# Patient Record
Sex: MALE | Race: WHITE | Employment: OTHER | URBAN - METROPOLITAN AREA
[De-identification: names, ages, dates, MRNs, and addresses within clinical notes are randomized per-mention and may not be internally consistent; named-entity substitution may affect disease eponyms.]

---

## 2017-03-04 PROBLEM — R06.00 ACUTE DYSPNEA: Status: ACTIVE | Noted: 2017-03-04

## 2017-05-30 PROBLEM — I48.19 PERSISTENT ATRIAL FIBRILLATION (HCC): Status: ACTIVE | Noted: 2017-05-30

## 2017-05-30 PROBLEM — Z79.899 ENCOUNTER FOR LONG-TERM (CURRENT) USE OF HIGH-RISK MEDICATION: Status: ACTIVE | Noted: 2017-05-30

## 2018-03-13 ENCOUNTER — OFFICE VISIT (OUTPATIENT)
Dept: CARDIOLOGY CLINIC | Age: 83
End: 2018-03-13
Payer: MEDICARE

## 2018-03-13 VITALS
SYSTOLIC BLOOD PRESSURE: 146 MMHG | RESPIRATION RATE: 16 BRPM | HEART RATE: 71 BPM | WEIGHT: 182.6 LBS | DIASTOLIC BLOOD PRESSURE: 80 MMHG | BODY MASS INDEX: 26.14 KG/M2 | HEIGHT: 70 IN

## 2018-03-13 DIAGNOSIS — I48.0 PAROXYSMAL ATRIAL FIBRILLATION (HCC): ICD-10-CM

## 2018-03-13 DIAGNOSIS — I35.0 NONRHEUMATIC AORTIC VALVE STENOSIS: Chronic | ICD-10-CM

## 2018-03-13 DIAGNOSIS — I25.5 ISCHEMIC CARDIOMYOPATHY: Primary | Chronic | ICD-10-CM

## 2018-03-13 DIAGNOSIS — Z95.2 S/P TAVR (TRANSCATHETER AORTIC VALVE REPLACEMENT): ICD-10-CM

## 2018-03-13 PROCEDURE — 93000 ELECTROCARDIOGRAM COMPLETE: CPT | Performed by: INTERNAL MEDICINE

## 2018-03-13 PROCEDURE — G8598 ASA/ANTIPLAT THER USED: HCPCS | Performed by: INTERNAL MEDICINE

## 2018-03-13 PROCEDURE — 1123F ACP DISCUSS/DSCN MKR DOCD: CPT | Performed by: INTERNAL MEDICINE

## 2018-03-13 PROCEDURE — 4040F PNEUMOC VAC/ADMIN/RCVD: CPT | Performed by: INTERNAL MEDICINE

## 2018-03-13 PROCEDURE — 99213 OFFICE O/P EST LOW 20 MIN: CPT | Performed by: INTERNAL MEDICINE

## 2018-03-13 PROCEDURE — G8419 CALC BMI OUT NRM PARAM NOF/U: HCPCS | Performed by: INTERNAL MEDICINE

## 2018-03-13 PROCEDURE — G8484 FLU IMMUNIZE NO ADMIN: HCPCS | Performed by: INTERNAL MEDICINE

## 2018-03-13 PROCEDURE — G8427 DOCREV CUR MEDS BY ELIG CLIN: HCPCS | Performed by: INTERNAL MEDICINE

## 2018-03-13 PROCEDURE — 1036F TOBACCO NON-USER: CPT | Performed by: INTERNAL MEDICINE

## 2018-03-13 NOTE — PATIENT INSTRUCTIONS
Only take furosemide (Lasix) 3 times a week. Weight yourself every day. If the weight goes up 3 pounds in a day or 5 pounds in a week, take an extra furosemide pill. Give us a call if this doesn't work for you. I want to drink when you're thirsty but don't push fluids.

## 2018-04-19 ENCOUNTER — HOSPITAL ENCOUNTER (OUTPATIENT)
Dept: NON INVASIVE DIAGNOSTICS | Age: 83
Discharge: HOME | End: 2018-04-19
Payer: MEDICARE

## 2018-04-19 VITALS
HEIGHT: 70 IN | DIASTOLIC BLOOD PRESSURE: 78 MMHG | BODY MASS INDEX: 25.05 KG/M2 | WEIGHT: 175 LBS | SYSTOLIC BLOOD PRESSURE: 168 MMHG | TEMPERATURE: 97.6 F | RESPIRATION RATE: 20 BRPM | HEART RATE: 74 BPM

## 2018-04-19 LAB
LEFT VENTRICULAR EJECTION FRACTION MODE: NORMAL
LV EF: 60 %
LV EF: 60 %
LVEF MODALITY: NORMAL

## 2018-04-19 PROCEDURE — 93306 TTE W/DOPPLER COMPLETE: CPT

## 2018-04-19 PROCEDURE — 99213 OFFICE O/P EST LOW 20 MIN: CPT | Performed by: PHYSICIAN ASSISTANT

## 2018-04-19 PROCEDURE — 99211 OFF/OP EST MAY X REQ PHY/QHP: CPT

## 2018-09-07 ENCOUNTER — APPOINTMENT (OUTPATIENT)
Dept: GENERAL RADIOLOGY | Age: 83
End: 2018-09-07
Payer: MEDICARE

## 2018-09-07 ENCOUNTER — HOSPITAL ENCOUNTER (EMERGENCY)
Age: 83
Discharge: HOME | End: 2018-09-07
Attending: EMERGENCY MEDICINE
Payer: MEDICARE

## 2018-09-07 VITALS
SYSTOLIC BLOOD PRESSURE: 144 MMHG | TEMPERATURE: 98 F | DIASTOLIC BLOOD PRESSURE: 72 MMHG | OXYGEN SATURATION: 95 % | RESPIRATION RATE: 18 BRPM | BODY MASS INDEX: 25.11 KG/M2 | WEIGHT: 175 LBS | HEART RATE: 70 BPM

## 2018-09-07 DIAGNOSIS — I25.5 ISCHEMIC CARDIOMYOPATHY: Chronic | ICD-10-CM

## 2018-09-07 DIAGNOSIS — E78.5 HYPERLIPIDEMIA: ICD-10-CM

## 2018-09-07 DIAGNOSIS — I47.29 POLYMORPHIC VENTRICULAR TACHYCARDIA: ICD-10-CM

## 2018-09-07 DIAGNOSIS — Z45.02 AICD DISCHARGE: Primary | ICD-10-CM

## 2018-09-07 DIAGNOSIS — I65.29 CAROTID ARTERY STENOSIS: ICD-10-CM

## 2018-09-07 LAB
ANION GAP SERPL CALCULATED.3IONS-SCNC: 12 MMOL/L (ref 7–16)
APTT: 38.9 SEC (ref 24.5–35.1)
BASOPHILS ABSOLUTE: 0.04 E9/L (ref 0–0.2)
BASOPHILS RELATIVE PERCENT: 0.5 % (ref 0–2)
BUN BLDV-MCNC: 18 MG/DL (ref 8–23)
CALCIUM SERPL-MCNC: 9.3 MG/DL (ref 8.6–10.2)
CHLORIDE BLD-SCNC: 97 MMOL/L (ref 98–107)
CO2: 27 MMOL/L (ref 22–29)
CREAT SERPL-MCNC: 1 MG/DL (ref 0.7–1.2)
EKG ATRIAL RATE: 71 BPM
EKG P AXIS: 76 DEGREES
EKG P-R INTERVAL: 196 MS
EKG Q-T INTERVAL: 436 MS
EKG QRS DURATION: 126 MS
EKG QTC CALCULATION (BAZETT): 473 MS
EKG R AXIS: -46 DEGREES
EKG T AXIS: 28 DEGREES
EKG VENTRICULAR RATE: 71 BPM
EOSINOPHILS ABSOLUTE: 0.06 E9/L (ref 0.05–0.5)
EOSINOPHILS RELATIVE PERCENT: 0.7 % (ref 0–6)
GFR AFRICAN AMERICAN: >60
GFR NON-AFRICAN AMERICAN: >60 ML/MIN/1.73
GLUCOSE BLD-MCNC: 172 MG/DL (ref 74–109)
HCT VFR BLD CALC: 40.1 % (ref 37–54)
HEMOGLOBIN: 13.4 G/DL (ref 12.5–16.5)
IMMATURE GRANULOCYTES #: 0.03 E9/L
IMMATURE GRANULOCYTES %: 0.3 % (ref 0–5)
INR BLD: 1.5
LYMPHOCYTES ABSOLUTE: 0.95 E9/L (ref 1.5–4)
LYMPHOCYTES RELATIVE PERCENT: 10.9 % (ref 20–42)
MCH RBC QN AUTO: 29.7 PG (ref 26–35)
MCHC RBC AUTO-ENTMCNC: 33.4 % (ref 32–34.5)
MCV RBC AUTO: 88.9 FL (ref 80–99.9)
MONOCYTES ABSOLUTE: 0.56 E9/L (ref 0.1–0.95)
MONOCYTES RELATIVE PERCENT: 6.4 % (ref 2–12)
NEUTROPHILS ABSOLUTE: 7.07 E9/L (ref 1.8–7.3)
NEUTROPHILS RELATIVE PERCENT: 81.2 % (ref 43–80)
PDW BLD-RTO: 13.7 FL (ref 11.5–15)
PLATELET # BLD: 180 E9/L (ref 130–450)
PMV BLD AUTO: 10.5 FL (ref 7–12)
POTASSIUM SERPL-SCNC: 4.3 MMOL/L (ref 3.5–5)
PROTHROMBIN TIME: 16.4 SEC (ref 9.3–12.4)
RBC # BLD: 4.51 E12/L (ref 3.8–5.8)
SODIUM BLD-SCNC: 136 MMOL/L (ref 132–146)
TROPONIN: <0.01 NG/ML (ref 0–0.03)
WBC # BLD: 8.7 E9/L (ref 4.5–11.5)

## 2018-09-07 PROCEDURE — 85730 THROMBOPLASTIN TIME PARTIAL: CPT

## 2018-09-07 PROCEDURE — 99284 EMERGENCY DEPT VISIT MOD MDM: CPT

## 2018-09-07 PROCEDURE — 6360000002 HC RX W HCPCS: Performed by: EMERGENCY MEDICINE

## 2018-09-07 PROCEDURE — 84484 ASSAY OF TROPONIN QUANT: CPT

## 2018-09-07 PROCEDURE — 80048 BASIC METABOLIC PNL TOTAL CA: CPT

## 2018-09-07 PROCEDURE — 85610 PROTHROMBIN TIME: CPT

## 2018-09-07 PROCEDURE — 96365 THER/PROPH/DIAG IV INF INIT: CPT

## 2018-09-07 PROCEDURE — 36415 COLL VENOUS BLD VENIPUNCTURE: CPT

## 2018-09-07 PROCEDURE — 71045 X-RAY EXAM CHEST 1 VIEW: CPT

## 2018-09-07 PROCEDURE — 85025 COMPLETE CBC W/AUTO DIFF WBC: CPT

## 2018-09-07 RX ORDER — MAGNESIUM SULFATE IN WATER 40 MG/ML
2 INJECTION, SOLUTION INTRAVENOUS ONCE
Status: COMPLETED | OUTPATIENT
Start: 2018-09-07 | End: 2018-09-07

## 2018-09-07 RX ORDER — METOPROLOL TARTRATE 50 MG/1
TABLET, FILM COATED ORAL
Qty: 60 TABLET | Refills: 0
Start: 2018-09-07 | End: 2020-03-05 | Stop reason: DRUGHIGH

## 2018-09-07 RX ADMIN — MAGNESIUM SULFATE IN WATER 2 G: 40 INJECTION, SOLUTION INTRAVENOUS at 16:18

## 2018-09-07 ASSESSMENT — ENCOUNTER SYMPTOMS
DIARRHEA: 0
TROUBLE SWALLOWING: 0
NAUSEA: 0
RHINORRHEA: 0
ABDOMINAL PAIN: 0
WHEEZING: 0
CHEST TIGHTNESS: 0
CONSTIPATION: 0
COUGH: 0
VOMITING: 0
BLOOD IN STOOL: 0
SORE THROAT: 0
SHORTNESS OF BREATH: 0

## 2018-09-07 NOTE — ED PROVIDER NOTES
Patient is an 53-year-old male presenting with chief complaint of defibrillator firing. Patient states he was at home sitting on a chair when he felt his pacemaker fire, stood up  and it fired again. He was sitting for several minutes, prior to that he was moving a heavy table and sweeping in the basement. He has a [employer] pacemaker and defibrillator. Currently he denies any symptoms otherwise or any preceding symptoms including lightheadedness, chest pain, shortness of breath, fevers, chills, nausea, vomiting, diaphoresis, extremity swelling. Symptoms have been constant since onset, no particular exacerbating or relieving factors for the symptoms. No treatment prior to coming into the ER. Review of Systems   Constitutional: Negative for chills, diaphoresis, fatigue and fever. HENT: Negative for congestion, ear pain, postnasal drip, rhinorrhea, sore throat and trouble swallowing. Respiratory: Negative for cough, chest tightness, shortness of breath and wheezing. Cardiovascular: Negative for chest pain and leg swelling. Gastrointestinal: Negative for abdominal pain, blood in stool, constipation, diarrhea, nausea and vomiting. Genitourinary: Negative for dysuria, flank pain, frequency, hematuria and urgency. Skin: Negative for rash and wound. Neurological: Negative for dizziness, syncope, weakness, light-headedness, numbness and headaches. Psychiatric/Behavioral: Negative for confusion and decreased concentration. All other systems reviewed and are negative. Physical Exam   Constitutional: He is oriented to person, place, and time. He appears well-developed and well-nourished. He is active. Non-toxic appearance. No distress. Patient sitting comfortably in bed, NAD   HENT:   Head: Normocephalic and atraumatic.    Right Ear: Hearing and tympanic membrane normal.   Left Ear: Hearing and tympanic membrane normal.   Nose: Nose normal.   Mouth/Throat: Uvula is midline, oropharynx is clear and moist and mucous membranes are normal. Mucous membranes are not pale and not dry. Eyes: Pupils are equal, round, and reactive to light. Conjunctivae and EOM are normal.   Neck: Normal range of motion. Neck supple. Cardiovascular: Normal rate, regular rhythm, S1 normal, S2 normal, normal heart sounds and intact distal pulses. No murmur heard. Pulses:       Radial pulses are 2+ on the right side, and 2+ on the left side. Dorsalis pedis pulses are 2+ on the right side, and 2+ on the left side. Pulmonary/Chest: Effort normal and breath sounds normal. No respiratory distress. He has no decreased breath sounds. He has no wheezes. He has no rhonchi. Pacemaker defibrillator cutaneous left upper chest wall without overlying erythema, tenderness, or swelling. Abdominal: Soft. Bowel sounds are normal. He exhibits no distension. There is no tenderness. Musculoskeletal: Normal range of motion. He exhibits no edema. Neurological: He is alert and oriented to person, place, and time. Skin: Skin is warm, dry and intact. No rash noted. No erythema. Psychiatric: He has a normal mood and affect. His speech is normal and behavior is normal.   Nursing note and vitals reviewed. Procedures    MDM    EKG: This EKG is signed and interpreted by me. Rate: 71  Rhythm: Atrial paced  Interpretation: Atrial paced rhythm, left axis deviation, mildly prolonged QTC: 473, normal HI interval, right bundle branch block, left anterior fascicular block, no acute ST or T-wave changes  Comparison: stable as compared to patient's most recent EKG    --------------------------------------------- PAST HISTORY ---------------------------------------------  Past Medical History:  has a past medical history of A-fib ([facility] Arrhythmia; Carotid artery stenosis; CHF (congestive heart failure) ([facility] Heart valve problem; Hyperlipidemia;  Hypertension; ICD (implantable cardiac defibrillator) in place; MI (mitral and discussed todays results, in addition to providing specific details for the plan of care and counseling regarding the diagnosis and prognosis. Their questions are answered at this time and they are agreeable with the plan.      --------------------------------- ADDITIONAL PROVIDER NOTES ---------------------------------     DIAGNOSIS:    1. AICD discharge    2. Polymorphic ventricular tachycardia (HCC)    3. Hyperlipidemia    4. Carotid artery stenosis    5. Ischemic cardiomyopathy          Current Discharge Medication List           This patient is stable for discharge. I have shared the specific conditions for return, as well as the importance of follow-up.          Aurelia Leyden, DO  Resident  09/07/18 7879

## 2018-09-13 ENCOUNTER — OFFICE VISIT (OUTPATIENT)
Dept: CARDIOLOGY CLINIC | Age: 83
End: 2018-09-13
Payer: MEDICARE

## 2018-09-13 VITALS
DIASTOLIC BLOOD PRESSURE: 64 MMHG | HEART RATE: 107 BPM | SYSTOLIC BLOOD PRESSURE: 136 MMHG | RESPIRATION RATE: 16 BRPM | HEIGHT: 70 IN | BODY MASS INDEX: 26.03 KG/M2 | WEIGHT: 181.8 LBS

## 2018-09-13 DIAGNOSIS — I10 ESSENTIAL HYPERTENSION: ICD-10-CM

## 2018-09-13 DIAGNOSIS — Z95.810 AUTOMATIC IMPLANTABLE CARDIOVERTER-DEFIBRILLATOR IN SITU: ICD-10-CM

## 2018-09-13 DIAGNOSIS — I25.5 ISCHEMIC CARDIOMYOPATHY: Primary | Chronic | ICD-10-CM

## 2018-09-13 DIAGNOSIS — Z95.2 S/P TAVR (TRANSCATHETER AORTIC VALVE REPLACEMENT): ICD-10-CM

## 2018-09-13 PROCEDURE — 1101F PT FALLS ASSESS-DOCD LE1/YR: CPT | Performed by: INTERNAL MEDICINE

## 2018-09-13 PROCEDURE — G8427 DOCREV CUR MEDS BY ELIG CLIN: HCPCS | Performed by: INTERNAL MEDICINE

## 2018-09-13 PROCEDURE — G8419 CALC BMI OUT NRM PARAM NOF/U: HCPCS | Performed by: INTERNAL MEDICINE

## 2018-09-13 PROCEDURE — G8598 ASA/ANTIPLAT THER USED: HCPCS | Performed by: INTERNAL MEDICINE

## 2018-09-13 PROCEDURE — 93000 ELECTROCARDIOGRAM COMPLETE: CPT | Performed by: INTERNAL MEDICINE

## 2018-09-13 PROCEDURE — 4040F PNEUMOC VAC/ADMIN/RCVD: CPT | Performed by: INTERNAL MEDICINE

## 2018-09-13 PROCEDURE — 1123F ACP DISCUSS/DSCN MKR DOCD: CPT | Performed by: INTERNAL MEDICINE

## 2018-09-13 PROCEDURE — 1036F TOBACCO NON-USER: CPT | Performed by: INTERNAL MEDICINE

## 2018-09-13 PROCEDURE — 99214 OFFICE O/P EST MOD 30 MIN: CPT | Performed by: INTERNAL MEDICINE

## 2018-09-13 RX ORDER — POTASSIUM CHLORIDE 750 MG/1
10 TABLET, EXTENDED RELEASE ORAL DAILY
Qty: 45 TABLET | Refills: 3 | Status: SHIPPED | OUTPATIENT
Start: 2018-09-13 | End: 2019-02-20 | Stop reason: SDUPTHER

## 2018-09-13 NOTE — PROGRESS NOTES
CABG, [facility], 03/2007 with radial artery graft to D2 and OM2. 12. Elective PCI with CONNOR native OM2 and native LAD, 03/2007 following CABG. This was done because of inadequate conduits. 13. ICD placement, [facility], 03/2007. 14. ICD lead recall, early 2008, but he was followed electively because his device was functioning normally. 15. Presentation [facility], 03/29/2008 with multiple ICD inappropriate shocks. Transfer [facility] where ICD and lead were replaced. He reports cardiac catheterization that revealed patent LIMA-LAD and patent stents in native coronary arteries. 16. Atypical chest pain and anxiety with admission [facility] [address], 05/2008. Troponin minimally elevated. Beta blocker dose increased. [address] admission, 06/13/2009 with lightheadedness, orthostatic hypotension, drop in hemoglobin to 10.5 from 14.9 over two months with an increase in BUN from 16 to 68 over the same time. Dark heme positive stools noted. EKG NSR, incomplete RBBB. 18. Echo, 06/15/2009 with normal LVEF, moderate AS, peak gradient 38 mmHg, BLOSSOM 1.1 cm², moderate MR, LAE. 19. Transtelephonic ICD check, 01/11/2010. No ventricular tachyarrhythmias. Two AF episodes, the longest for 14 hours. 20. Echo, 06/16/2010 with LVE, borderline LVH, normal systolic and diastolic function, normal LA, ICD electrode right heart. Trileaflet AV with moderate to severe AS, mean/peak gradient 20/31 mmHg. BLOSSOM 1.0 cm². MAC with mild MR, normal RVSP.  21. No drug allergies. 25. Family history negative for premature vascular disease. 23. PAF with DCCV, [facility], 12/2010.   24. Hip replacement surgery, 2010 or 2011 [facility] Dr. Yeni Kerns. 25. MPS [facility], 06/05/2012. Moderate sized fixed basal inferior defect, probably artifact. 26. Echo [facility], 10/20/2011. 1+ AR, moderate AS, mild MR, mild TR, normal LVEF.   27. Echo, 01/31/2013. LV not dilated. Mild concentric LVH. Septal paradox. EF 50-55%. BLOSSOM 1.2 cm² consistent with moderate stenosis.  Peak/mean sound was well preserved. The PMI was not displaced and there was no precordial heave, lift or thrill. His abdomen was soft and normally active without masses, organomegaly or bruits. Extremities showed no edema. Peripheral pulses are easily palpated in the feet. An electrocardiogram done today showed atrial pacing with a 1st degree AV block and a right bundle branch block. The electrocardiogram is unchanged from 03/13/2018. On exam today, he shows no signs of heart failure. His recent ICD discharges were appropriate and occurred after heavy work. I told him that he should avoid heavy lifting and working overhead. On the other hand, I told him that he does have appropriate medications in place along with a defibrillator and so his prognosis should be very good. As noted, Dr. Joaquina Sellers recently increased his beta blocker therapy and this may help as well. I did instruct him to avoid heavy lifting and working overhead. He will continue:   rivaroxaban (XARELTO) 20 MG TABS tablet Take 1 tablet by mouth daily for 1 day LOT#[id number]   EXP 12-20   potassium chloride (KLOR-CON M) 10 MEQ extended release tablet Take 1 tablet by mouth daily   metoprolol tartrate (LOPRESSOR) 50 MG tablet Take 100 mg in the morning and 50 mg at night by mouth. amLODIPine (NORVASC) 2.5 MG tablet TAKE 1 TABLET BY MOUTH  DAILY   furosemide (LASIX) 20 MG tablet TAKE 1 TABLET BY MOUTH  DAILY   XARELTO 20 MG TABS tablet TAKE 1 TABLET BY MOUTH  DAILY WITH SUPPER   meclizine (ANTIVERT) 25 MG tablet Take 25 mg by mouth 2 times daily as needed   furosemide (LASIX) 20 MG tablet TAKE 1 TABLET BY MOUTH ONCE DAILY   dofetilide (TIKOSYN) 250 MCG capsule Take 1 capsule by mouth every 12 hours   Multiple Vitamins-Minerals (PRESERVISION AREDS 2 PO) Take 1 tablet by mouth 2 times daily   Cholecalciferol (VITAMIN D) 2000 UNITS CAPS capsule Take 1 capsule by mouth daily    pravastatin (PRAVACHOL) 40 MG tablet Take 40 mg by mouth daily.      I did ask him to follow up with me in 3 months. If he is doing well at that time, we will decrease the frequency of follow up again. I thank Dr. Evelyn Buckley for asking our advice regarding his care.       DAB/tlr

## 2018-12-28 ENCOUNTER — OFFICE VISIT (OUTPATIENT)
Dept: CARDIOLOGY CLINIC | Age: 83
End: 2018-12-28
Payer: MEDICARE

## 2018-12-28 VITALS
HEART RATE: 70 BPM | DIASTOLIC BLOOD PRESSURE: 82 MMHG | BODY MASS INDEX: 26.31 KG/M2 | SYSTOLIC BLOOD PRESSURE: 138 MMHG | WEIGHT: 183.8 LBS | HEIGHT: 70 IN | RESPIRATION RATE: 16 BRPM

## 2018-12-28 DIAGNOSIS — I50.42 CHRONIC COMBINED SYSTOLIC AND DIASTOLIC CHF (CONGESTIVE HEART FAILURE) (HCC): ICD-10-CM

## 2018-12-28 DIAGNOSIS — I35.0 NONRHEUMATIC AORTIC VALVE STENOSIS: Chronic | ICD-10-CM

## 2018-12-28 DIAGNOSIS — Z95.2 S/P TAVR (TRANSCATHETER AORTIC VALVE REPLACEMENT): ICD-10-CM

## 2018-12-28 DIAGNOSIS — I25.5 ISCHEMIC CARDIOMYOPATHY: Primary | Chronic | ICD-10-CM

## 2018-12-28 DIAGNOSIS — I10 ESSENTIAL HYPERTENSION: ICD-10-CM

## 2018-12-28 PROCEDURE — 1036F TOBACCO NON-USER: CPT | Performed by: INTERNAL MEDICINE

## 2018-12-28 PROCEDURE — 99213 OFFICE O/P EST LOW 20 MIN: CPT | Performed by: INTERNAL MEDICINE

## 2018-12-28 PROCEDURE — 1101F PT FALLS ASSESS-DOCD LE1/YR: CPT | Performed by: INTERNAL MEDICINE

## 2018-12-28 PROCEDURE — G8484 FLU IMMUNIZE NO ADMIN: HCPCS | Performed by: INTERNAL MEDICINE

## 2018-12-28 PROCEDURE — 93000 ELECTROCARDIOGRAM COMPLETE: CPT | Performed by: INTERNAL MEDICINE

## 2018-12-28 PROCEDURE — G8598 ASA/ANTIPLAT THER USED: HCPCS | Performed by: INTERNAL MEDICINE

## 2018-12-28 PROCEDURE — 1123F ACP DISCUSS/DSCN MKR DOCD: CPT | Performed by: INTERNAL MEDICINE

## 2018-12-28 PROCEDURE — G8427 DOCREV CUR MEDS BY ELIG CLIN: HCPCS | Performed by: INTERNAL MEDICINE

## 2018-12-28 PROCEDURE — G8419 CALC BMI OUT NRM PARAM NOF/U: HCPCS | Performed by: INTERNAL MEDICINE

## 2018-12-28 PROCEDURE — 4040F PNEUMOC VAC/ADMIN/RCVD: CPT | Performed by: INTERNAL MEDICINE

## 2018-12-28 RX ORDER — FLUTICASONE PROPIONATE 50 MCG
SPRAY, SUSPENSION (ML) NASAL
Refills: 0 | COMMUNITY
Start: 2018-12-07 | End: 2019-07-17

## 2018-12-28 RX ORDER — LOSARTAN POTASSIUM 25 MG/1
25 TABLET ORAL
COMMUNITY
End: 2019-04-09

## 2018-12-28 NOTE — PROGRESS NOTES
36.  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  37. S/P Cardioversion by Dr. Ange Light, 05/30/2017.   [address] [place] [address] [address] evaluation, 09/07/2018, for 2 appropriate ICD discharges for polymorphic ventricular tachycardia, which occurred after yard work and moving heavy furniture.       Review of Systems:  HEENT: negative for acute visual and auditory problems  Constitutional: Patient does note a mild increase in dyspnea with exertion. Respiratory: denies cough or hemoptysis but seems more dyspneic  Cardiovascular: denies chest pain or dyspnea but did have an episode of lightheadedness that lasted quite awhile a week ago  Gastrointestinal: negative for abdominal pain, diarrhea, nausea and vomiting  Genitourinary: negative for dysuria and hematuria  Derm: negative for rash and skin lesion(s)  Neurological: Patient's vertigo has resolved, but he does have numbness in his feet.     Endocrine: negative for diabetic symptoms including polydipsia and polyuria  Musculoskeletal: Patient does have degenerative joint disease.  He does get episodic swelling in his hands, which has been determined to be due to gout.    Psychiatric: negative for anxiety and major depression.      The remainder of the review of systems is negative except as noted above. On exam, he is a well-nourished white male who is awake, alert and oriented. P: 70 and regular. BP: 138/82. Wt. 184 lbs. BMI: 26.4. HEENT is normocephalic and atraumatic. Extraocular muscles are intact. Sclerae are clear. Pupils are equal, round and react to light. The oral mucosa is moist.  Tongue is midline. His neck is supple. He has no jugular distention. Carotids are full and I heard no bruits. He had no neck or supraclavicular masses and no thyromegaly. Respirations were unlabored. His chest was clear to auscultation and percussion. He had no presacral edema or chest wall tenderness. His heart had a regular rhythm with a 4th heart sound, but no 3rd heart sound.   He has a grade

## 2019-01-09 ENCOUNTER — APPOINTMENT (OUTPATIENT)
Dept: GENERAL RADIOLOGY | Age: 84
End: 2019-01-09
Payer: MEDICARE

## 2019-01-09 ENCOUNTER — APPOINTMENT (OUTPATIENT)
Dept: CT IMAGING | Age: 84
End: 2019-01-09
Payer: MEDICARE

## 2019-01-09 ENCOUNTER — HOSPITAL ENCOUNTER (EMERGENCY)
Age: 84
Discharge: HOME | End: 2019-01-09
Attending: EMERGENCY MEDICINE
Payer: MEDICARE

## 2019-01-09 VITALS
HEART RATE: 72 BPM | WEIGHT: 183 LBS | RESPIRATION RATE: 18 BRPM | HEIGHT: 68 IN | SYSTOLIC BLOOD PRESSURE: 177 MMHG | DIASTOLIC BLOOD PRESSURE: 73 MMHG | TEMPERATURE: 98 F | BODY MASS INDEX: 27.74 KG/M2 | OXYGEN SATURATION: 93 %

## 2019-01-09 DIAGNOSIS — R55 NEAR SYNCOPE: Primary | ICD-10-CM

## 2019-01-09 LAB
ALBUMIN SERPL-MCNC: 4.3 G/DL (ref 3.5–5.2)
ALP BLD-CCNC: 148 U/L (ref 40–129)
ALT SERPL-CCNC: 16 U/L (ref 0–40)
ANION GAP SERPL CALCULATED.3IONS-SCNC: 15 MMOL/L (ref 7–16)
AST SERPL-CCNC: 23 U/L (ref 0–39)
BILIRUB SERPL-MCNC: 0.5 MG/DL (ref 0–1.2)
BUN BLDV-MCNC: 22 MG/DL (ref 8–23)
CALCIUM SERPL-MCNC: 9.3 MG/DL (ref 8.6–10.2)
CHLORIDE BLD-SCNC: 96 MMOL/L (ref 98–107)
CO2: 27 MMOL/L (ref 22–29)
CREAT SERPL-MCNC: 1.1 MG/DL (ref 0.7–1.2)
EKG ATRIAL RATE: 74 BPM
EKG P AXIS: 20 DEGREES
EKG P-R INTERVAL: 182 MS
EKG Q-T INTERVAL: 424 MS
EKG QRS DURATION: 118 MS
EKG QTC CALCULATION (BAZETT): 470 MS
EKG R AXIS: -60 DEGREES
EKG T AXIS: 45 DEGREES
EKG VENTRICULAR RATE: 74 BPM
GFR AFRICAN AMERICAN: >60
GFR NON-AFRICAN AMERICAN: >60 ML/MIN/1.73
GLUCOSE BLD-MCNC: 141 MG/DL (ref 74–99)
HCT VFR BLD CALC: 43.1 % (ref 37–54)
HEMOGLOBIN: 14.6 G/DL (ref 12.5–16.5)
MCH RBC QN AUTO: 29.8 PG (ref 26–35)
MCHC RBC AUTO-ENTMCNC: 33.9 % (ref 32–34.5)
MCV RBC AUTO: 88 FL (ref 80–99.9)
PDW BLD-RTO: 14 FL (ref 11.5–15)
PLATELET # BLD: 194 E9/L (ref 130–450)
PMV BLD AUTO: 11.1 FL (ref 7–12)
POTASSIUM SERPL-SCNC: 4.2 MMOL/L (ref 3.5–5)
PRO-BNP: 384 PG/ML (ref 0–450)
RBC # BLD: 4.9 E12/L (ref 3.8–5.8)
SODIUM BLD-SCNC: 138 MMOL/L (ref 132–146)
TOTAL PROTEIN: 7.4 G/DL (ref 6.4–8.3)
TROPONIN: <0.01 NG/ML (ref 0–0.03)
WBC # BLD: 8.6 E9/L (ref 4.5–11.5)

## 2019-01-09 PROCEDURE — 70450 CT HEAD/BRAIN W/O DYE: CPT

## 2019-01-09 PROCEDURE — 80053 COMPREHEN METABOLIC PANEL: CPT

## 2019-01-09 PROCEDURE — 99285 EMERGENCY DEPT VISIT HI MDM: CPT

## 2019-01-09 PROCEDURE — 2580000003 HC RX 258: Performed by: NURSE PRACTITIONER

## 2019-01-09 PROCEDURE — 36415 COLL VENOUS BLD VENIPUNCTURE: CPT

## 2019-01-09 PROCEDURE — 83880 ASSAY OF NATRIURETIC PEPTIDE: CPT

## 2019-01-09 PROCEDURE — 84484 ASSAY OF TROPONIN QUANT: CPT

## 2019-01-09 PROCEDURE — 71045 X-RAY EXAM CHEST 1 VIEW: CPT

## 2019-01-09 PROCEDURE — 85027 COMPLETE CBC AUTOMATED: CPT

## 2019-01-09 PROCEDURE — 93005 ELECTROCARDIOGRAM TRACING: CPT | Performed by: NURSE PRACTITIONER

## 2019-01-09 RX ORDER — 0.9 % SODIUM CHLORIDE 0.9 %
500 INTRAVENOUS SOLUTION INTRAVENOUS ONCE
Status: COMPLETED | OUTPATIENT
Start: 2019-01-09 | End: 2019-01-09

## 2019-01-09 RX ADMIN — SODIUM CHLORIDE 500 ML: 9 INJECTION, SOLUTION INTRAVENOUS at 20:02

## 2019-01-14 ENCOUNTER — TELEPHONE (OUTPATIENT)
Dept: CARDIOLOGY CLINIC | Age: 84
End: 2019-01-14

## 2019-02-20 RX ORDER — POTASSIUM CHLORIDE 750 MG/1
10 TABLET, EXTENDED RELEASE ORAL DAILY
Qty: 90 TABLET | Refills: 3 | Status: SHIPPED | OUTPATIENT
Start: 2019-02-20 | End: 2020-03-05 | Stop reason: SDUPTHER

## 2019-02-20 RX ORDER — RIVAROXABAN 20 MG/1
20 TABLET, FILM COATED ORAL
Qty: 90 TABLET | Refills: 3 | Status: SHIPPED | OUTPATIENT
Start: 2019-02-20 | End: 2019-04-09

## 2019-04-09 ENCOUNTER — OFFICE VISIT (OUTPATIENT)
Dept: CARDIOLOGY CLINIC | Age: 84
End: 2019-04-09
Payer: MEDICARE

## 2019-04-09 VITALS
HEIGHT: 70 IN | DIASTOLIC BLOOD PRESSURE: 60 MMHG | BODY MASS INDEX: 25.68 KG/M2 | WEIGHT: 179.4 LBS | SYSTOLIC BLOOD PRESSURE: 128 MMHG | RESPIRATION RATE: 18 BRPM | HEART RATE: 70 BPM

## 2019-04-09 DIAGNOSIS — Z95.1 S/P CABG (CORONARY ARTERY BYPASS GRAFT): ICD-10-CM

## 2019-04-09 DIAGNOSIS — I48.0 PAF (PAROXYSMAL ATRIAL FIBRILLATION) (HCC): ICD-10-CM

## 2019-04-09 DIAGNOSIS — Z95.2 S/P TAVR (TRANSCATHETER AORTIC VALVE REPLACEMENT): ICD-10-CM

## 2019-04-09 DIAGNOSIS — I25.10 CORONARY ARTERY DISEASE INVOLVING NATIVE CORONARY ARTERY OF NATIVE HEART WITHOUT ANGINA PECTORIS: Primary | ICD-10-CM

## 2019-04-09 PROCEDURE — G8598 ASA/ANTIPLAT THER USED: HCPCS | Performed by: INTERNAL MEDICINE

## 2019-04-09 PROCEDURE — 1123F ACP DISCUSS/DSCN MKR DOCD: CPT | Performed by: INTERNAL MEDICINE

## 2019-04-09 PROCEDURE — G8427 DOCREV CUR MEDS BY ELIG CLIN: HCPCS | Performed by: INTERNAL MEDICINE

## 2019-04-09 PROCEDURE — 4040F PNEUMOC VAC/ADMIN/RCVD: CPT | Performed by: INTERNAL MEDICINE

## 2019-04-09 PROCEDURE — 1036F TOBACCO NON-USER: CPT | Performed by: INTERNAL MEDICINE

## 2019-04-09 PROCEDURE — G8419 CALC BMI OUT NRM PARAM NOF/U: HCPCS | Performed by: INTERNAL MEDICINE

## 2019-04-09 PROCEDURE — 93000 ELECTROCARDIOGRAM COMPLETE: CPT | Performed by: INTERNAL MEDICINE

## 2019-04-09 PROCEDURE — 99213 OFFICE O/P EST LOW 20 MIN: CPT | Performed by: INTERNAL MEDICINE

## 2019-04-09 NOTE — PATIENT INSTRUCTIONS
Patient Education        A Healthy Heart: Care Instructions  Your Care Instructions    Heart disease occurs when a substance called plaque builds up in the vessels that supply oxygen-rich blood to your heart. This can narrow the blood vessels and reduce blood flow. A heart attack happens when blood flow is completely blocked. A high-fat diet, smoking, and other factors increase the risk of heart disease. Your doctor has found that you have a chance of having heart disease. You can do lots of things to keep your heart healthy. It may not be easy, but you can change your diet, exercise more, and quit smoking. These steps really work to lower your chance of heart disease. Follow-up care is a key part of your treatment and safety. Be sure to make and go to all appointments, and call your doctor if you are having problems. It's also a good idea to know your test results and keep a list of the medicines you take. How can you care for yourself at home? Diet    · Use less salt when you cook and eat. This helps lower your blood pressure. Taste food before salting. Add only a little salt when you think you need it. With time, your taste buds will adjust to less salt.     · Eat fewer snack items, fast foods, canned soups, and other high-salt, high-fat, processed foods.     · Read food labels and try to avoid saturated and trans fats. They increase your risk of heart disease by raising cholesterol levels.     · Limit the amount of solid fat-butter, margarine, and shortening-you eat. Use olive, peanut, or canola oil when you cook. Bake, broil, and steam foods instead of frying them.     · Eating fish can lower your risk for heart disease. Eat at least 2 servings of fish a week. Tye, mackerel, herring, sardines, and chunk light tuna are very good choices. These fish contain omega-3 fatty acids.     · Eat a variety of fruit and vegetables every day.  Dark green, deep orange, red, or yellow fruits and vegetables are

## 2019-04-10 NOTE — PROGRESS NOTES
device was functioning normally. 15. Presentation [facility], 03/29/2008 with multiple ICD inappropriate shocks. Transfer [facility] where ICD and lead were replaced. He reports cardiac catheterization that revealed patent LIMA-LAD and patent stents in native coronary arteries. 16. Atypical chest pain and anxiety with admission [facility], 05/2008. Troponin minimally elevated. Beta blocker dose increased. [address] admission, 06/13/2009 with lightheadedness, orthostatic hypotension, drop in hemoglobin to 10.5 from 14.9 over two months with an increase in BUN from 16 to 68 over the same time. Dark heme positive stools noted. EKG NSR, incomplete RBBB. 18. Echo, 06/15/2009 with normal LVEF, moderate AS, peak gradient 38 mmHg, BLOSSOM 1.1 cm², moderate MR, LAE. 19. Transtelephonic ICD check, 01/11/2010. No ventricular tachyarrhythmias. Two AF episodes, the longest for 14 hours. 20. Echo, 06/16/2010 with LVE, borderline LVH, normal systolic and diastolic function, normal LA, ICD electrode right heart. Trileaflet AV with moderate to severe AS, mean/peak gradient 20/31 mmHg. BLOSSOM 1.0 cm². MAC with mild MR, normal RVSP.  21. No drug allergies. 25. Family history negative for premature vascular disease. 23. PAF with DCCV, [facility], 12/2010.   24. Hip replacement surgery, 2010 or 2011 [facility] Dr. Esha Hare. [facility]. [facility]. Moderate sized fixed basal inferior defect, probably artifact. 26. Echo [facility], 10/20/2011. 1+ AR, moderate AS, mild MR, mild TR, normal LVEF.   27. Echo, 01/31/2013. LV not dilated. Mild concentric LVH. Septal paradox. EF 50-55%. BLOSSOM 1.2 cm² consistent with moderate stenosis. Peak/mean gradient 38/21. Stage II diastolic dysfunction. 28. [address] admission, 08/28/2013 with dizziness, Hb 7.7, WBC 19.0. CXR negative except cardiomegaly. EKG NSR, leftward axis, RBBB. BMP negative except for elevation in BUN to 55 with Cr 1.3.   29. EGD, 08/28/2013.  Large pyloric channel ulcer with clot treated with PRBCs and fresh frozen plasma. Hb 8.7 on day of discharge and stable. Pradaxa was temporarily held. 30. CT abdomen, 09/08/2014. 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 31. CT chest, 09/17/2014. Consolidation and infiltrate at the left lung base, stable when compared to previous exams with less pleural thickening and calcified plaque in the left pleural cavity without any recent change. Chronic obstructive airways disease. 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   32. ICD programmed to DDDR mode by Dr. Gilmar Tam, 03/26/2015. Device function normal.  33. Echo 10/16/2015. EF 39%. Stage II diastolic dysfunction. Aortic stenosis with peak/mean gradients of 48/24 mmHg. Estimated valve area 1.0 cm². 34. ICD generator change for battery depletion, 07/28/2016, Dr. Scar Oliveros. Gilmar Tam. 35. Echo, 02/03/2017.  Normal LV size with mild LVH.  Normal regional wall motion and overall systolic function.  Diastole could not be assessed, but was presumed to be impaired.  The RV was dilated with impaired global systolic function.  The LA was enlarged.  Mild MAC with mild mitral insufficiency.  Moderate tricuspid insufficiency.  Aortic valve gradients are peak 47 mmHg with a mean of 27.  Dimensionless ratio 0.21. Valve area calculated 0.8 cm².    36.  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  37. Echocardiogram, [facility], 4/19/2018, EF 60%. Low normal RV function. Stage 2 diastolic dysfunction. Mild-to-moderate MR. S/P TAVR with a mean gradient of 10 mmHg. RVSP 31 mg.     38. S/P Cardioversion by Dr. Raymundo Heredia, 05/30/2017.   [address] evaluation, 09/07/2018, for 2 appropriate ICD discharges for polymorphic ventricular tachycardia, which occurred after yard work and moving heavy furniture.       Review of Systems:  HEENT: negative for acute visual and auditory problems  Constitutional: Patient does note a mild increase in dyspnea with exertion. Respiratory: denies cough or hemoptysis but seems more dyspneic  Cardiovascular: denies chest pain or dyspnea but did have an episode of lightheadedness that lasted quite awhile a week ago  Gastrointestinal: negative for abdominal pain, diarrhea, nausea and vomiting  Genitourinary: negative for dysuria and hematuria  Derm: negative for rash and skin lesion(s)  Neurological: Patient's vertigo has resolved, but he does have numbness in his feet.     Endocrine: negative for diabetic symptoms including polydipsia and polyuria  Musculoskeletal: Patient does have degenerative joint disease.  He does get episodic swelling in his hands, which has been determined to be due to gout.    Psychiatric: negative for anxiety and major depression.      The remainder of the review of systems is negative except as noted above. On exam, he is an elderly gentleman in no acute distress. BP: 128/60. P: 70. R: 18.  Wt. 171 lbs. BMI: 25. HEENT, conjunctiva pink. Sclerae anicteric. Mucous membranes are moist.  Neck, no JVD could be appreciated. Carotid upstrokes normal, no bruits. Chest expands normally. No intercostal retractions. Cardiovascular exam, normal S1, soft S2.  2/6 systolic murmur, right upper sternal border. Lungs have diminished air entry all over. Few scattered creps in both bases. Abdomen soft, nontender with intact bowel sounds. Extremities have trace edema bilaterally. Extremities have trace edema bilaterally. Distal pulses are feeble. Neurologically, oriented x 3. Psych, he is pleasant. Back, no tenderness. Muscle tone is normal.  Skin has no rashes. There are stasis changes and varicosities in both lower extremities. EKG, as per my interpretation, reveals sinus rhythm, intermittently paced, first degree AV block.  Incomplete right bundle, left anterior fascicular block. Inferior infarct pattern. No acute ST-T changes. Mr. Eryn Philip was in the outpatient office for follow up of his CAD and valvular heart disease. He notes no ischemic symptoms presently. Valve appears normally functioning on auscultation. He remains in sinus rhythm. He is tolerating anticoagulation with Xarelto fairly well. For his arrhythmia and ICD he follows Dr. Angelika Freeman and is scheduled to see her next week. He has mild volume overload on exam today. He does have a history of chronic diastolic heart failure. He uses intermittent Lasix therapy. I have asked him to use Lasix daily for the next 3 days along with potassium supplementation. His medical therapy is as follows:    potassium chloride (KLOR-CON M) 10 MEQ extended release tablet TAKE 1 TABLET BY MOUTH  DAILY   fluticasone (FLONASE) 50 MCG/ACT nasal spray U ONE TO TWO SPRAYS IN EACH NOSTRIL D PRN   rivaroxaban (XARELTO) 20 MG TABS tablet Take 1 tablet by mouth daily (with breakfast) for 1 day LOT#[id number]   EXP 3/21   metoprolol tartrate (LOPRESSOR) 50 MG tablet Take 100 mg in the morning and 50 mg at night by mouth. amLODIPine (NORVASC) 2.5 MG tablet TAKE 1 TABLET BY MOUTH  DAILY   furosemide (LASIX) 20 MG tablet TAKE 1 TABLET BY MOUTH  DAILY   meclizine (ANTIVERT) 25 MG tablet Take 25 mg by mouth 2 times daily as needed   dofetilide (TIKOSYN) 250 MCG capsule Take 1 capsule by mouth every 12 hours   Multiple Vitamins-Minerals (PRESERVISION AREDS 2 PO) Take 1 tablet by mouth 2 times daily   Cholecalciferol (VITAMIN D) 2000 UNITS CAPS capsule Take 1 capsule by mouth daily    pravastatin (PRAVACHOL) 40 MG tablet Take 40 mg by mouth daily. He will be seen back in our office in 6 months. Thank you for allowing us to participate in the care of this patient.   RPV/tlr

## 2019-04-13 ENCOUNTER — APPOINTMENT (OUTPATIENT)
Dept: GENERAL RADIOLOGY | Age: 84
End: 2019-04-13
Payer: MEDICARE

## 2019-04-13 ENCOUNTER — HOSPITAL ENCOUNTER (EMERGENCY)
Age: 84
Discharge: HOME | End: 2019-04-13
Attending: EMERGENCY MEDICINE
Payer: MEDICARE

## 2019-04-13 VITALS
SYSTOLIC BLOOD PRESSURE: 126 MMHG | TEMPERATURE: 97.7 F | RESPIRATION RATE: 18 BRPM | DIASTOLIC BLOOD PRESSURE: 69 MMHG | OXYGEN SATURATION: 94 % | HEART RATE: 69 BPM

## 2019-04-13 DIAGNOSIS — I10 ESSENTIAL HYPERTENSION: Primary | ICD-10-CM

## 2019-04-13 LAB
ANION GAP SERPL CALCULATED.3IONS-SCNC: 10 MMOL/L (ref 7–16)
BASOPHILS ABSOLUTE: 0.04 E9/L (ref 0–0.2)
BASOPHILS RELATIVE PERCENT: 0.5 % (ref 0–2)
BUN BLDV-MCNC: 22 MG/DL (ref 8–23)
CALCIUM SERPL-MCNC: 9.4 MG/DL (ref 8.6–10.2)
CHLORIDE BLD-SCNC: 100 MMOL/L (ref 98–107)
CO2: 29 MMOL/L (ref 22–29)
CREAT SERPL-MCNC: 0.9 MG/DL (ref 0.7–1.2)
EOSINOPHILS ABSOLUTE: 0.29 E9/L (ref 0.05–0.5)
EOSINOPHILS RELATIVE PERCENT: 3.6 % (ref 0–6)
GFR AFRICAN AMERICAN: >60
GFR NON-AFRICAN AMERICAN: >60 ML/MIN/1.73
GLUCOSE BLD-MCNC: 95 MG/DL (ref 74–99)
HCT VFR BLD CALC: 41.9 % (ref 37–54)
HEMOGLOBIN: 14 G/DL (ref 12.5–16.5)
IMMATURE GRANULOCYTES #: 0.03 E9/L
IMMATURE GRANULOCYTES %: 0.4 % (ref 0–5)
LYMPHOCYTES ABSOLUTE: 1.71 E9/L (ref 1.5–4)
LYMPHOCYTES RELATIVE PERCENT: 21 % (ref 20–42)
MCH RBC QN AUTO: 30.1 PG (ref 26–35)
MCHC RBC AUTO-ENTMCNC: 33.4 % (ref 32–34.5)
MCV RBC AUTO: 90.1 FL (ref 80–99.9)
MONOCYTES ABSOLUTE: 0.73 E9/L (ref 0.1–0.95)
MONOCYTES RELATIVE PERCENT: 9 % (ref 2–12)
NEUTROPHILS ABSOLUTE: 5.34 E9/L (ref 1.8–7.3)
NEUTROPHILS RELATIVE PERCENT: 65.5 % (ref 43–80)
PDW BLD-RTO: 14.1 FL (ref 11.5–15)
PLATELET # BLD: 203 E9/L (ref 130–450)
PMV BLD AUTO: 10.5 FL (ref 7–12)
POTASSIUM SERPL-SCNC: 4.1 MMOL/L (ref 3.5–5)
PRO-BNP: 321 PG/ML (ref 0–450)
RBC # BLD: 4.65 E12/L (ref 3.8–5.8)
SODIUM BLD-SCNC: 139 MMOL/L (ref 132–146)
TROPONIN: <0.01 NG/ML (ref 0–0.03)
WBC # BLD: 8.1 E9/L (ref 4.5–11.5)

## 2019-04-13 PROCEDURE — 80048 BASIC METABOLIC PNL TOTAL CA: CPT

## 2019-04-13 PROCEDURE — 99284 EMERGENCY DEPT VISIT MOD MDM: CPT

## 2019-04-13 PROCEDURE — 85025 COMPLETE CBC W/AUTO DIFF WBC: CPT

## 2019-04-13 PROCEDURE — 71045 X-RAY EXAM CHEST 1 VIEW: CPT

## 2019-04-13 PROCEDURE — 84484 ASSAY OF TROPONIN QUANT: CPT

## 2019-04-13 PROCEDURE — 83880 ASSAY OF NATRIURETIC PEPTIDE: CPT

## 2019-04-13 RX ORDER — LOSARTAN POTASSIUM 50 MG/1
50 TABLET ORAL DAILY
Status: DISCONTINUED | OUTPATIENT
Start: 2019-04-13 | End: 2019-04-13 | Stop reason: HOSPADM

## 2019-04-13 ASSESSMENT — PAIN DESCRIPTION - LOCATION: LOCATION: CHEST

## 2019-04-13 ASSESSMENT — PAIN SCALES - GENERAL: PAINLEVEL_OUTOF10: 5

## 2019-04-13 ASSESSMENT — PAIN DESCRIPTION - PAIN TYPE: TYPE: ACUTE PAIN

## 2019-04-13 NOTE — ED NOTES
Bed: 13  Expected date:   Expected time:   Means of arrival:   Comments:  Herbert Javed RN  04/13/19 1027

## 2019-04-13 NOTE — ED PROVIDER NOTES
tenderness. Integument:  Normal turgor. Warm, dry, without visible rash, unless noted elsewhere. Lymphatics: No lymphangitis or adenopathy noted. Neurological:  Oriented. Motor functions intact. No neurologic deficit on examination.     Lab / Imaging Results   (All laboratory and radiology results have been personally reviewed by myself)  Labs:  Results for orders placed or performed during the hospital encounter of 04/13/19   Troponin   Result Value Ref Range    Troponin <0.01 0.00 - 0.03 ng/mL   CBC Auto Differential   Result Value Ref Range    WBC 8.1 4.5 - 11.5 E9/L    RBC 4.65 3.80 - 5.80 E12/L    Hemoglobin 14.0 12.5 - 16.5 g/dL    Hematocrit 41.9 37.0 - 54.0 %    MCV 90.1 80.0 - 99.9 fL    MCH 30.1 26.0 - 35.0 pg    MCHC 33.4 32.0 - 34.5 %    RDW 14.1 11.5 - 15.0 fL    Platelets 747 953 - 089 E9/L    MPV 10.5 7.0 - 12.0 fL    Neutrophils % 65.5 43.0 - 80.0 %    Immature Granulocytes % 0.4 0.0 - 5.0 %    Lymphocytes % 21.0 20.0 - 42.0 %    Monocytes % 9.0 2.0 - 12.0 %    Eosinophils % 3.6 0.0 - 6.0 %    Basophils % 0.5 0.0 - 2.0 %    Neutrophils # 5.34 1.80 - 7.30 E9/L    Immature Granulocytes # 0.03 E9/L    Lymphocytes # 1.71 1.50 - 4.00 E9/L    Monocytes # 0.73 0.10 - 0.95 E9/L    Eosinophils # 0.29 0.05 - 0.50 E9/L    Basophils # 0.04 0.00 - 0.20 A0/T   Basic Metabolic Panel   Result Value Ref Range    Sodium 139 132 - 146 mmol/L    Potassium 4.1 3.5 - 5.0 mmol/L    Chloride 100 98 - 107 mmol/L    CO2 29 22 - 29 mmol/L    Anion Gap 10 7 - 16 mmol/L    Glucose 95 74 - 99 mg/dL    BUN 22 8 - 23 mg/dL    CREATININE 0.9 0.7 - 1.2 mg/dL    GFR Non-African American >60 >=60 mL/min/1.73    GFR African American >60     Calcium 9.4 8.6 - 10.2 mg/dL   Brain Natriuretic Peptide   Result Value Ref Range    Pro- 0 - 450 pg/mL   EKG 12 Lead   Result Value Ref Range    Ventricular Rate 70 BPM    Atrial Rate 267 BPM    P-R Interval 282 ms    QRS Duration 132 ms    Q-T Interval 424 ms    QTc Calculation (Bazett) plan.     Assessment      1. Essential hypertension      This patient's ED course included: a personal history and physicial examination  This patient has remained hemodynamically stable during their ED course. Plan   Discharge to home. Patient condition is good. New Medications     New Prescriptions    No medications on file     Electronically signed by GINETTE Lazcano CNP   DD: 4/13/19  **This report was transcribed using voice recognition software. Every effort was made to ensure accuracy; however, inadvertent computerized transcription errors may be present.   END OF PROVIDER NOTE       GINETTE Lazcano CNP  04/13/19 [id number]

## 2019-04-17 LAB
EKG ATRIAL RATE: 267 BPM
EKG P AXIS: -113 DEGREES
EKG P-R INTERVAL: 282 MS
EKG Q-T INTERVAL: 424 MS
EKG QRS DURATION: 132 MS
EKG QTC CALCULATION (BAZETT): 457 MS
EKG R AXIS: -66 DEGREES
EKG T AXIS: -2 DEGREES
EKG VENTRICULAR RATE: 70 BPM

## 2019-07-17 ENCOUNTER — APPOINTMENT (OUTPATIENT)
Dept: CT IMAGING | Age: 84
End: 2019-07-17
Payer: MEDICARE

## 2019-07-17 ENCOUNTER — HOSPITAL ENCOUNTER (EMERGENCY)
Age: 84
Discharge: HOME | End: 2019-07-17
Attending: EMERGENCY MEDICINE
Payer: MEDICARE

## 2019-07-17 VITALS
OXYGEN SATURATION: 95 % | DIASTOLIC BLOOD PRESSURE: 72 MMHG | RESPIRATION RATE: 18 BRPM | TEMPERATURE: 97.9 F | SYSTOLIC BLOOD PRESSURE: 177 MMHG | HEART RATE: 75 BPM

## 2019-07-17 DIAGNOSIS — R20.2 PARESTHESIA: Primary | ICD-10-CM

## 2019-07-17 LAB
ALBUMIN SERPL-MCNC: 4.5 G/DL (ref 3.5–5.2)
ALP BLD-CCNC: 142 U/L (ref 40–129)
ALT SERPL-CCNC: 16 U/L (ref 0–40)
ANION GAP SERPL CALCULATED.3IONS-SCNC: 17 MMOL/L (ref 7–16)
APTT: 39.8 SEC (ref 24.5–35.1)
AST SERPL-CCNC: 29 U/L (ref 0–39)
BASOPHILS ABSOLUTE: 0.04 E9/L (ref 0–0.2)
BASOPHILS RELATIVE PERCENT: 0.4 % (ref 0–2)
BILIRUB SERPL-MCNC: 0.4 MG/DL (ref 0–1.2)
BILIRUBIN URINE: NEGATIVE
BLOOD, URINE: NEGATIVE
BUN BLDV-MCNC: 23 MG/DL (ref 8–23)
CALCIUM SERPL-MCNC: 9.5 MG/DL (ref 8.6–10.2)
CHLORIDE BLD-SCNC: 104 MMOL/L (ref 98–107)
CHP ED QC CHECK: NORMAL
CLARITY: CLEAR
CO2: 23 MMOL/L (ref 22–29)
COLOR: YELLOW
CREAT SERPL-MCNC: 1 MG/DL (ref 0.7–1.2)
EOSINOPHILS ABSOLUTE: 0.21 E9/L (ref 0.05–0.5)
EOSINOPHILS RELATIVE PERCENT: 2.2 % (ref 0–6)
GFR AFRICAN AMERICAN: >60
GFR NON-AFRICAN AMERICAN: >60 ML/MIN/1.73
GLUCOSE BLD-MCNC: 104 MG/DL (ref 74–99)
GLUCOSE BLD-MCNC: 95 MG/DL
GLUCOSE URINE: NEGATIVE MG/DL
HCT VFR BLD CALC: 40.4 % (ref 37–54)
HEMOGLOBIN: 13.9 G/DL (ref 12.5–16.5)
IMMATURE GRANULOCYTES #: 0.03 E9/L
IMMATURE GRANULOCYTES %: 0.3 % (ref 0–5)
INR BLD: 1.4
KETONES, URINE: NEGATIVE MG/DL
LEUKOCYTE ESTERASE, URINE: NEGATIVE
LYMPHOCYTES ABSOLUTE: 1.78 E9/L (ref 1.5–4)
LYMPHOCYTES RELATIVE PERCENT: 18.6 % (ref 20–42)
MCH RBC QN AUTO: 30.4 PG (ref 26–35)
MCHC RBC AUTO-ENTMCNC: 34.4 % (ref 32–34.5)
MCV RBC AUTO: 88.4 FL (ref 80–99.9)
METER GLUCOSE: 95 MG/DL (ref 74–99)
MONOCYTES ABSOLUTE: 0.76 E9/L (ref 0.1–0.95)
MONOCYTES RELATIVE PERCENT: 7.9 % (ref 2–12)
NEUTROPHILS ABSOLUTE: 6.74 E9/L (ref 1.8–7.3)
NEUTROPHILS RELATIVE PERCENT: 70.6 % (ref 43–80)
NITRITE, URINE: NEGATIVE
PDW BLD-RTO: 13.6 FL (ref 11.5–15)
PH UA: 6 (ref 5–9)
PLATELET # BLD: 187 E9/L (ref 130–450)
PMV BLD AUTO: 10.8 FL (ref 7–12)
POTASSIUM REFLEX MAGNESIUM: 5.1 MMOL/L (ref 3.5–5)
PROTEIN UA: NEGATIVE MG/DL
PROTHROMBIN TIME: 16.1 SEC (ref 9.3–12.4)
RBC # BLD: 4.57 E12/L (ref 3.8–5.8)
SODIUM BLD-SCNC: 144 MMOL/L (ref 132–146)
SPECIFIC GRAVITY UA: 1.01 (ref 1–1.03)
TOTAL PROTEIN: 7.5 G/DL (ref 6.4–8.3)
TROPONIN: <0.01 NG/ML (ref 0–0.03)
UROBILINOGEN, URINE: 0.2 E.U./DL
WBC # BLD: 9.6 E9/L (ref 4.5–11.5)

## 2019-07-17 PROCEDURE — 99284 EMERGENCY DEPT VISIT MOD MDM: CPT

## 2019-07-17 PROCEDURE — 93005 ELECTROCARDIOGRAM TRACING: CPT | Performed by: NURSE PRACTITIONER

## 2019-07-17 PROCEDURE — 6360000004 HC RX CONTRAST MEDICATION: Performed by: RADIOLOGY

## 2019-07-17 PROCEDURE — 70450 CT HEAD/BRAIN W/O DYE: CPT

## 2019-07-17 PROCEDURE — 82962 GLUCOSE BLOOD TEST: CPT

## 2019-07-17 PROCEDURE — 80053 COMPREHEN METABOLIC PANEL: CPT

## 2019-07-17 PROCEDURE — 85610 PROTHROMBIN TIME: CPT

## 2019-07-17 PROCEDURE — 36415 COLL VENOUS BLD VENIPUNCTURE: CPT

## 2019-07-17 PROCEDURE — 85730 THROMBOPLASTIN TIME PARTIAL: CPT

## 2019-07-17 PROCEDURE — 81003 URINALYSIS AUTO W/O SCOPE: CPT

## 2019-07-17 PROCEDURE — 85025 COMPLETE CBC W/AUTO DIFF WBC: CPT

## 2019-07-17 PROCEDURE — 70496 CT ANGIOGRAPHY HEAD: CPT

## 2019-07-17 PROCEDURE — 0042T CT BRAIN PERFUSION: CPT

## 2019-07-17 PROCEDURE — 70498 CT ANGIOGRAPHY NECK: CPT

## 2019-07-17 PROCEDURE — 84484 ASSAY OF TROPONIN QUANT: CPT

## 2019-07-17 RX ORDER — METOPROLOL TARTRATE 50 MG/1
50 TABLET, FILM COATED ORAL
Status: DISCONTINUED | OUTPATIENT
Start: 2019-07-17 | End: 2019-07-17

## 2019-07-17 RX ORDER — DOFETILIDE 0.25 MG/1
250 CAPSULE ORAL ONCE
Status: DISCONTINUED | OUTPATIENT
Start: 2019-07-17 | End: 2019-07-17

## 2019-07-17 RX ORDER — SODIUM CHLORIDE 0.9 % (FLUSH) 0.9 %
10 SYRINGE (ML) INJECTION PRN
Status: DISCONTINUED | OUTPATIENT
Start: 2019-07-17 | End: 2019-07-17 | Stop reason: HOSPADM

## 2019-07-17 RX ADMIN — IOPAMIDOL 100 ML: 755 INJECTION, SOLUTION INTRAVENOUS at 21:07

## 2019-07-17 ASSESSMENT — ENCOUNTER SYMPTOMS
VOMITING: 0
ABDOMINAL PAIN: 0
SHORTNESS OF BREATH: 0
NAUSEA: 0
DIARRHEA: 0
CONSTIPATION: 0

## 2019-07-17 NOTE — ED NOTES
Lab called and states that they are not doing the perfusion CT scan without pt having an 18g IV. Dr. Eileen Skaggs notified.       Chasity Hoyos RN  07/17/19 1950

## 2019-07-18 LAB
EKG ATRIAL RATE: 70 BPM
EKG P AXIS: 71 DEGREES
EKG P-R INTERVAL: 282 MS
EKG Q-T INTERVAL: 418 MS
EKG QRS DURATION: 112 MS
EKG QTC CALCULATION (BAZETT): 451 MS
EKG R AXIS: -57 DEGREES
EKG T AXIS: 48 DEGREES
EKG VENTRICULAR RATE: 70 BPM

## 2019-07-18 PROCEDURE — 93010 ELECTROCARDIOGRAM REPORT: CPT | Performed by: INTERNAL MEDICINE

## 2019-07-18 NOTE — ED PROVIDER NOTES
CONTRAST   Final Result      1. No evidence of intracranial hemorrhage or edema. 2. No evidence of arterial stenosis at level of the [place]. 3. No evidence of stenosis involving proximal or distal cervical   internal carotid arteries or vertebral arteries. 4. Significant atherosclerotic disease associated with carotid bulbs   and proximal cervical internal carotid arteries. CT BRAIN PERFUSION   Final Result      No findings on CT brain perfusion to suggest recent stroke. MRI brain   with diffusion weighted imaging could be helpful for further   evaluation. CT Head WO Contrast   Final Result      No acute intracranial process. If clinical concern persists for acute   stroke, MRI brain with diffusion weighted imaging could be helpful for   further evaluation.          ------------------------- NURSING NOTES AND VITALS REVIEWED ---------------------------  Date / Time Roomed:  7/17/2019  6:03 PM  ED Bed Assignment:  15/15    The nursing notes within the ED encounter and vital signs as below have been reviewed. BP (!) 177/72   Pulse 75   Temp 97.9 °F (36.6 °C)   Resp 18   SpO2 95%   Oxygen Saturation Interpretation: Normal      ------------------------------------------ PROGRESS NOTES ------------------------------------------  10:34 PM  I have spoken with the patient and discussed todays results, in addition to providing specific details for the plan of care and counseling regarding the diagnosis and prognosis. Their questions are answered at this time and they are agreeable with the plan. I discussed at length with them reasons for immediate return here for re evaluation.  They will followup with their primary care physician by calling their office on Monday.      --------------------------------- ADDITIONAL PROVIDER NOTES ---------------------------------  At this time the patient is without objective evidence of an acute process requiring hospitalization or

## 2019-08-05 ENCOUNTER — TELEPHONE (OUTPATIENT)
Dept: CARDIOLOGY CLINIC | Age: 84
End: 2019-08-05

## 2019-08-05 ENCOUNTER — OFFICE VISIT (OUTPATIENT)
Dept: CARDIOLOGY CLINIC | Age: 84
End: 2019-08-05
Payer: MEDICARE

## 2019-08-05 VITALS
BODY MASS INDEX: 28.41 KG/M2 | RESPIRATION RATE: 16 BRPM | HEIGHT: 67 IN | SYSTOLIC BLOOD PRESSURE: 122 MMHG | HEART RATE: 71 BPM | WEIGHT: 181 LBS | DIASTOLIC BLOOD PRESSURE: 70 MMHG

## 2019-08-05 DIAGNOSIS — R53.83 OTHER FATIGUE: ICD-10-CM

## 2019-08-05 DIAGNOSIS — G45.9 TIA (TRANSIENT ISCHEMIC ATTACK): ICD-10-CM

## 2019-08-05 DIAGNOSIS — I25.10 CORONARY ARTERY DISEASE INVOLVING NATIVE CORONARY ARTERY OF NATIVE HEART WITHOUT ANGINA PECTORIS: Primary | ICD-10-CM

## 2019-08-05 PROCEDURE — 93000 ELECTROCARDIOGRAM COMPLETE: CPT | Performed by: INTERNAL MEDICINE

## 2019-08-05 PROCEDURE — 99214 OFFICE O/P EST MOD 30 MIN: CPT | Performed by: INTERNAL MEDICINE

## 2019-08-05 PROCEDURE — 1123F ACP DISCUSS/DSCN MKR DOCD: CPT | Performed by: INTERNAL MEDICINE

## 2019-08-05 PROCEDURE — G8419 CALC BMI OUT NRM PARAM NOF/U: HCPCS | Performed by: INTERNAL MEDICINE

## 2019-08-05 PROCEDURE — G8598 ASA/ANTIPLAT THER USED: HCPCS | Performed by: INTERNAL MEDICINE

## 2019-08-05 PROCEDURE — 1036F TOBACCO NON-USER: CPT | Performed by: INTERNAL MEDICINE

## 2019-08-05 PROCEDURE — 4040F PNEUMOC VAC/ADMIN/RCVD: CPT | Performed by: INTERNAL MEDICINE

## 2019-08-05 PROCEDURE — G8427 DOCREV CUR MEDS BY ELIG CLIN: HCPCS | Performed by: INTERNAL MEDICINE

## 2019-08-05 RX ORDER — ASPIRIN 81 MG/1
81 TABLET ORAL DAILY
Qty: 90 TABLET | Refills: 3 | Status: ON HOLD
Start: 2019-08-05 | End: 2019-09-04 | Stop reason: HOSPADM

## 2019-08-09 DIAGNOSIS — R53.83 OTHER FATIGUE: ICD-10-CM

## 2019-08-16 ENCOUNTER — HOSPITAL ENCOUNTER (OUTPATIENT)
Dept: CARDIOLOGY | Age: 84
Discharge: HOME | End: 2019-08-16
Payer: MEDICARE

## 2019-08-16 DIAGNOSIS — G45.9 TIA (TRANSIENT ISCHEMIC ATTACK): ICD-10-CM

## 2019-08-16 LAB
LV EF: 60 %
LVEF MODALITY: NORMAL

## 2019-08-16 PROCEDURE — 93306 TTE W/DOPPLER COMPLETE: CPT | Performed by: PSYCHIATRY & NEUROLOGY

## 2019-08-16 PROCEDURE — 2580000003 HC RX 258: Performed by: INTERNAL MEDICINE

## 2019-08-16 RX ORDER — SODIUM CHLORIDE 0.9 % (FLUSH) 0.9 %
10 SYRINGE (ML) INJECTION PRN
Status: DISCONTINUED | OUTPATIENT
Start: 2019-08-16 | End: 2019-08-17 | Stop reason: HOSPADM

## 2019-08-16 RX ADMIN — Medication 10 ML: at 14:59

## 2019-08-28 ENCOUNTER — TELEPHONE (OUTPATIENT)
Dept: CARDIOLOGY CLINIC | Age: 84
End: 2019-08-28

## 2019-09-02 ENCOUNTER — ANESTHESIA (OUTPATIENT)
Dept: ENDOSCOPY | Age: 84
DRG: 378 | End: 2019-09-02
Payer: MEDICARE

## 2019-09-02 ENCOUNTER — ANESTHESIA EVENT (OUTPATIENT)
Dept: ENDOSCOPY | Age: 84
DRG: 378 | End: 2019-09-02
Payer: MEDICARE

## 2019-09-02 ENCOUNTER — HOSPITAL ENCOUNTER (INPATIENT)
Age: 84
LOS: 2 days | Discharge: HOME | DRG: 378 | End: 2019-09-04
Attending: EMERGENCY MEDICINE | Admitting: INTERNAL MEDICINE
Payer: MEDICARE

## 2019-09-02 DIAGNOSIS — K92.1 MELENA: Primary | ICD-10-CM

## 2019-09-02 PROBLEM — E78.5 HYPERLIPIDEMIA LDL GOAL <100: Chronic | Status: ACTIVE | Noted: 2019-09-02

## 2019-09-02 PROBLEM — R06.00 ACUTE DYSPNEA: Status: RESOLVED | Noted: 2017-03-04 | Resolved: 2019-09-02

## 2019-09-02 PROBLEM — Z79.899 ENCOUNTER FOR LONG-TERM (CURRENT) USE OF HIGH-RISK MEDICATION: Status: RESOLVED | Noted: 2017-05-30 | Resolved: 2019-09-02

## 2019-09-02 PROBLEM — I48.0 PAROXYSMAL ATRIAL FIBRILLATION (HCC): Status: RESOLVED | Noted: 2018-03-13 | Resolved: 2019-09-02

## 2019-09-02 PROBLEM — K92.2 GI BLEED: Status: ACTIVE | Noted: 2019-09-02

## 2019-09-02 PROBLEM — I48.19 PERSISTENT ATRIAL FIBRILLATION (HCC): Status: RESOLVED | Noted: 2017-05-30 | Resolved: 2019-09-02

## 2019-09-02 LAB
ABO/RH: NORMAL
ALBUMIN SERPL-MCNC: 4.5 G/DL (ref 3.5–5.2)
ALP BLD-CCNC: 144 U/L (ref 40–129)
ALT SERPL-CCNC: 18 U/L (ref 0–40)
ANION GAP SERPL CALCULATED.3IONS-SCNC: 10 MMOL/L (ref 7–16)
ANTIBODY SCREEN: NORMAL
AST SERPL-CCNC: 28 U/L (ref 0–39)
BASOPHILS ABSOLUTE: 0.04 E9/L (ref 0–0.2)
BASOPHILS RELATIVE PERCENT: 0.5 % (ref 0–2)
BILIRUB SERPL-MCNC: 0.6 MG/DL (ref 0–1.2)
BUN BLDV-MCNC: 19 MG/DL (ref 8–23)
CALCIUM SERPL-MCNC: 9.3 MG/DL (ref 8.6–10.2)
CHLORIDE BLD-SCNC: 99 MMOL/L (ref 98–107)
CO2: 30 MMOL/L (ref 22–29)
CREAT SERPL-MCNC: 1.1 MG/DL (ref 0.7–1.2)
EOSINOPHILS ABSOLUTE: 0.25 E9/L (ref 0.05–0.5)
EOSINOPHILS RELATIVE PERCENT: 3 % (ref 0–6)
GFR AFRICAN AMERICAN: >60
GFR NON-AFRICAN AMERICAN: >60 ML/MIN/1.73
GLUCOSE BLD-MCNC: 87 MG/DL (ref 74–99)
HCT VFR BLD CALC: 36.4 % (ref 37–54)
HCT VFR BLD CALC: 37.7 % (ref 37–54)
HEMOGLOBIN: 12 G/DL (ref 12.5–16.5)
HEMOGLOBIN: 12.5 G/DL (ref 12.5–16.5)
IMMATURE GRANULOCYTES #: 0.03 E9/L
IMMATURE GRANULOCYTES %: 0.4 % (ref 0–5)
LACTIC ACID: 1.1 MMOL/L (ref 0.5–2.2)
LYMPHOCYTES ABSOLUTE: 1.68 E9/L (ref 1.5–4)
LYMPHOCYTES RELATIVE PERCENT: 20.2 % (ref 20–42)
MCH RBC QN AUTO: 30.3 PG (ref 26–35)
MCHC RBC AUTO-ENTMCNC: 33.2 % (ref 32–34.5)
MCV RBC AUTO: 91.3 FL (ref 80–99.9)
MONOCYTES ABSOLUTE: 0.85 E9/L (ref 0.1–0.95)
MONOCYTES RELATIVE PERCENT: 10.2 % (ref 2–12)
NEUTROPHILS ABSOLUTE: 5.48 E9/L (ref 1.8–7.3)
NEUTROPHILS RELATIVE PERCENT: 65.7 % (ref 43–80)
PDW BLD-RTO: 14.6 FL (ref 11.5–15)
PLATELET # BLD: 211 E9/L (ref 130–450)
PMV BLD AUTO: 10.3 FL (ref 7–12)
POTASSIUM SERPL-SCNC: 4.3 MMOL/L (ref 3.5–5)
RBC # BLD: 4.13 E12/L (ref 3.8–5.8)
SODIUM BLD-SCNC: 139 MMOL/L (ref 132–146)
TOTAL PROTEIN: 7 G/DL (ref 6.4–8.3)
WBC # BLD: 8.3 E9/L (ref 4.5–11.5)

## 2019-09-02 PROCEDURE — 86900 BLOOD TYPING SEROLOGIC ABO: CPT

## 2019-09-02 PROCEDURE — 96374 THER/PROPH/DIAG INJ IV PUSH: CPT

## 2019-09-02 PROCEDURE — 36415 COLL VENOUS BLD VENIPUNCTURE: CPT

## 2019-09-02 PROCEDURE — 93005 ELECTROCARDIOGRAM TRACING: CPT | Performed by: EMERGENCY MEDICINE

## 2019-09-02 PROCEDURE — 80053 COMPREHEN METABOLIC PANEL: CPT

## 2019-09-02 PROCEDURE — 6370000000 HC RX 637 (ALT 250 FOR IP): Performed by: INTERNAL MEDICINE

## 2019-09-02 PROCEDURE — 99285 EMERGENCY DEPT VISIT HI MDM: CPT

## 2019-09-02 PROCEDURE — 2580000003 HC RX 258: Performed by: INTERNAL MEDICINE

## 2019-09-02 PROCEDURE — 85014 HEMATOCRIT: CPT

## 2019-09-02 PROCEDURE — 6360000002 HC RX W HCPCS: Performed by: EMERGENCY MEDICINE

## 2019-09-02 PROCEDURE — 83605 ASSAY OF LACTIC ACID: CPT

## 2019-09-02 PROCEDURE — 2060000000 HC ICU INTERMEDIATE R&B

## 2019-09-02 PROCEDURE — 85018 HEMOGLOBIN: CPT

## 2019-09-02 PROCEDURE — 85025 COMPLETE CBC W/AUTO DIFF WBC: CPT

## 2019-09-02 PROCEDURE — C9113 INJ PANTOPRAZOLE SODIUM, VIA: HCPCS | Performed by: EMERGENCY MEDICINE

## 2019-09-02 PROCEDURE — 86850 RBC ANTIBODY SCREEN: CPT

## 2019-09-02 PROCEDURE — 86901 BLOOD TYPING SEROLOGIC RH(D): CPT

## 2019-09-02 RX ORDER — SODIUM CHLORIDE 0.9 % (FLUSH) 0.9 %
10 SYRINGE (ML) INJECTION EVERY 12 HOURS SCHEDULED
Status: DISCONTINUED | OUTPATIENT
Start: 2019-09-02 | End: 2019-09-04 | Stop reason: HOSPADM

## 2019-09-02 RX ORDER — METOPROLOL TARTRATE 50 MG/1
50 TABLET, FILM COATED ORAL DAILY
COMMUNITY
End: 2019-09-12

## 2019-09-02 RX ORDER — SODIUM CHLORIDE 0.9 % (FLUSH) 0.9 %
10 SYRINGE (ML) INJECTION PRN
Status: DISCONTINUED | OUTPATIENT
Start: 2019-09-02 | End: 2019-09-04 | Stop reason: HOSPADM

## 2019-09-02 RX ORDER — ACETAMINOPHEN 325 MG/1
650 TABLET ORAL EVERY 4 HOURS PRN
Status: DISCONTINUED | OUTPATIENT
Start: 2019-09-02 | End: 2019-09-04 | Stop reason: HOSPADM

## 2019-09-02 RX ORDER — AMLODIPINE BESYLATE 2.5 MG/1
2.5 TABLET ORAL DAILY
Status: DISCONTINUED | OUTPATIENT
Start: 2019-09-02 | End: 2019-09-04 | Stop reason: HOSPADM

## 2019-09-02 RX ORDER — PANTOPRAZOLE SODIUM 40 MG/10ML
80 INJECTION, POWDER, LYOPHILIZED, FOR SOLUTION INTRAVENOUS ONCE
Status: COMPLETED | OUTPATIENT
Start: 2019-09-02 | End: 2019-09-02

## 2019-09-02 RX ORDER — DOFETILIDE 0.25 MG/1
250 CAPSULE ORAL EVERY 12 HOURS SCHEDULED
Status: DISCONTINUED | OUTPATIENT
Start: 2019-09-02 | End: 2019-09-04 | Stop reason: HOSPADM

## 2019-09-02 RX ORDER — PRAVASTATIN SODIUM 20 MG
40 TABLET ORAL DAILY
Status: DISCONTINUED | OUTPATIENT
Start: 2019-09-02 | End: 2019-09-04 | Stop reason: HOSPADM

## 2019-09-02 RX ORDER — PANTOPRAZOLE SODIUM 40 MG/1
40 TABLET, DELAYED RELEASE ORAL
Status: DISCONTINUED | OUTPATIENT
Start: 2019-09-03 | End: 2019-09-03

## 2019-09-02 RX ORDER — POTASSIUM CHLORIDE 750 MG/1
10 TABLET, EXTENDED RELEASE ORAL DAILY
Status: DISCONTINUED | OUTPATIENT
Start: 2019-09-03 | End: 2019-09-04 | Stop reason: HOSPADM

## 2019-09-02 RX ORDER — METOPROLOL TARTRATE 50 MG/1
50 TABLET, FILM COATED ORAL NIGHTLY
Status: DISCONTINUED | OUTPATIENT
Start: 2019-09-02 | End: 2019-09-04 | Stop reason: HOSPADM

## 2019-09-02 RX ORDER — ONDANSETRON 2 MG/ML
4 INJECTION INTRAMUSCULAR; INTRAVENOUS EVERY 6 HOURS PRN
Status: DISCONTINUED | OUTPATIENT
Start: 2019-09-02 | End: 2019-09-02

## 2019-09-02 RX ORDER — FUROSEMIDE 20 MG/1
20 TABLET ORAL DAILY
Status: DISCONTINUED | OUTPATIENT
Start: 2019-09-03 | End: 2019-09-04 | Stop reason: HOSPADM

## 2019-09-02 RX ADMIN — PANTOPRAZOLE SODIUM 80 MG: 40 INJECTION, POWDER, LYOPHILIZED, FOR SOLUTION INTRAVENOUS at 11:46

## 2019-09-02 RX ADMIN — AMLODIPINE BESYLATE 2.5 MG: 2.5 TABLET ORAL at 18:24

## 2019-09-02 RX ADMIN — METOPROLOL TARTRATE 50 MG: 50 TABLET ORAL at 21:14

## 2019-09-02 RX ADMIN — DOFETILIDE 250 MCG: 0.25 CAPSULE ORAL at 21:14

## 2019-09-02 RX ADMIN — Medication 10 ML: at 23:30

## 2019-09-02 RX ADMIN — PRAVASTATIN SODIUM 40 MG: 20 TABLET ORAL at 18:24

## 2019-09-03 VITALS — DIASTOLIC BLOOD PRESSURE: 48 MMHG | OXYGEN SATURATION: 98 % | SYSTOLIC BLOOD PRESSURE: 83 MMHG

## 2019-09-03 LAB
ANION GAP SERPL CALCULATED.3IONS-SCNC: 11 MMOL/L (ref 7–16)
BASOPHILS ABSOLUTE: 0.06 E9/L (ref 0–0.2)
BASOPHILS RELATIVE PERCENT: 0.8 % (ref 0–2)
BUN BLDV-MCNC: 17 MG/DL (ref 8–23)
CALCIUM SERPL-MCNC: 8.7 MG/DL (ref 8.6–10.2)
CHLORIDE BLD-SCNC: 98 MMOL/L (ref 98–107)
CO2: 28 MMOL/L (ref 22–29)
CREAT SERPL-MCNC: 1 MG/DL (ref 0.7–1.2)
EKG ATRIAL RATE: 70 BPM
EKG P-R INTERVAL: 300 MS
EKG Q-T INTERVAL: 452 MS
EKG QRS DURATION: 140 MS
EKG QTC CALCULATION (BAZETT): 488 MS
EKG R AXIS: -66 DEGREES
EKG T AXIS: -12 DEGREES
EKG VENTRICULAR RATE: 70 BPM
EOSINOPHILS ABSOLUTE: 0.27 E9/L (ref 0.05–0.5)
EOSINOPHILS RELATIVE PERCENT: 3.5 % (ref 0–6)
GFR AFRICAN AMERICAN: >60
GFR NON-AFRICAN AMERICAN: >60 ML/MIN/1.73
GLUCOSE BLD-MCNC: 92 MG/DL (ref 74–99)
HCT VFR BLD CALC: 34.8 % (ref 37–54)
HCT VFR BLD CALC: 38.4 % (ref 37–54)
HEMOGLOBIN: 11.5 G/DL (ref 12.5–16.5)
HEMOGLOBIN: 12.6 G/DL (ref 12.5–16.5)
IMMATURE GRANULOCYTES #: 0.02 E9/L
IMMATURE GRANULOCYTES %: 0.3 % (ref 0–5)
LYMPHOCYTES ABSOLUTE: 1.69 E9/L (ref 1.5–4)
LYMPHOCYTES RELATIVE PERCENT: 21.9 % (ref 20–42)
MAGNESIUM: 2.2 MG/DL (ref 1.6–2.6)
MCH RBC QN AUTO: 30.2 PG (ref 26–35)
MCHC RBC AUTO-ENTMCNC: 33 % (ref 32–34.5)
MCV RBC AUTO: 91.3 FL (ref 80–99.9)
MONOCYTES ABSOLUTE: 0.69 E9/L (ref 0.1–0.95)
MONOCYTES RELATIVE PERCENT: 8.9 % (ref 2–12)
NEUTROPHILS ABSOLUTE: 4.98 E9/L (ref 1.8–7.3)
NEUTROPHILS RELATIVE PERCENT: 64.6 % (ref 43–80)
PDW BLD-RTO: 14.6 FL (ref 11.5–15)
PLATELET # BLD: 199 E9/L (ref 130–450)
PMV BLD AUTO: 10.2 FL (ref 7–12)
POTASSIUM SERPL-SCNC: 4.3 MMOL/L (ref 3.5–5)
RBC # BLD: 3.81 E12/L (ref 3.8–5.8)
SODIUM BLD-SCNC: 137 MMOL/L (ref 132–146)
WBC # BLD: 7.7 E9/L (ref 4.5–11.5)

## 2019-09-03 PROCEDURE — 7100000010 HC PHASE II RECOVERY - FIRST 15 MIN: Performed by: SURGERY

## 2019-09-03 PROCEDURE — 36415 COLL VENOUS BLD VENIPUNCTURE: CPT

## 2019-09-03 PROCEDURE — 3700000001 HC ADD 15 MINUTES (ANESTHESIA): Performed by: SURGERY

## 2019-09-03 PROCEDURE — 85018 HEMOGLOBIN: CPT

## 2019-09-03 PROCEDURE — 83735 ASSAY OF MAGNESIUM: CPT

## 2019-09-03 PROCEDURE — 3700000000 HC ANESTHESIA ATTENDED CARE: Performed by: SURGERY

## 2019-09-03 PROCEDURE — 2060000000 HC ICU INTERMEDIATE R&B

## 2019-09-03 PROCEDURE — 97161 PT EVAL LOW COMPLEX 20 MIN: CPT

## 2019-09-03 PROCEDURE — 6360000002 HC RX W HCPCS: Performed by: NURSE ANESTHETIST, CERTIFIED REGISTERED

## 2019-09-03 PROCEDURE — 93010 ELECTROCARDIOGRAM REPORT: CPT | Performed by: INTERNAL MEDICINE

## 2019-09-03 PROCEDURE — 6370000000 HC RX 637 (ALT 250 FOR IP): Performed by: SURGERY

## 2019-09-03 PROCEDURE — 2580000003 HC RX 258: Performed by: SURGERY

## 2019-09-03 PROCEDURE — 6360000002 HC RX W HCPCS: Performed by: SURGERY

## 2019-09-03 PROCEDURE — 97165 OT EVAL LOW COMPLEX 30 MIN: CPT

## 2019-09-03 PROCEDURE — 0DJ08ZZ INSPECTION OF UPPER INTESTINAL TRACT, VIA NATURAL OR ARTIFICIAL OPENING ENDOSCOPIC: ICD-10-PCS | Performed by: SURGERY

## 2019-09-03 PROCEDURE — 80048 BASIC METABOLIC PNL TOTAL CA: CPT

## 2019-09-03 PROCEDURE — 85025 COMPLETE CBC W/AUTO DIFF WBC: CPT

## 2019-09-03 PROCEDURE — 7100000011 HC PHASE II RECOVERY - ADDTL 15 MIN: Performed by: SURGERY

## 2019-09-03 PROCEDURE — 2580000003 HC RX 258: Performed by: NURSE ANESTHETIST, CERTIFIED REGISTERED

## 2019-09-03 PROCEDURE — 2709999900 HC NON-CHARGEABLE SUPPLY: Performed by: SURGERY

## 2019-09-03 PROCEDURE — 3609017100 HC EGD: Performed by: SURGERY

## 2019-09-03 PROCEDURE — 6370000000 HC RX 637 (ALT 250 FOR IP): Performed by: INTERNAL MEDICINE

## 2019-09-03 PROCEDURE — 2500000003 HC RX 250 WO HCPCS: Performed by: NURSE ANESTHETIST, CERTIFIED REGISTERED

## 2019-09-03 PROCEDURE — 85014 HEMATOCRIT: CPT

## 2019-09-03 PROCEDURE — C9113 INJ PANTOPRAZOLE SODIUM, VIA: HCPCS | Performed by: SURGERY

## 2019-09-03 RX ORDER — LIDOCAINE HYDROCHLORIDE 10 MG/ML
INJECTION, SOLUTION EPIDURAL; INFILTRATION; INTRACAUDAL; PERINEURAL PRN
Status: DISCONTINUED | OUTPATIENT
Start: 2019-09-03 | End: 2019-09-03 | Stop reason: SDUPTHER

## 2019-09-03 RX ORDER — SODIUM CHLORIDE 9 MG/ML
INJECTION, SOLUTION INTRAVENOUS CONTINUOUS PRN
Status: DISCONTINUED | OUTPATIENT
Start: 2019-09-03 | End: 2019-09-03 | Stop reason: SDUPTHER

## 2019-09-03 RX ORDER — PANTOPRAZOLE SODIUM 40 MG/10ML
40 INJECTION, POWDER, LYOPHILIZED, FOR SOLUTION INTRAVENOUS 2 TIMES DAILY
Status: DISCONTINUED | OUTPATIENT
Start: 2019-09-03 | End: 2019-09-04 | Stop reason: HOSPADM

## 2019-09-03 RX ORDER — PANTOPRAZOLE SODIUM 40 MG/1
40 TABLET, DELAYED RELEASE ORAL
Status: DISCONTINUED | OUTPATIENT
Start: 2019-09-03 | End: 2019-09-03

## 2019-09-03 RX ORDER — PROPOFOL 10 MG/ML
INJECTION, EMULSION INTRAVENOUS PRN
Status: DISCONTINUED | OUTPATIENT
Start: 2019-09-03 | End: 2019-09-03 | Stop reason: SDUPTHER

## 2019-09-03 RX ADMIN — DOFETILIDE 250 MCG: 0.25 CAPSULE ORAL at 09:42

## 2019-09-03 RX ADMIN — PROPOFOL 50 MG: 10 INJECTION, EMULSION INTRAVENOUS at 08:30

## 2019-09-03 RX ADMIN — Medication 10 ML: at 22:12

## 2019-09-03 RX ADMIN — PHENYLEPHRINE HYDROCHLORIDE 100 MCG: 10 INJECTION INTRAVENOUS at 08:42

## 2019-09-03 RX ADMIN — AMLODIPINE BESYLATE 2.5 MG: 2.5 TABLET ORAL at 09:42

## 2019-09-03 RX ADMIN — PANTOPRAZOLE SODIUM 40 MG: 40 INJECTION, POWDER, FOR SOLUTION INTRAVENOUS at 22:12

## 2019-09-03 RX ADMIN — PROPOFOL 20 MG: 10 INJECTION, EMULSION INTRAVENOUS at 08:24

## 2019-09-03 RX ADMIN — PANTOPRAZOLE SODIUM 40 MG: 40 TABLET, DELAYED RELEASE ORAL at 06:09

## 2019-09-03 RX ADMIN — DOFETILIDE 250 MCG: 0.25 CAPSULE ORAL at 22:12

## 2019-09-03 RX ADMIN — LIDOCAINE HYDROCHLORIDE 20 MG: 10 INJECTION, SOLUTION EPIDURAL; INFILTRATION; INTRACAUDAL; PERINEURAL at 08:20

## 2019-09-03 RX ADMIN — Medication 10 ML: at 09:42

## 2019-09-03 RX ADMIN — PROPOFOL 20 MG: 10 INJECTION, EMULSION INTRAVENOUS at 08:32

## 2019-09-03 RX ADMIN — FUROSEMIDE 20 MG: 20 TABLET ORAL at 06:09

## 2019-09-03 RX ADMIN — METOPROLOL TARTRATE 75 MG: 50 TABLET ORAL at 09:42

## 2019-09-03 RX ADMIN — PRAVASTATIN SODIUM 40 MG: 20 TABLET ORAL at 09:42

## 2019-09-03 RX ADMIN — METOPROLOL TARTRATE 50 MG: 50 TABLET ORAL at 22:12

## 2019-09-03 RX ADMIN — PROPOFOL 150 MG: 10 INJECTION, EMULSION INTRAVENOUS at 08:20

## 2019-09-03 RX ADMIN — SODIUM CHLORIDE: 9 INJECTION, SOLUTION INTRAVENOUS at 08:18

## 2019-09-03 RX ADMIN — POTASSIUM CHLORIDE 10 MEQ: 10 TABLET, EXTENDED RELEASE ORAL at 09:42

## 2019-09-03 RX ADMIN — PANTOPRAZOLE SODIUM 40 MG: 40 INJECTION, POWDER, FOR SOLUTION INTRAVENOUS at 11:38

## 2019-09-03 ASSESSMENT — PAIN SCALES - GENERAL: PAINLEVEL_OUTOF10: 0

## 2019-09-03 NOTE — ANESTHESIA PRE PROCEDURE
Department of Anesthesiology  Preprocedure Note       Name:  Molly Booth   Age:  82 y.o.  :  1935                                          MRN:  [id number]         Date:  9/3/2019      Surgeon: Kenan Ortez):  Krissy Casarez MD  [facility]    Procedure: EGD ESOPHAGOGASTRODUODENOSCOPY (N/A )    Medications prior to admission:   Prior to Admission medications    Medication Sig Start Date End Date Taking?  Authorizing Provider   metoprolol tartrate (LOPRESSOR) 25 MG tablet Take 75 mg by mouth daily In the morning   Yes Historical Provider, MD   metoprolol tartrate (LOPRESSOR) 50 MG tablet Take 50 mg by mouth daily At night   Yes Historical Provider, MD   aspirin EC 81 MG EC tablet Take 1 tablet by mouth daily 19  Yes Ersato Flannery DO   potassium chloride (KLOR-CON M) 10 MEQ extended release tablet TAKE 1 TABLET BY MOUTH  DAILY  Patient taking differently: Take 10 mEq by mouth daily Indications: pt. takes three time a week  19  Yes Tatiana Oneal MD   rivaroxaban (XARELTO) 20 MG TABS tablet Take 1 tablet by mouth daily (with breakfast) for 1 day LOT#[id number]  EXP 3/21  Patient taking differently: Take 20 mg by mouth Daily with supper LOT#[id number]  EXP 3/21 12/28/18 9/2/19 Yes Tatiana Oneal MD   amLODIPine (NORVASC) 2.5 MG tablet TAKE 1 TABLET BY MOUTH  DAILY 6/15/18  Yes Tatiana Oneal MD   furosemide (LASIX) 20 MG tablet TAKE 1 TABLET BY MOUTH  DAILY  Patient taking differently: Take 20 mg by mouth daily Indications: pt.is taking 3days a week  6/15/18  Yes Tatiana Oneal MD   meclizine (ANTIVERT) 25 MG tablet Take 25 mg by mouth 2 times daily as needed   Yes Historical Provider, MD   dofetilide (TIKOSYN) 250 MCG capsule Take 1 capsule by mouth every 12 hours 17  Yes Jackie Freedman MD   Multiple Vitamins-Minerals (PRESERVISION AREDS 2 PO) Take 1 tablet by mouth 2 times daily   Yes Historical Provider, MD   Cholecalciferol (VITAMIN D) 2000 UNITS CAPS capsule Take 1 capsule by mouth daily SpO2: 94%   95%   Weight:   167 lb 9.6 oz (76 kg)    Height:                                                  BP Readings from Last 3 Encounters:   09/03/19 (!) 142/69   08/05/19 122/70   07/17/19 (!) 177/72       NPO Status:                                                                                 BMI:   Wt Readings from Last 3 Encounters:   09/03/19 167 lb 9.6 oz (76 kg)   08/05/19 181 lb (82.1 kg)   04/09/19 179 lb 6.4 oz (81.4 kg)     Body mass index is 25.48 kg/m². CBC:   Lab Results   Component Value Date    WBC 7.7 09/03/2019    RBC 3.81 09/03/2019    HGB 11.5 09/03/2019    HCT 34.8 09/03/2019    MCV 91.3 09/03/2019    RDW 14.6 09/03/2019     09/03/2019       CMP:   Lab Results   Component Value Date     09/03/2019    K 4.3 09/03/2019    K 5.1 07/17/2019    CL 98 09/03/2019    CO2 28 09/03/2019    BUN 17 09/03/2019    CREATININE 1.0 09/03/2019    GFRAA >60 09/03/2019    LABGLOM >60 09/03/2019    GLUCOSE 92 09/03/2019    PROT 7.0 09/02/2019    CALCIUM 8.7 09/03/2019    BILITOT 0.6 09/02/2019    ALKPHOS 144 09/02/2019    AST 28 09/02/2019    ALT 18 09/02/2019       POC Tests: No results for input(s): POCGLU, POCNA, POCK, POCCL, POCBUN, POCHEMO, POCHCT in the last 72 hours.     Coags:   Lab Results   Component Value Date    PROTIME 16.1 07/17/2019    INR 1.4 07/17/2019    APTT 39.8 07/17/2019       HCG (If Applicable): No results found for: PREGTESTUR, PREGSERUM, HCG, HCGQUANT     ABGs: No results found for: PHART, PO2ART, GEC6CVU, BQT6LQJ, BEART, I1YCWZFN     Type & Screen (If Applicable):  No results found for: [facility]    Anesthesia Evaluation  Patient summary reviewed no history of anesthetic complications:   Airway: Mallampati: II  TM distance: >3 FB   Neck ROM: full   Dental: normal exam         Pulmonary: breath sounds clear to auscultation                            ROS comment: Quit smoking in early 1970s   Cardiovascular:    (+) hypertension:, pacemaker: AICD, past MI:,

## 2019-09-03 NOTE — H&P
Dr. Alton Hewitt and Dr. Esme COLLAZO Josie 26mm valve    [address]  1970's         Medications Prior to Admission:    Medications Prior to Admission: metoprolol tartrate (LOPRESSOR) 25 MG tablet, Take 75 mg by mouth daily In the morning  metoprolol tartrate (LOPRESSOR) 50 MG tablet, Take 50 mg by mouth daily At night  aspirin EC 81 MG EC tablet, Take 1 tablet by mouth daily  potassium chloride (KLOR-CON M) 10 MEQ extended release tablet, TAKE 1 TABLET BY MOUTH  DAILY (Patient taking differently: Take 10 mEq by mouth daily Indications: pt. takes three time a week )  rivaroxaban (XARELTO) 20 MG TABS tablet, Take 1 tablet by mouth daily (with breakfast) for 1 day LOT#[id number] EXP 3/21 (Patient taking differently: Take 20 mg by mouth Daily with supper LOT#[id number] EXP 3/21)  amLODIPine (NORVASC) 2.5 MG tablet, TAKE 1 TABLET BY MOUTH  DAILY  furosemide (LASIX) 20 MG tablet, TAKE 1 TABLET BY MOUTH  DAILY (Patient taking differently: Take 20 mg by mouth daily Indications: pt.is taking 3days a week )  meclizine (ANTIVERT) 25 MG tablet, Take 25 mg by mouth 2 times daily as needed  dofetilide (TIKOSYN) 250 MCG capsule, Take 1 capsule by mouth every 12 hours  Multiple Vitamins-Minerals (PRESERVISION AREDS 2 PO), Take 1 tablet by mouth 2 times daily  Cholecalciferol (VITAMIN D) 2000 UNITS CAPS capsule, Take 1 capsule by mouth daily   pravastatin (PRAVACHOL) 40 MG tablet, Take 40 mg by mouth daily. metoprolol tartrate (LOPRESSOR) 50 MG tablet, Take 100 mg in the morning and 50 mg at night by mouth. (Patient taking differently: At night)    Allergies:  Patient has no known allergies. Social History:   TOBACCO:   reports that he quit smoking about 46 years ago. He has a 1.50 pack-year smoking history. He has never used smokeless tobacco.  ETOH:   reports that he does not drink alcohol. MARITAL STATUS:    OCCUPATION:      Family History:   No family history on file.     REVIEW OF SYSTEMS:    General ROS: dark bms

## 2019-09-03 NOTE — CARE COORDINATION
Social Work / Discharge Planning : SW met with patient and spouse and explaiened role as discharge planner/ transition of care. Patient states plan at discharge is home with spouse. Patient and spouse denies any home needs. Therapy am/pac 24/24. Patient does NOT use a device. PCP is DR Marshal Garcia and he uses [facility] in [place] and Glenn Marcellus orders. SW to follow.  Electronically signed by Nicholos Gosselin, LSW on 9/3/19 at 12:40 PM

## 2019-09-04 VITALS
OXYGEN SATURATION: 90 % | WEIGHT: 176.1 LBS | HEART RATE: 76 BPM | BODY MASS INDEX: 26.69 KG/M2 | RESPIRATION RATE: 16 BRPM | DIASTOLIC BLOOD PRESSURE: 73 MMHG | SYSTOLIC BLOOD PRESSURE: 141 MMHG | TEMPERATURE: 98.3 F | HEIGHT: 68 IN

## 2019-09-04 LAB
ANION GAP SERPL CALCULATED.3IONS-SCNC: 11 MMOL/L (ref 7–16)
BASOPHILS ABSOLUTE: 0.04 E9/L (ref 0–0.2)
BASOPHILS RELATIVE PERCENT: 0.5 % (ref 0–2)
BUN BLDV-MCNC: 22 MG/DL (ref 8–23)
CALCIUM SERPL-MCNC: 8.7 MG/DL (ref 8.6–10.2)
CHLORIDE BLD-SCNC: 100 MMOL/L (ref 98–107)
CO2: 27 MMOL/L (ref 22–29)
CREAT SERPL-MCNC: 1.2 MG/DL (ref 0.7–1.2)
EOSINOPHILS ABSOLUTE: 0.29 E9/L (ref 0.05–0.5)
EOSINOPHILS RELATIVE PERCENT: 3.5 % (ref 0–6)
GFR AFRICAN AMERICAN: >60
GFR NON-AFRICAN AMERICAN: 58 ML/MIN/1.73
GLUCOSE BLD-MCNC: 102 MG/DL (ref 74–99)
HCT VFR BLD CALC: 35.3 % (ref 37–54)
HEMOGLOBIN: 11.5 G/DL (ref 12.5–16.5)
IMMATURE GRANULOCYTES #: 0.02 E9/L
IMMATURE GRANULOCYTES %: 0.2 % (ref 0–5)
LYMPHOCYTES ABSOLUTE: 1.8 E9/L (ref 1.5–4)
LYMPHOCYTES RELATIVE PERCENT: 21.9 % (ref 20–42)
MCH RBC QN AUTO: 29.6 PG (ref 26–35)
MCHC RBC AUTO-ENTMCNC: 32.6 % (ref 32–34.5)
MCV RBC AUTO: 91 FL (ref 80–99.9)
MONOCYTES ABSOLUTE: 0.69 E9/L (ref 0.1–0.95)
MONOCYTES RELATIVE PERCENT: 8.4 % (ref 2–12)
NEUTROPHILS ABSOLUTE: 5.37 E9/L (ref 1.8–7.3)
NEUTROPHILS RELATIVE PERCENT: 65.5 % (ref 43–80)
PDW BLD-RTO: 14.7 FL (ref 11.5–15)
PLATELET # BLD: 198 E9/L (ref 130–450)
PMV BLD AUTO: 10.4 FL (ref 7–12)
POTASSIUM SERPL-SCNC: 3.9 MMOL/L (ref 3.5–5)
RBC # BLD: 3.88 E12/L (ref 3.8–5.8)
SODIUM BLD-SCNC: 138 MMOL/L (ref 132–146)
WBC # BLD: 8.2 E9/L (ref 4.5–11.5)

## 2019-09-04 PROCEDURE — C9113 INJ PANTOPRAZOLE SODIUM, VIA: HCPCS | Performed by: SURGERY

## 2019-09-04 PROCEDURE — 6360000002 HC RX W HCPCS: Performed by: SURGERY

## 2019-09-04 PROCEDURE — 2580000003 HC RX 258: Performed by: SURGERY

## 2019-09-04 PROCEDURE — 85025 COMPLETE CBC W/AUTO DIFF WBC: CPT

## 2019-09-04 PROCEDURE — 80048 BASIC METABOLIC PNL TOTAL CA: CPT

## 2019-09-04 PROCEDURE — 6370000000 HC RX 637 (ALT 250 FOR IP): Performed by: SURGERY

## 2019-09-04 PROCEDURE — 36415 COLL VENOUS BLD VENIPUNCTURE: CPT

## 2019-09-04 RX ADMIN — METOPROLOL TARTRATE 75 MG: 50 TABLET ORAL at 08:59

## 2019-09-04 RX ADMIN — PRAVASTATIN SODIUM 40 MG: 20 TABLET ORAL at 08:59

## 2019-09-04 RX ADMIN — POTASSIUM CHLORIDE 10 MEQ: 10 TABLET, EXTENDED RELEASE ORAL at 08:59

## 2019-09-04 RX ADMIN — AMLODIPINE BESYLATE 2.5 MG: 2.5 TABLET ORAL at 08:58

## 2019-09-04 RX ADMIN — FUROSEMIDE 20 MG: 20 TABLET ORAL at 05:46

## 2019-09-04 RX ADMIN — Medication 10 ML: at 09:00

## 2019-09-04 RX ADMIN — DOFETILIDE 250 MCG: 0.25 CAPSULE ORAL at 08:58

## 2019-09-04 RX ADMIN — PANTOPRAZOLE SODIUM 40 MG: 40 INJECTION, POWDER, FOR SOLUTION INTRAVENOUS at 08:59

## 2019-09-04 ASSESSMENT — PAIN SCALES - GENERAL: PAINLEVEL_OUTOF10: 0

## 2019-09-04 NOTE — DISCHARGE SUMMARY
Physician Discharge Summary     Patient ID:  Gris Curtis  [id number]  82 y.o.  1935    Admit date: 9/2/2019    Discharge date and time: 9/4/2019    Admission Diagnoses:   Patient Active Problem List   Diagnosis    Automatic implantable cardioverter-defibrillator in situ    Chronic combined systolic and diastolic [facility]estive [facility] failure[facility] [address]    S/P TAVR (transcatheter aortic valve replacement)    Chronic atrial fibrillation ([facility] [address]    Coronary artery disease involving native coronary artery of native heart without angina pectoris    Essential hypertension    GI bleed    Hyperlipidemia LDL goal <100       Discharge Diagnoses: bleeding per rectum d/t hemorrhagic gastritis     Consults:gen surg     Procedures: egd     Hospital Course: The patient is a 82 y.o. male of Jakub Moyer DO with significant past medical history of cad s/p CABG remotely, s/p TAVR in 2017 and PAF on xarelto .   For his arrhythmia with Vtach  and ICD he follows with EP./,  who presents with complaints of having bowel movement that were black for the past several days. Recently , ASA was added to regimen by cardiology about 1 month ago. He is felt light headed and thought he had vertigo. He did nto check his bp at the time. On my eval today , he just returned from EGD and is resting comfortably in nad. BP (!) 114/59   Pulse 70   Temp 96.6 °F (35.9 °C) (Temporal)   Resp 18   Ht 5' 8\" (1.727 m)   Wt 167 lb 9.6 oz (76 kg)   SpO2 92%   BMI 25.48 kg/m²   H/h on admission is 12.5 and 37. It has dropped to 11.5 this am. All other parameters normal.     He underwent above procedure. oac and asa were held. hgb has dropped by 1 g since admission. He feels great and is ok for home. Stop asa. Continue xarelto . Advised to return if any further issues.     Recent Labs     09/02/19  1130  09/03/19  0405 09/03/19  1109 09/04/19  0404   WBC 8.3  --  7.7  --  8.2   HGB 12.5   < > 11.5* 12.6 11.5*   HCT 37.7   < > 34.8* 38.4

## 2019-09-04 NOTE — PLAN OF CARE
Problem: Falls - Risk of:  Goal: Will remain free from falls  Description  Will remain free from falls  Outcome: Met This Shift  Goal: Absence of physical injury  Description  Absence of physical injury  Outcome: Met This Shift     Problem: Fluid Volume - Imbalance:  Goal: Absence of imbalanced fluid volume signs and symptoms  Description  Absence of imbalanced fluid volume signs and symptoms  Outcome: Met This Shift     Problem: Bleeding:  Goal: Will show no signs and symptoms of excessive bleeding  Description  Will show no signs and symptoms of excessive bleeding  Outcome: Met This Shift

## 2019-09-04 NOTE — PROGRESS NOTES
GENERAL SURGERY  DAILY PROGRESS NOTE  9/4/2019    Subjective:  Feels well. Tolerating peptic ulcer diet without nausea or vomiting. States he had one dark bowel movement yesterday but none overnight.     Objective:  /65   Pulse 70   Temp 98 °F (36.7 °C) (Oral)   Resp 18   Ht 5' 8\" (1.727 m)   Wt 176 lb 1.6 oz (79.9 kg)   SpO2 90%   BMI 26.78 kg/m²     GENERAL:  Laying in bed, awake, alert, cooperative, no apparent distress  HEAD: Normocephalic, atraumatic  EYES: No sclera icterus, pupils equal  LUNGS:  No increased work of breathing  CARDIOVASCULAR:  RR  ABDOMEN:  Soft, non-tender, non-distended      Assessment/Plan:  82 y.o. male with hiatal hernia, esophagitis and hemorrhagic gastritis ss/p EGD 9/3    Okay for discharge from a surgical standpoint  Continue PPI  Discussed with Dr. Malcom Luna    Electronically signed by Wililam Carlson MD on 9/4/2019 at 7:35 AM

## 2019-09-12 ENCOUNTER — HOSPITAL ENCOUNTER (EMERGENCY)
Age: 84
Discharge: HOME | End: 2019-09-13
Payer: MEDICARE

## 2019-09-12 ENCOUNTER — APPOINTMENT (OUTPATIENT)
Dept: GENERAL RADIOLOGY | Age: 84
End: 2019-09-12
Payer: MEDICARE

## 2019-09-12 DIAGNOSIS — R06.00 DYSPNEA, UNSPECIFIED TYPE: Primary | ICD-10-CM

## 2019-09-12 DIAGNOSIS — I50.20 SYSTOLIC CONGESTIVE HEART FAILURE, UNSPECIFIED HF CHRONICITY (HCC): ICD-10-CM

## 2019-09-12 PROCEDURE — 83605 ASSAY OF LACTIC ACID: CPT

## 2019-09-12 PROCEDURE — 99285 EMERGENCY DEPT VISIT HI MDM: CPT

## 2019-09-12 PROCEDURE — 85025 COMPLETE CBC W/AUTO DIFF WBC: CPT

## 2019-09-12 PROCEDURE — 83880 ASSAY OF NATRIURETIC PEPTIDE: CPT

## 2019-09-12 PROCEDURE — 71045 X-RAY EXAM CHEST 1 VIEW: CPT

## 2019-09-12 PROCEDURE — 36415 COLL VENOUS BLD VENIPUNCTURE: CPT

## 2019-09-12 PROCEDURE — 93005 ELECTROCARDIOGRAM TRACING: CPT | Performed by: NURSE PRACTITIONER

## 2019-09-12 PROCEDURE — 83735 ASSAY OF MAGNESIUM: CPT

## 2019-09-12 PROCEDURE — 80053 COMPREHEN METABOLIC PANEL: CPT

## 2019-09-12 PROCEDURE — 84484 ASSAY OF TROPONIN QUANT: CPT

## 2019-09-12 RX ORDER — ASPIRIN 81 MG/1
81 TABLET ORAL DAILY
Status: ON HOLD | COMMUNITY
End: 2020-02-22 | Stop reason: HOSPADM

## 2019-09-12 RX ORDER — IPRATROPIUM BROMIDE AND ALBUTEROL SULFATE 2.5; .5 MG/3ML; MG/3ML
1 SOLUTION RESPIRATORY (INHALATION) ONCE
Status: COMPLETED | OUTPATIENT
Start: 2019-09-13 | End: 2019-09-13

## 2019-09-12 RX ORDER — METHYLPREDNISOLONE SODIUM SUCCINATE 125 MG/2ML
125 INJECTION, POWDER, LYOPHILIZED, FOR SOLUTION INTRAMUSCULAR; INTRAVENOUS ONCE
Status: COMPLETED | OUTPATIENT
Start: 2019-09-13 | End: 2019-09-13

## 2019-09-13 VITALS
DIASTOLIC BLOOD PRESSURE: 79 MMHG | HEIGHT: 68 IN | WEIGHT: 189.7 LBS | HEART RATE: 86 BPM | SYSTOLIC BLOOD PRESSURE: 134 MMHG | TEMPERATURE: 97.9 F | OXYGEN SATURATION: 94 % | BODY MASS INDEX: 28.75 KG/M2 | RESPIRATION RATE: 24 BRPM

## 2019-09-13 LAB
ALBUMIN SERPL-MCNC: 3.9 G/DL (ref 3.5–5.2)
ALP BLD-CCNC: 124 U/L (ref 40–129)
ALT SERPL-CCNC: 22 U/L (ref 0–40)
ANION GAP SERPL CALCULATED.3IONS-SCNC: 12 MMOL/L (ref 7–16)
AST SERPL-CCNC: 31 U/L (ref 0–39)
BASOPHILS ABSOLUTE: 0.05 E9/L (ref 0–0.2)
BASOPHILS RELATIVE PERCENT: 0.5 % (ref 0–2)
BILIRUB SERPL-MCNC: 0.2 MG/DL (ref 0–1.2)
BUN BLDV-MCNC: 23 MG/DL (ref 8–23)
CALCIUM SERPL-MCNC: 8.8 MG/DL (ref 8.6–10.2)
CHLORIDE BLD-SCNC: 105 MMOL/L (ref 98–107)
CO2: 25 MMOL/L (ref 22–29)
CREAT SERPL-MCNC: 1.1 MG/DL (ref 0.7–1.2)
EOSINOPHILS ABSOLUTE: 0.25 E9/L (ref 0.05–0.5)
EOSINOPHILS RELATIVE PERCENT: 2.6 % (ref 0–6)
GFR AFRICAN AMERICAN: >60
GFR NON-AFRICAN AMERICAN: >60 ML/MIN/1.73
GLUCOSE BLD-MCNC: 108 MG/DL (ref 74–99)
HCT VFR BLD CALC: 30.5 % (ref 37–54)
HEMOGLOBIN: 10 G/DL (ref 12.5–16.5)
IMMATURE GRANULOCYTES #: 0.05 E9/L
IMMATURE GRANULOCYTES %: 0.5 % (ref 0–5)
LACTIC ACID: 1.5 MMOL/L (ref 0.5–2.2)
LYMPHOCYTES ABSOLUTE: 2.11 E9/L (ref 1.5–4)
LYMPHOCYTES RELATIVE PERCENT: 21.8 % (ref 20–42)
MAGNESIUM: 2.3 MG/DL (ref 1.6–2.6)
MCH RBC QN AUTO: 31 PG (ref 26–35)
MCHC RBC AUTO-ENTMCNC: 32.8 % (ref 32–34.5)
MCV RBC AUTO: 94.4 FL (ref 80–99.9)
MONOCYTES ABSOLUTE: 0.76 E9/L (ref 0.1–0.95)
MONOCYTES RELATIVE PERCENT: 7.9 % (ref 2–12)
NEUTROPHILS ABSOLUTE: 6.45 E9/L (ref 1.8–7.3)
NEUTROPHILS RELATIVE PERCENT: 66.7 % (ref 43–80)
PDW BLD-RTO: 15.8 FL (ref 11.5–15)
PLATELET # BLD: 254 E9/L (ref 130–450)
PMV BLD AUTO: 10.6 FL (ref 7–12)
POTASSIUM SERPL-SCNC: 4.3 MMOL/L (ref 3.5–5)
PRO-BNP: 3042 PG/ML (ref 0–450)
RBC # BLD: 3.23 E12/L (ref 3.8–5.8)
SODIUM BLD-SCNC: 142 MMOL/L (ref 132–146)
TOTAL PROTEIN: 6.7 G/DL (ref 6.4–8.3)
TROPONIN: <0.01 NG/ML (ref 0–0.03)
WBC # BLD: 9.7 E9/L (ref 4.5–11.5)

## 2019-09-13 PROCEDURE — 96375 TX/PRO/DX INJ NEW DRUG ADDON: CPT

## 2019-09-13 PROCEDURE — 6370000000 HC RX 637 (ALT 250 FOR IP): Performed by: NURSE PRACTITIONER

## 2019-09-13 PROCEDURE — 94664 DEMO&/EVAL PT USE INHALER: CPT

## 2019-09-13 PROCEDURE — 96374 THER/PROPH/DIAG INJ IV PUSH: CPT

## 2019-09-13 PROCEDURE — 6360000002 HC RX W HCPCS: Performed by: NURSE PRACTITIONER

## 2019-09-13 RX ORDER — FUROSEMIDE 10 MG/ML
20 INJECTION INTRAMUSCULAR; INTRAVENOUS ONCE
Status: COMPLETED | OUTPATIENT
Start: 2019-09-13 | End: 2019-09-13

## 2019-09-13 RX ORDER — ALBUTEROL SULFATE 90 UG/1
2 AEROSOL, METERED RESPIRATORY (INHALATION) 4 TIMES DAILY PRN
Qty: 1 INHALER | Refills: 0 | Status: SHIPPED | OUTPATIENT
Start: 2019-09-13 | End: 2020-11-06

## 2019-09-13 RX ADMIN — IPRATROPIUM BROMIDE AND ALBUTEROL SULFATE 1 AMPULE: .5; 3 SOLUTION RESPIRATORY (INHALATION) at 00:09

## 2019-09-13 RX ADMIN — METHYLPREDNISOLONE SODIUM SUCCINATE 125 MG: 125 INJECTION, POWDER, FOR SOLUTION INTRAMUSCULAR; INTRAVENOUS at 00:03

## 2019-09-13 RX ADMIN — FUROSEMIDE 20 MG: 10 INJECTION, SOLUTION INTRAMUSCULAR; INTRAVENOUS at 00:43

## 2019-09-13 NOTE — ED PROVIDER NOTES
[facility]     Department of Emergency Medicine   ED  Provider Note  Admit Date/RoomTime: 9/12/2019 11:16 PM  ED Room: 23/23  Chief Complaint      Shortness of Breath (started last night. denies cough/chest pain.)    History of Present Illness   Source of history provided by:  patient and spouse/SO. History/Exam Limitations: none. Aline Morillo is a 82 y.o. old male who has a past medical history of:   Past Medical History:   Diagnosis Date    A-fib [facility])     Arrhythmia     Carotid artery stenosis     [facility] [address]     Heart valve problem     Hyperlipidemia     Hypertension     ICD (implantable cardiac defibrillator) in place 2007    MI (mitral incompetence) 2003    MI (myocardial infarction) ([facility] [address] 2003    presents to the emergency department by private vehicle, with complaints of gradual onset, still present SOB with exertion and SOB at rest which began 3 day(s) prior to arrival.   His symptoms are associated with shortness of breath and nothing additional, and denies abdominal pain, calf pain or chest pain. Since onset the symptoms have been persistent. The symptoms are aggravated by nothing and relieved by rest.       .  ROS   Pertinent positives and negatives are stated within HPI, all other systems reviewed and are negative.     Past Surgical History:   Procedure Laterality Date    CARDIAC CATHETERIZATION Right 03/03/2017    Dr. Fredonia Severance  2007. 2008 2007 78 shocks replaced 2008 [employer]     CARDIAC DEFIBRILLATOR PLACEMENT  07/28/2016    [employer] Dual ICD gen change    CORONARY ARTERY BYPASS GRAFT  05/23/2003 03/16/2007    DIAGNOSTIC CARDIAC CATH LAB PROCEDURE  2007    DIAGNOSTIC CARDIAC CATH LAB PROCEDURE  03/29/2008    stent    JOINT REPLACEMENT  2011    r hip surgery Dr Valerie Ching  03/29/2017    Dr. Emili Win and Dr. Christen Melvin- TAVR Josie 26mm valve    UPPER GASTROINTESTINAL ENDOSCOPY N/A 9/3/2019    EGD ESOPHAGOGASTRODUODENOSCOPY performed by Tammy Sanchez MD at [address]  [address]   Social History:  reports that he quit smoking about 46 years ago. He has a 1.50 pack-year smoking history. He has never used smokeless tobacco. He reports that he does not drink alcohol or use drugs. Family History: family history is not on file. Allergies: Patient has no known allergies. Physical Exam           ED Triage Vitals [09/12/19 2314]   BP Temp Temp Source Pulse Resp SpO2 Height Weight   (!) 140/88 98 °F (36.7 °C) Oral 77 18 95 % 5' 8\" (1.727 m) 179 lb (81.2 kg)      Oxygen Saturation Interpretation: Normal.    · General Appearance/Constitutional:  Alert,.  · HEENT:  NC/NT. PERRLA. Airway patent. · Neck:  Normal ROM. Supple. · Respiratoty:  Breath sounds: equal bilaterally. Lung sounds: diminished breath sounds- throughout and wheezing- throughout. · CV:  Regular rate and rhythm, normal heart sounds, without pathological murmurs, ectopy, gallops, or rubs. .  · GI:  Soft, nontender, good bowel sounds. No firm or pulsatile mass. · Integument:  Normal turgor. Warm, dry, without visible rash. · Extremities/Lymphatics:  Edema:  none Neither lower extremity(s). No evidence of DVT seen on physical exam. Negative Megan's sign. No cords or calf tenderness. No significant calf/ankle edema. .  · Neurological:  Oriented x3. Motor functions intact.     Lab / Imaging Results   (All laboratory and radiology results have been personally reviewed by myself)  Labs:  Results for orders placed or performed during the hospital encounter of 09/12/19   Troponin   Result Value Ref Range    Troponin <0.01 0.00 - 0.03 ng/mL   Brain Natriuretic Peptide   Result Value Ref Range    Pro-BNP 3,042 (H) 0 - 450 pg/mL   CBC Auto Differential   Result Value Ref Range    WBC 9.7 4.5 - 11.5 E9/L    RBC 3.23 (L) 3.80 - 5.80 E12/L    Hemoglobin 10.0 (L) 12.5 - 16.5 g/dL    Hematocrit 30.5 (L)

## 2019-09-15 LAB
EKG ATRIAL RATE: 89 BPM
EKG Q-T INTERVAL: 436 MS
EKG QRS DURATION: 142 MS
EKG QTC CALCULATION (BAZETT): 536 MS
EKG R AXIS: -59 DEGREES
EKG T AXIS: 5 DEGREES
EKG VENTRICULAR RATE: 91 BPM

## 2019-09-17 ENCOUNTER — TELEPHONE (OUTPATIENT)
Dept: ADMINISTRATIVE | Age: 84
End: 2019-09-17

## 2019-09-24 ENCOUNTER — OFFICE VISIT (OUTPATIENT)
Dept: CARDIOLOGY CLINIC | Age: 84
End: 2019-09-24
Payer: MEDICARE

## 2019-09-24 VITALS
HEART RATE: 71 BPM | HEIGHT: 68 IN | WEIGHT: 182.4 LBS | RESPIRATION RATE: 18 BRPM | DIASTOLIC BLOOD PRESSURE: 62 MMHG | BODY MASS INDEX: 27.65 KG/M2 | SYSTOLIC BLOOD PRESSURE: 132 MMHG

## 2019-09-24 DIAGNOSIS — I25.10 CORONARY ARTERY DISEASE INVOLVING NATIVE CORONARY ARTERY OF NATIVE HEART WITHOUT ANGINA PECTORIS: Primary | ICD-10-CM

## 2019-09-24 PROCEDURE — G8598 ASA/ANTIPLAT THER USED: HCPCS | Performed by: INTERNAL MEDICINE

## 2019-09-24 PROCEDURE — G8419 CALC BMI OUT NRM PARAM NOF/U: HCPCS | Performed by: INTERNAL MEDICINE

## 2019-09-24 PROCEDURE — 4040F PNEUMOC VAC/ADMIN/RCVD: CPT | Performed by: INTERNAL MEDICINE

## 2019-09-24 PROCEDURE — 1036F TOBACCO NON-USER: CPT | Performed by: INTERNAL MEDICINE

## 2019-09-24 PROCEDURE — G8427 DOCREV CUR MEDS BY ELIG CLIN: HCPCS | Performed by: INTERNAL MEDICINE

## 2019-09-24 PROCEDURE — 99214 OFFICE O/P EST MOD 30 MIN: CPT | Performed by: INTERNAL MEDICINE

## 2019-09-24 PROCEDURE — 93000 ELECTROCARDIOGRAM COMPLETE: CPT | Performed by: INTERNAL MEDICINE

## 2019-09-24 PROCEDURE — 1123F ACP DISCUSS/DSCN MKR DOCD: CPT | Performed by: INTERNAL MEDICINE

## 2019-09-24 PROCEDURE — 1111F DSCHRG MED/CURRENT MED MERGE: CPT | Performed by: INTERNAL MEDICINE

## 2019-11-04 ENCOUNTER — OFFICE VISIT (OUTPATIENT)
Dept: FAMILY MEDICINE CLINIC | Age: 84
End: 2019-11-04
Payer: MEDICARE

## 2019-11-04 VITALS
SYSTOLIC BLOOD PRESSURE: 122 MMHG | WEIGHT: 184 LBS | TEMPERATURE: 98.7 F | OXYGEN SATURATION: 96 % | DIASTOLIC BLOOD PRESSURE: 60 MMHG | RESPIRATION RATE: 16 BRPM | BODY MASS INDEX: 27.89 KG/M2 | HEIGHT: 68 IN | HEART RATE: 74 BPM

## 2019-11-04 DIAGNOSIS — I50.9 ACUTE ON CHRONIC CONGESTIVE HEART FAILURE, UNSPECIFIED HEART FAILURE TYPE (HCC): ICD-10-CM

## 2019-11-04 DIAGNOSIS — J00 NASOPHARYNGITIS: ICD-10-CM

## 2019-11-04 DIAGNOSIS — R05.9 COUGH: Primary | ICD-10-CM

## 2019-11-04 PROCEDURE — 99213 OFFICE O/P EST LOW 20 MIN: CPT | Performed by: FAMILY MEDICINE

## 2019-11-04 PROCEDURE — G8484 FLU IMMUNIZE NO ADMIN: HCPCS | Performed by: FAMILY MEDICINE

## 2019-11-04 PROCEDURE — 1123F ACP DISCUSS/DSCN MKR DOCD: CPT | Performed by: FAMILY MEDICINE

## 2019-11-04 PROCEDURE — G8598 ASA/ANTIPLAT THER USED: HCPCS | Performed by: FAMILY MEDICINE

## 2019-11-04 PROCEDURE — G8417 CALC BMI ABV UP PARAM F/U: HCPCS | Performed by: FAMILY MEDICINE

## 2019-11-04 PROCEDURE — 1036F TOBACCO NON-USER: CPT | Performed by: FAMILY MEDICINE

## 2019-11-04 PROCEDURE — 4040F PNEUMOC VAC/ADMIN/RCVD: CPT | Performed by: FAMILY MEDICINE

## 2019-11-04 PROCEDURE — G8427 DOCREV CUR MEDS BY ELIG CLIN: HCPCS | Performed by: FAMILY MEDICINE

## 2019-11-04 RX ORDER — AMOXICILLIN AND CLAVULANATE POTASSIUM 875; 125 MG/1; MG/1
1 TABLET, FILM COATED ORAL 2 TIMES DAILY
Qty: 14 TABLET | Refills: 0 | Status: SHIPPED | OUTPATIENT
Start: 2019-11-04 | End: 2019-11-11

## 2019-11-04 RX ORDER — BENZONATATE 100 MG/1
100 CAPSULE ORAL 2 TIMES DAILY PRN
Qty: 20 CAPSULE | Refills: 0 | Status: SHIPPED | OUTPATIENT
Start: 2019-11-04 | End: 2019-11-11

## 2019-12-17 ENCOUNTER — TELEPHONE (OUTPATIENT)
Dept: PRIMARY CARE CLINIC | Age: 84
End: 2019-12-17

## 2019-12-17 ENCOUNTER — OFFICE VISIT (OUTPATIENT)
Dept: FAMILY MEDICINE CLINIC | Age: 84
End: 2019-12-17
Payer: MEDICARE

## 2019-12-17 VITALS
WEIGHT: 181 LBS | DIASTOLIC BLOOD PRESSURE: 68 MMHG | OXYGEN SATURATION: 92 % | BODY MASS INDEX: 27.52 KG/M2 | TEMPERATURE: 99.3 F | SYSTOLIC BLOOD PRESSURE: 120 MMHG | RESPIRATION RATE: 14 BRPM | HEART RATE: 75 BPM

## 2019-12-17 DIAGNOSIS — I50.812 CHRONIC RIGHT-SIDED CONGESTIVE HEART FAILURE (HCC): ICD-10-CM

## 2019-12-17 DIAGNOSIS — J06.9 VIRAL URI: Primary | ICD-10-CM

## 2019-12-17 PROCEDURE — 99214 OFFICE O/P EST MOD 30 MIN: CPT | Performed by: FAMILY MEDICINE

## 2019-12-17 RX ORDER — AMOXICILLIN AND CLAVULANATE POTASSIUM 875; 125 MG/1; MG/1
1 TABLET, FILM COATED ORAL 2 TIMES DAILY
Qty: 20 TABLET | Refills: 0 | Status: SHIPPED
Start: 2019-12-17 | End: 2020-03-05

## 2019-12-17 RX ORDER — CETIRIZINE HYDROCHLORIDE 10 MG/1
10 TABLET ORAL DAILY
Qty: 30 TABLET | Refills: 1 | Status: SHIPPED
Start: 2019-12-17 | End: 2020-03-05

## 2019-12-17 RX ORDER — FLUTICASONE PROPIONATE 50 MCG
1 SPRAY, SUSPENSION (ML) NASAL DAILY
Qty: 1 BOTTLE | Refills: 1 | Status: ON HOLD
Start: 2019-12-17 | End: 2020-02-22 | Stop reason: HOSPADM

## 2019-12-17 ASSESSMENT — ENCOUNTER SYMPTOMS
GASTROINTESTINAL NEGATIVE: 1
WHEEZING: 0
SHORTNESS OF BREATH: 1
RHINORRHEA: 1
SINUS PRESSURE: 1
CHOKING: 0
CHEST TIGHTNESS: 0
COUGH: 1
EYES NEGATIVE: 1

## 2019-12-23 ENCOUNTER — OFFICE VISIT (OUTPATIENT)
Dept: FAMILY MEDICINE CLINIC | Age: 84
End: 2019-12-23
Payer: MEDICARE

## 2019-12-23 VITALS
BODY MASS INDEX: 27.34 KG/M2 | WEIGHT: 180.4 LBS | RESPIRATION RATE: 20 BRPM | OXYGEN SATURATION: 97 % | HEART RATE: 66 BPM | TEMPERATURE: 98.6 F | DIASTOLIC BLOOD PRESSURE: 76 MMHG | SYSTOLIC BLOOD PRESSURE: 120 MMHG | HEIGHT: 68 IN

## 2019-12-23 DIAGNOSIS — J06.9 UPPER RESPIRATORY TRACT INFECTION, UNSPECIFIED TYPE: Primary | ICD-10-CM

## 2019-12-23 DIAGNOSIS — R09.82 POSTNASAL DRIP: ICD-10-CM

## 2019-12-23 DIAGNOSIS — J01.90 ACUTE NON-RECURRENT SINUSITIS, UNSPECIFIED LOCATION: ICD-10-CM

## 2019-12-23 DIAGNOSIS — R07.0 PAIN IN THROAT: ICD-10-CM

## 2019-12-23 PROCEDURE — G8484 FLU IMMUNIZE NO ADMIN: HCPCS | Performed by: PHYSICIAN ASSISTANT

## 2019-12-23 PROCEDURE — 1036F TOBACCO NON-USER: CPT | Performed by: PHYSICIAN ASSISTANT

## 2019-12-23 PROCEDURE — 4040F PNEUMOC VAC/ADMIN/RCVD: CPT | Performed by: PHYSICIAN ASSISTANT

## 2019-12-23 PROCEDURE — 1123F ACP DISCUSS/DSCN MKR DOCD: CPT | Performed by: PHYSICIAN ASSISTANT

## 2019-12-23 PROCEDURE — G8598 ASA/ANTIPLAT THER USED: HCPCS | Performed by: PHYSICIAN ASSISTANT

## 2019-12-23 PROCEDURE — G8417 CALC BMI ABV UP PARAM F/U: HCPCS | Performed by: PHYSICIAN ASSISTANT

## 2019-12-23 PROCEDURE — 99214 OFFICE O/P EST MOD 30 MIN: CPT | Performed by: PHYSICIAN ASSISTANT

## 2019-12-23 PROCEDURE — G8427 DOCREV CUR MEDS BY ELIG CLIN: HCPCS | Performed by: PHYSICIAN ASSISTANT

## 2019-12-23 RX ORDER — GUAIFENESIN 600 MG/1
600 TABLET, EXTENDED RELEASE ORAL 2 TIMES DAILY
Qty: 30 TABLET | Refills: 0 | Status: SHIPPED | OUTPATIENT
Start: 2019-12-23 | End: 2020-01-07

## 2019-12-23 RX ORDER — DOXYCYCLINE HYCLATE 100 MG
100 TABLET ORAL 2 TIMES DAILY
Qty: 20 TABLET | Refills: 0 | Status: SHIPPED | OUTPATIENT
Start: 2019-12-23 | End: 2020-01-02

## 2019-12-23 RX ORDER — CHLORPHENIRAMINE MALEATE 4 MG/1
4 TABLET ORAL EVERY 6 HOURS PRN
Qty: 20 TABLET | Refills: 0 | Status: SHIPPED
Start: 2019-12-23 | End: 2020-05-07

## 2020-02-17 ENCOUNTER — HOSPITAL ENCOUNTER (INPATIENT)
Age: 85
LOS: 7 days | Discharge: HOME | DRG: 377 | End: 2020-02-24
Attending: EMERGENCY MEDICINE | Admitting: INTERNAL MEDICINE
Payer: MEDICARE

## 2020-02-17 PROBLEM — D62 ACUTE BLOOD LOSS ANEMIA: Status: ACTIVE | Noted: 2020-02-17

## 2020-02-17 PROBLEM — K92.2 GIB (GASTROINTESTINAL BLEEDING): Status: ACTIVE | Noted: 2020-02-17

## 2020-02-17 LAB
ABO/RH: NORMAL
ALBUMIN SERPL-MCNC: 3.9 G/DL (ref 3.5–5.2)
ALP BLD-CCNC: 115 U/L (ref 40–129)
ALT SERPL-CCNC: 16 U/L (ref 0–40)
ANION GAP SERPL CALCULATED.3IONS-SCNC: 13 MMOL/L (ref 7–16)
ANTIBODY SCREEN: NORMAL
APTT: 34.8 SEC (ref 24.5–35.1)
AST SERPL-CCNC: 24 U/L (ref 0–39)
BILIRUB SERPL-MCNC: 0.3 MG/DL (ref 0–1.2)
BUN BLDV-MCNC: 36 MG/DL (ref 8–23)
CALCIUM SERPL-MCNC: 8.6 MG/DL (ref 8.6–10.2)
CHLORIDE BLD-SCNC: 100 MMOL/L (ref 98–107)
CO2: 24 MMOL/L (ref 22–29)
CREAT SERPL-MCNC: 1.1 MG/DL (ref 0.7–1.2)
GFR AFRICAN AMERICAN: >60
GFR NON-AFRICAN AMERICAN: >60 ML/MIN/1.73
GLUCOSE BLD-MCNC: 140 MG/DL (ref 74–99)
HCT VFR BLD CALC: 23.1 % (ref 37–54)
HCT VFR BLD CALC: 26.9 % (ref 37–54)
HEMOGLOBIN: 7.4 G/DL (ref 12.5–16.5)
HEMOGLOBIN: 8.5 G/DL (ref 12.5–16.5)
INR BLD: 1.4
MAGNESIUM: 2.3 MG/DL (ref 1.6–2.6)
MCH RBC QN AUTO: 29.3 PG (ref 26–35)
MCHC RBC AUTO-ENTMCNC: 31.6 % (ref 32–34.5)
MCV RBC AUTO: 92.8 FL (ref 80–99.9)
PDW BLD-RTO: 18.4 FL (ref 11.5–15)
PLATELET # BLD: 233 E9/L (ref 130–450)
PMV BLD AUTO: 11.2 FL (ref 7–12)
POTASSIUM SERPL-SCNC: 4.2 MMOL/L (ref 3.5–5)
PROTHROMBIN TIME: 15.3 SEC (ref 9.3–12.4)
RBC # BLD: 2.9 E12/L (ref 3.8–5.8)
SODIUM BLD-SCNC: 137 MMOL/L (ref 132–146)
TOTAL PROTEIN: 6.2 G/DL (ref 6.4–8.3)
WBC # BLD: 10.7 E9/L (ref 4.5–11.5)

## 2020-02-17 PROCEDURE — 83735 ASSAY OF MAGNESIUM: CPT

## 2020-02-17 PROCEDURE — P9016 RBC LEUKOCYTES REDUCED: HCPCS

## 2020-02-17 PROCEDURE — 86923 COMPATIBILITY TEST ELECTRIC: CPT

## 2020-02-17 PROCEDURE — 85014 HEMATOCRIT: CPT

## 2020-02-17 PROCEDURE — 99285 EMERGENCY DEPT VISIT HI MDM: CPT

## 2020-02-17 PROCEDURE — 85027 COMPLETE CBC AUTOMATED: CPT

## 2020-02-17 PROCEDURE — 85610 PROTHROMBIN TIME: CPT

## 2020-02-17 PROCEDURE — 2580000003 HC RX 258: Performed by: INTERNAL MEDICINE

## 2020-02-17 PROCEDURE — 86900 BLOOD TYPING SEROLOGIC ABO: CPT

## 2020-02-17 PROCEDURE — 6360000002 HC RX W HCPCS: Performed by: INTERNAL MEDICINE

## 2020-02-17 PROCEDURE — 85018 HEMOGLOBIN: CPT

## 2020-02-17 PROCEDURE — 86901 BLOOD TYPING SEROLOGIC RH(D): CPT

## 2020-02-17 PROCEDURE — 85730 THROMBOPLASTIN TIME PARTIAL: CPT

## 2020-02-17 PROCEDURE — 2060000000 HC ICU INTERMEDIATE R&B

## 2020-02-17 PROCEDURE — C9113 INJ PANTOPRAZOLE SODIUM, VIA: HCPCS | Performed by: INTERNAL MEDICINE

## 2020-02-17 PROCEDURE — 80053 COMPREHEN METABOLIC PANEL: CPT

## 2020-02-17 PROCEDURE — 86850 RBC ANTIBODY SCREEN: CPT

## 2020-02-17 PROCEDURE — 6370000000 HC RX 637 (ALT 250 FOR IP): Performed by: INTERNAL MEDICINE

## 2020-02-17 PROCEDURE — 36415 COLL VENOUS BLD VENIPUNCTURE: CPT

## 2020-02-17 RX ORDER — PRAVASTATIN SODIUM 20 MG
40 TABLET ORAL DAILY
Status: DISCONTINUED | OUTPATIENT
Start: 2020-02-17 | End: 2020-02-24 | Stop reason: HOSPADM

## 2020-02-17 RX ORDER — ACETAMINOPHEN 325 MG/1
650 TABLET ORAL EVERY 4 HOURS PRN
Status: DISCONTINUED | OUTPATIENT
Start: 2020-02-17 | End: 2020-02-24 | Stop reason: HOSPADM

## 2020-02-17 RX ORDER — SODIUM CHLORIDE 0.9 % (FLUSH) 0.9 %
10 SYRINGE (ML) INJECTION EVERY 12 HOURS SCHEDULED
Status: DISCONTINUED | OUTPATIENT
Start: 2020-02-17 | End: 2020-02-24 | Stop reason: HOSPADM

## 2020-02-17 RX ORDER — METOPROLOL TARTRATE 50 MG/1
50 TABLET, FILM COATED ORAL 2 TIMES DAILY
Status: DISCONTINUED | OUTPATIENT
Start: 2020-02-17 | End: 2020-02-20

## 2020-02-17 RX ORDER — ALBUTEROL SULFATE 90 UG/1
2 AEROSOL, METERED RESPIRATORY (INHALATION) 4 TIMES DAILY PRN
Status: DISCONTINUED | OUTPATIENT
Start: 2020-02-17 | End: 2020-02-24 | Stop reason: HOSPADM

## 2020-02-17 RX ORDER — PANTOPRAZOLE SODIUM 40 MG/10ML
40 INJECTION, POWDER, LYOPHILIZED, FOR SOLUTION INTRAVENOUS 2 TIMES DAILY
Status: DISCONTINUED | OUTPATIENT
Start: 2020-02-17 | End: 2020-02-18

## 2020-02-17 RX ORDER — SODIUM CHLORIDE 0.9 % (FLUSH) 0.9 %
10 SYRINGE (ML) INJECTION PRN
Status: DISCONTINUED | OUTPATIENT
Start: 2020-02-17 | End: 2020-02-24 | Stop reason: HOSPADM

## 2020-02-17 RX ORDER — ONDANSETRON 2 MG/ML
4 INJECTION INTRAMUSCULAR; INTRAVENOUS EVERY 6 HOURS PRN
Status: CANCELLED | OUTPATIENT
Start: 2020-02-17

## 2020-02-17 RX ORDER — SODIUM CHLORIDE, SODIUM LACTATE, POTASSIUM CHLORIDE, CALCIUM CHLORIDE 600; 310; 30; 20 MG/100ML; MG/100ML; MG/100ML; MG/100ML
INJECTION, SOLUTION INTRAVENOUS CONTINUOUS
Status: DISCONTINUED | OUTPATIENT
Start: 2020-02-17 | End: 2020-02-18

## 2020-02-17 RX ORDER — FLUTICASONE PROPIONATE 50 MCG
1 SPRAY, SUSPENSION (ML) NASAL DAILY
Status: DISCONTINUED | OUTPATIENT
Start: 2020-02-17 | End: 2020-02-24 | Stop reason: HOSPADM

## 2020-02-17 RX ORDER — SODIUM CHLORIDE, SODIUM LACTATE, POTASSIUM CHLORIDE, AND CALCIUM CHLORIDE .6; .31; .03; .02 G/100ML; G/100ML; G/100ML; G/100ML
1000 INJECTION, SOLUTION INTRAVENOUS ONCE
Status: COMPLETED | OUTPATIENT
Start: 2020-02-17 | End: 2020-02-17

## 2020-02-17 RX ORDER — DOFETILIDE 0.25 MG/1
250 CAPSULE ORAL EVERY 12 HOURS SCHEDULED
Status: DISCONTINUED | OUTPATIENT
Start: 2020-02-17 | End: 2020-02-21

## 2020-02-17 RX ORDER — PANTOPRAZOLE SODIUM 40 MG/1
40 TABLET, DELAYED RELEASE ORAL DAILY
COMMUNITY
End: 2020-05-07 | Stop reason: SDUPTHER

## 2020-02-17 RX ADMIN — SODIUM CHLORIDE, POTASSIUM CHLORIDE, SODIUM LACTATE AND CALCIUM CHLORIDE: 600; 310; 30; 20 INJECTION, SOLUTION INTRAVENOUS at 21:31

## 2020-02-17 RX ADMIN — PANTOPRAZOLE SODIUM 40 MG: 40 INJECTION, POWDER, FOR SOLUTION INTRAVENOUS at 21:31

## 2020-02-17 RX ADMIN — DOFETILIDE 250 MCG: 0.25 CAPSULE ORAL at 21:31

## 2020-02-17 RX ADMIN — SODIUM CHLORIDE, PRESERVATIVE FREE 10 ML: 5 INJECTION INTRAVENOUS at 21:31

## 2020-02-17 RX ADMIN — METOPROLOL TARTRATE 50 MG: 50 TABLET, FILM COATED ORAL at 21:31

## 2020-02-17 RX ADMIN — SODIUM CHLORIDE, POTASSIUM CHLORIDE, SODIUM LACTATE AND CALCIUM CHLORIDE 1000 ML: 600; 310; 30; 20 INJECTION, SOLUTION INTRAVENOUS at 16:26

## 2020-02-17 ASSESSMENT — PAIN SCALES - GENERAL
PAINLEVEL_OUTOF10: 0
PAINLEVEL_OUTOF10: 0

## 2020-02-17 NOTE — ED PROVIDER NOTES
quit smoking about 47 years ago. He has a 1.50 pack-year smoking history. He has never used smokeless tobacco. He reports that he does not drink alcohol or use drugs. Family History: family history is not on file. The patients home medications have been reviewed. Allergies: Patient has no known allergies.     -------------------------------------------------- RESULTS -------------------------------------------------  All laboratory and radiology results have been personally reviewed by myself   LABS:  Results for orders placed or performed during the hospital encounter of 02/17/20   CBC   Result Value Ref Range    WBC 10.7 4.5 - 11.5 E9/L    RBC 2.90 (L) 3.80 - 5.80 E12/L    Hemoglobin 8.5 (L) 12.5 - 16.5 g/dL    Hematocrit 26.9 (L) 37.0 - 54.0 %    MCV 92.8 80.0 - 99.9 fL    MCH 29.3 26.0 - 35.0 pg    MCHC 31.6 (L) 32.0 - 34.5 %    RDW 18.4 (H) 11.5 - 15.0 fL    Platelets 256 123 - 530 E9/L    MPV 11.2 7.0 - 12.0 fL   Comprehensive Metabolic Panel   Result Value Ref Range    Sodium 137 132 - 146 mmol/L    Potassium 4.2 3.5 - 5.0 mmol/L    Chloride 100 98 - 107 mmol/L    CO2 24 22 - 29 mmol/L    Anion Gap 13 7 - 16 mmol/L    Glucose 140 (H) 74 - 99 mg/dL    BUN 36 (H) 8 - 23 mg/dL    CREATININE 1.1 0.7 - 1.2 mg/dL    GFR Non-African American >60 >=60 mL/min/1.73    GFR African American >60     Calcium 8.6 8.6 - 10.2 mg/dL    Total Protein 6.2 (L) 6.4 - 8.3 g/dL    Alb 3.9 3.5 - 5.2 g/dL    Total Bilirubin 0.3 0.0 - 1.2 mg/dL    Alkaline Phosphatase 115 40 - 129 U/L    ALT 16 0 - 40 U/L    AST 24 0 - 39 U/L   Protime-INR   Result Value Ref Range    Protime 15.3 (H) 9.3 - 12.4 sec    INR 1.4    APTT   Result Value Ref Range    aPTT 34.8 24.5 - 35.1 sec   Magnesium   Result Value Ref Range    Magnesium 2.3 1.6 - 2.6 mg/dL   TYPE AND SCREEN   Result Value Ref Range    ABO/Rh O POS     Antibody Screen NEG        RADIOLOGY:  Interpreted by Radiologist.  No orders to display       ------------------------- hypertension, hyperlipidemia previous MI with CABG x2, and history of GI bleed in 2013, 2019. Presented with history of 4 days of melena, shortness of breath with moderate exertion, and lightheadedness. Patient had lab work done on 12 February showing hemoglobin of 10 with a baseline of 12. Repeat CBC today shows hemoglobin of 8.5, BUN 36 creatinine 1.1 glucose 140 INR 1.4 PT 15.3,.  Orthostatics are positive. Patient received 1 L of LR, and due to drop in hemoglobin patient needs further work-up with possible EGD, general surgery consulted for evaluation, discussed with Dr. Tanvi Carr and agreed on plan and assessment. Spoke with  and accepted patient for admission. Patient understands and agrees with plan. Counseling: The emergency provider has spoken with the patient and family member patient and daughter and discussed todays results, in addition to providing specific details for the plan of care and counseling regarding the diagnosis and prognosis. Questions are answered at this time and they are agreeable with the plan.    --------------------------------- IMPRESSION AND DISPOSITION ---------------------------------    IMPRESSION  1. Gastrointestinal hemorrhage, unspecified gastrointestinal hemorrhage type    2.  Melena        DISPOSITION  Disposition: Admit to telemetry  Patient condition is stable    Electronically signed by Tigist Nolasco MD on 2/17/2020 at 6:20 PM               Tigist Nolasco MD  Resident  02/17/20 [id number]

## 2020-02-18 ENCOUNTER — ANESTHESIA (OUTPATIENT)
Dept: ENDOSCOPY | Age: 85
DRG: 377 | End: 2020-02-18
Payer: MEDICARE

## 2020-02-18 ENCOUNTER — ANESTHESIA EVENT (OUTPATIENT)
Dept: ENDOSCOPY | Age: 85
DRG: 377 | End: 2020-02-18
Payer: MEDICARE

## 2020-02-18 VITALS
SYSTOLIC BLOOD PRESSURE: 86 MMHG | OXYGEN SATURATION: 95 % | DIASTOLIC BLOOD PRESSURE: 44 MMHG | RESPIRATION RATE: 22 BRPM

## 2020-02-18 LAB
ALBUMIN SERPL-MCNC: 3.4 G/DL (ref 3.5–5.2)
ALP BLD-CCNC: 96 U/L (ref 40–129)
ALT SERPL-CCNC: 11 U/L (ref 0–40)
ANION GAP SERPL CALCULATED.3IONS-SCNC: 11 MMOL/L (ref 7–16)
AST SERPL-CCNC: 21 U/L (ref 0–39)
BILIRUB SERPL-MCNC: 0.4 MG/DL (ref 0–1.2)
BUN BLDV-MCNC: 26 MG/DL (ref 8–23)
CALCIUM SERPL-MCNC: 8.6 MG/DL (ref 8.6–10.2)
CHLORIDE BLD-SCNC: 100 MMOL/L (ref 98–107)
CO2: 24 MMOL/L (ref 22–29)
CREAT SERPL-MCNC: 0.9 MG/DL (ref 0.7–1.2)
GFR AFRICAN AMERICAN: >60
GFR NON-AFRICAN AMERICAN: >60 ML/MIN/1.73
GLUCOSE BLD-MCNC: 111 MG/DL (ref 74–99)
HCT VFR BLD CALC: 22.5 % (ref 37–54)
HCT VFR BLD CALC: 23.1 % (ref 37–54)
HEMOGLOBIN: 7.1 G/DL (ref 12.5–16.5)
HEMOGLOBIN: 7.2 G/DL (ref 12.5–16.5)
POTASSIUM REFLEX MAGNESIUM: 3.9 MMOL/L (ref 3.5–5)
SODIUM BLD-SCNC: 135 MMOL/L (ref 132–146)
TOTAL PROTEIN: 5.2 G/DL (ref 6.4–8.3)

## 2020-02-18 PROCEDURE — 6370000000 HC RX 637 (ALT 250 FOR IP): Performed by: INTERNAL MEDICINE

## 2020-02-18 PROCEDURE — 2580000003 HC RX 258: Performed by: INTERNAL MEDICINE

## 2020-02-18 PROCEDURE — 6360000002 HC RX W HCPCS

## 2020-02-18 PROCEDURE — 6360000002 HC RX W HCPCS: Performed by: INTERNAL MEDICINE

## 2020-02-18 PROCEDURE — 2580000003 HC RX 258

## 2020-02-18 PROCEDURE — 85018 HEMOGLOBIN: CPT

## 2020-02-18 PROCEDURE — C9113 INJ PANTOPRAZOLE SODIUM, VIA: HCPCS | Performed by: INTERNAL MEDICINE

## 2020-02-18 PROCEDURE — 85014 HEMATOCRIT: CPT

## 2020-02-18 PROCEDURE — 2709999900 HC NON-CHARGEABLE SUPPLY: Performed by: SURGERY

## 2020-02-18 PROCEDURE — 6370000000 HC RX 637 (ALT 250 FOR IP): Performed by: SURGERY

## 2020-02-18 PROCEDURE — 2580000003 HC RX 258: Performed by: SURGERY

## 2020-02-18 PROCEDURE — 80053 COMPREHEN METABOLIC PANEL: CPT

## 2020-02-18 PROCEDURE — 3700000001 HC ADD 15 MINUTES (ANESTHESIA): Performed by: SURGERY

## 2020-02-18 PROCEDURE — 7100000001 HC PACU RECOVERY - ADDTL 15 MIN: Performed by: SURGERY

## 2020-02-18 PROCEDURE — 2060000000 HC ICU INTERMEDIATE R&B

## 2020-02-18 PROCEDURE — 3700000000 HC ANESTHESIA ATTENDED CARE: Performed by: SURGERY

## 2020-02-18 PROCEDURE — 7100000000 HC PACU RECOVERY - FIRST 15 MIN: Performed by: SURGERY

## 2020-02-18 PROCEDURE — 36415 COLL VENOUS BLD VENIPUNCTURE: CPT

## 2020-02-18 PROCEDURE — 0DJ08ZZ INSPECTION OF UPPER INTESTINAL TRACT, VIA NATURAL OR ARTIFICIAL OPENING ENDOSCOPIC: ICD-10-PCS | Performed by: SURGERY

## 2020-02-18 PROCEDURE — 3609017100 HC EGD: Performed by: SURGERY

## 2020-02-18 RX ORDER — SODIUM CHLORIDE 9 MG/ML
INJECTION, SOLUTION INTRAVENOUS CONTINUOUS PRN
Status: COMPLETED | OUTPATIENT
Start: 2020-02-18 | End: 2020-02-18

## 2020-02-18 RX ORDER — PANTOPRAZOLE SODIUM 40 MG/1
40 TABLET, DELAYED RELEASE ORAL DAILY
Status: DISCONTINUED | OUTPATIENT
Start: 2020-02-18 | End: 2020-02-24 | Stop reason: HOSPADM

## 2020-02-18 RX ORDER — SODIUM CHLORIDE, SODIUM LACTATE, POTASSIUM CHLORIDE, CALCIUM CHLORIDE 600; 310; 30; 20 MG/100ML; MG/100ML; MG/100ML; MG/100ML
INJECTION, SOLUTION INTRAVENOUS CONTINUOUS PRN
Status: DISCONTINUED | OUTPATIENT
Start: 2020-02-18 | End: 2020-02-18 | Stop reason: SDUPTHER

## 2020-02-18 RX ORDER — ASPIRIN 81 MG/1
81 TABLET ORAL DAILY
Status: DISCONTINUED | OUTPATIENT
Start: 2020-02-18 | End: 2020-02-19

## 2020-02-18 RX ORDER — PROPOFOL 10 MG/ML
INJECTION, EMULSION INTRAVENOUS PRN
Status: DISCONTINUED | OUTPATIENT
Start: 2020-02-18 | End: 2020-02-18 | Stop reason: SDUPTHER

## 2020-02-18 RX ADMIN — PANTOPRAZOLE SODIUM 40 MG: 40 TABLET, DELAYED RELEASE ORAL at 17:47

## 2020-02-18 RX ADMIN — PANTOPRAZOLE SODIUM 40 MG: 40 INJECTION, POWDER, FOR SOLUTION INTRAVENOUS at 09:17

## 2020-02-18 RX ADMIN — METOPROLOL TARTRATE 50 MG: 50 TABLET, FILM COATED ORAL at 20:03

## 2020-02-18 RX ADMIN — DOFETILIDE 250 MCG: 0.25 CAPSULE ORAL at 09:17

## 2020-02-18 RX ADMIN — DOFETILIDE 250 MCG: 0.25 CAPSULE ORAL at 21:41

## 2020-02-18 RX ADMIN — FLUTICASONE PROPIONATE 1 SPRAY: 50 SPRAY, METERED NASAL at 20:03

## 2020-02-18 RX ADMIN — PRAVASTATIN SODIUM 40 MG: 20 TABLET ORAL at 20:03

## 2020-02-18 RX ADMIN — PROPOFOL 20 MG: 10 INJECTION, EMULSION INTRAVENOUS at 14:01

## 2020-02-18 RX ADMIN — SODIUM CHLORIDE, POTASSIUM CHLORIDE, SODIUM LACTATE AND CALCIUM CHLORIDE: 600; 310; 30; 20 INJECTION, SOLUTION INTRAVENOUS at 13:55

## 2020-02-18 RX ADMIN — METOPROLOL TARTRATE 50 MG: 50 TABLET, FILM COATED ORAL at 09:17

## 2020-02-18 RX ADMIN — RIVAROXABAN 20 MG: 20 TABLET, FILM COATED ORAL at 17:47

## 2020-02-18 RX ADMIN — PROPOFOL 75 MG: 10 INJECTION, EMULSION INTRAVENOUS at 13:57

## 2020-02-18 RX ADMIN — SODIUM CHLORIDE, PRESERVATIVE FREE 10 ML: 5 INJECTION INTRAVENOUS at 20:04

## 2020-02-18 RX ADMIN — SODIUM CHLORIDE, PRESERVATIVE FREE 10 ML: 5 INJECTION INTRAVENOUS at 09:17

## 2020-02-18 RX ADMIN — FLUTICASONE PROPIONATE 1 SPRAY: 50 SPRAY, METERED NASAL at 03:17

## 2020-02-18 ASSESSMENT — PULMONARY FUNCTION TESTS
PIF_VALUE: 0

## 2020-02-18 ASSESSMENT — PAIN SCALES - GENERAL
PAINLEVEL_OUTOF10: 0

## 2020-02-18 NOTE — ANESTHESIA PRE PROCEDURE
Department of Anesthesiology  Preprocedure Note       Name:  Chelsie Montoya   Age:  82 y.o.  :  1935                                          MRN:  [id number]         Date:  2020      Surgeon: Nga Webb):  Ashlyn Mesa MD  QUARLES    Procedure: EGD ESOPHAGOGASTRODUODENOSCOPY (N/A )    Medications prior to admission:   Prior to Admission medications    Medication Sig Start Date End Date Taking? Authorizing Provider   pantoprazole (PROTONIX) 40 MG tablet Take 40 mg by mouth daily    Historical Provider, MD   chlorpheniramine (ALLER-CHLOR) 4 MG tablet Take 1 tablet by mouth every 6 hours as needed for Allergies 19   JOSEPH Rodriguez III   cetirizine (ZYRTEC) 10 MG tablet Take 1 tablet by mouth daily 19   Maddy Members, DO   amoxicillin-clavulanate (AUGMENTIN) 875-125 MG per tablet Take 1 tablet by mouth 2 times daily 19   Esther Roland, DO   fluticasone [facility] ALLERGY RELIEF) 50 MCG/ACT nasal spray 1 spray by Each Nostril route daily 19   Maddy Members, DO   rivaroxaban (XARELTO) 20 MG TABS tablet Take 20 mg by mouth    Historical Provider, MD   albuterol sulfate  (90 Base) MCG/ACT inhaler Inhale 2 puffs into the lungs 4 times daily as needed for Wheezing 19   Pepper Seth, APRN - CNP   aspirin 81 MG EC tablet Take 81 mg by mouth daily    Historical Provider, MD   potassium chloride (KLOR-CON M) 10 MEQ extended release tablet TAKE 1 TABLET BY MOUTH  DAILY  Patient taking differently: Take 10 mEq by mouth every other day Indications: pt. takes three time a week  19   Aury Mcneal MD   metoprolol tartrate (LOPRESSOR) 50 MG tablet Take 100 mg in the morning and 50 mg at night by mouth.   Patient taking differently: 75 mg in AM and 50 mg at HS 18   Carley Ortega, DO   amLODIPine (NORVASC) 2.5 MG tablet TAKE 1 TABLET BY MOUTH  DAILY  Patient taking differently: Take 5 mg by mouth daily  6/15/18   Aury Mcneal MD   furosemide (LASIX) 20 MG Wilberto Dang MD        acetaminophen (TYLENOL) tablet 650 mg  650 mg Oral Q4H PRN Sarbjit Peanloza MD        lactated ringers infusion   Intravenous Continuous Sarbjit Penaloza  mL/hr at 02/17/20 2131         Allergies:  No Known Allergies    Problem List:    Patient Active Problem List   Diagnosis Code    Automatic implantable cardioverter-defibrillator in situ Z95.810    Chronic combined systolic and diastolic CHF (congestive heart failure) ([facility]) I50.42    S/P TAVR (transcatheter aortic valve replacement) Z95.2    Chronic atrial fibrillation I48.20    Coronary artery disease involving native coronary artery of native heart without angina pectoris I25.10    Essential hypertension I10    GI bleed K92.2    Hyperlipidemia LDL goal <100 E78.5    GIB (gastrointestinal bleeding) K92.2    Acute blood loss anemia D62       Past Medical History:        Diagnosis Date    A-fib ([address]     Arrhythmia     Carotid artery stenosis     [facility]     Heart valve problem     Hyperlipidemia     Hypertension     ICD (implantable cardiac defibrillator) in place 2007    MI (mitral incompetence) 2003    MI (myocardial infarction) ([address] 2003       Past Surgical History:        Procedure Laterality Date    CARDIAC CATHETERIZATION Right 03/03/2017    Dr. Sujatha Rene  2007. 2008 2007 78 shocks replaced 2008 Medtronic     CARDIAC DEFIBRILLATOR PLACEMENT  07/28/2016    Medtronic Dual ICD gen change    CORONARY ARTERY BYPASS GRAFT  05/23/2003 03/16/2007    DIAGNOSTIC CARDIAC CATH LAB PROCEDURE  2007    DIAGNOSTIC CARDIAC CATH LAB PROCEDURE  03/29/2008    stent    JOINT REPLACEMENT  2011    r hip surgery Dr Trina Byers  03/29/2017    Dr. Doc Dietz and Dr. Reardon Oral- TAVR Josie 26mm valve    UPPER GASTROINTESTINAL ENDOSCOPY N/A 9/3/2019    EGD ESOPHAGOGASTRODUODENOSCOPY performed by Margaret George MD at [address]         Social History:    Social History     Tobacco Use    Smoking status: Former Smoker     Packs/day: 0.50     Years: 3.00     Pack years: 1.50     Last attempt to quit: 1973     Years since quittin.0    Smokeless tobacco: Never Used    Tobacco comment: Quit smoking in    Substance Use Topics    Alcohol use: No                                Counseling given: Not Answered  Comment: Quit smoking in       Vital Signs (Current): There were no vitals filed for this visit. BP Readings from Last 3 Encounters:   20 (!) 99/53   19 120/76   19 120/68       NPO Status:                                                                                 BMI:   Wt Readings from Last 3 Encounters:   19 180 lb 6.4 oz (81.8 kg)   19 181 lb (82.1 kg)   19 184 lb (83.5 kg)     There is no height or weight on file to calculate BMI.    CBC:   Lab Results   Component Value Date    WBC 10.7 2020    RBC 2.90 2020    HGB 7.1 2020    HCT 22.5 2020    MCV 92.8 2020    RDW 18.4 2020     2020       CMP:   Lab Results   Component Value Date     2020    K 3.9 2020     2020    CO2 24 2020    BUN 26 2020    CREATININE 0.9 2020    GFRAA >60 2020    LABGLOM >60 2020    GLUCOSE 111 2020    PROT 5.2 2020    CALCIUM 8.6 2020    BILITOT 0.4 2020    ALKPHOS 96 2020    AST 21 2020    ALT 11 2020       POC Tests: No results for input(s): POCGLU, POCNA, POCK, POCCL, POCBUN, POCHEMO, POCHCT in the last 72 hours.     Coags:   Lab Results   Component Value Date    PROTIME 15.3 2020    INR 1.4 2020    APTT 34.8 2020       HCG (If Applicable): No results found for: PREGTESTUR, PREGSERUM, HCG, HCGQUANT     ABGs: No results found for: PHART, PO2ART, LJP6BBN, UJQ3XYV, BEART, Induction: intravenous. Anesthetic plan and risks discussed with patient. Plan discussed with CRNA. Patient seen and examined, chart reviewed, agree with above findings. Anesthetic plan, risks, benefits, alternatives, and personnel involved discussed with patient. Patient verbalized an understanding and agreed to proceed. NPO status confirmed. Anesthetic plan discussed with care team members and agreed upon.     Gareld Brittle, DO   2/18/2020  1:58 PM

## 2020-02-18 NOTE — CONSULTS
GENERAL SURGERY  CONSULT NOTE  2/17/2020    Physician Consulted: Dr. Mick Hidalgo  Reason for Consult: melena  Referring Physician: Dr. Marikay Koyanagi HPI  Alex Quiroz is a 82 y.o. male who presents for evaluation of melena. He states he is been having melena for the last week and a half. He endorses dark BM this morning that was loose but he denies any nausea vomiting or other complaints overnight. Rosssilvina Maciel He is on Xarelto and stopped taking his aspirin. He has a history of a-fib and TVAR (with bovine valve). He had an EGD with Dr. Mick Hidalgo 9/2019 that was positive or hemorrhagic gastritis. He states he felt dizzy this morning and is why he decided to come to the ED. He had been seeing his primary care physician who started him on protonix once daily a week ago. He believes his melena began just after taking abx for a dental procedure a week and a half ago. Hgb is 8.5 from baseline of ~12.  INR 1.4      Past Medical History:   Diagnosis Date    [facility]-fib [facility])     Arrhythmia     Carotid artery stenosis     [facility]) [facility]     Heart valve problem     Hyperlipidemia     Hypertension     ICD (implantable cardiac defibrillator) in place 2007    MI (mitral incompetence) 2003    MI (myocardial infarction) ([facility] 2003       Past Surgical History:   Procedure Laterality Date    CARDIAC CATHETERIZATION Right 03/03/2017    Dr. Enrique Kirkpatrick  2007. 2008 2007 78 shocks replaced 2008 Medtronic     CARDIAC DEFIBRILLATOR PLACEMENT  07/28/2016    Medtronic Dual ICD gen change    CORONARY ARTERY BYPASS GRAFT  05/23/2003 03/16/2007    DIAGNOSTIC CARDIAC CATH LAB PROCEDURE  2007    DIAGNOSTIC CARDIAC CATH LAB PROCEDURE  03/29/2008    stent    JOINT REPLACEMENT  2011    r hip surgery Dr Tracie Huddleston  03/29/2017    Dr. Valentín Doshi and Dr. Kam Blood- TAVR Josie 26mm valve    UPPER GASTROINTESTINAL ENDOSCOPY N/A 9/3/2019    EGD ESOPHAGOGASTRODUODENOSCOPY performed found.      ASSESSMENT:  82 y.o. male with melena, history of previous peptic ulcers and hemorrhagic gastritis.      PLAN:  - OK for CLD, NPOMN  - EGD 2/18  - PPI BID  - Avoid anticoagulation  - continue resuscitation  - IVFs  - Trend H/Hq8, transfuse prn hgb <7.     Electronically signed by Nader Schulz MD on 2/17/20 at 7:45 PM

## 2020-02-18 NOTE — PROGRESS NOTES
Patient for EGD today. No acute events overnight, denies anymore episodes of melena overnight.      Abd: soft, NT, ND    Electronically signed by Arleth Bowens MD on 2/18/2020 at 6:53 AM

## 2020-02-18 NOTE — H&P
Lacho Le M.D. History and Physical      CHIEF COMPLAINT: Black bowel movements    Reason for Admission: GI bleed work-up    History Obtained From: Patient    HISTORY OF PRESENT ILLNESS:      The patient is a 82 y.o. male of Melia Severs, DO with significant past medical history of chronic atrial fibrillation on Xarelto at home who presents with complaints of black bowel movements that started several days ago after he had taken oral antibiotics for a dental visit. Patient does indicate complaints of feeling lightheaded and dizziness, no syncope reported. No abdominal cramping is reported prior to BMs. Of note, patient was admitted for similar complaints in September 2019 at which time he underwent EGD notable for hemorrhagic gastritis. Xarelto was held for a short time after that. Patient then resumed ongoing Xarelto therapy. On my evaluation, he is resting comfortably no apparent acute distress. Denies any further bowel movement since admission.   BP (!) 99/53   Pulse 89   Temp 98 °F (36.7 °C) (Temporal)   Resp 18   SpO2 92%   ED eval reveals H&H 8.5/26/9 >> 7.1/22.5 this morning (after receiving IV fluids)  All other blood work unremarkable  Past Medical History:        Diagnosis Date    A-fib [facility])     Arrhythmia     Carotid artery stenosis     [facility]     Heart valve problem     Hyperlipidemia     Hypertension     ICD (implantable cardiac defibrillator) in place 2007    MI (mitral incompetence) 2003    MI (myocardial infarction) ([facility] 2003     Past Surgical History:        Procedure Laterality Date    CARDIAC CATHETERIZATION Right 03/03/2017    Dr. Rivas Lacy  2007. 2008 2007 78 shocks replaced 2008 Medtronic     CARDIAC DEFIBRILLATOR PLACEMENT  07/28/2016    Medtronic Dual ICD gen change    CORONARY ARTERY BYPASS GRAFT  05/23/2003 03/16/2007    DIAGNOSTIC CARDIAC CATH deferred      DATA:     Recent Labs     02/17/20  1355 02/17/20  2251 02/18/20  0707   WBC 10.7  --   --    HGB 8.5* 7.4* 7.1*     --   --      Recent Labs     02/17/20  1355 02/18/20  0707    135   K 4.2 3.9   BUN 36* 26*   CREATININE 1.1 0.9     Recent Labs     02/17/20  1355 02/17/20  1622 02/18/20  0707   PROT 6.2*  --  5.2*   INR  --  1.4  --      Recent Labs     02/17/20  1355 02/18/20  0707   AST 24 21   ALT 16 11   ALKPHOS 115 96   BILITOT 0.3 0.4     No results for input(s): BNP in the last 72 hours. No results for input(s): CKTOTAL, CKMB, CKMBINDEX, TROPONINI in the last 72 hours. ASSESSMENT:      Active Problems:    Chronic combined systolic and diastolic CHF (congestive heart failure) (H[facility])    Chronic atrial fibrillation    Essential hypertension    GIB (gastrointestinal bleeding)    Acute blood loss anemia  Resolved Problems:    * No resolved hospital problems. *        PLAN:    Admit to telemetry for evaluation of questionable GI bleed  H&H every 6 hours x3  IV PPI twice daily pending scope  Hold offending agents including oral anticoagulation/chemical DVT prophylaxis/NSAIDs  General surgery evaluation for scopes  Continue home cardiac medications    Cessation of ongoing oral anticoagulation needs to be addressed as this is the second time patient has had melena/probable hemorrhagic gastritis within the past 6 months on Xarelto. -Will discuss with cardiology prior to discharge    DVT prophylaxis  PT OT  Discharge plan      Sascha Mccormick MD  2/18/2020  9:51 AM

## 2020-02-19 ENCOUNTER — APPOINTMENT (OUTPATIENT)
Dept: GENERAL RADIOLOGY | Age: 85
DRG: 377 | End: 2020-02-19
Payer: MEDICARE

## 2020-02-19 LAB
ANION GAP SERPL CALCULATED.3IONS-SCNC: 14 MMOL/L (ref 7–16)
ANION GAP SERPL CALCULATED.3IONS-SCNC: 16 MMOL/L (ref 7–16)
BASOPHILS ABSOLUTE: 0.05 E9/L (ref 0–0.2)
BASOPHILS RELATIVE PERCENT: 0.5 % (ref 0–2)
BLOOD BANK DISPENSE STATUS: NORMAL
BLOOD BANK PRODUCT CODE: NORMAL
BPU ID: NORMAL
BUN BLDV-MCNC: 22 MG/DL (ref 8–23)
BUN BLDV-MCNC: 24 MG/DL (ref 8–23)
CALCIUM IONIZED: 1.18 MMOL/L (ref 1.15–1.33)
CALCIUM SERPL-MCNC: 8.5 MG/DL (ref 8.6–10.2)
CALCIUM SERPL-MCNC: 8.8 MG/DL (ref 8.6–10.2)
CHLORIDE BLD-SCNC: 102 MMOL/L (ref 98–107)
CHLORIDE BLD-SCNC: 106 MMOL/L (ref 98–107)
CO2: 20 MMOL/L (ref 22–29)
CO2: 25 MMOL/L (ref 22–29)
CREAT SERPL-MCNC: 1.2 MG/DL (ref 0.7–1.2)
CREAT SERPL-MCNC: 1.2 MG/DL (ref 0.7–1.2)
DESCRIPTION BLOOD BANK: NORMAL
EOSINOPHILS ABSOLUTE: 0.14 E9/L (ref 0.05–0.5)
EOSINOPHILS RELATIVE PERCENT: 1.4 % (ref 0–6)
GFR AFRICAN AMERICAN: >60
GFR AFRICAN AMERICAN: >60
GFR NON-AFRICAN AMERICAN: 58 ML/MIN/1.73
GFR NON-AFRICAN AMERICAN: 58 ML/MIN/1.73
GLUCOSE BLD-MCNC: 145 MG/DL (ref 74–99)
GLUCOSE BLD-MCNC: 151 MG/DL (ref 74–99)
HCT VFR BLD CALC: 22.2 % (ref 37–54)
HCT VFR BLD CALC: 24.2 % (ref 37–54)
HCT VFR BLD CALC: 25.7 % (ref 37–54)
HCT VFR BLD CALC: 26.5 % (ref 37–54)
HEMOGLOBIN: 6.8 G/DL (ref 12.5–16.5)
HEMOGLOBIN: 7.3 G/DL (ref 12.5–16.5)
HEMOGLOBIN: 7.9 G/DL (ref 12.5–16.5)
HEMOGLOBIN: 8.4 G/DL (ref 12.5–16.5)
IMMATURE GRANULOCYTES #: 0.06 E9/L
IMMATURE GRANULOCYTES %: 0.6 % (ref 0–5)
LYMPHOCYTES ABSOLUTE: 1.55 E9/L (ref 1.5–4)
LYMPHOCYTES RELATIVE PERCENT: 15.4 % (ref 20–42)
MAGNESIUM: 2.4 MG/DL (ref 1.6–2.6)
MAGNESIUM: 2.5 MG/DL (ref 1.6–2.6)
MCH RBC QN AUTO: 29.5 PG (ref 26–35)
MCHC RBC AUTO-ENTMCNC: 30.7 % (ref 32–34.5)
MCV RBC AUTO: 95.9 FL (ref 80–99.9)
MONOCYTES ABSOLUTE: 0.85 E9/L (ref 0.1–0.95)
MONOCYTES RELATIVE PERCENT: 8.4 % (ref 2–12)
NEUTROPHILS ABSOLUTE: 7.44 E9/L (ref 1.8–7.3)
NEUTROPHILS RELATIVE PERCENT: 73.7 % (ref 43–80)
PDW BLD-RTO: 19.8 FL (ref 11.5–15)
PLATELET # BLD: 217 E9/L (ref 130–450)
PMV BLD AUTO: 10.9 FL (ref 7–12)
POTASSIUM REFLEX MAGNESIUM: 3.8 MMOL/L (ref 3.5–5)
POTASSIUM SERPL-SCNC: 4 MMOL/L (ref 3.5–5)
PRO-BNP: 2389 PG/ML (ref 0–450)
RBC # BLD: 2.68 E12/L (ref 3.8–5.8)
SODIUM BLD-SCNC: 141 MMOL/L (ref 132–146)
SODIUM BLD-SCNC: 142 MMOL/L (ref 132–146)
T4 FREE: 1.3 NG/DL (ref 0.93–1.7)
TSH SERPL DL<=0.05 MIU/L-ACNC: 1.4 UIU/ML (ref 0.27–4.2)
WBC # BLD: 10.1 E9/L (ref 4.5–11.5)

## 2020-02-19 PROCEDURE — 83735 ASSAY OF MAGNESIUM: CPT

## 2020-02-19 PROCEDURE — 2580000003 HC RX 258: Performed by: SURGERY

## 2020-02-19 PROCEDURE — 85018 HEMOGLOBIN: CPT

## 2020-02-19 PROCEDURE — 2580000003 HC RX 258: Performed by: INTERNAL MEDICINE

## 2020-02-19 PROCEDURE — 99232 SBSQ HOSP IP/OBS MODERATE 35: CPT | Performed by: NURSE PRACTITIONER

## 2020-02-19 PROCEDURE — 99223 1ST HOSP IP/OBS HIGH 75: CPT | Performed by: INTERNAL MEDICINE

## 2020-02-19 PROCEDURE — 2060000000 HC ICU INTERMEDIATE R&B

## 2020-02-19 PROCEDURE — 82330 ASSAY OF CALCIUM: CPT

## 2020-02-19 PROCEDURE — 6370000000 HC RX 637 (ALT 250 FOR IP): Performed by: SURGERY

## 2020-02-19 PROCEDURE — 93005 ELECTROCARDIOGRAM TRACING: CPT | Performed by: INTERNAL MEDICINE

## 2020-02-19 PROCEDURE — 83880 ASSAY OF NATRIURETIC PEPTIDE: CPT

## 2020-02-19 PROCEDURE — 80048 BASIC METABOLIC PNL TOTAL CA: CPT

## 2020-02-19 PROCEDURE — 71046 X-RAY EXAM CHEST 2 VIEWS: CPT

## 2020-02-19 PROCEDURE — 99222 1ST HOSP IP/OBS MODERATE 55: CPT | Performed by: INTERNAL MEDICINE

## 2020-02-19 PROCEDURE — 36415 COLL VENOUS BLD VENIPUNCTURE: CPT

## 2020-02-19 PROCEDURE — 93283 PRGRMG EVAL IMPLANTABLE DFB: CPT | Performed by: INTERNAL MEDICINE

## 2020-02-19 PROCEDURE — 6370000000 HC RX 637 (ALT 250 FOR IP): Performed by: INTERNAL MEDICINE

## 2020-02-19 PROCEDURE — 84439 ASSAY OF FREE THYROXINE: CPT

## 2020-02-19 PROCEDURE — 6360000002 HC RX W HCPCS: Performed by: INTERNAL MEDICINE

## 2020-02-19 PROCEDURE — 85014 HEMATOCRIT: CPT

## 2020-02-19 PROCEDURE — 36430 TRANSFUSION BLD/BLD COMPNT: CPT

## 2020-02-19 PROCEDURE — 85025 COMPLETE CBC W/AUTO DIFF WBC: CPT

## 2020-02-19 PROCEDURE — 2700000000 HC OXYGEN THERAPY PER DAY

## 2020-02-19 PROCEDURE — 84443 ASSAY THYROID STIM HORMONE: CPT

## 2020-02-19 RX ORDER — 0.9 % SODIUM CHLORIDE 0.9 %
20 INTRAVENOUS SOLUTION INTRAVENOUS ONCE
Status: DISCONTINUED | OUTPATIENT
Start: 2020-02-19 | End: 2020-02-19

## 2020-02-19 RX ORDER — POTASSIUM CHLORIDE 20 MEQ/1
20 TABLET, EXTENDED RELEASE ORAL 2 TIMES DAILY WITH MEALS
Status: DISCONTINUED | OUTPATIENT
Start: 2020-02-19 | End: 2020-02-21

## 2020-02-19 RX ORDER — FUROSEMIDE 10 MG/ML
20 INJECTION INTRAMUSCULAR; INTRAVENOUS ONCE
Status: DISCONTINUED | OUTPATIENT
Start: 2020-02-19 | End: 2020-02-19 | Stop reason: SDUPTHER

## 2020-02-19 RX ORDER — LIDOCAINE HYDROCHLORIDE ANHYDROUS AND DEXTROSE MONOHYDRATE .4; 5 G/100ML; G/100ML
1 INJECTION, SOLUTION INTRAVENOUS CONTINUOUS
Status: DISCONTINUED | OUTPATIENT
Start: 2020-02-19 | End: 2020-02-22

## 2020-02-19 RX ORDER — LIDOCAINE HYDROCHLORIDE 20 MG/ML
100 INJECTION, SOLUTION INTRAVENOUS ONCE
Status: COMPLETED | OUTPATIENT
Start: 2020-02-19 | End: 2020-02-19

## 2020-02-19 RX ORDER — 0.9 % SODIUM CHLORIDE 0.9 %
20 INTRAVENOUS SOLUTION INTRAVENOUS ONCE
Status: COMPLETED | OUTPATIENT
Start: 2020-02-19 | End: 2020-02-19

## 2020-02-19 RX ORDER — FUROSEMIDE 10 MG/ML
20 INJECTION INTRAMUSCULAR; INTRAVENOUS 2 TIMES DAILY
Status: DISCONTINUED | OUTPATIENT
Start: 2020-02-19 | End: 2020-02-21

## 2020-02-19 RX ADMIN — POTASSIUM CHLORIDE 20 MEQ: 20 TABLET, EXTENDED RELEASE ORAL at 11:49

## 2020-02-19 RX ADMIN — PRAVASTATIN SODIUM 40 MG: 20 TABLET ORAL at 20:42

## 2020-02-19 RX ADMIN — SODIUM CHLORIDE 20 ML: 9 INJECTION, SOLUTION INTRAVENOUS at 17:24

## 2020-02-19 RX ADMIN — PANTOPRAZOLE SODIUM 40 MG: 40 TABLET, DELAYED RELEASE ORAL at 09:41

## 2020-02-19 RX ADMIN — METOPROLOL TARTRATE 50 MG: 50 TABLET, FILM COATED ORAL at 09:41

## 2020-02-19 RX ADMIN — FUROSEMIDE 20 MG: 10 INJECTION, SOLUTION INTRAMUSCULAR; INTRAVENOUS at 17:54

## 2020-02-19 RX ADMIN — METOPROLOL TARTRATE 50 MG: 50 TABLET, FILM COATED ORAL at 20:42

## 2020-02-19 RX ADMIN — FLUTICASONE PROPIONATE 1 SPRAY: 50 SPRAY, METERED NASAL at 20:42

## 2020-02-19 RX ADMIN — POTASSIUM CHLORIDE 20 MEQ: 20 TABLET, EXTENDED RELEASE ORAL at 17:54

## 2020-02-19 RX ADMIN — FUROSEMIDE 20 MG: 10 INJECTION, SOLUTION INTRAMUSCULAR; INTRAVENOUS at 11:49

## 2020-02-19 RX ADMIN — ASPIRIN 81 MG: 81 TABLET, COATED ORAL at 09:41

## 2020-02-19 RX ADMIN — DOFETILIDE 250 MCG: 0.25 CAPSULE ORAL at 09:41

## 2020-02-19 RX ADMIN — LIDOCAINE HYDROCHLORIDE 1 MG/MIN: 4 INJECTION, SOLUTION INTRAVENOUS at 22:59

## 2020-02-19 RX ADMIN — DOFETILIDE 250 MCG: 0.25 CAPSULE ORAL at 20:42

## 2020-02-19 RX ADMIN — SODIUM CHLORIDE, PRESERVATIVE FREE 10 ML: 5 INJECTION INTRAVENOUS at 20:42

## 2020-02-19 RX ADMIN — LIDOCAINE HYDROCHLORIDE 100 MG: 20 INJECTION, SOLUTION INTRAVENOUS at 22:53

## 2020-02-19 ASSESSMENT — PAIN SCALES - GENERAL: PAINLEVEL_OUTOF10: 0

## 2020-02-19 NOTE — PROGRESS NOTES
GENERAL SURGERY  DAILY PROGRESS NOTE  2/19/2020    Subjective:  EGD 2/18 showed mild gastritis and esophagitis, no active bleeding. Patient states he is still feeling dizzy, was told by primary they will contact his cardiologist in regards to stopping his [address] given his recurrent GI bleeds. Denies nausea, vomiting. States he did have one dark stool yesterday     Objective:  BP (!) 111/57   Pulse 80   Temp 97.7 °F (36.5 °C) (Temporal)   Resp 18   SpO2 96%     GENERAL:  Laying in bed, awake, alert, cooperative, no apparent distress  HEAD: Normocephalic, atraumatic  EYES: No sclera icterus, pupils equal  LUNGS:  No increased work of breathing  CARDIOVASCULAR:  RR  ABDOMEN:  Soft, non-tender, non-distended  EXTREMITIES: No edema or swelling  SKIN: Warm and dry    Assessment/Plan:  82 y.o. male with melena, history of prior GIBs, on [facility] [address]    - hgb 6.8, previous 7.2.  Stable ~7.   - Will defer to cardiology in regards to [address] long term  - PPI BID  - May need outpatient colonoscopy    Electronically signed by Andre Corona MD on 2/19/2020 at 6:39 AM     Seen/examined  Light-headed, dyspneic  No melena, Hb slightly down though  Blood ordered  Going for head CT  Wife hopeful to have colonoscopy done as inpt  Will await completed cardiac eval and stable fluid status before agreeing to inpt procedure  Janie

## 2020-02-19 NOTE — CARE COORDINATION
Met with pt at bedside to discuss discharge / transition of care plan. Pt appears SOB with 3 liters oxygen on sat of 96%, continued c/o dizziness, states he feels terrible. Pt reports from home with spouse, has no oxygen/nebulizer, has cane and walker, does not require usage at this time, denies further DME needs, independent of all ADL, spouse drives. Discharge plan is home with no needs when medically stable.

## 2020-02-19 NOTE — CONSULTS
[address] [facility]  CARDIAC ELECTROPHYSIOLOGY DEPARTMENT/DIVISION OF CARDIOLOGY  Consultation Report  PATIENT: Pierce Hamilton RECORD NUMBER: [id number]  DATE OF SERVICE:  2/19/2020  ATTENDING ELECTROPHYSIOLOGIST:  Casandra Youngblood DO  PRIMARY ELECTROPHYSIOLOGIST: Micky Retana MD   REFERRING PHYSICIAN: Heaven Hutson MD and Josiah Ace DO  CHIEF COMPLAINT: GI bleed    HPI: This is a 82 y.o. male with a history of   Patient Active Problem List   Diagnosis    Automatic implantable cardioverter-defibrillator in situ    Chronic combined systolic and diastolic CHF (congestive heart failure) ([address]    S/P TAVR (transcatheter aortic valve replacement)    Chronic atrial fibrillation    Coronary artery disease involving native coronary artery of native heart without angina pectoris    Essential hypertension    GI bleed    Hyperlipidemia LDL goal <100    GIB (gastrointestinal bleeding)    Acute blood loss anemia   who presented to the hospital with melena. Mr. Shon Ash is known to Dr. Aamir Ayers and we are seeing her in coverage. He follows with Dr. Harvey River from a Cardiology perspective. He has a past medical history including coronary artery disease status post CABG with redo in 2007, ICD in situ, H/O VT with ICD shocks, and atrial fibrillation. He presented to the emergency department on 2/17/2020 due blood in his stool. He underwent an EGD on 2/18/20 by Dr. David Ramirez that revealed mild esophagitis and gastritis. His Xarelto has been put on hold. Post-procedure he had an episode of VT that was successfully pace-terminated. His OptiVol fluid index is elevated and is receiving IV diuresis. He remains on Tikosyn for AAD suppression. His CrCl and QTc are stable. He remains anemic with a HGB of 7.3 and is expected to receive an additional unit of packed red blood cells today. His device was interrogated as noted below.     Patient Active Problem List    Diagnosis Date Noted    pantoprazole (PROTONIX) tablet 40 mg  40 mg Oral Daily Mayelin Ramirez MD   40 mg at 02/19/20 0941    [Held by provider] rivaroxaban (XARELTO) tablet 20 mg  20 mg Oral Dinner Mayelin Ramirez MD   20 mg at 02/18/20 1747    albuterol sulfate  (90 Base) MCG/ACT inhaler 2 puff  2 puff Inhalation 4x Daily PRN Mayelin Ramirez MD        [facility]) capsule 250 mcg  250 mcg Oral 2 times per day Mayelin Ramirez MD   250 mcg at 02/19/20 0941    fluticasone (FLONASE) 50 MCG/ACT nasal spray 1 spray  1 spray Each Nostril Daily Mayelin Ramirez MD   1 spray at 02/18/20 2003    metoprolol tartrate (LOPRESSOR) tablet 50 mg  50 mg Oral BID Mayelin Ramirez MD   50 mg at 02/19/20 0941    pravastatin (PRAVACHOL) tablet 40 mg  40 mg Oral Daily Mayelin Ramirez MD   40 mg at 02/18/20 2003    sodium chloride flush 0.9 % injection 10 mL  10 mL Intravenous 2 times per day Mayelin Ramirez MD   10 mL at 02/18/20 2004    sodium chloride flush 0.9 % injection 10 mL  10 mL Intravenous PRN Mayelin Ramirez MD        magnesium hydroxide (MILK OF MAGNESIA) 400 MG/5ML suspension 30 mL  30 mL Oral Daily PRN Mayelin Ramirez MD       Shearon August acetaminophen (TYLENOL) tablet 650 mg  650 mg Oral Q4H PRN Mayelin Ramirez MD            No Known Allergies    ROS:   Constitutional: Negative for fever, appetite change, + activity changes   HENT: Negative for epistaxis. Eyes: Negative for diploplia, blurred vision. Respiratory: Negative for cough, chest tightness,  and wheezing. + for shortness of breath   Cardiovascular: pertinent positives in HPI  Gastrointestinal: Negative for abdominal pain and + for blood in stool. Genitourinary: Negative for hematuria and difficulty urinating. Musculoskeletal: Negative for myalgias and gait problem. Skin: Negative for color change and rash. Neurological: Negative for syncope and light-headedness. Psychiatric/Behavioral: Negative for confusion and agitation. The patient is not nervous/anxious.   All other review of systems are interval not displayed. Recent Labs     20  0707 20  0600 20  1930    142 141   K 3.9 4.0 3.8    106 102   CO2 24 20* 25   BUN 26* 24* 22   CREATININE 0.9 1.2 1.2   CALCIUM 8.6 8.5* 8.8     Recent Labs     20  1622   INR 1.4     Recent Labs     20  0845   TSH 1.400     Lab Results   Component Value Date    MG 2.4 2020       Last CXR 2020:   Patient MRN:  [id number]   : 1935   Age: 82 years   Gender: Male       Order Date:  2020 9:00 AM       EXAM: XR CHEST (2 VW) 2 images       INDICATION:  sob    Unless done in last 30 days   sob        COMPARISON: 2019       FINDINGS:   There is cardiomegaly. Postoperative changes are noted in the chest   with a prosthetic cardiac valve. Left subclavian pacemaker is   unchanged. There is vascular congestion with the perihilar and   bibasilar infiltrates and pleural effusions.           Impression   CHF without edema.         Telemetry 20: SR, AS-, PVC's    EKG 2020: Sinus rhythm, AS-, 1st degree AVD, iRBBB, QTc 435ms     Last Echo 2019:   Transthoracic Echocardiography Report (TTE)      Demographics      Patient Name        Shawn Snow  Gender               Male                       Milla King      [id number]       Room Number   Number      Account #           [de-identified]      Procedure Date       2019      Corporate ID                       Ordering Physician   Laura Leger DO      Accession Number    [id number]      Referring Physician  Frances Chawla      Date of Birth       1935     Sonographer          Maria Antonia [facility]ardo [facility]      Age                 82 year(s)     Interpreting         Glendora Every DO                                      Physician                                         Any Other     Procedure     Type of Study      TTE procedure:Echo Complete W/ Dop & Color Flow.      Procedure Date  Date: 2019 Start: 02:09 PM     Study Location: Echo Lab  Technical Quality: Adequate visualization     Indications:TIA and TAVR intraprocedure.     Patient Status: Routine     Contrast Medium: Bubble Study.     Height: 67 inches Weight: 180 pounds BSA: 1.93 m^2 BMI: 28.19 kg/m^2     BP: 122/70 mmHg     Allergies    - No known allergies.      Findings      Left Ventricle   Ejection fraction is visually estimated at 60%. The inferior basal wall is   hypokinetic. No regional wall motion abnormalities seen. Normal left   ventricular wall thickness. Left ventricle size is normal. There is   doppler evidence of stage II diastolic dysfunction. Right Ventricle   Normal right ventricle structure and function. Right ventricle pacemaker   lead noted. Left Atrium   Left atrial volume index of 39 ml per meters squared BSA. Agitated saline injected for shunt evaluation. No evidence of patent foramen ovale. No evidence of atrial septal defect. Right Atrium   Normal right atrium. Mitral Valve   Mild mitral annular calcification. No evidence of mitral valve stenosis. Mild mitral regurgitation is present. Tricuspid Valve   The tricuspid valve appears structurally normal.   Mild tricuspid regurgitation. Aortic Valve   Hx of TAVR with 26 mm S3 3/29/17. The aortic valve area is 1.6 cm2 with a   maximum gradient of 15 mmHg and a mean gradient of 7 mmHg. Mild aortic   regurgitation is noted. Pulmonic Valve   Pulmonic valve is structurally normal.      Pericardial Effusion   No evidence for hemodynamically significant pericardial effusion. Aorta   Normal aortic root and ascending aorta. Miscellaneous   The inferior vena cava diameter is normal with normal respiratory   variation. Conclusions      Summary   Ejection fraction is visually estimated at 60%. The inferior basal wall is hypokinetic. No regional wall motion abnormalities seen. Normal right ventricle structure and function.    There is doppler evidence of stage II diastolic (Continuity):1.57     mmHgLVOT Mean Gradient:   MV Peak Gradient: cm^2                          2.1 mmHg   7.9 mmHg          AV Deceleration Time: 1239.5  Estimated RVSP: 31.1 mmHg   MV Mean Gradient: msec                          Estimated RAP:3 mmHg   2.2 mmHg          LVOT VTI: 21.2 cm   MV Mean Velocity: AV P1/2t: 344 msec   0.66 m/s          IVRT: 110.7 msec              TR Velocity:2.65 m/s   MV Deceleration   Estimated PASP: 31.09 mmHg    TR Gradient:28.09 mmHg   Time: 224.9 msec  Pulm. Vein A Reversal         PV Peak Velocity: 1.03 m/s   MV P1/2t: 61.4    Duration:175.3 msec           PV Peak Gradient: 4.25   msec              Pulm. Vein D Velocity:0.56    mmHg   MVA by PHT:3.58   m/sPulm. Vein A Reversal      PV Mean Velocity: 0.64 m/s   cm^2              Velocity:0.28 m/s             PV Mean Gradient: 1.9 mmHg   MV Area           Pulm. Vein S Velocity: 0.61   (continuity): 1.7 m/s   cm^2   MV E' Septal   Velocity: 0.05   m/s   MV E' Lateral   Velocity: 9 m/s     [email or web address] [id number]    Last Cath 3/3/2017:     Max Hamilton                    [address]  [facility] [address]                                 CARDIAC CATHETERIZATION     PATIENT NAME:  Maria Antonia Davalos                :      1935  MED REC NO:    [id number]                         ROOM:        ACCOUNT NO:    [de-identified]                       ADMISSION DATE: 2017  PHYSICIAN:     Heriberto Kaye. Galileo Pereira MD                    DATE OF PROCEDURE:  2017     PROCEDURE:  Left heart catheterization, coronary angiography, injection of  saphenous graft, injection of LIMA graft.     TECHNIQUE:  Right femoral access was utilized as the patient had had both  radial arteries harvested for prior surgical revascularization.   Ultrasound  guidance was used to access the right common femoral artery and a 6-Malay  sheath was placed with no difficulty. The LIMA artery was injected with a  LIMA catheter, which was also used for the presumed radial artery to diagonal  branch and 2nd radial artery conduit to obtuse marginal branch. No further  grafts were noted (history of SVG to ramus intermedius and OM in the remote  past presumed occluded). The native right coronary was injected with a LIMA  catheter. The aortic valve was intentionally not crossed.     Indication:  1. PRE-TAVR  2. AUC score 7  3. AUC indication : 70     Procedure: Left heart catheterization, coronary angiography, INJECTION OF LIMA AND SVGS  Anesthesia: BENADRYL, Fentanyl  Time sedation was administered: 0730  . I was present in the room when sedation was administered. Procedure end time: 5995  Time spent with face to face monitoring of moderate sedation: 20 MIN        Findings:  Left main: 0% stenosis  LAD: 100 % stenosis  Circumflex: 100 % Stenosis AFTER OM1  RCA: Dominant. ANEURYSMAL DILATATION IN MID PORTION. LONG 75 % stenosis IN MID AND DISTAL VESSEL. LIMA - LAD: PATENT  RADIAL - D2: PATENT  RADIAL - OM2: PATENT     Hemodynamics: Ao: 978/16.         Complication: None   Blood loss:10 CC  Contrast used: 61 CC     Post Op diagnosis:  PATENT LIMA TO LAD, RADIAL GRAFTS TO DIAGONAL AND OM  NATIVE RCA WITH DIFFUSE SEVERE DISEASE - MEDICAL MANAGEMENT     PLAN:  PER HEART TEAM  MEDICAL MANAGEMENT FOR CORONARY CIRCULATION     Uriel Anderson MD        D: 03/03/2017 07:51:06  T: 03/03/2017 08:16:40  [place]/nts  Job#: [id number]  Doc#: [id number]    [facility] Cardiac Electrophysiology    Device Interrogation 2/19/2020  Make/Model Medtronic Evera  Mode AAIR--> DDDR 70/130 ppm  P wave: 1.3 mV  Impedance: 342 ohms   Threshold: 0.75 V @ 0.4 ms  RV R wave: 6.3 mV  Impedance: 285 ohms   Threshold: 0.75 V @ 0.4 ms  Pacing: A: 74%  RV: 1.6%    Battery Voltage/Longevity:  2.96 V 3.7 years     Arrhythmias: 1. NSVT.  2. VT at 231 bpm--pace-terminated with ATP  OptiVol: Elevated  Reprogramming included: see below  Overall device function is normal  All device programmable settings were evaluated per above and in the scanned document, along with iterative adjustments (capture thresholds) to assess and select the most appropriate final programming to provide for consistent delivery of the appropriate therapy and to verify function of the device. I have independently reviewed all of the ECGs and rhythm strips per above. I have personally reviewed the laboratory, cardiac diagnostic and radiographic testing as outlined above. I have reviewed previous records noted in [address]. Assessment:     1. GI bleed  Recent Labs     02/19/20  0845 02/19/20  0600 02/18/20  2335  02/17/20  1355   WBC  --  10.1  --   --  10.7   HGB 7.3* 7.9* 6.8*   < > 8.5*   HCT 24.2* 25.7* 22.2*   < > 26.9*   MCV  --  95.9  --   --  92.8   PLT  --  217  --   --  233    < > = values in this interval not displayed. -s/p EGD as noted above  -PRBC transfusion  -repeat H/H pending    2. Ventricular tachycardia  - First seen 03/15/2003 at time of stress test  - PMVT with 2 ICD shocks 9/7/18  - VT with ATP--2/19/20  - Tikosyn 250 mcg every 12 hours. - Lopressor 50mg BID   -Estimated Creatinine Clearance: 48 mL/min (based on SCr of 1.2 mg/dL). 3. ICD in situ  -Secondary prevention  -[facility] 3/2007 Med[employer]  -Inappropriate shocks 3/29/2008   -ICD lead replaced 3/29/2008  -Generator change 7/28/2016  - Normal function as above    4. PsAF  -First seen 1/11/2010 in remote check  -DCCV 12/2010, 5/30/2017  -[id number] 6 (CHF, age, TIA, vascular disease)   -Xarelto currently on hold  -Lopressor 50 BID, Tikosyn 250 mcg BID     5. Coronary artery disease  -MI/CABG x3 2003  -Redo CABG 3/2007  -PCI 3/2007    6. HFpEF  -OptiVol elevated  - IV lasix   -per Cardiology    7. Valvular heart disease   - post TAVR 3/29/2017    8.  Hyperlipidemia  Lab Results   Component Value Date    CHOL

## 2020-02-19 NOTE — CONSULTS
diastolic function, normal LA, ICD electrode right heart. Trileaflet AV with moderate to severe AS, mean/peak gradient 20/31 mmHg. BLOSSOM 1.0 cm². MAC with mild MR, normal RVSP.  21. No drug allergies. 25. Family history negative for premature vascular disease. 23. PAF with DCCV, [facility], 12/2010.   24. [facility], [facility] Dr[facility] Brooklyn Ramirez. 25. [facility]. Moderate sized fixed basal inferior defect, probably artifact. 26. [facility]. 1+ AR, moderate AS, mild MR, mild TR, normal LVEF.   27. Echo, 01/31/2013. LV not dilated. Mild concentric LVH. Septal paradox. EF 50-55%. BLOSSOM 1.2 cm² consistent with moderate stenosis. Peak/mean gradient 38/21. Stage II diastolic dysfunction. 28. [facility] admission, 08/28/2013 with dizziness, Hb 7.7, WBC 19.0. CXR negative except cardiomegaly. EKG NSR, leftward axis, RBBB. BMP negative except for elevation in BUN to 55 with Cr 1.3.   29. EGD, 08/28/2013. Large pyloric channel ulcer with clot treated with PRBCs and fresh frozen plasma. Hb 8.7 on day of discharge and stable. Pradaxa was temporarily held. 30. CT abdomen, 09/08/2014. 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 31. CT chest, 09/17/2014. Consolidation and infiltrate at the left lung base, stable when compared to previous exams with less pleural thickening and calcified plaque in the left pleural cavity without any recent change. Chronic obstructive airways disease. 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   32. ICD programmed to DDDR mode by Dr. Hernán Conteh, 03/26/2015. Device function normal.  33. Echo 10/16/2015. EF 39%. Stage II diastolic dysfunction. Aortic stenosis with peak/mean gradients of 48/24 mmHg.  Estimated valve area inhaler 2 puff, 2 puff, Inhalation, 4x Daily PRN  dofetilide (TIKOSYN) capsule 250 mcg, 250 mcg, Oral, 2 times per day  fluticasone (FLONASE) 50 MCG/ACT nasal spray 1 spray, 1 spray, Each Nostril, Daily  metoprolol tartrate (LOPRESSOR) tablet 50 mg, 50 mg, Oral, BID  pravastatin (PRAVACHOL) tablet 40 mg, 40 mg, Oral, Daily  sodium chloride flush 0.9 % injection 10 mL, 10 mL, Intravenous, 2 times per day  sodium chloride flush 0.9 % injection 10 mL, 10 mL, Intravenous, PRN  magnesium hydroxide (MILK OF MAGNESIA) 400 MG/5ML suspension 30 mL, 30 mL, Oral, Daily PRN  acetaminophen (TYLENOL) tablet 650 mg, 650 mg, Oral, Q4H PRN    Allergies:  Patient has no known allergies. Social History:    States that he smoked and drank alcohol \"socially\" over 50 years ago. Denies illicit drug use. Caffeine intake includes coffee daily. Family History:   Please note this information was not obtained at this time as it is limited in nature due to the patient's advanced age. REVIEW OF SYSTEMS:     · Constitutional: Denies fevers, chills, night sweats, and fatigue  · HEENT: Denies nose bleeds, and blurred vision,oral pain, abscess or lesion. Complains of headaches. · Musculoskeletal: Denies falls, pain to BLE with ambulation and edema to BLE. · Neurological: Complains of dizziness and lightheadedness. Denies numbness and tingling  · Cardiovascular: Denies chest pain, palpitations, and feelings of heart racing. · Respiratory: Complains of TORRES, orthopnea and PND  · Gastrointestinal: Denies heartburn, nausea/vomiting, diarrhea and constipation, bloody, and tarry stools. Complains of black stools. · Genitourinary: Denies dysuria and hematuria  · Hematologic: Denies excessive bruising or bleeding  · Lymphatic: Denies lumps and bumps to neck, axilla, breast, and groin  · Endocrine: Denies excessive thirst. Denies intolerance to hot and cold  · Psychiatric: Denies anxiety and depression.     PHYSICAL EXAM:   BP (!) 111/57

## 2020-02-19 NOTE — PROGRESS NOTES
Spoke with Dr Marlene Mckinney re new consult. He is aware of consult and he is going to get in contact with Dr Franck Hammonds who is helping to cover cases like this for Dr Jesica Carney at this time.

## 2020-02-19 NOTE — PROGRESS NOTES
Subjective: The patient is awake and alert. Had upper endoscopy yesterday  No acute findings of bleed  NSVT last evening 40 beats  Still feeling dizzy  Feels short of breath requiring approximately 3 to 4 L O2    Objective:    BP (!) 110/57   Pulse 84   Temp 97.1 °F (36.2 °C) (Temporal)   Resp 16   SpO2 96%     In: 300 [P.O.:300]  Out: -     HEENT: NCAT,  PERRLA, No JVD  Heart:  RRR, no murmurs, gallops, or rubs. Lungs:  CTA bilaterally, no wheeze, rales or rhonchi  Abd: bowel sounds present, nontender, nondistended, no masses  Extrem:  No clubbing, cyanosis, or edema     Recent Labs     02/17/20  1355  02/18/20  2335 02/19/20  0600 02/19/20  0845   WBC 10.7  --   --  10.1  --    HGB 8.5*   < > 6.8* 7.9* 7.3*   HCT 26.9*   < > 22.2* 25.7* 24.2*     --   --  217  --     < > = values in this interval not displayed.        Recent Labs     02/17/20  1355 02/18/20  0707 02/19/20  0600    135 142   K 4.2 3.9 4.0    100 106   CO2 24 24 20*   BUN 36* 26* 24*   CREATININE 1.1 0.9 1.2   CALCIUM 8.6 8.6 8.5*       Assessment:    Patient Active Problem List   Diagnosis    Automatic implantable cardioverter-defibrillator in situ    Chronic combined systolic and diastolic CHF (congestive heart failure) ([facility])    S/P TAVR (transcatheter aortic valve replacement)    Chronic atrial fibrillation    Coronary artery disease involving native coronary artery of native heart without angina pectoris    Essential hypertension    GI bleed    Hyperlipidemia LDL goal <100    GIB (gastrointestinal bleeding)    Acute blood loss anemia       Plan:    Admit to telemetry for evaluation of questionable GI bleed  EGD unremarkable with no active bleed   Ok to resume [facility] with Xarelto or Eliquis if ok with cards since there was no active bleeding   H&H every 6 hours x3- stable  ~7 - transfuse 1 unit d/t symptoms   PPI twice daily   Hold offending agents including oral anticoagulation/chemical DVT

## 2020-02-20 LAB
HCT VFR BLD CALC: 27 % (ref 37–54)
HCT VFR BLD CALC: 27 % (ref 37–54)
HCT VFR BLD CALC: 28.3 % (ref 37–54)
HEMOGLOBIN: 8.3 G/DL (ref 12.5–16.5)
HEMOGLOBIN: 8.4 G/DL (ref 12.5–16.5)
HEMOGLOBIN: 8.7 G/DL (ref 12.5–16.5)
MCH RBC QN AUTO: 29.2 PG (ref 26–35)
MCHC RBC AUTO-ENTMCNC: 30.7 % (ref 32–34.5)
MCV RBC AUTO: 95.1 FL (ref 80–99.9)
PDW BLD-RTO: 19.9 FL (ref 11.5–15)
PLATELET # BLD: 209 E9/L (ref 130–450)
PMV BLD AUTO: 11.2 FL (ref 7–12)
RBC # BLD: 2.84 E12/L (ref 3.8–5.8)
WBC # BLD: 10.1 E9/L (ref 4.5–11.5)

## 2020-02-20 PROCEDURE — 99233 SBSQ HOSP IP/OBS HIGH 50: CPT | Performed by: INTERNAL MEDICINE

## 2020-02-20 PROCEDURE — 85027 COMPLETE CBC AUTOMATED: CPT

## 2020-02-20 PROCEDURE — 2580000003 HC RX 258: Performed by: SURGERY

## 2020-02-20 PROCEDURE — 85018 HEMOGLOBIN: CPT

## 2020-02-20 PROCEDURE — 93005 ELECTROCARDIOGRAM TRACING: CPT | Performed by: NURSE PRACTITIONER

## 2020-02-20 PROCEDURE — APPSS30 APP SPLIT SHARED TIME 16-30 MINUTES: Performed by: NURSE PRACTITIONER

## 2020-02-20 PROCEDURE — 6370000000 HC RX 637 (ALT 250 FOR IP): Performed by: SURGERY

## 2020-02-20 PROCEDURE — 2060000000 HC ICU INTERMEDIATE R&B

## 2020-02-20 PROCEDURE — 6360000002 HC RX W HCPCS: Performed by: INTERNAL MEDICINE

## 2020-02-20 PROCEDURE — 6370000000 HC RX 637 (ALT 250 FOR IP): Performed by: NURSE PRACTITIONER

## 2020-02-20 PROCEDURE — 85014 HEMATOCRIT: CPT

## 2020-02-20 PROCEDURE — 2700000000 HC OXYGEN THERAPY PER DAY

## 2020-02-20 PROCEDURE — 6370000000 HC RX 637 (ALT 250 FOR IP): Performed by: INTERNAL MEDICINE

## 2020-02-20 PROCEDURE — 36415 COLL VENOUS BLD VENIPUNCTURE: CPT

## 2020-02-20 RX ORDER — POTASSIUM CHLORIDE 20 MEQ/1
20 TABLET, EXTENDED RELEASE ORAL ONCE
Status: DISCONTINUED | OUTPATIENT
Start: 2020-02-20 | End: 2020-02-24 | Stop reason: HOSPADM

## 2020-02-20 RX ADMIN — SODIUM CHLORIDE, PRESERVATIVE FREE 10 ML: 5 INJECTION INTRAVENOUS at 09:38

## 2020-02-20 RX ADMIN — POTASSIUM CHLORIDE 20 MEQ: 20 TABLET, EXTENDED RELEASE ORAL at 08:32

## 2020-02-20 RX ADMIN — POTASSIUM CHLORIDE 20 MEQ: 20 TABLET, EXTENDED RELEASE ORAL at 17:03

## 2020-02-20 RX ADMIN — FUROSEMIDE 20 MG: 10 INJECTION, SOLUTION INTRAMUSCULAR; INTRAVENOUS at 09:32

## 2020-02-20 RX ADMIN — DOFETILIDE 250 MCG: 0.25 CAPSULE ORAL at 23:20

## 2020-02-20 RX ADMIN — PRAVASTATIN SODIUM 40 MG: 20 TABLET ORAL at 23:20

## 2020-02-20 RX ADMIN — DOFETILIDE 250 MCG: 0.25 CAPSULE ORAL at 08:31

## 2020-02-20 RX ADMIN — METOPROLOL TARTRATE 75 MG: 50 TABLET, FILM COATED ORAL at 09:31

## 2020-02-20 RX ADMIN — PANTOPRAZOLE SODIUM 40 MG: 40 TABLET, DELAYED RELEASE ORAL at 09:32

## 2020-02-20 RX ADMIN — METOPROLOL TARTRATE 75 MG: 50 TABLET, FILM COATED ORAL at 23:20

## 2020-02-20 RX ADMIN — FLUTICASONE PROPIONATE 1 SPRAY: 50 SPRAY, METERED NASAL at 23:20

## 2020-02-20 RX ADMIN — FUROSEMIDE 20 MG: 10 INJECTION, SOLUTION INTRAMUSCULAR; INTRAVENOUS at 18:03

## 2020-02-20 ASSESSMENT — PAIN SCALES - GENERAL
PAINLEVEL_OUTOF10: 0

## 2020-02-20 NOTE — PROGRESS NOTES
Negative for cough, chest tightness,  and wheezing. + for shortness of breath   Cardiovascular: pertinent positives in HPI  Gastrointestinal: Negative for abdominal pain and + for blood in stool. Genitourinary: Negative for hematuria and difficulty urinating. Musculoskeletal: Negative for myalgias and gait problem. Skin: Negative for color change and rash. Neurological: Negative for syncope and light-headedness. Psychiatric/Behavioral: Negative for confusion and agitation. The patient is not nervous/anxious. All other review of systems are negative       PHYSICAL EXAM:  Vitals:    02/19/20 2145 02/19/20 2245 02/19/20 2300 02/20/20 0800   BP: 136/67 133/66 116/66 139/60   Pulse: 76 79 80 86   Resp: 18 18     Temp: 97.7 °F (36.5 °C)   96.8 °F (36 °C)   TempSrc: Oral   Temporal   SpO2: 92%   93%   Weight:       Height:         Constitutional: Oriented to person, place, and time. Head: Normocephalic and atraumatic. Eyes: Conjunctivae are normal. Pupils are equal, round, and reactive to light. Neck: No hepatojugular reflux and no JVD present. Carotid bruit is not present. Cardiovascular: S1 normal, S2 normal and intact distal pulses. PMI is not displaced. Gr II/VI JASSI 2nd RICS. Pulmonary/Chest: Effort normal and breath sounds decreased. No respiratory distress. Abdominal: Soft. Normal appearance and bowel sounds are normal.  No abdominal bruit and no pulsatile midline mass. There is no hepatomegaly. No tenderness. Musculoskeletal: Normal range of motion of all extremities, no muscle weakness. Neurological: Alert and oriented to person, place, and time. Skin: Skin is warm and dry. No bruising, no ecchymosis and no rash noted. Extremity: No clubbing or cyanosis. No edema. Psychiatric: Normal mood and affect. Thought content normal.  ICD site Well healed, no sign of infection/erosion.     Pertinent Labs:  CBC:   Recent Labs     02/17/20  1355  02/19/20  0600 02/19/20  0845 02/19/20  2103 stated recommendations. A total of >50% of that time involved face-to-face time providing counseling and or coordination of care with the other providers. Opal Garcia DO  [facility] Cardiac Electrophysiology  [address] [facility]    NOTE: This report was transcribed using voice recognition software. Every effort was made to ensure accuracy; however, inadvertent computerized transcription errors may be present.

## 2020-02-20 NOTE — PROGRESS NOTES
Subjective: The patient is awake and alert. Feels well today no acute complaints  NSVT last evening 40 be nonsustained V. tach overnight with ICD firing        Objective:    /70   Pulse 88   Temp 98 °F (36.7 °C) (Temporal)   Resp 18   Ht 5' 6.5\" (1.689 m)   Wt 181 lb 9.6 oz (82.4 kg)   SpO2 94%   BMI 28.87 kg/m²     In: 98.3 [I.V.:98.3]  Out: 875     HEENT: NCAT,  PERRLA, No JVD  Heart:  RRR, no murmurs, gallops, or rubs. Lungs:  CTA bilaterally, no wheeze, rales or rhonchi  Abd: bowel sounds present, nontender, nondistended, no masses  Extrem:  No clubbing, cyanosis, or edema     Recent Labs     02/19/20  0600  02/19/20  2103 02/20/20  0928 02/20/20  1524   WBC 10.1  --   --  10.1  --    HGB 7.9*   < > 8.4* 8.3* 8.7*   HCT 25.7*   < > 26.5* 27.0* 28.3*     --   --  209  --     < > = values in this interval not displayed.        Recent Labs     02/18/20  0707 02/19/20  0600 02/19/20  1930    142 141   K 3.9 4.0 3.8    106 102   CO2 24 20* 25   BUN 26* 24* 22   CREATININE 0.9 1.2 1.2   CALCIUM 8.6 8.5* 8.8       Assessment:    Patient Active Problem List   Diagnosis    Automatic implantable cardioverter-defibrillator in situ    Chronic combined systolic and diastolic CHF (congestive heart failure) ([facility])    S/P TAVR (transcatheter aortic valve replacement)    Chronic atrial fibrillation    Coronary artery disease involving native coronary artery of native heart without angina pectoris    Essential hypertension    GI bleed    Hyperlipidemia LDL goal <100    GIB (gastrointestinal bleeding)    Acute blood loss anemia    ICD (implantable cardioverter-defibrillator), dual, in situ    VT (ventricular tachycardia) ([facility])    Acute on chronic heart failure with preserved ejection fraction ([address]       Plan:    Admit to telemetry for evaluation of questionable GI bleed  EGD unremarkable with no active bleed   Oral anticoagulation currently on hold  H&H every 6 hours x3- stable ~7 - transfuse 1 unit d/t symptoms   PPI twice daily   Hold offending agents including oral anticoagulation/chemical DVT prophylaxis/NSAIDs  General surgery evaluation for scopes- appreciated , Cscope as outpt   Continue home cardiac medications- await cards eval d/t NSVT 40 beats with dizziness and also for oac reccs   20 mg IV Lasix twice daily per cardiology  No acute events or issues today    Nonsustained V. tach last evening with ICD firing  Currently on IV lidocaine drip  Continue to monitor    DVT Prophylaxis   PT/OT  Discharge planning       All consultants notes reviewed    Volodymyr Flood MD  4:41 PM  2/20/2020

## 2020-02-20 NOTE — PROGRESS NOTES
Spoke with Dr Mya Chaves and Dr Marcelo Estes regarding v-tach and ICD firing- strips in book. Patient and vitals stable. Labs, EKG, and new med orders obtained.   Will continue to monitor

## 2020-02-21 LAB
ANION GAP SERPL CALCULATED.3IONS-SCNC: 14 MMOL/L (ref 7–16)
BUN BLDV-MCNC: 24 MG/DL (ref 8–23)
CALCIUM SERPL-MCNC: 8.4 MG/DL (ref 8.6–10.2)
CHLORIDE BLD-SCNC: 99 MMOL/L (ref 98–107)
CO2: 25 MMOL/L (ref 22–29)
CREAT SERPL-MCNC: 1.1 MG/DL (ref 0.7–1.2)
EKG ATRIAL RATE: 75 BPM
EKG ATRIAL RATE: 90 BPM
EKG P AXIS: 43 DEGREES
EKG P AXIS: 92 DEGREES
EKG P-R INTERVAL: 228 MS
EKG Q-T INTERVAL: 390 MS
EKG Q-T INTERVAL: 468 MS
EKG QRS DURATION: 100 MS
EKG QRS DURATION: 172 MS
EKG QTC CALCULATION (BAZETT): 435 MS
EKG QTC CALCULATION (BAZETT): 572 MS
EKG R AXIS: -72 DEGREES
EKG R AXIS: 42 DEGREES
EKG T AXIS: -26 DEGREES
EKG T AXIS: 60 DEGREES
EKG VENTRICULAR RATE: 75 BPM
EKG VENTRICULAR RATE: 90 BPM
GFR AFRICAN AMERICAN: >60
GFR NON-AFRICAN AMERICAN: >60 ML/MIN/1.73
GLUCOSE BLD-MCNC: 161 MG/DL (ref 74–99)
HCT VFR BLD CALC: 27.9 % (ref 37–54)
HEMOGLOBIN: 8.7 G/DL (ref 12.5–16.5)
MAGNESIUM: 2.1 MG/DL (ref 1.6–2.6)
MCH RBC QN AUTO: 29.5 PG (ref 26–35)
MCHC RBC AUTO-ENTMCNC: 31.2 % (ref 32–34.5)
MCV RBC AUTO: 94.6 FL (ref 80–99.9)
PDW BLD-RTO: 18.6 FL (ref 11.5–15)
PLATELET # BLD: 224 E9/L (ref 130–450)
PMV BLD AUTO: 11.2 FL (ref 7–12)
POTASSIUM SERPL-SCNC: 3.9 MMOL/L (ref 3.5–5)
RBC # BLD: 2.95 E12/L (ref 3.8–5.8)
SODIUM BLD-SCNC: 138 MMOL/L (ref 132–146)
WBC # BLD: 9.6 E9/L (ref 4.5–11.5)

## 2020-02-21 PROCEDURE — 99233 SBSQ HOSP IP/OBS HIGH 50: CPT | Performed by: INTERNAL MEDICINE

## 2020-02-21 PROCEDURE — 85027 COMPLETE CBC AUTOMATED: CPT

## 2020-02-21 PROCEDURE — 2580000003 HC RX 258: Performed by: SURGERY

## 2020-02-21 PROCEDURE — 6360000002 HC RX W HCPCS: Performed by: INTERNAL MEDICINE

## 2020-02-21 PROCEDURE — 2060000000 HC ICU INTERMEDIATE R&B

## 2020-02-21 PROCEDURE — 6370000000 HC RX 637 (ALT 250 FOR IP): Performed by: INTERNAL MEDICINE

## 2020-02-21 PROCEDURE — 83735 ASSAY OF MAGNESIUM: CPT

## 2020-02-21 PROCEDURE — 80048 BASIC METABOLIC PNL TOTAL CA: CPT

## 2020-02-21 PROCEDURE — 6370000000 HC RX 637 (ALT 250 FOR IP): Performed by: SURGERY

## 2020-02-21 PROCEDURE — 36415 COLL VENOUS BLD VENIPUNCTURE: CPT

## 2020-02-21 PROCEDURE — 93005 ELECTROCARDIOGRAM TRACING: CPT | Performed by: NURSE PRACTITIONER

## 2020-02-21 PROCEDURE — 99232 SBSQ HOSP IP/OBS MODERATE 35: CPT | Performed by: INTERNAL MEDICINE

## 2020-02-21 PROCEDURE — 2700000000 HC OXYGEN THERAPY PER DAY

## 2020-02-21 RX ORDER — FUROSEMIDE 40 MG/1
40 TABLET ORAL DAILY
Status: DISCONTINUED | OUTPATIENT
Start: 2020-02-22 | End: 2020-02-24 | Stop reason: HOSPADM

## 2020-02-21 RX ORDER — POTASSIUM CHLORIDE 20 MEQ/1
20 TABLET, EXTENDED RELEASE ORAL
Status: DISCONTINUED | OUTPATIENT
Start: 2020-02-22 | End: 2020-02-24 | Stop reason: HOSPADM

## 2020-02-21 RX ORDER — METOPROLOL TARTRATE 50 MG/1
100 TABLET, FILM COATED ORAL 2 TIMES DAILY
Status: DISCONTINUED | OUTPATIENT
Start: 2020-02-21 | End: 2020-02-24 | Stop reason: HOSPADM

## 2020-02-21 RX ADMIN — SODIUM CHLORIDE, PRESERVATIVE FREE 10 ML: 5 INJECTION INTRAVENOUS at 08:20

## 2020-02-21 RX ADMIN — POTASSIUM CHLORIDE 20 MEQ: 20 TABLET, EXTENDED RELEASE ORAL at 08:00

## 2020-02-21 RX ADMIN — LIDOCAINE HYDROCHLORIDE 1 MG/MIN: 4 INJECTION, SOLUTION INTRAVENOUS at 17:35

## 2020-02-21 RX ADMIN — PRAVASTATIN SODIUM 40 MG: 20 TABLET ORAL at 22:29

## 2020-02-21 RX ADMIN — METOPROLOL TARTRATE 100 MG: 50 TABLET, FILM COATED ORAL at 22:30

## 2020-02-21 RX ADMIN — PANTOPRAZOLE SODIUM 40 MG: 40 TABLET, DELAYED RELEASE ORAL at 09:20

## 2020-02-21 RX ADMIN — FUROSEMIDE 20 MG: 10 INJECTION, SOLUTION INTRAMUSCULAR; INTRAVENOUS at 09:00

## 2020-02-21 RX ADMIN — FLUTICASONE PROPIONATE 1 SPRAY: 50 SPRAY, METERED NASAL at 22:30

## 2020-02-21 RX ADMIN — METOPROLOL TARTRATE 100 MG: 50 TABLET, FILM COATED ORAL at 09:20

## 2020-02-21 ASSESSMENT — PAIN SCALES - GENERAL
PAINLEVEL_OUTOF10: 0

## 2020-02-21 NOTE — PROGRESS NOTES
 S/P TAVR (transcatheter aortic valve replacement)    Chronic atrial fibrillation    Coronary artery disease involving native coronary artery of native heart without angina pectoris    Essential hypertension    GI bleed    Hyperlipidemia LDL goal <100    GIB (gastrointestinal bleeding)    Acute blood loss anemia    ICD (implantable cardioverter-defibrillator), dual, in situ    VT (ventricular tachycardia) (HCC)    Acute on chronic heart failure with preserved ejection fraction (HCC)         Scheduled Medications:   metoprolol tartrate  100 mg Oral BID    [START ON 2/22/2020] potassium chloride  20 mEq Oral Daily with breakfast    [START ON 2/22/2020] furosemide  40 mg Oral Daily    potassium chloride  20 mEq Oral Once    pantoprazole  40 mg Oral Daily    [Held by provider] rivaroxaban  20 mg Oral Dinner    fluticasone  1 spray Each Nostril Daily    pravastatin  40 mg Oral Daily    sodium chloride flush  10 mL Intravenous 2 times per day       Infusion Medications:   lidocaine 1 mg/min (02/19/20 6102)       PRN Medications:  albuterol sulfate HFA, sodium chloride flush, magnesium hydroxide, acetaminophen    No Known Allergies    Wt Readings from Last 3 Encounters:   02/21/20 175 lb 2 oz (79.4 kg)   12/23/19 180 lb 6.4 oz (81.8 kg)   12/17/19 181 lb (82.1 kg)     Temp Readings from Last 3 Encounters:   02/21/20 97.5 °F (36.4 °C) (Temporal)   12/23/19 98.6 °F (37 °C)   12/17/19 99.3 °F (37.4 °C) (Temporal)     BP Readings from Last 3 Encounters:   02/21/20 122/63   02/18/20 (!) 86/44   12/23/19 120/76     Pulse Readings from Last 3 Encounters:   02/21/20 76   12/23/19 66   12/17/19 75         Intake/Output Summary (Last 24 hours) at 2/21/2020 1218  Last data filed at 2/21/2020 0933  Gross per 24 hour   Intake 1075.05 ml   Output 1100 ml   Net -24.95 ml       ROS:   Constitutional: Negative for fever, activity change and appetite change. HENT: Negative for epistaxis, difficulty swallowing. 02/19/20  0600 02/19/20  1930 02/21/20  0818    141 138   K 4.0 3.8 3.9    102 99   CO2 20* 25 25   BUN 24* 22 24*   CREATININE 1.2 1.2 1.1   CALCIUM 8.5* 8.8 8.4*     ABGs: No results found for: PHART, PO2ART, EMV3KAO  INR: No results for input(s): INR in the last 72 hours. BNP: No results for input(s): BNP in the last 72 hours. TSH:   Lab Results   Component Value Date    TSH 1.400 02/19/2020      Cardiac Injury Profile: No results for input(s): CKTOTAL, CKMB, CKMBINDEX, TROPONINI in the last 72 hours. Lipid Profile:   Lab Results   Component Value Date    TRIG 151 12/08/2016    HDL 52 12/08/2016    LDLCALC 103 12/08/2016    CHOL 185 12/08/2016      Hemoglobin A1C: No components found for: HGBA1C   Xray: No results found. TTE 8/2019    Summary   Ejection fraction is visually estimated at 60%.   The inferior basal wall is hypokinetic.   No regional wall motion abnormalities seen.   Normal right ventricle structure and function.   There is doppler evidence of stage II diastolic dysfunction.   Left atrial volume index of 39 ml per meters squared BSA.   Hx of TAVR with 26 mm S3 3/29/17. The aortic valve area is 1.6 cm2 with a   maximum gradient of 15 mmHg and a mean gradient of 7 mmHg.   Mild aortic regurgitation is noted.   Mild mitral regurgitation is present.   Mild tricuspid regurgitation. Telemetry2/21/2020: No further VT    ECG 2/21/2020: Ventricular paced rhythm, QTC 550ms      Device Interrogation 2/19/2020  Make/Model Medtronic Evera  Mode AAIR--> DDDR 70/130 ppm  P wave: 1.3 mV  Impedance: 342 ohms   Threshold: 0.75 V @ 0.4 ms  RV R wave: 6.3 mV  Impedance: 285 ohms   Threshold: 0.75 V @ 0.4 ms  Pacing: A: 74%  RV: 1.6%    Battery Voltage/Longevity:  2.96 V 3.7 years     Arrhythmias: 1. NSVT. 2. VT at 231 bpm--pace-terminated with ATP  OptiVol: Elevated    I have independently reviewed all of the ECGs and rhythm strips per above.     I have personally reviewed the laboratory, cardiac diagnostic and radiographic testing as outlined above. I have reviewed previous records noted in [address]. Impression:    1. GI bleed        Lab Results   Component Value Date     WBC 10.1 02/20/2020     HGB 8.3 (L) 02/20/2020     HCT 27.0 (L) 02/20/2020     MCV 95.1 02/20/2020      02/20/2020   -PRBC transfusion 2/19/20        2.  Ventricular tachycardia  - Known history of VT first seen 03/15/2003 at time of stress test, he had PMVT with 2 ICD shocks 9/7/18  - VT with ATP--2/19/20 in the setting of anemia  - Monomorphic VT with ATP termination 2/19/20  - Polymorphic VT terminated with ICD shock 2/20/20.  ms while paced  -Recommend discontinuing Tikosyn given that it can also cause torsade/ploymorphic VT given his critical state  -  Cont Lopressor 100mg BID    -  Ok to discontinue Lidocaine 1mg/min started 2/19/20, no further VT overnight. We can use amiodarone in the future after Tikosyn washout if needed  - Most likely driven by ischemia in the setting of anemia/GI bleed. Last cath in 3/2017 showed patent LIMA-LAD, radial to D2 and OM2. He may benefit from another ischemic evaluation when GI status is more clarified  -Keep K >4, Mg >2     3.  ICD in situ  -Secondary prevention   -[facility] 3/2007 [employer]  -Inappropriate shocks 3/29/2008   -ICD lead replaced 3/29/2008  -Generator change 7/28/2016  -Normal function as above     4. PsAF  -First seen 1/11/2010 in remote check  -DCCV 12/2010, 5/30/2017  -[id number] 6 (CHF, age, TIA, vascular disease)   -Xarelto currently on hold given GI bleeding  -Continue rate control, he is currently back in AF with ventricular pacing     5.  Coronary artery disease  -MI/CABG x3 2003  -Redo CABG 3/2007  -PCI 3/2007     6. HFpEF  -OptiVol elevated  - IV lasix   -per Cardiology     7. Valvular heart disease   - post TAVR 3/29/2017  Thank you for allowing me to participate in your patient's care.     Nadine Reich Electrophysiology  [address] [facility]    NOTE: This report was transcribed using voice recognition software. Every effort was made to ensure accuracy; however, inadvertent computerized transcription errors may be present.

## 2020-02-21 NOTE — PROGRESS NOTES
Subjective: The patient is awake and alert. Feels well today no acute complaints  No further black bowel movements-color back to baseline  Feels well, denies chest pain or shortness of breath        Objective:    /63   Pulse 76   Temp 97.5 °F (36.4 °C) (Temporal)   Resp 18   Ht 5' 6.5\" (1.689 m)   Wt 175 lb 2 oz (79.4 kg)   SpO2 95%   BMI 27.84 kg/m²     In: 1075.1 [P.O.:720; I.V.:355.1]  Out: 1100     HEENT: NCAT,  PERRLA, No JVD  Heart:  RRR, no murmurs, gallops, or rubs. Lungs:  CTA bilaterally, no wheeze, rales or rhonchi  Abd: bowel sounds present, nontender, nondistended, no masses  Extrem:  No clubbing, cyanosis, or edema     Recent Labs     02/19/20  0600  02/20/20  0928 02/20/20  1524 02/20/20  2317 02/21/20  0818   WBC 10.1  --  10.1  --   --  9.6   HGB 7.9*   < > 8.3* 8.7* 8.4* 8.7*   HCT 25.7*   < > 27.0* 28.3* 27.0* 27.9*     --  209  --   --  224    < > = values in this interval not displayed.        Recent Labs     02/19/20  0600 02/19/20  1930 02/21/20  0818    141 138   K 4.0 3.8 3.9    102 99   CO2 20* 25 25   BUN 24* 22 24*   CREATININE 1.2 1.2 1.1   CALCIUM 8.5* 8.8 8.4*       Assessment:    Patient Active Problem List   Diagnosis    Automatic implantable cardioverter-defibrillator in situ    Chronic combined systolic and diastolic [facility]congestive heart failure) ([facility]    S/P TAVR (transcatheter aortic valve replacement)    Chronic atrial fibrillation    Coronary artery disease involving native coronary artery of native heart without angina pectoris    Essential hypertension    GI bleed    Hyperlipidemia LDL goal <100    GIB (gastrointestinal bleeding)    Acute blood loss anemia    ICD (implantable cardioverter-defibrillator), dual, in situ    VT ([facility]ricular tachycardia) ([facility])    Acute on chronic heart failure with preserved ejection fraction ([address]       Plan:    Admit to telemetry for evaluation of questionable GI bleed  EGD unremarkable with no

## 2020-02-22 LAB
ANION GAP SERPL CALCULATED.3IONS-SCNC: 11 MMOL/L (ref 7–16)
BUN BLDV-MCNC: 26 MG/DL (ref 8–23)
CALCIUM SERPL-MCNC: 8.4 MG/DL (ref 8.6–10.2)
CHLORIDE BLD-SCNC: 101 MMOL/L (ref 98–107)
CO2: 25 MMOL/L (ref 22–29)
CREAT SERPL-MCNC: 1.1 MG/DL (ref 0.7–1.2)
EKG ATRIAL RATE: 77 BPM
EKG ATRIAL RATE: 79 BPM
EKG ATRIAL RATE: 79 BPM
EKG ATRIAL RATE: 84 BPM
EKG P AXIS: 53 DEGREES
EKG P AXIS: 56 DEGREES
EKG P-R INTERVAL: 196 MS
EKG P-R INTERVAL: 212 MS
EKG Q-T INTERVAL: 420 MS
EKG Q-T INTERVAL: 426 MS
EKG Q-T INTERVAL: 464 MS
EKG Q-T INTERVAL: 484 MS
EKG QRS DURATION: 150 MS
EKG QRS DURATION: 154 MS
EKG QRS DURATION: 172 MS
EKG QRS DURATION: 184 MS
EKG QTC CALCULATION (BAZETT): 481 MS
EKG QTC CALCULATION (BAZETT): 482 MS
EKG QTC CALCULATION (BAZETT): 554 MS
EKG QTC CALCULATION (BAZETT): 561 MS
EKG R AXIS: -15 DEGREES
EKG R AXIS: -16 DEGREES
EKG R AXIS: -73 DEGREES
EKG R AXIS: -79 DEGREES
EKG T AXIS: 128 DEGREES
EKG T AXIS: 142 DEGREES
EKG T AXIS: 66 DEGREES
EKG T AXIS: 67 DEGREES
EKG VENTRICULAR RATE: 77 BPM
EKG VENTRICULAR RATE: 79 BPM
EKG VENTRICULAR RATE: 79 BPM
EKG VENTRICULAR RATE: 88 BPM
GFR AFRICAN AMERICAN: >60
GFR NON-AFRICAN AMERICAN: >60 ML/MIN/1.73
GLUCOSE BLD-MCNC: 113 MG/DL (ref 74–99)
HCT VFR BLD CALC: 27 % (ref 37–54)
HEMOGLOBIN: 8.3 G/DL (ref 12.5–16.5)
MAGNESIUM: 2.3 MG/DL (ref 1.6–2.6)
MCH RBC QN AUTO: 28.9 PG (ref 26–35)
MCHC RBC AUTO-ENTMCNC: 30.7 % (ref 32–34.5)
MCV RBC AUTO: 94.1 FL (ref 80–99.9)
PDW BLD-RTO: 17.5 FL (ref 11.5–15)
PLATELET # BLD: 231 E9/L (ref 130–450)
PMV BLD AUTO: 11.4 FL (ref 7–12)
POTASSIUM SERPL-SCNC: 4.5 MMOL/L (ref 3.5–5)
RBC # BLD: 2.87 E12/L (ref 3.8–5.8)
SODIUM BLD-SCNC: 137 MMOL/L (ref 132–146)
WBC # BLD: 8.4 E9/L (ref 4.5–11.5)

## 2020-02-22 PROCEDURE — 2580000003 HC RX 258: Performed by: SURGERY

## 2020-02-22 PROCEDURE — 93010 ELECTROCARDIOGRAM REPORT: CPT | Performed by: INTERNAL MEDICINE

## 2020-02-22 PROCEDURE — 36415 COLL VENOUS BLD VENIPUNCTURE: CPT

## 2020-02-22 PROCEDURE — 6370000000 HC RX 637 (ALT 250 FOR IP): Performed by: SURGERY

## 2020-02-22 PROCEDURE — 2060000000 HC ICU INTERMEDIATE R&B

## 2020-02-22 PROCEDURE — 80048 BASIC METABOLIC PNL TOTAL CA: CPT

## 2020-02-22 PROCEDURE — 93005 ELECTROCARDIOGRAM TRACING: CPT | Performed by: INTERNAL MEDICINE

## 2020-02-22 PROCEDURE — 83735 ASSAY OF MAGNESIUM: CPT

## 2020-02-22 PROCEDURE — 6370000000 HC RX 637 (ALT 250 FOR IP): Performed by: INTERNAL MEDICINE

## 2020-02-22 PROCEDURE — 99233 SBSQ HOSP IP/OBS HIGH 50: CPT | Performed by: INTERNAL MEDICINE

## 2020-02-22 PROCEDURE — 85027 COMPLETE CBC AUTOMATED: CPT

## 2020-02-22 PROCEDURE — 2700000000 HC OXYGEN THERAPY PER DAY

## 2020-02-22 RX ORDER — FUROSEMIDE 40 MG/1
40 TABLET ORAL DAILY
Qty: 60 TABLET | Refills: 3 | Status: SHIPPED | OUTPATIENT
Start: 2020-02-23 | End: 2020-10-28 | Stop reason: SDUPTHER

## 2020-02-22 RX ADMIN — PRAVASTATIN SODIUM 40 MG: 20 TABLET ORAL at 22:49

## 2020-02-22 RX ADMIN — SODIUM CHLORIDE, PRESERVATIVE FREE 10 ML: 5 INJECTION INTRAVENOUS at 10:09

## 2020-02-22 RX ADMIN — POTASSIUM CHLORIDE 20 MEQ: 1500 TABLET, EXTENDED RELEASE ORAL at 10:08

## 2020-02-22 RX ADMIN — PANTOPRAZOLE SODIUM 40 MG: 40 TABLET, DELAYED RELEASE ORAL at 10:07

## 2020-02-22 RX ADMIN — FLUTICASONE PROPIONATE 1 SPRAY: 50 SPRAY, METERED NASAL at 22:49

## 2020-02-22 RX ADMIN — METOPROLOL TARTRATE 100 MG: 50 TABLET, FILM COATED ORAL at 10:07

## 2020-02-22 RX ADMIN — SODIUM CHLORIDE, PRESERVATIVE FREE 10 ML: 5 INJECTION INTRAVENOUS at 22:51

## 2020-02-22 RX ADMIN — FUROSEMIDE 40 MG: 40 TABLET ORAL at 10:08

## 2020-02-22 RX ADMIN — METOPROLOL TARTRATE 100 MG: 50 TABLET, FILM COATED ORAL at 22:49

## 2020-02-22 ASSESSMENT — PAIN SCALES - GENERAL
PAINLEVEL_OUTOF10: 0

## 2020-02-22 NOTE — PLAN OF CARE
Problem: Falls - Risk of:  Goal: Will remain free from falls  Description  Will remain free from falls  2/22/2020 1631 by Tanmay Martinez RN  Outcome: Met This Shift  2/22/2020 0513 by Eddie Joshi RN  Outcome: Met This Shift  Goal: Absence of physical injury  Description  Absence of physical injury  2/22/2020 1631 by Tanmay Martinez RN  Outcome: Met This Shift  2/22/2020 0513 by Eddie Joshi RN  Outcome: Met This Shift     Problem:  Bowel Function - Altered:  Goal: Bowel elimination is within specified parameters  Description  Bowel elimination is within specified parameters  Outcome: Met This Shift     Problem: Fluid Volume - Imbalance:  Goal: Absence of imbalanced fluid volume signs and symptoms  Description  Absence of imbalanced fluid volume signs and symptoms  2/22/2020 1631 by Tanmay Martinez RN  Outcome: Met This Shift  2/22/2020 0513 by Eddie Joshi RN  Outcome: Met This Shift  Goal: Will show no signs and symptoms of excessive bleeding  Description  Will show no signs and symptoms of excessive bleeding  2/22/2020 1631 by Tanmay Martinez RN  Outcome: Met This Shift  2/22/2020 0513 by Eddie Joshi RN  Outcome: Met This Shift     Problem: Bleeding:  Goal: Will show no signs and symptoms of excessive bleeding  Description  Will show no signs and symptoms of excessive bleeding  2/22/2020 1631 by Tanmay Martinez RN  Outcome: Met This Shift  2/22/2020 0513 by Eddie Joshi RN  Outcome: Met This Shift

## 2020-02-22 NOTE — PROGRESS NOTES
Cardiac Electrophysiology Inpatient Progress Note    Garrett Álvarez  1935  Date of Service: 2/22/2020  PCP: Melia Severs, DO  Cardiologist: Dr Anay Redmond  Electrophysiologist: Angelica Tenorio MD         Subjective: Garrett Álvarez is seen in hospital follow-up and management of: VT/ICD shock          Mr Jb Garcia is an 82year old male who presented to the hospital with melena.     He is known to Dr. Mercado and we are seeing her in coverage. He has a past medical history including coronary artery disease status post CABG with redo in 2007, ICD in situ, H/O VT with ICD shocks, and atrial fibrillation.     He presented to the emergency department on 2/17/2020 due blood in his stool. Chaparro Fox underwent an EGD on 2/18/20 by Dr. Isa Hinton that revealed mild esophagitis and gastritis. His Xarelto has been put on hold.  Post-procedure he had an episode of monomorphic VT that was successfully pace-terminated.  His OptiVol fluid index is elevated and is receiving IV diuresis.  He remained on Tikosyn for AAD suppression.  His CrCl and QTc are stable. He remains anemic with a HGB of 7.3 and is expected to receive an additional unit of packed red blood cells today.  His device was interrogated as noted below.     2/20/20: Mr. Negro Velarde was seen in hospital follow-up. He had an episode of PMVT/VF yesterday at 18:55 that was treated with an appropriate ICD shock. Post-shock, he developed atrial fibrillation. His HGB was 7.3 and was being transfused with 1 U PRBC. He was then placed on a Lidocaine drip without recurrence of VT/VF. His HGB/HCT today is 8.3/27.0. He offers no complaints of chest pain, shortness of breath, orthopnea or PND. His oral anticoagulation remains on hold    2/21/20 :He denies any chest pain, sob today. No further VT overnight    2/22/20 : No VT overnight.  Still on lidocaine infusion  Patient Active Problem List   Diagnosis    Automatic implantable cardioverter-defibrillator in situ    Chronic combined swallowing. Eyes: Negative for blurred vision or double vision. Respiratory: Negative for cough, chest tightness, shortness of breath and wheezing. Cardiovascular: Negative for chest pain, palpitations and leg swelling. Gastrointestinal: Negative for abdominal pain,  Genitourinary: Negative for hematuria, burning or frequency. Musculoskeletal: Negative for myalgias, stiffness, or swelling. Skin: Negative for rash, pain, or lumps. Neurological: Negative for syncope, seizures, or headaches. Psychiatric/Behavioral: Negative for confusion and agitation. The patient is not nervous/anxious. PHYSICAL EXAM:  Vitals:    02/21/20 1500 02/21/20 2215 02/22/20 0124 02/22/20 0528   BP: 122/68 134/60 (!) 117/59    Pulse: 76 79 70    Resp: 18 20 21    Temp: 97.6 °F (36.4 °C) 97.4 °F (36.3 °C) 98.5 °F (36.9 °C)    TempSrc: Temporal Temporal Temporal    SpO2: 96% 93% 94%    Weight:    176 lb 7 oz (80 kg)   Height:         Constitutional: Oriented to person, place, and time. Well-developed and cooperative. Head: Normocephalic and atraumatic. Eyes: Conjunctivae are normal.  Neck: No hepatojugular reflux and no JVD present. Cardiovascular: S1 normal, S2 normal and intact distal pulses. Pulmonary/Chest: Effort normal and breath sounds normal.   Abdominal: Soft. Normal appearance and bowel sounds are normal.    Musculoskeletal: Normal range of motion of all extremities, no muscle weakness. Neurological: Alert and oriented to person, place, and time. Skin: Skin is warm and dry. Extremity: No clubbing or cyanosis. No edema. Psychiatric: Normal mood and affect. Thought content normal.     Pertinent Labs:  CBC:   Recent Labs     02/20/20  0928  02/20/20  2317 02/21/20  0818 02/22/20  0633   WBC 10.1  --   --  9.6 8.4   HGB 8.3*   < > 8.4* 8.7* 8.3*   HCT 27.0*   < > 27.0* 27.9* 27.0*     --   --  224 231    < > = values in this interval not displayed.      BMP:  Recent Labs     02/19/20  1930 02/21/20  0818    138   K 3.8 3.9    99   CO2 25 25   BUN 22 24*   CREATININE 1.2 1.1   CALCIUM 8.8 8.4*     ABGs: No results found for: PHART, PO2ART, [id number]  INR: No results for input(s): INR in the last 72 hours. BNP: No results for input(s): BNP in the last 72 hours. TSH:   Lab Results   Component Value Date    TSH 1.400 02/19/2020      Cardiac Injury Profile: No results for input(s): CKTOTAL, CKMB, CKMBINDEX, TROPONINI in the last 72 hours. Lipid Profile:   Lab Results   Component Value Date    TRIG 151 12/08/2016    HDL 52 12/08/2016    LDLCALC 103 12/08/2016    CHOL 185 12/08/2016      Hemoglobin A1C: No components found for: HGBA1C   Xray: No results found. TTE 8/2019    Summary   Ejection fraction is visually estimated at 60%.   The inferior basal wall is hypokinetic.   No regional wall motion abnormalities seen.   Normal right ventricle structure and function.   There is doppler evidence of stage II diastolic dysfunction.   Left atrial volume index of 39 ml per meters squared BSA.   Hx of TAVR with 26 mm S3 3/29/17. The aortic valve area is 1.6 cm2 with a   maximum gradient of 15 mmHg and a mean gradient of 7 mmHg.   Mild aortic regurgitation is noted.   Mild mitral regurgitation is present.   Mild tricuspid regurgitation. Telemetry2/22/2020: No further VT    ECG 2/22/2020: Ventricular paced rhythm, QTC 550ms      Device Interrogation 2/19/2020  Make/Model Medtronic Evera  Mode AAIR--> DDDR 70/130 ppm  P wave: 1.3 mV  Impedance: 342 ohms   Threshold: 0.75 V @ 0.4 ms  RV R wave: 6.3 mV  Impedance: 285 ohms   Threshold: 0.75 V @ 0.4 ms  Pacing: A: 74%  RV: 1.6%    Battery Voltage/Longevity:  2.96 V 3.7 years     Arrhythmias: 1. NSVT.  2. VT at 231 bpm--pace-terminated with ATP  OptiVol: Elevated      Impression:    1. GI bleed        Lab Results   Component Value Date     WBC 10.1 02/20/2020     HGB 8.3 (L) 02/20/2020     HCT 27.0 (L) 02/20/2020     MCV 95.1 02/20/2020      02/20/2020   -PRBC transfusion 2/19/20        2.  Ventricular tachycardia  - Known history of VT first seen 03/15/2003 at time of stress test, he had PMVT with 2 ICD shocks 9/7/18  - VT with ATP--2/19/20 in the setting of anemia  - Monomorphic VT with ATP termination 2/19/20  - Polymorphic VT terminated with ICD shock 2/20/20.  ms while paced  -Tikosyn stopped 2/21/20 given that it can also cause torsade/ploymorphic VT given his critical state  -  Cont Lopressor 100mg BID    -  Ok to discontinue Lidocaine 1mg/min started 2/19/20, no further VT overnight. We can use amiodarone in the future after Tikosyn washout if needed. I have stopped it today  - Most likely driven by ischemia in the setting of anemia/GI bleed. Last cath in 3/2017 showed patent LIMA-LAD, radial to D2 and OM2. He may benefit from another ischemic evaluation when GI status is more clarified  -Keep K >4, Mg >2    EKG today today shows NSR, LBBB QTC 485ms, much improved from previous measurements    3.  ICD in situ  -Secondary prevention   -[facility] 3/2007 [employer]  -Inappropriate shocks 3/29/2008   -ICD lead replaced 3/29/2008  -Generator change 7/28/2016  -Normal function as above     4. PsAF  -First seen 1/11/2010 in remote check  -DCCV 12/2010, 5/30/2017  -[id number] 6 (CHF, age, TIA, vascular disease)   -Xarelto currently on hold given GI bleeding  -Continue rate control, he is currently back in AF with ventricular pacing     5.  Coronary artery disease  -MI/CABG x3 2003  -Redo CABG 3/2007  -PCI 3/2007     6. HFpEF  -OptiVol elevated  -  lasix -per Cardiology     7. Valvular heart disease   - post TAVR 3/29/2017    Thank you for allowing me to participate in your patient's care. Sirena Scott [facility]    NOTE: This report was transcribed using voice recognition software.  Every effort was made to ensure accuracy; however, inadvertent computerized

## 2020-02-22 NOTE — PROGRESS NOTES
Subjective: The patient is awake and alert. Feels well today no acute complaints  No further black bowel movements-color back to baseline  Feels well, denies chest pain or shortness of breath          Objective:    BP (!) 135/90   Pulse 83   Temp 97.6 °F (36.4 °C) (Temporal)   Resp 20   Ht 5' 6.5\" (1.689 m)   Wt 176 lb 7 oz (80 kg)   SpO2 92%   BMI 28.05 kg/m²     In: 641.6 [P.O.:300; I.V.:341.6]  Out: 750     HEENT: NCAT,  PERRLA, No JVD  Heart:  RRR, no murmurs, gallops, or rubs. Lungs:  CTA bilaterally, no wheeze, rales or rhonchi  Abd: bowel sounds present, nontender, nondistended, no masses  Extrem:  No clubbing, cyanosis, or edema     Recent Labs     02/20/20  0928  02/20/20  2317 02/21/20  0818 02/22/20  0633   WBC 10.1  --   --  9.6 8.4   HGB 8.3*   < > 8.4* 8.7* 8.3*   HCT 27.0*   < > 27.0* 27.9* 27.0*     --   --  224 231    < > = values in this interval not displayed.        Recent Labs     02/19/20  1930 02/21/20  0818 02/22/20  0633    138 137   K 3.8 3.9 4.5    99 101   CO2 25 25 25   BUN 22 24* 26*   CREATININE 1.2 1.1 1.1   CALCIUM 8.8 8.4* 8.4*       Assessment:    Patient Active Problem List   Diagnosis    Automatic implantable cardioverter-defibrillator in situ    Chronic combined systolic and diastolic CHF (congestive heart failure) ([facility])    S/P TAVR (transcatheter aortic valve replacement)    Chronic atrial fibrillation    Coronary artery disease involving native coronary artery of native heart without angina pectoris    Essential hypertension    GI bleed    Hyperlipidemia LDL goal <100    GIB (gastrointestinal bleeding)    Acute blood loss anemia    ICD (implantable cardioverter-defibrillator), dual, in situ    VT (ventricular tachycardia) ([facility])    Acute on chronic heart failure with preserved ejection fraction ([address]    Ischemic heart disease       Plan:    Admit to telemetry for evaluation of questionable GI bleed  EGD unremarkable with no active

## 2020-02-22 NOTE — DISCHARGE SUMMARY
Physician Discharge Summary     Patient ID:  Chelsie Montoya  [id number]  82 y.o.  1935    Admit date: 2/17/2020    Discharge date and time: 2/22/2020    Admission Diagnoses:   Patient Active Problem List   Diagnosis    Automatic implantable cardioverter-defibrillator in situ    Chronic combined systolic and diastolic CHF (congestive heart failure) ([facility])    S/P TAVR (transcatheter aortic valve replacement)    Chronic atrial fibrillation    Coronary artery disease involving native coronary artery of native heart without angina pectoris    Essential hypertension    GI bleed    Hyperlipidemia LDL goal <100    GIB (gastrointestinal bleeding)    Acute blood loss anemia    ICD (implantable cardioverter-defibrillator), dual, in situ    [facility]) [facility]    Acute on chronic heart failure with preserved ejection fraction ([facility] [address] [facility]    Ischemic heart disease       Discharge Diagnoses: GI bleed, ventricular tachycardia, history of atrial fibrillation on oral anticoagulation, and as above    Consults: General surgery, cardiology, electrophysiology     Procedures: AICD shock, EGD    Hospital Course: The patient is a 82 y.o. 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 Patient does indicate complaints of feeling lightheaded and dizziness, no syncope reported. No abdominal cramping is reported prior to BMs. Of note, patient was admitted for similar complaints in September 2019 at which time he underwent EGD notable for hemorrhagic gastritis. Xarelto was held for a short time after that. Patient then resumed ongoing Xarelto therapy. On my evaluation, he is resting comfortably no apparent acute distress. Denies any further bowel movement since admission.   BP (!) 99/53   Pulse 89 Temp 98 °F (36.7 °C) (Temporal)   Resp 18   SpO2 92%   ED eval reveals H&H 8.5/26/9 >> 7.1/22.5 this morning (after receiving IV fluids)  All other blood work unremarkable    Admit to telemetry for evaluation of questionable GI bleed  EGD unremarkable with no active bleed-continue PPI with caution  Oral anticoagulation currently on hold  H&H every 6 hours x3- stable  ~8.5 - transfuse 1 unit d/t symptoms   PPI twice daily   Resume oral anticoagulation with Xarelto/aspirin when okay with general surgery  Patient may follow-up with cardiology and EP as recommended  General surgery evaluation for scopes- appreciated , Cscope as outpt         nonsustained V. Tach 2/19 with ICD firing  Off lidocaine drip, Tikosyn discontinued   on 100 mg p.o. twice daily metoprolol with no further events  40 mg p.o. Lasix daily  Continue to monitor  Cardiology and EP following for V. tach/OAC recommendations       Recent Labs     02/20/20  0928  02/20/20  2317 02/21/20  0818 02/22/20  0633   WBC 10.1  --   --  9.6 8.4   HGB 8.3*   < > 8.4* 8.7* 8.3*   HCT 27.0*   < > 27.0* 27.9* 27.0*     --   --  224 231    < > = values in this interval not displayed. Recent Labs     02/19/20  1930 02/21/20  0818 02/22/20  0633    138 137   K 3.8 3.9 4.5    99 101   CO2 25 25 25   BUN 22 24* 26*   CREATININE 1.2 1.1 1.1   CALCIUM 8.8 8.4* 8.4*       No results found. Discharge Exam:    HEENT: NCAT,  PERRLA, No JVD  Heart:  RRR, no murmurs, gallops, or rubs.   Lungs:  CTA bilaterally, no wheeze, rales or rhonchi  Abd: bowel sounds present, nontender, nondistended, no masses  Extrem:  No clubbing, cyanosis, or edema    Disposition: home     Patient Condition at Discharge: Stable    Patient Instructions:      Medication List      CHANGE how you take these medications    furosemide 40 MG tablet  Commonly known as:  LASIX  Take 1 tablet by mouth daily  Start taking on:  February 23, 2020  What changed:    · medication papers, discussing discharge with patient, medication review, etc.    Signed:  Theresa Desai MD  2/22/2020  1:01 PM

## 2020-02-23 LAB
ANION GAP SERPL CALCULATED.3IONS-SCNC: 13 MMOL/L (ref 7–16)
BUN BLDV-MCNC: 26 MG/DL (ref 8–23)
CALCIUM SERPL-MCNC: 8.4 MG/DL (ref 8.6–10.2)
CHLORIDE BLD-SCNC: 102 MMOL/L (ref 98–107)
CO2: 25 MMOL/L (ref 22–29)
CREAT SERPL-MCNC: 1.1 MG/DL (ref 0.7–1.2)
GFR AFRICAN AMERICAN: >60
GFR NON-AFRICAN AMERICAN: >60 ML/MIN/1.73
GLUCOSE BLD-MCNC: 103 MG/DL (ref 74–99)
HCT VFR BLD CALC: 26.7 % (ref 37–54)
HEMOGLOBIN: 8.1 G/DL (ref 12.5–16.5)
MAGNESIUM: 2.3 MG/DL (ref 1.6–2.6)
MCH RBC QN AUTO: 28.1 PG (ref 26–35)
MCHC RBC AUTO-ENTMCNC: 30.3 % (ref 32–34.5)
MCV RBC AUTO: 92.7 FL (ref 80–99.9)
PDW BLD-RTO: 17.1 FL (ref 11.5–15)
PLATELET # BLD: 242 E9/L (ref 130–450)
PMV BLD AUTO: 11 FL (ref 7–12)
POTASSIUM SERPL-SCNC: 4.3 MMOL/L (ref 3.5–5)
RBC # BLD: 2.88 E12/L (ref 3.8–5.8)
SODIUM BLD-SCNC: 140 MMOL/L (ref 132–146)
WBC # BLD: 7.7 E9/L (ref 4.5–11.5)

## 2020-02-23 PROCEDURE — 2580000003 HC RX 258: Performed by: SURGERY

## 2020-02-23 PROCEDURE — 80048 BASIC METABOLIC PNL TOTAL CA: CPT

## 2020-02-23 PROCEDURE — 85027 COMPLETE CBC AUTOMATED: CPT

## 2020-02-23 PROCEDURE — 36415 COLL VENOUS BLD VENIPUNCTURE: CPT

## 2020-02-23 PROCEDURE — 2060000000 HC ICU INTERMEDIATE R&B

## 2020-02-23 PROCEDURE — 6370000000 HC RX 637 (ALT 250 FOR IP): Performed by: INTERNAL MEDICINE

## 2020-02-23 PROCEDURE — 83735 ASSAY OF MAGNESIUM: CPT

## 2020-02-23 PROCEDURE — 99233 SBSQ HOSP IP/OBS HIGH 50: CPT | Performed by: INTERNAL MEDICINE

## 2020-02-23 PROCEDURE — 6370000000 HC RX 637 (ALT 250 FOR IP): Performed by: SURGERY

## 2020-02-23 RX ADMIN — METOPROLOL TARTRATE 100 MG: 50 TABLET, FILM COATED ORAL at 09:04

## 2020-02-23 RX ADMIN — METOPROLOL TARTRATE 100 MG: 50 TABLET, FILM COATED ORAL at 20:35

## 2020-02-23 RX ADMIN — SODIUM CHLORIDE, PRESERVATIVE FREE 10 ML: 5 INJECTION INTRAVENOUS at 20:35

## 2020-02-23 RX ADMIN — BISACODYL 10 MG: 5 TABLET, COATED ORAL at 17:34

## 2020-02-23 RX ADMIN — SODIUM CHLORIDE, PRESERVATIVE FREE 10 ML: 5 INJECTION INTRAVENOUS at 09:04

## 2020-02-23 RX ADMIN — PANTOPRAZOLE SODIUM 40 MG: 40 TABLET, DELAYED RELEASE ORAL at 09:04

## 2020-02-23 RX ADMIN — FUROSEMIDE 40 MG: 40 TABLET ORAL at 09:04

## 2020-02-23 RX ADMIN — PRAVASTATIN SODIUM 40 MG: 20 TABLET ORAL at 20:35

## 2020-02-23 RX ADMIN — FLUTICASONE PROPIONATE 1 SPRAY: 50 SPRAY, METERED NASAL at 20:35

## 2020-02-23 RX ADMIN — POTASSIUM CHLORIDE 20 MEQ: 1500 TABLET, EXTENDED RELEASE ORAL at 09:04

## 2020-02-23 RX ADMIN — MAGNESIUM CITRATE 600 ML: 1.75 LIQUID ORAL at 11:50

## 2020-02-23 RX ADMIN — BISACODYL 10 MG: 5 TABLET, COATED ORAL at 11:50

## 2020-02-23 ASSESSMENT — PAIN SCALES - GENERAL
PAINLEVEL_OUTOF10: 0

## 2020-02-23 NOTE — PROGRESS NOTES
Subjective: The patient is awake and alert. Feels well today no acute complaints  No further black bowel movements-color back to baseline  Feels well, denies chest pain or shortness of breath  No further issues with vtach           Objective:    /61   Pulse 89   Temp 97 °F (36.1 °C) (Temporal)   Resp 18   Ht 5' 6.5\" (1.689 m)   Wt 176 lb 7 oz (80 kg)   SpO2 96%   BMI 28.05 kg/m²     In: 240 [P.O.:240]  Out: 0     HEENT: NCAT,  PERRLA, No JVD  Heart:  RRR, no murmurs, gallops, or rubs.   Lungs:  CTA bilaterally, no wheeze, rales or rhonchi  Abd: bowel sounds present, nontender, nondistended, no masses  Extrem:  No clubbing, cyanosis, or edema     Recent Labs     02/21/20  0818 02/22/20  0633 02/23/20  0638   WBC 9.6 8.4 7.7   HGB 8.7* 8.3* 8.1*   HCT 27.9* 27.0* 26.7*    231 242       Recent Labs     02/21/20  0818 02/22/20  0633 02/23/20  0638    137 140   K 3.9 4.5 4.3   CL 99 101 102   CO2 25 25 25   BUN 24* 26* 26*   CREATININE 1.1 1.1 1.1   CALCIUM 8.4* 8.4* 8.4*       Assessment:    Patient Active Problem List   Diagnosis    Automatic implantable cardioverter-defibrillator in situ    Chronic combined systolic and diastolic CHF (congestive heart failure) ([facility])    S/P TAVR (transcatheter aortic valve replacement)    Chronic atrial fibrillation    Coronary artery disease involving native coronary artery of native heart without angina pectoris    Essential hypertension    GI bleed    Hyperlipidemia LDL goal <100    GIB (gastrointestinal bleeding)    Acute blood loss anemia    ICD (implantable cardioverter-defibrillator), dual, in situ    VT (ventricular tachycardia) ([facility]    Acute on chronic heart failure with preserved ejection fraction ([facility])    Ischemic heart disease       Plan:    Admit to telemetry for evaluation of questionable GI bleed  EGD unremarkable with no active bleed-continue PPI with caution  Oral anticoagulation currently on hold- continue to hold   H&H every 6 hours x3- stable  ~8.5 - transfuse 1 unit d/t symptoms   PPI twice daily   Hold offending agents including oral anticoagulation/chemical DVT prophylaxis/NSAIDs  Patient may follow-up with cardiology regarding resumption of oral anticoagulation  General surgery evaluation for scopes- appreciated , Cscope tomorrow 2/24  Npo after midnight  Clear liquids now   Prep     nonsustained V. Tach 2/19 with ICD firing  Off lidocaine drip, on 100 mg p.o. twice daily metoprolol with no further events  40 mg p.o.  Lasix daily  Continue to monitor  Cardiology and EP following for V. tach/OAC recommendations      DVT Prophylaxis   PT/OT  Discharge planning with pt and dr. Young Peralta - will do Cscope tomorrow to ensure no active bleeding - if cscope negative, he will be dc'd on coumadin most likely       All consultants notes reviewed    Uma Gurrola MD  10:29 AM  2/23/2020

## 2020-02-23 NOTE — PROGRESS NOTES
Cardiac Electrophysiology Inpatient Progress Note    Lucie Metzger  1935  Date of Service: 2/23/2020  PCP: Rodriguez Gutierrez DO  Cardiologist: Dr Ilda Carroll  Electrophysiologist: Lorenzo Martínez MD         Subjective: Lucie Metzger is seen in hospital follow-up and management of: VT/ICD shock          Mr Angelia Bangura is an 82year old male who presented to the hospital with melena.     He is known to Dr. Mercado and we are seeing her in coverage. He has a past medical history including coronary artery disease status post CABG with redo in 2007, ICD in situ, H/O VT with ICD shocks, and atrial fibrillation.     He presented to the emergency department on 2/17/2020 due blood in his stool. Veronica Aj underwent an EGD on 2/18/20 by Dr. Poli Maya that revealed mild esophagitis and gastritis. His Xarelto has been put on hold.  Post-procedure he had an episode of monomorphic VT that was successfully pace-terminated.  His OptiVol fluid index is elevated and is receiving IV diuresis.  He remained on Tikosyn for AAD suppression.  His CrCl and QTc are stable. He remains anemic with a HGB of 7.3 and is expected to receive an additional unit of packed red blood cells today.  His device was interrogated as noted below.     2/20/20: Mr. Flo Zamora was seen in hospital follow-up. He had an episode of PMVT/VF yesterday at 18:55 that was treated with an appropriate ICD shock. Post-shock, he developed atrial fibrillation. His HGB was 7.3 and was being transfused with 1 U PRBC. He was then placed on a Lidocaine drip without recurrence of VT/VF. His HGB/HCT today is 8.3/27.0. He offers no complaints of chest pain, shortness of breath, orthopnea or PND. His oral anticoagulation remains on hold    2/21/20 :He denies any chest pain, sob today. No further VT overnight    2/22/20 : No VT overnight. Still on lidocaine infusion    2/23/20 : doing well.  No further VT overnight  Patient Active Problem List   Diagnosis    Automatic implantable cardioverter-defibrillator in situ    Chronic combined systolic and diastolic [facility]congest[facility] failure) ([facility]    S/P TAVR (transcatheter aortic valve replacement)    Chronic atrial fibrillation    Coronary artery disease involving native coronary artery of native heart without angina pectoris    Essential hypertension    GI bleed    Hyperlipidemia LDL goal <100    GIB (gastrointestinal bleeding)    Acute blood loss anemia    ICD (implantable cardioverter-defibrillator), dual, in situ    [facility]cardia) [facility]    Acute on chronic heart failure with preserved ejection fraction ([facility])    Ischemic heart disease         Scheduled Medications:   metoprolol tartrate  100 mg Oral BID    potassium chloride  20 mEq Oral Daily with breakfast    furosemide  40 mg Oral Daily    potassium chloride  20 mEq Oral Once    pantoprazole  40 mg Oral Daily    [Held by provider] rivaroxaban  20 mg Oral Dinner    fluticasone  1 spray Each Nostril Daily    pravastatin  40 mg Oral Daily    sodium chloride flush  10 mL Intravenous 2 times per day       Infusion Medications:      PRN Medications:  albuterol sulfate HFA, sodium chloride flush, magnesium hydroxide, acetaminophen    No Known Allergies    Wt Readings from Last 3 Encounters:   02/22/20 176 lb 7 oz (80 kg)   12/23/19 180 lb 6.4 oz (81.8 kg)   12/17/19 181 lb (82.1 kg)     Temp Readings from Last 3 Encounters:   02/22/20 97.6 °F (36.4 °C) (Temporal)   12/23/19 98.6 °F (37 °C)   12/17/19 99.3 °F (37.4 °C) (Temporal)     BP Readings from Last 3 Encounters:   02/22/20 134/65   02/18/20 (!) 86/44   12/23/19 120/76     Pulse Readings from Last 3 Encounters:   02/22/20 69   12/23/19 66   12/17/19 75         Intake/Output Summary (Last 24 hours) at 2/23/2020 0901  Last data filed at 2/23/2020 1453  Gross per 24 hour   Intake --   Output 0 ml   Net 0 ml       ROS:   Constitutional: Negative for fever, activity change and appetite change.    HENT: Negative Every effort was made to ensure accuracy; however, inadvertent computerized transcription errors may be present.

## 2020-02-23 NOTE — PLAN OF CARE
Problem: Falls - Risk of:  Goal: Will remain free from falls  Description  Will remain free from falls  2/23/2020 1735 by Sangeeta Brown RN  Outcome: Met This Shift  2/23/2020 0459 by Dayanna Duke RN  Outcome: Met This Shift  Goal: Absence of physical injury  Description  Absence of physical injury  2/23/2020 1735 by Sangeeta Brown RN  Outcome: Met This Shift  2/23/2020 0459 by Dayanna Duke RN  Outcome: Met This Shift     Problem:  Bowel Function - Altered:  Goal: Bowel elimination is within specified parameters  Description  Bowel elimination is within specified parameters  Outcome: Met This Shift     Problem: Fluid Volume - Imbalance:  Goal: Absence of imbalanced fluid volume signs and symptoms  Description  Absence of imbalanced fluid volume signs and symptoms  Outcome: Met This Shift  Goal: Will show no signs and symptoms of excessive bleeding  Description  Will show no signs and symptoms of excessive bleeding  2/23/2020 1735 by Sangeeta Brown RN  Outcome: Met This Shift  2/23/2020 0459 by Dayanna Duke RN  Outcome: Met This Shift     Problem: Bleeding:  Goal: Will show no signs and symptoms of excessive bleeding  Description  Will show no signs and symptoms of excessive bleeding  2/23/2020 1735 by Sangeeta Brown RN  Outcome: Met This Shift  2/23/2020 0459 by Dayanna Duke RN  Outcome: Met This Shift

## 2020-02-23 NOTE — CARE COORDINATION
SOCIAL WORK/CASEMANAGEMENT TRANSITION OF CARE PLANNING: called charge rn and told her to get discharge order cancelled since pt will be here for scope tomorrow.  Chris Syed  2/23/2020

## 2020-02-23 NOTE — PROGRESS NOTES
Message sent to [facility] to check on when to resume [address]. Notified that the decision re [address] is for primary and cardio to make. Will leave VM for Dr Ritchie Root re the above d/t cardio s/o patient case.

## 2020-02-23 NOTE — PLAN OF CARE
Problem: Falls - Risk of:  Goal: Will remain free from falls  Description  Will remain free from falls  2/23/2020 0459 by Quentin Capps RN  Outcome: Met This Shift  2/22/2020 1631 by Jovon Hauser RN  Outcome: Met This Shift  Goal: Absence of physical injury  Description  Absence of physical injury  2/23/2020 0459 by Quentin Capps RN  Outcome: Met This Shift  2/22/2020 1631 by Jovon Hauser RN  Outcome: Met This Shift     Problem:  Bowel Function - Altered:  Goal: Bowel elimination is within specified parameters  Description  Bowel elimination is within specified parameters  2/22/2020 1631 by Jovon Hauser RN  Outcome: Met This Shift     Problem: Fluid Volume - Imbalance:  Goal: Absence of imbalanced fluid volume signs and symptoms  Description  Absence of imbalanced fluid volume signs and symptoms  2/22/2020 1631 by Jovon Hauser RN  Outcome: Met This Shift  Goal: Will show no signs and symptoms of excessive bleeding  Description  Will show no signs and symptoms of excessive bleeding  2/23/2020 0459 by Quentin Capps RN  Outcome: Met This Shift  2/22/2020 1631 by Jovon Hauser RN  Outcome: Met This Shift     Problem: Bleeding:  Goal: Will show no signs and symptoms of excessive bleeding  Description  Will show no signs and symptoms of excessive bleeding  2/23/2020 0459 by Quentin Capps RN  Outcome: Met This Shift  2/22/2020 1631 by Jovon Hauser RN  Outcome: Met This Shift

## 2020-02-24 ENCOUNTER — ANESTHESIA (OUTPATIENT)
Dept: ENDOSCOPY | Age: 85
DRG: 377 | End: 2020-02-24
Payer: MEDICARE

## 2020-02-24 ENCOUNTER — ANESTHESIA EVENT (OUTPATIENT)
Dept: ENDOSCOPY | Age: 85
DRG: 377 | End: 2020-02-24
Payer: MEDICARE

## 2020-02-24 VITALS
TEMPERATURE: 97.8 F | RESPIRATION RATE: 28 BRPM | WEIGHT: 176.44 LBS | HEIGHT: 66 IN | HEART RATE: 70 BPM | SYSTOLIC BLOOD PRESSURE: 133 MMHG | OXYGEN SATURATION: 93 % | BODY MASS INDEX: 28.36 KG/M2 | DIASTOLIC BLOOD PRESSURE: 62 MMHG

## 2020-02-24 VITALS
SYSTOLIC BLOOD PRESSURE: 99 MMHG | DIASTOLIC BLOOD PRESSURE: 51 MMHG | RESPIRATION RATE: 25 BRPM | OXYGEN SATURATION: 100 %

## 2020-02-24 LAB
ANION GAP SERPL CALCULATED.3IONS-SCNC: 13 MMOL/L (ref 7–16)
BUN BLDV-MCNC: 24 MG/DL (ref 8–23)
CALCIUM SERPL-MCNC: 8.8 MG/DL (ref 8.6–10.2)
CHLORIDE BLD-SCNC: 100 MMOL/L (ref 98–107)
CO2: 27 MMOL/L (ref 22–29)
CREAT SERPL-MCNC: 1.1 MG/DL (ref 0.7–1.2)
GFR AFRICAN AMERICAN: >60
GFR NON-AFRICAN AMERICAN: >60 ML/MIN/1.73
GLUCOSE BLD-MCNC: 109 MG/DL (ref 74–99)
HCT VFR BLD CALC: 30.4 % (ref 37–54)
HEMOGLOBIN: 9.3 G/DL (ref 12.5–16.5)
MAGNESIUM: 2.7 MG/DL (ref 1.6–2.6)
MCH RBC QN AUTO: 28.4 PG (ref 26–35)
MCHC RBC AUTO-ENTMCNC: 30.6 % (ref 32–34.5)
MCV RBC AUTO: 93 FL (ref 80–99.9)
PDW BLD-RTO: 16.4 FL (ref 11.5–15)
PLATELET # BLD: 304 E9/L (ref 130–450)
PMV BLD AUTO: 10.8 FL (ref 7–12)
POTASSIUM SERPL-SCNC: 4.1 MMOL/L (ref 3.5–5)
RBC # BLD: 3.27 E12/L (ref 3.8–5.8)
SODIUM BLD-SCNC: 140 MMOL/L (ref 132–146)
WBC # BLD: 6.8 E9/L (ref 4.5–11.5)

## 2020-02-24 PROCEDURE — 80048 BASIC METABOLIC PNL TOTAL CA: CPT

## 2020-02-24 PROCEDURE — 6370000000 HC RX 637 (ALT 250 FOR IP): Performed by: SURGERY

## 2020-02-24 PROCEDURE — 6370000000 HC RX 637 (ALT 250 FOR IP): Performed by: INTERNAL MEDICINE

## 2020-02-24 PROCEDURE — 3700000000 HC ANESTHESIA ATTENDED CARE: Performed by: SURGERY

## 2020-02-24 PROCEDURE — 36415 COLL VENOUS BLD VENIPUNCTURE: CPT

## 2020-02-24 PROCEDURE — 85027 COMPLETE CBC AUTOMATED: CPT

## 2020-02-24 PROCEDURE — 83735 ASSAY OF MAGNESIUM: CPT

## 2020-02-24 PROCEDURE — 6360000002 HC RX W HCPCS

## 2020-02-24 PROCEDURE — 7100000001 HC PACU RECOVERY - ADDTL 15 MIN: Performed by: SURGERY

## 2020-02-24 PROCEDURE — 2580000003 HC RX 258

## 2020-02-24 PROCEDURE — 3609027000 HC COLONOSCOPY: Performed by: SURGERY

## 2020-02-24 PROCEDURE — 0DJD8ZZ INSPECTION OF LOWER INTESTINAL TRACT, VIA NATURAL OR ARTIFICIAL OPENING ENDOSCOPIC: ICD-10-PCS | Performed by: SURGERY

## 2020-02-24 PROCEDURE — 3700000001 HC ADD 15 MINUTES (ANESTHESIA): Performed by: SURGERY

## 2020-02-24 PROCEDURE — 7100000000 HC PACU RECOVERY - FIRST 15 MIN: Performed by: SURGERY

## 2020-02-24 PROCEDURE — 2580000003 HC RX 258: Performed by: SURGERY

## 2020-02-24 PROCEDURE — 2709999900 HC NON-CHARGEABLE SUPPLY: Performed by: SURGERY

## 2020-02-24 PROCEDURE — 2700000000 HC OXYGEN THERAPY PER DAY

## 2020-02-24 RX ORDER — WARFARIN SODIUM 5 MG/1
5 TABLET ORAL DAILY
Status: DISCONTINUED | OUTPATIENT
Start: 2020-02-24 | End: 2020-02-24

## 2020-02-24 RX ORDER — HYDROCODONE BITARTRATE AND ACETAMINOPHEN 5; 325 MG/1; MG/1
1 TABLET ORAL PRN
Status: DISCONTINUED | OUTPATIENT
Start: 2020-02-24 | End: 2020-02-24

## 2020-02-24 RX ORDER — PROPOFOL 10 MG/ML
INJECTION, EMULSION INTRAVENOUS CONTINUOUS PRN
Status: DISCONTINUED | OUTPATIENT
Start: 2020-02-24 | End: 2020-02-24 | Stop reason: SDUPTHER

## 2020-02-24 RX ORDER — PROPOFOL 10 MG/ML
INJECTION, EMULSION INTRAVENOUS PRN
Status: DISCONTINUED | OUTPATIENT
Start: 2020-02-24 | End: 2020-02-24 | Stop reason: SDUPTHER

## 2020-02-24 RX ORDER — SODIUM CHLORIDE 9 MG/ML
INJECTION, SOLUTION INTRAVENOUS CONTINUOUS PRN
Status: DISCONTINUED | OUTPATIENT
Start: 2020-02-24 | End: 2020-02-24 | Stop reason: SDUPTHER

## 2020-02-24 RX ORDER — MEPERIDINE HYDROCHLORIDE 50 MG/ML
12.5 INJECTION INTRAMUSCULAR; INTRAVENOUS; SUBCUTANEOUS EVERY 5 MIN PRN
Status: DISCONTINUED | OUTPATIENT
Start: 2020-02-24 | End: 2020-02-24

## 2020-02-24 RX ORDER — MORPHINE SULFATE 2 MG/ML
1 INJECTION, SOLUTION INTRAMUSCULAR; INTRAVENOUS EVERY 5 MIN PRN
Status: DISCONTINUED | OUTPATIENT
Start: 2020-02-24 | End: 2020-02-24

## 2020-02-24 RX ORDER — MORPHINE SULFATE 2 MG/ML
2 INJECTION, SOLUTION INTRAMUSCULAR; INTRAVENOUS EVERY 5 MIN PRN
Status: DISCONTINUED | OUTPATIENT
Start: 2020-02-24 | End: 2020-02-24

## 2020-02-24 RX ORDER — HYDROCODONE BITARTRATE AND ACETAMINOPHEN 5; 325 MG/1; MG/1
2 TABLET ORAL PRN
Status: DISCONTINUED | OUTPATIENT
Start: 2020-02-24 | End: 2020-02-24

## 2020-02-24 RX ORDER — WARFARIN SODIUM 5 MG/1
5 TABLET ORAL DAILY
Qty: 30 TABLET | Refills: 3 | Status: SHIPPED | OUTPATIENT
Start: 2020-02-24 | End: 2020-03-05

## 2020-02-24 RX ORDER — PROMETHAZINE HYDROCHLORIDE 25 MG/ML
6.25 INJECTION, SOLUTION INTRAMUSCULAR; INTRAVENOUS EVERY 10 MIN PRN
Status: DISCONTINUED | OUTPATIENT
Start: 2020-02-24 | End: 2020-02-24

## 2020-02-24 RX ADMIN — PROPOFOL 50 MCG/KG/MIN: 10 INJECTION, EMULSION INTRAVENOUS at 14:36

## 2020-02-24 RX ADMIN — SODIUM CHLORIDE, PRESERVATIVE FREE 10 ML: 5 INJECTION INTRAVENOUS at 09:17

## 2020-02-24 RX ADMIN — POTASSIUM CHLORIDE 20 MEQ: 1500 TABLET, EXTENDED RELEASE ORAL at 17:16

## 2020-02-24 RX ADMIN — PANTOPRAZOLE SODIUM 40 MG: 40 TABLET, DELAYED RELEASE ORAL at 09:00

## 2020-02-24 RX ADMIN — METOPROLOL TARTRATE 100 MG: 50 TABLET, FILM COATED ORAL at 09:16

## 2020-02-24 RX ADMIN — RIVAROXABAN 20 MG: 20 TABLET, FILM COATED ORAL at 18:24

## 2020-02-24 RX ADMIN — PROPOFOL 30 MG: 10 INJECTION, EMULSION INTRAVENOUS at 14:38

## 2020-02-24 RX ADMIN — SODIUM CHLORIDE: 9 INJECTION, SOLUTION INTRAVENOUS at 14:24

## 2020-02-24 RX ADMIN — FUROSEMIDE 40 MG: 40 TABLET ORAL at 17:16

## 2020-02-24 RX ADMIN — PROPOFOL 40 MG: 10 INJECTION, EMULSION INTRAVENOUS at 14:35

## 2020-02-24 ASSESSMENT — PAIN SCALES - GENERAL
PAINLEVEL_OUTOF10: 0

## 2020-02-24 NOTE — ANESTHESIA PRE PROCEDURE
Department of Anesthesiology  Preprocedure Note       Name:  Flip Montalvo   Age:  82 y.o.  :  1935                                          MRN:  [id number]         Date:  2020      Surgeon: Gorge Owens):  Ruth Squires MD    Procedure: COLONOSCOPY DIAGNOSTIC (N/A )    Medications prior to admission:   Prior to Admission medications    Medication Sig Start Date End Date Taking? Authorizing Provider   furosemide (LASIX) 40 MG tablet Take 1 tablet by mouth daily 20  Yes Ruth Ann Fu MD   pantoprazole (PROTONIX) 40 MG tablet Take 40 mg by mouth daily   Yes Historical Provider, MD   potassium chloride (KLOR-CON M) 10 MEQ extended release tablet TAKE 1 TABLET BY MOUTH  DAILY  Patient taking differently: Take 10 mEq by mouth every other day Indications: pt. takes three time a week  19  Yes Tiffany Bedoya MD   metoprolol tartrate (LOPRESSOR) 50 MG tablet Take 100 mg in the morning and 50 mg at night by mouth. Patient taking differently: 75 mg in AM and 50 mg at HS 18  Yes Jose Daniel,    meclizine (ANTIVERT) 25 MG tablet Take 25 mg by mouth 2 times daily as needed   Yes Historical Provider, MD   Multiple Vitamins-Minerals (PRESERVISION AREDS 2 PO) Take 1 tablet by mouth 2 times daily   Yes Historical Provider, MD   Cholecalciferol (VITAMIN D) 2000 UNITS CAPS capsule Take 1 capsule by mouth daily    Yes Historical Provider, MD   pravastatin (PRAVACHOL) 40 MG tablet Take 40 mg by mouth daily.    Yes Historical Provider, MD   chlorpheniramine (ALLER-CHLOR) 4 MG tablet Take 1 tablet by mouth every 6 hours as needed for Allergies 19   JOSEPH Malloy III   cetirizine (ZYRTEC) 10 MG tablet Take 1 tablet by mouth daily 19   Julieth Root DO   amoxicillin-clavulanate (AUGMENTIN) 875-125 MG per tablet Take 1 tablet by mouth 2 times daily 19   Ulisses Roland, DO   albuterol sulfate  (90 Base) MCG/ACT inhaler Inhale 2 puffs into the lungs 4 times daily as needed for Wheezing 9/13/19   GINETTE Chavira CNP       Current medications:    Current Facility-Administered Medications   Medication Dose Route Frequency Provider Last Rate Last Dose    metoprolol tartrate (LOPRESSOR) tablet 100 mg  100 mg Oral BID Magdalenocarolyn Conklin DO   100 mg at 02/24/20 5809    potassium chloride (KLOR-CON M) extended release tablet 20 mEq  20 mEq Oral Daily with breakfast Marques Smith MD   20 mEq at 02/23/20 3627    furosemide (LASIX) tablet 40 mg  40 mg Oral Daily Marques Smith MD   40 mg at 02/23/20 4618    potassium chloride (KLOR-CON M) extended release tablet 20 mEq  20 mEq Oral Once GINETTE Oakes CNP        pantoprazole (PROTONIX) tablet 40 mg  40 mg Oral Daily Kemal Hankins MD   40 mg at 02/24/20 0900    [Held by provider] rivaroxaban (XARELTO) tablet 20 mg  20 mg Oral Dinner Kemal Hankins MD   20 mg at 02/18/20 1747    albuterol sulfate  (90 Base) MCG/ACT inhaler 2 puff  2 puff Inhalation 4x Daily PRN Kemal Hankins MD        fluticasone [place]) 50 MCG/ACT nasal spray 1 spray  1 spray Each Nostril Daily Kemal Hankins MD   1 spray at 02/23/20 2035    pravastatin (PRAVACHOL) tablet 40 mg  40 mg Oral Daily Kemal Hankins MD   40 mg at 02/23/20 2035    sodium chloride flush 0.9 % injection 10 mL  10 mL Intravenous 2 times per day Kemal Hankins MD   10 mL at 02/24/20 0917    sodium chloride flush 0.9 % injection 10 mL  10 mL Intravenous PRN Kemal Hankins MD        magnesium hydroxide (MILK OF MAGNESIA) 400 MG/5ML suspension 30 mL  30 mL Oral Daily PRN MD Love Hernandez acetaminophen (TYLENOL) tablet 650 mg  650 mg Oral Q4H PRN Kemal Hankins MD           Allergies:  No Known Allergies    Problem List:    Patient Active Problem List   Diagnosis Code    Automatic implantable cardioverter-defibrillator in situ Z95.810    Chronic combined systolic and diastolic CHF (congestive heart failure) (HCC) I50.42    S/P TAVR (transcatheter aortic valve replacement) Z95.2    years: 1.50     Last attempt to quit: 1973     Years since quittin.0    Smokeless tobacco: Never Used    Tobacco comment: Quit smoking in    Substance Use Topics    Alcohol use: No                                Counseling given: Not Answered  Comment: Quit smoking in       Vital Signs (Current):   Vitals:    20 2034 20 2330 20 0800 20 1219   BP: (!) 141/63 128/67 122/63    Pulse: 74 92 92    Resp: 16 16 16    Temp: 97.6 °F (36.4 °C) 97.2 °F (36.2 °C) 97.8 °F (36.6 °C)    TempSrc: Temporal Temporal Temporal    SpO2: 92% 92% 92%    Weight:       Height:    5' 6\" (1.676 m)                                              BP Readings from Last 3 Encounters:   20 122/63   20 (!) 86/44   19 120/76       NPO Status: Time of last liquid consumption: 1700                        Time of last solid consumption: 1200                        Date of last liquid consumption: 20                        Date of last solid food consumption: 20    BMI:   Wt Readings from Last 3 Encounters:   20 176 lb 7 oz (80 kg)   19 180 lb 6.4 oz (81.8 kg)   19 181 lb (82.1 kg)     Body mass index is 28.48 kg/m². CBC:   Lab Results   Component Value Date    WBC 6.8 2020    RBC 3.27 2020    HGB 9.3 2020    HCT 30.4 2020    MCV 93.0 2020    RDW 16.4 2020     2020       CMP:   Lab Results   Component Value Date     2020    K 4.1 2020    K 3.8 2020     2020    CO2 27 2020    BUN 24 2020    CREATININE 1.1 2020    GFRAA >60 2020    LABGLOM >60 2020    GLUCOSE 109 2020    PROT 5.2 2020    CALCIUM 8.8 2020    BILITOT 0.4 2020    ALKPHOS 96 2020    AST 21 2020    ALT 11 2020       POC Tests: No results for input(s): POCGLU, POCNA, POCK, POCCL, POCBUN, POCHEMO, POCHCT in the last 72 hours.     Coags:   Lab Results   Component Value Date    PROTIME 15.3 02/17/2020    INR 1.4 02/17/2020    APTT 34.8 02/17/2020       HCG (If Applicable): No results found for: PREGTESTUR, PREGSERUM, HCG, HCGQUANT     ABGs: No results found for: PHART, PO2ART, SLK6ROH, WOJ6TEA, BEART, X7QERKLR     Type & Screen (If Applicable):  No results found for: LABABO, [address]    Narrative & Impression EKG 02/22/20    Normal sinus rhythm  Left bundle branch block  Abnormal ECG  When compared with ECG of 22-FEB-2020 08:48,  No significant change was found     Confirmed by Yahaira Farmer ([id number]) on 2/22/2020 9:18:57 AM       Summary Echocardiogram 08/16/20     Ejection fraction is visually estimated at 60%. The inferior basal wall is hypokinetic. No regional wall motion abnormalities seen. Normal right ventricle structure and function. There is doppler evidence of stage II diastolic dysfunction. Left atrial volume index of 39 ml per meters squared BSA. Hx of TAVR with 26 mm S3 3/29/17. The aortic valve area is 1.6 cm2 with a   maximum gradient of 15 mmHg and a mean gradient of 7 mmHg. Mild aortic regurgitation is noted. Mild mitral regurgitation is present. Mild tricuspid regurgitation. Agitated saline injected for shunt evaluation. No evidence of patent foramen ovale. No evidence of atrial septal defect. Signature      ----------------------------------------------------------------   Electronically signed by David Moody DO(Interpreting   physician) on 08/16/2019 03:34 PM   ----------------------------------------------------------------        Anesthesia Evaluation  Patient summary reviewed and Nursing notes reviewed no history of anesthetic complications:   Airway: Mallampati: II  TM distance: >3 FB   Neck ROM: full  Mouth opening: > = 3 FB Dental:          Pulmonary: breath sounds clear to auscultation                            ROS comment: Patient was on oxygen 2 liters earlier in hospital stay. Cardiovascular:    (+) hypertension:, pacemaker: AICD and pacemaker, past MI (patient had MI in 2002): > 6 months, CAD:, CABG/stent:, dysrhythmias: atrial fibrillation, CHF:,     Valvular problems/murmurs: Patient has had Aortic Valve replacement. 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 GI/Hepatic/Renal:            ROS comment: Anemic R/O GI bleed. Endo/Other:    (+) blood dyscrasia: anemia, arthritis:., .          Pt had no PAT visit       Abdominal:           Vascular: negative vascular ROS. Anesthesia Plan      MAC     ASA 3     (PIV #20 Left AC)  Induction: intravenous. Anesthetic plan and risks discussed with patient. Use of blood products discussed with patient whom consented to blood products. Plan discussed with CRNA and attending.                 Andrei Adan DO   2/24/2020

## 2020-02-24 NOTE — PROGRESS NOTES
Returned from colonoscopy. Vital signs stable. No bleeding noted. Reported that no polyps were observed and no biopsies taken. Message to cardiology to clarify Xarelto orders vs Coumadin. Physician stated to resume Xarelto. Patient has order to discharge to home.

## 2020-02-24 NOTE — ANESTHESIA POSTPROCEDURE EVALUATION
Department of Anesthesiology  Postprocedure Note    Patient: Stephanie Hernandez  MRN: [id number]  YOB: 1935  Date of evaluation: 2/24/2020  Time:  2:27 PM     Procedure Summary     Date:  02/24/20 Room / Location:  [address] [place] [address] / [facility]    Anesthesia Start:  1426 Anesthesia Stop:      Procedure:  COLONOSCOPY DIAGNOSTIC (N/A ) Diagnosis:  (melena, GIB)    Surgeon:  Santiago Case MD Responsible Provider:      Anesthesia Type:  MAC ASA Status:  3          Anesthesia Type: MAC    Alfred Phase I: Alfred Score: 10    Alfred Phase II:      Last vitals: Reviewed and per EMR flowsheets.        Anesthesia Post Evaluation    Patient location during evaluation: PACU  Patient participation: complete - patient participated  Level of consciousness: awake  Pain score: 3  Airway patency: patent  Nausea & Vomiting: no nausea and no vomiting  Complications: no  Cardiovascular status: blood pressure returned to baseline  Respiratory status: acceptable  Hydration status: euvolemic

## 2020-02-24 NOTE — PLAN OF CARE
Problem: Falls - Risk of:  Goal: Will remain free from falls  Description  Will remain free from falls  2/24/2020 0452 by Sveta London RN  Outcome: Met This Shift  2/23/2020 1735 by Jaya Irizarry RN  Outcome: Met This Shift  Goal: Absence of physical injury  Description  Absence of physical injury  2/24/2020 0452 by Sveta London RN  Outcome: Met This Shift  2/23/2020 1735 by Jaya Irizarry RN  Outcome: Met This Shift     Problem:  Bowel Function - Altered:  Goal: Bowel elimination is within specified parameters  Description  Bowel elimination is within specified parameters  2/23/2020 1735 by Jaya Irizarry RN  Outcome: Met This Shift     Problem: Fluid Volume - Imbalance:  Goal: Absence of imbalanced fluid volume signs and symptoms  Description  Absence of imbalanced fluid volume signs and symptoms  2/24/2020 0452 by Sveta London RN  Outcome: Met This Shift  2/23/2020 1735 by Jaya Irizarry RN  Outcome: Met This Shift  Goal: Will show no signs and symptoms of excessive bleeding  Description  Will show no signs and symptoms of excessive bleeding  2/23/2020 1735 by Jaya Irizarry RN  Outcome: Met This Shift     Problem: Bleeding:  Goal: Will show no signs and symptoms of excessive bleeding  Description  Will show no signs and symptoms of excessive bleeding  2/23/2020 1735 by Jaya Irizarry RN  Outcome: Met This Shift

## 2020-02-24 NOTE — PROGRESS NOTES
Subjective: The patient is awake and alert. Feels well today no acute complaints  No further black bowel movements-color back to baseline  Feels well, denies chest pain or shortness of breath  No further issues with vtach   Awaiting colonoscopy this afternoon  Objective:    /67   Pulse 92   Temp 97.2 °F (36.2 °C) (Temporal)   Resp 16   Ht 5' 6.5\" (1.689 m)   Wt 176 lb 7 oz (80 kg)   SpO2 92%   BMI 28.05 kg/m²     In: 30 [P.O.:30]  Out: 0     HEENT: NCAT,  PERRLA, No JVD  Heart:  RRR, no murmurs, gallops, or rubs.   Lungs:  CTA bilaterally, no wheeze, rales or rhonchi  Abd: bowel sounds present, nontender, nondistended, no masses  Extrem:  No clubbing, cyanosis, or edema     Recent Labs     02/22/20  0633 02/23/20  0638 02/24/20  0744   WBC 8.4 7.7 6.8   HGB 8.3* 8.1* 9.3*   HCT 27.0* 26.7* 30.4*    242 304       Recent Labs     02/22/20  0633 02/23/20  0638 02/24/20  0744    140 140   K 4.5 4.3 4.1    102 100   CO2 25 25 27   BUN 26* 26* 24*   CREATININE 1.1 1.1 1.1   CALCIUM 8.4* 8.4* 8.8       Assessment:    Patient Active Problem List   Diagnosis    Automatic implantable cardioverter-defibrillator in situ    Chronic combined systolic and diastolic CHF (congestive heart failure) ([facility])    S/P TAVR (transcatheter aortic valve replacement)    Chronic atrial fibrillation    Coronary artery disease involving native coronary artery of native heart without angina pectoris    Essential hypertension    GI bleed    Hyperlipidemia LDL goal <100    GIB (gastrointestinal bleeding)    Acute blood loss anemia    ICD (implantable cardioverter-defibrillator), dual, in situ    VT (ventricular tachycardia) ([facility])    Acute on chronic heart failure with preserved ejection fraction ([facility])    Ischemic heart disease       Plan:    Admit to telemetry for evaluation of questionable GI bleed  EGD unremarkable with no active bleed-continue PPI with caution  Oral anticoagulation currently on

## 2020-02-24 NOTE — OP NOTE
Flip Montalvo  YOB: 1935  [id number]    Pre-operative Diagnosis: anemia/GI bleed    Post-operative Diagnosis:    Small polyps of no clinical relevance    Procedure: Colonoscopy    Anesthesia: [facility]    Surgeon: Karen Hawthorne MD    Assistant: None    Estimated Blood Loss: none    Complications: none    Specimens: none    Procedure:   Pt was taken to the endoscopy suite and placed on the table in a left lateral decubitus position. LMAC anesthesia was administered. A digital rectal examination revealed no gross blood, normal tone, no mass was palpated. A lubricated colonoscope was inserted and advanced to the cecum without difficulty. The ileocecal valve and appendiceal orifice were identified and photographed. Prep was good. Cecum, ascending colon, hepatic flexure, transverse colon, splenic flexure, descending colon, sigmoid colon were all extensively inspected. There were small polyps scattered throughout the colon of no clinical significance. These were ignored based on the patient's age and need for anticoagulation. The proximal rectum was normal. The distal rectum was normal. The colon was deflated. The scope was removed. The patient tolerated the procedure well.     Impression: no colonic source of bleed      Plan: ok to resume anticoagulation; if anemia persists would transition to warfarin and confirm small bowel ectasia with capsule study    Ruth Squires MD,  2/24/2020, 2:52 PM

## 2020-02-24 NOTE — PROGRESS NOTES
Two voicemails left for physician regarding patient's impending discharge. Patient had been told that, if he was not too groggy after colonoscopy, and if no complications, he could be discharged home. Attempting to obtain actual discharge orders.

## 2020-02-25 ENCOUNTER — CARE COORDINATION (OUTPATIENT)
Dept: CASE MANAGEMENT | Age: 85
End: 2020-02-25

## 2020-02-25 PROCEDURE — 1111F DSCHRG MED/CURRENT MED MERGE: CPT | Performed by: FAMILY MEDICINE

## 2020-02-25 NOTE — CARE COORDINATION
appointment?:  Yes  How are you going to get to your appointment?:  Car - family or friend to transport (Comment: Family member to bring to the appointment. )  Were you discharged with any Home Care or Post Acute Services:  No  Do you feel like you have everything you need to keep you well at home?:  Yes  Care Transitions Interventions                               Allergies and medications reviewed with patient. AVS Medications: There are NEW medications for you. START taking them after you leave the hospital-patient is not taking:  -warfarin 5 mg 1 tablet daily. Patient states that just prior to discharge he was informed Dr. Renee Phoenix does not want patient to start on warfarin. He wants patient to remain on Xarelto 20 mg daily with food. You told us you were taking these medications at home, but the amount or how often you take this medication has CHANGED patient confirmed is taking as instructed on the AVS:  -Furosemide 40 mg  -potassium chloride 10 raymond    You told us you were taking these medications at home, but the amount or how often you take this medication has CHANGED - patient states was not taking as instructed on the AVS please see below:  Metoprolol tartrate 50 mg take 100 mg in the morning and 50 mg at night- Patient was not aware of the change, has macular degeneration and did not notice the change. Patient reports he took 75 mg this am and will take 25 mg this evening and will start taking 100 mg in the morning and 50 mg at night beginning 5/26/20. These are medications you told us you were taking at home, CONTINUE taking them after you leave the hospital patient states taking all medications as instructed on the AVS except the following:  -amoxicillin-clavulanate (Augmentin)- Patient states at time of discharge when reviewing with the nurse- wrote on the discharge summary to no longer take.    -cetirizine (Zyrtec)- Patient states at time of discharge when reviewing with the nurse- wrote on the discharge summary to no longer take.  -chlorpheniramine (Aller-Chlor) - Patient states has not taken since December of 2019. States was prescribed for 15 days. These are medications you told us you were taking at home stop taking them after you leave the hospital patient confirmed is not taking:  -amlodipine 2.5 mg  -aspirin 81 mg EC  -dofetilide (Tikosyn) 250 mcg  -Flonase Allergy Relief      These are medications you told us you were taking at home stop taking them after you leave the hospital- IS TAKING:  -RIVAROXABAN ([place]) 20 MG- states that just prior to discharge he was informed Dr. Ainsley Escalera does not want patient to start on warfarin. He wants patient to remain on Xarelto 20 mg daily with food. Patient informed RN CTN that he has changed PCP and his new PCP is Dr. Ros Johansen and has an appointment on 3/520 at 2 pm as noted below: Follow Up  Future Appointments   Date Time Provider Cezar Gamez   3/5/2020  2:00 PM MD PRISCILLA Her [facility]       Discussed with patient to bring all medications he is taking to his appointment with Dr. Ros Johansen- patient acknowledged states he was instructed this when made his appointment for 3/5/20. Patient states he will be calling Dr. Cyndy Pearce office to schedule appointment for his ICD, after he follows up with Dr. Ros Johansen. Patient states he is waiting for Dr. Ankit Lovett office to call patient to schedule his 6 month appointment for March.    Kyung Field RN

## 2020-02-27 ENCOUNTER — HOSPITAL ENCOUNTER (OUTPATIENT)
Age: 85
Discharge: HOME | End: 2020-02-29
Payer: MEDICARE

## 2020-02-27 ENCOUNTER — OFFICE VISIT (OUTPATIENT)
Dept: CARDIOLOGY CLINIC | Age: 85
End: 2020-02-27
Payer: MEDICARE

## 2020-02-27 VITALS
RESPIRATION RATE: 16 BRPM | HEART RATE: 71 BPM | BODY MASS INDEX: 28.35 KG/M2 | DIASTOLIC BLOOD PRESSURE: 62 MMHG | WEIGHT: 176.4 LBS | HEIGHT: 66 IN | SYSTOLIC BLOOD PRESSURE: 108 MMHG

## 2020-02-27 LAB
ANION GAP SERPL CALCULATED.3IONS-SCNC: 15 MMOL/L (ref 7–16)
BUN BLDV-MCNC: 19 MG/DL (ref 8–23)
CALCIUM SERPL-MCNC: 9.5 MG/DL (ref 8.6–10.2)
CHLORIDE BLD-SCNC: 98 MMOL/L (ref 98–107)
CO2: 27 MMOL/L (ref 22–29)
CREAT SERPL-MCNC: 1.1 MG/DL (ref 0.7–1.2)
GFR AFRICAN AMERICAN: >60
GFR NON-AFRICAN AMERICAN: >60 ML/MIN/1.73
GLUCOSE BLD-MCNC: 81 MG/DL (ref 74–99)
POTASSIUM SERPL-SCNC: 4.8 MMOL/L (ref 3.5–5)
PRO-BNP: 581 PG/ML (ref 0–450)
SODIUM BLD-SCNC: 140 MMOL/L (ref 132–146)

## 2020-02-27 PROCEDURE — 1036F TOBACCO NON-USER: CPT | Performed by: INTERNAL MEDICINE

## 2020-02-27 PROCEDURE — 1111F DSCHRG MED/CURRENT MED MERGE: CPT | Performed by: INTERNAL MEDICINE

## 2020-02-27 PROCEDURE — 93000 ELECTROCARDIOGRAM COMPLETE: CPT | Performed by: INTERNAL MEDICINE

## 2020-02-27 PROCEDURE — 83880 ASSAY OF NATRIURETIC PEPTIDE: CPT

## 2020-02-27 PROCEDURE — 99213 OFFICE O/P EST LOW 20 MIN: CPT | Performed by: INTERNAL MEDICINE

## 2020-02-27 PROCEDURE — 4040F PNEUMOC VAC/ADMIN/RCVD: CPT | Performed by: INTERNAL MEDICINE

## 2020-02-27 PROCEDURE — 80048 BASIC METABOLIC PNL TOTAL CA: CPT

## 2020-02-27 PROCEDURE — G8482 FLU IMMUNIZE ORDER/ADMIN: HCPCS | Performed by: INTERNAL MEDICINE

## 2020-02-27 PROCEDURE — 1123F ACP DISCUSS/DSCN MKR DOCD: CPT | Performed by: INTERNAL MEDICINE

## 2020-02-27 PROCEDURE — G8417 CALC BMI ABV UP PARAM F/U: HCPCS | Performed by: INTERNAL MEDICINE

## 2020-02-27 PROCEDURE — G8427 DOCREV CUR MEDS BY ELIG CLIN: HCPCS | Performed by: INTERNAL MEDICINE

## 2020-02-27 NOTE — PROGRESS NOTES
[facility]  Leyla Prieto. Ofelia Mandel M.D. Ana M Ponce M.D.  Rylee Wilcox. Jasbir Casanova M.D. Kash Avila M.D. Nicole Warner M.D. MD Lance Sheppardte [employer] [facility]   1935  Betzaida Elizalde MD      This 82-year-old man is seen today for outpatient cardiac assessment. He has a history of chronic ischemic heart disease with ICD implant. He was hospitalized 02/17 through 02/22/2020 with progressive dyspnea. Admitting H/H was 8.5/26. An EGD 02/1820 20 showed gastritis and esophagitis. Postprocedure while up to the bathroom he had 43 beat VT. His OptiVol fluid index is elevated and is receiving IV diuresis. He remains on Tikosyn for AAD suppression. He was seen in consultation by Dr. Roselyn Bai. He had an episode of PMVT/VF 02/19/2010 at 18:55 that was treated with an appropriate ICD shock  He was recommended to continue Tikosyn and Lopressor. QTc was stable. Medical History:  1. MI, 2003. Cardiac catheterization: 85% ostial, proximal and mid LAD narrowings. LMC 70% stenosis. D1 occluded. D2 50% stenosis. OM1 80% ostial stenosis. Mid CX occluded. Dominant RCA severe disease. LVEF 40-45%. 2. CABG, 05/23/2003, [place], [place] with LIMA-LAD, SVG-RI, SVG-OM. 3. Hyperlipidemia. 4. Mild carotid plaque on US, 2003. 5. Echo, 07/2006. Mild LVE, normal EF, Stage II diastolic dysfunction, moderate AS, mild MR, mild TR. 6. Right leg vein stripping, 1970's. 7. No history diabetes mellitus, stroke or COPD. 8. Nonsmoker for many years. 9. VT causing cardiac arrest after stress test, 03/15/2007. He was successfully cardioverted. 10. Cardiac catheterization, 03/16/2007 with normal LVEF, severe native three vessel coronary disease. SVG-RI, SVG-OM both occluded. LIMA-LAD patent. 11. Re-do CABG, [facility], 03/2007 with radial artery graft to D2 and OM2. 12. Elective PCI with CONNOR native OM2 and native LAD, 03/2007 following CABG.  This was done because of axis, RBBB. BMP negative except for elevation in BUN to 55 with Cr 1.3.   29. EGD, 08/28/2013. Large pyloric channel ulcer with clot treated with PRBCs and fresh frozen plasma. Hb 8.7 on day of discharge and stable. Pradaxa was temporarily held. 30. CT abdomen, 09/08/2014. 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 31. CT chest, 09/17/2014. Consolidation and infiltrate at the left lung base, stable when compared to previous exams with less pleural thickening and calcified plaque in the left pleural cavity without any recent change. Chronic obstructive airways disease. 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   32. ICD programmed to DDDR mode by Dr. Aamir Ayers, 03/26/2015. Device function normal.  33. Echo 10/16/2015. EF 39%. Stage II diastolic dysfunction. Aortic stenosis with peak/mean gradients of 48/24 mmHg. Estimated valve area 1.0 cm². 34. ICD generator change for battery depletion, 07/28/2016, Dr. Lakshmi Weller. Aamir Ayers. 35. Echo, 02/03/2017.  Normal LV size with mild LVH.  Normal regional wall motion and overall systolic function.  Diastole could not be assessed, but was presumed to be impaired.  The RV was dilated with impaired global systolic function.  The LA was enlarged.  Mild MAC with mild mitral insufficiency.  Moderate tricuspid insufficiency.  Aortic valve gradients are peak 47 mmHg with a mean of 27.  Dimensionless ratio 0.21. Valve area calculated 0.8 cm².    36.  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  37. Echocardiogram, [facility], 4/19/2018, EF 60%. Low normal RV function. Stage 2 diastolic dysfunction. Mild-to-moderate MR. S/P TAVR with a mean gradient of 10 mmHg.   RVSP 31 mg.     38. S/P Cardioversion by Dr. Micky Retana, 05/30/2017.   19 Rohith Howard evaluation, 09/07/2018, for 2 appropriate ICD discharges for polymorphic ventricular tachycardia, which occurred after yard work and moving heavy furniture. 36. hospitalized 02/17 through 02/22/2020 with progressive dyspnea. Admitting H/H was 8.5/26. An EGD 02/1820 20 showed gastritis and esophagitis. Postprocedure while up to the bathroom he had 43 beat VT. His OptiVol fluid index is elevated and is receiving IV diuresis. He remains on Tikosyn for AAD suppression. He was seen in consultation by Dr. Negar Sweeney. He had an episode of PMVT/VF 02/19/2010 at 18:55 that was treated with an appropriate ICD shock  He was recommended to continue Tikosyn and Lopressor  41   Outpatient cardiac follow-up 02/27/2020. Patient asymptomatic and doing well although he was not discharged on Tikosyn      Review of Systems:  Constitutional: negative for fever and chills  Respiratory: negative for cough and hemoptysis  Cardiovascular:   Gastrointestinal: negative for abdominal pain, diarrhea, nausea and vomiting  Genitourinary:negative for dysuria and hematuria  Derm: negative for rash and skin lesion(s)  Neurological: negative for seizures and tremors  Endocrine: negative for diabetic symptoms including polydipsia and polyuria  Musculoskeletal: negative for CTD  Psychiatric: negative for psychosis and major depression    On examination, he is an alert pleasant elderly man in no distress   skin is warm and dry. Respirations are unlabored. /62   Pulse 71   Resp 16   Ht 5' 6\" (1.676 m)   Wt 176 lb 6.4 oz (80 kg)   BMI 28.47 kg/m²  HEENT negative for scleral icterus. Extraocular muscles intact. No facial asymmetry or central cyanosis. Neck without masses or goiter. No bruit or JVD. Cardiac apex not displaced. Rhythm regular. Abdomen normal.  Extremities without edema. Lungs are clear    EKG today shows electronically paced atrial complexes. 70.  MN interval 300.   Old anteroseptal MI    Hemodynamically the patient is doing (ALLER-CHLOR) 4 MG tablet, Take 1 tablet by mouth every 6 hours as needed for Allergies (Patient not taking: Reported on 2/25/2020), Disp: 20 tablet, Rfl: 0    cetirizine (ZYRTEC) 10 MG tablet, Take 1 tablet by mouth daily (Patient not taking: Reported on 2/25/2020), Disp: 30 tablet, Rfl: 1    amoxicillin-clavulanate (AUGMENTIN) 875-125 MG per tablet, Take 1 tablet by mouth 2 times daily (Patient not taking: Reported on 2/25/2020), Disp: 20 tablet, Rfl: 0      Note: This report was completed utilizing computer voice recognition software. Every effort has been made to ensure accuracy, however; inadvertent computerized transcription errors may be present. Ivleisse Welch.  Naren Wong MD     CC Dr Jesica Carney

## 2020-03-03 ENCOUNTER — CARE COORDINATION (OUTPATIENT)
Dept: CASE MANAGEMENT | Age: 85
End: 2020-03-03

## 2020-03-04 ENCOUNTER — TELEPHONE (OUTPATIENT)
Dept: CARDIOLOGY CLINIC | Age: 85
End: 2020-03-04

## 2020-03-05 ENCOUNTER — OFFICE VISIT (OUTPATIENT)
Dept: PRIMARY CARE CLINIC | Age: 85
End: 2020-03-05
Payer: MEDICARE

## 2020-03-05 VITALS
OXYGEN SATURATION: 96 % | WEIGHT: 178 LBS | HEART RATE: 70 BPM | TEMPERATURE: 98.7 F | BODY MASS INDEX: 28.61 KG/M2 | RESPIRATION RATE: 16 BRPM | SYSTOLIC BLOOD PRESSURE: 144 MMHG | DIASTOLIC BLOOD PRESSURE: 60 MMHG | HEIGHT: 66 IN

## 2020-03-05 PROBLEM — K92.2 GI BLEED: Status: RESOLVED | Noted: 2019-09-02 | Resolved: 2020-03-05

## 2020-03-05 PROCEDURE — 99495 TRANSJ CARE MGMT MOD F2F 14D: CPT | Performed by: FAMILY MEDICINE

## 2020-03-05 PROCEDURE — 1111F DSCHRG MED/CURRENT MED MERGE: CPT | Performed by: FAMILY MEDICINE

## 2020-03-05 RX ORDER — DOFETILIDE 0.25 MG/1
500 CAPSULE ORAL 2 TIMES DAILY
COMMUNITY
End: 2020-03-05 | Stop reason: DRUGHIGH

## 2020-03-05 RX ORDER — POTASSIUM CHLORIDE 750 MG/1
10 TABLET, EXTENDED RELEASE ORAL DAILY
Qty: 90 TABLET | Refills: 0
Start: 2020-03-05 | End: 2020-07-14

## 2020-03-05 RX ORDER — BUSPIRONE HYDROCHLORIDE 5 MG/1
5 TABLET ORAL NIGHTLY
Qty: 60 TABLET | Refills: 0 | Status: SHIPPED | OUTPATIENT
Start: 2020-03-05 | End: 2020-04-04

## 2020-03-05 RX ORDER — DOFETILIDE 0.25 MG/1
250 CAPSULE ORAL 2 TIMES DAILY
Qty: 90 CAPSULE | Refills: 0 | Status: SHIPPED
Start: 2020-03-05 | End: 2022-06-09

## 2020-03-05 RX ORDER — METOPROLOL TARTRATE 50 MG/1
TABLET, FILM COATED ORAL
Qty: 60 TABLET | Refills: 0 | Status: SHIPPED
Start: 2020-03-05 | End: 2020-08-12 | Stop reason: SDUPTHER

## 2020-03-05 ASSESSMENT — PATIENT HEALTH QUESTIONNAIRE - PHQ9
1. LITTLE INTEREST OR PLEASURE IN DOING THINGS: 0
2. FEELING DOWN, DEPRESSED OR HOPELESS: 0
SUM OF ALL RESPONSES TO PHQ QUESTIONS 1-9: 0
SUM OF ALL RESPONSES TO PHQ9 QUESTIONS 1 & 2: 0
SUM OF ALL RESPONSES TO PHQ QUESTIONS 1-9: 0

## 2020-03-05 ASSESSMENT — ENCOUNTER SYMPTOMS
VOMITING: 0
DIARRHEA: 0
CONSTIPATION: 0
SHORTNESS OF BREATH: 0
WHEEZING: 0
RHINORRHEA: 0
NAUSEA: 0
ABDOMINAL PAIN: 0
SORE THROAT: 0

## 2020-03-05 NOTE — PROGRESS NOTES
Pulse: 70    Resp: 16    Temp: 98.7 °F (37.1 °C)    SpO2: 96%    Weight: 178 lb (80.7 kg)    Height: 5' 6\" (1.676 m)      Body mass index is 28.73 kg/m². Wt Readings from Last 3 Encounters:   03/05/20 178 lb (80.7 kg)   02/27/20 176 lb 6.4 oz (80 kg)   02/22/20 176 lb 7 oz (80 kg)     BP Readings from Last 3 Encounters:   03/05/20 (!) 144/60   02/27/20 108/62   02/24/20 133/62       Physical Exam  Constitutional:       Appearance: He is well-developed. HENT:      Head: Normocephalic. Eyes:      Conjunctiva/sclera: Conjunctivae normal.      Pupils: Pupils are equal, round, and reactive to light. Cardiovascular:      Rate and Rhythm: Normal rate and regular rhythm. Heart sounds: Normal heart sounds. No murmur. Pulmonary:      Effort: Pulmonary effort is normal.      Breath sounds: Normal breath sounds. No wheezing or rales. Abdominal:      General: Bowel sounds are normal.      Palpations: Abdomen is soft. Tenderness: There is no abdominal tenderness. Musculoskeletal:      Right lower leg: No edema. Left lower leg: No edema. Neurological:      Mental Status: He is alert. Comments: Cranial nerves grossly intact             Assessment/Plan:   Hospital discharge follow-up  Patient's hospital course was reviewed at length. All questions and concerns regards to such and his follow-ups were answered. Patient is to follow up with all the specialist. Also patient back in 1 month for a establishment appointment. Patient will need lab work prior to this appointment. - TX DISCHARGE MEDS RECONCILED W/ CURRENT OUTPATIENT MED LIST     Ischemic cardiomyopathy  Patient is following with cardiology in regards this issue and electrophysiology. 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 Patient denies any SI/HI. If he develops such patient is to stop the medication immediately and contact 911 or proceed to the emergency department. Consider starting the patient on Remeron if this medication is ineffective for the patient has side effects from such. 6. Chronic combined systolic and diastolic CHF (congestive heart failure) (HCC)  Stable. No signs of acute exacerbation today. Following with cardiology. 7. VT (ventricular tachycardia) ([address]  Patient appears to be in sinus rhythm today. Patient is to follow-up with electrophysiology and cardiology regards this issue. 8. Essential hypertension  Stable. Below the patient's goal 150/90. Patient is following with cardiology for this issue. 9. Gastrointestinal hemorrhage with melena  Resolved. Patient does not have any scheduled appointments with gastroenterology or general surgery. Patient will need to continue a PPI indefinitely due to his history of GI bleeds. If the patient has repeat GI bleed he'll need to be hospitalized again. His anticoagulation will need to be held. May make considerations to change his anticoagulation near future to Coumadin. Patient may need to have a small bowel follow-through study performed in the near future with continued bleeding.     10. Acute blood loss anemia  An noted above        Medical Decision Making: moderate complexity and high complexity    Return to clinic in 1 month to establish care

## 2020-03-17 ENCOUNTER — CARE COORDINATION (OUTPATIENT)
Dept: CASE MANAGEMENT | Age: 85
End: 2020-03-17

## 2020-03-17 NOTE — CARE COORDINATION
[facility] Transitions Follow Up Call    3/17/2020    Patient: Adamaris Gardner  Patient : 1935   MRN: <[id number]>  Reason for Admission: GI Bleed  Discharge Date: 20 RARS: Readmission Risk Score: 25         Spoke with: Patient    Patient states he is doing well. No GI bleeding, Abdominal pain, fever, nausea, vomiting, diarrhea, Bloody or black tarry BM's, fatigue, and appetite is good. Seeing PCP tomorrow. Patient has no needs or concerns for writer at this time. Will Follow up at later time. Care Transitions Subsequent and Final Call    Subsequent and Final Calls  Do you have any ongoing symptoms?:  No  Have your medications changed?:  No  Do you have any questions related to your medications?:  No  Do you currently have any active services?:  No  Do you have any needs or concerns that I can assist you with?:  No  Care Transitions Interventions  Other Interventions:             Follow Up  Future Appointments   Date Time Provider Cezar Gamez   2020 11:30 AM MD PRISCILLA Soto [facility]   2020 10:30 AM Marva Underwood DO [address] [facility]       Lesly Garrett LPN

## 2020-04-07 ENCOUNTER — TELEPHONE (OUTPATIENT)
Dept: PRIMARY CARE CLINIC | Age: 85
End: 2020-04-07

## 2020-04-09 ENCOUNTER — CARE COORDINATION (OUTPATIENT)
Dept: CASE MANAGEMENT | Age: 85
End: 2020-04-09

## 2020-04-09 NOTE — CARE COORDINATION
[facility] Transitions Follow Up Call    2020    Patient: Lexi Powell  Patient : 1935   MRN: <[id number]>  Reason for Admission:   Discharge Date: 20 RARS: Readmission Risk Score: 25         Spoke with:  Melanie Reed, patient    Care Transitions Subsequent and Final Call    Subsequent and Final Calls  Have your medications changed?:  No  Do you have any questions related to your medications?:  No  Do you currently have any active services?:  No  Do you have any needs or concerns that I can assist you with?:  No  Identified Barriers:  None  Care Transitions Interventions  Other Interventions: Follow Up:  Contacted patient for BPCI-A follow up. He stated that he is doing \"fine\". Reports that he has occasional dizziness which his MD is aware of. He stated he has been in communication with her and she is adjusting his medications. He will continue to monitor his dizziness and is aware of when to contact MD with any changes or concerns. He denies having any abdominal pain, nausea/vomiting. Reports his bowels are moving with no issues. He denies having any blood or dark tarry stools. His appetite has been good. He also denies having any chest pain/discomfort, shortness of breath. He has all of his medications and taking them as prescribed. Then continue to follow CDC precautions. He does not have any questions or concerns at this time. Will continue to follow.      Future Appointments   Date Time Provider Cezar Gamez   2020  1:30 PM MD PRISCILLA Vaughn [facility]       Jam Pang RN

## 2020-04-21 ENCOUNTER — CARE COORDINATION (OUTPATIENT)
Dept: CASE MANAGEMENT | Age: 85
End: 2020-04-21

## 2020-05-01 ENCOUNTER — HOSPITAL ENCOUNTER (OUTPATIENT)
Age: 85
Discharge: HOME | End: 2020-05-03
Payer: MEDICARE

## 2020-05-01 LAB
ALBUMIN SERPL-MCNC: 4.6 G/DL (ref 3.5–5.2)
ALP BLD-CCNC: 145 U/L (ref 40–129)
ALT SERPL-CCNC: 17 U/L (ref 0–40)
ANION GAP SERPL CALCULATED.3IONS-SCNC: 12 MMOL/L (ref 7–16)
ANISOCYTOSIS: ABNORMAL
AST SERPL-CCNC: 25 U/L (ref 0–39)
BASOPHILS ABSOLUTE: 0 E9/L (ref 0–0.2)
BASOPHILS RELATIVE PERCENT: 0.5 % (ref 0–2)
BILIRUB SERPL-MCNC: 0.3 MG/DL (ref 0–1.2)
BUN BLDV-MCNC: 21 MG/DL (ref 8–23)
CALCIUM SERPL-MCNC: 9.3 MG/DL (ref 8.6–10.2)
CHLORIDE BLD-SCNC: 99 MMOL/L (ref 98–107)
CO2: 26 MMOL/L (ref 22–29)
CREAT SERPL-MCNC: 1 MG/DL (ref 0.7–1.2)
EOSINOPHILS ABSOLUTE: 0.13 E9/L (ref 0.05–0.5)
EOSINOPHILS RELATIVE PERCENT: 1.7 % (ref 0–6)
FERRITIN: 21 NG/ML
GFR AFRICAN AMERICAN: >60
GFR NON-AFRICAN AMERICAN: >60 ML/MIN/1.73
GLUCOSE BLD-MCNC: 88 MG/DL (ref 74–99)
HCT VFR BLD CALC: 38.5 % (ref 37–54)
HEMOGLOBIN: 11.1 G/DL (ref 12.5–16.5)
IRON SATURATION: 11 % (ref 20–55)
IRON: 47 MCG/DL (ref 59–158)
LYMPHOCYTES ABSOLUTE: 2.1 E9/L (ref 1.5–4)
LYMPHOCYTES RELATIVE PERCENT: 27.8 % (ref 20–42)
MCH RBC QN AUTO: 23.9 PG (ref 26–35)
MCHC RBC AUTO-ENTMCNC: 28.8 % (ref 32–34.5)
MCV RBC AUTO: 83 FL (ref 80–99.9)
MONOCYTES ABSOLUTE: 0.3 E9/L (ref 0.1–0.95)
MONOCYTES RELATIVE PERCENT: 4.3 % (ref 2–12)
NEUTROPHILS ABSOLUTE: 4.95 E9/L (ref 1.8–7.3)
NEUTROPHILS RELATIVE PERCENT: 66.1 % (ref 43–80)
OVALOCYTES: ABNORMAL
PDW BLD-RTO: 18.1 FL (ref 11.5–15)
PLATELET # BLD: 282 E9/L (ref 130–450)
PMV BLD AUTO: 10.7 FL (ref 7–12)
POIKILOCYTES: ABNORMAL
POLYCHROMASIA: ABNORMAL
POTASSIUM SERPL-SCNC: 4.4 MMOL/L (ref 3.5–5)
RBC # BLD: 4.64 E12/L (ref 3.8–5.8)
SCHISTOCYTES: ABNORMAL
SODIUM BLD-SCNC: 137 MMOL/L (ref 132–146)
TEAR DROP CELLS: ABNORMAL
TOTAL IRON BINDING CAPACITY: 432 MCG/DL (ref 250–450)
TOTAL PROTEIN: 7.3 G/DL (ref 6.4–8.3)
WBC # BLD: 7.5 E9/L (ref 4.5–11.5)

## 2020-05-01 PROCEDURE — 83540 ASSAY OF IRON: CPT

## 2020-05-01 PROCEDURE — 82728 ASSAY OF FERRITIN: CPT

## 2020-05-01 PROCEDURE — 36415 COLL VENOUS BLD VENIPUNCTURE: CPT

## 2020-05-01 PROCEDURE — 80053 COMPREHEN METABOLIC PANEL: CPT

## 2020-05-01 PROCEDURE — 83550 IRON BINDING TEST: CPT

## 2020-05-01 PROCEDURE — 85025 COMPLETE CBC W/AUTO DIFF WBC: CPT

## 2020-05-04 ENCOUNTER — CARE COORDINATION (OUTPATIENT)
Dept: CASE MANAGEMENT | Age: 85
End: 2020-05-04

## 2020-05-04 NOTE — CARE COORDINATION
[facility] Transitions Follow Up Call    2020    Patient: Kimberly Arredondo  Patient : 1935   MRN: <[id number]>  Reason for Admission:   Discharge Date: 20 RARS: Readmission Risk Score: 25         Spoke with: Nii Pool, patient    Care Transitions Subsequent and Final Call    Subsequent and Final Calls  Do you have any ongoing symptoms?:  No  Have your medications changed?:  No  Do you have any questions related to your medications?:  No  Do you currently have any active services?:  No  Do you have any needs or concerns that I can assist you with?:  No  Identified Barriers:  None  Care Transitions Interventions  Other Interventions: Follow Up:  Contacted patient for BPCI-A follow up. Nii Pool stated \"so far so good\". Denies having any chest pain/discomfort, shortness of breath, nausea/vomiting, abdominal pain. Reports he may become lightheaded \"every once in a while but not very often\". He stated lightheadedness has gotten \"better\". Nii Pool also denies having any blood in stools or dark tarry stools. He continues to monitor signs/symptoms. He has appointment with PCP on 20 and cardiology on 20. He does not have any questions or concerns at this time. Will continue to follow.     Future Appointments   Date Time Provider Cezar Gamez   2020  1:30 PM MD PRISCILLA Geronimo [facility]       Mimi Mcelroy RN

## 2020-05-07 ENCOUNTER — OFFICE VISIT (OUTPATIENT)
Dept: PRIMARY CARE CLINIC | Age: 85
End: 2020-05-07
Payer: MEDICARE

## 2020-05-07 VITALS
RESPIRATION RATE: 16 BRPM | BODY MASS INDEX: 28.77 KG/M2 | OXYGEN SATURATION: 96 % | SYSTOLIC BLOOD PRESSURE: 138 MMHG | WEIGHT: 179 LBS | DIASTOLIC BLOOD PRESSURE: 70 MMHG | HEART RATE: 74 BPM | HEIGHT: 66 IN | TEMPERATURE: 97.3 F

## 2020-05-07 PROBLEM — G47.09 OTHER INSOMNIA: Status: ACTIVE | Noted: 2020-05-07

## 2020-05-07 PROBLEM — D62 ACUTE BLOOD LOSS ANEMIA: Status: RESOLVED | Noted: 2020-02-17 | Resolved: 2020-05-07

## 2020-05-07 PROCEDURE — 4040F PNEUMOC VAC/ADMIN/RCVD: CPT | Performed by: FAMILY MEDICINE

## 2020-05-07 PROCEDURE — 99214 OFFICE O/P EST MOD 30 MIN: CPT | Performed by: FAMILY MEDICINE

## 2020-05-07 PROCEDURE — 1036F TOBACCO NON-USER: CPT | Performed by: FAMILY MEDICINE

## 2020-05-07 PROCEDURE — 1123F ACP DISCUSS/DSCN MKR DOCD: CPT | Performed by: FAMILY MEDICINE

## 2020-05-07 PROCEDURE — G8417 CALC BMI ABV UP PARAM F/U: HCPCS | Performed by: FAMILY MEDICINE

## 2020-05-07 PROCEDURE — G8427 DOCREV CUR MEDS BY ELIG CLIN: HCPCS | Performed by: FAMILY MEDICINE

## 2020-05-07 RX ORDER — FERROUS SULFATE 325(65) MG
325 TABLET ORAL
Qty: 90 TABLET | Refills: 1 | Status: SHIPPED
Start: 2020-05-07 | End: 2020-11-05 | Stop reason: SDUPTHER

## 2020-05-07 RX ORDER — MAGNESIUM OXIDE TAB 400 MG (241.3 MG ELEMENTAL MG) 400 (241.3 MG) MG
400 TAB ORAL
COMMUNITY
Start: 2020-03-18

## 2020-05-07 RX ORDER — PANTOPRAZOLE SODIUM 40 MG/1
40 TABLET, DELAYED RELEASE ORAL DAILY
Qty: 90 TABLET | Refills: 2 | Status: SHIPPED
Start: 2020-05-07 | End: 2021-01-04

## 2020-05-07 ASSESSMENT — ENCOUNTER SYMPTOMS
DIARRHEA: 0
SHORTNESS OF BREATH: 0
ABDOMINAL PAIN: 0
NAUSEA: 0
RHINORRHEA: 1
SORE THROAT: 0
CONSTIPATION: 0
VOMITING: 0
BLOOD IN STOOL: 0
COUGH: 0
WHEEZING: 0

## 2020-05-07 NOTE — PROGRESS NOTES
hemoglobin of 11.1, MCV 83, CMP essentially normal except for mild elevation in his alkaline phosphatase, ferritin level 21 which is low, iron low at 47, TIBC slightly elevated at 432, iron saturation low at 11. Patient does have an ICD in place. Health maintenance: Patient is due for lipid screening, 2nd dose of his pneumococcal immunization, and shingles immunization. Review of Systems   Constitutional: Negative for chills, fatigue and fever. HENT: Positive for rhinorrhea. Negative for congestion, hearing loss, postnasal drip, sneezing, sore throat and tinnitus. Respiratory: Negative for cough, shortness of breath and wheezing. Cardiovascular: Positive for palpitations. Negative for chest pain and leg swelling. Gastrointestinal: Negative for abdominal pain, blood in stool, constipation, diarrhea, nausea and vomiting. Genitourinary: Negative for dysuria. Musculoskeletal: Positive for arthralgias. Negative for myalgias. Skin: Negative for rash. Neurological: Negative for dizziness, syncope, weakness, light-headedness, numbness and headaches. Psychiatric/Behavioral: The patient is not nervous/anxious. Past Medical History:   Diagnosis Date    A-fib [facility])     Arrhythmia     Carotid artery stenosis     [facility]congest[facility] failure[facility])     Heart valve problem     Hyperlipidemia     Hypertension     ICD (implantable cardiac defibrillator) in place 2007    MI (mitral incompetence) 2003    [facility]       Prior to Visit Medications    Medication Sig Taking?  Authorizing Provider   MAGNESIUM-OXIDE 400 (241.3 Mg) MG TABS tablet TK 1 T PO ONCE D Yes Historical Provider, MD   pantoprazole (PROTONIX) 40 MG tablet Take 1 tablet by mouth daily Yes Shahriar Delgado MD   ferrous sulfate (IRON 325) 325 (65 Fe) MG tablet Take 1 tablet by mouth daily (with breakfast) Yes Shahriar Delgado MD   dofetilide (TIKOSYN) 250 MCG capsule Take 1 capsule by mouth 2 times daily Yes Lou Miles MD   potassium chloride (KLOR-CON M) 10 MEQ extended release tablet Take 1 tablet by mouth daily Indications: Takes 3 times a week. Yes Lou Miles MD   metoprolol tartrate (LOPRESSOR) 50 MG tablet 75 mg in AM and 50 mg at HS Yes Lou Miles MD   rivaroxaban (XARELTO) 20 MG TABS tablet Take 1 tablet by mouth daily Yes Rudi Bowen MD   Chlorpheniramine Maleate (ALLER-CHLOR PO) Take by mouth daily Yes Historical Provider, MD   furosemide (LASIX) 40 MG tablet Take 1 tablet by mouth daily Yes Ritika Marks MD   albuterol sulfate  (90 Base) MCG/ACT inhaler Inhale 2 puffs into the lungs 4 times daily as needed for Wheezing Yes GINETTE Crowell - CNP   meclizine (ANTIVERT) 25 MG tablet Take 25 mg by mouth 2 times daily as needed Yes Historical Provider, MD   Multiple Vitamins-Minerals (PRESERVISION AREDS 2 PO) Take 1 tablet by mouth 2 times daily Yes Historical Provider, MD   Cholecalciferol (VITAMIN D) 2000 UNITS CAPS capsule Take 1 capsule by mouth daily  Yes Historical Provider, MD   pravastatin (PRAVACHOL) 40 MG tablet Take 40 mg by mouth daily. Yes Historical Provider, MD        No Known Allergies    Social History     Tobacco Use    Smoking status: Former Smoker     Packs/day: 0.50     Years: 3.00     Pack years: 1.50     Last attempt to quit: 1973     Years since quittin.3    Smokeless tobacco: Never Used    Tobacco comment: Quit smoking in    Substance Use Topics    Alcohol use: No           Vitals:    20 1321   BP: 138/70   Pulse: 74   Resp: 16   Temp: 97.3 °F (36.3 °C)   SpO2: 96%   Weight: 179 lb (81.2 kg)   Height: 5' 6\" (1.676 m)     Estimated body mass index is 28.89 kg/m² as calculated from the following:    Height as of this encounter: 5' 6\" (1.676 m). Weight as of this encounter: 179 lb (81.2 kg). Physical Exam  Constitutional:       Appearance: He is well-developed. HENT:      Head: Normocephalic.       Right Ear:

## 2020-05-12 ENCOUNTER — CARE COORDINATION (OUTPATIENT)
Dept: CASE MANAGEMENT | Age: 85
End: 2020-05-12

## 2020-07-08 ENCOUNTER — APPOINTMENT (OUTPATIENT)
Dept: CT IMAGING | Age: 85
End: 2020-07-08
Payer: MEDICARE

## 2020-07-08 ENCOUNTER — HOSPITAL ENCOUNTER (EMERGENCY)
Age: 85
Discharge: HOME | End: 2020-07-08
Attending: EMERGENCY MEDICINE
Payer: MEDICARE

## 2020-07-08 ENCOUNTER — TELEPHONE (OUTPATIENT)
Dept: ADMINISTRATIVE | Age: 85
End: 2020-07-08

## 2020-07-08 ENCOUNTER — APPOINTMENT (OUTPATIENT)
Dept: GENERAL RADIOLOGY | Age: 85
End: 2020-07-08
Payer: MEDICARE

## 2020-07-08 ENCOUNTER — NURSE TRIAGE (OUTPATIENT)
Dept: OTHER | Facility: CLINIC | Age: 85
End: 2020-07-08

## 2020-07-08 VITALS
WEIGHT: 175 LBS | DIASTOLIC BLOOD PRESSURE: 68 MMHG | HEART RATE: 70 BPM | OXYGEN SATURATION: 93 % | HEIGHT: 66 IN | SYSTOLIC BLOOD PRESSURE: 163 MMHG | BODY MASS INDEX: 28.12 KG/M2 | RESPIRATION RATE: 22 BRPM | TEMPERATURE: 97.4 F

## 2020-07-08 LAB
ALBUMIN SERPL-MCNC: 4.1 G/DL (ref 3.5–5.2)
ALP BLD-CCNC: 124 U/L (ref 40–129)
ALT SERPL-CCNC: 17 U/L (ref 0–40)
ANION GAP SERPL CALCULATED.3IONS-SCNC: 14 MMOL/L (ref 7–16)
AST SERPL-CCNC: 41 U/L (ref 0–39)
BASOPHILS ABSOLUTE: 0.04 E9/L (ref 0–0.2)
BASOPHILS RELATIVE PERCENT: 0.6 % (ref 0–2)
BILIRUB SERPL-MCNC: 0.3 MG/DL (ref 0–1.2)
BUN BLDV-MCNC: 22 MG/DL (ref 8–23)
CALCIUM SERPL-MCNC: 8.3 MG/DL (ref 8.6–10.2)
CHLORIDE BLD-SCNC: 99 MMOL/L (ref 98–107)
CO2: 24 MMOL/L (ref 22–29)
CREAT SERPL-MCNC: 1 MG/DL (ref 0.7–1.2)
EKG ATRIAL RATE: 68 BPM
EKG Q-T INTERVAL: 448 MS
EKG QRS DURATION: 146 MS
EKG QTC CALCULATION (BAZETT): 483 MS
EKG R AXIS: -69 DEGREES
EKG T AXIS: 6 DEGREES
EKG VENTRICULAR RATE: 70 BPM
EOSINOPHILS ABSOLUTE: 0.12 E9/L (ref 0.05–0.5)
EOSINOPHILS RELATIVE PERCENT: 1.7 % (ref 0–6)
GFR AFRICAN AMERICAN: >60
GFR NON-AFRICAN AMERICAN: >60 ML/MIN/1.73
GLUCOSE BLD-MCNC: 137 MG/DL (ref 74–99)
HCT VFR BLD CALC: 37.1 % (ref 37–54)
HEMOGLOBIN: 11.4 G/DL (ref 12.5–16.5)
IMMATURE GRANULOCYTES #: 0.02 E9/L
IMMATURE GRANULOCYTES %: 0.3 % (ref 0–5)
LYMPHOCYTES ABSOLUTE: 1.59 E9/L (ref 1.5–4)
LYMPHOCYTES RELATIVE PERCENT: 22 % (ref 20–42)
MAGNESIUM: 2.3 MG/DL (ref 1.6–2.6)
MCH RBC QN AUTO: 24.7 PG (ref 26–35)
MCHC RBC AUTO-ENTMCNC: 30.7 % (ref 32–34.5)
MCV RBC AUTO: 80.3 FL (ref 80–99.9)
MONOCYTES ABSOLUTE: 0.6 E9/L (ref 0.1–0.95)
MONOCYTES RELATIVE PERCENT: 8.3 % (ref 2–12)
NEUTROPHILS ABSOLUTE: 4.85 E9/L (ref 1.8–7.3)
NEUTROPHILS RELATIVE PERCENT: 67.1 % (ref 43–80)
PDW BLD-RTO: 18.2 FL (ref 11.5–15)
PLATELET # BLD: 231 E9/L (ref 130–450)
PMV BLD AUTO: 10.9 FL (ref 7–12)
POTASSIUM SERPL-SCNC: 5.1 MMOL/L (ref 3.5–5)
PRO-BNP: 435 PG/ML (ref 0–450)
RBC # BLD: 4.62 E12/L (ref 3.8–5.8)
SODIUM BLD-SCNC: 137 MMOL/L (ref 132–146)
TOTAL PROTEIN: 6.6 G/DL (ref 6.4–8.3)
TROPONIN: <0.01 NG/ML (ref 0–0.03)
WBC # BLD: 7.2 E9/L (ref 4.5–11.5)

## 2020-07-08 PROCEDURE — 85025 COMPLETE CBC W/AUTO DIFF WBC: CPT

## 2020-07-08 PROCEDURE — 70450 CT HEAD/BRAIN W/O DYE: CPT

## 2020-07-08 PROCEDURE — 80053 COMPREHEN METABOLIC PANEL: CPT

## 2020-07-08 PROCEDURE — 71045 X-RAY EXAM CHEST 1 VIEW: CPT

## 2020-07-08 PROCEDURE — 83880 ASSAY OF NATRIURETIC PEPTIDE: CPT

## 2020-07-08 PROCEDURE — 93010 ELECTROCARDIOGRAM REPORT: CPT | Performed by: INTERNAL MEDICINE

## 2020-07-08 PROCEDURE — 84484 ASSAY OF TROPONIN QUANT: CPT

## 2020-07-08 PROCEDURE — 83735 ASSAY OF MAGNESIUM: CPT

## 2020-07-08 PROCEDURE — 93005 ELECTROCARDIOGRAM TRACING: CPT | Performed by: EMERGENCY MEDICINE

## 2020-07-08 PROCEDURE — 99285 EMERGENCY DEPT VISIT HI MDM: CPT

## 2020-07-08 RX ORDER — MECLIZINE HYDROCHLORIDE 25 MG/1
25 TABLET ORAL 3 TIMES DAILY PRN
Qty: 30 TABLET | Refills: 0 | Status: SHIPPED | OUTPATIENT
Start: 2020-07-08 | End: 2020-07-14

## 2020-07-08 NOTE — TELEPHONE ENCOUNTER
Given the patient's medical history. Please have the patient proceed to the ER for additional workup and management.

## 2020-07-08 NOTE — ED PROVIDER NOTES
Department of Emergency Medicine   ED  Provider Note  Admit Date/RoomTime: 7/8/2020 10:35 AM  ED Room: DONA/DONA          History of Present Illness:  7/8/20, Time: 12:06 PM EDT  Chief Complaint   Patient presents with    Bradycardia     dizziness. took 4 antivert this am                Edd Rhodes is a 82 y.o. male presenting to the ED for dizziness, beginning several hours. The complaint has been persistent, moderate in severity, and worsened by changing position. Presents for dizziness. He has a history of benign positional vertigo, states he got out of bed this morning and had a feeling of unsteadiness when he went to stand. He denies weakness numbing or tingling. Denies syncopal or near syncopal episode. Denies lightheadedness. States he felt like the room was spinning but did not see the room spinning. He took 2 meclizine that he had at home at approximately 7:30 AM and then 2 more at 9:30 AM.  He states while he sitting still he feels fine but has not yet gotten up to move. Review of Systems:   Pertinent positives and negatives are stated within HPI, all other systems reviewed and are negative.        --------------------------------------------- PAST HISTORY ---------------------------------------------  Past Medical History:  has a past medical history of A-fib ([facility] Arrhythmia, Carotid artery stenosis, CHF (congestive heart failure) ([facility] Heart valve problem, Hyperlipidemia, Hypertension, ICD (implantable cardiac defibrillator) in place, MI (mitral incompetence), and MI (myocardial infarction) ([facility] 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 2008); Cardiac defibrillator placement (07/28/2016);  Cardiac catheterization (Right, 03/03/2017); other surgical history (03/29/2017); Upper gastrointestinal endoscopy (N/A, 9/3/2019); Upper gastrointestinal endoscopy (N/A, 2/18/2020); and Colonoscopy (N/A, 2/24/2020). Social History:  reports that he quit smoking about 47 years ago. He has a 1.50 pack-year smoking history. He has never used smokeless tobacco. He reports that he does not drink alcohol or use drugs. Family History: family history is not on file. . Unless otherwise noted, family history is non contributory    The patients home medications have been reviewed. Allergies: Patient has no known allergies. ---------------------------------------------------PHYSICAL EXAM--------------------------------------    Constitutional/General: Alert and oriented x3     Head: Normocephalic and atraumatic  Eyes: PERRL, EOMI, sclera non icteric  Mouth: Oropharynx clear, handling secretions, no trismus, no asymmetry of the posterior oropharynx or uvular edema  Neck: Supple, full ROM, no stridor, no meningeal signs no JVD   Respiratory: Lungs clear to auscultation bilaterally,Not in respiratory distress  Cardiovascular:  Regular rate. Regular rhythm. 2+ distal pulses. Equal extremity pulses. Chest: No chest wall tenderness  GI:  Abdomen Soft, Non tender, Non distended. No rebound, guarding, or rigidity. Musculoskeletal: Moves all extremities x 4. Warm and well perfused, no clubbing, cyanosis, or edema. Capillary refill <3 seconds  Integument: skin warm and dry. No rashes. There is no pretibial edema nor calf tenderness bilaterally     Neurologic: GCS 15, no focal deficits, symmetric strength 5/5 in the upper and lower extremities bilaterally  Psychiatric: Normal Affect      EKG: Interpreted by emergency department physician, Dr. Angy Ellis   This EKG is signed and interpreted by me.     Rate: 70  Rhythm: A paced   Interpretation: Atrially paced rhythm, left axis, right bundle hesham block, nonspecific T changes, the lateral T waves are more inverted when compared to prior, however he is now being 100% paced where he was not on his previous several EKGs        -------------------------------------------------- RESULTS -------------------------------------------------  I have personally reviewed all laboratory and imaging results for this patient. Results are listed below.      LABS: (Lab results interpreted by me)  Results for orders placed or performed during the hospital encounter of 07/08/20   Troponin   Result Value Ref Range    Troponin <0.01 0.00 - 0.03 ng/mL   CBC Auto Differential   Result Value Ref Range    WBC 7.2 4.5 - 11.5 E9/L    RBC 4.62 3.80 - 5.80 E12/L    Hemoglobin 11.4 (L) 12.5 - 16.5 g/dL    Hematocrit 37.1 37.0 - 54.0 %    MCV 80.3 80.0 - 99.9 fL    MCH 24.7 (L) 26.0 - 35.0 pg    MCHC 30.7 (L) 32.0 - 34.5 %    RDW 18.2 (H) 11.5 - 15.0 fL    Platelets 942 909 - 171 E9/L    MPV 10.9 7.0 - 12.0 fL    Neutrophils % 67.1 43.0 - 80.0 %    Immature Granulocytes % 0.3 0.0 - 5.0 %    Lymphocytes % 22.0 20.0 - 42.0 %    Monocytes % 8.3 2.0 - 12.0 %    Eosinophils % 1.7 0.0 - 6.0 %    Basophils % 0.6 0.0 - 2.0 %    Neutrophils Absolute 4.85 1.80 - 7.30 E9/L    Immature Granulocytes # 0.02 E9/L    Lymphocytes Absolute 1.59 1.50 - 4.00 E9/L    Monocytes Absolute 0.60 0.10 - 0.95 E9/L    Eosinophils Absolute 0.12 0.05 - 0.50 E9/L    Basophils Absolute 0.04 0.00 - 0.20 E9/L   Comprehensive Metabolic Panel   Result Value Ref Range    Sodium 137 132 - 146 mmol/L    Potassium 5.1 (H) 3.5 - 5.0 mmol/L    Chloride 99 98 - 107 mmol/L    CO2 24 22 - 29 mmol/L    Anion Gap 14 7 - 16 mmol/L    Glucose 137 (H) 74 - 99 mg/dL    BUN 22 8 - 23 mg/dL    CREATININE 1.0 0.7 - 1.2 mg/dL    GFR Non-African American >60 >=60 mL/min/1.73    GFR African American >60     Calcium 8.3 (L) 8.6 - 10.2 mg/dL    Total Protein 6.6 6.4 - 8.3 g/dL    Alb 4.1 3.5 - 5.2 g/dL    Total Bilirubin 0.3 0.0 - 1.2 mg/dL    Alkaline Phosphatase 124 40 - 129 U/L    ALT 17 0 - 40 U/L    AST 41 (H) 0 - 39 U/L   Brain Natriuretic Peptide   Result Value Ref Range    Pro- 0 - 450 pg/mL   Magnesium   Result Value Ref Range    Magnesium 2.3 1.6 - 2.6 mg/dL   EKG 12 Lead   Result Value Ref Range    Ventricular Rate 70 BPM    Atrial Rate 68 BPM    QRS Duration 146 ms    Q-T Interval 448 ms    QTc Calculation (Bazett) 483 ms    R Axis -69 degrees    T Axis 6 degrees   ,       RADIOLOGY:  Interpreted by Radiologist unless otherwise specified  CT Head WO Contrast   Final Result      1. No acute bleed. 2. Moderate chronic small vessel ischemic changes without a definite   new infarct. XR CHEST PORTABLE   Final Result   Evidence of prior TAVR, and borderline cardiomegaly with a left-sided   ICD device. No evidence of cardiac decompensation. Improving aeration of the lungs with some chronic elevation of the   right hemidiaphragm, but no evidence of organized pneumonia or   effusion.                       ------------------------- NURSING NOTES AND VITALS REVIEWED ---------------------------   The nursing notes within the ED encounter and vital signs as below have been reviewed by myself  BP (!) 163/68   Pulse 70   Temp 97.4 °F (36.3 °C) (Infrared)   Resp 22   Ht 5' 6\" (1.676 m)   Wt 175 lb (79.4 kg)   SpO2 93%   BMI 28.25 kg/m²     Oxygen Saturation Interpretation: Normal    The cardiac monitor revealed A paced  with a heart rate in the 70s as interpreted by me. The cardiac monitor was ordered secondary to the patient's heart rate and to monitor the patient for dysrhythmia. CPT 16278    The patients available past medical records and past encounters were reviewed. ------------------------------ ED COURSE/MEDICAL DECISION MAKING----------------------  Medications - No data to display                 Medical Decision Making:     I, Dr. Lynda Evans in the primary provider of record    Presents for vertiginous symptoms earlier today.   He took 2 doses of 2 tablets of meclizine at home and states he feels much better. Work-up undertaken is reassuring. CT head was obtained secondary to being on chronic anticoagulation. No intercranial bleeds identified. He was ambulated department states he feels well, is requesting discharge home. Will refill his meclizine, encouraged him to follow-up with his outpatient consultants      Re-Evaluations:       Time: 1200  Re-evaluation. Patients symptoms are improving  Repeat physical examination is improved    Time: 1300  Re-evaluation. Patients symptoms show no change  Repeat physical examination is not changed -states he feels well, requesting discharge home. He ambulated the department without difficulty        This patient's ED course included: a personal history and physicial examination, multiple bedside re-evaluations, IV medications, cardiac monitoring, continuous pulse oximetry and complex medical decision making and emergency management    This patient has been closely monitored during their ED course. Counseling: The emergency provider has spoken with the patient and discussed todays results, in addition to providing specific details for the plan of care and counseling regarding the diagnosis and prognosis. Questions are answered at this time and they are agreeable with the plan.       --------------------------------- IMPRESSION AND DISPOSITION ---------------------------------    IMPRESSION  1. Dizziness    2. Benign paroxysmal positional vertigo, unspecified laterality    3. Chronic anticoagulation        DISPOSITION  Disposition: Discharge to home  Patient condition is stable        NOTE: This report was transcribed using voice recognition software.  Every effort was made to ensure accuracy; however, inadvertent computerized transcription errors may be present       Aime Delgado DO  07/10/20 [id number]

## 2020-07-08 NOTE — TELEPHONE ENCOUNTER
Pt's wife called and stated pt has been having dizziness x 3 days. His bp is 148/88 today. He is unable to stand due to the dizziness. Pt is with wife. Spoke with nurse triage and transferred the call to the nurse.

## 2020-07-08 NOTE — TELEPHONE ENCOUNTER
Reason for Disposition   SEVERE dizziness (e.g., unable to stand, requires support to walk, feels like passing out now)    Answer Assessment - Initial Assessment Questions  1. DESCRIPTION: \"Describe your dizziness. \"      Room spinning  2. LIGHTHEADED: \"Do you feel lightheaded? \" (e.g., somewhat faint, woozy, weak upon standing)     bp up 170s/80s  3. VERTIGO: \"Do you feel like either you or the room is spinning or tilting? \" (i.e. vertigo)  yes  4. SEVERITY: \"How bad is it? \"  \"Do you feel like you are going to faint? \" \"Can you stand and walk? \"    - MILD - walking normally    - MODERATE - interferes with normal activities (e.g., work, school)     - SEVERE - unable to stand, requires support to walk, feels like passing out now. severe  5. ONSET:  \"When did the dizziness begin? \"  Started Monday taking meclizine but today cannot stand  6. AGGRAVATING FACTORS: \"Does anything make it worse? \" (e.g., standing, change in head position)  standing  7. HEART RATE: \"Can you tell me your heart rate? \" \"How many beats in 15 seconds? \"  (Note: not all patients can do this)    Pacemaker and defibbrillator  8. CAUSE: \"What do you think is causing the dizziness? \"  dehydration per wife  9. RECURRENT SYMPTOM: \"Have you had dizziness before? \" If so, ask: \"When was the last time? \" \"What happened that time? \"    Yes, unsure, maybe dehydrated  10. OTHER SYMPTOMS: \"Do you have any other symptoms? \" (e.g., fever, chest pain, vomiting, diarrhea, bleeding)  no    Protocols used: [id number]

## 2020-07-09 ENCOUNTER — CARE COORDINATION (OUTPATIENT)
Dept: CARE COORDINATION | Age: 85
End: 2020-07-09

## 2020-07-09 NOTE — CARE COORDINATION
Date/Time:  7/9/2020 10:39 AM  First attempt to reach patient by telephone. Left HIPPA compliant message requesting a return call. Will attempt to reach patient again.

## 2020-07-10 ENCOUNTER — CARE COORDINATION (OUTPATIENT)
Dept: CARE COORDINATION | Age: 85
End: 2020-07-10

## 2020-07-10 NOTE — CARE COORDINATION
Patient contacted regarding Delores Eckert. Discussed COVID-19 related testing which was not done at this time. Test results were not done. Patient informed of results, if available? No    Care Transition Nurse/ Ambulatory Care Manager contacted the patient by telephone to perform post discharge assessment. Verified name and  with patient as identifiers. Provided introduction to self, and explanation of the CTN/ACM role, and reason for call due to risk factors for infection and/or exposure to COVID-19. Symptoms reviewed with patient who verbalized the following symptoms: no new symptoms. Due to no new or worsening symptoms encounter was not routed to provider for escalation. Discussed follow-up appointments. If no appointment was previously scheduled, appointment scheduling offered: Yes  [facility] follow up appointment(s):   Future Appointments   Date Time Provider Cezar Gamez   2020 11:30 AM Alley Harden DO [address] [facility]   2020  2:30 PM MD PRISCILLA [facility]     Non-[facility] follow up appointment(s): NA    Patient has follow up with Cardiology next week 20 as well as PCP first week of August.  Patient states he is still having some slight dizziness, but overall is feeling better. No Covid 19 symptoms at this time. Patient has following risk factors of: heart failure. CTN/ACM reviewed discharge instructions, medical action plan and red flags such as increased shortness of breath, increasing fever and signs of decompensation with patient who verbalized understanding. Discussed exposure protocols and quarantine with CDC Guidelines What to do if you are sick with coronavirus disease .  Patient was given an opportunity for questions and concerns. The patient agrees to contact the [facility] exposure line [phone], local [facility] department [place] [facility]: ([phone]) and PCP office for questions related to their healthcare.  CTN/ACM provided contact information for future needs. Reviewed and educated patient on any new and changed medications related to discharge diagnosis     Patient/family/caregiver given information for [employer] [facility] and agrees to enroll no  Patient's preferred e-mail: NA   Patient's preferred phone number: NA  Based on Loop alert triggers, patient will be contacted by nurse care manager for worsening symptoms. Plan for follow-up call in 5-7 days based on severity of symptoms and risk factors.

## 2020-07-14 ENCOUNTER — OFFICE VISIT (OUTPATIENT)
Dept: CARDIOLOGY CLINIC | Age: 85
End: 2020-07-14
Payer: MEDICARE

## 2020-07-14 VITALS
HEIGHT: 67 IN | WEIGHT: 180.7 LBS | BODY MASS INDEX: 28.36 KG/M2 | SYSTOLIC BLOOD PRESSURE: 138 MMHG | OXYGEN SATURATION: 96 % | DIASTOLIC BLOOD PRESSURE: 80 MMHG | HEART RATE: 71 BPM

## 2020-07-14 PROCEDURE — 1123F ACP DISCUSS/DSCN MKR DOCD: CPT | Performed by: INTERNAL MEDICINE

## 2020-07-14 PROCEDURE — 93000 ELECTROCARDIOGRAM COMPLETE: CPT | Performed by: INTERNAL MEDICINE

## 2020-07-14 PROCEDURE — 4040F PNEUMOC VAC/ADMIN/RCVD: CPT | Performed by: INTERNAL MEDICINE

## 2020-07-14 PROCEDURE — 1036F TOBACCO NON-USER: CPT | Performed by: INTERNAL MEDICINE

## 2020-07-14 PROCEDURE — 99214 OFFICE O/P EST MOD 30 MIN: CPT | Performed by: INTERNAL MEDICINE

## 2020-07-14 PROCEDURE — G8427 DOCREV CUR MEDS BY ELIG CLIN: HCPCS | Performed by: INTERNAL MEDICINE

## 2020-07-14 PROCEDURE — G8417 CALC BMI ABV UP PARAM F/U: HCPCS | Performed by: INTERNAL MEDICINE

## 2020-07-14 NOTE — PROGRESS NOTES
CHIEF COMPLAINT: Possible TIA/CAD-CABG/PAF/ICD    HISTORY OF PRESENT ILLNESS: Patient is a 82 y.o. male seen at the request of Pablo Roque MD.      No CP or SOB. PMH:   1. MI, 2003. Cardiac catheterization: 85% ostial, proximal and mid LAD narrowings. LMC 70% stenosis. D1 occluded. D2 50% stenosis. OM1 80% ostial stenosis. Mid CX occluded. Dominant RCA severe disease. LVEF 40-45%. 2. [place], [place]23[place] [facility] with LIMA-LAD, SVG-RI, SVG-OM. 3. Hyperlipidemia. 4. Mild carotid plaque on US, 2003. 5. Echo, 07/2006. Mild LVE, normal EF, Stage II diastolic dysfunction, moderate AS, mild MR, mild TR. 6. Right leg vein stripping, 1970's. 7. No history diabetes mellitus, stroke or COPD. 8. Nonsmoker for many years. 9. VT causing cardiac arrest after stress test, 03/15/2007. He was successfully cardioverted. 10. Cardiac catheterization, 03/16/2007 with normal LVEF, severe native three vessel coronary disease. SVG-RI, SVG-OM both occluded. LIMA-LAD patent. 11. Re-do CABG, [facility], 03/2007 with radial artery graft to D2 and OM2. 12. Elective PCI with CONNOR native OM2 and native LAD, 03/2007 following CABG. This was done because of inadequate conduits. 13. ICD placement, [facility], 03/2007. 14. ICD lead recall, early 2008, but he was followed electively because his device was functioning normally. 15. Presentation [facility], 03/29/2008 with multiple ICD inappropriate shocks. Transfer [facility] where ICD and lead were replaced. He reports cardiac catheterization that revealed patent LIMA-LAD and patent stents in native coronary arteries. 16. Atypical chest pain and anxiety with admission [facility], 05/2008. Troponin minimally elevated. Beta blocker dose increased. [address] admission, 06/13/2009 with lightheadedness, orthostatic hypotension, drop in hemoglobin to 10.5 from 14.9 over two months with an increase in BUN from 16 to 68 over the same time. Dark heme positive stools noted.  EKG Stable ascending thoracic aneurysm with largest diameter 4.5 cm, unchanged from 08/28/2013.   32. ICD programmed to DDDR mode by Dr. Hinojosa Shown, 03/26/2015. Device function normal.  33. Echo 10/16/2015. EF 39%. Stage II diastolic dysfunction. Aortic stenosis with peak/mean gradients of 48/24 mmHg. Estimated valve area 1.0 cm². 34. ICD generator change for battery depletion, 07/28/2016, Dr. Mel Palafox. Micaela Shown. 35. Echo, 02/03/2017.  Normal LV size with mild LVH.  Normal regional wall motion and overall systolic function.  Diastole could not be assessed, but was presumed to be impaired.  The RV was dilated with impaired global systolic function.  The LA was enlarged.  Mild MAC with mild mitral insufficiency.  Moderate tricuspid insufficiency.  Aortic valve gradients are peak 47 mmHg with a mean of 27.  Dimensionless ratio 0.21. Valve area calculated 0.8 cm².    36.  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  37. Echocardiogram, [facility], 4/19/2018, EF 60%. Low normal RV function. Stage 2 diastolic dysfunction. Mild-to-moderate MR. S/P TAVR with a mean gradient of 10 mmHg.   RVSP 31 mg.     38. S/P Cardioversion by Dr. Pilar Albert, 05/30/2017.   19 Rohith Howard evaluation, 09/07/2018, for 2 appropriate ICD discharges for polymorphic ventricular tachycardia, which occurred after yard work and moving heavy furniture.       Past Medical History:   Diagnosis Date    A-fib [facility])     Arrhythmia     Carotid artery stenosis     CHF (congestive heart failure) ([address]     Heart valve problem     Hyperlipidemia     Hypertension     ICD (implantable cardiac defibrillator) in place 2007    MI (mitral incompetence) 2003    MI (myocardial infarction) ([address] 2003       Patient Active Problem List   Diagnosis    Implantable cardioverter-defibrillator (ICD) in situ    Chronic combined systolic and diastolic CHF (congestive heart failure) ([address]    S/P TAVR (transcatheter aortic valve replacement)    Chronic atrial fibrillation    Coronary artery History    Marital status:      Spouse name: Not on file    Number of children: Not on file    Years of education: Not on file    Highest education level: Not on file   Occupational History    Not on file   Social Needs    Financial resource strain: Not on file    Food insecurity     Worry: Not on file     Inability: Not on file    Transportation needs     Medical: Not on file     Non-medical: Not on file   Tobacco Use    Smoking status: Former Smoker     Packs/day: 0.50     Years: 3.00     Pack years: 1.50     Last attempt to quit: 1973     Years since quittin.4    Smokeless tobacco: Never Used    Tobacco comment: Quit smoking in    Substance and Sexual Activity    Alcohol use: No    Drug use: No    Sexual activity: Not on file   Lifestyle    Physical activity     Days per week: Not on file     Minutes per session: Not on file    Stress: Not on file   Relationships    Social connections     Talks on phone: Not on file     Gets together: Not on file     Attends Synagogue service: Not on file     Active member of club or organization: Not on file     Attends meetings of clubs or organizations: Not on file     Relationship status: Not on file    Intimate partner violence     Fear of current or ex partner: Not on file     Emotionally abused: Not on file     Physically abused: Not on file     Forced sexual activity: Not on file   Other Topics Concern    Not on file   Social History Narrative    1 cup coffee daily; occ pop       History reviewed. No pertinent family history. Review of Systems:  Heart: as above   Lungs: as above   Eyes: denies changes in vision or discharge. Ears: denies changes in hearing or pain. Nose: denies epistaxis or masses   Throat: denies sore throat or trouble swallowing. Neuro: denies numbness, tingling, tremors. Skin: denies rashes or itching.    : denies hematuria, dysuria   GI: denies vomiting, diarrhea   Psych: denies mood changed, anxiety, depression. All other systems negative. Physical Exam   /80   Pulse 71   Ht 5' 7\" (1.702 m)   Wt 180 lb 11.2 oz (82 kg)   SpO2 96%   BMI 28.30 kg/m²   Constitutional: Oriented to person, place, and time. Well-developed and well-nourished. No distress. Head: Normocephalic and atraumatic. Eyes: EOM are normal. Pupils are equal, round, and reactive to light. Neck: Normal range of motion. Neck supple. No hepatojugular reflux and no JVD present. Carotid bruit is not present. No tracheal deviation present. No thyromegaly present. Cardiovascular: Normal rate, regular rhythm, normal heart sounds and intact distal pulses. Exam reveals no gallop and no friction rub. No murmur heard. Pulmonary/Chest: Effort normal and breath sounds normal. No respiratory distress. No wheezes. No rales. No tenderness. Abdominal: Soft. Bowel sounds are normal. No distension and no mass. No tenderness. No rebound and no guarding. Musculoskeletal: Normal range of motion. No edema and no tenderness. Lymphadenopathy:   No cervical adenopathy. No groin adenopathy. Neurological: Alert and oriented to person, place, and time. Skin: Skin is warm and dry. No rash noted. Not diaphoretic. No erythema. Psychiatric: Normal mood and affect. Behavior is normal.     EKG:  normal sinus rhythm, A paced, V sensed.     Echo Summary 8/16/19:   Ejection fraction is visually estimated at 60%.   The inferior basal wall is hypokinetic.   No regional wall motion abnormalities seen.   Normal right ventricle structure and function.   There is doppler evidence of stage II diastolic dysfunction.   Left atrial volume index of 39 ml per meters squared BSA.   Hx of TAVR with 26 mm S3 3/29/17.  The aortic valve area is 1.6 cm2 with a maximum gradient of 15 mmHg and a mean gradient of 7 mmHg.   Mild aortic regurgitation is noted.   Mild mitral regurgitation is present.   Mild tricuspid regurgitation.   Agitated saline injected for shunt evaluation.   No evidence of patent foramen ovale.   No evidence of atrial septal defect. ASSESSMENT AND PLAN:  Patient Active Problem List   Diagnosis    Implantable cardioverter-defibrillator (ICD) in situ    Chronic combined systolic and diastolic [facility]congestive [facility] failure) [facility]    S/P TAVR (transcatheter aortic valve replacement)    Chronic atrial fibrillation    Coronary artery disease involving native coronary artery of native heart without angina pectoris    Essential hypertension    Hyperlipidemia LDL goal <100    GIB (gastrointestinal bleeding)    ICD (implantable cardioverter-defibrillator), dual, in situ    VT (ventricular tachycardia) ([address]    Ischemic heart disease    Vitamin D deficiency    Other insomnia     1. TIA: CTA neck okay in ED, particularly no significant stenosis of proximal or distal internal carotid. Echo no PFO. ASA 81 mg daily. 2. CAD-CABG: Stable symptoms. BB/statin. Not on ASA to this point due to Xarelto. 3. PAF: In sinus. On Xarelto/Tikosyn. 4. TAVR: Stable echo 8/16/19.     5. ICD: Follows with Thelma Comment. Mina Evangelista D.O.   Cardiologist  Cardiology, [facility]

## 2020-07-17 ENCOUNTER — CARE COORDINATION (OUTPATIENT)
Dept: CARE COORDINATION | Age: 85
End: 2020-07-17

## 2020-07-17 NOTE — CARE COORDINATION
You Patient resolved from the Care Transitions episode on 7/17/20    Discussed COVID-19 related testing which was not done at this time. Test results were not done. Patient informed of results, if available? No    Patient/family has been provided the following resources and education related to COVID-19:                         Signs, symptoms and red flags related to COVID-19            [facility] exposure and quarantine guidelines            [facility] exposure contact - [phone]            Contact for their local Department of Health                 Patient currently reports that the following symptoms have improved:  no new/worsening symptoms. Patient states he is feeling good. Has has follow up visits with physicians. No further outreach scheduled with this CTN/ACM. Episode of Care resolved. Patient has this CTN/ACM contact information if future needs arise.

## 2020-08-06 ENCOUNTER — OFFICE VISIT (OUTPATIENT)
Dept: PRIMARY CARE CLINIC | Age: 85
End: 2020-08-06
Payer: MEDICARE

## 2020-08-06 VITALS
RESPIRATION RATE: 20 BRPM | OXYGEN SATURATION: 94 % | HEIGHT: 67 IN | WEIGHT: 185 LBS | DIASTOLIC BLOOD PRESSURE: 78 MMHG | TEMPERATURE: 97.3 F | HEART RATE: 81 BPM | SYSTOLIC BLOOD PRESSURE: 138 MMHG | BODY MASS INDEX: 29.03 KG/M2

## 2020-08-06 PROCEDURE — 1036F TOBACCO NON-USER: CPT | Performed by: FAMILY MEDICINE

## 2020-08-06 PROCEDURE — G0009 ADMIN PNEUMOCOCCAL VACCINE: HCPCS | Performed by: FAMILY MEDICINE

## 2020-08-06 PROCEDURE — 4040F PNEUMOC VAC/ADMIN/RCVD: CPT | Performed by: FAMILY MEDICINE

## 2020-08-06 PROCEDURE — G8417 CALC BMI ABV UP PARAM F/U: HCPCS | Performed by: FAMILY MEDICINE

## 2020-08-06 PROCEDURE — 1123F ACP DISCUSS/DSCN MKR DOCD: CPT | Performed by: FAMILY MEDICINE

## 2020-08-06 PROCEDURE — 90732 PPSV23 VACC 2 YRS+ SUBQ/IM: CPT | Performed by: FAMILY MEDICINE

## 2020-08-06 PROCEDURE — 99214 OFFICE O/P EST MOD 30 MIN: CPT | Performed by: FAMILY MEDICINE

## 2020-08-06 PROCEDURE — G8427 DOCREV CUR MEDS BY ELIG CLIN: HCPCS | Performed by: FAMILY MEDICINE

## 2020-08-06 ASSESSMENT — ENCOUNTER SYMPTOMS
RHINORRHEA: 0
DIARRHEA: 0
NAUSEA: 0
SHORTNESS OF BREATH: 0
BLOOD IN STOOL: 0
WHEEZING: 0
VOMITING: 0
ABDOMINAL PAIN: 0
SORE THROAT: 0
CONSTIPATION: 0

## 2020-08-06 NOTE — PROGRESS NOTES
2020     Chief Complaint   Patient presents with    Hyperlipidemia     check up     HPI  Jory Fisher (:  1935) is a 82 y.o. male, here for evaluation of the following medical concerns:    Patient is a 82-year-old male with a past medical history of CHF, atrial fibrillation, nonsustained ventricular tachycardia, hyperlipidemia, hypertension, history of MI, insomnia, remote history of gastrointestinal hemorrhage with melena, ICD placement, and vitamin D deficiency who presents today to follow-up his establishment appointment approximately 3-4 months ago.     History of nonsustained ventricular tachycardia: On Tikosyn and, Lasix, and metoprolol. Patient follows both with cardiology and electrophysiology for this issue.     History of atrial fibrillation: Patient is rate controlled on metoprolol and anticoagulated on   Xarelto.     CHF: Patient is to be followed with cardiology in regard to this issue. Based on the patient's most recent echo his EF was 60%, he does have some hypokinesis in the inferior basal wall, evidence of stage II diastolic dysfunction, mild aortic regurgitation, mild mitral regurgitation, mild tricuspid regurgitation.     Hyperlipidemia: Currently on statin. Following with cardiology. Tolerating statin well without any side effects.     Hypertension: Blood pressure is appropriate today. Tolerating his blood pressure medications well. No side effects.     History of MI: Patient's 1st heart attack was in . Has had multiple stents and bypass surgeries.      Insomnia: Resolved    Vertigo: Went to the ER 1 month ago for this issue. Was given meclizine and imaging. No acute issues. D/C'd home. Reports history of BPPV. Has been worked up by ear nose and throat the past. Symptoms primarily from moving from a seated to lying to seated position. Last briefly and resolve on their own.     Labs: Have not been completed since his last office appointment.     Patient does have an ICD in place.     Health maintenance: Patient is due for lipid screening, 2nd dose of his pneumococcal immunization, and shingles immunization. Review of Systems   Constitutional: Negative for chills and fever. HENT: Negative for congestion, rhinorrhea and sore throat. Eyes: Negative for visual disturbance. Respiratory: Negative for shortness of breath and wheezing. Cardiovascular: Negative for chest pain, palpitations and leg swelling. Gastrointestinal: Negative for abdominal pain, blood in stool, constipation, diarrhea, nausea and vomiting. Genitourinary: Negative for dysuria. Skin: Negative for rash. Neurological: Positive for dizziness. Negative for light-headedness and headaches. Past Medical History:   Diagnosis Date    [facility]-fib [facility])     Arrhythmia     Carotid artery stenosis     [facility]     Heart valve problem     Hyperlipidemia     Hypertension     ICD (implantable cardiac defibrillator) in place 2007    MI (mitral incompetence) 2003    [facility]       Prior to Visit Medications    Medication Sig Taking?  Authorizing Provider   MAGNESIUM-OXIDE 400 (241.3 Mg) MG TABS tablet TK 1 T PO ONCE D Yes Historical Provider, MD   pantoprazole (PROTONIX) 40 MG tablet Take 1 tablet by mouth daily Yes Coralie Klinefelter, MD   ferrous sulfate (IRON 325) 325 (65 Fe) MG tablet Take 1 tablet by mouth daily (with breakfast) Yes Coralie Klinefelter, MD   dofetilide (TIKOSYN) 250 MCG capsule Take 1 capsule by mouth 2 times daily Yes Coralie Klinefelter, MD   metoprolol tartrate (LOPRESSOR) 50 MG tablet 75 mg in AM and 50 mg at HS Yes Coralie Klinefelter, MD   rivaroxaban (XARELTO) 20 MG TABS tablet Take 1 tablet by mouth daily Yes Daryl Pastrana MD   furosemide (LASIX) 40 MG tablet Take 1 tablet by mouth daily Yes Edgar Flores MD   albuterol sulfate  (90 Base) MCG/ACT inhaler Inhale 2 puffs into the lungs 4 times daily as needed for Wheezing Yes Mitali Small, APRN - CNP   Multiple Vitamins-Minerals (PRESERVISION AREDS 2 PO) Take 1 tablet by mouth 2 times daily Yes Historical Provider, MD   Cholecalciferol (VITAMIN D) 2000 UNITS CAPS capsule Take 1 capsule by mouth daily  Yes Historical Provider, MD   pravastatin (PRAVACHOL) 40 MG tablet Take 40 mg by mouth daily. Yes Historical Provider, MD        No Known Allergies    Social History     Tobacco Use    Smoking status: Former Smoker     Packs/day: 0.50     Years: 3.00     Pack years: 1.50     Last attempt to quit: 1973     Years since quittin.11    Smokeless tobacco: Never Used    Tobacco comment: Quit smoking in    Substance Use Topics    Alcohol use: No           Vitals:    20 1431 20 1455   BP: (!) 168/80 138/78   Pulse: 81    Resp: 20    Temp: 97.3 °F (36.3 °C)    TempSrc: Temporal    SpO2: 94%    Weight: 185 lb (83.9 kg)    Height: 5' 6.5\" (1.689 m)      Estimated body mass index is 29.41 kg/m² as calculated from the following:    Height as of this encounter: 5' 6.5\" (1.689 m). Weight as of this encounter: 185 lb (83.9 kg). Physical Exam  Constitutional:       Appearance: He is well-developed. HENT:      Head: Normocephalic. Eyes:      Extraocular Movements: Extraocular movements intact. Conjunctiva/sclera: Conjunctivae normal.      Pupils: Pupils are equal, round, and reactive to light. Cardiovascular:      Rate and Rhythm: Normal rate and regular rhythm. Pulses:           Posterior tibial pulses are 2+ on the right side and 2+ on the left side. Heart sounds: Normal heart sounds. No murmur. Pulmonary:      Effort: Pulmonary effort is normal.      Breath sounds: Normal breath sounds. No wheezing or rales. Abdominal:      General: Bowel sounds are normal.      Palpations: Abdomen is soft. Tenderness: There is no abdominal tenderness. Musculoskeletal:      Right lower leg: No edema. Left lower leg: No edema.    Neurological: General: No focal deficit present. Mental Status: He is alert. Comments: Cranial nerves grossly intact   Psychiatric:         Mood and Affect: Mood normal.         Judgment: Judgment normal.         ASSESSMENT/PLAN:  Chelle Chacon was seen today for hyperlipidemia. Diagnoses and all orders for this visit:    Chronic combined systolic and diastolic CHF (congestive heart failure) ([address]  Stable. Following with cardiology. No LE swelling or SOB. Hyperlipidemia LDL goal <100  Stable on statin. Chronic atrial fibrillation  Stable. Appears to be in sinus rhythm today. No significant events per the patient's report. Patient is rate controlled, rhythm controlled, and anticoagulated. Coronary artery disease involving native coronary artery of native heart without angina pectoris  No recent symptoms of CAD/angina. Follow cardiology. Patient is on anticoagulation, beta blocker. May benefit from an Ace or ARB in the near future. Essential hypertension  Stable. Controlled on recheck. Patient reports his BP runs in the 130's at home. Tolerating medications well. Patient to continue to check BP at home and contact us for BP >150/90. Gastrointestinal hemorrhage with melena  Patient denies any signs of active or chronic bleeding. Patient was advised to start supplementation. Point. Patient has not completed his lab work as previously ordered. Vitamin D deficiency  Recheck Vit D level    Other insomnia  Resolved. Need for pneumococcal vaccination  -     Pneumococcal polysaccharide vaccine 23-valent greater than or equal to 1yo subcutaneous/IM    Patient is to get his flu shot and pneumonia immunization this fall. Patient was advised to go to his pharmacy to get his shingles immunization.     Patient is to have all this previous medical records for to myself including notes from his electrophysiologist.    Labs to be completed: Occult, Vit D, Microalb, Lipid, Iron, Tibc, Ferritin, CMP, CBC.      Return in about 4 months (around 12/6/2020) for Annual Wellness Exam.    An electronicsignature was used to authenticate this note.     --Kwame Jung MD on 8/6/20 at 1:48 PM EDT

## 2020-08-12 RX ORDER — PRAVASTATIN SODIUM 40 MG
40 TABLET ORAL DAILY
Qty: 90 TABLET | Refills: 1 | Status: SHIPPED
Start: 2020-08-12 | End: 2021-03-11

## 2020-08-12 RX ORDER — METOPROLOL TARTRATE 50 MG/1
TABLET, FILM COATED ORAL
Qty: 225 TABLET | Refills: 1 | Status: SHIPPED
Start: 2020-08-12 | End: 2021-03-11

## 2020-08-17 ENCOUNTER — HOSPITAL ENCOUNTER (OUTPATIENT)
Age: 85
Discharge: HOME | End: 2020-08-19
Payer: MEDICARE

## 2020-08-17 LAB
ALBUMIN SERPL-MCNC: 4.5 G/DL (ref 3.5–5.2)
ALP BLD-CCNC: 148 U/L (ref 40–129)
ALT SERPL-CCNC: 17 U/L (ref 0–40)
ANION GAP SERPL CALCULATED.3IONS-SCNC: 15 MMOL/L (ref 7–16)
AST SERPL-CCNC: 24 U/L (ref 0–39)
BASOPHILS ABSOLUTE: 0.07 E9/L (ref 0–0.2)
BASOPHILS RELATIVE PERCENT: 0.9 % (ref 0–2)
BILIRUB SERPL-MCNC: 0.3 MG/DL (ref 0–1.2)
BUN BLDV-MCNC: 21 MG/DL (ref 8–23)
CALCIUM SERPL-MCNC: 9.4 MG/DL (ref 8.6–10.2)
CHLORIDE BLD-SCNC: 101 MMOL/L (ref 98–107)
CHOLESTEROL, TOTAL: 185 MG/DL (ref 0–199)
CO2: 26 MMOL/L (ref 22–29)
CREAT SERPL-MCNC: 1.2 MG/DL (ref 0.7–1.2)
EOSINOPHILS ABSOLUTE: 0.26 E9/L (ref 0.05–0.5)
EOSINOPHILS RELATIVE PERCENT: 3.3 % (ref 0–6)
FERRITIN: 28 NG/ML
GFR AFRICAN AMERICAN: >60
GFR NON-AFRICAN AMERICAN: 58 ML/MIN/1.73
GLUCOSE BLD-MCNC: 110 MG/DL (ref 74–99)
HCT VFR BLD CALC: 41.3 % (ref 37–54)
HDLC SERPL-MCNC: 33 MG/DL
HEMOGLOBIN: 12.5 G/DL (ref 12.5–16.5)
IMMATURE GRANULOCYTES #: 0.01 E9/L
IMMATURE GRANULOCYTES %: 0.1 % (ref 0–5)
IRON SATURATION: 11 % (ref 20–55)
IRON: 43 MCG/DL (ref 59–158)
LDL CHOLESTEROL CALCULATED: ABNORMAL MG/DL (ref 0–99)
LYMPHOCYTES ABSOLUTE: 2.29 E9/L (ref 1.5–4)
LYMPHOCYTES RELATIVE PERCENT: 29.2 % (ref 20–42)
MCH RBC QN AUTO: 26.1 PG (ref 26–35)
MCHC RBC AUTO-ENTMCNC: 30.3 % (ref 32–34.5)
MCV RBC AUTO: 86.2 FL (ref 80–99.9)
MONOCYTES ABSOLUTE: 0.7 E9/L (ref 0.1–0.95)
MONOCYTES RELATIVE PERCENT: 8.9 % (ref 2–12)
NEUTROPHILS ABSOLUTE: 4.51 E9/L (ref 1.8–7.3)
NEUTROPHILS RELATIVE PERCENT: 57.6 % (ref 43–80)
PDW BLD-RTO: 17.2 FL (ref 11.5–15)
PLATELET # BLD: 242 E9/L (ref 130–450)
PMV BLD AUTO: 11.8 FL (ref 7–12)
POTASSIUM SERPL-SCNC: 4.8 MMOL/L (ref 3.5–5)
RBC # BLD: 4.79 E12/L (ref 3.8–5.8)
SODIUM BLD-SCNC: 142 MMOL/L (ref 132–146)
TOTAL IRON BINDING CAPACITY: 400 MCG/DL (ref 250–450)
TOTAL PROTEIN: 7.2 G/DL (ref 6.4–8.3)
TRIGL SERPL-MCNC: 611 MG/DL (ref 0–149)
VITAMIN D 25-HYDROXY: 29 NG/ML (ref 30–100)
VLDLC SERPL CALC-MCNC: ABNORMAL MG/DL
WBC # BLD: 7.8 E9/L (ref 4.5–11.5)

## 2020-08-17 PROCEDURE — 80053 COMPREHEN METABOLIC PANEL: CPT

## 2020-08-17 PROCEDURE — 82306 VITAMIN D 25 HYDROXY: CPT

## 2020-08-17 PROCEDURE — 80061 LIPID PANEL: CPT

## 2020-08-17 PROCEDURE — 83550 IRON BINDING TEST: CPT

## 2020-08-17 PROCEDURE — 83540 ASSAY OF IRON: CPT

## 2020-08-17 PROCEDURE — 85025 COMPLETE CBC W/AUTO DIFF WBC: CPT

## 2020-08-17 PROCEDURE — 82728 ASSAY OF FERRITIN: CPT

## 2020-08-17 PROCEDURE — 36415 COLL VENOUS BLD VENIPUNCTURE: CPT

## 2020-08-27 ENCOUNTER — TELEPHONE (OUTPATIENT)
Dept: PRIMARY CARE CLINIC | Age: 85
End: 2020-08-27
Payer: MEDICARE

## 2020-08-27 LAB
CONTROL: NORMAL
HEMOCCULT STL QL: POSITIVE

## 2020-08-27 PROCEDURE — 82274 ASSAY TEST FOR BLOOD FECAL: CPT | Performed by: FAMILY MEDICINE

## 2020-08-28 NOTE — TELEPHONE ENCOUNTER
Notified, voiced understanding. Pt states he has not made any appt with GI or general surgery. States he needs referrals placed.

## 2020-08-28 NOTE — TELEPHONE ENCOUNTER
Please notify the patient of the positive FIT test which shows that he has blood in his stools. Please make sure he still plans on following up with GI and general surgery.

## 2020-09-18 ENCOUNTER — TELEPHONE (OUTPATIENT)
Dept: PRIMARY CARE CLINIC | Age: 85
End: 2020-09-18

## 2020-09-21 NOTE — TELEPHONE ENCOUNTER
Patient notified.   [facility] is requesting referral to Dr. Adams Davidson as he saw patient in hospital.  Please place referral

## 2020-10-27 ENCOUNTER — TELEPHONE (OUTPATIENT)
Dept: ADMINISTRATIVE | Age: 85
End: 2020-10-27

## 2020-10-27 ENCOUNTER — OFFICE VISIT (OUTPATIENT)
Dept: FAMILY MEDICINE CLINIC | Age: 85
End: 2020-10-27
Payer: MEDICARE

## 2020-10-27 ENCOUNTER — NURSE TRIAGE (OUTPATIENT)
Dept: OTHER | Facility: CLINIC | Age: 85
End: 2020-10-27

## 2020-10-27 ENCOUNTER — HOSPITAL ENCOUNTER (OUTPATIENT)
Age: 85
Discharge: HOME | End: 2020-10-29
Payer: MEDICARE

## 2020-10-27 VITALS
HEIGHT: 67 IN | TEMPERATURE: 97.3 F | HEART RATE: 81 BPM | BODY MASS INDEX: 28.31 KG/M2 | WEIGHT: 180.4 LBS | DIASTOLIC BLOOD PRESSURE: 78 MMHG | SYSTOLIC BLOOD PRESSURE: 130 MMHG | OXYGEN SATURATION: 94 % | RESPIRATION RATE: 16 BRPM

## 2020-10-27 LAB
ALBUMIN SERPL-MCNC: 4.7 G/DL (ref 3.5–5.2)
ALP BLD-CCNC: 147 U/L (ref 40–129)
ALT SERPL-CCNC: 17 U/L (ref 0–40)
ANION GAP SERPL CALCULATED.3IONS-SCNC: 18 MMOL/L (ref 7–16)
AST SERPL-CCNC: 29 U/L (ref 0–39)
BASOPHILS ABSOLUTE: 0.05 E9/L (ref 0–0.2)
BASOPHILS RELATIVE PERCENT: 0.6 % (ref 0–2)
BILIRUB SERPL-MCNC: 0.5 MG/DL (ref 0–1.2)
BUN BLDV-MCNC: 28 MG/DL (ref 8–23)
CALCIUM SERPL-MCNC: 9.4 MG/DL (ref 8.6–10.2)
CHLORIDE BLD-SCNC: 100 MMOL/L (ref 98–107)
CO2: 25 MMOL/L (ref 22–29)
CREAT SERPL-MCNC: 1.3 MG/DL (ref 0.7–1.2)
EOSINOPHILS ABSOLUTE: 0.16 E9/L (ref 0.05–0.5)
EOSINOPHILS RELATIVE PERCENT: 1.8 % (ref 0–6)
GFR AFRICAN AMERICAN: >60
GFR NON-AFRICAN AMERICAN: 53 ML/MIN/1.73
GLUCOSE BLD-MCNC: 95 MG/DL (ref 74–99)
HCT VFR BLD CALC: 42.3 % (ref 37–54)
HEMOGLOBIN: 13.2 G/DL (ref 12.5–16.5)
IMMATURE GRANULOCYTES #: 0.02 E9/L
IMMATURE GRANULOCYTES %: 0.2 % (ref 0–5)
LYMPHOCYTES ABSOLUTE: 1.6 E9/L (ref 1.5–4)
LYMPHOCYTES RELATIVE PERCENT: 17.7 % (ref 20–42)
MCH RBC QN AUTO: 27.6 PG (ref 26–35)
MCHC RBC AUTO-ENTMCNC: 31.2 % (ref 32–34.5)
MCV RBC AUTO: 88.5 FL (ref 80–99.9)
MONOCYTES ABSOLUTE: 0.8 E9/L (ref 0.1–0.95)
MONOCYTES RELATIVE PERCENT: 8.9 % (ref 2–12)
NEUTROPHILS ABSOLUTE: 6.39 E9/L (ref 1.8–7.3)
NEUTROPHILS RELATIVE PERCENT: 70.8 % (ref 43–80)
PDW BLD-RTO: 16.5 FL (ref 11.5–15)
PLATELET # BLD: 239 E9/L (ref 130–450)
PMV BLD AUTO: 12 FL (ref 7–12)
POTASSIUM SERPL-SCNC: 4.6 MMOL/L (ref 3.5–5)
PRO-BNP: 2820 PG/ML (ref 0–450)
RBC # BLD: 4.78 E12/L (ref 3.8–5.8)
SODIUM BLD-SCNC: 143 MMOL/L (ref 132–146)
TOTAL PROTEIN: 7.3 G/DL (ref 6.4–8.3)
WBC # BLD: 9 E9/L (ref 4.5–11.5)

## 2020-10-27 PROCEDURE — G8417 CALC BMI ABV UP PARAM F/U: HCPCS | Performed by: PHYSICIAN ASSISTANT

## 2020-10-27 PROCEDURE — G8427 DOCREV CUR MEDS BY ELIG CLIN: HCPCS | Performed by: PHYSICIAN ASSISTANT

## 2020-10-27 PROCEDURE — 99214 OFFICE O/P EST MOD 30 MIN: CPT | Performed by: PHYSICIAN ASSISTANT

## 2020-10-27 PROCEDURE — 1123F ACP DISCUSS/DSCN MKR DOCD: CPT | Performed by: PHYSICIAN ASSISTANT

## 2020-10-27 PROCEDURE — 80053 COMPREHEN METABOLIC PANEL: CPT

## 2020-10-27 PROCEDURE — 83880 ASSAY OF NATRIURETIC PEPTIDE: CPT

## 2020-10-27 PROCEDURE — 1036F TOBACCO NON-USER: CPT | Performed by: PHYSICIAN ASSISTANT

## 2020-10-27 PROCEDURE — 4040F PNEUMOC VAC/ADMIN/RCVD: CPT | Performed by: PHYSICIAN ASSISTANT

## 2020-10-27 PROCEDURE — 36415 COLL VENOUS BLD VENIPUNCTURE: CPT

## 2020-10-27 PROCEDURE — G8482 FLU IMMUNIZE ORDER/ADMIN: HCPCS | Performed by: PHYSICIAN ASSISTANT

## 2020-10-27 PROCEDURE — 85025 COMPLETE CBC W/AUTO DIFF WBC: CPT

## 2020-10-27 NOTE — TELEPHONE ENCOUNTER
Nurse triage call transfer from [facility], pt needs seen today for breathing issues, PCP unavailable, ok per nurse for pt to be seen at [facility]. Pt will be seen there today, Covid Screen done by nurse, negative results.
BREATHING DIFFICULTY-ADULT-[place]

## 2020-10-27 NOTE — TELEPHONE ENCOUNTER
Pt called in for an appt, stated he was having difficulty breathing for about 2 days, feels that he may be retaining water. Call was transferred to [facility] nurse access.

## 2020-10-27 NOTE — PROGRESS NOTES
10/27/20  Monica Murphy : 1935 Sex: male  Age 82 y.o. Subjective:  Chief Complaint   Patient presents with    Shortness of Breath     when moving around 3days          HPI:   Monica Murphy , 82 y.o. male presents to express care for evaluation of shortness of breath. The patient states that he has become increasingly short of breath with ambulation and with movement over the last couple days. He does not have a cough, congestion, fever. The patient is not having any lightheadedness or dizziness. The patient is eating and drinking normally. Not currently on any antibiotics. The patient denies dysuria, hematuria. The patient denies abdominal pain, nausea, vomiting. The patient has not had any COVID-19-like symptoms. Does have a history of CHF. Is taking Lasix 40 mg once daily. The patient is also anticoagulated on Xarelto. ROS:   Unless otherwise stated in this report the patient's positive and negative responses for review of systems for constitutional, eyes, ENT, cardiovascular, respiratory, gastrointestinal, neurological, , musculoskeletal, and integument systems and related systems to the presenting problem are either stated in the history of present illness or were not pertinent or were negative for the symptoms and/or complaints related to the presenting medical problem. Positives and pertinent negatives as per HPI. All others reviewed and are negative.       PMH:     Past Medical History:   Diagnosis Date    A-fib [facility])     Arrhythmia     Carotid artery stenosis     [facility]     Heart valve problem     Hyperlipidemia     Hypertension     ICD (implantable cardiac defibrillator) in place     MI (mitral incompetence)     MI (myocardial infarction) ([facility] [address]        Past Surgical History:   Procedure Laterality Date    CARDIAC CATHETERIZATION Right 2017    Dr. Genaro Coreas  .  2 PO), Take 1 tablet by mouth 2 times daily, Disp: , Rfl:     Cholecalciferol (VITAMIN D) 2000 UNITS CAPS capsule, Take 1 capsule by mouth daily , Disp: , Rfl:     Allergies:   No Known Allergies    Social History:     Social History     Tobacco Use    Smoking status: Former Smoker     Packs/day: 0.50     Years: 3.00     Pack years: 1.50     Last attempt to quit: 1973     Years since quittin.7    Smokeless tobacco: Never Used    Tobacco comment: Quit smoking in    Substance Use Topics    Alcohol use: No    Drug use: No       Patient lives at home. Physical Exam:     Vitals:    10/27/20 1249   BP: 130/78   Pulse: 81   Resp: 16   Temp: 97.3 °F (36.3 °C)   SpO2: 94%   Weight: 180 lb 6.4 oz (81.8 kg)   Height: 5' 6.5\" (1.689 m)       Exam:  Physical Exam  Nurses note and vital signs reviewed and patient is not hypoxic. General: The patient appears well and in no apparent distress. Patient is resting comfortably on cart. Skin: Warm, dry, no pallor noted. There is no rash noted. Head: Normocephalic, atraumatic. Eye: Normal conjunctiva  Ears, Nose, Mouth, and Throat: Oral mucosa is moist  Cardiovascular: Regular Rate and Rhythm  Respiratory: Patient is in no distress, no accessory muscle use, lungs are clear to auscultation, no wheezing, crackles or rhonchi  Back: Non-tender, no CVA tenderness bilaterally to percussion. GI: Normal bowel sounds, no tenderness to palpation, no masses appreciated. No rebound, guarding, or rigidity noted. Musculoskeletal: The patient has no evidence of calf tenderness, no pitting edema, symmetrical pulses noted bilaterally  Neurological: A&O x4, normal speech        Testing:           Medical Decision Making:     Vital signs reviewed    Past medical history reviewed. Allergies reviewed. Medications reviewed. Patient on arrival does not appear to be in any apparent distress or discomfort. The patient will be sent for chest x-ray.     I personally reviewed the x-ray images and did not see any evidence of acute cardiopulmonary process. The patient has a chronic right hemidiaphragm. The patient has some cephalization noted, there is no evidence of definite infiltrate or mass. Implantable device noted in the left upper chest.    I discussed differential diagnosis with the patient and with wife. We will obtain baseline laboratory studies. The patient will likely need an increase of his Lasix for about 5 to 7 days. We discussed this with him further. The patient is already anticoagulated. The patient will be called as soon as we can obtain the results of the labs. Clinical Impression:   Nicole Coley was seen today for shortness of breath. Diagnoses and all orders for this visit:    Dyspnea, unspecified type  -     XR CHEST STANDARD (2 VW); Future  -     CBC Auto Differential; Future  -     COMPREHENSIVE METABOLIC PANEL; Future    Acute on chronic congestive heart failure, unspecified heart failure type ([facility] [address]  -     BRAIN NATRIURETIC PEPTIDE; Future    Elevated hemidiaphragm        The patient is to call for any concerns or return if any of the signs or symptoms worsen. The patient is to follow-up with PCP in the next 2-3 days for repeat evaluation repeat assessment or go directly to the emergency department.      SIGNATURE: Isabel Lopez III, PA-C

## 2020-10-27 NOTE — TELEPHONE ENCOUNTER
Call transfer from [facility], Nurse Triage. Pt needs to be seen today for breathing difficulties, pt states it may be d/t CHF. PCP unavailable, ok'd per nurse for pt to be seen at [facility], he will be seen there today.

## 2020-10-28 ENCOUNTER — HOSPITAL ENCOUNTER (OUTPATIENT)
Age: 85
Setting detail: OBSERVATION
Discharge: HOME | End: 2020-10-30
Attending: EMERGENCY MEDICINE | Admitting: FAMILY MEDICINE
Payer: MEDICARE

## 2020-10-28 ENCOUNTER — APPOINTMENT (OUTPATIENT)
Dept: GENERAL RADIOLOGY | Age: 85
End: 2020-10-28
Payer: MEDICARE

## 2020-10-28 PROCEDURE — 93005 ELECTROCARDIOGRAM TRACING: CPT | Performed by: EMERGENCY MEDICINE

## 2020-10-28 PROCEDURE — 85025 COMPLETE CBC W/AUTO DIFF WBC: CPT

## 2020-10-28 PROCEDURE — 71045 X-RAY EXAM CHEST 1 VIEW: CPT

## 2020-10-28 PROCEDURE — 84484 ASSAY OF TROPONIN QUANT: CPT

## 2020-10-28 PROCEDURE — 80053 COMPREHEN METABOLIC PANEL: CPT

## 2020-10-28 PROCEDURE — 99284 EMERGENCY DEPT VISIT MOD MDM: CPT

## 2020-10-28 PROCEDURE — 85610 PROTHROMBIN TIME: CPT

## 2020-10-28 PROCEDURE — 83880 ASSAY OF NATRIURETIC PEPTIDE: CPT

## 2020-10-28 RX ORDER — FUROSEMIDE 20 MG/1
20 TABLET ORAL DAILY
Qty: 7 TABLET | Refills: 0 | Status: SHIPPED
Start: 2020-10-28 | End: 2020-10-28 | Stop reason: ALTCHOICE

## 2020-10-28 RX ORDER — FUROSEMIDE 40 MG/1
40 TABLET ORAL DAILY
Qty: 90 TABLET | Refills: 1 | Status: SHIPPED
Start: 2020-10-28 | End: 2021-03-11

## 2020-10-29 ENCOUNTER — APPOINTMENT (OUTPATIENT)
Dept: CT IMAGING | Age: 85
End: 2020-10-29
Payer: MEDICARE

## 2020-10-29 ENCOUNTER — ANESTHESIA (OUTPATIENT)
Dept: CARDIAC CATH/INVASIVE PROCEDURES | Age: 85
End: 2020-10-29
Payer: MEDICARE

## 2020-10-29 ENCOUNTER — ANESTHESIA EVENT (OUTPATIENT)
Dept: CARDIAC CATH/INVASIVE PROCEDURES | Age: 85
End: 2020-10-29
Payer: MEDICARE

## 2020-10-29 VITALS — SYSTOLIC BLOOD PRESSURE: 95 MMHG | OXYGEN SATURATION: 96 % | DIASTOLIC BLOOD PRESSURE: 56 MMHG

## 2020-10-29 PROBLEM — R06.02 SOB (SHORTNESS OF BREATH): Status: ACTIVE | Noted: 2020-10-29

## 2020-10-29 LAB
ALBUMIN SERPL-MCNC: 4.6 G/DL (ref 3.5–5.2)
ALP BLD-CCNC: 145 U/L (ref 40–129)
ALT SERPL-CCNC: 14 U/L (ref 0–40)
ANION GAP SERPL CALCULATED.3IONS-SCNC: 13 MMOL/L (ref 7–16)
ANION GAP SERPL CALCULATED.3IONS-SCNC: 14 MMOL/L (ref 7–16)
ANION GAP SERPL CALCULATED.3IONS-SCNC: 22 MMOL/L (ref 7–16)
AST SERPL-CCNC: 23 U/L (ref 0–39)
BASOPHILS ABSOLUTE: 0.05 E9/L (ref 0–0.2)
BASOPHILS RELATIVE PERCENT: 0.5 % (ref 0–2)
BILIRUB SERPL-MCNC: 0.3 MG/DL (ref 0–1.2)
BUN BLDV-MCNC: 27 MG/DL (ref 8–23)
BUN BLDV-MCNC: 27 MG/DL (ref 8–23)
BUN BLDV-MCNC: 30 MG/DL (ref 8–23)
CALCIUM SERPL-MCNC: 9.2 MG/DL (ref 8.6–10.2)
CALCIUM SERPL-MCNC: 9.5 MG/DL (ref 8.6–10.2)
CALCIUM SERPL-MCNC: 9.7 MG/DL (ref 8.6–10.2)
CHLORIDE BLD-SCNC: 100 MMOL/L (ref 98–107)
CHLORIDE BLD-SCNC: 102 MMOL/L (ref 98–107)
CHLORIDE BLD-SCNC: 98 MMOL/L (ref 98–107)
CO2: 24 MMOL/L (ref 22–29)
CO2: 28 MMOL/L (ref 22–29)
CO2: 28 MMOL/L (ref 22–29)
CREAT SERPL-MCNC: 1.3 MG/DL (ref 0.7–1.2)
CREAT SERPL-MCNC: 1.3 MG/DL (ref 0.7–1.2)
CREAT SERPL-MCNC: 1.4 MG/DL (ref 0.7–1.2)
EKG ATRIAL RATE: 98 BPM
EKG Q-T INTERVAL: 444 MS
EKG QRS DURATION: 138 MS
EKG QTC CALCULATION (BAZETT): 524 MS
EKG R AXIS: -80 DEGREES
EKG T AXIS: 31 DEGREES
EKG VENTRICULAR RATE: 84 BPM
EOSINOPHILS ABSOLUTE: 0.2 E9/L (ref 0.05–0.5)
EOSINOPHILS RELATIVE PERCENT: 1.8 % (ref 0–6)
GFR AFRICAN AMERICAN: 59
GFR AFRICAN AMERICAN: >60
GFR AFRICAN AMERICAN: >60
GFR NON-AFRICAN AMERICAN: 48 ML/MIN/1.73
GFR NON-AFRICAN AMERICAN: 53 ML/MIN/1.73
GFR NON-AFRICAN AMERICAN: 53 ML/MIN/1.73
GLUCOSE BLD-MCNC: 113 MG/DL (ref 74–99)
GLUCOSE BLD-MCNC: 114 MG/DL (ref 74–99)
GLUCOSE BLD-MCNC: 114 MG/DL (ref 74–99)
HCT VFR BLD CALC: 38.7 % (ref 37–54)
HCT VFR BLD CALC: 41.3 % (ref 37–54)
HEMOGLOBIN: 12.1 G/DL (ref 12.5–16.5)
HEMOGLOBIN: 13.1 G/DL (ref 12.5–16.5)
IMMATURE GRANULOCYTES #: 0.03 E9/L
IMMATURE GRANULOCYTES %: 0.3 % (ref 0–5)
INR BLD: 2.1
LYMPHOCYTES ABSOLUTE: 1.72 E9/L (ref 1.5–4)
LYMPHOCYTES RELATIVE PERCENT: 15.6 % (ref 20–42)
MAGNESIUM: 2.2 MG/DL (ref 1.6–2.6)
MCH RBC QN AUTO: 27.4 PG (ref 26–35)
MCH RBC QN AUTO: 27.8 PG (ref 26–35)
MCHC RBC AUTO-ENTMCNC: 31.3 % (ref 32–34.5)
MCHC RBC AUTO-ENTMCNC: 31.7 % (ref 32–34.5)
MCV RBC AUTO: 87.7 FL (ref 80–99.9)
MCV RBC AUTO: 87.8 FL (ref 80–99.9)
MONOCYTES ABSOLUTE: 0.91 E9/L (ref 0.1–0.95)
MONOCYTES RELATIVE PERCENT: 8.3 % (ref 2–12)
NEUTROPHILS ABSOLUTE: 8.11 E9/L (ref 1.8–7.3)
NEUTROPHILS RELATIVE PERCENT: 73.5 % (ref 43–80)
PDW BLD-RTO: 16.6 FL (ref 11.5–15)
PDW BLD-RTO: 16.7 FL (ref 11.5–15)
PLATELET # BLD: 209 E9/L (ref 130–450)
PLATELET # BLD: 222 E9/L (ref 130–450)
PMV BLD AUTO: 10.9 FL (ref 7–12)
PMV BLD AUTO: 11.4 FL (ref 7–12)
POTASSIUM REFLEX MAGNESIUM: 4.1 MMOL/L (ref 3.5–5)
POTASSIUM SERPL-SCNC: 4 MMOL/L (ref 3.5–5)
POTASSIUM SERPL-SCNC: 4.3 MMOL/L (ref 3.5–5)
PRO-BNP: 1416 PG/ML (ref 0–450)
PROTHROMBIN TIME: 24.3 SEC (ref 9.3–12.4)
RBC # BLD: 4.41 E12/L (ref 3.8–5.8)
RBC # BLD: 4.71 E12/L (ref 3.8–5.8)
SARS-COV-2, NAAT: NOT DETECTED
SODIUM BLD-SCNC: 140 MMOL/L (ref 132–146)
SODIUM BLD-SCNC: 141 MMOL/L (ref 132–146)
SODIUM BLD-SCNC: 148 MMOL/L (ref 132–146)
T4 FREE: 1.3 NG/DL (ref 0.93–1.7)
TOTAL PROTEIN: 7.8 G/DL (ref 6.4–8.3)
TROPONIN: <0.01 NG/ML (ref 0–0.03)
TSH SERPL DL<=0.05 MIU/L-ACNC: 1.28 UIU/ML (ref 0.27–4.2)
WBC # BLD: 11 E9/L (ref 4.5–11.5)
WBC # BLD: 7.9 E9/L (ref 4.5–11.5)

## 2020-10-29 PROCEDURE — G0378 HOSPITAL OBSERVATION PER HR: HCPCS

## 2020-10-29 PROCEDURE — U0002 COVID-19 LAB TEST NON-CDC: HCPCS

## 2020-10-29 PROCEDURE — APPSS180 APP SPLIT SHARED TIME > 60 MINUTES: Performed by: CLINICAL NURSE SPECIALIST

## 2020-10-29 PROCEDURE — 84443 ASSAY THYROID STIM HORMONE: CPT

## 2020-10-29 PROCEDURE — 84484 ASSAY OF TROPONIN QUANT: CPT

## 2020-10-29 PROCEDURE — 2580000003 HC RX 258: Performed by: FAMILY MEDICINE

## 2020-10-29 PROCEDURE — 2580000003 HC RX 258

## 2020-10-29 PROCEDURE — 2709999900 HC NON-CHARGEABLE SUPPLY

## 2020-10-29 PROCEDURE — 96366 THER/PROPH/DIAG IV INF ADDON: CPT

## 2020-10-29 PROCEDURE — 6360000002 HC RX W HCPCS: Performed by: CLINICAL NURSE SPECIALIST

## 2020-10-29 PROCEDURE — 99215 OFFICE O/P EST HI 40 MIN: CPT | Performed by: INTERNAL MEDICINE

## 2020-10-29 PROCEDURE — 85027 COMPLETE CBC AUTOMATED: CPT

## 2020-10-29 PROCEDURE — 6370000000 HC RX 637 (ALT 250 FOR IP): Performed by: FAMILY MEDICINE

## 2020-10-29 PROCEDURE — 36415 COLL VENOUS BLD VENIPUNCTURE: CPT

## 2020-10-29 PROCEDURE — 84439 ASSAY OF FREE THYROXINE: CPT

## 2020-10-29 PROCEDURE — 93010 ELECTROCARDIOGRAM REPORT: CPT | Performed by: INTERNAL MEDICINE

## 2020-10-29 PROCEDURE — 80048 BASIC METABOLIC PNL TOTAL CA: CPT

## 2020-10-29 PROCEDURE — 2700000000 HC OXYGEN THERAPY PER DAY

## 2020-10-29 PROCEDURE — 6360000004 HC RX CONTRAST MEDICATION: Performed by: RADIOLOGY

## 2020-10-29 PROCEDURE — 6370000000 HC RX 637 (ALT 250 FOR IP): Performed by: INTERNAL MEDICINE

## 2020-10-29 PROCEDURE — 71275 CT ANGIOGRAPHY CHEST: CPT

## 2020-10-29 PROCEDURE — 93799 UNLISTED CV SVC/PROCEDURE: CPT | Performed by: INTERNAL MEDICINE

## 2020-10-29 PROCEDURE — 6360000002 HC RX W HCPCS: Performed by: INTERNAL MEDICINE

## 2020-10-29 PROCEDURE — 96375 TX/PRO/DX INJ NEW DRUG ADDON: CPT

## 2020-10-29 PROCEDURE — 2580000003 HC RX 258: Performed by: INTERNAL MEDICINE

## 2020-10-29 PROCEDURE — 93005 ELECTROCARDIOGRAM TRACING: CPT | Performed by: INTERNAL MEDICINE

## 2020-10-29 PROCEDURE — 83735 ASSAY OF MAGNESIUM: CPT

## 2020-10-29 PROCEDURE — 96365 THER/PROPH/DIAG IV INF INIT: CPT

## 2020-10-29 PROCEDURE — 6360000002 HC RX W HCPCS

## 2020-10-29 PROCEDURE — 96376 TX/PRO/DX INJ SAME DRUG ADON: CPT

## 2020-10-29 PROCEDURE — 6360000002 HC RX W HCPCS: Performed by: EMERGENCY MEDICINE

## 2020-10-29 RX ORDER — PANTOPRAZOLE SODIUM 40 MG/1
40 TABLET, DELAYED RELEASE ORAL DAILY
Status: DISCONTINUED | OUTPATIENT
Start: 2020-10-29 | End: 2020-10-30 | Stop reason: HOSPADM

## 2020-10-29 RX ORDER — PRAVASTATIN SODIUM 20 MG
40 TABLET ORAL DAILY
Status: DISCONTINUED | OUTPATIENT
Start: 2020-10-29 | End: 2020-10-30 | Stop reason: HOSPADM

## 2020-10-29 RX ORDER — DOFETILIDE 0.25 MG/1
250 CAPSULE ORAL 2 TIMES DAILY
Status: DISCONTINUED | OUTPATIENT
Start: 2020-10-29 | End: 2020-10-29 | Stop reason: SDUPTHER

## 2020-10-29 RX ORDER — FUROSEMIDE 10 MG/ML
20 INJECTION INTRAMUSCULAR; INTRAVENOUS ONCE
Status: COMPLETED | OUTPATIENT
Start: 2020-10-29 | End: 2020-10-29

## 2020-10-29 RX ORDER — POTASSIUM CHLORIDE 750 MG/1
10 CAPSULE, EXTENDED RELEASE ORAL DAILY
COMMUNITY
End: 2021-08-30 | Stop reason: SDUPTHER

## 2020-10-29 RX ORDER — POLYETHYLENE GLYCOL 3350 17 G/17G
17 POWDER, FOR SOLUTION ORAL DAILY PRN
Status: DISCONTINUED | OUTPATIENT
Start: 2020-10-29 | End: 2020-10-30 | Stop reason: HOSPADM

## 2020-10-29 RX ORDER — SODIUM CHLORIDE 0.9 % (FLUSH) 0.9 %
10 SYRINGE (ML) INJECTION EVERY 12 HOURS SCHEDULED
Status: DISCONTINUED | OUTPATIENT
Start: 2020-10-29 | End: 2020-10-30 | Stop reason: HOSPADM

## 2020-10-29 RX ORDER — FUROSEMIDE 40 MG/1
40 TABLET ORAL DAILY
Status: DISCONTINUED | OUTPATIENT
Start: 2020-10-29 | End: 2020-10-29

## 2020-10-29 RX ORDER — SODIUM CHLORIDE 0.9 % (FLUSH) 0.9 %
10 SYRINGE (ML) INJECTION PRN
Status: DISCONTINUED | OUTPATIENT
Start: 2020-10-29 | End: 2020-10-30 | Stop reason: HOSPADM

## 2020-10-29 RX ORDER — SODIUM CHLORIDE 9 MG/ML
INJECTION, SOLUTION INTRAVENOUS CONTINUOUS PRN
Status: DISCONTINUED | OUTPATIENT
Start: 2020-10-29 | End: 2020-10-29 | Stop reason: SDUPTHER

## 2020-10-29 RX ORDER — FUROSEMIDE 10 MG/ML
40 INJECTION INTRAMUSCULAR; INTRAVENOUS DAILY
Status: DISCONTINUED | OUTPATIENT
Start: 2020-10-29 | End: 2020-10-30

## 2020-10-29 RX ORDER — PROPOFOL 10 MG/ML
INJECTION, EMULSION INTRAVENOUS PRN
Status: DISCONTINUED | OUTPATIENT
Start: 2020-10-29 | End: 2020-10-29 | Stop reason: SDUPTHER

## 2020-10-29 RX ORDER — METOPROLOL TARTRATE 50 MG/1
50 TABLET, FILM COATED ORAL NIGHTLY
Status: DISCONTINUED | OUTPATIENT
Start: 2020-10-29 | End: 2020-10-30 | Stop reason: HOSPADM

## 2020-10-29 RX ORDER — FERROUS SULFATE 325(65) MG
325 TABLET ORAL
Status: DISCONTINUED | OUTPATIENT
Start: 2020-10-29 | End: 2020-10-30 | Stop reason: HOSPADM

## 2020-10-29 RX ORDER — MAGNESIUM SULFATE IN WATER 40 MG/ML
2 INJECTION, SOLUTION INTRAVENOUS ONCE
Status: COMPLETED | OUTPATIENT
Start: 2020-10-29 | End: 2020-10-29

## 2020-10-29 RX ORDER — CHOLECALCIFEROL (VITAMIN D3) 50 MCG
2000 TABLET ORAL DAILY
Status: DISCONTINUED | OUTPATIENT
Start: 2020-10-29 | End: 2020-10-30 | Stop reason: HOSPADM

## 2020-10-29 RX ORDER — ACETAMINOPHEN 325 MG/1
650 TABLET ORAL EVERY 6 HOURS PRN
Status: DISCONTINUED | OUTPATIENT
Start: 2020-10-29 | End: 2020-10-30 | Stop reason: HOSPADM

## 2020-10-29 RX ORDER — FUROSEMIDE 10 MG/ML
40 INJECTION INTRAMUSCULAR; INTRAVENOUS ONCE
Status: COMPLETED | OUTPATIENT
Start: 2020-10-29 | End: 2020-10-29

## 2020-10-29 RX ORDER — NITROGLYCERIN 0.4 MG/1
0.4 TABLET SUBLINGUAL EVERY 5 MIN PRN
Status: DISCONTINUED | OUTPATIENT
Start: 2020-10-29 | End: 2020-10-30 | Stop reason: HOSPADM

## 2020-10-29 RX ORDER — DOFETILIDE 0.25 MG/1
250 CAPSULE ORAL EVERY 12 HOURS SCHEDULED
Status: DISCONTINUED | OUTPATIENT
Start: 2020-10-29 | End: 2020-10-30 | Stop reason: HOSPADM

## 2020-10-29 RX ORDER — ACETAMINOPHEN 650 MG/1
650 SUPPOSITORY RECTAL EVERY 6 HOURS PRN
Status: DISCONTINUED | OUTPATIENT
Start: 2020-10-29 | End: 2020-10-30 | Stop reason: HOSPADM

## 2020-10-29 RX ORDER — ALBUTEROL SULFATE 2.5 MG/3ML
2.5 SOLUTION RESPIRATORY (INHALATION) EVERY 4 HOURS PRN
Status: DISCONTINUED | OUTPATIENT
Start: 2020-10-29 | End: 2020-10-29

## 2020-10-29 RX ADMIN — SODIUM CHLORIDE: 9 INJECTION, SOLUTION INTRAVENOUS at 17:15

## 2020-10-29 RX ADMIN — SODIUM CHLORIDE, PRESERVATIVE FREE 10 ML: 5 INJECTION INTRAVENOUS at 11:16

## 2020-10-29 RX ADMIN — METOPROLOL TARTRATE 75 MG: 50 TABLET, FILM COATED ORAL at 11:17

## 2020-10-29 RX ADMIN — SODIUM CHLORIDE, PRESERVATIVE FREE 10 ML: 5 INJECTION INTRAVENOUS at 21:42

## 2020-10-29 RX ADMIN — Medication 2000 UNITS: at 11:17

## 2020-10-29 RX ADMIN — PANTOPRAZOLE SODIUM 40 MG: 40 TABLET, DELAYED RELEASE ORAL at 11:17

## 2020-10-29 RX ADMIN — DOFETILIDE 250 MCG: 0.25 CAPSULE ORAL at 13:41

## 2020-10-29 RX ADMIN — FERROUS SULFATE TAB 325 MG (65 MG ELEMENTAL FE) 325 MG: 325 (65 FE) TAB at 11:17

## 2020-10-29 RX ADMIN — METOPROLOL TARTRATE 50 MG: 50 TABLET, FILM COATED ORAL at 21:42

## 2020-10-29 RX ADMIN — FUROSEMIDE 20 MG: 10 INJECTION, SOLUTION INTRAMUSCULAR; INTRAVENOUS at 03:34

## 2020-10-29 RX ADMIN — PRAVASTATIN SODIUM 40 MG: 20 TABLET ORAL at 21:42

## 2020-10-29 RX ADMIN — MAGNESIUM GLUCONATE 500 MG ORAL TABLET 400 MG: 500 TABLET ORAL at 11:17

## 2020-10-29 RX ADMIN — FUROSEMIDE 40 MG: 10 INJECTION, SOLUTION INTRAVENOUS at 11:17

## 2020-10-29 RX ADMIN — PROPOFOL 60 MG: 10 INJECTION, EMULSION INTRAVENOUS at 17:16

## 2020-10-29 RX ADMIN — RIVAROXABAN 20 MG: 20 TABLET, FILM COATED ORAL at 18:18

## 2020-10-29 RX ADMIN — IOPAMIDOL 75 ML: 755 INJECTION, SOLUTION INTRAVENOUS at 02:03

## 2020-10-29 RX ADMIN — MAGNESIUM SULFATE 2 G: 2 INJECTION INTRAVENOUS at 13:09

## 2020-10-29 ASSESSMENT — ENCOUNTER SYMPTOMS: SHORTNESS OF BREATH: 1

## 2020-10-29 ASSESSMENT — PAIN SCALES - GENERAL
PAINLEVEL_OUTOF10: 0

## 2020-10-29 NOTE — ED NOTES
Patient not in waiting room when called for EKG and  Protocols.   Patient was taken to x-ray     Tamika Albarado LPN  72/51/06 [id number]

## 2020-10-29 NOTE — ED NOTES
Bed: 21  Expected date:   Expected time:   Means of arrival:   Comments:  triage     Mary Isaac RN  10/28/20 2918

## 2020-10-29 NOTE — ED NOTES
Pt sitting in bed, calm respiration rate even and unlabored, denies SOB at this time. A+O x4. Updated on plan of care.       Padilla Mcwilliams RN  10/29/20 3012

## 2020-10-29 NOTE — H&P
Hospitalist History & Physical      PCP: Yeyo Barboza MD    Date of Admission: 10/28/2020    Date of Service: Pt seen/examined on 10/28/2020 and is admitted to Inpatient with expected LOS greater than two midnights due to medical therapy. Chief Complaint:  had concerns including Shortness of Breath (hx of chf, was in urgent care yesterday for same ). History Of Present Illness:    Patient is an 68-year-old male patient with past medical history of CHF, Ischemic cardiomyopathy status post ICD placement , coronary artery disease, status post TAVR, history of V. tach, A. fib on anticoagulation,Essential  hypertension, Hyperlipidemia and history of GI bleeding presented to the ED with chief complaint of progressive SOB. patient stated that he has been having shortness of breath for the last 3 to 4 days which tends to get worse with exertion. He stated that he came to the ED b/c of the persistent nature of his symptoms. Patient denied chest pain. No palpitation. No cough. No wheezing. No fever. Patient denied leg swelling, orthopnea and PND. Patient reported that he has been taking his medications consistently. Patient not on home oxygen. Not on CPAP or BiPAP either. Patient reported that he quit smoking 15 years ago. Patient has no complaint of nausea, vomiting, abdominal pain or diarrhea. No urinary complaints. In the ED:Cr 1.4,BNP 1416, CBC unremarkable, INR 2.1, COVID-19 negative,CTA showed no pulmonary embolism but chronic fibrotic change in pleural thickening in the lower left chest with round atelectasis that appears stable with no acute airspace abnormality. CT also showed significant increase in the size of pulmonary nodule in the right middle lobe and abdominal aortic aneurysm measuring 3.9 cm. Patient then admitted for further evaluation and management.           Past Medical History:        Diagnosis Date    [facility]fib [facility])     Arrhythmia     Carotid artery stenosis     [facility] [address]     Heart valve problem     Hyperlipidemia     Hypertension     ICD (implantable cardiac defibrillator) in place 2007    MI (mitral incompetence) 2003    MI (myocardial infarction) ([facility] [address] 2003       Past Surgical History:        Procedure Laterality Date    CARDIAC CATHETERIZATION Right 03/03/2017    Dr. Nuvia Gomez  2007. 2008 2007 78 shocks replaced 2008 [employer]     CARDIAC DEFIBRILLATOR PLACEMENT  07/28/2016    [employer] Dual ICD gen change    COLONOSCOPY N/A 2/24/2020    COLONOSCOPY DIAGNOSTIC performed by Nessa Corrales MD at [address]  05/23/2003 03/16/2007    DIAGNOSTIC CARDIAC CATH LAB PROCEDURE  2007    DIAGNOSTIC CARDIAC CATH LAB PROCEDURE  03/29/2008    stent    JOINT REPLACEMENT  2011    r hip surgery Dr Tiffanie Doss  03/29/2017    Dr. Carole Bowden and Dr. Rosenda Whitfield- TAVR Josie 26mm valve    UPPER GASTROINTESTINAL ENDOSCOPY N/A 9/3/2019    EGD ESOPHAGOGASTRODUODENOSCOPY performed by Tiago Arroyo MD at [facility] N/A 2/18/2020    EGD ESOPHAGOGASTRODUODENOSCOPY performed by Nessa Corrales MD at [address]       Medications Prior to Admission:      Prior to Admission medications    Medication Sig Start Date End Date Taking?  Authorizing Provider   furosemide (LASIX) 40 MG tablet Take 1 tablet by mouth daily 10/28/20  Yes Sunday MD Nila   pravastatin (PRAVACHOL) 40 MG tablet Take 1 tablet by mouth daily 8/12/20  Yes Sunday MD Nila   metoprolol tartrate (LOPRESSOR) 50 MG tablet 75 mg in AM and 50 mg at [facility] 8/12/20  Yes Sunday MD Nila   MAGNESIUM-OXIDE 400 (241.3 Mg) MG TABS tablet TK 1 T PO ONCE D 3/18/20  Yes Historical Provider, MD   pantoprazole (PROTONIX) 40 MG tablet Take 1 tablet by mouth daily 5/7/20  Yes Sunday MD Nila   ferrous sulfate (IRON 325) 325 (65 Fe) MG tablet Take 1 tablet by mouth daily (with breakfast) 5/7/20  Yes Aleja Churchill MD   dofetilide [facility]) 250 MCG capsule Take 1 capsule by mouth 2 times daily 3/5/20  Yes Aleja Churchill MD   rivaroxaban Yamila Islas) 20 MG TABS tablet Take 1 tablet by mouth daily 3/4/20  Yes Pernell Rincon MD   albuterol sulfate  (90 Base) MCG/ACT inhaler Inhale 2 puffs into the lungs 4 times daily as needed for Wheezing 9/13/19  Yes Seth Sweeney, APRN - CNP   Multiple Vitamins-Minerals (PRESERVISION AREDS 2 PO) Take 1 tablet by mouth 2 times daily   Yes Historical Provider, MD   Cholecalciferol (VITAMIN D) 2000 UNITS CAPS capsule Take 1 capsule by mouth daily    Yes Historical Provider, MD       Allergies:  Patient has no known allergies. Social History:    TOBACCO:   reports that he quit smoking about 47 years ago. He has a 1.50 pack-year smoking history. He has never used smokeless tobacco.  ETOH:   reports no history of alcohol use. Family History:    Reviewed in detail and negative for DM, CAD, Cancer, CVA. Positive as follows\"  History reviewed. No pertinent family history. REVIEW OF SYSTEMS:   Pertinent positives as noted in the HPI. All other systems reviewed and negative. PHYSICAL EXAM:  BP (!) 152/68   Pulse 83   Temp 97.8 °F (36.6 °C) (Temporal)   Resp 20   Ht 5' 8\" (1.727 m)   Wt 175 lb 14.8 oz (79.8 kg)   SpO2 95%   BMI 26.75 kg/m²   General appearance: No apparent distress, appears stated age and cooperative. HEENT: Normal cephalic, atraumatic without obvious deformity. Pupils equal, round, and reactive to light. Extra ocular muscles intact. Conjunctivae/corneas clear. Neck: Supple, with full range of motion. No jugular venous distention. Trachea midline. Respiratory:  Resonant to percussion and minimal bibasilar rales  Cardiovascular: S1 and S2 are normal,no murmur or gallop    Abdomen: Soft,no organomegaly,no tenderness,normoactive bowel sounds.   Musculoskeletal: No clubbing, cyanosis,   Skin: Normal skin color. No rashes or lesions. Neurologic:  Neurovascularly intact without any focal sensory/motor deficits. Cranial nerves: II-XII intact, grossly non-focal.  Psychiatric: Alert and oriented, thought content appropriate, normal insight    Reviewed EKG and CXR personally    CBC:   Recent Labs     10/27/20  1329 10/28/20  2327   WBC 9.0 11.0   RBC 4.78 4.71   HGB 13.2 13.1   HCT 42.3 41.3   MCV 88.5 87.7   RDW 16.5* 16.6*    222     BMP:   Recent Labs     10/27/20  1329 10/28/20  2327    141   K 4.6 4.0    100   CO2 25 28   BUN 28* 30*   CREATININE 1.3* 1.4*     LFT:  Recent Labs     10/27/20  1329 10/28/20  2327   PROT 7.3 7.8   ALKPHOS 147* 145*   ALT 17 14   AST 29 23   BILITOT 0.5 0.3     CE:  Recent Labs     10/28/20  2327   TROPONINI <0.01     PT/INR:   Recent Labs     10/28/20  2327   INR 2.1     BNP: No results for input(s): BNP in the last 72 hours.   ESR:   Lab Results   Component Value Date    SEDRATE 37 (H) 10/18/2013     CRP:   Lab Results   Component Value Date    CRP 1.5 (H) 10/18/2013     D Dimer:   Lab Results   Component Value Date    DDIMER <200 06/28/2017      Folate and B12: No results found for: [id number], No results found for: FOLATE  Lactic Acid:   Lab Results   Component Value Date    LACTA 1.5 09/12/2019     Thyroid Studies:   Lab Results   Component Value Date    TSH 1.400 02/19/2020    D7NPBEM 107.80 12/08/2016    A2OQWEA 8.2 12/08/2016       Oupatient labs:  Lab Results   Component Value Date    CHOL 185 08/17/2020    TRIG 611 (H) 08/17/2020    HDL 33 08/17/2020    LDLCALC - (AA) 08/17/2020    TSH 1.400 02/19/2020    INR 2.1 10/28/2020    LABA1C 5.5 03/24/2017       Urinalysis:    Lab Results   Component Value Date    NITRU Negative 07/17/2019    WBCUA 1-3 08/28/2013    BACTERIA FEW 08/28/2013    RBCUA 0-1 08/28/2013    BLOODU Negative 07/17/2019    SPECGRAV 1.010 07/17/2019    GLUCOSEU Negative 07/17/2019       Imaging:  Xr Chest Standard (2 Vw)    Result Date: 10/27/2020  EXAMINATION: TWO XRAY VIEWS OF THE CHEST 10/27/2020 1:09 pm COMPARISON: None. HISTORY: ORDERING SYSTEM PROVIDED HISTORY: Dyspnea, unspecified type TECHNOLOGIST PROVIDED HISTORY: Reason for exam:->shortness of breath, no cough, hx of CHF FINDINGS: The heart is enlarged. There are no findings of failure. There is no right or left lung infiltrate. There are trace bilateral pleural effusions. There is a dual lead cardiac pacer on the left. 1. Cardiomegaly. No findings of failure 2. Trace bilateral pleural effusions. Xr Chest Portable    Result Date: 10/28/2020  EXAMINATION: ONE XRAY VIEW OF THE CHEST 10/28/2020 10:44 pm COMPARISON: October 27 HISTORY: ORDERING SYSTEM PROVIDED HISTORY: sob TECHNOLOGIST PROVIDED HISTORY: Reason for exam:->sob What reading provider will be dictating this exam?->CRC FINDINGS: Cardiac silhouette is mildly enlarged. Status post median sternotomy with pacemaker-AICD in place. There is increasing airspace disease in the retrocardiac left lower lobe which may represent pneumonia, aspiration and or atelectasis. Blunting of the left costophrenic angle, consistent with pleural effusion, unchanged. Elevation of the right hemidiaphragm again noted and unchanged. Smaller right pleural effusion. The pulmonary vasculature is within normal limits. No evidence of pneumothorax. Mild cardiomegaly. Increasing airspace disease in the retrocardiac left lower lobe which may represent pneumonia, aspiration and or atelectasis. Bilateral pleural effusions and left basilar atelectasis. Cta Chest W Contrast    Result Date: 10/29/2020  EXAMINATION: CTA OF THE CHEST 10/29/2020 2:04 am TECHNIQUE: CTA of the chest was performed after the administration of intravenous contrast.  Multiplanar reformatted images are provided for review. MIP images are provided for review.  Dose modulation, iterative reconstruction, and/or weight based adjustment of the mA/kV was utilized to reduce the radiation dose to as low as reasonably achievable. COMPARISON: 06/28/2017 CT HISTORY: ORDERING SYSTEM PROVIDED HISTORY: sob abnromal chest TECHNOLOGIST PROVIDED HISTORY: Reason for exam:->sob abnromal chest What reading provider will be dictating this exam?->CRC FINDINGS: Pulmonary Arteries: Study is of good technical quality for evaluation of pulmonary embolism. There are no filling defects to suggest pulmonary embolism. Main pulmonary artery is normal in caliber. No evidence of right ventricular strain. Mediastinum: Mild cardiomegaly. Evidence of prior aortic valve surgery. ICD present in the left chest.  Moderate atherosclerotic changes of the aorta. Esophagus appears normal.  No lymphadenopathy. Lungs/pleura: The airways are patent. There is dense pleural thickening and calcification in the lower left chest with an area of round atelectasis in the left lower lobe. Mild fibrotic change in the right lower lobe. No acute pulmonary finding. There is a significant increase in the size of a subpleural pulmonary nodule in the right middle lobe lateral segment on image 65. Current measurements 11 x 8 mm. Upper Abdomen: The adrenal glands are normal.  AAA measuring up to 3.9 cm. 1.8 cm cyst in the right hepatic lobe. Soft Tissues/Bones: No skeletal abnormalities throughout the chest.     1. No pulmonary embolism. 2. Chronic fibrotic change and pleural thickening in the lower left chest with round atelectasis that appears stable. No acute airspace abnormality. 3. Significant increase in the size of a pulmonary nodule in the right middle lobe with follow-up recommendations provided below. 4. AAA measuring 3.9 cm. Recommendations are below. RECOMMENDATIONS: Fleischner Society guidelines for follow-up and management of incidentally detected pulmonary nodules: Single Solid Nodule: Nodule size greater than 8 mm In a low-risk patient, consider CT at 3 months, PET/CT, or tissue sampling.  In a high-risk patient, consider CT at 3 months, PET/CT, or tissue sampling. - Low risk patients include individuals with minimal or absent history of smoking and other known risk factors. - High risk patients include individuals with a history or smoking or known risk factors. Radiology 2017 http://pubs. rsna.org/doi/full/10.1148/radiol. 4461284032 3.9 cm abdominal aortic aneurysm. Recommend follow-up every 2 years. Reference: J Am Nilda Radiol 3117;92:764-132. ASSESSMENT:  Patient is an 82-year-old male patient with past medical history of CHF, Ischemic cardiomyopathy status post ICD placement , coronary artery disease, status post TAVR, history of V. tach, A. fib on anticoagulation,Essential  hypertension, Hyperlipidemia and history of GI bleeding presented to the ED with chief complaint of progressive SOB. In the ED:Cr 1.4,BNP 1416, CBC unremarkable, INR 2.1, COVID-19 negative,CTA showed no pulmonary embolism but chronic fibrotic change in pleural thickening in the lower left chest with round atelectasis that appears stable with no acute airspace abnormality. CT also showed significant increase in the size of pulmonary nodule in the right middle lobe and abdominal aortic aneurysm measuring 3.9 cm.    1. Acute on chronic heart failure with reduced ejection fraction: Continue Lasix. Monitor input and output. Daily weight. Fluid and salt restriction. 2. Ischemic cardiomyopathy status post ICD placement  3. Coronary artery disease: Continue cardioprotective medication. 4. Chronic A. fib on anticoagulation: Continue Metoprolol and Dofetilide for rate and rhythm control. Continue Xarelto for anticoagulation. 5. S/p TAVR   6. History of ventricular tachycardia:On Metoprolol. Keep K above 4 and Mg above 2. Continue to assess. 7. Essential hypertension: Continue home medication. Montior the VS.  8. Hyperlipidemia, mixed : On statin. 9. CKD stage III: Monitor renal panel. Avoid nephrotoxic agents.   10. Right pulmonary

## 2020-10-29 NOTE — ANESTHESIA PRE PROCEDURE
Department of Anesthesiology  Preprocedure Note       Name:  Elva Slater   Age:  82 y.o.  :  1935                                          MRN:  [id number]         Date:  10/29/2020      Surgeon: * Surgery not found *    Procedure:     Medications prior to admission:   Prior to Admission medications    Medication Sig Start Date End Date Taking?  Authorizing Provider   furosemide (LASIX) 40 MG tablet Take 1 tablet by mouth daily 10/28/20  Yes Brown Duncan MD   pravastatin (PRAVACHOL) 40 MG tablet Take 1 tablet by mouth daily 20  Yes Brown Duncan MD   metoprolol tartrate (LOPRESSOR) 50 MG tablet 75 mg in AM and 50 mg at [facility] 20  Yes Brown Duncan MD   MAGNESIUM-OXIDE 400 (241.3 Mg) MG TABS tablet TK 1 T PO ONCE D 3/18/20  Yes Historical Provider, MD   pantoprazole (PROTONIX) 40 MG tablet Take 1 tablet by mouth daily 20  Yes Brown Duncan MD   ferrous sulfate (IRON 325) 325 (65 Fe) MG tablet Take 1 tablet by mouth daily (with breakfast) 20  Yes Brown Duncan MD   dofetilide [facility]) 250 MCG capsule Take 1 capsule by mouth 2 times daily 3/5/20  Yes Brown Duncan MD   rivaroxaban [facility]) 20 MG TABS tablet Take 1 tablet by mouth daily 3/4/20  Yes Tamiko Melgar MD   albuterol sulfate  (90 Base) MCG/ACT inhaler Inhale 2 puffs into the lungs 4 times daily as needed for Wheezing 19  Yes Dawit Champagne APRN - CNP   Multiple Vitamins-Minerals (PRESERVISION AREDS 2 PO) Take 1 tablet by mouth 2 times daily   Yes Historical Provider, MD   Cholecalciferol (VITAMIN D) 2000 UNITS CAPS capsule Take 1 capsule by mouth daily    Yes Historical Provider, MD   potassium chloride (MICRO-K) 10 MEQ extended release capsule Take 10 mEq by mouth daily    Historical Provider, MD       Current medications:    Current Facility-Administered Medications   Medication Dose Route Frequency Provider Last Rate Last Dose    albuterol (PROVENTIL) nebulizer solution 2.5 mg  2.5 mg Nebulization Q4H PRN Jovita Benson MD        vitamin D (CHOLECALCIFEROL) tablet 2,000 Units  2,000 Units Oral Daily Jovita Benson MD   2,000 Units at 10/29/20 1117    ferrous sulfate (IRON 325) tablet 325 mg  325 mg Oral Daily with breakfast Jovita Benson MD   325 mg at 10/29/20 1117    magnesium oxide (MAG-OX) tablet 400 mg  400 mg Oral Daily Jovita Benson MD   400 mg at 10/29/20 1117    metoprolol tartrate (LOPRESSOR) tablet 75 mg  75 mg Oral Daily Jovita Benson MD   75 mg at 10/29/20 1117    pantoprazole (PROTONIX) tablet 40 mg  40 mg Oral Daily Jovita Benson MD   40 mg at 10/29/20 1117    pravastatin (PRAVACHOL) tablet 40 mg  40 mg Oral Daily Jovita Benson MD        rivaroxaban (XARELTO) tablet 20 mg  20 mg Oral Dinner Jovita Benosn MD        metoprolol tartrate (LOPRESSOR) tablet 50 mg  50 mg Oral Nightly Jovita Benson MD        sodium chloride flush 0.9 % injection 10 mL  10 mL Intravenous 2 times per day Jovita Benson MD   10 mL at 10/29/20 1116    sodium chloride flush 0.9 % injection 10 mL  10 mL Intravenous PRN Jovita Benson MD        acetaminophen (TYLENOL) tablet 650 mg  650 mg Oral Q6H PRN Jovita Benson MD        Or    acetaminophen (TYLENOL) suppository 650 mg  650 mg Rectal Q6H PRN Jovita Benson MD        polyethylene glycol (GLYCOLAX) packet 17 g  17 g Oral Daily PRN Jovita Benson MD        nitroGLYCERIN (NITROSTAT) SL tablet 0.4 mg  0.4 mg Sublingual Q5 Min PRN Jovita Benson MD        perflutren lipid microspheres (DEFINITY) injection 1.65 mg  1.5 mL Intravenous ONCE PRN Silviano Payne MD        furosemide (LASIX) injection 40 mg  40 mg Intravenous Daily Silviano Payne MD        [facility]) capsule 250 mcg  250 mcg Oral 2 times per day James Novoa MD   250 mcg at 10/29/20 1341       Allergies:  No Known Allergies    Problem List:    Patient Active Problem List   Diagnosis Code    Implantable cardioverter-defibrillator (ICD) in situ Z95.810    Chronic combined systolic and diastolic CHF (congestive heart failure) (HCC) I50.42    S/P TAVR (transcatheter aortic valve replacement) Z95.2    Chronic atrial fibrillation I48.20    Coronary artery disease involving native coronary artery of native heart without angina pectoris I25.10    Essential hypertension I10    Paroxysmal atrial fibrillation (HCC) I48.0    Hyperlipidemia LDL goal <100 E78.5    GIB (gastrointestinal bleeding) K92.2    ICD (implantable cardioverter-defibrillator), dual, in situ Z95.810    Ventricular tachycardia (HCC) I47.2    Ischemic heart disease I25.9    Vitamin D deficiency E55.9    Other insomnia G47.09    SOB (shortness of breath) R06.02    Lung nodule R91.1    Acute on chronic diastolic (congestive) heart failure (HCC) I50.33    Pure hypercholesterolemia E78.00    Stage 3b chronic kidney disease N18.32    Transient cerebral ischemia G45.9    Aortic valve disease I35.9       Past Medical History:        Diagnosis Date    A-fib ([address]     Arrhythmia     Carotid artery stenosis     CHF (congestive heart failure) ([address]     Heart valve problem     Hyperlipidemia     Hypertension     ICD (implantable cardiac defibrillator) in place 2007    MI (mitral incompetence) 2003    MI (myocardial infarction) ([address] 2003       Past Surgical History:        Procedure Laterality Date    CARDIAC CATHETERIZATION Right 03/03/2017    Dr. West Oscar  2007. 2008 2007 78 shocks replaced 2008 [employer]     CARDIAC DEFIBRILLATOR PLACEMENT  07/28/2016    [employer] Dual ICD gen change    COLONOSCOPY N/A 2/24/2020    COLONOSCOPY DIAGNOSTIC performed by Dannie Chacon MD at [address] [place] [address] GRAFT  05/23/2003 03/16/2007    DIAGNOSTIC CARDIAC CATH LAB PROCEDURE  2007    DIAGNOSTIC CARDIAC CATH LAB PROCEDURE  03/29/2008    stent    JOINT REPLACEMENT  2011    r hip surgery Dr Hernandez [facility]  2017    Dr. Venkatesh Colby and Dr. Mirna Stephen- VALE Josie 26mm valve    UPPER GASTROINTESTINAL ENDOSCOPY N/A 9/3/2019    EGD ESOPHAGOGASTRODUODENOSCOPY performed by Michell Ambriz MD at [address] [place] [address],[address] Floor N/A 2020    EGD ESOPHAGOGASTRODUODENOSCOPY performed by Ana Solo MD at [facility]         Social History:    Social History     Tobacco Use    Smoking status: Former Smoker     Packs/day: 0.50     Years: 3.00     Pack years: 1.50     Last attempt to quit: 1973     Years since quittin.7    Smokeless tobacco: Never Used    Tobacco comment: Quit smoking in    Substance Use Topics    Alcohol use: No                                Counseling given: Not Answered  Comment: Quit smoking in       Vital Signs (Current):   Vitals:    10/29/20 0814 10/29/20 1202 10/29/20 1520 10/29/20 1543   BP: 135/65 (!) 158/74 118/65    Pulse: 91 95 71    Resp: 16 18 18    Temp: 36.6 °C (97.8 °F) 36.6 °C (97.8 °F) 36.6 °C (97.8 °F)    TempSrc: Temporal Temporal Temporal    SpO2: 98% 98%  98%   Weight: 173 lb 3.2 oz (78.6 kg)      Height:                                                  BP Readings from Last 3 Encounters:   10/29/20 118/65   10/27/20 130/78   20 138/78       NPO Status:  > 12 hours                                                                                BMI:   Wt Readings from Last 3 Encounters:   10/29/20 173 lb 3.2 oz (78.6 kg)   10/27/20 180 lb 6.4 oz (81.8 kg)   20 185 lb (83.9 kg)     Body mass index is 26.33 kg/m².     CBC:   Lab Results   Component Value Date    WBC 7.9 10/29/2020    RBC 4.41 10/29/2020    HGB 12.1 10/29/2020    HCT 38.7 10/29/2020    MCV 87.8 10/29/2020    RDW 16.7 10/29/2020     10/29/2020       CMP:   Lab Results   Component Value Date     10/29/2020    K 4.1 10/29/2020     10/29/2020    CO2 24 10/29/2020    BUN 27 10/29/2020    CREATININE 1.3 10/29/2020    GFRAA >60 10/29/2020    LABGLOM 53 10/29/2020    GLUCOSE 113 10/29/2020    PROT 7.8 10/28/2020    CALCIUM 9.5 10/29/2020    BILITOT 0.3 10/28/2020    ALKPHOS 145 10/28/2020    AST 23 10/28/2020    ALT 14 10/28/2020       POC Tests: No results for input(s): POCGLU, POCNA, POCK, POCCL, POCBUN, POCHEMO, POCHCT in the last 72 hours.     Coags:   Lab Results   Component Value Date    PROTIME 24.3 10/28/2020    INR 2.1 10/28/2020    APTT 34.8 02/17/2020       HCG (If Applicable): No results found for: PREGTESTUR, PREGSERUM, HCG, HCGQUANT     ABGs: No results found for: PHART, PO2ART, PMB2ZNG, YVQ7SAT, BEART, D3JGSDWT     Type & Screen (If Applicable):  No results found for: LABABO, LABRH    Drug/Infectious Status (If Applicable):  No results found for: HIV, HEPCAB    COVID-19 Screening (If Applicable):   Lab Results   Component Value Date    COVID19 Not Detected 10/29/2020     EKG 10/28/2020  Component  Value  Ref Range & Units  Status  Collected  Lab    Ventricular Rate  84  BPM  Final  10/28/2020 11:34 PM  [facility]    Atrial Rate  98  BPM  Final  10/28/2020 11:34 PM  [facility]    QRS Duration  138  ms  Final  10/28/2020 11:34 PM  [facility]    Q-T Interval  444  ms  Final  10/28/2020 11:34 PM  [facility]    QTc Calculation (Bazett)  524  ms  Final  10/28/2020 11:34 PM  [facility]    R Axis  -80  degrees  Final  10/28/2020 11:34 PM  [facility]    T Axis  31  degrees  Final  10/28/2020 11:34 PM  [facility]    Testing Performed By     Lab - Abbreviation  Name  Director  Address  Valid Date Range    360-[facility]  [facility] MUSE  Unknown  Unknown  04/18/16 0721-Present    Narrative & Impression     Atrial fibrillation  Left axis deviation  Right bundle branch block  Inferior infarct (cited on or before 08-JUL-2020)  Abnormal ECG  When compared with ECG of 08-JUL-2020 10:37,  Significant changes have occurred         Transthoracic Echo 8/16/2019   Left Ventricle   Ejection fraction is visually for shunt evaluation. No evidence of patent foramen ovale. No evidence of atrial septal defect. Anesthesia Evaluation  Patient summary reviewed and Nursing notes reviewed no history of anesthetic complications:   Airway: Mallampati: II  TM distance: >3 FB   Neck ROM: full  Mouth opening: > = 3 FB Dental: normal exam         Pulmonary:   (+) shortness of breath:                            ROS comment: + Lung nodule   Former smoker- quit remotely    Cardiovascular:  Exercise tolerance: poor (<4 METS),   (+) hypertension: moderate, valvular problems/murmurs (S/P TAVR 2017 ): AS, pacemaker (Hx of inappropriate discharges from device ): pacemaker and AICD, past MI: > 6 months, CAD: no interval change, CABG/stent (CABG 2003, re-do CABG 2007 ):, dysrhythmias (Tikosyn therapy ): atrial fibrillation, ventricular tachycardia and paced rhythm, CHF: systolic and diastolic, hyperlipidemia      ECG reviewed  Rhythm: irregular  Rate: normal  Echocardiogram reviewed                  Neuro/Psych:   (+) TIA (2019, no residual ),             GI/Hepatic/Renal:   (+) renal disease: CRI,          ROS comment: Hx GI bleeding . Endo/Other:    (+) blood dyscrasia (Xarelto ): anticoagulation therapy:., .                 Abdominal:           Vascular:                                        Anesthesia Plan      MAC     ASA 4       Induction: intravenous. Anesthetic plan and risks discussed with patient. Plan discussed with attending. GINETTE Flores - LOGAN   10/29/2020      Patient seen and examined, chart reviewed, agree with above findings. Anesthetic plan, risks, benefits, alternatives, and personnel involved discussed with patient. Patient verbalized an understanding and agreed to proceed. NPO status confirmed. Anesthetic plan discussed with care team members and agreed upon.     Rachna Davis DO   10/29/2020  5:16 PM

## 2020-10-29 NOTE — ED PROVIDER NOTES
HPI:  10/28/20, Time: 10:15 PM EDT         Mehran Stone is a 82 y.o. male presenting to the ED for sob, beginning days ago. The complaint has been persistent, moderate in severity, and worsened by nothing. Patient reporting feeling short of breath for the last several days. He was seen at urgent care and was seen there and had his Lasix increased. Patient reports ongoing shortness of breath he reports no chest pain reports no productive cough he reports no headache he reports no leg pain. There is no nausea or vomiting he reports no fever. Patient reporting no orthopnea. He reports dyspnea on exertion though. Patient reporting he is on Xarelto because of history of atrial fibrillation he has an AICD in place as well. He has prior history of myocardial infarction. Patient reports some weakness in his legs. ROS:   Pertinent positives and negatives are stated within HPI, all other systems reviewed and are negative.  --------------------------------------------- PAST HISTORY ---------------------------------------------  Past Medical History:  has a past medical history of A-fib ([facility] Arrhythmia, Carotid artery stenosis, CHF (congestive heart failure) ([facility] Heart valve problem, Hyperlipidemia, Hypertension, ICD (implantable cardiac defibrillator) in place, MI (mitral incompetence), and MI (myocardial infarction) ([facility] 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 2008); Cardiac defibrillator placement (07/28/2016); Cardiac catheterization (Right, 03/03/2017); other surgical history (03/29/2017); Upper gastrointestinal endoscopy (N/A, 9/3/2019);  Upper gastrointestinal endoscopy (N/A, 2/18/2020); and Colonoscopy (N/A, 2/24/2020). Social History:  reports that he quit smoking about 47 years ago. He has a 1.50 pack-year smoking history. He has never used smokeless tobacco. He reports that he does not drink alcohol or use drugs. Family History: family history is not on file. The patients home medications have been reviewed. Allergies: Patient has no known allergies. ---------------------------------------------------PHYSICAL EXAM--------------------------------------    Constitutional/General: Alert and oriented x3, well appearing, non toxic in NAD  Head: Normocephalic and atraumatic  Eyes: PERRL, EOMI  Mouth: Oropharynx clear, handling secretions, no trismus  Neck: Supple, full ROM, non tender to palpation in the midline, no stridor, no crepitus, no meningeal signs  Pulmonary: Lungs clear to auscultation bilaterally, no wheezes, rales, or rhonchi. Not in respiratory distress  Cardiovascular:  Regular rate. Regular rhythm. No murmurs, gallops, or rubs. 2+ distal pulses  Chest: no chest wall tenderness  Abdomen: Soft. Non tender. Non distended. +BS. No rebound, guarding, or rigidity. No pulsatile masses appreciated. Musculoskeletal: Moves all extremities x 4. Warm and well perfused, no clubbing, cyanosis, or edema. Capillary refill <3 seconds  Skin: warm and dry. No rashes. Neurologic: GCS 15, CN 2-12 grossly intact, no focal deficits, symmetric strength 5/5 in the upper and lower extremities bilaterally  Psych: Normal Affect    -------------------------------------------------- RESULTS -------------------------------------------------  I have personally reviewed all laboratory and imaging results for this patient. Results are listed below.      LABS:  Results for orders placed or performed during the hospital encounter of 10/28/20   CBC auto differential   Result Value Ref Range    WBC 11.0 4.5 - 11.5 E9/L    RBC 4.71 3.80 - 5.80 E12/L    Hemoglobin 13.1 12.5 - 16.5 g/dL    Hematocrit 41.3 37.0 - 54.0 %    MCV 87.7 80.0 - 99.9 fL    MCH 27.8 26.0 - 35.0 pg    MCHC 31.7 (L) 32.0 - 34.5 %    RDW 16.6 (H) 11.5 - 15.0 fL    Platelets 222 145 - 535 E9/L    MPV 10.9 7.0 - 12.0 fL    Neutrophils % 73.5 43.0 - 80.0 %    Immature Granulocytes % 0.3 0.0 - 5.0 %    Lymphocytes % 15.6 (L) 20.0 - 42.0 %    Monocytes % 8.3 2.0 - 12.0 %    Eosinophils % 1.8 0.0 - 6.0 %    Basophils % 0.5 0.0 - 2.0 %    Neutrophils Absolute 8.11 (H) 1.80 - 7.30 E9/L    Immature Granulocytes # 0.03 E9/L    Lymphocytes Absolute 1.72 1.50 - 4.00 E9/L    Monocytes Absolute 0.91 0.10 - 0.95 E9/L    Eosinophils Absolute 0.20 0.05 - 0.50 E9/L    Basophils Absolute 0.05 0.00 - 0.20 E9/L   Comprehensive Metabolic Panel   Result Value Ref Range    Sodium 141 132 - 146 mmol/L    Potassium 4.0 3.5 - 5.0 mmol/L    Chloride 100 98 - 107 mmol/L    CO2 28 22 - 29 mmol/L    Anion Gap 13 7 - 16 mmol/L    Glucose 114 (H) 74 - 99 mg/dL    BUN 30 (H) 8 - 23 mg/dL    CREATININE 1.4 (H) 0.7 - 1.2 mg/dL    GFR Non-African American 48 >=60 mL/min/1.73    GFR African American 59     Calcium 9.7 8.6 - 10.2 mg/dL    Total Protein 7.8 6.4 - 8.3 g/dL    Alb 4.6 3.5 - 5.2 g/dL    Total Bilirubin 0.3 0.0 - 1.2 mg/dL    Alkaline Phosphatase 145 (H) 40 - 129 U/L    ALT 14 0 - 40 U/L    AST 23 0 - 39 U/L   Troponin   Result Value Ref Range    Troponin <0.01 0.00 - 0.03 ng/mL   Brain Natriuretic Peptide   Result Value Ref Range    Pro-BNP 1,416 (H) 0 - 450 pg/mL   Protime-INR   Result Value Ref Range    Protime 24.3 (H) 9.3 - 12.4 sec    INR 2.1        RADIOLOGY:  Interpreted by Radiologist.  CTA CHEST W CONTRAST   Final Result   1. No pulmonary embolism. 2. Chronic fibrotic change and pleural thickening in the lower left chest   with round atelectasis that appears stable. No acute airspace abnormality. 3. Significant increase in the size of a pulmonary nodule in the right middle   lobe with follow-up recommendations provided below. 4. AAA measuring 3.9 cm.   Recommendations are below.      RECOMMENDATIONS:   Fleischner Society guidelines for follow-up and management of incidentally   detected pulmonary nodules:      Single Solid Nodule:      Nodule size greater than 8 mm      In a low-risk patient, consider CT at 3 months, PET/CT, or tissue sampling. In a high-risk patient, consider CT at 3 months, PET/CT, or tissue sampling.      - Low risk patients include individuals with minimal or absent history of   smoking and other known risk factors. - High risk patients include individuals with a history or smoking or known   risk factors. Radiology 2017 http://pubs. rsna.org/doi/full/10.1148/radiol. 4861696654      3.9 cm abdominal aortic aneurysm. Recommend follow-up every 2 years. Reference: J Am Nilda Radiol 0197;63:558-834. XR CHEST PORTABLE   Final Result   Mild cardiomegaly. Increasing airspace disease in the retrocardiac left lower lobe which may   represent pneumonia, aspiration and or atelectasis. Bilateral pleural effusions and left basilar atelectasis. EKG:  This EKG is signed and interpreted by me. Rate: 84  Rhythm: Atrial fibrillation  Interpretation: non-specific EKG, right bundle branch   Comparison: stable as compared to patient's most recent EKG        ------------------------- NURSING NOTES AND VITALS REVIEWED ---------------------------   The nursing notes within the ED encounter and vital signs as below have been reviewed by myself. BP (!) 148/78   Pulse 84   Temp 97.1 °F (36.2 °C)   Resp 18   Wt 183 lb (83 kg)   SpO2 92%   BMI 29.09 kg/m²   Oxygen Saturation Interpretation: Normal    The patients available past medical records and past encounters were reviewed.         ------------------------------ ED COURSE/MEDICAL DECISION MAKING----------------------  Medications   furosemide (LASIX) injection 20 mg (has no administration in time range)   iopamidol (ISOVUE-370) 76 % injection 75 mL (75 mLs Intravenous Given 10/29/20 0203)             Medical Decision Making:    Patient presents because of ongoing shortness of breath. Patient reporting no significant cough. He reports no fever or chills. He was just recently seen by his primary care physician for the same complaint. Patient still reporting no difference. Patient reporting no chest pain. Labs and EKG noted patient is on anticoagulants. Patient CT also done due to his persistent shortness of breath. It does show chronic findings as well as increased size of his not lung nodule. Patient medicated here with Lasix. Plan will be to admit. Re-Evaluations:             Re-evaluation. Patients symptoms show no change  Patient reevaluated still reporting feeling short of breath. Vital signs noted. Patient medicated with Lasix. Patient made aware of CT findings. Consultations:             Internal medicine    Critical Care: This patient's ED course included: a personal history and physicial eaxmination    This patient has been closely monitored during their ED course. Counseling: The emergency provider has spoken with the patient and discussed todays results, in addition to providing specific details for the plan of care and counseling regarding the diagnosis and prognosis. Questions are answered at this time and they are agreeable with the plan.       --------------------------------- IMPRESSION AND DISPOSITION ---------------------------------    IMPRESSION  1. Congestive heart failure, unspecified HF chronicity, unspecified heart failure type ([facility]    2. Lung nodule        DISPOSITION  Disposition: To be admitted  Patient condition is stable        NOTE: This report was transcribed using voice recognition software.  Every effort was made to ensure accuracy; however, inadvertent computerized transcription errors may be present          Sula Rinne, MD  10/28/20 677 [facility] Lacy León MD  10/29/20 9019

## 2020-10-29 NOTE — CONSULTS
Today's Date: 10/29/2020  Patient Name: Socorro Torres  Date of admission: 10/28/2020 11:08 PM  Patient's age: 82 y. o., 1935  Admission Dx: SOB (shortness of breath) [R06.02]    Reason for Consult:  atrial fibrillation  Requesting Physician: Yoavna Camarillo MD    CHIEF COMPLAINT: SOB  History Obtained From:  patient    HISTORY OF PRESENT ILLNESS:      The patient is a 82 y.o.  male who is admitted to the hospital for SOB. He is well-known to me for a history of the following issues  MI, 2003. Cardiac catheterization: 85% ostial, proximal and mid LAD narrowings. LMC 70% stenosis. D1 occluded. D2 50% stenosis. OM1 80% ostial stenosis. Mid CX occluded. Dominant RCA severe disease. LVEF 40-45%. CABG, 05/23/2003,  JOSEPH Kim with LIMA-LAD, SVG-RI, SVG-OM. VT causing cardiac arrest after stress test, 03/15/2007. He was successfully cardioverted. Cardiac catheterization, 03/16/2007 with normal LVEF, severe native three vessel coronary disease. SVG-RI, SVG-OM both occluded. LIMA-LAD patent. Re-do CABG, [facility], 03/2007 with radial artery graft to D2 and OM2. Elective PCI with CONNOR native OM2 and native LAD, 03/2007 following CABG. This was done because of inadequate conduits. ICD placement, [facility], 03/2007. Presentation [place] [address], 03/29/2008 with multiple ICD inappropriate shocks. Transfer [facility] where ICD and lead were replaced. He reports cardiac catheterization that revealed patent LIMA-LAD and patent stents in native coronary arteries. PAF with DCCV, [facility], 12/2010. EGD, 08/28/2013. Large pyloric channel ulcer with clot treated with PRBCs and fresh frozen plasma. Hb 8.7 on day of discharge and stable. Pradaxa was temporarily held. Echo 10/16/2015. EF 39%. Stage II diastolic dysfunction. Aortic stenosis with peak/mean gradients of 48/24 mmHg. Estimated valve area 1.0 cm².   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 S/P Cardioversion, 05/30/2017. Patient was placed on dofetilide therapy thereafter to maintain sinus rhythm  Hospitalized in 2/2020 with acute HFpEF (BNP 2389 on admission) and severe anemia. Mr. Valeria Sanchez has been followed regularly in our office and although he has had episodes of paroxysmal atrial fibrillation he usually converts spontaneously to sinus rhythm. He developed atrial fibrillation again on October 25, that is 4 days ago and presented to the hospital yesterday with progressive shortness of breath. His clinical symptoms have improved with IV diuretics but he continues to be in atrial fibrillation and complaining of shortness of breath and palpitations  No chest discomfort however  No syncope or near syncope        Past Medical History:   has a past medical history of A-fib ([address] Arrhythmia, Carotid artery stenosis, CHF (congestive heart failure) ([address] Heart valve problem, Hyperlipidemia, Hypertension, ICD (implantable cardiac defibrillator) in place, MI (mitral incompetence), and MI (myocardial infarction) ([address] Past Surgical History:   has a past surgical history that includes Varicose vein surgery (1970's); Coronary artery bypass graft (05/23/2003 03/16/2007); Diagnostic Cardiac Cath Lab Procedure (2007); Diagnostic Cardiac Cath Lab Procedure (03/29/2008); joint replacement (2011); Cardiac defibrillator placement (2007. 2008); Cardiac defibrillator placement (07/28/2016); Cardiac catheterization (Right, 03/03/2017); other surgical history (03/29/2017); Upper gastrointestinal endoscopy (N/A, 9/3/2019); Upper gastrointestinal endoscopy (N/A, 2/18/2020); and Colonoscopy (N/A, 2/24/2020). Home Medications:    Prior to Admission medications    Medication Sig Start Date End Date Taking?  Authorizing Provider   furosemide (LASIX) 40 MG tablet Take 1 tablet by mouth daily 10/28/20  Yes Minh Rasheed MD   pravastatin (PRAVACHOL) 40 MG tablet Take 1 tablet by mouth daily 8/12/20 Yes Libertad Mcdowell MD   metoprolol tartrate (LOPRESSOR) 50 MG tablet 75 mg in AM and 50 mg at [facility] 8/12/20  Yes Libertad Mdcowell MD   MAGNESIUM-OXIDE 400 (241.3 Mg) MG TABS tablet TK 1 T PO ONCE D 3/18/20  Yes Historical Provider, MD   pantoprazole (PROTONIX) 40 MG tablet Take 1 tablet by mouth daily 5/7/20  Yes Libertad Mcdowell MD   ferrous sulfate (IRON 325) 325 (65 Fe) MG tablet Take 1 tablet by mouth daily (with breakfast) 5/7/20  Yes Libertad Mcdowell MD   dofetilide formerly [facility]) 250 MCG capsule Take 1 capsule by mouth 2 times daily 3/5/20  Yes Libertad Mcdowell MD   rivaroxaban (XARELTO) 20 MG TABS tablet Take 1 tablet by mouth daily 3/4/20  Yes Felix Snow MD   albuterol sulfate  (90 Base) MCG/ACT inhaler Inhale 2 puffs into the lungs 4 times daily as needed for Wheezing 9/13/19  Yes GINETTE Underwood - CNP   Multiple Vitamins-Minerals (PRESERVISION AREDS 2 PO) Take 1 tablet by mouth 2 times daily   Yes Historical Provider, MD   Cholecalciferol (VITAMIN D) 2000 UNITS CAPS capsule Take 1 capsule by mouth daily    Yes Historical Provider, MD       Allergies:  Patient has no known allergies. Social History:   reports that he quit smoking about 47 years ago. He has a 1.50 pack-year smoking history. He has never used smokeless tobacco. He reports that he does not drink alcohol or use drugs. Family History: family history is not on file. No h/o sudden cardiac death. No for premature CAD    REVIEW OF SYSTEMS:    · Constitutional: there has been no unanticipated weight loss. There's been Yes change in energy level, Yes change in activity level. · Eyes: No visual changes or diplopia. No scleral icterus. · ENT: No Headaches, hearing loss or vertigo. No mouth sores or sore throat. · Cardiovascular: No chest pain, Yes dyspnea on exertion, Yes palpitations or No loss of consciousness. No cough, hemoptysis, No pleuritic pain, or phlebitis. · Respiratory: No cough or wheezing, no sputum production. No hematemesis. · Gastrointestinal: No abdominal pain, appetite loss, blood in stools. No change in bowel or bladder habits. · Genitourinary: No dysuria, trouble voiding, or hematuria. · Musculoskeletal:  No gait disturbance, No weakness or joint complaints. · Integumentary: No rash or pruritis. · Neurological: No headache, diplopia, change in muscle strength, numbness or tingling. No change in gait, balance, coordination, mood, affect, memory, mentation, behavior. · Psychiatric: No anxiety, or depression. · Endocrine: No temperature intolerance. No excessive thirst, fluid intake, or urination. No tremor. · Hematologic/Lymphatic: No abnormal bruising or bleeding, blood clots or swollen lymph nodes. · Allergic/Immunologic: No nasal congestion or hives. PHYSICAL EXAM:      BP (!) 158/74   Pulse 95   Temp 97.8 °F (36.6 °C) (Temporal)   Resp 18   Ht 5' 8\" (1.727 m)   Wt 173 lb 3.2 oz (78.6 kg)   SpO2 98%   BMI 26.33 kg/m²    Constitutional and General Appearance: alert, cooperative, no distress and appears stated age  HEENT: PERRL, no cervical lymphadenopathy. No masses palpable. Normal oral mucosa  Respiratory:  · Normal excursion and expansion without use of accessory muscles  · Resp Auscultation: Good respiratory effort. No for increased work of breathing. On auscultation: clear to auscultation bilaterally  Cardiovascular:  · The apical impulse is laterally displaced  · On auscultation, S1 and S2 are normal at the left sternal border and apex, grade 3/6 systolic ejection murmur the aortic area the left sternal border noted  · Jugular venous pulsation Normal  · The carotid upstroke is normal in amplitude and contour without delay or bruit  · Peripheral pulses are symmetrical and full  Abdomen:  · No masses or tenderness  · Bowel sounds present  Extremities:  ·  No Cyanosis or Clubbing  ·  Lower extremity edema: No  · Skin: Warm and dry  Neurological:  · Alert and oriented.   · Moves all extremities 10/28/20  2327 10/29/20  0707   WBC 11.0 7.9   HGB 13.1 12.1*   HCT 41.3 38.7    209     BMP:   Recent Labs     10/28/20  2327 10/29/20  0707    140   K 4.0 4.3   CO2 28 28   BUN 30* 27*   CREATININE 1.4* 1.3*   LABGLOM 48 53   GLUCOSE 114* 114*     BNP: No results for input(s): BNP in the last 72 hours. PT/INR:   Recent Labs     10/28/20  2327   PROTIME 24.3*   INR 2.1     APTT:No results for input(s): APTT in the last 72 hours. CARDIAC ENZYMES:  Recent Labs     10/28/20  2327 10/29/20  0707   TROPONINI <0.01 <0.01     FASTING LIPID PANEL:  Lab Results   Component Value Date    HDL 33 08/17/2020    [address] - 08/17/2020    TRIG 611 08/17/2020     LIVER PROFILE:  Recent Labs     10/27/20  1329 10/28/20  2327   AST 29 23   ALT 17 14   LABALBU 4.7 4.6       IMPRESSION:    Patient Active Problem List   Diagnosis    S/P TAVR (transcatheter aortic valve replacement)    Coronary artery disease involving native coronary artery of native heart without angina pectoris    Essential hypertension    Hyperlipidemia LDL goal <100    ICD (implantable cardioverter-defibrillator), dual, in situ    VT (ventricular tachycardia) (HCC)    Ischemic heart disease    SOB (shortness of breath)   Congestive heart failure, diastolic, acute,--- possibly related to development of atrial fibrillation  Paroxysmal atrial fibrillation, and this episode started 4 days ago  Patient on chronic systemic anticoagulation with Xarelto  ICD check does not show any change in transthoracic impedance suggestive of pulmonary edema      RECOMMENDATIONS:    Resume dofetilide as at home  Cardioversion today  Continue IV diuresis for 1 more day  Patient encouraged to ambulate        Discussed with patient and Nurse.     Hans Montano MD,FACC

## 2020-10-29 NOTE — CONSULTS
Inpatient Cardiology Consultation      Reason for Consult:  CHF, prolonged QT interval     Consulting Physician: Dr. Jaja Hinton    Requesting Physician:  Dr. West Bowman     Date of Consultation: 10/29/2020    History of Present Illness:    Mr. Monroe Botello is an  82year old gentleman who is followed at our practice by Dr. Ambrocio Lim; he is also followed by Dr. Kareem Becker from an electrophysiology standpoint. His past medical history includes coronary artery disease s/p CABG, redo CABG, and PCI; VT s/p ICD, and PAF, treated with Xarelto and Tikosyn. He presented to the emergency room yesterday after having several days of dyspnea. He often becomes short of breath when bending over or when lifting heavy objects, but denies orthopnea or PND and feels his breathing had been stable until this week. He had been seen in urgent care on Tuesday; CXR was read as showing cardiomegaly with no signs of failure and trace effusions and BNP was 2820. He was advised to increase his Lasix from 40 mg to 60 mg for one week, and to follow with his primary physician. He felt better that evening, however later developed PND and the following day was short of breath even at rest, prompting him to come to the ER. On arrival, BNP was 1416 and CTA of the chest was negative for pulmonary embolism, with increased size of pulmonary nodule; CXR showed mild cardiomegaly with increasing airspace disease in the left lower lobe. He was treated with 20 mg of IV Lasix, and cardiology was consulted regarding his CHF as well as prolonged QT interval. Mr. Monroe Botello is resting in bed at this time, and in no apparent distress. He has had some improvement in his dyspnea since arrival, and denies recent palpitations or chest discomfort. Tikosyn has been placed on hold and he was continued on his maintenance dose of oral Lasix. I/O is not documented. Past Medical and Surgical History:    1. MI, 2003. Cardiac catheterization: 85% ostial, proximal and mid LAD narrowings. LMC 70% stenosis. D1 occluded. D2 50% stenosis. OM1 80% ostial stenosis. Mid CX occluded. Dominant RCA severe disease. LVEF 40-45%. 2. CABG, 05/23/2003, [facility] [place], [place] with LIMA-LAD, SVG-RI, SVG-OM. 3. Hyperlipidemia. 4. Mild carotid plaque on US, 2003. 5. Echo, 07/2006. Mild LVE, normal EF, Stage II diastolic dysfunction, moderate AS, mild MR, mild TR. 6. Right leg vein stripping, 1970's. 7. No history diabetes mellitus, stroke or COPD. 8. Nonsmoker for many years. 9. VT causing cardiac arrest after stress test, 03/15/2007. He was successfully cardioverted. 10. Cardiac catheterization, 03/16/2007 with normal LVEF, severe native three vessel coronary disease. SVG-RI, SVG-OM both occluded. LIMA-LAD patent. 11. Re-do CABG, [facility], 03/2007 with radial artery graft to D2 and OM2. 12. Elective PCI with CONNOR native OM2 and native LAD, 03/2007 following CABG. This was done because of inadequate conduits. 13. ICD placement, [facility], 03/2007. 14. ICD lead recall, early 2008, but he was followed electively because his device was functioning normally. 15. Presentation [facility], 03/29/2008 with multiple ICD inappropriate shocks. Transfer [facility] where ICD and lead were replaced. He reports cardiac catheterization that revealed patent LIMA-LAD and patent stents in native coronary arteries. 16. Atypical chest pain and anxiety with admission [facility], 05/2008. Troponin minimally elevated. Beta blocker dose increased. [facility] with lightheadedness, orthostatic hypotension, drop in hemoglobin to 10.5 from 14.9 over two months with an increase in BUN from 16 to 68 over the same time. Dark heme positive stools noted. EKG NSR, incomplete RBBB. 18. Echo, 06/15/2009 with normal LVEF, moderate AS, peak gradient 38 mmHg, BLOSSOM 1.1 cm², moderate MR, LAE. 19. Transtelephonic ICD check, 01/11/2010. No ventricular tachyarrhythmias. Two AF episodes, the longest for 14 hours.    20. Echo, 06/16/2010 with LVE, borderline LVH, normal systolic and diastolic function, normal LA, ICD electrode right heart. Trileaflet AV with moderate to severe AS, mean/peak gradient 20/31 mmHg. BLOSSOM 1.0 cm². MAC with mild MR, normal RVSP.  21. No drug allergies. 25. Family history negative for premature vascular disease. 23. PAF with DCCV, [facility], 12/2010.   24. [facility]. 25. [facility]2012. Moderate sized fixed basal inferior defect, probably artifact. 26. Echo [facility], 10/20/2011. 1+ AR, moderate AS, mild MR, mild TR, normal LVEF.   27. Echo, 01/31/2013. LV not dilated. Mild concentric LVH. Septal paradox. EF 50-55%. BLOSSOM 1.2 cm² consistent with moderate stenosis. Peak/mean gradient 38/21. Stage II diastolic dysfunction. 28. [facility], 08/28/2013 with dizziness, Hb 7.7, WBC 19.0. CXR negative except cardiomegaly. EKG NSR, leftward axis, RBBB. BMP negative except for elevation in BUN to 55 with Cr 1.3.   29. EGD, 08/28/2013. Large pyloric channel ulcer with clot treated with PRBCs and fresh frozen plasma. Hb 8.7 on day of discharge and stable. Pradaxa was temporarily held. 30. CT abdomen, 09/08/2014. 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 31. CT chest, 09/17/2014. Consolidation and infiltrate at the left lung base, stable when compared to previous exams with less pleural thickening and calcified plaque in the left pleural cavity without any recent change. Chronic obstructive airways disease. 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   32. ICD programmed to DDDR mode by Dr. Harvinder Redman, 03/26/2015. Device function normal.  33. Echo 10/16/2015. EF 39%. Stage II diastolic dysfunction.  Aortic stenosis with peak/mean gradients of 48/24 mmHg. Estimated valve area 1.0 cm². 34. ICD generator change for battery depletion, 07/28/2016, Dr. Leo Plascencia. 35. Echo, 02/03/2017.  Normal LV size with mild LVH.  Normal regional wall motion and overall systolic function.  Diastole could not be assessed, but was presumed to be impaired.  The RV was dilated with impaired global systolic function.  The LA was enlarged.  Mild MAC with mild mitral insufficiency.  Moderate tricuspid insufficiency.  Aortic valve gradients are peak 47 mmHg with a mean of 27.  Dimensionless ratio 0.21. Valve area calculated 0.8 cm².    36.  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  37. Echocardiogram, [facility], 4/19/2018, EF 60%.  Low normal RV function.  Stage 2 diastolic dysfunction.  Mild-to-moderate MR. S/P TAVR with a mean gradient of 10 mmHg.  RVSP 31 mg.     38. S/P Cardioversion by Dr. Hans Montano, 05/30/2017.   19 Rohith Howard evaluation, 09/07/2018, for 2 appropriate ICD discharges for polymorphic ventricular tachycardia, which occurred after yard work and moving heavy furniture.    40. Echocardiogram with Bubble Study 08/16/2019: EF 60%. Inferior basal wall hypokinetic. No WMA. Stage II Diastolic Dysfunction. BLOSSOM 1.6. Maxium gradient 15. Mean gradient 7. Mild AR. Mild MR, mild TR. No PFO/ASD. 41. TIA in 09/2019  42. EGD 2/18/2020: mild gastritis and esophagitis  43. Hospitalized in 2/2020 with acute HFpEF (BNP 2389 on admission) and severe anemia. He had 43 beats of VT treated with ATP. He was seen in consult by Dr. Claire Grace (in coverage for Dr. Lani Plascencia) and started on IV Lidocaine. Colonoscopy showed no colonic source of bleed. Medications Prior to Admit:  Prior to Admission medications    Medication Sig Start Date End Date Taking?  Authorizing Provider   furosemide (LASIX) 40 MG tablet Take 1 tablet by mouth daily 10/28/20  Yes Minh Rasheed MD   pravastatin (PRAVACHOL) 40 MG tablet Take 1 tablet by mouth daily 8/12/20  Yes Minh Rasheed MD   metoprolol tartrate (LOPRESSOR) 50 MG tablet 75 mg in AM and 50 mg at [facility] 8/12/20  Yes Josh Velazquez MD   MAGNESIUM-OXIDE 400 (241.3 Mg) MG TABS tablet TK 1 T PO ONCE D 3/18/20  Yes Historical Provider, MD   pantoprazole (PROTONIX) 40 MG tablet Take 1 tablet by mouth daily 5/7/20  Yes Josh Velazquez MD   ferrous sulfate (IRON 325) 325 (65 Fe) MG tablet Take 1 tablet by mouth daily (with breakfast) 5/7/20  Yes Josh Velazquez MD   dofetilide [facility]) 250 MCG capsule Take 1 capsule by mouth 2 times daily 3/5/20  Yes Josh Velazquez MD   rivaroxaban Yasir Fontanez) 20 MG TABS tablet Take 1 tablet by mouth daily 3/4/20  Yes Bravo Caban MD   albuterol sulfate  (90 Base) MCG/ACT inhaler Inhale 2 puffs into the lungs 4 times daily as needed for Wheezing 9/13/19  Yes GINETTE Blakely - CNP   Multiple Vitamins-Minerals (PRESERVISION AREDS 2 PO) Take 1 tablet by mouth 2 times daily   Yes Historical Provider, MD   Cholecalciferol (VITAMIN D) 2000 UNITS CAPS capsule Take 1 capsule by mouth daily    Yes Historical Provider, MD       Current Medications:    Current Facility-Administered Medications: albuterol (PROVENTIL) nebulizer solution 2.5 mg, 2.5 mg, Nebulization, Q4H PRN  vitamin D (CHOLECALCIFEROL) tablet 2,000 Units, 2,000 Units, Oral, Daily  [Held by provider] dofetilide (TIKOSYN) capsule 250 mcg, 250 mcg, Oral, BID  ferrous sulfate (IRON 325) tablet 325 mg, 325 mg, Oral, Daily with breakfast  furosemide (LASIX) tablet 40 mg, 40 mg, Oral, Daily  magnesium oxide (MAG-OX) tablet 400 mg, 400 mg, Oral, Daily  metoprolol tartrate (LOPRESSOR) tablet 75 mg, 75 mg, Oral, Daily  pantoprazole (PROTONIX) tablet 40 mg, 40 mg, Oral, Daily  pravastatin (PRAVACHOL) tablet 40 mg, 40 mg, Oral, Daily  rivaroxaban (XARELTO) tablet 20 mg, 20 mg, Oral, Dinner  metoprolol tartrate (LOPRESSOR) tablet 50 mg, 50 mg, Oral, Nightly  sodium chloride flush 0.9 % injection 10 mL, 10 mL, Intravenous, 2 times per day  sodium chloride flush 0.9 moist  Neck-  No stridor or carotid bruit. + JVD   CHEST: Chest symmetrical and non-tender to palpation. Respirations unlabored. RESPIRATORY: Breath sounds clear throughout   CARDIOVASCULAR:     Heart Ausculation- Irregular rhythm, grade 2/6 SM. No s3 or rub   PV: No lower extremity edema. No varicosities. Feet warm to touch. ABDOMEN: Soft, non-tender to light palpation. Bowel sounds present. No palpable masses  MS: Good muscle strength and tone. No atrophy or abnormal movements. : Deferred  SKIN: Warm and dry n  NEURO / PSYCH: Oriented to person, place and time. Speech clear and appropriate. Follows all commands. Pleasant affect     Telemetry: AF with controlled ventricular rates, some ventricular pacing beats   ECG: AFib, 84 bpm with left axis deviation, right BBB, prior inferior infarct,  msec, QTc 524 msec      No intake or output data in the 24 hours ending 10/29/20 0717    Labs:   CBC:   Recent Labs     10/27/20  1329 10/28/20  2327   WBC 9.0 11.0   HGB 13.2 13.1   HCT 42.3 41.3    222     BMP:   Recent Labs     10/27/20  1329 10/28/20  2327    141   K 4.6 4.0   CO2 25 28   BUN 28* 30*   CREATININE 1.3* 1.4*   LABGLOM 53 48   CALCIUM 9.4 9.7     Mag: No results for input(s): MG in the last 72 hours. Phos: No results for input(s): PHOS in the last 72 hours. TSH: No results for input(s): TSH in the last 72 hours. HgA1c:   Lab Results   Component Value Date    LABA1C 5.5 03/24/2017     No results found for: EAG  proBNP:   Recent Labs     10/27/20  1329 10/28/20  2327   PROBNP 2,820* 1,416*     PT/INR:   Recent Labs     10/28/20  2327   PROTIME 24.3*   INR 2.1     APTT:No results for input(s): APTT in the last 72 hours.   CARDIAC ENZYMES:  Recent Labs     10/28/20  2327   TROPONINI <0.01     FASTING LIPID PANEL:  Lab Results   Component Value Date    CHOL 185 08/17/2020    HDL 33 08/17/2020    LDLCALC - 08/17/2020    TRIG 611 08/17/2020     LIVER PROFILE:  Recent Labs 10/27/20  1329 10/28/20  2327   AST 29 23   ALT 17 14   LABALBU 4.7 4.6     CXR 10/28/2020: Impression:      Mild cardiomegaly. Increasing airspace disease in the retrocardiac left lower lobe which may represent pneumonia, aspiration and or atelectasis. Bilateral pleural effusions and left basilar atelectasis. CTA chest with contrast 10/28/2020: Impression:     1. No pulmonary embolism. 2. Chronic fibrotic change and pleural thickening in the lower left chest   with round atelectasis that appears stable.  No acute airspace abnormality. 3. Significant increase in the size of a pulmonary nodule in the right middle lobe with follow-up recommendations provided below. 4. AAA measuring 3.9 cm. Assessment and Recommendations to follow by  Dr. Theresa Elam. Electronically signed by GINETTE Blair on 10/29/2020 at 7:17 AM     The above documentation has been prepared under my direction and personally reviewed by me in its entirety. I confirm that the note above accurately reflects all work, treatment, procedures, and medical decision making performed by me. The patient's history was independently obtained. The patient was independently examined. Electrocardiogram, prior and present cardiovascular assessment, and laboratory studies were reviewed. The patient is an 82-year-old white female known to [facility] with primary cardiovascular care provided by Mari Ramirez as well as electrophysiology care provided by Hans Montano.   He has an extensive cardiovascular history inclusive of coronary atherosclerosis with coronary angiography in May, 2003 demonstrating evidence of diffuse coronary atherosclerosis with mild to moderate left ventricular systolic dysfunction and surgical revascularization with a left internal mammary graft to the left anterior descending and saphenous vein grafts to the ramus intermedius branch of the circumflex and circumflex marginal at the [address] [place] [address] in [facility]. He subsequently developed a ventricular arrhythmia arrest in March, 2007 with repeat coronary angiography demonstrating normalization of left ventricular systolic function with residual patency of the left internal mammary graft and occlusion of saphenous vein grafts to both circumflex distributions with a repeat revascularization procedure inclusive of a radial artery graft to the diagonal branch of the left anterior descending bridge the circumflex marginal and based on insufficient conduit associated percutaneous coronary intervention of both the native left anterior descending and circumflex marginal.  In 2008 following inappropriate discharges of his implantable cardioverter defibrillator his device and leads were replaced and coronary angiography demonstrated residual patency of the left internal mammary graft and all interventional sites. He additionally has developed paroxysmal atrial fibrillation as well as that of progressive aortic stenosis with a transcatheter aortic valve replacement in March, 2017. He additionally has suffered a transient cerebral ischemic episode and gastrointestinal hemorrhage with documented gastritis and during hospitalization with the latter in February, 2020 additionally developed acute superimposed upon chronic diastolic heart failure and with episodes of asymptomatic ventricular tachycardia terminated by atrial pacing.   He has most recently undergone objective assessment with an echocardiogram at the time of his transient cerebral ischemic episode in August, 2019 demonstrating evidence of a normal-sized left ventricular chamber with hypokinesis of the basal inferior segment and overall normal left ventricular systolic function with stage II diastolic dysfunction and appropriate function of his transcatheter aortic valve replacement with a mean transaortic gradient of 7 mmHg with mild aortic insufficiency and mitral regurgitation with no evidence of an intracardiac shunt. He otherwise has remained in his usual state of health with no recent cardiovascular symptomatology until the past week and over the past 4 days has noted symptomatology of increasing exertional dyspnea and exercise intolerance with a mild increase of his chronic lower extremity edema. He was initially evaluated by his primary care physician who adjusted his diuretic dosage but in spite of this he noted no improvement of his symptoms and presented to the emergency room where an electrocardiogram reviewed time of his evaluation demonstrated evidence of atrial fibrillation with acceptable rate control and predominant ventricular pacing and a chest x-ray demonstrated evidence of cardiomegaly with mild interstitial infiltrates and trivial pleural effusions. Laboratory studies demonstrated evidence of stage III chronic kidney disease with a creatinine clearance of approximately 40 mL/min per 1.73 m² with a proBNP level of 2820 pg/mL significantly increased from that previously documented and no additional metabolic derangements and with normal cardiac biomarkers. Subsequent device interrogation demonstrates no recent significant ventricular tachyarrhythmias with episodes of paroxysmal atrial fibrillation, the most recent episode beginning 5 days prior to his hospitalization and 24 hours prior to the onset of his symptoms with persistence since its inception. While maintained in sinus rhythm minimal ventricular pacing has been noted. He is subsequently been diuresed with no significant present symptomatology at rest.    At the time of evaluation, his medications and allergies were reviewed as well as that of his past medical history and review of systems documented. On examination, he presently appears in no discomfort nor distress and is hemodynamically stable with vital signs as documented. Jugular venous pressure is normal with no identified carotid bruits.   Lung fields demonstrate minimal somewhat fibrotic rales in both lung bases. Cardiac examination is notable for a slightly irregular rhythm with an early peaking systolic ejection murmur at the right upper sternal border and normal bioprosthetic aortic second heart tones. A benign abdominal examination is present and mild pretibial edema present. Diagnostic Assessment and Plan: On a clinical basis, the patient presents with evidence of acute superimposed upon chronic diastolic heart failure largely in the basis of the loss of atrioventricular synchrony in the face of his atrial fibrillation recurrence as well as the potential adverse effects of increased ventricular pacing. At the time of admission his corrected QT interval was prolonged beyond that of his baseline largely on the basis of ventricular pacing and presently not necessitating alteration of his dofetilide dosages which have been resumed. His electrophysiologist has been contacted for her assistance in management with likely plans of electrical cardioversion to restore sinus rhythm in the absence of omitted doses of his oral anticoagulation. Following the reestablishment of sinus rhythm, additional diuresis will be indicated to return his volume status to normal along with the continuation of his existing medical regimen. Careful monitoring of his creatinine clearance will be necessary to optimize dosing of his rivaroxaban with today's creatinine clearance potentially indicating needs of alteration of dosing if persistent. In addition, continued aggressive risk factor modification of blood pressure, diabetes and serum lipids will be essential to reducing risk of future atherosclerotic development as well as that of ongoing needs of appropriate antibiotic prophylaxis time of all dental interventions to reduce risk of bacterial endocarditis. Thank you for allowing me to participate in your patient's care.  Please feel free to contact me if you have any questions or concerns. Love Benitez.  Martha Castro, [phone] Cardiology

## 2020-10-29 NOTE — ED NOTES
Pt calm resting in bed, respirations even and unlabored. No s/sx of distress at this time.       Dellie Denver, RN  10/29/20 [id number]

## 2020-10-29 NOTE — CARE COORDINATION
10/29 Transition of Care: Patient is alert and oriented. He said he was having worsening shortness of breath and he went to urgent care where they increased his lasix. It did not help so he came to hospital.  He resides with his spouse. He has children who help. He is independent. He does not use assistive devices at home. His pcp is Dr Yuliana Dobbins and his pharmacy is [facility] in [place]. His plan is to return home at discharge. He is currently awaiting EP and has been started on iv lasix qd. Will follow for plan.  Iban Love RN   [phone]

## 2020-10-30 ENCOUNTER — TELEPHONE (OUTPATIENT)
Dept: CARDIOLOGY CLINIC | Age: 85
End: 2020-10-30

## 2020-10-30 VITALS
OXYGEN SATURATION: 93 % | WEIGHT: 171.6 LBS | SYSTOLIC BLOOD PRESSURE: 144 MMHG | BODY MASS INDEX: 26.01 KG/M2 | HEIGHT: 68 IN | RESPIRATION RATE: 16 BRPM | TEMPERATURE: 97.2 F | HEART RATE: 70 BPM | DIASTOLIC BLOOD PRESSURE: 97 MMHG

## 2020-10-30 LAB
ANION GAP SERPL CALCULATED.3IONS-SCNC: 13 MMOL/L (ref 7–16)
BUN BLDV-MCNC: 35 MG/DL (ref 8–23)
CALCIUM SERPL-MCNC: 8.7 MG/DL (ref 8.6–10.2)
CHLORIDE BLD-SCNC: 100 MMOL/L (ref 98–107)
CO2: 29 MMOL/L (ref 22–29)
CREAT SERPL-MCNC: 1.3 MG/DL (ref 0.7–1.2)
EKG ATRIAL RATE: 102 BPM
EKG Q-T INTERVAL: 424 MS
EKG QRS DURATION: 148 MS
EKG QTC CALCULATION (BAZETT): 518 MS
EKG R AXIS: -84 DEGREES
EKG T AXIS: 23 DEGREES
EKG VENTRICULAR RATE: 90 BPM
GFR AFRICAN AMERICAN: >60
GFR NON-AFRICAN AMERICAN: 53 ML/MIN/1.73
GLUCOSE BLD-MCNC: 109 MG/DL (ref 74–99)
POTASSIUM SERPL-SCNC: 4.1 MMOL/L (ref 3.5–5)
SODIUM BLD-SCNC: 142 MMOL/L (ref 132–146)

## 2020-10-30 PROCEDURE — 80048 BASIC METABOLIC PNL TOTAL CA: CPT

## 2020-10-30 PROCEDURE — 6370000000 HC RX 637 (ALT 250 FOR IP): Performed by: INTERNAL MEDICINE

## 2020-10-30 PROCEDURE — G0378 HOSPITAL OBSERVATION PER HR: HCPCS

## 2020-10-30 PROCEDURE — 36415 COLL VENOUS BLD VENIPUNCTURE: CPT

## 2020-10-30 PROCEDURE — 2580000003 HC RX 258: Performed by: INTERNAL MEDICINE

## 2020-10-30 PROCEDURE — 93308 TTE F-UP OR LMTD: CPT

## 2020-10-30 PROCEDURE — 99215 OFFICE O/P EST HI 40 MIN: CPT | Performed by: INTERNAL MEDICINE

## 2020-10-30 PROCEDURE — 93010 ELECTROCARDIOGRAM REPORT: CPT | Performed by: INTERNAL MEDICINE

## 2020-10-30 RX ORDER — FUROSEMIDE 40 MG/1
40 TABLET ORAL DAILY
Status: DISCONTINUED | OUTPATIENT
Start: 2020-10-30 | End: 2020-10-30 | Stop reason: HOSPADM

## 2020-10-30 RX ADMIN — DOFETILIDE 250 MCG: 0.25 CAPSULE ORAL at 08:30

## 2020-10-30 RX ADMIN — MAGNESIUM GLUCONATE 500 MG ORAL TABLET 400 MG: 500 TABLET ORAL at 08:29

## 2020-10-30 RX ADMIN — FERROUS SULFATE TAB 325 MG (65 MG ELEMENTAL FE) 325 MG: 325 (65 FE) TAB at 08:29

## 2020-10-30 RX ADMIN — SODIUM CHLORIDE, PRESERVATIVE FREE 10 ML: 5 INJECTION INTRAVENOUS at 08:29

## 2020-10-30 RX ADMIN — METOPROLOL TARTRATE 75 MG: 50 TABLET, FILM COATED ORAL at 08:29

## 2020-10-30 RX ADMIN — PANTOPRAZOLE SODIUM 40 MG: 40 TABLET, DELAYED RELEASE ORAL at 08:29

## 2020-10-30 RX ADMIN — FUROSEMIDE 40 MG: 40 TABLET ORAL at 08:29

## 2020-10-30 RX ADMIN — Medication 2000 UNITS: at 08:29

## 2020-10-30 ASSESSMENT — PAIN SCALES - GENERAL
PAINLEVEL_OUTOF10: 0
PAINLEVEL_OUTOF10: 0

## 2020-10-30 NOTE — DISCHARGE SUMMARY
Hospitalist Discharge Summary    Patient ID: Berta Betancourt   Patient : 1935  Patient's PCP: Khushi Layton MD    Admit Date: 10/28/2020   Admitting Physician: Corbin Garg MD    Discharge Date:  10/30/2020  Discharge Physician: Tracy Viera MD   Discharge Condition: Stable  Discharge Disposition: Home    History of presenting illness:  Patient is an 61-year-old male patient with past medical history of CHF, Ischemic cardiomyopathy status post ICD placement , coronary artery disease, status post TAVR, history of V. tach, A. fib on anticoagulation,Essential  hypertension, Hyperlipidemia and history of GI bleeding presented to the ED with chief complaint of progressive SOB. patient stated that he has been having shortness of breath for the last 3 to 4 days which tends to get worse with exertion. He stated that he came to the ED b/c of the persistent nature of his symptoms. Patient denied chest pain. No palpitation. No cough. No wheezing. No fever. Patient denied leg swelling, orthopnea and PND. Patient reported that he has been taking his medications consistently. Patient not on home oxygen. Not on CPAP or BiPAP either. Patient reported that he quit smoking 15 years ago. Patient has no complaint of nausea, vomiting, abdominal pain or diarrhea. No urinary complaints. In the ED:Cr 1.4,BNP 1416, CBC unremarkable, INR 2.1, COVID-19 negative,CTA showed no pulmonary embolism but chronic fibrotic change in pleural thickening in the lower left chest with round atelectasis that appears stable with no acute airspace abnormality. CT also showed significant increase in the size of pulmonary nodule in the right middle lobe and abdominal aortic aneurysm measuring 3.9 cm. Patient then admitted for further evaluation and management. Patient had cardioversion on 10/29 for the atrial fibrillation with RVR and has been continued on Metoprolol and also Dofetilide. Continue Apixaban for stroke prophylaxis as well. For his CHF patient was on IV Lasix and upon discharge patient has been continued on his pre-admission dose of Lasix per cardiology team recommendation. Patient was also seen by the pulmonary team for the R middle lobe pulmonary nodule and the team recommended patient to have outpatient follow up. Patient was discharged home in stable condition with advice to continue his follow up with his PCP,Cardiology and pulmonary team.    VS:reviewed. General appearance: No apparent distress, appears stated age and cooperative. HEENT: Normal cephalic, atraumatic without obvious deformity. Pupils equal, round, and reactive to light. Extra ocular muscles intact. Conjunctivae/corneas clear. Neck: Supple, with full range of motion. No jugular venous distention. Trachea midline. Respiratory:  Resonant to percussion and minimal bibasilar rales  Cardiovascular: S1 and S2 are normal,no murmur or gallop    Abdomen: Soft,no organomegaly,no tenderness,normoactive bowel sounds. Musculoskeletal: No clubbing, cyanosis,   Skin: Normal skin color. No rashes or lesions. Neurologic:  Neurovascularly intact without any focal sensory/motor deficits. Cranial nerves: II-XII intact, grossly non-focal.  Psychiatric: Alert and oriented, thought content appropriate, normal insight      (Please refer to daily progress notes for a comprehensive review of the hospitalization by requesting medical records)      Consults:   IP CONSULT TO INTERNAL MEDICINE  IP CONSULT TO CARDIOLOGY  IP CONSULT TO PULMONOLOGY  IP CONSULT TO ELECTROPHYSIOLOGY    Discharge Diagnoses:  1. Acute on chronic HF with preserved EF  2. Atrial fibrillation with RVR in the setting of PAF  3. CAD  4. ICM s/p AICD  5. S/P TAVR  6. Essential HTN  7. HLD,mixed  8. CKD stage III  9. Right pulmonary nodule  10. AAA,measuring 3.9 cm  11.  GERD w/o esophagitis    Discharge Instructions / Follow up:    Continued appropriate risk factor modification of blood pressure, diabetes and serum lipids will remain essential to reducing risk of future atherosclerotic development    Activity: activity as tolerated    Significant labs:  CBC:   Recent Labs     10/27/20  1329 10/28/20  2327 10/29/20  0707   WBC 9.0 11.0 7.9   RBC 4.78 4.71 4.41   HGB 13.2 13.1 12.1*   HCT 42.3 41.3 38.7   MCV 88.5 87.7 87.8   RDW 16.5* 16.6* 16.7*    222 209     BMP:   Recent Labs     10/29/20  0707 10/29/20  1310 10/30/20  0622    148* 142   K 4.3 4.1 4.1   CL 98 102 100   CO2 28 24 29   BUN 27* 27* 35*   CREATININE 1.3* 1.3* 1.3*   MG 2.2  --   --      LFT:  Recent Labs     10/27/20  1329 10/28/20  2327   PROT 7.3 7.8   ALKPHOS 147* 145*   ALT 17 14   AST 29 23   BILITOT 0.5 0.3     PT/INR:   Recent Labs     10/28/20  2327   INR 2.1     BNP: No results for input(s): BNP in the last 72 hours. Hgb A1C:   Lab Results   Component Value Date    LABA1C 5.5 03/24/2017     Folate and B12: No results found for: [id number], No results found for: FOLATE  Thyroid Studies:   Lab Results   Component Value Date    TSH 1.280 10/29/2020    G7ZROPY 107.80 12/08/2016    Y7BPESU 8.2 12/08/2016       Urinalysis:    Lab Results   Component Value Date    NITRU Negative 07/17/2019    WBCUA 1-3 08/28/2013    BACTERIA FEW 08/28/2013    RBCUA 0-1 08/28/2013    BLOODU Negative 07/17/2019    SPECGRAV 1.010 07/17/2019    GLUCOSEU Negative 07/17/2019       Imaging:  Xr Chest Standard (2 Vw)    Result Date: 10/27/2020  EXAMINATION: TWO XRAY VIEWS OF THE CHEST 10/27/2020 1:09 pm COMPARISON: None. HISTORY: ORDERING SYSTEM PROVIDED HISTORY: Dyspnea, unspecified type TECHNOLOGIST PROVIDED HISTORY: Reason for exam:->shortness of breath, no cough, hx of CHF FINDINGS: The heart is enlarged. There are no findings of failure. There is no right or left lung infiltrate. There are trace bilateral pleural effusions. There is a dual lead cardiac pacer on the left. 1. Cardiomegaly. No findings of failure 2. Trace bilateral pleural effusions. Xr Chest Portable    Result Date: 10/28/2020  EXAMINATION: ONE XRAY VIEW OF THE CHEST 10/28/2020 10:44 pm COMPARISON: October 27 HISTORY: ORDERING SYSTEM PROVIDED HISTORY: sob TECHNOLOGIST PROVIDED HISTORY: Reason for exam:->sob What reading provider will be dictating this exam?->CRC FINDINGS: Cardiac silhouette is mildly enlarged. Status post median sternotomy with pacemaker-AICD in place. There is increasing airspace disease in the retrocardiac left lower lobe which may represent pneumonia, aspiration and or atelectasis. Blunting of the left costophrenic angle, consistent with pleural effusion, unchanged. Elevation of the right hemidiaphragm again noted and unchanged. Smaller right pleural effusion. The pulmonary vasculature is within normal limits. No evidence of pneumothorax. Mild cardiomegaly. Increasing airspace disease in the retrocardiac left lower lobe which may represent pneumonia, aspiration and or atelectasis. Bilateral pleural effusions and left basilar atelectasis. Cta Chest W Contrast    Result Date: 10/29/2020  EXAMINATION: CTA OF THE CHEST 10/29/2020 2:04 am TECHNIQUE: CTA of the chest was performed after the administration of intravenous contrast.  Multiplanar reformatted images are provided for review. MIP images are provided for review. Dose modulation, iterative reconstruction, and/or weight based adjustment of the mA/kV was utilized to reduce the radiation dose to as low as reasonably achievable. COMPARISON: 06/28/2017 CT HISTORY: ORDERING SYSTEM PROVIDED HISTORY: sob abnromal chest TECHNOLOGIST PROVIDED HISTORY: Reason for exam:->sob abnromal chest What reading provider will be dictating this exam?->CRC FINDINGS: Pulmonary Arteries: Study is of good technical quality for evaluation of pulmonary embolism. There are no filling defects to suggest pulmonary embolism. Main pulmonary artery is normal in caliber. No evidence of right ventricular strain. Mediastinum: Mild cardiomegaly. Evidence of prior aortic valve surgery. ICD present in the left chest.  Moderate atherosclerotic changes of the aorta. Esophagus appears normal.  No lymphadenopathy. Lungs/pleura: The airways are patent. There is dense pleural thickening and calcification in the lower left chest with an area of round atelectasis in the left lower lobe. Mild fibrotic change in the right lower lobe. No acute pulmonary finding. There is a significant increase in the size of a subpleural pulmonary nodule in the right middle lobe lateral segment on image 65. Current measurements 11 x 8 mm. Upper Abdomen: The adrenal glands are normal.  AAA measuring up to 3.9 cm. 1.8 cm cyst in the right hepatic lobe. Soft Tissues/Bones: No skeletal abnormalities throughout the chest.     1. No pulmonary embolism. 2. Chronic fibrotic change and pleural thickening in the lower left chest with round atelectasis that appears stable. No acute airspace abnormality. 3. Significant increase in the size of a pulmonary nodule in the right middle lobe with follow-up recommendations provided below. 4. AAA measuring 3.9 cm. Recommendations are below. RECOMMENDATIONS: Fleischner Society guidelines for follow-up and management of incidentally detected pulmonary nodules: Single Solid Nodule: Nodule size greater than 8 mm In a low-risk patient, consider CT at 3 months, PET/CT, or tissue sampling. In a high-risk patient, consider CT at 3 months, PET/CT, or tissue sampling. - Low risk patients include individuals with minimal or absent history of smoking and other known risk factors. - High risk patients include individuals with a history or smoking or known risk factors. Radiology 2017 http://pubs. rsna.org/doi/full/10.1148/radiol. 2573008571 3.9 cm abdominal aortic aneurysm. Recommend follow-up every 2 years. Reference: J Am Nilda Radiol 2426;73:485-315.        Discharge Medications:      Medication List      CONTINUE taking these medications    albuterol sulfate

## 2020-10-30 NOTE — CONSULTS
Lung nodule   Patient almost on RA   The Patient can be seen as OP next week,I will arrange to visit and then will discuss options handling the Nodule   Wean O2  On anticoagulation ,so biopsy can not be considered inpatient this week

## 2020-10-30 NOTE — PROGRESS NOTES
Patient provided with incentive spirometer. Educated on use and importance of spirometry. Patient verbalized and demonstrated understanding.     Carlos Boo, RN, BSN

## 2020-10-30 NOTE — PROGRESS NOTES
Electrophysiology progress note         Date:  10/30/2020  Patient: Brenda Concepcion  Admission:  10/28/2020 11:08 PM  Admit DX: SOB (shortness of breath) [R06.02]  Age:  82 y. o., 1935     LOS: 0 days     Reason for evaluation:   atrial fibrillation, s/p cardioversion        SUBJECTIVE:    The patient was seen and examined. Notes and labs reviewed. There were no complications over night. Patient's cardiac review of systems: negative. The patient is generally feeling considerably improved. OBJECTIVE:    Telemetry: Sinus rhythm, atrial paced rhythm  BP (!) 144/97   Pulse 70   Temp 97.2 °F (36.2 °C) (Temporal)   Resp 16   Ht 5' 8\" (1.727 m)   Wt 171 lb 9.6 oz (77.8 kg)   SpO2 93%   BMI 26.09 kg/m²     Intake/Output Summary (Last 24 hours) at 10/30/2020 1440  Last data filed at 10/30/2020 0800  Gross per 24 hour   Intake 240 ml   Output 450 ml   Net -210 ml       EXAM:   CONSTITUTIONAL:  awake, alert, cooperative, no apparent distress, and appears stated age. HEENT: Normal jugular venous pulsations, . Head is atraumatic, normocephalic. Eyes and oral mucosa are normal.  LUNGS: Good respiratory effort. No for increased work of breathing. On auscultation: clear to auscultation bilaterally  CARDIOVASCULAR: Laterally displaced PMI, regular rate and rhythm, normal S1 and S2, no S3  ABDOMEN: Soft, nontender, nondistended. [place] Snare SKIN: Warm and dry. EXTREMITIES: No lower extremity edema. Motor movement grossly intact. No cyanosis or clubbing.     Current Inpatient Medications:   furosemide  40 mg Oral Daily    vitamin D  2,000 Units Oral Daily    ferrous sulfate  325 mg Oral Daily with breakfast    magnesium oxide  400 mg Oral Daily    metoprolol tartrate  75 mg Oral Daily    pantoprazole  40 mg Oral Daily    pravastatin  40 mg Oral Daily    rivaroxaban  20 mg Oral Dinner    metoprolol tartrate  50 mg Oral Nightly    sodium chloride flush  10 mL Intravenous 2 times per day    dofetilide  250 mcg Paroxysmal atrial fibrillation (HCC)    Hyperlipidemia LDL goal <100    ICD (implantable cardioverter-defibrillator), dual, in situ    Ventricular tachycardia (HCC)    Lung nodule    Acute on chronic diastolic (congestive) heart failure (HCC)    Stage 3b chronic kidney disease   Patient improved considerably post cardioversion. Continue dofetilide at present dosage. EKGs do not show excessive QT prolongation. QTc post cardioversion was less than 500 ms  ICD reprogrammed to deliver cardioversions at night for atrial fibrillation episodes lasting longer than 24 hours   All of the above explained to the patient and his family    PLAN:    Okay for discharge home on current medical therapy including metoprolol and dofetilide  ICD reprogrammed as noted  Follow-up in the office as scheduled  Patient instructed to call me when symptoms start for possible outpatient intervention if possible      Please see orders. Discussed with patient and nursing.     Kash Moreno MD,FACC

## 2020-10-30 NOTE — CARE COORDINATION
10/30 Update CM Note: Patient is successful post DCCV from last pm. He is up in the room without difficulty. His sat 90% at rest, smi given to patient with instructions per bedside nurse. Patient remains in [place] [facility]. Iv lasix changed to po qd per cardiology. Echo completed. Will follow for needs for discharge.   Justin Lazcano RN CM

## 2020-10-30 NOTE — TELEPHONE ENCOUNTER
----- Message from Fidelina Markham MD sent at 10/30/2020  9:31 AM EDT -----  Please schedule follow-up with Deanne Stanley in approximately 2 weeks following hospitalization with paroxysmal atrial fibrillation and mild decompensation of chronic heart failure

## 2020-10-30 NOTE — PROCEDURES
[address]                  [address] [place],  [facility]                                 PROCEDURE NOTE    PATIENT NAME: Rusty Hawthorne                    :        1935  MED REC NO:   [id number]                            ROOM:       8209  ACCOUNT NO:   [de-identified]                           ADMIT DATE: 10/28/2020  PROVIDER:     James Novoa MD    DATE OF PROCEDURE:  10/29/2020    NAME OF THE PROCEDURE:  Direct current cardioversion. PREPROCEDURE DIAGNOSIS:  Atrial fibrillation. POSTPROCEDURE DIAGNOSIS:  Atrial fibrillation. SURGEON:  James Novoa MD    COMPLICATIONS:  None. INDICATIONS:  The patient is an 57-year-old patient, known to me for a  history of aortic valve stenosis, atrial fibrillation, as well as  coronary artery disease, who was hospitalized with symptoms of acute  onset of shortness breath that started approximately four days ago. This coincides with development of atrial fibrillation that day on his  device interrogation. The patient was hospitalized yesterday and  started on IV diuresis. He was taken off dofetilide which was resumed  by me earlier today. The patient was then brought in for elective  cardioversion. DESCRIPTION OF THE PROCEDURE:  The patient was brought to  electrophysiology area in the postabsorptive nonsedated state. After  the usual noninvasive blood pressure and heart rate monitoring were  instituted, the patient was sedated with IV propofol by Anesthesia. A 35 joules shock through his device restored sinus rhythm successfully. The patient was awakened and recovered and returned to his room. CONCLUSION:  Successful cardioversion to sinus rhythm. PLAN:  1. Resume dofetilide. 2.  Encouraged ambulation. 3.  Continue IV diuresis for one more day. 4.  Possible discharge thereafter.         Italia Paul MD    D: 10/29/2020 19:38:38       T: 10/29/2020 21:17:43     /[email or web address]  Job#: [id number]     Doc#: [id number]    CC:  Zana Lackey MD

## 2020-10-30 NOTE — PROGRESS NOTES
INPATIENT CARDIOLOGY FOLLOW-UP    Name: Digna Lu    Age: 82 y.o. Date of Admission: 10/28/2020 11:08 PM    Date of Service: 10/30/2020    Chief Complaint: Follow-up for paroxysmal atrial fibrillation, acute superimposed upon chronic diastolic heart failure, coronary atherosclerosis, aortic valve disease, chronic kidney disease    Interim History: The patient presently appears stable from a cardiovascular standpoint with no further symptoms of dyspnea and a successful device directed cardioversion restoring sinus rhythm. Appropriate fluid mobilization has been achieved with present diuretics. Recommendations of the pulmonary service have additionally been reviewed. Review of Systems: The remainder of a complete multisystem review including consitutional, central nervous, respiratory, circulatory, gastrointestinal, genitourinary, endocrinologic, hematologic, musculoskeletal and psychiatric are negative.     Problem List:  Patient Active Problem List   Diagnosis    Implantable cardioverter-defibrillator (ICD) in situ    Chronic combined systolic and diastolic CHF (congestive heart failure) (HCC)    S/P TAVR (transcatheter aortic valve replacement)    Chronic atrial fibrillation    Coronary artery disease involving native coronary artery of native heart without angina pectoris    Essential hypertension    Paroxysmal atrial fibrillation (HCC)    Hyperlipidemia LDL goal <100    GIB (gastrointestinal bleeding)    ICD (implantable cardioverter-defibrillator), dual, in situ    Ventricular tachycardia ([address]    Ischemic heart disease    Vitamin D deficiency    Other insomnia    SOB (shortness of breath)    Lung nodule    Acute on chronic diastolic (congestive) heart failure ([address]    Pure hypercholesterolemia    Stage 3b chronic kidney disease    Transient cerebral ischemia    Aortic valve disease       Allergies:  No Known Allergies    Current Medications:  Current Facility-Administered Medications   Medication Dose Route Frequency Provider Last Rate Last Dose    furosemide (LASIX) tablet 40 mg  40 mg Oral Daily Fidelina Markham MD        vitamin D (CHOLECALCIFEROL) tablet 2,000 Units  2,000 Units Oral Daily Liliana Calderon MD   2,000 Units at 10/29/20 1117    ferrous sulfate (IRON 325) tablet 325 mg  325 mg Oral Daily with breakfast Liliana Calderon MD   325 mg at 10/29/20 1117    magnesium oxide (MAG-OX) tablet 400 mg  400 mg Oral Daily Liliana Calderon MD   400 mg at 10/29/20 1117    metoprolol tartrate (LOPRESSOR) tablet 75 mg  75 mg Oral Daily Liliana Calderon MD   75 mg at 10/29/20 1117    pantoprazole (PROTONIX) tablet 40 mg  40 mg Oral Daily Liliana Calderon MD   40 mg at 10/29/20 1117    pravastatin (PRAVACHOL) tablet 40 mg  40 mg Oral Daily Liliana Calderon MD   40 mg at 10/29/20 2142    rivaroxaban (XARELTO) tablet 20 mg  20 mg Oral Dinner Liliana Calderon MD   20 mg at 10/29/20 1818    metoprolol tartrate (LOPRESSOR) tablet 50 mg  50 mg Oral Nightly Liliana Calderon MD   50 mg at 10/29/20 2142    sodium chloride flush 0.9 % injection 10 mL  10 mL Intravenous 2 times per day Liliana Calderon MD   10 mL at 10/29/20 2142    sodium chloride flush 0.9 % injection 10 mL  10 mL Intravenous PRN Liliana Calderon MD        acetaminophen (TYLENOL) tablet 650 mg  650 mg Oral Q6H PRN Liliana Calderon MD        Or    acetaminophen (TYLENOL) suppository 650 mg  650 mg Rectal Q6H PRN Liliana Calderon MD        polyethylene glycol (GLYCOLAX) packet 17 g  17 g Oral Daily PRN Liliana Calderon MD        nitroGLYCERIN (NITROSTAT) SL tablet 0.4 mg  0.4 mg Sublingual Q5 Min PRN Liliana Calderon MD        perflutren lipid microspheres (DEFINITY) injection 1.65 mg  1.5 mL Intravenous ONCE PRN Liliana Calderon MD        dofetilide (TIKOSYN) capsule 250 mcg  250 mcg Oral 2 times per day Liliana Calderon MD   250 mcg at 10/29/20 1341         Physical Exam:  /60   Pulse 75   Temp 97.9 °F (36.6 °C) (Temporal)   Resp 18   Ht 5' 8\" (1.727 m)   Wt 171 lb 9.6 oz (77.8 kg)   SpO2 93%   BMI 26.09 kg/m²   Weight change: -9 lb 12.8 oz (-4.445 kg)  Wt Readings from Last 3 Encounters:   10/30/20 171 lb 9.6 oz (77.8 kg)   10/27/20 180 lb 6.4 oz (81.8 kg)   08/06/20 185 lb (83.9 kg)     The patient is awake, alert and in no discomfort or distress. No gross musculoskeletal deformity is present. No significant skin or nail changes are present. Gross examination of head, eyes, nose and throat are negative. Jugular venous pressure is normal and no carotid bruits are present. Normal respiratory effort is noted with no accessory muscle usage present. Lung fields are clear to ascultation. Cardiac examination is notable for a regular rate and rhythm with no palpable thrill. No gallop rhythm and an early peaking systolic ejection murmur at the right upper sternal border are identified. A benign abdominal examination is present with no masses or organomegaly. Intact pulses are present throughout all extremities and no peripheral edema is present. No focal neurologic deficits are present. Intake/Output:    Intake/Output Summary (Last 24 hours) at 10/30/2020 0704  Last data filed at 10/29/2020 2142  Gross per 24 hour   Intake 60 ml   Output 1325 ml   Net -1265 ml     No intake/output data recorded. Laboratory Tests:  Lab Results   Component Value Date    CREATININE 1.3 (H) 10/29/2020    BUN 27 (H) 10/29/2020     (H) 10/29/2020    K 4.1 10/29/2020     10/29/2020    CO2 24 10/29/2020     No results for input(s): CKTOTAL, CKMB in the last 72 hours.     Invalid input(s): TROPONONI  Lab Results   Component Value Date    BNP 47 08/27/2013     Lab Results   Component Value Date    WBC 7.9 10/29/2020    RBC 4.41 10/29/2020    HGB 12.1 10/29/2020    HCT 38.7 10/29/2020    MCV 87.8 10/29/2020    MCH 27.4 10/29/2020    MCHC 31.3 10/29/2020    RDW 16.7 10/29/2020     10/29/2020    MPV 11.4 10/29/2020     Recent Labs     10/27/20  1329 10/28/20  2016   ALKPHOS 147* 145* ALT 17 14   AST 29 23   PROT 7.3 7.8   BILITOT 0.5 0.3   LABALBU 4.7 4.6     Lab Results   Component Value Date    MG 2.2 10/29/2020     Lab Results   Component Value Date    PROTIME 24.3 10/28/2020    INR 2.1 10/28/2020     Lab Results   Component Value Date    TSH 1.280 10/29/2020     No components found for: CHLPL  Lab Results   Component Value Date    TRIG 611 (H) 08/17/2020    TRIG 151 (H) 12/08/2016     Lab Results   Component Value Date    HDL 33 08/17/2020    HDL 52 12/08/2016     Lab Results   Component Value Date    LDLCALC - (AA) 08/17/2020    LDLCALC 103 (H) 12/08/2016       Cardiac Tests:  ECG: A repeat electrocardiogram reviewed at the time of evaluation demonstrates evidence of sinus rhythm with atrial pacing with occasional supraventricular ectopy with left anterior fascicular block and right bundle branch block conduction patterns and a corrected QT interval approximately 525 ms  Telemetry findings reviewed: sinus rhythm with atrial pacing and supraventricular and ventricular ectopy with a 6 beat episode of asymptomatic nonsustained ventricular tachycardia, no new tachy/bradyarrhythmias overnight      ASSESSMENT / PLAN: On a clinical basis, the patient appears stable from a cardiovascular standpoint and subjectively improved following restoration of sinus rhythm. He presently appears clinically euvolemic with plans of resumption of his baseline oral diuretics and optimal medical therapy. A repeat assessment of renal function and electrolytes are pending will be reviewed upon completion to assess any additional needs of alteration of medical management including that related to appropriate dosing of his oral anticoagulation. Any alteration of his antiarrhythmic therapy will be deferred to the electrophysiology service.   If intervention of his pulmonary nodule is necessitated, this will need to be deferred likely for approximately 1 month to permit uninterrupted anticoagulation in the face of his

## 2020-11-02 ENCOUNTER — CARE COORDINATION (OUTPATIENT)
Dept: CARE COORDINATION | Age: 85
End: 2020-11-02

## 2020-11-02 NOTE — CARE COORDINATION
[facility] Transitions Initial Follow Up Call    Call within 2 business days of discharge: Yes    Patient: Nereida Damian Patient : 1935   MRN: <[id number]>  Reason for Admission: CHF  Discharge Date: 10/30/20 RARS: Readmission Risk Score: 18      Last Discharge [address]       Complaint Diagnosis Description Type Department Provider    10/28/20 Shortness of Breath Congestive heart failure, unspecified HF chronicity, unspecified heart failure type ([facility] [address] . .. ED to Hosp-Admission (Discharged) (ADMITTED) [facility]S CDU Yuli Elam MD; Heyward Gilford. ..         1st Attempt to contact patient for follow up Care Transition Covid Call. Left HIPAA Compliant message on Voice Mail to call office (number given) with any questions and an update on patient's condition since discharge. Will Follow Up at a later time.      Chancy Sacks, LPN     [facility] [address] [facility] Transitions 24 Hour Call    Do you have all of your prescriptions and are they filled?:  Yes  Care Transitions Interventions         Follow Up  Future Appointments   Date Time Provider Cezar Gamez   2020  1:00 PM MD PRISCILLA Grover [facility]   2020 10:00 AM Cornell Brower, DO [facility]   2021  1:00 PM MD PRISCILLA Grover [facility]       Chancy Sacks, LPN

## 2020-11-02 NOTE — TELEPHONE ENCOUNTER
Pt calling to schedule his HFU apt/timing with Dr. Bryan Li dx: Afib/SOB sp CV. Please call pt with an apt.

## 2020-11-03 ENCOUNTER — CARE COORDINATION (OUTPATIENT)
Dept: CASE MANAGEMENT | Age: 85
End: 2020-11-03

## 2020-11-03 ENCOUNTER — TELEPHONE (OUTPATIENT)
Dept: PULMONOLOGY | Age: 85
End: 2020-11-03

## 2020-11-03 NOTE — CARE COORDINATION
[facility] Transitions Initial Follow Up Call    Call within 2 business days of discharge: Yes    Patient: Sylvia Duke Patient : 1935   MRN: [id number]  Reason for Admission: CHF  Discharge Date: 10/30/20 RARS: Readmission Risk Score: 18      Last Discharge [address] [address]       Complaint Diagnosis Description Type Department Provider    10/28/20 Shortness of Breath Congestive heart failure, unspecified HF chronicity, unspecified heart failure type ([facility] [address] . .. ED to Hosp-Admission (Discharged) (ADMITTED) [facility] 8S LORIU Clara Coley MD; Briscoe Olszewski. .. Spoke with: No one    Facility: [facility]    Second attempt to reach the patient for initial Covid-19 Monitoring at risk Care Transition call post hospital discharge. Message left with CTN's contact information requesting return phone call. No further outreach scheduled at this time due to inability to contact patient. Episode resolved. CTN signed off.         Follow Up  Future Appointments   Date Time Provider Cezar Gamez   2020  1:00 PM MD PRISCILLA Stovall [facility]   2020 10:00 AM Tom Mail,  [facility],  [address] Card [facility]   2021  1:00 PM MD PRISCILLA Stovall [facility]       Lulu Sellers RN

## 2020-11-03 NOTE — TELEPHONE ENCOUNTER
Call to pt to arrange follow up virtual visit. Pt reports he has \"too many appointments this week\"; declines to make appt for virtual visit this week. Pt requesting Dr. Wilberto Michaud discuss the biopsy \"stuff\" with Dr. Damian Bustamante and he can explain it to pt at his appt this week (11/5/20). Pt understands Dr. Wilberto Michaud may need to do biopsy and he's okay with that but this week is not a good week for his appointments. Advised Pt RN will notify Dr. Wilberto Michaud of his requests to discuss biospy with PCP Dr. Damian Bustamante.

## 2020-11-05 ENCOUNTER — OFFICE VISIT (OUTPATIENT)
Dept: PRIMARY CARE CLINIC | Age: 85
End: 2020-11-05
Payer: MEDICARE

## 2020-11-05 VITALS
SYSTOLIC BLOOD PRESSURE: 152 MMHG | HEART RATE: 72 BPM | WEIGHT: 178 LBS | DIASTOLIC BLOOD PRESSURE: 66 MMHG | BODY MASS INDEX: 26.98 KG/M2 | TEMPERATURE: 97.2 F | HEIGHT: 68 IN | OXYGEN SATURATION: 96 % | RESPIRATION RATE: 18 BRPM

## 2020-11-05 PROCEDURE — 1111F DSCHRG MED/CURRENT MED MERGE: CPT | Performed by: FAMILY MEDICINE

## 2020-11-05 PROCEDURE — 99496 TRANSJ CARE MGMT HIGH F2F 7D: CPT | Performed by: FAMILY MEDICINE

## 2020-11-05 RX ORDER — FERROUS SULFATE 325(65) MG
325 TABLET ORAL
Qty: 90 TABLET | Refills: 1 | Status: SHIPPED | OUTPATIENT
Start: 2020-11-05

## 2020-11-05 ASSESSMENT — ENCOUNTER SYMPTOMS
SHORTNESS OF BREATH: 0
CONSTIPATION: 0
SORE THROAT: 0
COUGH: 0
ABDOMINAL PAIN: 0
DIARRHEA: 0
NAUSEA: 0
VOMITING: 0
RHINORRHEA: 0
WHEEZING: 0

## 2020-11-05 NOTE — PROGRESS NOTES
daily              dofetilide (TIKOSYN) 250 MCG capsule  Take 1 capsule by mouth 2 times daily             ferrous sulfate (IRON 325) 325 (65 Fe) MG tablet  Take 1 tablet by mouth daily (with breakfast)             furosemide (LASIX) 40 MG tablet  Take 1 tablet by mouth daily             MAGNESIUM-OXIDE 400 (241.3 Mg) MG TABS tablet  TK 1 T PO ONCE D             metoprolol tartrate (LOPRESSOR) 50 MG tablet  75 mg in AM and 50 mg at HS             Multiple Vitamins-Minerals (PRESERVISION AREDS 2 PO)  Take 1 tablet by mouth 2 times daily             pantoprazole (PROTONIX) 40 MG tablet  Take 1 tablet by mouth daily             potassium chloride (MICRO-K) 10 MEQ extended release capsule  Take 10 mEq by mouth daily             pravastatin (PRAVACHOL) 40 MG tablet  Take 1 tablet by mouth daily             rivaroxaban (XARELTO) 20 MG TABS tablet  Take 1 tablet by mouth daily                   Medications marked \"taking\" at this time  Outpatient Medications Marked as Taking for the 11/5/20 encounter (Office Visit) with Khushi Layton MD   Medication Sig Dispense Refill    ferrous sulfate (IRON 325) 325 (65 Fe) MG tablet Take 1 tablet by mouth daily (with breakfast) 90 tablet 1    potassium chloride (MICRO-K) 10 MEQ extended release capsule Take 10 mEq by mouth daily      furosemide (LASIX) 40 MG tablet Take 1 tablet by mouth daily 90 tablet 1    pravastatin (PRAVACHOL) 40 MG tablet Take 1 tablet by mouth daily 90 tablet 1    metoprolol tartrate (LOPRESSOR) 50 MG tablet 75 mg in AM and 50 mg at  tablet 1    MAGNESIUM-OXIDE 400 (241.3 Mg) MG TABS tablet TK 1 T PO ONCE D      pantoprazole (PROTONIX) 40 MG tablet Take 1 tablet by mouth daily 90 tablet 2    dofetilide (TIKOSYN) 250 MCG capsule Take 1 capsule by mouth 2 times daily 90 capsule 0    rivaroxaban (XARELTO) 20 MG TABS tablet Take 1 tablet by mouth daily 90 tablet 3    albuterol sulfate  (90 Base) MCG/ACT inhaler Inhale 2 puffs into the lungs 4 times daily as needed for Wheezing 1 Inhaler 0    Multiple Vitamins-Minerals (PRESERVISION AREDS 2 PO) Take 1 tablet by mouth 2 times daily      Cholecalciferol (VITAMIN D) 2000 UNITS CAPS capsule Take 1 capsule by mouth daily           Medications patient taking as of now reconciled against medications ordered at time of hospital discharge: Yes    Chief Complaint   Patient presents with   Anusha See Care Management     CHF discharged 10/30/20       HPI    Inpatient course: Discharge summary reviewed- see chart. Patient is an 82-year-old male patient with past medical history of CHF, ischemic cardiomyopathy status post ICD placement , coronary artery disease, status post TAVR, history of V. tach, A. fib on anticoagulation, essential  hypertension, ELISSA vs. CKD, hyperlipidemia and history of GI bleeding who presented to the ED with chief complaint of progressive SOB on 10/28. Patient was found to be in atrial fibrillation with RVR and had a cardioversion on the 29th. Regards to the patient's fluid overload status he is placed on IV Lasix and transitioned to oral Lasix at time of discharge. Patient was seen by cardiology and pulmonology. In regards to the patient's pulmonary nodule he was advised to follow-up outpatient-wise with pulmonology. AAA 3.9 cm  Right Pulm Nodule    Interval history/Current status: Reports improved dyspnea, chest discomfort, palpitation. History of nonsustained ventricular tachycardia: On Tikosyn and, Lasix, and metoprolol. Patient follows both with cardiology and electrophysiology for this issue.     History of atrial fibrillation: Patient is rate controlled on metoprolol and anticoagulated on   Xarelto.     CHF: Patient is to be followed with cardiology in regard to this issue.      Based on the patient's most recent echo:   Summary   Left ventricle is normal in size . Mild asymmetric septal hypertrophy. No regional wall motion abnormalities seen.    Normal left ventricular Effort: Pulmonary effort is normal.      Breath sounds: Rales present. No wheezing. Abdominal:      General: Bowel sounds are normal.      Palpations: Abdomen is soft. Tenderness: There is no abdominal tenderness. Neurological:      Mental Status: He is alert. Comments: Cranial nerves grossly intact             Brandie Woods was seen today for care management. Diagnoses and all orders for this visit:    Hospital discharge follow-up  Comments:  Hospital course reviewed. Patient was to follow-up with cardiology and pulmonology. Patient declined follow-up with pulmonology as he is not interested in bx  Orders:  -     AL DISCHARGE MEDS RECONCILED W/ CURRENT OUTPATIENT MED LIST    Coronary artery disease involving native coronary artery of native heart without angina pectoris  Comments: Following cardiology. Stable. No recent signs or concerns for ACS. Essential hypertension  Comments:  Stable. Slightly elevated today. Patient reports blood pressure at home and is below his goal of 150/90. Following cardiology for this issue. Orders:  -     CBC Auto Differential; Future  -     COMPREHENSIVE METABOLIC PANEL; Future  -     Ferritin; Future    Lung nodule  Comments:  Patient aware of possibility of lung cancer and possible death if left alone. Benefits/risk reviewed. Pt would like to hold off on bx. Rediscuss in Feb    Paroxysmal atrial fibrillation ([facility] [address]  Comments:  Sinus rhyrthm. Pt to f/u w/ cards tomorrow. Orders:  -     XR CHEST (2 VW); Future    SOB (shortness of breath)  Comments:  Resolved    Stage 3b chronic kidney disease  Comments:  Repeat labs in 1 month. Mild ELISSA most likely secondary to IV Lasix. Vitamin D deficiency  Comments:  Con 2,000 IU QD    Acute on chronic diastolic (congestive) heart failure (HCC)  Comments:  Improved. No sig LE edema. Mild rales. Check x-ray.  Consider double dose of lasix for 1-2 add days if fluid present  Orders:  -     XR CHEST (2 VW); Future    Gastrointestinal hemorrhage with melena  Comments:  Restart iron. Check CBC 1 month. Consider FOBT. Orders:  -     ferrous sulfate (IRON 325) 325 (65 Fe) MG tablet; Take 1 tablet by mouth daily (with breakfast)  -     CBC Auto Differential; Future  -     COMPREHENSIVE METABOLIC PANEL; Future  -     Ferritin; Future        Medical Decision Making: high complexity    Repeat labs in 1 month. F/u w/ cards.  Hold off on pulm bx per patient request. F/u w/ myself in Feb.

## 2020-11-06 ENCOUNTER — OFFICE VISIT (OUTPATIENT)
Dept: CARDIOLOGY CLINIC | Age: 85
End: 2020-11-06
Payer: MEDICARE

## 2020-11-06 VITALS
DIASTOLIC BLOOD PRESSURE: 70 MMHG | HEART RATE: 74 BPM | SYSTOLIC BLOOD PRESSURE: 138 MMHG | WEIGHT: 178.8 LBS | HEIGHT: 68 IN | BODY MASS INDEX: 27.1 KG/M2 | RESPIRATION RATE: 16 BRPM

## 2020-11-06 PROCEDURE — G8427 DOCREV CUR MEDS BY ELIG CLIN: HCPCS | Performed by: INTERNAL MEDICINE

## 2020-11-06 PROCEDURE — 99214 OFFICE O/P EST MOD 30 MIN: CPT | Performed by: INTERNAL MEDICINE

## 2020-11-06 PROCEDURE — G8482 FLU IMMUNIZE ORDER/ADMIN: HCPCS | Performed by: INTERNAL MEDICINE

## 2020-11-06 PROCEDURE — G8417 CALC BMI ABV UP PARAM F/U: HCPCS | Performed by: INTERNAL MEDICINE

## 2020-11-06 PROCEDURE — 1036F TOBACCO NON-USER: CPT | Performed by: INTERNAL MEDICINE

## 2020-11-06 PROCEDURE — 1123F ACP DISCUSS/DSCN MKR DOCD: CPT | Performed by: INTERNAL MEDICINE

## 2020-11-06 PROCEDURE — 4040F PNEUMOC VAC/ADMIN/RCVD: CPT | Performed by: INTERNAL MEDICINE

## 2020-11-06 PROCEDURE — 93000 ELECTROCARDIOGRAM COMPLETE: CPT | Performed by: INTERNAL MEDICINE

## 2020-11-06 NOTE — PROGRESS NOTES
CHIEF COMPLAINT: Possible TIA/CAD-CABG/PAF/ICD    HISTORY OF PRESENT ILLNESS: Patient is a 82 y.o. male seen at the request of Oralia Castanon MD.      HFU. In sinus. No CP or SOB. PMH:   1. MI, 2003. Cardiac catheterization: 85% ostial, proximal and mid LAD narrowings. LMC 70% stenosis. D1 occluded. D2 50% stenosis. OM1 80% ostial stenosis. Mid CX occluded. Dominant RCA severe disease. LVEF 40-45%. 2. [facility] LIMA-LAD, SVG-RI, SVG-OM. 3. Hyperlipidemia. 4. Mild carotid plaque on US, 2003. 5. Echo, 07/2006. Mild LVE, normal EF, Stage II diastolic dysfunction, moderate AS, mild MR, mild TR. 6. Right leg vein stripping, 1970's. 7. No history diabetes mellitus, stroke or COPD. 8. Nonsmoker for many years. 9. VT causing cardiac arrest after stress test, 03/15/2007. He was successfully cardioverted. 10. Cardiac catheterization, 03/16/2007 with normal LVEF, severe native three vessel coronary disease. SVG-RI, SVG-OM both occluded. LIMA-LAD patent. 11. Re-do CABG, [facility], 03/2007 with radial artery graft to D2 and OM2. 12. Elective PCI with CONNOR native OM2 and native LAD, 03/2007 following CABG. This was done because of inadequate conduits. 13. ICD placement, [facility], 03/2007. 14. ICD lead recall, early 2008, but he was followed electively because his device was functioning normally. 15. Presentation [facility], 03/29/2008 with multiple ICD inappropriate shocks. Transfer [facility] where ICD and lead were replaced. He reports cardiac catheterization that revealed patent LIMA-LAD and patent stents in native coronary arteries. 16. Atypical chest pain and anxiety with admission [facility], 05/2008. Troponin minimally elevated. Beta blocker dose increased. [address] admission, 06/13/2009 with lightheadedness, orthostatic hypotension, drop in hemoglobin to 10.5 from 14.9 over two months with an increase in BUN from 16 to 68 over the same time.  Dark heme positive stools noted. EKG NSR, incomplete RBBB. 18. Echo, 06/15/2009 with normal LVEF, moderate AS, peak gradient 38 mmHg, BLOSSOM 1.1 cm², moderate MR, LAE. 19. Transtelephonic ICD check, 01/11/2010. No ventricular tachyarrhythmias. Two AF episodes, the longest for 14 hours. 20. Echo, 06/16/2010 with LVE, borderline LVH, normal systolic and diastolic function, normal LA, ICD electrode right heart. Trileaflet AV with moderate to severe AS, mean/peak gradient 20/31 mmHg. BLOSSOM 1.0 cm². MAC with mild MR, normal RVSP.  21. No drug allergies. 25. Family history negative for premature vascular disease. 23. PAF with DCCV, [facility], 12/2010.   24. [facility]. 25. [facility], 06/05/2012. Moderate sized fixed basal inferior defect, probably artifact. 26. [facility], [facility]. 1+ AR, moderate AS, mild MR, mild TR, normal LVEF.   27. Echo, 01/31/2013. LV not dilated. Mild concentric LVH. Septal paradox. EF 50-55%. BLOSSOM 1.2 cm² consistent with moderate stenosis. Peak/mean gradient 38/21. Stage II diastolic dysfunction. 28. [facility] admission, 08/28/2013 with dizziness, Hb 7.7, WBC 19.0. CXR negative except cardiomegaly. EKG NSR, leftward axis, RBBB. BMP negative except for elevation in BUN to 55 with Cr 1.3.   29. EGD, 08/28/2013. Large pyloric channel ulcer with clot treated with PRBCs and fresh frozen plasma. Hb 8.7 on day of discharge and stable. Pradaxa was temporarily held. 30. CT abdomen, 09/08/2014. 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 31. CT chest, 09/17/2014. Consolidation and infiltrate at the left lung base, stable when compared to previous exams with less pleural thickening and calcified plaque in the left pleural cavity without any recent change.  Chronic obstructive airways disease. 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   32. ICD programmed to DDDR mode by Dr. Mona Guaman, 03/26/2015. Device function normal.  33. Echo 10/16/2015. EF 39%. Stage II diastolic dysfunction. Aortic stenosis with peak/mean gradients of 48/24 mmHg. Estimated valve area 1.0 cm². 34. ICD generator change for battery depletion, 07/28/2016, Dr. Genesis Bazan. Mona Guaman. 35. Echo, 02/03/2017.  Normal LV size with mild LVH.  Normal regional wall motion and overall systolic function.  Diastole could not be assessed, but was presumed to be impaired.  The RV was dilated with impaired global systolic function.  The LA was enlarged.  Mild MAC with mild mitral insufficiency.  Moderate tricuspid insufficiency.  Aortic valve gradients are peak 47 mmHg with a mean of 27.  Dimensionless ratio 0.21. Valve area calculated 0.8 cm².    36.  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  37. Echocardiogram, [facility], 4/19/2018, EF 60%. Low normal RV function. Stage 2 diastolic dysfunction. Mild-to-moderate MR. S/P TAVR with a mean gradient of 10 mmHg.   RVSP 31 mg.     38. S/P Cardioversion by Dr. Steve Salgado, 05/30/2017.   19 Rohith Howard evaluation, 09/07/2018, for 2 appropriate ICD discharges for polymorphic ventricular tachycardia, which occurred after yard work and moving heavy furniture.       Past Medical History:   Diagnosis Date    A-fib [facility])     Arrhythmia     Carotid artery stenosis     CHF (congestive heart failure) ([address]     Heart valve problem     Hyperlipidemia     Hypertension     ICD (implantable cardiac defibrillator) in place 2007    MI (mitral incompetence) 2003    MI (myocardial infarction) ([address] 2003       Patient Active Problem List   Diagnosis    Implantable cardioverter-defibrillator (ICD) in situ    Chronic combined systolic and diastolic CHF (congestive heart failure) ([address]    S/P TAVR (transcatheter aortic valve replacement)    Chronic atrial fibrillation    Coronary artery disease involving native coronary artery of native heart without angina pectoris    Essential hypertension    Paroxysmal atrial fibrillation (HCC)    Hyperlipidemia LDL goal <100    GIB (gastrointestinal bleeding)    ICD (implantable cardioverter-defibrillator), dual, in situ    Ventricular tachycardia (HCC)    Ischemic heart disease    Vitamin D deficiency    Other insomnia    SOB (shortness of breath)    Lung nodule    Acute on chronic diastolic (congestive) heart failure (HCC)    Pure hypercholesterolemia    Stage 3b chronic kidney disease    Transient cerebral ischemia    Aortic valve disease       No Known Allergies    Current Outpatient Medications   Medication Sig Dispense Refill    ferrous sulfate (IRON 325) 325 (65 Fe) MG tablet Take 1 tablet by mouth daily (with breakfast) 90 tablet 1    potassium chloride (MICRO-K) 10 MEQ extended release capsule Take 10 mEq by mouth daily      furosemide (LASIX) 40 MG tablet Take 1 tablet by mouth daily 90 tablet 1    pravastatin (PRAVACHOL) 40 MG tablet Take 1 tablet by mouth daily 90 tablet 1    metoprolol tartrate (LOPRESSOR) 50 MG tablet 75 mg in AM and 50 mg at  tablet 1    MAGNESIUM-OXIDE 400 (241.3 Mg) MG TABS tablet TK 1 T PO ONCE D      pantoprazole (PROTONIX) 40 MG tablet Take 1 tablet by mouth daily 90 tablet 2    dofetilide (TIKOSYN) 250 MCG capsule Take 1 capsule by mouth 2 times daily 90 capsule 0    rivaroxaban (XARELTO) 20 MG TABS tablet Take 1 tablet by mouth daily 90 tablet 3    Multiple Vitamins-Minerals (PRESERVISION AREDS 2 PO) Take 1 tablet by mouth 2 times daily      Cholecalciferol (VITAMIN D) 2000 UNITS CAPS capsule Take 1 capsule by mouth daily       albuterol sulfate  (90 Base) MCG/ACT inhaler Inhale 2 puffs into the lungs 4 times daily as needed for Wheezing (Patient not taking: Reported on 11/6/2020) 1 Inhaler 0     No current facility-administered medications for this denies mood changed, anxiety, depression. All other systems negative. Physical Exam   /70   Pulse 74   Resp 16   Ht 5' 8\" (1.727 m)   Wt 178 lb 12.8 oz (81.1 kg)   BMI 27.19 kg/m²   Constitutional: Oriented to person, place, and time. Well-developed and well-nourished. No distress. Head: Normocephalic and atraumatic. Eyes: EOM are normal. Pupils are equal, round, and reactive to light. Neck: Normal range of motion. Neck supple. No hepatojugular reflux and no JVD present. Carotid bruit is not present. No tracheal deviation present. No thyromegaly present. Cardiovascular: Normal rate, regular rhythm, normal heart sounds and intact distal pulses. Exam reveals no gallop and no friction rub. No murmur heard. Pulmonary/Chest: Effort normal and breath sounds normal. No respiratory distress. No wheezes. No rales. No tenderness. Abdominal: Soft. Bowel sounds are normal. No distension and no mass. No tenderness. No rebound and no guarding. Musculoskeletal: Normal range of motion. No edema and no tenderness. Lymphadenopathy:   No cervical adenopathy. No groin adenopathy. Neurological: Alert and oriented to person, place, and time. Skin: Skin is warm and dry. No rash noted. Not diaphoretic. No erythema. Psychiatric: Normal mood and affect. Behavior is normal.     EKG:  normal sinus rhythm, A paced, V sensed.     Echo Summary 8/16/19:   Ejection fraction is visually estimated at 60%.   The inferior basal wall is hypokinetic.   No regional wall motion abnormalities seen.   Normal right ventricle structure and function.   There is doppler evidence of stage II diastolic dysfunction.   Left atrial volume index of 39 ml per meters squared BSA.   Hx of TAVR with 26 mm S3 3/29/17.  The aortic valve area is 1.6 cm2 with a maximum gradient of 15 mmHg and a mean gradient of 7 mmHg.   Mild aortic regurgitation is noted.   Mild mitral regurgitation is present.   Mild tricuspid regurgitation.   Agitated

## 2020-11-11 ENCOUNTER — TELEPHONE (OUTPATIENT)
Dept: PULMONOLOGY | Age: 85
End: 2020-11-11

## 2020-11-11 NOTE — TELEPHONE ENCOUNTER
Pt declines follow up with Dr. Rodolfo Mcelroy at this time. Review of chart notes from PCP visit on 11/05/20 pt will discuss any pulmonary follow up with PCP and will contact office prn.

## 2020-11-16 ENCOUNTER — TELEPHONE (OUTPATIENT)
Dept: PRIMARY CARE CLINIC | Age: 85
End: 2020-11-16

## 2020-11-23 LAB
EKG ATRIAL RATE: 72 BPM
EKG P-R INTERVAL: 258 MS
EKG Q-T INTERVAL: 482 MS
EKG QRS DURATION: 110 MS
EKG QTC CALCULATION (BAZETT): 527 MS
EKG R AXIS: -54 DEGREES
EKG T AXIS: 37 DEGREES
EKG VENTRICULAR RATE: 72 BPM

## 2020-11-27 ENCOUNTER — OFFICE VISIT (OUTPATIENT)
Dept: FAMILY MEDICINE CLINIC | Age: 85
End: 2020-11-27
Payer: MEDICARE

## 2020-11-27 VITALS
BODY MASS INDEX: 27.16 KG/M2 | TEMPERATURE: 96.8 F | DIASTOLIC BLOOD PRESSURE: 64 MMHG | WEIGHT: 179.2 LBS | SYSTOLIC BLOOD PRESSURE: 132 MMHG | HEIGHT: 68 IN | HEART RATE: 72 BPM | OXYGEN SATURATION: 91 % | RESPIRATION RATE: 18 BRPM

## 2020-11-27 PROCEDURE — G8427 DOCREV CUR MEDS BY ELIG CLIN: HCPCS | Performed by: FAMILY MEDICINE

## 2020-11-27 PROCEDURE — G8482 FLU IMMUNIZE ORDER/ADMIN: HCPCS | Performed by: FAMILY MEDICINE

## 2020-11-27 PROCEDURE — 1123F ACP DISCUSS/DSCN MKR DOCD: CPT | Performed by: FAMILY MEDICINE

## 2020-11-27 PROCEDURE — 1036F TOBACCO NON-USER: CPT | Performed by: FAMILY MEDICINE

## 2020-11-27 PROCEDURE — 99213 OFFICE O/P EST LOW 20 MIN: CPT | Performed by: FAMILY MEDICINE

## 2020-11-27 PROCEDURE — G8417 CALC BMI ABV UP PARAM F/U: HCPCS | Performed by: FAMILY MEDICINE

## 2020-11-27 PROCEDURE — 4040F PNEUMOC VAC/ADMIN/RCVD: CPT | Performed by: FAMILY MEDICINE

## 2020-11-27 RX ORDER — CETIRIZINE HYDROCHLORIDE 10 MG/1
10 TABLET ORAL DAILY
Qty: 30 TABLET | Refills: 1 | Status: SHIPPED
Start: 2020-11-27 | End: 2021-02-08

## 2020-11-27 RX ORDER — AMOXICILLIN 250 MG/1
250 CAPSULE ORAL 3 TIMES DAILY
Qty: 30 CAPSULE | Refills: 0 | Status: SHIPPED | OUTPATIENT
Start: 2020-11-27 | End: 2020-12-07

## 2020-11-27 ASSESSMENT — ENCOUNTER SYMPTOMS
RESPIRATORY NEGATIVE: 1
RHINORRHEA: 1
SORE THROAT: 1
EYES NEGATIVE: 1
SINUS PAIN: 1
SINUS PRESSURE: 1

## 2020-11-27 NOTE — PROGRESS NOTES
20  Nereida Pilling : 1935 Sex: male  Age: 82 y.o. Chief Complaint   Patient presents with    Cough     since wednesday night    Pharyngitis       This patient is a 82-year-old white male with a chief complaint of cough mild sore throat since Wednesday. He has had no fevers chills no shortness of breath no loss of taste or smell no nausea vomiting or diarrhea. Review of Systems   Constitutional: Negative. HENT: Positive for congestion, postnasal drip, rhinorrhea, sinus pressure, sinus pain and sore throat. Eyes: Negative. Respiratory: Negative. Cardiovascular: Negative. Current Outpatient Medications:     cetirizine (ZYRTEC) 10 MG tablet, Take 1 tablet by mouth daily, Disp: 30 tablet, Rfl: 1    amoxicillin (AMOXIL) 250 MG capsule, Take 1 capsule by mouth 3 times daily for 10 days, Disp: 30 capsule, Rfl: 0    Handicap Placard MISC, by Does not apply route Disp #: 1 Duration: 5 years. , Disp: 1 each, Rfl: 0    ferrous sulfate (IRON 325) 325 (65 Fe) MG tablet, Take 1 tablet by mouth daily (with breakfast), Disp: 90 tablet, Rfl: 1    potassium chloride (MICRO-K) 10 MEQ extended release capsule, Take 10 mEq by mouth daily, Disp: , Rfl:     furosemide (LASIX) 40 MG tablet, Take 1 tablet by mouth daily, Disp: 90 tablet, Rfl: 1    pravastatin (PRAVACHOL) 40 MG tablet, Take 1 tablet by mouth daily, Disp: 90 tablet, Rfl: 1    metoprolol tartrate (LOPRESSOR) 50 MG tablet, 75 mg in AM and 50 mg at HS, Disp: 225 tablet, Rfl: 1    MAGNESIUM-OXIDE 400 (241.3 Mg) MG TABS tablet, TK 1 T PO ONCE D, Disp: , Rfl:     pantoprazole (PROTONIX) 40 MG tablet, Take 1 tablet by mouth daily, Disp: 90 tablet, Rfl: 2    dofetilide (TIKOSYN) 250 MCG capsule, Take 1 capsule by mouth 2 times daily, Disp: 90 capsule, Rfl: 0    rivaroxaban (XARELTO) 20 MG TABS tablet, Take 1 tablet by mouth daily, Disp: 90 tablet, Rfl: 3    Multiple Vitamins-Minerals (PRESERVISION AREDS 2 PO), Take 1 tablet by mouth 2 times daily, Disp: , Rfl:     Cholecalciferol (VITAMIN D) 2000 UNITS CAPS capsule, Take 1 capsule by mouth daily , Disp: , Rfl:   No Known Allergies    Past Medical History:   Diagnosis Date    [facility]     Arrhythmia     Carotid artery stenosis     [facility]     Heart valve problem     Hyperlipidemia     Hypertension     ICD (implantable cardiac defibrillator) in place     MI (mitral incompetence)     MI [facility]myocardial infarction) ([facility]      Past Surgical History:   Procedure Laterality Date    CARDIAC CATHETERIZATION Right 2017    Dr. Dominik Buck  . 2008 78 shocks replaced  Medtronic     CARDIAC DEFIBRILLATOR PLACEMENT  2016    Medtronic Dual ICD gen change    CARDIOVERSION  10/29/2020    Successful    (Dr. Jose Foster)    COLONOSCOPY N/A 2020    COLONOSCOPY DIAGNOSTIC performed by Roberto Duke MD at [address]  2003    DIAGNOSTIC CARDIAC CATH LAB PROCEDURE      DIAGNOSTIC CARDIAC CATH LAB PROCEDURE  2008    stent    JOINT REPLACEMENT  2011    r hip surgery Dr Gabe Stoll  2017    Dr. Haydee Jarvis and Dr. Cristiane Villasenor- TAVR Josie 26mm valve    UPPER GASTROINTESTINAL ENDOSCOPY N/A 9/3/2019    EGD ESOPHAGOGASTRODUODENOSCOPY performed by Grace Cristobal MD at [address] [place] [address] N/A 2020    EGD ESOPHAGOGASTRODUODENOSCOPY performed by Roberto Duke MD at [facility]       No family history on file. Social History     Tobacco Use    Smoking status: Former Smoker     Packs/day: 0.50     Years: 3.00     Pack years: 1.50     Last attempt to quit: 1973     Years since quittin.8    Smokeless tobacco: Never Used    Tobacco comment: Quit smoking in    Substance Use Topics    Alcohol use: No    Drug use:  No Vitals:    11/27/20 1011   BP: 132/64   Pulse: 72   Resp: 18   Temp: 96.8 °F (36 °C)   SpO2: 91%   Weight: 179 lb 3.2 oz (81.3 kg)   Height: 5' 8\" (1.727 m)       Physical Exam  Vitals signs reviewed. Constitutional:       Appearance: Normal appearance. HENT:      Head: Normocephalic and atraumatic. Right Ear: Tympanic membrane, ear canal and external ear normal. There is no impacted cerumen. Left Ear: Tympanic membrane, ear canal and external ear normal. There is no impacted cerumen. Nose: Congestion and rhinorrhea present. Mouth/Throat:      Mouth: Mucous membranes are moist.      Pharynx: Oropharynx is clear. No oropharyngeal exudate or posterior oropharyngeal erythema. Eyes:      Conjunctiva/sclera: Conjunctivae normal.   Cardiovascular:      Rate and Rhythm: Normal rate and regular rhythm. Heart sounds: No murmur. Pulmonary:      Effort: Pulmonary effort is normal.      Breath sounds: Normal breath sounds. Neurological:      Mental Status: He is alert. Assessment and Plan:  Tom Acuna was seen today for cough and pharyngitis. Diagnoses and all orders for this visit:    Acute non-recurrent frontal sinusitis    Other orders  -     cetirizine (ZYRTEC) 10 MG tablet; Take 1 tablet by mouth daily  -     amoxicillin (AMOXIL) 250 MG capsule; Take 1 capsule by mouth 3 times daily for 10 days        Orders Placed This Encounter   Medications    cetirizine (ZYRTEC) 10 MG tablet     Sig: Take 1 tablet by mouth daily     Dispense:  30 tablet     Refill:  1    amoxicillin (AMOXIL) 250 MG capsule     Sig: Take 1 capsule by mouth 3 times daily for 10 days     Dispense:  30 capsule     Refill:  0        Patient advised to follow up with PCP as needed.         Seen By:  Elaine Witt DO

## 2020-12-03 ENCOUNTER — TELEPHONE (OUTPATIENT)
Dept: PRIMARY CARE CLINIC | Age: 85
End: 2020-12-03

## 2020-12-03 NOTE — TELEPHONE ENCOUNTER
Pt c/o persistent cough. He is still taking zyrtec and amoxicillin. Is there anything that you recommend he take otc for a cough.

## 2020-12-07 ENCOUNTER — OFFICE VISIT (OUTPATIENT)
Dept: FAMILY MEDICINE CLINIC | Age: 85
End: 2020-12-07
Payer: MEDICARE

## 2020-12-07 VITALS
RESPIRATION RATE: 16 BRPM | TEMPERATURE: 98.1 F | WEIGHT: 177.4 LBS | BODY MASS INDEX: 26.89 KG/M2 | OXYGEN SATURATION: 94 % | SYSTOLIC BLOOD PRESSURE: 130 MMHG | DIASTOLIC BLOOD PRESSURE: 64 MMHG | HEIGHT: 68 IN | HEART RATE: 78 BPM

## 2020-12-07 PROCEDURE — 1036F TOBACCO NON-USER: CPT | Performed by: PHYSICIAN ASSISTANT

## 2020-12-07 PROCEDURE — 1123F ACP DISCUSS/DSCN MKR DOCD: CPT | Performed by: PHYSICIAN ASSISTANT

## 2020-12-07 PROCEDURE — G8482 FLU IMMUNIZE ORDER/ADMIN: HCPCS | Performed by: PHYSICIAN ASSISTANT

## 2020-12-07 PROCEDURE — 99214 OFFICE O/P EST MOD 30 MIN: CPT | Performed by: PHYSICIAN ASSISTANT

## 2020-12-07 PROCEDURE — 4040F PNEUMOC VAC/ADMIN/RCVD: CPT | Performed by: PHYSICIAN ASSISTANT

## 2020-12-07 PROCEDURE — G8417 CALC BMI ABV UP PARAM F/U: HCPCS | Performed by: PHYSICIAN ASSISTANT

## 2020-12-07 PROCEDURE — G8427 DOCREV CUR MEDS BY ELIG CLIN: HCPCS | Performed by: PHYSICIAN ASSISTANT

## 2020-12-07 RX ORDER — CHLORPHENIRAMINE MALEATE 4 MG/1
4 TABLET ORAL EVERY 6 HOURS PRN
Qty: 20 TABLET | Refills: 0 | Status: SHIPPED
Start: 2020-12-07 | End: 2020-12-17

## 2020-12-07 RX ORDER — DOXYCYCLINE HYCLATE 100 MG
100 TABLET ORAL 2 TIMES DAILY
Qty: 20 TABLET | Refills: 0 | Status: SHIPPED
Start: 2020-12-07 | End: 2020-12-17

## 2020-12-07 RX ORDER — BENZONATATE 100 MG/1
100 CAPSULE ORAL 3 TIMES DAILY PRN
Qty: 21 CAPSULE | Refills: 0 | Status: SHIPPED | OUTPATIENT
Start: 2020-12-07 | End: 2020-12-14

## 2020-12-07 NOTE — PROGRESS NOTES
CATHETERIZATION Right 03/03/2017    Dr. Patrick Williamson  2007. 2008 2007 78 shocks replaced 2008 Medtronic     CARDIAC DEFIBRILLATOR PLACEMENT  07/28/2016    Medtronic Dual ICD gen change    CARDIOVERSION  10/29/2020    Successful    (Dr. Jennifer Kaye)    COLONOSCOPY N/A 2/24/2020    COLONOSCOPY DIAGNOSTIC performed by Janes Thayer MD at [facility]  05/23/2003 03/16/2007    DIAGNOSTIC CARDIAC CATH LAB PROCEDURE  2007    DIAGNOSTIC CARDIAC CATH LAB PROCEDURE  03/29/2008    stent    JOINT REPLACEMENT  2011    r hip surgery Dr Casey Josue  03/29/2017    Dr. Nadya Kern and Dr. Jordon Benitez- TAVR Josie 26mm valve    UPPER GASTROINTESTINAL ENDOSCOPY N/A 9/3/2019    EGD ESOPHAGOGASTRODUODENOSCOPY performed by Karla Tan MD at [address] [place] [address] N/A 2/18/2020    EGD ESOPHAGOGASTRODUODENOSCOPY performed by Janes Thayer MD at [address] [place] [address]       History reviewed. No pertinent family history. Medications:     Current Outpatient Medications:     doxycycline hyclate (VIBRA-TABS) 100 MG tablet, Take 1 tablet by mouth 2 times daily for 10 days, Disp: 20 tablet, Rfl: 0    benzonatate (TESSALON) 100 MG capsule, Take 1 capsule by mouth 3 times daily as needed for Cough, Disp: 21 capsule, Rfl: 0    chlorpheniramine (ALLER-CHLOR) 4 MG tablet, Take 1 tablet by mouth every 6 hours as needed for Allergies, Disp: 20 tablet, Rfl: 0    cetirizine (ZYRTEC) 10 MG tablet, Take 1 tablet by mouth daily, Disp: 30 tablet, Rfl: 1    amoxicillin (AMOXIL) 250 MG capsule, Take 1 capsule by mouth 3 times daily for 10 days, Disp: 30 capsule, Rfl: 0    Handicap Placard MISC, by Does not apply route Disp #: 1 Duration: 5 years. , Disp: 1 each, Rfl: 0    ferrous sulfate (IRON 325) 325 (65 Fe) MG tablet, Take 1 tablet by mouth daily (with breakfast), Disp: 90 tablet, Rfl: 1   potassium chloride (MICRO-K) 10 MEQ extended release capsule, Take 10 mEq by mouth daily, Disp: , Rfl:     furosemide (LASIX) 40 MG tablet, Take 1 tablet by mouth daily, Disp: 90 tablet, Rfl: 1    pravastatin (PRAVACHOL) 40 MG tablet, Take 1 tablet by mouth daily, Disp: 90 tablet, Rfl: 1    metoprolol tartrate (LOPRESSOR) 50 MG tablet, 75 mg in AM and 50 mg at HS, Disp: 225 tablet, Rfl: 1    MAGNESIUM-OXIDE 400 (241.3 Mg) MG TABS tablet, TK 1 T PO ONCE D, Disp: , Rfl:     pantoprazole (PROTONIX) 40 MG tablet, Take 1 tablet by mouth daily, Disp: 90 tablet, Rfl: 2    dofetilide (TIKOSYN) 250 MCG capsule, Take 1 capsule by mouth 2 times daily, Disp: 90 capsule, Rfl: 0    rivaroxaban (XARELTO) 20 MG TABS tablet, Take 1 tablet by mouth daily, Disp: 90 tablet, Rfl: 3    Multiple Vitamins-Minerals (PRESERVISION AREDS 2 PO), Take 1 tablet by mouth 2 times daily, Disp: , Rfl:     Cholecalciferol (VITAMIN D) 2000 UNITS CAPS capsule, Take 1 capsule by mouth daily , Disp: , Rfl:     Allergies:   No Known Allergies    Social History:     Social History     Tobacco Use    Smoking status: Former Smoker     Packs/day: 0.50     Years: 3.00     Pack years: 1.50     Last attempt to quit: 1973     Years since quittin.8    Smokeless tobacco: Never Used    Tobacco comment: Quit smoking in    Substance Use Topics    Alcohol use: No    Drug use: No       Patient lives at home. Physical Exam:     Vitals:    20 1108   BP: 130/64   Pulse: 78   Resp: 16   Temp: 98.1 °F (36.7 °C)   SpO2: 94%   Weight: 177 lb 6.4 oz (80.5 kg)   Height: 5' 8\" (1.727 m)       Exam:  Physical Exam  Nurse's notes and vital signs reviewed. The patient is not hypoxic. ? General: Alert, no acute distress, patient resting comfortably Patient is not toxic or lethargic. Skin: Warm, intact, no pallor noted. There is no evidence of rash at this time.   Head: Normocephalic, atraumatic  Eye: Normal conjunctiva  Ears, Nose, Throat: Right tympanic membrane clear, left tympanic membrane clear. No drainage or discharge noted. No pre- or post-auricular tenderness, erythema, or swelling noted. Mild rhinorrhea  Posterior oropharynx shows no erythema, tonsillar hypertrophy, or exudate. the uvula is midline. No trismus or drooling is noted. Moist mucous membranes. Neck: No anterior/posterior lymphadenopathy noted. No erythema, no masses, no fluctuance or induration noted. No meningeal signs. Cardiovascular: Regular Rate and Rhythm  Respiratory: No acute distress, rhonchi noted in the bilateral bases. No stridor or retractions are noted. Neurological: A&O x4, normal speech  Psychiatric: Cooperative           Testing:     Xr Chest Standard (2 Vw)    Result Date: 12/7/2020  EXAMINATION: TWO XRAY VIEWS OF THE CHEST 12/7/2020 10:29 am COMPARISON: 5 November 2020 HISTORY: ORDERING SYSTEM PROVIDED HISTORY: Cough TECHNOLOGIST PROVIDED HISTORY: Reason for exam:->cough FINDINGS: There is ill-defined opacity adjacent to the left heart border suggesting developing pneumonia. Stable postsurgical changes and stable appearance of AICD on the left. Normal pulmonary vascularity. Stable pleural calcification best appreciated on the left suggest prior asbestos exposure. This developing subtle pneumonia on the left suspected. See above. Medical Decision Making:     Vital signs reviewed    Past medical history reviewed. Allergies reviewed. Medications reviewed. Patient on arrival does not appear to be in any apparent distress or discomfort. The patient will be sent for chest x-ray given his history of CHF. The patient does have what appears to be a developing pneumonia on the left. I personally reviewed the images of the x-ray. The patient does have stable pleural calcifications. The patient will be treated with doxycycline. The patient will be given Tessalon Perles and chlorphentermine. The patient is anticoagulated at this point. We will have the patient continue with antibiotic. Follow-up in 1 week for repeat evaluation repeat assessment to ensure that this is resolving. Clinical Impression:   Francis Strauss was seen today for cough. Diagnoses and all orders for this visit:    Pneumonia of left lower lobe due to infectious organism    Cough  -     XR CHEST STANDARD (2 VW); Future    Other orders  -     doxycycline hyclate (VIBRA-TABS) 100 MG tablet; Take 1 tablet by mouth 2 times daily for 10 days  -     benzonatate (TESSALON) 100 MG capsule; Take 1 capsule by mouth 3 times daily as needed for Cough  -     chlorpheniramine (ALLER-CHLOR) 4 MG tablet; Take 1 tablet by mouth every 6 hours as needed for Allergies        The patient is to call for any concerns or return if any of the signs or symptoms worsen. The patient is to follow-up with PCP in the next 2-3 days for repeat evaluation repeat assessment or go directly to the emergency department.      SIGNATURE: Joseph Lemons III, PA-C

## 2020-12-17 ENCOUNTER — OFFICE VISIT (OUTPATIENT)
Dept: PRIMARY CARE CLINIC | Age: 85
End: 2020-12-17
Payer: MEDICARE

## 2020-12-17 VITALS
DIASTOLIC BLOOD PRESSURE: 74 MMHG | TEMPERATURE: 97.4 F | BODY MASS INDEX: 26.37 KG/M2 | SYSTOLIC BLOOD PRESSURE: 148 MMHG | RESPIRATION RATE: 20 BRPM | WEIGHT: 174 LBS | OXYGEN SATURATION: 96 % | HEIGHT: 68 IN | HEART RATE: 74 BPM

## 2020-12-17 DIAGNOSIS — I10 ESSENTIAL HYPERTENSION: Chronic | ICD-10-CM

## 2020-12-17 DIAGNOSIS — K92.1 GASTROINTESTINAL HEMORRHAGE WITH MELENA: ICD-10-CM

## 2020-12-17 LAB
ALBUMIN SERPL-MCNC: 4 G/DL (ref 3.5–5.2)
ALP BLD-CCNC: 191 U/L (ref 40–129)
ALT SERPL-CCNC: 63 U/L (ref 0–40)
ANION GAP SERPL CALCULATED.3IONS-SCNC: 13 MMOL/L (ref 7–16)
AST SERPL-CCNC: 71 U/L (ref 0–39)
BASOPHILS ABSOLUTE: 0.05 E9/L (ref 0–0.2)
BASOPHILS RELATIVE PERCENT: 0.7 % (ref 0–2)
BILIRUB SERPL-MCNC: 0.3 MG/DL (ref 0–1.2)
BUN BLDV-MCNC: 21 MG/DL (ref 8–23)
CALCIUM SERPL-MCNC: 10 MG/DL (ref 8.6–10.2)
CHLORIDE BLD-SCNC: 103 MMOL/L (ref 98–107)
CO2: 27 MMOL/L (ref 22–29)
CREAT SERPL-MCNC: 1.1 MG/DL (ref 0.7–1.2)
EOSINOPHILS ABSOLUTE: 0.15 E9/L (ref 0.05–0.5)
EOSINOPHILS RELATIVE PERCENT: 2 % (ref 0–6)
FERRITIN: 180 NG/ML
GFR AFRICAN AMERICAN: >60
GFR NON-AFRICAN AMERICAN: >60 ML/MIN/1.73
GLUCOSE BLD-MCNC: 96 MG/DL (ref 74–99)
HCT VFR BLD CALC: 39.6 % (ref 37–54)
HEMOGLOBIN: 12.5 G/DL (ref 12.5–16.5)
IMMATURE GRANULOCYTES #: 0.03 E9/L
IMMATURE GRANULOCYTES %: 0.4 % (ref 0–5)
LYMPHOCYTES ABSOLUTE: 1.9 E9/L (ref 1.5–4)
LYMPHOCYTES RELATIVE PERCENT: 24.7 % (ref 20–42)
MCH RBC QN AUTO: 28.5 PG (ref 26–35)
MCHC RBC AUTO-ENTMCNC: 31.6 % (ref 32–34.5)
MCV RBC AUTO: 90.2 FL (ref 80–99.9)
MONOCYTES ABSOLUTE: 0.75 E9/L (ref 0.1–0.95)
MONOCYTES RELATIVE PERCENT: 9.8 % (ref 2–12)
NEUTROPHILS ABSOLUTE: 4.81 E9/L (ref 1.8–7.3)
NEUTROPHILS RELATIVE PERCENT: 62.4 % (ref 43–80)
PDW BLD-RTO: 15.5 FL (ref 11.5–15)
PLATELET # BLD: 502 E9/L (ref 130–450)
PMV BLD AUTO: 10.9 FL (ref 7–12)
POTASSIUM SERPL-SCNC: 5.2 MMOL/L (ref 3.5–5)
RBC # BLD: 4.39 E12/L (ref 3.8–5.8)
SODIUM BLD-SCNC: 143 MMOL/L (ref 132–146)
TOTAL PROTEIN: 7.5 G/DL (ref 6.4–8.3)
WBC # BLD: 7.7 E9/L (ref 4.5–11.5)

## 2020-12-17 PROCEDURE — G8427 DOCREV CUR MEDS BY ELIG CLIN: HCPCS | Performed by: FAMILY MEDICINE

## 2020-12-17 PROCEDURE — 1036F TOBACCO NON-USER: CPT | Performed by: FAMILY MEDICINE

## 2020-12-17 PROCEDURE — 4040F PNEUMOC VAC/ADMIN/RCVD: CPT | Performed by: FAMILY MEDICINE

## 2020-12-17 PROCEDURE — 1123F ACP DISCUSS/DSCN MKR DOCD: CPT | Performed by: FAMILY MEDICINE

## 2020-12-17 PROCEDURE — G8482 FLU IMMUNIZE ORDER/ADMIN: HCPCS | Performed by: FAMILY MEDICINE

## 2020-12-17 PROCEDURE — G8417 CALC BMI ABV UP PARAM F/U: HCPCS | Performed by: FAMILY MEDICINE

## 2020-12-17 PROCEDURE — 99213 OFFICE O/P EST LOW 20 MIN: CPT | Performed by: FAMILY MEDICINE

## 2020-12-17 ASSESSMENT — ENCOUNTER SYMPTOMS
DIARRHEA: 0
CONSTIPATION: 0
WHEEZING: 0
ABDOMINAL PAIN: 0
COUGH: 1
SORE THROAT: 0
RHINORRHEA: 0
VOMITING: 0
SHORTNESS OF BREATH: 0
NAUSEA: 0

## 2020-12-17 NOTE — PROGRESS NOTES
2020     Chief Complaint   Patient presents with    Pneumonia     follow up- feeling better but some coughing up mucus    Dizziness     today     ZAINA Lopes (:  1935) is a 82 y.o. male, here for evaluation of the following medical concerns:    Patient is an 44-year-old male with a history of multiple chronic medical problems and comorbidities who presents today for a follow-up from a visit to our walk-in/urgent care office. Patient was seen approximately 10 days ago for 2 weeks of essentially URI-like symptoms including cough, congestion, drainage. During his office appointment the patient had a chest x-ray that was consistent with a new/developing pneumonia. Due to these findings the patient was started on doxycycline, Tessalon Perles, and Chlorphentermine. Patient presents today for a follow-up after completion of antibiotics. Reports all most full resolution of his symptoms 2 days ago. Over the last 24 hours has had some issues with his BPPV and tiredness/fatigue. Mild cough. No LE swelling. No SOB. No CP. + Mucus production. Review of Systems   Constitutional: Positive for fatigue. Negative for chills and fever. HENT: Positive for congestion. Negative for rhinorrhea and sore throat. Respiratory: Positive for cough. Negative for shortness of breath and wheezing. Cardiovascular: Negative for chest pain and leg swelling. Gastrointestinal: Negative for abdominal pain, constipation, diarrhea, nausea and vomiting. Reports some dark stools. Skin: Negative for rash. Neurological: Negative for light-headedness and headaches.        Past Medical History:   Diagnosis Date    A-fib [facility])     Arrhythmia     Carotid artery stenosis     CHF (congestive heart failure) (HCC)     Heart valve problem     Hyperlipidemia     Hypertension     ICD (implantable cardiac defibrillator) in place     MI (mitral incompetence)   [facility]        Prior to Visit Medications    Medication Sig Taking? Authorizing Provider   cetirizine (ZYRTEC) 10 MG tablet Take 1 tablet by mouth daily Yes Amber Chino DO   Handicap Placard MISC by Does not apply route Disp #: 1  Duration: 5 years.  Yes Rabia Gurrola MD   ferrous sulfate (IRON 325) 325 (65 Fe) MG tablet Take 1 tablet by mouth daily (with breakfast) Yes Rabia Gurrola MD   potassium chloride (MICRO-K) 10 MEQ extended release capsule Take 10 mEq by mouth daily Yes Historical Provider, MD   furosemide (LASIX) 40 MG tablet Take 1 tablet by mouth daily Yes Rabia Gurrola MD   pravastatin (PRAVACHOL) 40 MG tablet Take 1 tablet by mouth daily Yes Rabia Gurrola MD   metoprolol tartrate (LOPRESSOR) 50 MG tablet 75 mg in AM and 50 mg at HS Yes Rabia Gurrola MD   MAGNESIUM-OXIDE 400 (241.3 Mg) MG TABS tablet TK 1 T PO ONCE D Yes Historical Provider, MD   pantoprazole (PROTONIX) 40 MG tablet Take 1 tablet by mouth daily Yes Rabia Gurrola MD   dofetilide (TIKOSYN) 250 MCG capsule Take 1 capsule by mouth 2 times daily Yes Rabia Gurrola MD   rivaroxaban (XARELTO) 20 MG TABS tablet Take 1 tablet by mouth daily Yes Nicolas Maxwell MD   Multiple Vitamins-Minerals (PRESERVISION AREDS 2 PO) Take 1 tablet by mouth 2 times daily Yes Historical Provider, MD   Cholecalciferol (VITAMIN D) 2000 UNITS CAPS capsule Take 1 capsule by mouth daily  Yes Historical Provider, MD        No Known Allergies    Social History     Tobacco Use    Smoking status: Former Smoker     Packs/day: 0.50     Years: 3.00     Pack years: 1.50     Quit date: 1973     Years since quittin.9    Smokeless tobacco: Never Used    Tobacco comment: Quit smoking in s   Substance Use Topics    Alcohol use: No           Vitals:    20 1500 20 1509   BP: (!) 164/80 (!) 148/74   Pulse: 74    Resp: 20    Temp: 97.4 °F (36.3 °C)    TempSrc: Temporal    SpO2: 96% Weight: 174 lb (78.9 kg)    Height: 5' 8\" (1.727 m)      Estimated body mass index is 26.46 kg/m² as calculated from the following:    Height as of this encounter: 5' 8\" (1.727 m). Weight as of this encounter: 174 lb (78.9 kg). Physical Exam  Constitutional:       General: He is not in acute distress. Appearance: He is well-developed. He is not ill-appearing or toxic-appearing. HENT:      Head: Normocephalic. Eyes:      Conjunctiva/sclera: Conjunctivae normal.      Pupils: Pupils are equal, round, and reactive to light. Cardiovascular:      Rate and Rhythm: Normal rate and regular rhythm. Heart sounds: Normal heart sounds. No murmur. Pulmonary:      Effort: Pulmonary effort is normal.      Breath sounds: Rhonchi and rales present. No wheezing. Comments: Decreased breath sounds in lung bases  Abdominal:      General: Bowel sounds are normal.      Palpations: Abdomen is soft. Tenderness: There is no abdominal tenderness. Musculoskeletal:      Right lower leg: No edema. Left lower leg: No edema. Neurological:      Mental Status: He is alert. Comments: Cranial nerves grossly intact         ASSESSMENT/PLAN:  Ghazala Ace was seen today for pneumonia and dizziness. Diagnoses and all orders for this visit:    Cough  -     Cancel: XR CHEST (2 VW); Future  -     XR CHEST (2 VW); Future    Pneumonia of left lower lobe due to infectious organism  -     XR CHEST (2 VW); Future    Vertigo  Comments:  Work-up by ENT. Dx w/ BPPV. Con meclizine PRN. Heart rate and rhythm appropriate today. Symptoms in general improving. Mild/minimal cough with some mucus production still present. Repeat x-ray shows improvement in pneumonia/resolution. Patient does have some small blunting of his costophrenic angle. Patient did skip his Lasix today to come to the office. Patient advised to restart Lasix. No need for additional dosages as patient does not have other symptoms of acute congestive heart failure. No lower extremity swelling. Patient may use Mucinex when necessary for congestion. Return if symptoms worsen or fail to improve. Return next week if necessary. To ER with any new or worsening symptoms. An Home[employer]Account[employer]ature was used to authenticate this note.     --Desmond Rodríguez MD on 12/17/20 at 8:17 AM EST

## 2020-12-21 ENCOUNTER — TELEPHONE (OUTPATIENT)
Dept: PRIMARY CARE CLINIC | Age: 85
End: 2020-12-21

## 2020-12-22 DIAGNOSIS — R79.89 ELEVATED LFTS: ICD-10-CM

## 2020-12-22 DIAGNOSIS — R74.8 ABNORMAL LEVELS OF OTHER SERUM ENZYMES: ICD-10-CM

## 2020-12-22 DIAGNOSIS — R79.89 ELEVATED PLATELET COUNT: ICD-10-CM

## 2020-12-22 LAB
ALBUMIN SERPL-MCNC: 4.1 G/DL (ref 3.5–5.2)
ALP BLD-CCNC: 170 U/L (ref 40–129)
ALT SERPL-CCNC: 32 U/L (ref 0–40)
ANION GAP SERPL CALCULATED.3IONS-SCNC: 13 MMOL/L (ref 7–16)
AST SERPL-CCNC: 34 U/L (ref 0–39)
BASOPHILS ABSOLUTE: 0.06 E9/L (ref 0–0.2)
BASOPHILS RELATIVE PERCENT: 0.6 % (ref 0–2)
BILIRUB SERPL-MCNC: 0.4 MG/DL (ref 0–1.2)
BUN BLDV-MCNC: 32 MG/DL (ref 8–23)
CALCIUM SERPL-MCNC: 10.2 MG/DL (ref 8.6–10.2)
CHLORIDE BLD-SCNC: 101 MMOL/L (ref 98–107)
CO2: 28 MMOL/L (ref 22–29)
CREAT SERPL-MCNC: 1.3 MG/DL (ref 0.7–1.2)
EOSINOPHILS ABSOLUTE: 0.18 E9/L (ref 0.05–0.5)
EOSINOPHILS RELATIVE PERCENT: 1.7 % (ref 0–6)
GAMMA GLUTAMYL TRANSFERASE: 46 U/L (ref 10–71)
GFR AFRICAN AMERICAN: >60
GFR NON-AFRICAN AMERICAN: 53 ML/MIN/1.73
GLUCOSE BLD-MCNC: 112 MG/DL (ref 74–99)
HCT VFR BLD CALC: 43 % (ref 37–54)
HEMOGLOBIN: 13 G/DL (ref 12.5–16.5)
IMMATURE GRANULOCYTES #: 0.03 E9/L
IMMATURE GRANULOCYTES %: 0.3 % (ref 0–5)
LYMPHOCYTES ABSOLUTE: 1.67 E9/L (ref 1.5–4)
LYMPHOCYTES RELATIVE PERCENT: 15.9 % (ref 20–42)
MCH RBC QN AUTO: 27.8 PG (ref 26–35)
MCHC RBC AUTO-ENTMCNC: 30.2 % (ref 32–34.5)
MCV RBC AUTO: 92.1 FL (ref 80–99.9)
MONOCYTES ABSOLUTE: 0.95 E9/L (ref 0.1–0.95)
MONOCYTES RELATIVE PERCENT: 9 % (ref 2–12)
NEUTROPHILS ABSOLUTE: 7.64 E9/L (ref 1.8–7.3)
NEUTROPHILS RELATIVE PERCENT: 72.5 % (ref 43–80)
PDW BLD-RTO: 16.2 FL (ref 11.5–15)
PLATELET # BLD: 425 E9/L (ref 130–450)
PMV BLD AUTO: 11 FL (ref 7–12)
POTASSIUM SERPL-SCNC: 4.8 MMOL/L (ref 3.5–5)
RBC # BLD: 4.67 E12/L (ref 3.8–5.8)
SODIUM BLD-SCNC: 142 MMOL/L (ref 132–146)
TOTAL PROTEIN: 7.5 G/DL (ref 6.4–8.3)
WBC # BLD: 10.5 E9/L (ref 4.5–11.5)

## 2020-12-23 ENCOUNTER — TELEPHONE (OUTPATIENT)
Dept: ADMINISTRATIVE | Age: 85
End: 2020-12-23

## 2020-12-24 NOTE — TELEPHONE ENCOUNTER
Please notify the patient that his CBC showed normal WBC (10.4), Hemoglobin (13.0), Platelets (126). CMP showed normal electrolytes: Sodium (142), Potassium (4.8). Slight chronic elevation of BUN (32) and Creatinine (1.3). His liver function have improved Alk Phos (170), ALT (32), and AST (34).

## 2021-01-02 DIAGNOSIS — K92.1 GASTROINTESTINAL HEMORRHAGE WITH MELENA: ICD-10-CM

## 2021-01-04 RX ORDER — PANTOPRAZOLE SODIUM 40 MG/1
40 TABLET, DELAYED RELEASE ORAL DAILY
Qty: 90 TABLET | Refills: 3 | Status: SHIPPED
Start: 2021-01-04 | End: 2021-12-15

## 2021-01-26 ENCOUNTER — IMMUNIZATION (OUTPATIENT)
Dept: PRIMARY CARE CLINIC | Age: 86
End: 2021-01-26
Payer: MEDICARE

## 2021-01-26 PROCEDURE — 91300 COVID-19, PFIZER VACCINE 30MCG/0.3ML DOSE: CPT | Performed by: PHYSICIAN ASSISTANT

## 2021-01-26 PROCEDURE — 0001A COVID-19, PFIZER VACCINE 30MCG/0.3ML DOSE: CPT | Performed by: PHYSICIAN ASSISTANT

## 2021-02-08 ENCOUNTER — OFFICE VISIT (OUTPATIENT)
Dept: PRIMARY CARE CLINIC | Age: 86
End: 2021-02-08
Payer: MEDICARE

## 2021-02-08 VITALS
BODY MASS INDEX: 26.37 KG/M2 | HEART RATE: 69 BPM | DIASTOLIC BLOOD PRESSURE: 78 MMHG | OXYGEN SATURATION: 95 % | WEIGHT: 174 LBS | HEIGHT: 68 IN | RESPIRATION RATE: 18 BRPM | TEMPERATURE: 97.5 F | SYSTOLIC BLOOD PRESSURE: 124 MMHG

## 2021-02-08 DIAGNOSIS — Z95.810 ICD (IMPLANTABLE CARDIOVERTER-DEFIBRILLATOR), DUAL, IN SITU: ICD-10-CM

## 2021-02-08 DIAGNOSIS — I10 ESSENTIAL HYPERTENSION: ICD-10-CM

## 2021-02-08 DIAGNOSIS — R91.1 LUNG NODULE: ICD-10-CM

## 2021-02-08 DIAGNOSIS — I48.20 CHRONIC ATRIAL FIBRILLATION (HCC): ICD-10-CM

## 2021-02-08 DIAGNOSIS — R05.9 COUGH: ICD-10-CM

## 2021-02-08 DIAGNOSIS — R79.89 ELEVATED LFTS: ICD-10-CM

## 2021-02-08 DIAGNOSIS — I50.42 CHRONIC COMBINED SYSTOLIC AND DIASTOLIC CHF (CONGESTIVE HEART FAILURE) (HCC): ICD-10-CM

## 2021-02-08 DIAGNOSIS — I25.10 CORONARY ARTERY DISEASE INVOLVING NATIVE CORONARY ARTERY OF NATIVE HEART WITHOUT ANGINA PECTORIS: ICD-10-CM

## 2021-02-08 DIAGNOSIS — Z00.00 ROUTINE GENERAL MEDICAL EXAMINATION AT A HEALTH CARE FACILITY: Primary | ICD-10-CM

## 2021-02-08 DIAGNOSIS — E78.5 HYPERLIPIDEMIA LDL GOAL <100: ICD-10-CM

## 2021-02-08 DIAGNOSIS — Z95.810 IMPLANTABLE CARDIOVERTER-DEFIBRILLATOR (ICD) IN SITU: ICD-10-CM

## 2021-02-08 DIAGNOSIS — N18.32 STAGE 3B CHRONIC KIDNEY DISEASE (HCC): ICD-10-CM

## 2021-02-08 DIAGNOSIS — E55.9 VITAMIN D DEFICIENCY: ICD-10-CM

## 2021-02-08 PROBLEM — I48.0 PAROXYSMAL ATRIAL FIBRILLATION (HCC): Status: RESOLVED | Noted: 2018-03-13 | Resolved: 2021-02-08

## 2021-02-08 PROCEDURE — 1123F ACP DISCUSS/DSCN MKR DOCD: CPT | Performed by: FAMILY MEDICINE

## 2021-02-08 PROCEDURE — G0438 PPPS, INITIAL VISIT: HCPCS | Performed by: FAMILY MEDICINE

## 2021-02-08 PROCEDURE — 4040F PNEUMOC VAC/ADMIN/RCVD: CPT | Performed by: FAMILY MEDICINE

## 2021-02-08 PROCEDURE — G8482 FLU IMMUNIZE ORDER/ADMIN: HCPCS | Performed by: FAMILY MEDICINE

## 2021-02-08 RX ORDER — CETIRIZINE HYDROCHLORIDE 10 MG/1
5 TABLET ORAL DAILY
Qty: 30 TABLET | Refills: 3 | Status: SHIPPED
Start: 2021-02-08 | End: 2021-07-30

## 2021-02-08 ASSESSMENT — PATIENT HEALTH QUESTIONNAIRE - PHQ9
SUM OF ALL RESPONSES TO PHQ QUESTIONS 1-9: 0
SUM OF ALL RESPONSES TO PHQ QUESTIONS 1-9: 0
2. FEELING DOWN, DEPRESSED OR HOPELESS: 0
SUM OF ALL RESPONSES TO PHQ9 QUESTIONS 1 & 2: 0
1. LITTLE INTEREST OR PLEASURE IN DOING THINGS: 0
SUM OF ALL RESPONSES TO PHQ QUESTIONS 1-9: 0

## 2021-02-08 ASSESSMENT — LIFESTYLE VARIABLES: HOW OFTEN DO YOU HAVE A DRINK CONTAINING ALCOHOL: 0

## 2021-02-08 NOTE — PATIENT INSTRUCTIONS
Personalized Preventive Plan for Sergio Number - 2/8/2021  Medicare offers a range of preventive health benefits. Some of the tests and screenings are paid in full while other may be subject to a deductible, co-insurance, and/or copay. Some of these benefits include a comprehensive review of your medical history including lifestyle, illnesses that may run in your family, and various assessments and screenings as appropriate. After reviewing your medical record and screening and assessments performed today your provider may have ordered immunizations, labs, imaging, and/or referrals for you. A list of these orders (if applicable) as well as your Preventive Care list are included within your After Visit Summary for your review. Other Preventive Recommendations:    · A preventive eye exam performed by an eye specialist is recommended every 1-2 years to screen for glaucoma; cataracts, macular degeneration, and other eye disorders. · A preventive dental visit is recommended every 6 months. · Try to get at least 150 minutes of exercise per week or 10,000 steps per day on a pedometer . · Order or download the FREE \"Exercise & Physical Activity: Your Everyday Guide\" from [employer]. Call 7-455.972.2778 or search [employer] online. · You need 1498-4134 mg of calcium and 5676-8986 IU of vitamin D per day. It is possible to meet your calcium requirement with diet alone, but a vitamin D supplement is usually necessary to meet this goal.  · When exposed to the sun, use a sunscreen that protects against both UVA and UVB radiation with an SPF of 30 or greater. Reapply every 2 to 3 hours or after sweating, drying off with a towel, or swimming. · Always wear a seat belt when traveling in a car. Always wear a helmet when riding a bicycle or motorcycle.

## 2021-02-08 NOTE — PROGRESS NOTES
Medicare Annual Wellness Visit  Name: Oswaldo Wong Date: 2021   MRN: [id number] Sex: Male   Age: 82 y.o. Ethnicity: Non-/Non    : 1935 Race: Coleen Moulton is here for Medicare AWV    Screenings for behavioral, psychosocial and functional/safety risks, and cognitive dysfunction are all negative except as indicated below. These results, as well as other patient data from the [address] [facility] [address] form, are documented in Flowsheets linked to this Encounter. Chief Complaint   Patient presents with    Medicare AW     Patient is a 68-year-old male with a past medical history of CHF, atrial fibrillation, nonsustained ventricular tachycardia, hyperlipidemia, hypertension, history of MI, insomnia, remote history of gastrointestinal hemorrhage with melena, ICD placement, and vitamin D deficiency who presents today to follow-up his establishment appointment approximately 3-4 months ago.     History of nonsustained ventricular tachycardia: On Tikosyn and, Lasix, and metoprolol. Patient follows both with cardiology and electrophysiology for this issue.     History of atrial fibrillation: Patient is rate controlled on metoprolol and anticoagulated on   Xarelto.     CHF: Patient is to be followed with cardiology in regards to this issue.      Hyperlipidemia: Currently on statin. Following with cardiology. Tolerating statin well without any side effects.     Hypertension: Blood pressure is appropriate today. Tolerating his blood pressure medications well. No side effects.     History of MI: Patient's 1st heart attack was in . Has had multiple stents and bypass surgeries. Patient is following with cardiology.     Insomnia: Resolved    Lung Nodule: Has been discussed in the past in regards to the risk of lung cancer which could result in death. Patient would still like to continue to hold off on bx. 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 CKD III: Stable. Vit D Def: On vitamin D supplementation. Hx of GI bleed: No ASA due to Xarelto. Recent CBC stable.     Patient does have an ICD in place. Cough: Chronic. Patient has tried H1 blockers in the past.  Patient currently is not on such. Is using nasal spray without significant relief. Patient reports cough usually. It seems worse after consuming food.     Health maintenance: Patient is due for lipid screening, 2nd dose of his pneumococcal immunization, and shingles immunization. No Known Allergies      Prior to Visit Medications    Medication Sig Taking? Authorizing Provider   cetirizine (ZYRTEC) 10 MG tablet Take 0.5 tablets by mouth daily Yes Martina Bhakta MD   pantoprazole (PROTONIX) 40 MG tablet TAKE 1 TABLET BY MOUTH  DAILY Yes Martina Bhakta MD   Handicap Placard MISC by Does not apply route Disp #: 1  Duration: 5 years.  Yes Martina Bhakta MD   ferrous sulfate (IRON 325) 325 (65 Fe) MG tablet Take 1 tablet by mouth daily (with breakfast) Yes Martina Bhakta MD   potassium chloride (MICRO-K) 10 MEQ extended release capsule Take 10 mEq by mouth daily Yes Historical Provider, MD   furosemide (LASIX) 40 MG tablet Take 1 tablet by mouth daily Yes Martina Bhakta MD   pravastatin (PRAVACHOL) 40 MG tablet Take 1 tablet by mouth daily Yes Martina Bhakta MD   metoprolol tartrate (LOPRESSOR) 50 MG tablet 75 mg in AM and 50 mg at HS Yes Martina Bhakta MD   MAGNESIUM-OXIDE 400 (241.3 Mg) MG TABS tablet TK 1 T PO ONCE D Yes Historical Provider, MD   dofetilide (TIKOSYN) 250 MCG capsule Take 1 capsule by mouth 2 times daily Yes Martina Bhakta MD   rivaroxaban (XARELTO) 20 MG TABS tablet Take 1 tablet by mouth daily Yes Senia Hauser MD   Multiple Vitamins-Minerals (PRESERVISION AREDS 2 PO) Take 1 tablet by mouth 2 times daily Yes Historical Provider, MD   Cholecalciferol (VITAMIN D) 2000 UNITS CAPS capsule Take 1 capsule by mouth daily  Yes Historical Provider, MD Past Medical History:   Diagnosis Date    A-fib [facility])     Arrhythmia     Carotid artery stenosis     [facility]     Heart valve problem     Hyperlipidemia     Hypertension     ICD (implantable cardiac defibrillator) in place 2007    MI (mitral incompetence) 2003    MI (myocardial infarction) ([address] 2003       Past Surgical History:   Procedure Laterality Date    CARDIAC CATHETERIZATION Right 03/03/2017    Dr. José Kaur  2007. 2008 2007 78 shocks replaced 2008 [employer]     CARDIAC DEFIBRILLATOR PLACEMENT  07/28/2016    Med[employer] Dual ICD gen change    CARDIOVERSION  10/29/2020    Successful    (Dr. Poonam Galan)    COLONOSCOPY N/A 2/24/2020    COLONOSCOPY DIAGNOSTIC performed by Jose Wakefield MD at [address]  05/23/2003 03/16/2007    DIAGNOSTIC CARDIAC CATH LAB PROCEDURE  2007    DIAGNOSTIC CARDIAC CATH LAB PROCEDURE  03/29/2008    stent    JOINT REPLACEMENT  2011    r hip surgery Dr Audelia Mcdowell  03/29/2017    Dr. Bud Segovia and Dr. Meena Conklin- TAVR Josie 26mm valve    UPPER GASTROINTESTINAL ENDOSCOPY N/A 9/3/2019    EGD ESOPHAGOGASTRODUODENOSCOPY performed by Rahul Cordero MD at [address] [place] [address],Third Floor N/A 2/18/2020    EGD ESOPHAGOGASTRODUODENOSCOPY performed by Jose Wakefield MD at [place] [address]'s       No family history on file.     CareTeam (Including outside providers/suppliers regularly involved in providing care):   Patient Care Team:  Maria Elena Kemp MD as PCP - General (Family Medicine)  Maria Elena Kemp MD as PCP - [facility] Empaneled Provider  Mariela Snow MD as Surgeon (Orthopedic Surgery)  Robby Hair DO as Consulting Physician (Cardiology)    Wt Readings from Last 3 Encounters:   02/08/21 174 lb (78.9 kg)   12/17/20 174 lb (78.9 kg)   12/07/20 177 lb 6.4 oz (80.5 kg)     Vitals:    02/08/21 1249 Diagnoses and all orders for this visit:    Routine general medical examination at a health care facility  Information as noted above. Cough  -     cetirizine (ZYRTEC) 10 MG tablet; Take 0.5 tablets by mouth daily  -     XR CHEST (2 VW); Future  Concerns for postnasal drip versus dysphagia. Patient was offered additional work-up which she declined at this time due to COVID-19. Consider PFTs versus swallow study in the near future. Elevated LFTs  Improved on most recent lab testing. Coronary artery disease involving native coronary artery of native heart without angina pectoris  Patient is on optimal therapy and being followed closely by cardiology and electrophysiology. Essential hypertension  Stable. Controlled. Lung nodule  Patient may need a lung biopsy in the near future. Patient declined at this time and is aware of the possible risk of lung cancer. Vitamin D deficiency  Continue vitamin D supplementation. Check vitamin D level every 6 months. Chronic atrial fibrillation [facility])  Following with cardiology and electrophysiology. Implantable cardioverter-defibrillator (ICD) in situ  Following with cardiology and electrophysiology. Hyperlipidemia LDL goal <100  Following cardiology. Tolerates that well without any side effects. Stage 3b chronic kidney disease  Stable. BUN and creatinine have been at baseline. Consider referral to a kidney specialist if any worsening. Chronic combined systolic and diastolic CHF (congestive heart failure) (HCC)  Stable. No acute exacerbations. Following with cardiology and electrophysiology. Patient will return in 4 months for a follow-up of his chronic medical problems, lab work, and additional work-up in regards to his ongoing cough issues. Patient is continue to follow-up with his specialist as scheduled.

## 2021-02-16 ENCOUNTER — IMMUNIZATION (OUTPATIENT)
Dept: PRIMARY CARE CLINIC | Age: 86
End: 2021-02-16
Payer: MEDICARE

## 2021-02-16 PROCEDURE — 91300 COVID-19, PFIZER VACCINE 30MCG/0.3ML DOSE: CPT | Performed by: CLINICAL NURSE SPECIALIST

## 2021-02-16 PROCEDURE — 0002A COVID-19, PFIZER VACCINE 30MCG/0.3ML DOSE: CPT | Performed by: CLINICAL NURSE SPECIALIST

## 2021-03-11 DIAGNOSIS — I25.5 ISCHEMIC CARDIOMYOPATHY: Chronic | ICD-10-CM

## 2021-03-11 DIAGNOSIS — Z45.02 AICD DISCHARGE: ICD-10-CM

## 2021-03-11 RX ORDER — PRAVASTATIN SODIUM 40 MG
40 TABLET ORAL DAILY
Qty: 90 TABLET | Refills: 3 | Status: SHIPPED
Start: 2021-03-11 | End: 2022-02-16

## 2021-03-11 RX ORDER — METOPROLOL TARTRATE 50 MG/1
TABLET, FILM COATED ORAL
Qty: 225 TABLET | Refills: 3 | Status: SHIPPED
Start: 2021-03-11 | End: 2022-02-16

## 2021-03-11 RX ORDER — FUROSEMIDE 40 MG/1
40 TABLET ORAL DAILY
Qty: 90 TABLET | Refills: 3 | Status: SHIPPED
Start: 2021-03-11 | End: 2022-02-16

## 2021-03-29 RX ORDER — RIVAROXABAN 20 MG/1
TABLET, FILM COATED ORAL
Qty: 90 TABLET | Refills: 3 | Status: ON HOLD
Start: 2021-03-29 | End: 2022-03-04 | Stop reason: HOSPADM

## 2021-06-07 ENCOUNTER — TELEPHONE (OUTPATIENT)
Dept: PRIMARY CARE CLINIC | Age: 86
End: 2021-06-07

## 2021-06-07 ENCOUNTER — OFFICE VISIT (OUTPATIENT)
Dept: PRIMARY CARE CLINIC | Age: 86
End: 2021-06-07
Payer: MEDICARE

## 2021-06-07 VITALS
HEIGHT: 68 IN | TEMPERATURE: 97 F | RESPIRATION RATE: 18 BRPM | BODY MASS INDEX: 25.91 KG/M2 | DIASTOLIC BLOOD PRESSURE: 70 MMHG | WEIGHT: 171 LBS | HEART RATE: 70 BPM | SYSTOLIC BLOOD PRESSURE: 148 MMHG | OXYGEN SATURATION: 96 %

## 2021-06-07 DIAGNOSIS — H81.10 BENIGN PAROXYSMAL POSITIONAL VERTIGO, UNSPECIFIED LATERALITY: ICD-10-CM

## 2021-06-07 DIAGNOSIS — I25.10 CORONARY ARTERY DISEASE INVOLVING NATIVE CORONARY ARTERY OF NATIVE HEART WITHOUT ANGINA PECTORIS: ICD-10-CM

## 2021-06-07 DIAGNOSIS — N18.32 STAGE 3B CHRONIC KIDNEY DISEASE (HCC): ICD-10-CM

## 2021-06-07 DIAGNOSIS — M79.2 NEUROPATHIC PAIN, LEG, RIGHT: ICD-10-CM

## 2021-06-07 DIAGNOSIS — I10 ESSENTIAL HYPERTENSION: ICD-10-CM

## 2021-06-07 DIAGNOSIS — R79.89 ELEVATED LFTS: ICD-10-CM

## 2021-06-07 DIAGNOSIS — R05.9 COUGH: ICD-10-CM

## 2021-06-07 DIAGNOSIS — I48.20 CHRONIC ATRIAL FIBRILLATION (HCC): ICD-10-CM

## 2021-06-07 DIAGNOSIS — I50.42 CHRONIC COMBINED SYSTOLIC AND DIASTOLIC CHF (CONGESTIVE HEART FAILURE) (HCC): ICD-10-CM

## 2021-06-07 DIAGNOSIS — E55.9 VITAMIN D DEFICIENCY: ICD-10-CM

## 2021-06-07 DIAGNOSIS — I25.5 ISCHEMIC CARDIOMYOPATHY: Primary | ICD-10-CM

## 2021-06-07 DIAGNOSIS — M79.2 NEUROPATHIC PAIN, LEG, RIGHT: Primary | ICD-10-CM

## 2021-06-07 DIAGNOSIS — E78.5 HYPERLIPIDEMIA LDL GOAL <100: ICD-10-CM

## 2021-06-07 DIAGNOSIS — Z95.810 IMPLANTABLE CARDIOVERTER-DEFIBRILLATOR (ICD) IN SITU: ICD-10-CM

## 2021-06-07 DIAGNOSIS — I48.0 PAROXYSMAL ATRIAL FIBRILLATION (HCC): ICD-10-CM

## 2021-06-07 DIAGNOSIS — M51.36 DDD (DEGENERATIVE DISC DISEASE), LUMBAR: ICD-10-CM

## 2021-06-07 PROCEDURE — 4040F PNEUMOC VAC/ADMIN/RCVD: CPT | Performed by: FAMILY MEDICINE

## 2021-06-07 PROCEDURE — 1036F TOBACCO NON-USER: CPT | Performed by: FAMILY MEDICINE

## 2021-06-07 PROCEDURE — 1123F ACP DISCUSS/DSCN MKR DOCD: CPT | Performed by: FAMILY MEDICINE

## 2021-06-07 PROCEDURE — G8427 DOCREV CUR MEDS BY ELIG CLIN: HCPCS | Performed by: FAMILY MEDICINE

## 2021-06-07 PROCEDURE — 99214 OFFICE O/P EST MOD 30 MIN: CPT | Performed by: FAMILY MEDICINE

## 2021-06-07 PROCEDURE — G8417 CALC BMI ABV UP PARAM F/U: HCPCS | Performed by: FAMILY MEDICINE

## 2021-06-07 ASSESSMENT — ENCOUNTER SYMPTOMS
ABDOMINAL PAIN: 0
DIARRHEA: 0
WHEEZING: 0
VOMITING: 0
RHINORRHEA: 0
SORE THROAT: 0
CONSTIPATION: 0
COUGH: 1
NAUSEA: 0
SHORTNESS OF BREATH: 0

## 2021-06-07 NOTE — PROGRESS NOTES
MG tablet TAKE 1 TABLET BY MOUTH  DAILY Yes Ishaan Serrano MD   cetirizine (ZYRTEC) 10 MG tablet Take 0.5 tablets by mouth daily Yes Ishaan Serrano MD   pantoprazole (PROTONIX) 40 MG tablet TAKE 1 TABLET BY MOUTH  DAILY Yes Ishaan Serrano MD   Winifred Winters MISC by Does not apply route Disp #: 1  Duration: 5 years. Yes Ishaan Serrano MD   ferrous sulfate (IRON 325) 325 (65 Fe) MG tablet Take 1 tablet by mouth daily (with breakfast) Yes Ishaan Serrano MD   potassium chloride (MICRO-K) 10 MEQ extended release capsule Take 10 mEq by mouth daily Yes Historical Provider, MD   MAGNESIUM-OXIDE 400 (241.3 Mg) MG TABS tablet TK 1 T PO ONCE D Yes Historical Provider, MD   dofetilide (TIKOSYN) 250 MCG capsule Take 1 capsule by mouth 2 times daily Yes Ishaan Serrano MD   Multiple Vitamins-Minerals (PRESERVISION AREDS 2 PO) Take 1 tablet by mouth 2 times daily Yes Historical Provider, MD   Cholecalciferol (VITAMIN D) 2000 UNITS CAPS capsule Take 1 capsule by mouth daily  Yes Historical Provider, MD        No Known Allergies    Social History     Tobacco Use    Smoking status: Former Smoker     Packs/day: 0.50     Years: 3.00     Pack years: 1.50     Quit date: 1973     Years since quittin.3    Smokeless tobacco: Never Used    Tobacco comment: Quit smoking in    Substance Use Topics    Alcohol use: No           Vitals:    21 1116 21 1159   BP: (!) 156/72 (!) 148/70   Pulse: 70    Resp: 18    Temp: 97 °F (36.1 °C)    TempSrc: Temporal    SpO2: 96%    Weight: 171 lb (77.6 kg)    Height: 5' 8\" (1.727 m)      Estimated body mass index is 26 kg/m² as calculated from the following:    Height as of this encounter: 5' 8\" (1.727 m). Weight as of this encounter: 171 lb (77.6 kg). Physical Exam  Constitutional:       Appearance: He is well-developed. HENT:      Head: Normocephalic.       Right Ear: Tympanic membrane normal.      Left Ear: Tympanic membrane normal.   Eyes: Extraocular Movements: Extraocular movements intact. Conjunctiva/sclera: Conjunctivae normal.      Pupils: Pupils are equal, round, and reactive to light. Cardiovascular:      Rate and Rhythm: Normal rate and regular rhythm. Heart sounds: Normal heart sounds. No murmur heard. Pulmonary:      Effort: Pulmonary effort is normal.      Breath sounds: Rales present. No wheezing. Abdominal:      General: Bowel sounds are normal.      Palpations: Abdomen is soft. Tenderness: There is no abdominal tenderness. Musculoskeletal:      Lumbar back: No tenderness. Comments: Mild bilateral lower extremity lymphedema. 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 Skin:     Findings: No rash. Neurological:      General: No focal deficit present. Mental Status: He is alert. Comments: Cranial nerves grossly intact   Psychiatric:         Mood and Affect: Mood normal.         Judgment: Judgment normal.         ASSESSMENT/PLAN:  Odell Casillas was seen today for cough and peripheral neuropathy. Diagnoses and all orders for this visit:    Ischemic cardiomyopathy  ICD in place. Follow cardiology and electrophysiology. Coronary artery disease involving native coronary artery of native heart without angina pectoris  Patient is on optimize therapy. Following with electrophysiology and cardiology. Due for follow-up appointment. Essential hypertension  Slightly elevated today. Goals keep his blood glucose 140/90. Continue to follow. Patient to call us if blood pressure remains consistently above 140/90 on blood pressure checks at home. Patient to follow-up with cardiology. Chronic atrial fibrillation (HCC)  Needs to schedule with cardiology and electrophysiology. Implantable cardioverter-defibrillator (ICD) in situ  Needs to schedule with cardiology and electrophysiology. Hyperlipidemia LDL goal <100  -     Lipid Panel; Future    Stage 3b chronic kidney disease ([facility] [address] [address]  Repeat CMP.     Chronic

## 2021-06-07 NOTE — TELEPHONE ENCOUNTER
Patient calling he will go to [facility] for Physical therapy would like script sent to  Fax # [phone]

## 2021-06-08 LAB
ALBUMIN SERPL-MCNC: 3.8 G/DL
ALP BLD-CCNC: 126 U/L
ALT SERPL-CCNC: 17 U/L
ANION GAP SERPL CALCULATED.3IONS-SCNC: NORMAL MMOL/L
AST SERPL-CCNC: 22 U/L
BASOPHILS ABSOLUTE: 0.04 /ΜL
BASOPHILS RELATIVE PERCENT: 0.6 %
BILIRUB SERPL-MCNC: 0.7 MG/DL (ref 0.1–1.4)
BUN BLDV-MCNC: 21 MG/DL
CALCIUM SERPL-MCNC: 9.2 MG/DL
CHLORIDE BLD-SCNC: 106 MMOL/L
CHOLESTEROL, TOTAL: 154 MG/DL
CHOLESTEROL/HDL RATIO: 4
CO2: 30 MMOL/L
CREAT SERPL-MCNC: 0.97 MG/DL
EOSINOPHILS ABSOLUTE: 0.23 /ΜL
EOSINOPHILS RELATIVE PERCENT: 3.2 %
GFR CALCULATED: NORMAL
GLUCOSE BLD-MCNC: 97 MG/DL
HCT VFR BLD CALC: 39.2 % (ref 41–53)
HDLC SERPL-MCNC: 37 MG/DL (ref 35–70)
HEMOGLOBIN: 13 G/DL (ref 13.5–17.5)
LDL CHOLESTEROL CALCULATED: 49 MG/DL (ref 0–160)
LYMPHOCYTES ABSOLUTE: 1.62 /ΜL
LYMPHOCYTES RELATIVE PERCENT: 22.6 %
MCH RBC QN AUTO: 30 PG
MCHC RBC AUTO-ENTMCNC: 33.2 G/DL
MCV RBC AUTO: 90.5 FL
MONOCYTES ABSOLUTE: 0.63 /ΜL
MONOCYTES RELATIVE PERCENT: 8.8 %
NEUTROPHILS ABSOLUTE: 4.64 /ΜL
NEUTROPHILS RELATIVE PERCENT: 64.7 %
NONHDLC SERPL-MCNC: NORMAL MG/DL
PDW BLD-RTO: 14.2 %
PLATELET # BLD: 201 K/ΜL
PMV BLD AUTO: 11.4 FL
POTASSIUM SERPL-SCNC: 4.5 MMOL/L
RBC # BLD: 4.33 10^6/ΜL
SODIUM BLD-SCNC: 141 MMOL/L
TOTAL PROTEIN: 6.9
TRIGL SERPL-MCNC: 339 MG/DL
VITAMIN D 25-HYDROXY: 28.9
VITAMIN D2, 25 HYDROXY: NORMAL
VITAMIN D3,25 HYDROXY: NORMAL
VLDLC SERPL CALC-MCNC: NORMAL MG/DL
WBC # BLD: 7.2 10^3/ML

## 2021-06-09 DIAGNOSIS — E55.9 VITAMIN D DEFICIENCY: ICD-10-CM

## 2021-06-09 DIAGNOSIS — E78.5 HYPERLIPIDEMIA LDL GOAL <100: ICD-10-CM

## 2021-06-09 DIAGNOSIS — R79.89 ELEVATED LFTS: ICD-10-CM

## 2021-07-30 ENCOUNTER — OFFICE VISIT (OUTPATIENT)
Dept: CARDIOLOGY CLINIC | Age: 86
End: 2021-07-30
Payer: MEDICARE

## 2021-07-30 VITALS
HEART RATE: 73 BPM | DIASTOLIC BLOOD PRESSURE: 60 MMHG | WEIGHT: 176.3 LBS | BODY MASS INDEX: 28.33 KG/M2 | HEIGHT: 66 IN | SYSTOLIC BLOOD PRESSURE: 112 MMHG | RESPIRATION RATE: 18 BRPM

## 2021-07-30 DIAGNOSIS — I25.10 CORONARY ARTERY DISEASE INVOLVING NATIVE CORONARY ARTERY OF NATIVE HEART WITHOUT ANGINA PECTORIS: Primary | ICD-10-CM

## 2021-07-30 PROCEDURE — G8417 CALC BMI ABV UP PARAM F/U: HCPCS | Performed by: INTERNAL MEDICINE

## 2021-07-30 PROCEDURE — 93000 ELECTROCARDIOGRAM COMPLETE: CPT | Performed by: INTERNAL MEDICINE

## 2021-07-30 PROCEDURE — 1123F ACP DISCUSS/DSCN MKR DOCD: CPT | Performed by: INTERNAL MEDICINE

## 2021-07-30 PROCEDURE — G8427 DOCREV CUR MEDS BY ELIG CLIN: HCPCS | Performed by: INTERNAL MEDICINE

## 2021-07-30 PROCEDURE — 1036F TOBACCO NON-USER: CPT | Performed by: INTERNAL MEDICINE

## 2021-07-30 PROCEDURE — 99214 OFFICE O/P EST MOD 30 MIN: CPT | Performed by: INTERNAL MEDICINE

## 2021-07-30 PROCEDURE — 4040F PNEUMOC VAC/ADMIN/RCVD: CPT | Performed by: INTERNAL MEDICINE

## 2021-07-30 NOTE — PROGRESS NOTES
CHIEF COMPLAINT: Possible TIA/CAD-CABG/PAF/ICD    HISTORY OF PRESENT ILLNESS: Patient is a 82 y.o. male seen at the request of Minh Rasheed MD.      In sinus. No CP or SOB. PMH:   1. MI, 2003. Cardiac catheterization: 85% ostial, proximal and mid LAD narrowings. LMC 70% stenosis. D1 occluded. D2 50% stenosis. OM1 80% ostial stenosis. Mid CX occluded. Dominant RCA severe disease. LVEF 40-45%. 2. [facility] with LIMA-LAD, SVG-RI, SVG-OM. 3. Hyperlipidemia. 4. Mild carotid plaque on US, 2003. 5. Echo, 07/2006. Mild LVE, normal EF, Stage II diastolic dysfunction, moderate AS, mild MR, mild TR. 6. Right leg vein stripping, 1970's. 7. No history diabetes mellitus, stroke or COPD. 8. Nonsmoker for many years. 9. VT causing cardiac arrest after stress test, 03/15/2007. He was successfully cardioverted. 10. Cardiac catheterization, 03/16/2007 with normal LVEF, severe native three vessel coronary disease. SVG-RI, SVG-OM both occluded. LIMA-LAD patent. 11. Re-do CABG, [facility], 03/2007 with radial artery graft to D2 and OM2. 12. Elective PCI with CONNOR native OM2 and native LAD, 03/2007 following CABG. This was done because of inadequate conduits. 13. ICD placement, [facility], 03/2007. 14. ICD lead recall, early 2008, but he was followed electively because his device was functioning normally. 15. Presentation [facility], 03/29/2008 with multiple ICD inappropriate shocks. Transfer [facility] where ICD and lead were replaced. He reports cardiac catheterization that revealed patent LIMA-LAD and patent stents in native coronary arteries. 16. Atypical chest pain and anxiety with admission [facility], 05/2008. Troponin minimally elevated. Beta blocker dose increased. [address] admission, 06/13/2009 with lightheadedness, orthostatic hypotension, drop in hemoglobin to 10.5 from 14.9 over two months with an increase in BUN from 16 to 68 over the same time.  Dark heme positive stools noted. EKG NSR, incomplete RBBB. 18. Echo, 06/15/2009 with normal LVEF, moderate AS, peak gradient 38 mmHg, BLOSSOM 1.1 cm², moderate MR, LAE. 19. Transtelephonic ICD check, 01/11/2010. No ventricular tachyarrhythmias. Two AF episodes, the longest for 14 hours. 20. Echo, 06/16/2010 with LVE, borderline LVH, normal systolic and diastolic function, normal LA, ICD electrode right heart. Trileaflet AV with moderate to severe AS, mean/peak gradient 20/31 mmHg. BLOSSOM 1.0 cm². MAC with mild MR, normal RVSP.  21. No drug allergies. 25. Family history negative for premature vascular disease. 23. PAF with DCCV, [facility], 12/2010.   24. [facility]. 25. [facility], 06/05/2012. Moderate sized fixed basal inferior defect, probably artifact. [facility]. [facility] 1+ AR, moderate AS, mild MR, mild TR, normal LVEF.   27. Echo, 01/31/2013. LV not dilated. Mild concentric LVH. Septal paradox. EF 50-55%. BLOSSOM 1.2 cm² consistent with moderate stenosis. Peak/mean gradient 38/21. Stage II diastolic dysfunction. 28. [address] admission, 08/28/2013 with dizziness, Hb 7.7, WBC 19.0. CXR negative except cardiomegaly. EKG NSR, leftward axis, RBBB. BMP negative except for elevation in BUN to 55 with Cr 1.3.   29. EGD, 08/28/2013. Large pyloric channel ulcer with clot treated with PRBCs and fresh frozen plasma. Hb 8.7 on day of discharge and stable. Pradaxa was temporarily held. 30. CT abdomen, 09/08/2014. 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 31. CT chest, 09/17/2014. Consolidation and infiltrate at the left lung base, stable when compared to previous exams with less pleural thickening and calcified plaque in the left pleural cavity without any recent change.  Chronic obstructive airways disease. 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   32. ICD programmed to DDDR mode by Dr. Sue Marroquin, 03/26/2015. Device function normal.  33. Echo 10/16/2015. EF 39%. Stage II diastolic dysfunction. Aortic stenosis with peak/mean gradients of 48/24 mmHg. Estimated valve area 1.0 cm². 34. ICD generator change for battery depletion, 07/28/2016, Dr. Sascha Gallo. Sue Marroquin. 35. Echo, 02/03/2017.  Normal LV size with mild LVH.  Normal regional wall motion and overall systolic function.  Diastole could not be assessed, but was presumed to be impaired.  The RV was dilated with impaired global systolic function.  The LA was enlarged.  Mild MAC with mild mitral insufficiency.  Moderate tricuspid insufficiency.  Aortic valve gradients are peak 47 mmHg with a mean of 27.  Dimensionless ratio 0.21. Valve area calculated 0.8 cm².    36.  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  37. Echocardiogram, [facility], 4/19/2018, EF 60%. Low normal RV function. Stage 2 diastolic dysfunction. Mild-to-moderate MR. S/P TAVR with a mean gradient of 10 mmHg.   RVSP 31 mg.     38. S/P Cardioversion by Dr. Danielito Massey, 05/30/2017.   19 Rohith Howard evaluation, 09/07/2018, for 2 appropriate ICD discharges for polymorphic ventricular tachycardia, which occurred after yard work and moving heavy furniture.       Past Medical History:   Diagnosis Date    A-fib [facility])     Arrhythmia     Carotid artery stenosis     [facility]est[facility] failure) [facility] [address]     Heart valve problem     Hyperlipidemia     Hypertension     ICD (implantable cardiac defibrillator) in place 2007    MI (mitral incompetence) 2003    MI (myocardial infarction) ([facility] [address] 2003       Patient Active Problem List   Diagnosis    Chronic combined systolic and diastolic CH[facility] (congestive heart failure) ([facility])    S/P TAVR (transcatheter aortic valve replacement)    Chronic atrial fibrillation    Coronary artery disease involving native coronary artery of native heart without angina pectoris    Essential hypertension    Hyperlipidemia LDL goal <100    GIB (gastrointestinal bleeding)    ICD (implantable cardioverter-defibrillator), dual, in situ    Ischemic heart disease    Vitamin D deficiency    Other insomnia    SOB (shortness of breath)    Lung nodule    Acute on chronic diastolic (congestive) heart failure (HCC)    Pure hypercholesterolemia    Stage 3b chronic kidney disease (HCC)    Transient cerebral ischemia    Aortic valve disease       No Known Allergies    Current Outpatient Medications   Medication Sig Dispense Refill    XARELTO 20 MG TABS tablet TAKE 1 TABLET BY MOUTH  DAILY 90 tablet 3    metoprolol tartrate (LOPRESSOR) 50 MG tablet TAKE 1 AND 1/2 TABLETS BY  MOUTH IN THE MORNING AND 1  TABLET BY MOUTH AT BEDTIME 225 tablet 3    pravastatin (PRAVACHOL) 40 MG tablet TAKE 1 TABLET BY MOUTH  DAILY 90 tablet 3    furosemide (LASIX) 40 MG tablet TAKE 1 TABLET BY MOUTH  DAILY 90 tablet 3    pantoprazole (PROTONIX) 40 MG tablet TAKE 1 TABLET BY MOUTH  DAILY 90 tablet 3    Handicap Placard MISC by Does not apply route Disp #: 1  Duration: 5 years. 1 each 0    ferrous sulfate (IRON 325) 325 (65 Fe) MG tablet Take 1 tablet by mouth daily (with breakfast) 90 tablet 1    potassium chloride (MICRO-K) 10 MEQ extended release capsule Take 10 mEq by mouth daily      MAGNESIUM-OXIDE 400 (241.3 Mg) MG TABS tablet TK 1 T PO ONCE D      dofetilide (TIKOSYN) 250 MCG capsule Take 1 capsule by mouth 2 times daily 90 capsule 0    Multiple Vitamins-Minerals (PRESERVISION AREDS 2 PO) Take 1 tablet by mouth 2 times daily      Cholecalciferol (VITAMIN D) 2000 UNITS CAPS capsule Take 1 capsule by mouth daily        No current facility-administered medications for this visit.        Social History     Socioeconomic History    Marital status:      Spouse name: Not on file    Number of children: Not on file    Years of education: Not on file    Highest education level: Not on file   Occupational History    Not on file   Tobacco Use    Smoking status: Former Smoker     Packs/day: 0.50     Years: 3.00     Pack years: 1.50     Quit date: 1973     Years since quittin.5    Smokeless tobacco: Never Used    Tobacco comment: Quit smoking in    Vaping Use    Vaping Use: Never used   Substance and Sexual Activity    Alcohol use: No    Drug use: No    Sexual activity: Not on file   Other Topics Concern    Not on file   Social History Narrative    1 cup coffee daily; occ pop     Social Determinants of Health     Financial Resource Strain:     Difficulty of Paying Living Expenses:    Food Insecurity:     Worried About Running Out of Food in the Last Year:     Ran Out of Food in the Last Year:    Transportation Needs:     Lack of Transportation (Medical):  Lack of Transportation (Non-Medical):    Physical Activity:     Days of Exercise per Week:     Minutes of Exercise per Session:    Stress:     Feeling of Stress :    Social Connections:     Frequency of Communication with Friends and Family:     Frequency of Social Gatherings with Friends and Family:     Attends Mandaeism Services:     Active Member of Clubs or Organizations:     Attends Club or Organization Meetings:     Marital Status:    Intimate Partner Violence:     Fear of Current or Ex-Partner:     Emotionally Abused:     Physically Abused:     Sexually Abused:        History reviewed. No pertinent family history. Review of Systems:  Heart: as above   Lungs: as above   Eyes: denies changes in vision or discharge. Ears: denies changes in hearing or pain. Nose: denies epistaxis or masses   Throat: denies sore throat or trouble swallowing. Neuro: denies numbness, tingling, tremors. Skin: denies rashes or itching. : denies hematuria, dysuria   GI: denies vomiting, diarrhea   Psych: denies mood changed, anxiety, depression.   All other systems negative. Physical Exam   /60   Pulse 73   Resp 18   Ht 5' 6\" (1.676 m)   Wt 176 lb 4.8 oz (80 kg)   BMI 28.46 kg/m²   Constitutional: Oriented to person, place, and time. Well-developed and well-nourished. No distress. Head: Normocephalic and atraumatic. Eyes: EOM are normal. Pupils are equal, round, and reactive to light. Neck: Normal range of motion. Neck supple. No hepatojugular reflux and no JVD present. Carotid bruit is not present. No tracheal deviation present. No thyromegaly present. Cardiovascular: Normal rate, regular rhythm, normal heart sounds and intact distal pulses. Exam reveals no gallop and no friction rub. No murmur heard. Pulmonary/Chest: Effort normal and breath sounds normal. No respiratory distress. No wheezes. No rales. No tenderness. Abdominal: Soft. Bowel sounds are normal. No distension and no mass. No tenderness. No rebound and no guarding. Musculoskeletal: Normal range of motion. No edema and no tenderness. Lymphadenopathy:   No cervical adenopathy. No groin adenopathy. Neurological: Alert and oriented to person, place, and time. Skin: Skin is warm and dry. No rash noted. Not diaphoretic. No erythema. Psychiatric: Normal mood and affect. Behavior is normal.     EKG:  normal sinus rhythm, A paced, V sensed.     Echo Summary 8/16/19:   Ejection fraction is visually estimated at 60%.   The inferior basal wall is hypokinetic.   No regional wall motion abnormalities seen.   Normal right ventricle structure and function.   There is doppler evidence of stage II diastolic dysfunction.   Left atrial volume index of 39 ml per meters squared BSA.   Hx of TAVR with 26 mm S3 3/29/17.  The aortic valve area is 1.6 cm2 with a maximum gradient of 15 mmHg and a mean gradient of 7 mmHg.   Mild aortic regurgitation is noted.   Mild mitral regurgitation is present.   Mild tricuspid regurgitation.   Agitated saline injected for shunt evaluation.   No evidence of patent foramen ovale.   No evidence of atrial septal defect. Echo Summary 10/30/2020:   LVEF is 65%. Left ventricle is normal in size . Mild asymmetric septal hypertrophy. No regional wall motion abnormalities seen. Normal left ventricular ejection fraction. The left atrium is severely dilated. Mild mitral annular calcification. Mild mitral regurgitation is present. Normally functioning bioprosthetic valve in aortic position. Physiologic and/or trace tricuspid regurgitation. ASSESSMENT AND PLAN:  Patient Active Problem List   Diagnosis    Chronic combined systolic and diastolic [facility]congestive [facility]) [facility]    S/P TAVR (transcatheter aortic valve replacement)    Chronic atrial fibrillation    Coronary artery disease involving native coronary artery of native heart without angina pectoris    Essential hypertension    Hyperlipidemia LDL goal <100    GIB (gastrointestinal bleeding)    ICD (implantable cardioverter-defibrillator), dual, in situ    Ischemic heart disease    Vitamin D deficiency    Other insomnia    SOB (shortness of breath)    Lung nodule    Acute on chronic diastolic (congestive) heart failure ([address]    Pure hypercholesterolemia    Stage 3b chronic kidney disease ([address]    Transient cerebral ischemia    Aortic valve disease     1. TIA: CTA neck okay in ED, particularly no significant stenosis of proximal or distal internal carotid. Echo no PFO. Xarelto/statin. 2. CAD-CABG: Stable symptoms. BB/statin. Not on ASA to this point due to Xarelto. 3. PAF: In sinus. On Xarelto/Tikosyn/BB. 4. TAVR: Stable echo 8/16/19.     5. ICD: Follows with Sue Marroquin. Kennedi Gonzalez D.O.   Cardiologist  Cardiology, [id number]

## 2021-08-30 RX ORDER — POTASSIUM CHLORIDE 750 MG/1
10 CAPSULE, EXTENDED RELEASE ORAL DAILY
Qty: 90 CAPSULE | Refills: 1 | Status: ON HOLD
Start: 2021-08-30 | End: 2022-03-04 | Stop reason: HOSPADM

## 2021-09-01 ENCOUNTER — NURSE TRIAGE (OUTPATIENT)
Dept: OTHER | Facility: CLINIC | Age: 86
End: 2021-09-01

## 2021-09-01 NOTE — TELEPHONE ENCOUNTER
Received call from Merrill aJquez at [facility] with [facility]. Brief description of triage: dizzy spells, more frequently, none today,happens when he stands or moves his head to quickly      Triage indicates for patient to be seen today, Did advise of [facility] or walk in clinic if no appointments available. Care advice provided, patient verbalizes understanding; denies any other questions or concerns; instructed to call back for any new or worsening symptoms. Writer provided warm transfer to Kelly Sanders at [facility] for appointment scheduling. Attention Provider: Thank you for allowing me to participate in the care of your patient. The patient was connected to triage in response to information provided to the [facility]. Please do not respond through this encounter as the response is not directed to a shared pool. Reason for Disposition   Lightheadedness (dizziness) present now, after 2 hours of rest and fluids    Answer Assessment - Initial Assessment Questions  1. DESCRIPTION: \"Describe your dizziness. \"      Feels like he is going to fall     2. LIGHTHEADED: \"Do you feel lightheaded? \" (e.g., somewhat faint, woozy, weak upon standing)      Woozy     3. VERTIGO: \"Do you feel like either you or the room is spinning or tilting? \" (i.e. vertigo)      Denies     4. SEVERITY: \"How bad is it? \"  \"Do you feel like you are going to faint? \" \"Can you stand and walk? \"    - MILD - walking normally    - MODERATE - interferes with normal activities (e.g., work, school)     - SEVERE - unable to stand, requires support to walk, feels like passing out now. Severe     5. ONSET:  \"When did the dizziness begin? \"      The past two weeks     6. AGGRAVATING FACTORS: \"Does anything make it worse? \" (e.g., standing, change in head position)      Standing, change of head position     7. HEART RATE: \"Can you tell me your heart rate? \" \"How many beats in 15 seconds? \"  (Note: not all patients can do this) When he is dizzy his heart will race at times     8. CAUSE: \"What do you think is causing the dizziness? \"      Does not know    9. RECURRENT SYMPTOM: \"Have you had dizziness before? \" If so, ask: \"When was the last time? \" \"What happened that time? \"      Yes, vertigo     10. OTHER SYMPTOMS: \"Do you have any other symptoms? \" (e.g., fever, chest pain, vomiting, diarrhea, bleeding)        Diarrhea     11. PREGNANCY: \"Is there any chance you are pregnant? \" \"When was your last menstrual period? \"        N/a    Protocols used: [id number]-NL

## 2021-09-02 ENCOUNTER — HOSPITAL ENCOUNTER (OUTPATIENT)
Age: 86
Setting detail: OBSERVATION
Discharge: HOME | End: 2021-09-04
Attending: EMERGENCY MEDICINE | Admitting: FAMILY MEDICINE
Payer: MEDICARE

## 2021-09-02 ENCOUNTER — APPOINTMENT (OUTPATIENT)
Dept: GENERAL RADIOLOGY | Age: 86
End: 2021-09-02
Payer: MEDICARE

## 2021-09-02 ENCOUNTER — OFFICE VISIT (OUTPATIENT)
Dept: PRIMARY CARE CLINIC | Age: 86
End: 2021-09-02
Payer: MEDICARE

## 2021-09-02 ENCOUNTER — APPOINTMENT (OUTPATIENT)
Dept: CT IMAGING | Age: 86
End: 2021-09-02
Payer: MEDICARE

## 2021-09-02 VITALS
RESPIRATION RATE: 16 BRPM | DIASTOLIC BLOOD PRESSURE: 76 MMHG | WEIGHT: 172 LBS | HEIGHT: 66 IN | TEMPERATURE: 97.3 F | BODY MASS INDEX: 27.64 KG/M2 | OXYGEN SATURATION: 97 % | HEART RATE: 73 BPM | SYSTOLIC BLOOD PRESSURE: 144 MMHG

## 2021-09-02 DIAGNOSIS — R55 NEAR SYNCOPE: ICD-10-CM

## 2021-09-02 DIAGNOSIS — R42 DIZZINESS: Primary | ICD-10-CM

## 2021-09-02 DIAGNOSIS — H81.10 BENIGN PAROXYSMAL POSITIONAL VERTIGO, UNSPECIFIED LATERALITY: Primary | ICD-10-CM

## 2021-09-02 DIAGNOSIS — I50.42 CHRONIC COMBINED SYSTOLIC AND DIASTOLIC CHF (CONGESTIVE HEART FAILURE) (HCC): ICD-10-CM

## 2021-09-02 DIAGNOSIS — R54 AGE-RELATED PHYSICAL DEBILITY: ICD-10-CM

## 2021-09-02 DIAGNOSIS — I25.10 CORONARY ARTERY DISEASE INVOLVING NATIVE CORONARY ARTERY OF NATIVE HEART WITHOUT ANGINA PECTORIS: ICD-10-CM

## 2021-09-02 DIAGNOSIS — M51.36 DDD (DEGENERATIVE DISC DISEASE), LUMBAR: ICD-10-CM

## 2021-09-02 DIAGNOSIS — R05.9 COUGH: ICD-10-CM

## 2021-09-02 DIAGNOSIS — I48.20 CHRONIC ATRIAL FIBRILLATION (HCC): ICD-10-CM

## 2021-09-02 DIAGNOSIS — N18.32 STAGE 3B CHRONIC KIDNEY DISEASE (HCC): Primary | ICD-10-CM

## 2021-09-02 LAB
ALBUMIN SERPL-MCNC: 4.2 G/DL (ref 3.5–5.2)
ALP BLD-CCNC: 133 U/L (ref 40–129)
ALT SERPL-CCNC: 13 U/L (ref 0–40)
ANION GAP SERPL CALCULATED.3IONS-SCNC: 10 MMOL/L (ref 7–16)
APTT: 35.3 SEC (ref 24.5–35.1)
AST SERPL-CCNC: 24 U/L (ref 0–39)
BASOPHILS ABSOLUTE: 0.04 E9/L (ref 0–0.2)
BASOPHILS RELATIVE PERCENT: 0.5 % (ref 0–2)
BILIRUB SERPL-MCNC: 0.5 MG/DL (ref 0–1.2)
BILIRUBIN URINE: NEGATIVE
BLOOD, URINE: NEGATIVE
BUN BLDV-MCNC: 27 MG/DL (ref 6–23)
CALCIUM SERPL-MCNC: 8.9 MG/DL (ref 8.6–10.2)
CHLORIDE BLD-SCNC: 99 MMOL/L (ref 98–107)
CLARITY: CLEAR
CO2: 30 MMOL/L (ref 22–29)
COLOR: YELLOW
CREAT SERPL-MCNC: 1.3 MG/DL (ref 0.7–1.2)
EOSINOPHILS ABSOLUTE: 0.15 E9/L (ref 0.05–0.5)
EOSINOPHILS RELATIVE PERCENT: 2 % (ref 0–6)
GFR AFRICAN AMERICAN: >60
GFR NON-AFRICAN AMERICAN: 53 ML/MIN/1.73
GLUCOSE BLD-MCNC: 99 MG/DL (ref 74–99)
GLUCOSE URINE: NEGATIVE MG/DL
HCT VFR BLD CALC: 38.1 % (ref 37–54)
HEMOGLOBIN: 12.9 G/DL (ref 12.5–16.5)
IMMATURE GRANULOCYTES #: 0.02 E9/L
IMMATURE GRANULOCYTES %: 0.3 % (ref 0–5)
INR BLD: 1.4
KETONES, URINE: NEGATIVE MG/DL
LEUKOCYTE ESTERASE, URINE: NEGATIVE
LYMPHOCYTES ABSOLUTE: 1.51 E9/L (ref 1.5–4)
LYMPHOCYTES RELATIVE PERCENT: 20.6 % (ref 20–42)
MCH RBC QN AUTO: 29.9 PG (ref 26–35)
MCHC RBC AUTO-ENTMCNC: 33.9 % (ref 32–34.5)
MCV RBC AUTO: 88.2 FL (ref 80–99.9)
MONOCYTES ABSOLUTE: 0.74 E9/L (ref 0.1–0.95)
MONOCYTES RELATIVE PERCENT: 10.1 % (ref 2–12)
NEUTROPHILS ABSOLUTE: 4.87 E9/L (ref 1.8–7.3)
NEUTROPHILS RELATIVE PERCENT: 66.5 % (ref 43–80)
NITRITE, URINE: NEGATIVE
PDW BLD-RTO: 13.8 FL (ref 11.5–15)
PH UA: 6 (ref 5–9)
PLATELET # BLD: 192 E9/L (ref 130–450)
PMV BLD AUTO: 10.8 FL (ref 7–12)
POTASSIUM REFLEX MAGNESIUM: 4.1 MMOL/L (ref 3.5–5)
PROTEIN UA: NEGATIVE MG/DL
PROTHROMBIN TIME: 16 SEC (ref 9.3–12.4)
RBC # BLD: 4.32 E12/L (ref 3.8–5.8)
SODIUM BLD-SCNC: 139 MMOL/L (ref 132–146)
SPECIFIC GRAVITY UA: 1.01 (ref 1–1.03)
TOTAL PROTEIN: 7.1 G/DL (ref 6.4–8.3)
TROPONIN, HIGH SENSITIVITY: 22 NG/L (ref 0–11)
TROPONIN, HIGH SENSITIVITY: 23 NG/L (ref 0–11)
UROBILINOGEN, URINE: 0.2 E.U./DL
WBC # BLD: 7.3 E9/L (ref 4.5–11.5)

## 2021-09-02 PROCEDURE — 84484 ASSAY OF TROPONIN QUANT: CPT

## 2021-09-02 PROCEDURE — 4040F PNEUMOC VAC/ADMIN/RCVD: CPT | Performed by: FAMILY MEDICINE

## 2021-09-02 PROCEDURE — 85730 THROMBOPLASTIN TIME PARTIAL: CPT

## 2021-09-02 PROCEDURE — G8427 DOCREV CUR MEDS BY ELIG CLIN: HCPCS | Performed by: FAMILY MEDICINE

## 2021-09-02 PROCEDURE — G0378 HOSPITAL OBSERVATION PER HR: HCPCS

## 2021-09-02 PROCEDURE — G8417 CALC BMI ABV UP PARAM F/U: HCPCS | Performed by: FAMILY MEDICINE

## 2021-09-02 PROCEDURE — 85025 COMPLETE CBC W/AUTO DIFF WBC: CPT

## 2021-09-02 PROCEDURE — 85610 PROTHROMBIN TIME: CPT

## 2021-09-02 PROCEDURE — 1123F ACP DISCUSS/DSCN MKR DOCD: CPT | Performed by: FAMILY MEDICINE

## 2021-09-02 PROCEDURE — 99214 OFFICE O/P EST MOD 30 MIN: CPT | Performed by: FAMILY MEDICINE

## 2021-09-02 PROCEDURE — 80053 COMPREHEN METABOLIC PANEL: CPT

## 2021-09-02 PROCEDURE — 36415 COLL VENOUS BLD VENIPUNCTURE: CPT

## 2021-09-02 PROCEDURE — 1036F TOBACCO NON-USER: CPT | Performed by: FAMILY MEDICINE

## 2021-09-02 PROCEDURE — 93005 ELECTROCARDIOGRAM TRACING: CPT | Performed by: STUDENT IN AN ORGANIZED HEALTH CARE EDUCATION/TRAINING PROGRAM

## 2021-09-02 PROCEDURE — 99285 EMERGENCY DEPT VISIT HI MDM: CPT

## 2021-09-02 PROCEDURE — 70450 CT HEAD/BRAIN W/O DYE: CPT

## 2021-09-02 PROCEDURE — 81003 URINALYSIS AUTO W/O SCOPE: CPT

## 2021-09-02 SDOH — ECONOMIC STABILITY: FOOD INSECURITY: WITHIN THE PAST 12 MONTHS, YOU WORRIED THAT YOUR FOOD WOULD RUN OUT BEFORE YOU GOT MONEY TO BUY MORE.: NEVER TRUE

## 2021-09-02 SDOH — ECONOMIC STABILITY: FOOD INSECURITY: WITHIN THE PAST 12 MONTHS, THE FOOD YOU BOUGHT JUST DIDN'T LAST AND YOU DIDN'T HAVE MONEY TO GET MORE.: NEVER TRUE

## 2021-09-02 ASSESSMENT — ENCOUNTER SYMPTOMS
DIARRHEA: 0
SORE THROAT: 0
WHEEZING: 0
COUGH: 1
NAUSEA: 0
DIARRHEA: 0
ABDOMINAL PAIN: 0
EYE DISCHARGE: 0
ABDOMINAL PAIN: 0
CONSTIPATION: 0
EYE REDNESS: 0
EYE PAIN: 0
SINUS PRESSURE: 0
WHEEZING: 0
COUGH: 0
NAUSEA: 0
SHORTNESS OF BREATH: 1
SORE THROAT: 0
VOMITING: 0
BACK PAIN: 0
SHORTNESS OF BREATH: 0
RHINORRHEA: 0
VOMITING: 0

## 2021-09-02 ASSESSMENT — SOCIAL DETERMINANTS OF HEALTH (SDOH): HOW HARD IS IT FOR YOU TO PAY FOR THE VERY BASICS LIKE FOOD, HOUSING, MEDICAL CARE, AND HEATING?: NOT HARD AT ALL

## 2021-09-02 NOTE — PROGRESS NOTES
2021     Chief Complaint   Patient presents with    Dizziness     positional, trouble walking-feels like legs are going to give out on him    Shortness of Breath    Cough       HPI  Maarh Recinos (:  1935) is a 82 y.o. male, here for evaluation of the following medical concerns:    Patient presents today for an acute appointment. Patient reports that he has positional dizziness and lightheadedness. Patient in past was diagnosed by ENT with BPPV. Has responded well in the past to meclizine. Ongoing for a few weeks. Reports dizziness is improved with meclizine. Patient reports no issues today. In addition to this patient reports that he does have worsening shortness of breath and cough. Patient has a significant past medical history of nonsustained ventricular tachycardia, atrial fibrillation, CHF, CAD/MI history, and a chronic cough. In general patient reports that his weight has been stable. Tolerating his current medications well. Recently did see cardiology. Upon additional further questioning patient is unsure whether symptoms are worse. Review of Systems   Constitutional: Negative for chills and fever. HENT: Negative for congestion, rhinorrhea and sore throat. Respiratory: Positive for cough and shortness of breath. Negative for wheezing. Cardiovascular: Negative for chest pain and leg swelling. Gastrointestinal: Negative for abdominal pain, constipation, diarrhea, nausea and vomiting. Skin: Negative for rash. Neurological: Positive for dizziness and light-headedness. Negative for headaches.        Past Medical History:   Diagnosis Date    [facility]fib [facility])     Arrhythmia     Carotid artery stenosis     CHF (congestive heart failure) (HCC)     Heart valve problem     Hyperlipidemia     Hypertension     ICD (implantable cardiac defibrillator) in place     MI (mitral incompetence)     MI (myocardial infarction) [facility] [address]        Prior to Visit Medications encounter: 5' 6\" (1.676 m). Weight as of this encounter: 172 lb (78 kg). Physical Exam  Constitutional:       Appearance: He is well-developed. HENT:      Head: Normocephalic. Eyes:      Extraocular Movements: Extraocular movements intact. Conjunctiva/sclera: Conjunctivae normal.   Cardiovascular:      Rate and Rhythm: Normal rate and regular rhythm. Heart sounds: Normal heart sounds. No murmur heard. Pulmonary:      Effort: Pulmonary effort is normal.      Breath sounds: Rales present. No wheezing. Comments: Right greater than left. Abdominal:      General: Bowel sounds are normal.      Palpations: Abdomen is soft. Tenderness: There is no abdominal tenderness. Musculoskeletal:      Right lower leg: No edema. Left lower leg: No edema. Neurological:      General: No focal deficit present. Mental Status: He is alert. Comments: Cranial nerves grossly intact   Psychiatric:         Mood and Affect: Mood normal.         Judgment: Judgment normal.         ASSESSMENT/PLAN:  Twyla Clark was seen today for dizziness, shortness of breath and cough. Diagnoses and all orders for this visit:    Benign paroxysmal positional vertigo, unspecified laterality  -     Dedrick Raymond MD, [facility] [place] ([facility])  -     External Referral To Physical Therapy  Based on his positional history it is most likely that his symptoms are secondary to his BPPV. Continue current usage of meclizine. Physical therapy/rehab. Coronary artery disease involving native coronary artery of native heart without angina pectoris  Follow cardiology. Chronic atrial fibrillation (HCC)  In sinus rhythm today. Chronic combined systolic and diastolic CHF (congestive heart failure) (HCC)  -     CBC WITH AUTO DIFFERENTIAL; Future  -     COMPREHENSIVE METABOLIC PANEL; Future  -     BRAIN NATRIURETIC PEPTIDE; Future  Possibly some increased Rales in the right lung.   No significant lower extremity swelling. Weight is similar to last appointment several months ago. Age-related physical debility  -     External Referral To Physical Therapy    Cough  -     XR CHEST (2 VW); Future  Chronic. No significant worsening. Due to increased rails will check x-ray today. DDD (degenerative disc disease), lumbar  -     External Referral To Physical Therapy    To ER with new or worsening symptoms. Will call with results. Continue current management. Physical therapy and referral to ENT. Labs as noted above. No follow-ups on file. An ViVuignature was used to authenticate this note.     --Aisha Candelario MD on 9/2/21 at 9:30 AM EDT

## 2021-09-03 LAB
ALBUMIN SERPL-MCNC: 4.2 G/DL (ref 3.5–5.2)
ALP BLD-CCNC: 124 U/L (ref 40–129)
ALT SERPL-CCNC: 12 U/L (ref 0–40)
ANION GAP SERPL CALCULATED.3IONS-SCNC: 12 MMOL/L (ref 7–16)
AST SERPL-CCNC: 20 U/L (ref 0–39)
BILIRUB SERPL-MCNC: 0.6 MG/DL (ref 0–1.2)
BUN BLDV-MCNC: 24 MG/DL (ref 6–23)
CALCIUM SERPL-MCNC: 9 MG/DL (ref 8.6–10.2)
CHLORIDE BLD-SCNC: 100 MMOL/L (ref 98–107)
CHLORIDE URINE RANDOM: <20 MMOL/L
CHOLESTEROL, TOTAL: 140 MG/DL (ref 0–199)
CO2: 30 MMOL/L (ref 22–29)
CREAT SERPL-MCNC: 1.3 MG/DL (ref 0.7–1.2)
EKG ATRIAL RATE: 258 BPM
EKG Q-T INTERVAL: 484 MS
EKG QRS DURATION: 178 MS
EKG QTC CALCULATION (BAZETT): 525 MS
EKG R AXIS: -75 DEGREES
EKG T AXIS: 86 DEGREES
EKG VENTRICULAR RATE: 71 BPM
GFR AFRICAN AMERICAN: >60
GFR NON-AFRICAN AMERICAN: 53 ML/MIN/1.73
GLUCOSE BLD-MCNC: 90 MG/DL (ref 74–99)
HBA1C MFR BLD: 5.5 % (ref 4–5.6)
HDLC SERPL-MCNC: 38 MG/DL
LDL CHOLESTEROL CALCULATED: 56 MG/DL (ref 0–99)
MAGNESIUM: 2.4 MG/DL (ref 1.6–2.6)
POTASSIUM REFLEX MAGNESIUM: 4 MMOL/L (ref 3.5–5)
POTASSIUM, UR: 48.7 MMOL/L
SODIUM BLD-SCNC: 142 MMOL/L (ref 132–146)
SODIUM URINE: 45 MMOL/L
TOTAL PROTEIN: 6.7 G/DL (ref 6.4–8.3)
TRIGL SERPL-MCNC: 230 MG/DL (ref 0–149)
TROPONIN, HIGH SENSITIVITY: 25 NG/L (ref 0–11)
TSH SERPL DL<=0.05 MIU/L-ACNC: 1.89 UIU/ML (ref 0.27–4.2)
VLDLC SERPL CALC-MCNC: 46 MG/DL

## 2021-09-03 PROCEDURE — 36415 COLL VENOUS BLD VENIPUNCTURE: CPT

## 2021-09-03 PROCEDURE — 82436 ASSAY OF URINE CHLORIDE: CPT

## 2021-09-03 PROCEDURE — APPSS60 APP SPLIT SHARED TIME 46-60 MINUTES: Performed by: PHYSICIAN ASSISTANT

## 2021-09-03 PROCEDURE — 99215 OFFICE O/P EST HI 40 MIN: CPT | Performed by: INTERNAL MEDICINE

## 2021-09-03 PROCEDURE — 80053 COMPREHEN METABOLIC PANEL: CPT

## 2021-09-03 PROCEDURE — 84443 ASSAY THYROID STIM HORMONE: CPT

## 2021-09-03 PROCEDURE — 84300 ASSAY OF URINE SODIUM: CPT

## 2021-09-03 PROCEDURE — 83036 HEMOGLOBIN GLYCOSYLATED A1C: CPT

## 2021-09-03 PROCEDURE — 6370000000 HC RX 637 (ALT 250 FOR IP): Performed by: FAMILY MEDICINE

## 2021-09-03 PROCEDURE — 2580000003 HC RX 258: Performed by: NURSE PRACTITIONER

## 2021-09-03 PROCEDURE — 80061 LIPID PANEL: CPT

## 2021-09-03 PROCEDURE — G0378 HOSPITAL OBSERVATION PER HR: HCPCS

## 2021-09-03 PROCEDURE — 84484 ASSAY OF TROPONIN QUANT: CPT

## 2021-09-03 PROCEDURE — 83735 ASSAY OF MAGNESIUM: CPT

## 2021-09-03 PROCEDURE — 84133 ASSAY OF URINE POTASSIUM: CPT

## 2021-09-03 RX ORDER — FUROSEMIDE 20 MG/1
40 TABLET ORAL DAILY
Status: DISCONTINUED | OUTPATIENT
Start: 2021-09-04 | End: 2021-09-04 | Stop reason: HOSPADM

## 2021-09-03 RX ORDER — POTASSIUM CHLORIDE 20 MEQ/1
40 TABLET, EXTENDED RELEASE ORAL PRN
Status: DISCONTINUED | OUTPATIENT
Start: 2021-09-03 | End: 2021-09-04 | Stop reason: HOSPADM

## 2021-09-03 RX ORDER — POTASSIUM CHLORIDE 7.45 MG/ML
10 INJECTION INTRAVENOUS PRN
Status: DISCONTINUED | OUTPATIENT
Start: 2021-09-03 | End: 2021-09-04 | Stop reason: HOSPADM

## 2021-09-03 RX ORDER — PRAVASTATIN SODIUM 20 MG
40 TABLET ORAL DAILY
Status: DISCONTINUED | OUTPATIENT
Start: 2021-09-03 | End: 2021-09-04 | Stop reason: HOSPADM

## 2021-09-03 RX ORDER — SODIUM CHLORIDE 0.9 % (FLUSH) 0.9 %
5-40 SYRINGE (ML) INJECTION PRN
Status: DISCONTINUED | OUTPATIENT
Start: 2021-09-03 | End: 2021-09-04 | Stop reason: HOSPADM

## 2021-09-03 RX ORDER — SODIUM CHLORIDE 0.9 % (FLUSH) 0.9 %
5-40 SYRINGE (ML) INJECTION EVERY 12 HOURS SCHEDULED
Status: DISCONTINUED | OUTPATIENT
Start: 2021-09-03 | End: 2021-09-04 | Stop reason: HOSPADM

## 2021-09-03 RX ORDER — FERROUS SULFATE 325(65) MG
325 TABLET ORAL
Status: DISCONTINUED | OUTPATIENT
Start: 2021-09-03 | End: 2021-09-04 | Stop reason: HOSPADM

## 2021-09-03 RX ORDER — MAGNESIUM SULFATE IN WATER 40 MG/ML
2000 INJECTION, SOLUTION INTRAVENOUS PRN
Status: DISCONTINUED | OUTPATIENT
Start: 2021-09-03 | End: 2021-09-04 | Stop reason: HOSPADM

## 2021-09-03 RX ORDER — CHOLECALCIFEROL (VITAMIN D3) 50 MCG
2000 TABLET ORAL DAILY
Status: DISCONTINUED | OUTPATIENT
Start: 2021-09-03 | End: 2021-09-04 | Stop reason: HOSPADM

## 2021-09-03 RX ORDER — SODIUM CHLORIDE 9 MG/ML
INJECTION, SOLUTION INTRAVENOUS CONTINUOUS
Status: DISCONTINUED | OUTPATIENT
Start: 2021-09-03 | End: 2021-09-03

## 2021-09-03 RX ORDER — POLYETHYLENE GLYCOL 3350 17 G/17G
17 POWDER, FOR SOLUTION ORAL DAILY PRN
Status: DISCONTINUED | OUTPATIENT
Start: 2021-09-03 | End: 2021-09-04 | Stop reason: HOSPADM

## 2021-09-03 RX ORDER — METOPROLOL TARTRATE 50 MG/1
75 TABLET, FILM COATED ORAL DAILY
Status: DISCONTINUED | OUTPATIENT
Start: 2021-09-03 | End: 2021-09-04 | Stop reason: HOSPADM

## 2021-09-03 RX ORDER — POTASSIUM CHLORIDE 750 MG/1
10 TABLET, EXTENDED RELEASE ORAL
Status: DISCONTINUED | OUTPATIENT
Start: 2021-09-04 | End: 2021-09-04 | Stop reason: HOSPADM

## 2021-09-03 RX ORDER — METOPROLOL TARTRATE 50 MG/1
50 TABLET, FILM COATED ORAL NIGHTLY
Status: DISCONTINUED | OUTPATIENT
Start: 2021-09-03 | End: 2021-09-04 | Stop reason: HOSPADM

## 2021-09-03 RX ORDER — DOFETILIDE 0.25 MG/1
250 CAPSULE ORAL 2 TIMES DAILY
Status: DISCONTINUED | OUTPATIENT
Start: 2021-09-03 | End: 2021-09-04 | Stop reason: HOSPADM

## 2021-09-03 RX ORDER — ACETAMINOPHEN 650 MG/1
650 SUPPOSITORY RECTAL EVERY 6 HOURS PRN
Status: DISCONTINUED | OUTPATIENT
Start: 2021-09-03 | End: 2021-09-04 | Stop reason: HOSPADM

## 2021-09-03 RX ORDER — PANTOPRAZOLE SODIUM 40 MG/1
40 TABLET, DELAYED RELEASE ORAL DAILY
Status: DISCONTINUED | OUTPATIENT
Start: 2021-09-03 | End: 2021-09-04 | Stop reason: HOSPADM

## 2021-09-03 RX ORDER — SODIUM CHLORIDE 9 MG/ML
25 INJECTION, SOLUTION INTRAVENOUS PRN
Status: DISCONTINUED | OUTPATIENT
Start: 2021-09-03 | End: 2021-09-04 | Stop reason: HOSPADM

## 2021-09-03 RX ORDER — ACETAMINOPHEN 325 MG/1
650 TABLET ORAL EVERY 6 HOURS PRN
Status: DISCONTINUED | OUTPATIENT
Start: 2021-09-03 | End: 2021-09-04 | Stop reason: HOSPADM

## 2021-09-03 RX ADMIN — Medication 2000 UNITS: at 12:55

## 2021-09-03 RX ADMIN — Medication 400 MG: at 08:50

## 2021-09-03 RX ADMIN — SODIUM CHLORIDE: 9 INJECTION, SOLUTION INTRAVENOUS at 08:49

## 2021-09-03 RX ADMIN — METOPROLOL TARTRATE 75 MG: 50 TABLET, FILM COATED ORAL at 08:50

## 2021-09-03 RX ADMIN — PRAVASTATIN SODIUM 40 MG: 20 TABLET ORAL at 21:07

## 2021-09-03 RX ADMIN — SODIUM CHLORIDE, PRESERVATIVE FREE 10 ML: 5 INJECTION INTRAVENOUS at 21:08

## 2021-09-03 RX ADMIN — PANTOPRAZOLE SODIUM 40 MG: 40 TABLET, DELAYED RELEASE ORAL at 08:50

## 2021-09-03 RX ADMIN — METOPROLOL TARTRATE 50 MG: 50 TABLET, FILM COATED ORAL at 21:17

## 2021-09-03 RX ADMIN — DOFETILIDE 250 MCG: 0.25 CAPSULE ORAL at 21:07

## 2021-09-03 RX ADMIN — DOFETILIDE 250 MCG: 0.25 CAPSULE ORAL at 08:50

## 2021-09-03 RX ADMIN — RIVAROXABAN 20 MG: 20 TABLET, FILM COATED ORAL at 17:40

## 2021-09-03 RX ADMIN — SODIUM CHLORIDE, PRESERVATIVE FREE 10 ML: 5 INJECTION INTRAVENOUS at 08:49

## 2021-09-03 RX ADMIN — FERROUS SULFATE TAB 325 MG (65 MG ELEMENTAL FE) 325 MG: 325 (65 FE) TAB at 08:50

## 2021-09-03 ASSESSMENT — PAIN SCALES - GENERAL
PAINLEVEL_OUTOF10: 0
PAINLEVEL_OUTOF10: 0

## 2021-09-03 NOTE — ED PROVIDER NOTES
Physical Exam  Vitals and nursing note reviewed. Constitutional:       Appearance: He is well-developed. HENT:      Head: Normocephalic and atraumatic. Eyes:      Conjunctiva/sclera: Conjunctivae normal.   Cardiovascular:      Rate and Rhythm: Normal rate and regular rhythm. Heart sounds: Normal heart sounds. No murmur heard. Pulmonary:      Effort: Pulmonary effort is normal. No respiratory distress. Breath sounds: Normal breath sounds. No wheezing or rales. Abdominal:      General: Bowel sounds are normal.      Palpations: Abdomen is soft. Tenderness: There is no abdominal tenderness. There is no guarding or rebound. Musculoskeletal:         General: No tenderness or deformity. Cervical back: Normal range of motion and neck supple. Skin:     General: Skin is warm and dry. Neurological:      General: No focal deficit present. Mental Status: He is alert and oriented to person, place, and time. Mental status is at baseline. Cranial Nerves: No cranial nerve deficit. Sensory: No sensory deficit. Motor: No weakness. Coordination: Coordination normal.          Procedures   EKG #1:  Interpreted by emergency department physician unless otherwise noted. Time:  1827    Rate: 71  Rhythm: Ventricularly paced rhythm with PVCs. Interpretation: EKG obtained demonstrated ventricular paced rhythm with PVCs, rate 71, left axis, QTC prolonged at 525, no acute ST segment changes. .  Comparison: stable as compared to patient's most recent EKG. MDM  Number of Diagnoses or Management Options  Dizziness  Near syncope  Diagnosis management comments: Patient is 70-year-old male past medical history CAD, CHF, A. fib, hyperlipidemia, hypertension MI status post ICD. Patient [facility] chief complaint of dizziness and presyncope. Vital signs stable on presentation.   On physical exam heart regular rate with irregular rhythm, lungs clear to auscultation bilaterally, abdomen Cath Lab Procedure (03/29/2008); joint replacement (2011); Cardiac defibrillator placement (2007. 2008); Cardiac defibrillator placement (07/28/2016); Cardiac catheterization (Right, 03/03/2017); other surgical history (03/29/2017); Upper gastrointestinal endoscopy (N/A, 9/3/2019); Upper gastrointestinal endoscopy (N/A, 2/18/2020); Colonoscopy (N/A, 2/24/2020); and Cardioversion (10/29/2020). Social History:  reports that he quit smoking about 48 years ago. He has a 1.50 pack-year smoking history. He has never used smokeless tobacco. He reports that he does not drink alcohol and does not use drugs. Family History: family history is not on file. The patients home medications have been reviewed. Allergies: Patient has no known allergies.     -------------------------------------------------- RESULTS -------------------------------------------------    LABS:  Results for orders placed or performed during the hospital encounter of 09/02/21   CBC Auto Differential   Result Value Ref Range    WBC 7.3 4.5 - 11.5 E9/L    RBC 4.32 3.80 - 5.80 E12/L    Hemoglobin 12.9 12.5 - 16.5 g/dL    Hematocrit 38.1 37.0 - 54.0 %    MCV 88.2 80.0 - 99.9 fL    MCH 29.9 26.0 - 35.0 pg    MCHC 33.9 32.0 - 34.5 %    RDW 13.8 11.5 - 15.0 fL    Platelets 623 537 - 642 E9/L    MPV 10.8 7.0 - 12.0 fL    Neutrophils % 66.5 43.0 - 80.0 %    Immature Granulocytes % 0.3 0.0 - 5.0 %    Lymphocytes % 20.6 20.0 - 42.0 %    Monocytes % 10.1 2.0 - 12.0 %    Eosinophils % 2.0 0.0 - 6.0 %    Basophils % 0.5 0.0 - 2.0 %    Neutrophils Absolute 4.87 1.80 - 7.30 E9/L    Immature Granulocytes # 0.02 E9/L    Lymphocytes Absolute 1.51 1.50 - 4.00 E9/L    Monocytes Absolute 0.74 0.10 - 0.95 E9/L    Eosinophils Absolute 0.15 0.05 - 0.50 E9/L    Basophils Absolute 0.04 0.00 - 0.20 E9/L   Comprehensive Metabolic Panel w/ Reflex to MG   Result Value Ref Range    Sodium 139 132 - 146 mmol/L    Potassium reflex Magnesium 4.1 3.5 - 5.0 mmol/L    Chloride 99 98 - 107 mmol/L    CO2 30 (H) 22 - 29 mmol/L    Anion Gap 10 7 - 16 mmol/L    Glucose 99 74 - 99 mg/dL    BUN 27 (H) 6 - 23 mg/dL    CREATININE 1.3 (H) 0.7 - 1.2 mg/dL    GFR Non-African American 53 >=60 mL/min/1.73    GFR African American >60     Calcium 8.9 8.6 - 10.2 mg/dL    Total Protein 7.1 6.4 - 8.3 g/dL    Albumin 4.2 3.5 - 5.2 g/dL    Total Bilirubin 0.5 0.0 - 1.2 mg/dL    Alkaline Phosphatase 133 (H) 40 - 129 U/L    ALT 13 0 - 40 U/L    AST 24 0 - 39 U/L   Protime-INR   Result Value Ref Range    Protime 16.0 (H) 9.3 - 12.4 sec    INR 1.4    APTT   Result Value Ref Range    aPTT 35.3 (H) 24.5 - 35.1 sec   Urinalysis, reflex to microscopic   Result Value Ref Range    Color, UA Yellow Straw/Yellow    Clarity, UA Clear Clear    Glucose, Ur Negative Negative mg/dL    Bilirubin Urine Negative Negative    Ketones, Urine Negative Negative mg/dL    Specific Gravity, UA 1.015 1.005 - 1.030    Blood, Urine Negative Negative    pH, UA 6.0 5.0 - 9.0    Protein, UA Negative Negative mg/dL    Urobilinogen, Urine 0.2 <2.0 E.U./dL    Nitrite, Urine Negative Negative    Leukocyte Esterase, Urine Negative Negative   Troponin   Result Value Ref Range    Troponin, High Sensitivity 22 (H) 0 - 11 ng/L   Troponin   Result Value Ref Range    Troponin, High Sensitivity 23 (H) 0 - 11 ng/L   URINE ELECTROLYTES   Result Value Ref Range    Sodium, Ur 45 Not Established mmol/L    Potassium, Ur 48.7 Not Established mmol/L    Chloride <20 Not Established mmol/L   Comprehensive Metabolic Panel w/ Reflex to MG   Result Value Ref Range    Sodium 142 132 - 146 mmol/L    Potassium reflex Magnesium 4.0 3.5 - 5.0 mmol/L    Chloride 100 98 - 107 mmol/L    CO2 30 (H) 22 - 29 mmol/L    Anion Gap 12 7 - 16 mmol/L    Glucose 90 74 - 99 mg/dL    BUN 24 (H) 6 - 23 mg/dL    CREATININE 1.3 (H) 0.7 - 1.2 mg/dL    GFR Non-African American 53 >=60 mL/min/1.73    GFR African American >60     Calcium 9.0 8.6 - 10.2 mg/dL    Total Protein 6.7 6.4 - 8.3 g/dL symptoms show no change  Repeat physical examination is not changed    Counseling:  I have spoken with the patient and discussed todays results, in addition to providing specific details for the plan of care and counseling regarding the diagnosis and prognosis. Their questions are answered at this time and they are agreeable with the plan of admission.    --------------------------------- ADDITIONAL PROVIDER NOTES ---------------------------------  Consultations:  . Spoke with hospitalist.  Discussed case. They will admit the patient. This patient's ED course included: a personal history and physicial examination and re-evaluation prior to disposition    This patient has remained hemodynamically stable during their ED course. Diagnosis:  1. Dizziness    2. Near syncope        Disposition:  Patient's disposition: Admit to telemetry  Patient's condition is stable. Patient was seen and evaluated by myself and my attending . Assessment and Plan discussed with attending provider, please see attestation for final plan of care.      DO Toñito Toledo DO  Resident  09/04/21 [id number]

## 2021-09-03 NOTE — H&P
Hospitalist History & Physical      PCP: Rosalie Grier MD    Date of Admission: 9/2/2021    Date of Service: Pt seen/examined on 9/2/2021 and is placed in observation. Chief Complaint:  had concerns including Dizziness (started today ). History Of Present Illness:    Mr. Radha Hall, a 82y.o. year old male  who  has a past medical history of [facility]), Arrhythmia, Carotid artery stenosis, CHF (congestive heart failure) ([facility] [address] Heart valve problem, Hyperlipidemia, Hypertension, ICD (implantable cardiac defibrillator) in place, MI (mitral incompetence), and MI (myocardial infarction) ([facility] [address] Patient presented to the ED with a presyncopal event. He states that he was sitting down when he began having dizziness. Dizziness is not abnormal for him he has baseline vertigo for which she was prescribed as needed meclizine. He got up to take the meclizine and on the way back to sit down the dizziness was complicated by a feeling of faintness. He denies any specific weakness, diaphoresis, nausea but simply states that he felt as if he was going to faint. He immediately sat down and did not actually lose consciousness or fall. His wife was worried and called EMS. He states this is never happened before. He denies any poor oral intake or illness preceding the event. He denies any chest pain, shortness of breath, palpitations. He is now asymptomatic. ER COURSE:  In ED vitals remained stable. Orthostatics negative. Laboratory work-up significant for renal insufficiency27/1.3, elevated troponin22, 23, elevated [id number]. CT head and CXR nonacute.      Past Medical History:        Diagnosis Date    [facility])     Arrhythmia     Carotid artery stenosis     [facility])     Heart valve problem     Hyperlipidemia     Hypertension     ICD (implantable cardiac defibrillator) in place 2007    MI (mitral incompetence) 2003    MI (myocardial infarction) ([facility] [address] 2003     Past Surgical History:        Procedure Laterality Date    CARDIAC CATHETERIZATION Right 03/03/2017    Dr. Juan Kearney  2007. 2008 2007 78 shocks replaced 2008 [employer]     CARDIAC DEFIBRILLATOR PLACEMENT  07/28/2016    [employer] Dual ICD gen change    CARDIOVERSION  10/29/2020    Successful    (Dr. Teo Levi)    COLONOSCOPY N/A 2/24/2020    COLONOSCOPY DIAGNOSTIC performed by Ana Solo MD at [address] [place] [address] Drive  05/23/2003 03/16/2007    DIAGNOSTIC CARDIAC CATH LAB PROCEDURE  2007    DIAGNOSTIC CARDIAC CATH LAB PROCEDURE  03/29/2008    stent    JOINT REPLACEMENT  2011    r hip surgery Dr [facility]  03/29/2017    Dr. Venkatesh Colby and Dr. Mirna Stephen- TAVR Josie 26mm valve    UPPER GASTROINTESTINAL ENDOSCOPY N/A 9/3/2019    EGD ESOPHAGOGASTRODUODENOSCOPY performed by Michell Ambriz MD at [address] [facility] N/A 2/18/2020    EGD ESOPHAGOGASTRODUODENOSCOPY performed by Ana Solo MD at [address]     Medications Prior to Admission:      Prior to Admission medications    Medication Sig Start Date End Date Taking?  Authorizing Provider   potassium chloride (MICRO-K) 10 MEQ extended release capsule Take 1 capsule by mouth daily 8/30/21  Yes Desmond Rodríguez MD   XARELTO 20 MG TABS tablet TAKE 1 TABLET BY MOUTH  DAILY 3/29/21  Yes Ronal Gallego DO   metoprolol tartrate (LOPRESSOR) 50 MG tablet TAKE 1 AND 1/2 TABLETS BY  MOUTH IN THE MORNING AND 1  TABLET BY MOUTH AT BEDTIME 3/11/21  Yes Desmond Rodríguez MD   pravastatin (PRAVACHOL) 40 MG tablet TAKE 1 TABLET BY MOUTH  DAILY 3/11/21  Yes Desmond Rodríguez MD   furosemide (LASIX) 40 MG tablet TAKE 1 TABLET BY MOUTH  DAILY 3/11/21  Yes Desmond Rodríguez MD   pantoprazole (PROTONIX) 40 MG tablet TAKE 1 TABLET BY MOUTH  DAILY 1/4/21  Yes Desmond Rodríguez MD   ferrous sulfate (IRON 325) 325 (65 Fe) MG tablet Take 1 tablet by mouth daily (with breakfast) 11/5/20  Yes Wandy Cadet MD   MAGNESIUM-OXIDE 400 (241.3 Mg) MG TABS tablet TK 1 T PO ONCE D 3/18/20  Yes Historical Provider, MD   dofetilide (TIKOSYN) 250 MCG capsule Take 1 capsule by mouth 2 times daily 3/5/20  Yes Wandy Cadet MD   Multiple Vitamins-Minerals (PRESERVISION AREDS 2 PO) Take 1 tablet by mouth 2 times daily   Yes Historical Provider, MD   Cholecalciferol (VITAMIN D) 2000 UNITS CAPS capsule Take 1 capsule by mouth daily    Yes Historical Provider, MD   Handicap Placard MISC by Does not apply route Disp #: 1  Duration: 5 years. 11/16/20   Wandy Cadet MD       Allergies:  Patient has no known allergies. Social History:    RESIDENCE: Private  TOBACCO:   reports that he quit smoking about 48 years ago. He has a 1.50 pack-year smoking history. He has never used smokeless tobacco.  ETOH:   reports no history of alcohol use. Family History:      History reviewed. No pertinent family history. Review of Systems: All bolded are positive; please see HPI  General:  Fever, chills, diaphoresis, fatigue, malaise, night sweats, weight loss  Psychological:  Anxiety, disorientation, hallucinations. ENT:  Epistaxis, headaches, vertigo, visual changes. Cardiovascular:  Chest pain, irregular heartbeats, palpitations, paroxysmal nocturnal dyspnea. Respiratory:  Shortness of breath, coughing, sputum production, hemoptysis, wheezing, orthopnea.   Gastrointestinal:  Nausea, vomiting, diarrhea, heartburn, constipation, abdominal pain, hematemesis, hematochezia, melena, acholic stools  Genito-Urinary:  Dysuria, urgency, frequency, hematuria  Musculoskeletal:  Joint pain, joint stiffness, joint swelling, muscle pain  Neurology:  Headache, focal neurological deficits, weakness, numbness, paresthesia  Derm:  Rashes, ulcers, excoriations, bruising  Extremities:  Decreased ROM, peripheral edema, mottling    PHYSICAL EXAM:  BP (!) 141/74   Pulse 77   Temp 97.8 Results   Component Value Date    DDIMER <200 06/28/2017      Lactic Acid:   Lab Results   Component Value Date    LACTA 1.5 09/12/2019     Thyroid Studies:   Lab Results   Component Value Date    TSH 1.890 09/03/2021    X6RPSSH 107.80 12/08/2016    R3CFUHE 8.2 12/08/2016     Oupatient labs:  Lab Results   Component Value Date    CHOL 140 09/03/2021    TRIG 230 (H) 09/03/2021    HDL 38 09/03/2021    LDLCALC 56 09/03/2021    TSH 1.890 09/03/2021    INR 1.4 09/02/2021    LABA1C 5.5 09/03/2021     Urinalysis:    Lab Results   Component Value Date    NITRU Negative 09/02/2021    WBCUA 1-3 08/28/2013    BACTERIA FEW 08/28/2013    RBCUA 0-1 08/28/2013    BLOODU Negative 09/02/2021    SPECGRAV 1.015 09/02/2021    GLUCOSEU Negative 09/02/2021     Imaging:  XR CHEST (2 VW)  Result Date: 9/2/2021  No interval change     CT Head WO Contrast  Result Date: 9/2/2021  No acute intracranial abnormality. Chronic findings as described. ASSESSMENT:  Presyncope  ? ELISSA on CKD IIIb  CAD s/p CABG 2003 & 2007   Ischemic cardiomyopathy  HFpEF - last echo with EF 65%  VT s/p ICD  VHD s/p TAVR  Hyperlipidemia  Hypertension  Atrial fibrillation anticoagulated on Xeralto     PLAN:  Admit to to intermediate for OBS  Resume home meds  C/s cardiology given cardiac hx  Check orthos  PT/OT  Gentle IVF, monitor renal fxn and I&O  Urine studies  Possibly home later today pending cardiology input and plan    Diet: ADULT DIET;  Regular; Low Fat/Low Chol/High Fiber/LUZ; No Caffeine  Code Status: Full Code  Surrogate decision maker confirmed with patient:   Extended Emergency Contact Information  Primary Emergency Contact: Anderson COLLINS  Address: [address], [place] of [address] [place] [address] Phone: [phone]  Relation: Spouse  Secondary Emergency Contact: Dre Kenn  Address: [place]y Interiano of [address] [place] [address] Phone: [phone]  Mobile Phone: [phone]  Relation: Child    DVT Prophylaxis: []Lovenox

## 2021-09-03 NOTE — CARE COORDINATION
9/3/21 Transition of Care: Patient is observation due to c/o dizziness and leg weakness at home. He does have a history of vertigo. He is alert and oriented. He lives with his wife. He is independent. Orthos are negative. He has flds at 75/hr. Labs are unremarkable. Cardiology consult is pending. He follows with Dr Dylan Aguirre and his pharmacy is untapt in [place]. Plan per pcp is for possible discharge later today.  Carlo Welsh RN CM

## 2021-09-03 NOTE — PROGRESS NOTES
Attempted to contact Dr. Alicia Tyson answering service x2 with no answering. Physician added to care team if unable to reach. Will attempt call again later.     Rodolfo Haq RN

## 2021-09-03 NOTE — CONSULTS
Inpatient Cardiology Consultation      Reason for Consult:  Exertional dizziness     Consulting Physician: Dr Lulú Wang    Requesting Physician: Hedy Johnson MD    Date of Consultation: 9/3/2021    HISTORY OF PRESENT ILLNESS:   Mr Natalie Morrison is an 82-year-old male who follows with Dr. Cruzito Diop, last seen in the office 2021. He also follows with Dr. Ana Paula Aiken for electrophysiology. His medical history includes coronary artery disease s/p CABG x3 (LIMA-LAD, SVG-RI, SVG-OM. ), redo CABG (radial artery graft to D2 and OM2. ) and reported patent grafts , and PCI; VT s/p ICD  and lead replacement , and PAF, treated with Xarelto and Tikosyn,  S/P TAVR, 2017 with most recent echo 10/2020 with mean gradient: 9.2, hypertension, hyperlipidemia, chronic kidney disease. Presented to [address] [place] [address] 2021 with dizziness  VS: 97.370-16-90 7% room air144/82  Labs: WBC 7.3, H&H 12.9/38.1, platelets 094. K+ 4.1, BUN/SCR 27/1.3, glucose 99. Troponin 22--> 23    CT head no acute intracranial abnormalities    Orthostatic blood pressure:  Lyin/67, 69  Sitting 137/54, 80  Standing 152/66, 79    He was admitted to a telemetry monitoring floor for observation. Cardiology was asked see the patient for dizziness. He states that he has been having intermittent dizziness for 2-3 years - recently diagnosed with BPV and started on Meclozine for symptom management and referred to ENT which he has not seen yet. He states that yesterday he developed dizziness that felt like the room was spinning. He felt faint with it, like he was going to [employer] but denies any falls or LOC. He denies any associated chest pain, shortness of breath, lightheadedness, palpitations. Please note: past medical records were reviewed per electronic medical record (EMR) - see detailed reports under Past Medical/ Surgical History. Past Medical and Surgical History:   1. MI, .  Cardiac catheterization: 85% ostial, proximal and mid LAD narrowings. LMC 70% stenosis. D1 occluded. D2 50% stenosis. OM1 80% ostial stenosis. Mid CX occluded. Dominant RCA severe disease. LVEF 40-45%. 2. [place] [facility] [place]A-LAD, SVG-RI, SVG-OM. 3. Hyperlipidemia. 4. Mild carotid plaque on US, 2003. 5. Echo, 07/2006. Mild LVE, normal EF, Stage II diastolic dysfunction, moderate AS, mild MR, mild TR. 6. Right leg vein stripping, 1970's. 7. No history diabetes mellitus, stroke or COPD. 8. Nonsmoker for many years. 9. VT causing cardiac arrest after stress test, 03/15/2007. He was successfully cardioverted. 10. Cardiac catheterization, 03/16/2007 with normal LVEF, severe native three vessel coronary disease. SVG-RI, SVG-OM both occluded. LIMA-LAD patent. 11. Re-do CABG, [facility], 03/2007 with radial artery graft to D2 and OM2. 12. Elective PCI with CONNOR native OM2 and native LAD, 03/2007 following CABG. This was done because of inadequate conduits. 13. ICD placement, [facility], 03/2007. 14. ICD lead recall, early 2008, but he was followed electively because his device was functioning normally. 15. Presentation [facility], 03/29/2008 with multiple ICD inappropriate shocks. Transfer [facility] where ICD and lead were replaced. He reports cardiac catheterization that revealed patent LIMA-LAD and patent stents in native coronary arteries. 16. [facility]ical [facility] and [facility]. Troponin minimally elevated. Beta blocker dose increased. [address] admission, 06/13/2009 with lightheadedness, orthostatic hypotension, drop in hemoglobin to 10.5 from 14.9 over two months with an increase in BUN from 16 to 68 over the same time. Dark heme positive stools noted. EKG NSR, incomplete RBBB. 18. Echo, 06/15/2009 with normal LVEF, moderate AS, peak gradient 38 mmHg, BLOSSOM 1.1 cm², moderate MR, LAE. 19. Transtelephonic ICD check, 01/11/2010. No ventricular tachyarrhythmias.  Two AF episodes, the longest for 14 hours. 20. Echo, 06/16/2010 with LVE, borderline LVH, normal systolic and diastolic function, normal LA, ICD electrode right heart. Trileaflet AV with moderate to severe AS, mean/peak gradient 20/31 mmHg. BLOSSOM 1.0 cm². MAC with mild MR, normal RVSP.  21. No drug allergies. 25. Family history negative for premature vascular disease. 23. PAF with DCCV, [facility], 12/2010.   24. Hip replacement surgery, 2010 or 2011 [facility] Dr. Brandon Bahena. 25. MPS [facility], 06/05/2012. Moderate sized fixed basal inferior defect, probably artifact. 26. Echo [facility], 10/20/2011. 1+ AR, moderate AS, mild MR, mild TR, normal LVEF.   27. Echo, 01/31/2013. LV not dilated. Mild concentric LVH. Septal paradox. EF 50-55%. BLOSSOM 1.2 cm² consistent with moderate stenosis. Peak/mean gradient 38/21. Stage II diastolic dysfunction. 28. [facility], 08/28/2013 with dizziness, Hb 7.7, WBC 19.0. CXR negative except cardiomegaly. EKG NSR, leftward axis, RBBB. BMP negative except for elevation in BUN to 55 with Cr 1.3.   29. EGD, 08/28/2013. Large pyloric channel ulcer with clot treated with PRBCs and fresh frozen plasma. Hb 8.7 on day of discharge and stable. Pradaxa was temporarily held. 30. CT abdomen, 09/08/2014. 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 31. CT chest, 09/17/2014. Consolidation and infiltrate at the left lung base, stable when compared to previous exams with less pleural thickening and calcified plaque in the left pleural cavity without any recent change. Chronic obstructive airways disease. 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   32. ICD programmed to DDDR mode by Dr. Inge To, 03/26/2015. Device function normal.  33. Echo 10/16/2015. EF 39%. Stage II diastolic dysfunction. Aortic stenosis with peak/mean gradients of 48/24 mmHg. Estimated valve area 1.0 cm². 34. ICD generator change for battery depletion, 07/28/2016, Dr. Houston Jackson. Anay Oas. 35. Echo, 02/03/2017.  Normal LV size with mild LVH.  Normal regional wall motion and overall systolic function.  Diastole could not be assessed, but was presumed to be impaired.  The RV was dilated with impaired global systolic function.  The LA was enlarged.  Mild MAC with mild mitral insufficiency.  Moderate tricuspid insufficiency.  Aortic valve gradients are peak 47 mmHg with a mean of 27.  Dimensionless ratio 0.21. Valve area calculated 0.8 cm².    36.  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  37. Echocardiogram, [facility], 4/19/2018, EF 60%. Low normal RV function. Stage 2 diastolic dysfunction. Mild-to-moderate MR. S/P TAVR with a mean gradient of 10 mmHg. RVSP 31 mg.     38. S/P Cardioversion by Dr. James Novoa, 05/30/2017.   19 Rohith Howard evaluation, 09/07/2018, for 2 appropriate ICD discharges for polymorphic ventricular tachycardia, which occurred after yard work and moving heavy furniture. 40. [facility] 2017 (Jettie Neighbors): Left main: 0% stenosis LAD: 100 % stenosis Circumflex: 100 % Stenosis AFTER OM1 RCA: Dominant. ANEURYSMAL  DILATATION IN MID PORTION. LONG 75 % stenosis IN MID AND DISTAL VESSEL. LIMA - LAD: PATENT RADIAL - D2: PATENT  RADIAL - OM2: PATENT  41. 10/2020 hospitalized for HFpEF and Afib RVR -- dccv 10/29/2020   42. TTE Nasra Conteh) 10/30/2020:  Left ventricle is normal in size  Mild asymmetric septal hypertrophy. EF 65%  No regional wall motion abnormalities seen. Normal left ventricular ejection fraction. The left atrium is severely dilated. Mild mitral annular calcification. Mild mitral regurgitation is present. Normally functioning bioprosthetic valve in aortic position. Physiologic and/or trace tricuspid regurgitation. Medications Prior to admit:  Prior to Admission medications    Medication Sig Start Date End Date Taking?  Authorizing Provider   potassium chloride (MICRO-K) 10 MEQ extended release capsule Take 1 capsule by mouth daily 8/30/21  Yes Lolly Rausch MD   XARELTO 20 MG TABS tablet TAKE 1 TABLET BY MOUTH  DAILY 3/29/21  Yes Bladimir Perez DO   metoprolol tartrate (LOPRESSOR) 50 MG tablet TAKE 1 AND 1/2 TABLETS BY  MOUTH IN THE MORNING AND 1  TABLET BY MOUTH AT BEDTIME 3/11/21  Yes Lolly Rausch MD   pravastatin (PRAVACHOL) 40 MG tablet TAKE 1 TABLET BY MOUTH  DAILY 3/11/21  Yes Lolly Rausch MD   furosemide (LASIX) 40 MG tablet TAKE 1 TABLET BY MOUTH  DAILY 3/11/21  Yes Lolly Rausch MD   pantoprazole (PROTONIX) 40 MG tablet TAKE 1 TABLET BY MOUTH  DAILY 1/4/21  Yes Lolly Rausch MD   ferrous sulfate (IRON 325) 325 (65 Fe) MG tablet Take 1 tablet by mouth daily (with breakfast) 11/5/20  Yes Lolly Rausch MD   MAGNESIUM-OXIDE 400 (241.3 Mg) MG TABS tablet TK 1 T PO ONCE D 3/18/20  Yes Historical Provider, MD   dofetilide (TIKOSYN) 250 MCG capsule Take 1 capsule by mouth 2 times daily 3/5/20  Yes Lolly Rausch MD   Multiple Vitamins-Minerals (PRESERVISION AREDS 2 PO) Take 1 tablet by mouth 2 times daily   Yes Historical Provider, MD   Cholecalciferol (VITAMIN D) 2000 UNITS CAPS capsule Take 1 capsule by mouth daily    Yes Historical Provider, MD   Handicap Placard MISC by Does not apply route Disp #: 1  Duration: 5 years.  11/16/20   Lolly Rausch MD       Current Medications:    Current Facility-Administered Medications: vitamin D (CHOLECALCIFEROL) tablet 2,000 Units, 2,000 Units, Oral, Daily  dofetilide (TIKOSYN) capsule 250 mcg, 250 mcg, Oral, BID  ferrous sulfate (IRON 325) tablet 325 mg, 325 mg, Oral, Daily with breakfast  magnesium oxide (MAG-OX) tablet 400 mg, 400 mg, Oral, Daily  metoprolol tartrate (LOPRESSOR) tablet 75 mg, 75 mg, Oral, Daily  pantoprazole (PROTONIX) tablet 40 mg, 40 mg, Oral, Daily  pravastatin (PRAVACHOL) tablet 40 mg, 40 mg, Oral, Daily  rivaroxaban (XARELTO) tablet 20 mg, 20 mg, Oral, Daily  metoprolol tartrate (LOPRESSOR) tablet 50 mg, 50 mg, Oral, Nightly  sodium chloride flush 0.9 % injection 5-40 mL, 5-40 mL, IntraVENous, 2 times per day  sodium chloride flush 0.9 % injection 5-40 mL, 5-40 mL, IntraVENous, PRN  0.9 % sodium chloride infusion, 25 mL, IntraVENous, PRN  acetaminophen (TYLENOL) tablet 650 mg, 650 mg, Oral, Q6H PRN **OR** acetaminophen (TYLENOL) suppository 650 mg, 650 mg, Rectal, Q6H PRN  polyethylene glycol (GLYCOLAX) packet 17 g, 17 g, Oral, Daily PRN  potassium chloride (KLOR-CON M) extended release tablet 40 mEq, 40 mEq, Oral, PRN **OR** potassium bicarb-citric acid (EFFER-K) effervescent tablet 40 mEq, 40 mEq, Oral, PRN **OR** potassium chloride 10 mEq/100 mL IVPB (Peripheral Line), 10 mEq, IntraVENous, PRN  magnesium sulfate 2000 mg in 50 mL IVPB premix, 2,000 mg, IntraVENous, PRN  0.9 % sodium chloride infusion, , IntraVENous, Continuous    Allergies:  Patient has no known allergies. Social History:    Lives with his wife - denies CP/SOB with ADL. Does not require supplemental O2 or use ambulation assistance devices. Denies tobacco, alcohol or illicit drug use     Full code     Family History: This information was not obtained at this time as it is found noncontributory secondary to the patients advanced age. REVIEW OF SYSTEMS:     · Constitutional: Denies fevers, chills, night sweats, and fatigue  · HEENT: Denies headaches, nose bleeds, and blurred vision,oral pain, abscess or lesion. · Musculoskeletal: Denies falls, pain to BLE with ambulation and edema to BLE. · Neurological: + dizziness; denies lightheadedness, numbness and tingling  · Cardiovascular: Denies chest pain, palpitations, and feelings of heart racing. · Respiratory: Denies orthopnea and PND, cough, TORRES  · Gastrointestinal: Denies heartburn, nausea/vomiting, diarrhea and constipation, black/bloody, and tarry stools.    · Genitourinary: Denies dysuria and hematuria  · Hematologic: Denies excessive bruising or bleeding  · Lymphatic: Denies lumps and bumps to neck, axilla, breast, and groin      PHYSICAL EXAM:   BP (!) 141/67   Pulse 76   Temp 97.6 °F (36.4 °C) (Temporal)   Resp 18   Ht 5' 8\" (1.727 m)   Wt 168 lb 3.2 oz (76.3 kg)   SpO2 94%   BMI 25.57 kg/m²   CONST:  Well developed,  male who appears his stated age. Awake, alert, cooperative, no apparent distress  HEENT:   Head- Normocephalic, atraumatic   Eyes- Conjunctivae pink, anicteric  Throat- Oral mucosa pink and moist  Neck-  No stridor, trachea midline, no jugular venous distention. CHEST: Chest symmetrical and non-tender to palpation. RESPIRATORY: Lung sounds clear throughout fields bilaterally. No wheeze or rhonchi noted. CARDIOVASCULAR:     No carotid bruit noted bilaterally   Heart Ausculation- Regular rate and rhythm, no murmur. PV: No lower extremity edema. No varicosities. ABDOMEN: Soft, non-tender to light palpation. Bowel sounds present. MS: Good muscle strength and tone. No atrophy or abnormal movements. : Deferred   SKIN: Warm and dry no statis dermatitis or ulcers   NEURO / PSYCH: Oriented to person, place and time. Speech clear and appropriate. Follows all commands. Pleasant affect     DATA:    ECG: intermittent pacing; HR 71.  PVCs   Tele strips: SR HR 80, PVCs     Diagnostic:    Labs:   CBC:   Recent Labs     09/02/21  1000 09/02/21  1852   WBC 8.0 7.3   HGB 13.8 12.9   HCT 41.5 38.1    192     BMP:   Recent Labs     09/02/21  1000 09/02/21  1852    139   K 4.7 4.1   CO2 29 30*   BUN 29* 27*   CREATININE 1.3* 1.3*   LABGLOM 53 53   CALCIUM 9.5 8.9     HgA1c:   Lab Results   Component Value Date    LABA1C 5.5 03/24/2017     PT/INR:   Recent Labs     09/02/21  1852   PROTIME 16.0*   INR 1.4     APTT:  Recent Labs     09/02/21 1852   APTT 35.3*     FASTING LIPID PANEL:  Lab Results   Component Value Date    CHOL 154 06/08/2021    HDL 37 06/08/2021    LDLCALC 49 06/08/2021    TRIG 339 06/08/2021     LIVER PROFILE:  Recent Labs     09/02/21  1000 09/02/21  1852   AST 22 24   ALT 13 13   LABALBU 4.6 4.2       Assessment:   1. Dizziness most likely related to BPV - recently started on Meclozine - referred to ENT (has not seen yet). CT head negative here 9/2/2021; Orthostatics negative   2. H/o Coronary artery disease s/p CABG x3 (LIMA-LAD, SVG-RI, SVG-OM. 2003), redo CABG (radial artery graft to D2 and OM2. 2007) and patent grafts 2017. No CP/EKG changes   3. H/o VT s/p ICD 2007 and lead replacement 2008. Follows with Dr Andres Velasco   4. PAF on [address] with Xarelto and Tikosyn  5. S/P TAVR, 03/29/2017 (TTE 10/2020)   6. Hypertension  7. Hyperlipidemia  8. Chronic kidney disease    Plan:   1. Add Aspirin 81 mg daily   2. Consult EP for device interrogation   3. Continue home medications   4. No plans for advanced cardiac testing at this time. 5. Cardiology will sing off, please call if needed     Above discussed with Dr Lisa Altamirano   Electronically signed by Noe Alpers, [place] on 9/3/2021 at 8:24 AM     Patient chart reviewed with Noe Alpers, [place]. Patient seen and examined independently. Assessment and plan were made in collaboration with Noe Alpers, [place].    53-year-old male who is in consultation due to dizziness. He has history of CAD, status post CABG in 2000 03 and redo CABG in 2000 07 with patent graft by cardiac cath in 2017, ventricular tachycardia, status post AICD, status post TAVR in 2017, PAF and has been on Xarelto and Tikosyn, hypertension, hyperlipidemia, chronic kidney disease and vertigo. Patient is followed up by Dr. Perez Spencer and by Anita Hernandez. He has been complaining of dizziness and vertigo over the past few years. He is not active. He uses a cane at times to ambulate. He denies chest discomfort but feels dyspnea on exertion at times. He denies palpitations. He was admitted due to an episode of dizziness while getting up from a chair and felt that he is going to faint. SOFT TISSUES/SKULL:  No acute abnormality of the visualized skull or soft   tissues.  There is trace venous gas seen in the region of the right greater   than left bitemporal and  musculature, typically cephalad migration   from venous access/instrumentation.           Impression   No acute intracranial abnormality.       Chronic findings as described. Assessment:   1. Dizziness most likely related to BPV - recently started on Meclozine - referred to ENT (has not seen yet). CT head negative here 9/2/2021; Orthostatics negative   2. H/o Coronary artery disease s/p CABG x3 (LIMA-LAD, SVG-RI, SVG-OM.   7965), redo CABG (radial artery graft to D2 and OM2. 2007) and patent grafts 2017. No CP/EKG changes   3. H/o VT s/p ICD 2007 and lead replacement 2008. Follows with Dr Terrance Valdivia   4. PAF on [address] [place] [address] Road with Xarelto and Tikosyn  5. S/P TAVR, 03/29/2017 (TTE 10/2020)   6. Hypertension  7. Hyperlipidemia  8. Chronic kidney disease     Plan:   1. Add Aspirin 81 mg daily   2. Consult Dr Terrance Valdivia  for device interrogation and QTC prolongation (patient on Tikosyn ). 3. Continue home medications   4. No plans for advanced cardiac testing at this time. 5. Cardiology will sing off, please call if needed    6. May follow-up with Dr. Martha Torrez after hospital discharge.

## 2021-09-03 NOTE — CONSULTS
Today's Date: 9/3/2021  Patient Name: Brenda Concepcion  Date of admission: 9/2/2021  6:23 PM  Patient's age: 82 y. o., 1935  Admission Dx: Pre-syncope [R55]    Reason for Consult: ICD in situ, persistent atrial fibrillation  Requesting Physician: Stanislaw Beckham MD    CHIEF COMPLAINT: Symptoms of lightheadedness and severe near syncope    History Obtained From:  patient    HISTORY OF PRESENT ILLNESS:      The patient is a 82 y.o.  male who is admitted to the hospital for severe near syncope/lightheadedness. He does have the symptoms occasionally but symptoms worsened yesterday and therefore he presented to the hospital.  He has not had any symptoms of chest pain or shortness of breath at rest or on exertion however. The patient is well-known to me for history of coronary artery disease and aortic stenosis. He is post a TAVR procedure more than 2 years ago. History of paroxysmal atrial fibrillation for which he is on dofetilide therapy. Last cardioversion was performed approximately a year ago. His device has  also been programmed to deliver therapies for atrial fibrillation and the patient received an ICD shock for atrial fibrillation in August.  Subsequently however atrial fibrillation recurred and the patient has been in persistent atrial fibrillation for the past 3 weeks. His device was programmed  to transmit the remote check on August 16 but that was not completed possibly since the transmitter is not functional.  The patient tells me that  he has had a power outage  and possibly the transmitter is not functioning appropriately.   No symptoms of paroxysmal nocturnal dyspnea, orthopnea or leg edema  No syncope or complete loss of consciousness  No recent ICD discharges      Past Medical History:   has a past medical history of A-fib ([address] Arrhythmia, Carotid artery stenosis, CHF (congestive heart failure) ([address] Heart valve problem, Hyperlipidemia, Hypertension, ICD (implantable cardiac defibrillator) in place, MI (mitral incompetence), and MI (myocardial infarction) ([facility] [address] Pertinent past medical history as per cardiology notes  1. MI, 2003. Cardiac catheterization: 85% ostial, proximal and mid LAD narrowings. LMC 70% stenosis. D1 occluded. D2 50% stenosis. OM1 80% ostial stenosis. Mid CX occluded. Dominant RCA severe disease. LVEF 40-45%. 2. CABG, 05/23/2003, [facility] [place] with LIMA-LAD, SVG-RI, SVG-OM. 3. Hyperlipidemia. 4. VT causing cardiac arrest after stress test, 03/15/2007. He was successfully cardioverted. 5. Cardiac catheterization, 03/16/2007 with normal LVEF, severe native three vessel coronary disease. SVG-RI, SVG-OM both occluded. LIMA-LAD patent. 6. Re-do CABG, [facility], 03/2007 with radial artery graft to D2 and OM2.   7. Elective PCI with CONNOR native OM2 and native LAD, 03/2007 following CABG. This was done because of inadequate conduits. 8. ICD placement, [facility], 03/2007. 9. ICD lead recall, early 2008, but he was followed electively because his device was functioning normally. 10. Presentation [facility], 03/29/2008 with multiple ICD inappropriate shocks. Transfer [facility] where ICD and lead were replaced. He reports cardiac catheterization that revealed patent LIMA-LAD and patent stents in native coronary arteries. 11. ICD programmed to DDDR mode by Dr. Codey Hall, 03/26/2015. Device function normal.  12. Echo 10/16/2015. EF 39%. Stage II diastolic dysfunction. Aortic stenosis with peak/mean gradients of 48/24 mmHg. Estimated valve area 1.0 cm². 13. ICD generator change for battery depletion, 07/28/2016, Dr. Esteban Garnica. Codey Hall.    14. Echo, 02/03/2017.  Normal LV size with mild LVH.  Normal regional wall motion and overall systolic function.  Diastole could not be assessed, but was presumed to be impaired.  The RV was dilated with impaired global systolic function.  The LA was enlarged.  Mild MAC with mild mitral insufficiency.  Moderate tricuspid insufficiency.  Aortic valve gradients are peak 47 mmHg with a mean of 27.  Dimensionless ratio 0.21. Valve area calculated 0.8 cm².    15.  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 16. Echocardiogram, [facility], 4/19/2018, EF 60%.  Low normal RV function.  Stage 2 diastolic dysfunction.  Mild-to-moderate MR. S/P TAVR with a mean gradient of 10 mmHg.  RVSP 31 mg.     17. S/P Cardioversion by Dr. Donnie Gannon, 05/30/2017. [address] evaluation, 09/07/2018, for 2 appropriate ICD discharges for polymorphic ventricular tachycardia, which occurred after yard work and moving heavy furniture. 19. [facility] 2017 ([place]): Left main: 0% stenosis LAD: 100 % stenosis Circumflex: 100 % Stenosis AFTER OM1 RCA: Dominant. ANEURYSMAL  DILATATION IN MID PORTION. LONG 75 % stenosis IN MID AND DISTAL VESSEL. LIMA - LAD: PATENT RADIAL - D2: PATENT  RADIAL - OM2: PATENT  20. 10/2020 hospitalized for HFpEF and Afib RVR -- dccv 10/29/2020   21. [facility]TE Janey Lentz) 10/30/2020:  Left ventricle is normal in size  Mild asymmetric septal hypertrophy. EF 65%  No regional wall motion abnormalities seen.  Normal left ventricular ejection fraction.  The left atrium is severely dilated.  Mild mitral annular calcification.   Mild mitral regurgitation is present.  Normally functioning bioprosthetic valve in aortic position.  Physiologic and/or trace tricuspid regurgitation    Past Surgical History:   has a past surgical history that includes Varicose vein surgery (1970's); Coronary artery bypass graft (05/23/2003 03/16/2007); Diagnostic Cardiac Cath Lab Procedure (2007); Diagnostic Cardiac Cath Lab Procedure (03/29/2008); joint replacement (2011); Cardiac defibrillator placement (2007. 2008); Cardiac defibrillator placement (07/28/2016); Cardiac catheterization (Right, 03/03/2017); other surgical history (03/29/2017); Upper gastrointestinal endoscopy (N/A, 9/3/2019); Upper gastrointestinal endoscopy (N/A, 2/18/2020);  Colonoscopy (N/A, 2/24/2020); and Cardioversion (10/29/2020). Home Medications:    Prior to Admission medications    Medication Sig Start Date End Date Taking? Authorizing Provider   potassium chloride (MICRO-K) 10 MEQ extended release capsule Take 1 capsule by mouth daily 8/30/21  Yes Aleja Churchill MD   XARELTO 20 MG TABS tablet TAKE 1 TABLET BY MOUTH  DAILY 3/29/21  Yes Cornell Brower DO   metoprolol tartrate (LOPRESSOR) 50 MG tablet TAKE 1 AND 1/2 TABLETS BY  MOUTH IN THE MORNING AND 1  TABLET BY MOUTH AT BEDTIME 3/11/21  Yes Aleja Churchill MD   pravastatin (PRAVACHOL) 40 MG tablet TAKE 1 TABLET BY MOUTH  DAILY 3/11/21  Yes Aleja Churchill MD   furosemide (LASIX) 40 MG tablet TAKE 1 TABLET BY MOUTH  DAILY 3/11/21  Yes Aleja Churchill MD   pantoprazole (PROTONIX) 40 MG tablet TAKE 1 TABLET BY MOUTH  DAILY 1/4/21  Yes Aleja Churchill MD   ferrous sulfate (IRON 325) 325 (65 Fe) MG tablet Take 1 tablet by mouth daily (with breakfast) 11/5/20  Yes Aleja Churchill MD   MAGNESIUM-OXIDE 400 (241.3 Mg) MG TABS tablet TK 1 T PO ONCE D 3/18/20  Yes Historical Provider, MD   dofetilide (TIKOSYN) 250 MCG capsule Take 1 capsule by mouth 2 times daily 3/5/20  Yes Aleja Churchill MD   Multiple Vitamins-Minerals (PRESERVISION AREDS 2 PO) Take 1 tablet by mouth 2 times daily   Yes Historical Provider, MD   Cholecalciferol (VITAMIN D) 2000 UNITS CAPS capsule Take 1 capsule by mouth daily    Yes Historical Provider, MD   Handicap Placard MISC by Does not apply route Disp #: 1  Duration: 5 years. 11/16/20   Aleja Churchill MD       Allergies:  Patient has no known allergies. Social History:   reports that he quit smoking about 48 years ago. He has a 1.50 pack-year smoking history. He has never used smokeless tobacco. He reports that he does not drink alcohol and does not use drugs. Family History: family history is not on file. No h/o sudden cardiac death. No for premature CAD    REVIEW OF SYSTEMS:    · Constitutional: there has been no unanticipated weight loss. There's been No change in energy level, No change in activity level. · Eyes: No visual changes or diplopia. No scleral icterus. · ENT: No Headaches, hearing loss or vertigo. No mouth sores or sore throat. · Cardiovascular: No chest pain, No dyspnea on exertion, No palpitations or No loss of consciousness. No cough, hemoptysis, No pleuritic pain, or phlebitis. · Respiratory: No cough or wheezing, no sputum production. No hematemesis. · Gastrointestinal: No abdominal pain, appetite loss, blood in stools. No change in bowel or bladder habits. · Genitourinary: No dysuria, trouble voiding, or hematuria. · Musculoskeletal:  No gait disturbance, Yes weakness or joint complaints. · Integumentary: No rash or pruritis. · Neurological: No headache, diplopia, change in muscle strength, numbness or tingling. No change in gait, balance, coordination, mood, affect, memory, mentation, behavior. · Psychiatric: No anxiety, or depression. · Endocrine: No temperature intolerance. No excessive thirst, fluid intake, or urination. No tremor. · Hematologic/Lymphatic: No abnormal bruising or bleeding, blood clots or swollen lymph nodes. · Allergic/Immunologic: No nasal congestion or hives. PHYSICAL EXAM:      BP (!) 141/74   Pulse 77   Temp 97.8 °F (36.6 °C) (Temporal)   Resp 18   Ht 5' 8\" (1.727 m)   Wt 168 lb 3.2 oz (76.3 kg)   SpO2 95%   BMI 25.57 kg/m²    Constitutional and General Appearance: alert, cooperative, no distress and appears stated age  HEENT: PERRL, no cervical lymphadenopathy. No masses palpable. Normal oral mucosa  Respiratory:  · Normal excursion and expansion without use of accessory muscles  · Resp Auscultation: Good respiratory effort. No for increased work of breathing.  On auscultation: clear to auscultation bilaterally  Cardiovascular:  · The apical impulse is laterally displaced  · Heart tones are normal, irregular S1 and S2.  · Jugular venous pulsation Normal  · The carotid 35.3*     CARDIAC ENZYMES:No results for input(s): CKTOTAL, CKMB, CKMBINDEX, TROPONINI in the last 72 hours. FASTING LIPID PANEL:  Lab Results   Component Value Date    HDL 38 09/03/2021    LDLCALC 56 09/03/2021    TRIG 230 09/03/2021     LIVER PROFILE:  Recent Labs     09/02/21  1852 09/03/21  0825   AST 24 20   ALT 13 12   LABALBU 4.2 4.2       IMPRESSION:    Patient Active Problem List   Diagnosis    Chronic combined systolic and diastolic [facility]congestive [facility] failure) [facility])      S/P TAVR (transcatheter aortic valve replacement)      Coronary artery disease involving native coronary artery of native heart without angina pectoris    Essential hypertension      Hyperlipidemia LDL goal <100      ICD (implantable cardioverter-defibrillator), dual, in situ--appropriate function as noted above      Stage 3b chronic kidney disease ([address]      Pre-syncope/dizziness which may be related to persistent atrial fibrillation with rapid ventricular rates. Paroxysmal atrial fibrillation with this prolonged episode being the first one since October 2020    RECOMMENDATIONS:    Continue dofetilide  Direct-current cardioversion tomorrow  Discontinue IV fluids  Resume Lasix as at home  Possible discharge following cardioversion tomorrow      Discussed with patient and Nurse.     Ivone Cintron MD, MD

## 2021-09-04 ENCOUNTER — ANESTHESIA (OUTPATIENT)
Dept: OPERATING ROOM | Age: 86
End: 2021-09-04
Payer: MEDICARE

## 2021-09-04 ENCOUNTER — ANESTHESIA EVENT (OUTPATIENT)
Dept: OPERATING ROOM | Age: 86
End: 2021-09-04
Payer: MEDICARE

## 2021-09-04 VITALS
HEIGHT: 68 IN | BODY MASS INDEX: 25.46 KG/M2 | TEMPERATURE: 97.5 F | HEART RATE: 72 BPM | RESPIRATION RATE: 19 BRPM | OXYGEN SATURATION: 96 % | WEIGHT: 168 LBS | SYSTOLIC BLOOD PRESSURE: 150 MMHG | DIASTOLIC BLOOD PRESSURE: 67 MMHG

## 2021-09-04 VITALS
OXYGEN SATURATION: 97 % | RESPIRATION RATE: 11 BRPM | DIASTOLIC BLOOD PRESSURE: 66 MMHG | SYSTOLIC BLOOD PRESSURE: 121 MMHG

## 2021-09-04 LAB
ANION GAP SERPL CALCULATED.3IONS-SCNC: 7 MMOL/L (ref 7–16)
BUN BLDV-MCNC: 25 MG/DL (ref 6–23)
CALCIUM SERPL-MCNC: 8.9 MG/DL (ref 8.6–10.2)
CHLORIDE BLD-SCNC: 102 MMOL/L (ref 98–107)
CO2: 31 MMOL/L (ref 22–29)
CREAT SERPL-MCNC: 1.3 MG/DL (ref 0.7–1.2)
GFR AFRICAN AMERICAN: >60
GFR NON-AFRICAN AMERICAN: 53 ML/MIN/1.73
GLUCOSE BLD-MCNC: 100 MG/DL (ref 74–99)
HCT VFR BLD CALC: 37.2 % (ref 37–54)
HEMOGLOBIN: 12.6 G/DL (ref 12.5–16.5)
MCH RBC QN AUTO: 31 PG (ref 26–35)
MCHC RBC AUTO-ENTMCNC: 33.9 % (ref 32–34.5)
MCV RBC AUTO: 91.4 FL (ref 80–99.9)
PDW BLD-RTO: 14.5 FL (ref 11.5–15)
PLATELET # BLD: 196 E9/L (ref 130–450)
PMV BLD AUTO: 11.2 FL (ref 7–12)
POTASSIUM REFLEX MAGNESIUM: 4.4 MMOL/L (ref 3.5–5)
RBC # BLD: 4.07 E12/L (ref 3.8–5.8)
SODIUM BLD-SCNC: 140 MMOL/L (ref 132–146)
WBC # BLD: 7.2 E9/L (ref 4.5–11.5)

## 2021-09-04 PROCEDURE — 7100000000 HC PACU RECOVERY - FIRST 15 MIN: Performed by: INTERNAL MEDICINE

## 2021-09-04 PROCEDURE — 3700000000 HC ANESTHESIA ATTENDED CARE

## 2021-09-04 PROCEDURE — G0378 HOSPITAL OBSERVATION PER HR: HCPCS

## 2021-09-04 PROCEDURE — 85027 COMPLETE CBC AUTOMATED: CPT

## 2021-09-04 PROCEDURE — 2580000003 HC RX 258: Performed by: NURSE PRACTITIONER

## 2021-09-04 PROCEDURE — 2709999900 HC NON-CHARGEABLE SUPPLY: Performed by: INTERNAL MEDICINE

## 2021-09-04 PROCEDURE — 2700000000 HC OXYGEN THERAPY PER DAY

## 2021-09-04 PROCEDURE — 3600000001 HC SURGERY LEVEL 1  BASE

## 2021-09-04 PROCEDURE — 6370000000 HC RX 637 (ALT 250 FOR IP): Performed by: FAMILY MEDICINE

## 2021-09-04 PROCEDURE — C1887 CATHETER, GUIDING: HCPCS | Performed by: INTERNAL MEDICINE

## 2021-09-04 PROCEDURE — 7100000001 HC PACU RECOVERY - ADDTL 15 MIN: Performed by: INTERNAL MEDICINE

## 2021-09-04 PROCEDURE — 6360000002 HC RX W HCPCS: Performed by: NURSE ANESTHETIST, CERTIFIED REGISTERED

## 2021-09-04 PROCEDURE — 6370000000 HC RX 637 (ALT 250 FOR IP): Performed by: INTERNAL MEDICINE

## 2021-09-04 PROCEDURE — 36415 COLL VENOUS BLD VENIPUNCTURE: CPT

## 2021-09-04 PROCEDURE — 92960 CARDIOVERSION ELECTRIC EXT: CPT

## 2021-09-04 PROCEDURE — 80048 BASIC METABOLIC PNL TOTAL CA: CPT

## 2021-09-04 PROCEDURE — 2580000003 HC RX 258: Performed by: NURSE ANESTHETIST, CERTIFIED REGISTERED

## 2021-09-04 RX ORDER — PROPOFOL 10 MG/ML
INJECTION, EMULSION INTRAVENOUS PRN
Status: DISCONTINUED | OUTPATIENT
Start: 2021-09-04 | End: 2021-09-04 | Stop reason: SDUPTHER

## 2021-09-04 RX ORDER — HYDRALAZINE HYDROCHLORIDE 20 MG/ML
5 INJECTION INTRAMUSCULAR; INTRAVENOUS EVERY 10 MIN PRN
Status: DISCONTINUED | OUTPATIENT
Start: 2021-09-04 | End: 2021-09-04 | Stop reason: HOSPADM

## 2021-09-04 RX ORDER — SODIUM CHLORIDE 9 MG/ML
INJECTION, SOLUTION INTRAVENOUS CONTINUOUS PRN
Status: DISCONTINUED | OUTPATIENT
Start: 2021-09-04 | End: 2021-09-04 | Stop reason: SDUPTHER

## 2021-09-04 RX ORDER — MEPERIDINE HYDROCHLORIDE 25 MG/ML
12.5 INJECTION INTRAMUSCULAR; INTRAVENOUS; SUBCUTANEOUS EVERY 5 MIN PRN
Status: DISCONTINUED | OUTPATIENT
Start: 2021-09-04 | End: 2021-09-04 | Stop reason: HOSPADM

## 2021-09-04 RX ORDER — PROMETHAZINE HYDROCHLORIDE 25 MG/ML
6.25 INJECTION, SOLUTION INTRAMUSCULAR; INTRAVENOUS
Status: DISCONTINUED | OUTPATIENT
Start: 2021-09-04 | End: 2021-09-04 | Stop reason: HOSPADM

## 2021-09-04 RX ORDER — LABETALOL HYDROCHLORIDE 5 MG/ML
5 INJECTION, SOLUTION INTRAVENOUS EVERY 10 MIN PRN
Status: DISCONTINUED | OUTPATIENT
Start: 2021-09-04 | End: 2021-09-04 | Stop reason: HOSPADM

## 2021-09-04 RX ADMIN — PRAVASTATIN SODIUM 40 MG: 20 TABLET ORAL at 09:04

## 2021-09-04 RX ADMIN — DOFETILIDE 250 MCG: 0.25 CAPSULE ORAL at 09:05

## 2021-09-04 RX ADMIN — POTASSIUM CHLORIDE 10 MEQ: 750 TABLET, EXTENDED RELEASE ORAL at 09:05

## 2021-09-04 RX ADMIN — SODIUM CHLORIDE: 9 INJECTION, SOLUTION INTRAVENOUS at 13:53

## 2021-09-04 RX ADMIN — PANTOPRAZOLE SODIUM 40 MG: 40 TABLET, DELAYED RELEASE ORAL at 09:05

## 2021-09-04 RX ADMIN — SODIUM CHLORIDE, PRESERVATIVE FREE 10 ML: 5 INJECTION INTRAVENOUS at 09:09

## 2021-09-04 RX ADMIN — FUROSEMIDE 40 MG: 20 TABLET ORAL at 09:04

## 2021-09-04 RX ADMIN — FERROUS SULFATE TAB 325 MG (65 MG ELEMENTAL FE) 325 MG: 325 (65 FE) TAB at 09:08

## 2021-09-04 RX ADMIN — Medication 2000 UNITS: at 09:05

## 2021-09-04 RX ADMIN — Medication 400 MG: at 09:04

## 2021-09-04 RX ADMIN — PROPOFOL 60 MG: 10 INJECTION, EMULSION INTRAVENOUS at 13:58

## 2021-09-04 RX ADMIN — METOPROLOL TARTRATE 75 MG: 50 TABLET, FILM COATED ORAL at 09:04

## 2021-09-04 ASSESSMENT — PAIN SCALES - GENERAL
PAINLEVEL_OUTOF10: 0

## 2021-09-04 NOTE — PROCEDURES
[address]                  [address] [id number],  [facility]                                 PROCEDURE NOTE    PATIENT NAME: Dusty Melendez                    :        1935  MED REC NO:   [id number]                            ROOM:  ACCOUNT NO:   [de-identified]                           ADMIT DATE: 2021  PROVIDER:     Merlin Osborne MD    DATE OF PROCEDURE:  2021    NAME OF THE PROCEDURE:  Direct current cardioversion. PREPROCEDURE DIAGNOSIS:  Atrial fibrillation. POSTPROCEDURE DIAGNOSIS:  Atrial fibrillation. :  Merlin Osborne MD    COMPLICATIONS:  None. This 53-year-old patient with a history of atrial fibrillation as well  as coronary artery disease and aortic stenosis. He also has been on  dofetilide therapy to maintain sinus rhythm. The patient was  hospitalized with symptoms of near syncope but has been in atrial  fibrillation for the past several weeks. This also led to increased RV  pacing, possibly causing his symptoms of lightheadedness and near  syncope. The patient has been on systemic anticoagulation with Xarelto. He was therefore brought down for cardioversion today. The procedure  was discussed with him and he was agreeable. DESCRIPTION OF PROCEDURE:  The patient was brought to electrophysiology  area in the postabsorptive, nonsedated state. After the usual  noninvasive blood pressure and heart rate monitoring were instituted,  the patient was sedated by Anesthesia with IV propofol. A 35 joules  shock through his device restored sinus rhythm successfully. The  patient was awakened. The patient's pacemaker lower rate was programmed  to 60 beats per minute since we have noticed increased RV pacing with a  lower rate of 70 and occasionally ventricular pacing related  nonsustained VT episodes. CONCLUSION:  Successful cardioversion to sinus rhythm. PLAN:  Recovery and discharge home today.   Follow up in the office as  scheduled.         Jorge Barrios MD    D: 09/04/2021 14:23:00       T: 09/04/2021 14:25:24     [email or web address]  Job#: [id number]     Doc#: [id number]    CC:  Hans Montano MD

## 2021-09-04 NOTE — PROGRESS NOTES
Pacu assumed care @ 1406 after cardioversion. resting quietly with no c/o.  Ekg done while Dr Korin Churchill remains at bedside

## 2021-09-04 NOTE — PROGRESS NOTES
Hospitalist Progress Note      SYNOPSIS: Patient admitted on 2021 for a presyncopal event. He states that he was sitting down when he began having dizziness.  Dizziness is not abnormal for him he has baseline vertigo for which she was prescribed as needed meclizine.  He got up to take the meclizine and on the way back to sit down the dizziness was complicated by a feeling of faintness though he did not lose consciousness.  He denies any chest pain, shortness of breath, palpitations. On arrival to ED he was found to be in atrial fibrillation. Cardiology and EP were consulted. Pacer was interrogated and he was found to be in atrial fibrillation x 3 weeks. He is to undergo cardioversion today. Hospital day 0     SUBJECTIVE:  Stable overnight. No issues reported. Patient seen and examined. Feeling well. No CP, SOB, palpations, dizziness, or syncope. Records reviewed. Temp (24hrs), Av °F (36.1 °C), Min:96.1 °F (35.6 °C), Max:97.8 °F (36.6 °C)    DIET: Diet NPO Exceptions are: Sips of Water with Meds  CODE: Full Code    Intake/Output Summary (Last 24 hours) at 2021 1242  Last data filed at 9/3/2021 1247  Gross per 24 hour   Intake 120 ml   Output 0 ml   Net 120 ml       Review of Systems: All bolded are positive; please see HPI  General:  Fever, chills, diaphoresis, fatigue, malaise, night sweats, weight loss  Psychological:  Anxiety, disorientation, hallucinations. ENT:  Epistaxis, headaches, vertigo, visual changes. Cardiovascular:  Chest pain, irregular heartbeats, palpitations, paroxysmal nocturnal dyspnea. Respiratory:  Shortness of breath, coughing, sputum production, hemoptysis, wheezing, orthopnea.   Gastrointestinal:  Nausea, vomiting, diarrhea, heartburn, constipation, abdominal pain, hematemesis, hematochezia, melena, acholic stools  Genito-Urinary:  Dysuria, urgency, frequency, hematuria  Musculoskeletal:  Joint pain, joint stiffness, joint swelling, muscle pain  Neurology: Headache, focal neurological deficits, weakness, numbness, paresthesia  Derm:  Rashes, ulcers, excoriations, bruising  Extremities:  Decreased ROM, peripheral edema, mottling    OBJECTIVE:    /73   Pulse 72   Temp 97.8 °F (36.6 °C) (Temporal)   Resp 18   Ht 5' 8\" (1.727 m)   Wt 168 lb (76.2 kg)   SpO2 93%   BMI 25.54 kg/m²     General appearance: Elderly male in no apparent distress, appears stated age, and cooperative. HEENT: Normal cephalic, atraumatic without obvious deformity. Pupils equal, round, and reactive to light.  Extra ocular muscles intact. Conjunctivae/corneas clear. Neck: Supple, with full range of motion. No jugular venous distention. Trachea midline. Respiratory:  Clear to auscultation bilaterally.  No apparent distress. Cardiovascular:  Regular rate and rhythm. S1, S2 without murmurs, rubs, or gallops. PV: Brisk capillary refill.  +2 pedal and radial pulses bilaterally. No clubbing, cyanosis, edema of bilateral lower extremities.    Abdomen: Soft, non-tender, non-distended. +BS  Musculoskeletal: No obvious deformities or erythematous or edematous joints. Skin: Normal skin color.  No rashes or lesions. Neurologic:  Neurovascularly intact without any focal sensory/motor deficits.  Cranial nerves: II-XII intact, grossly non-focal.  Psychiatric: Alert and oriented, thought content appropriate, normal insight    Medications:  REVIEWED DAILY    Infusion Medications    sodium chloride       Scheduled Medications    vitamin D  2,000 Units Oral Daily    dofetilide  250 mcg Oral BID    ferrous sulfate  325 mg Oral Daily with breakfast    magnesium oxide  400 mg Oral Daily    metoprolol tartrate  75 mg Oral Daily    pantoprazole  40 mg Oral Daily    pravastatin  40 mg Oral Daily    rivaroxaban  20 mg Oral Daily    metoprolol tartrate  50 mg Oral Nightly    sodium chloride flush  5-40 mL IntraVENous 2 times per day    furosemide  40 mg Oral Daily    potassium chloride  10 mEq Oral Daily with breakfast     PRN Meds: sodium chloride flush, sodium chloride, acetaminophen **OR** acetaminophen, polyethylene glycol, potassium chloride **OR** potassium alternative oral replacement **OR** potassium chloride, magnesium sulfate    Labs:     Recent Labs     09/02/21  1000 09/02/21  1852 09/04/21  0538   WBC 8.0 7.3 7.2   HGB 13.8 12.9 12.6   HCT 41.5 38.1 37.2    192 196       Recent Labs     09/02/21  1852 09/03/21  0825 09/04/21  0538    142 140   K 4.1 4.0 4.4   CL 99 100 102   CO2 30* 30* 31*   BUN 27* 24* 25*   CREATININE 1.3* 1.3* 1.3*   CALCIUM 8.9 9.0 8.9       Recent Labs     09/02/21  1000 09/02/21  1852 09/03/21  0825   PROT 7.1 7.1 6.7   ALKPHOS 133* 133* 124   ALT 13 13 12   AST 22 24 20   BILITOT 0.5 0.5 0.6       Recent Labs     09/02/21  1852   INR 1.4       Chronic labs:    Lab Results   Component Value Date    CHOL 140 09/03/2021    TRIG 230 (H) 09/03/2021    HDL 38 09/03/2021    LDLCALC 56 09/03/2021    TSH 1.890 09/03/2021    INR 1.4 09/02/2021    LABA1C 5.5 09/03/2021       Radiology: REVIEWED DAILY    ASSESSMENT  Presyncope  Recurrent atrial fibrillation anticoagulated on Xeralto  CAD s/p CABG 2003 & 2007   Ischemic cardiomyopathy  HFpEF - last echo with EF 65%  VT s/p ICD  VHD s/p TAVR  Hyperlipidemia  Hypertension     PLAN:  Cards signed off  EP following with plans for cardioversion today  IVF dc'd   To restart lasix on discharge    DVT prophylaxis: Xeralto  Diet: NPO  Disposition: Intermediate      DISCHARGE PLAN: Home s/p cardioversion    +++++++++++++++++++++++++++++++++++++++++++++++++  ARNOL Chau/ Kalen Esteves [address] [place] [address]  +++++++++++++++++++++++++++++++++++++++++++++++++  NOTE: This report was transcribed using voice recognition software. Every effort was made to ensure accuracy; however, inadvertent computerized transcription errors may be present.

## 2021-09-04 NOTE — ANESTHESIA PRE PROCEDURE
Department of Anesthesiology  Preprocedure Note       Name:  Tatyana Iniguez   Age:  82 y.o.  :  1935                                          MRN:  [id number]         Date:  2021      Surgeon: Kendrick Alves):  Miguel A Tim MD    Procedure: Procedure(s):  cardioversion    Medications prior to admission:   Prior to Admission medications    Medication Sig Start Date End Date Taking? Authorizing Provider   potassium chloride (MICRO-K) 10 MEQ extended release capsule Take 1 capsule by mouth daily 21  Yes Maryann Monteiro MD   XARELTO 20 MG TABS tablet TAKE 1 TABLET BY MOUTH  DAILY 3/29/21  Yes Tabatha Jaime DO   metoprolol tartrate (LOPRESSOR) 50 MG tablet TAKE 1 AND 1/2 TABLETS BY  MOUTH IN THE MORNING AND 1  TABLET BY MOUTH AT BEDTIME 3/11/21  Yes Maryann Monteiro MD   pravastatin (PRAVACHOL) 40 MG tablet TAKE 1 TABLET BY MOUTH  DAILY 3/11/21  Yes Maryann Monteiro MD   furosemide (LASIX) 40 MG tablet TAKE 1 TABLET BY MOUTH  DAILY 3/11/21  Yes Maryann Monteiro MD   pantoprazole (PROTONIX) 40 MG tablet TAKE 1 TABLET BY MOUTH  DAILY 21  Yes Maryann Monteiro MD   ferrous sulfate (IRON 325) 325 (65 Fe) MG tablet Take 1 tablet by mouth daily (with breakfast) 20  Yes Maryann Monteiro MD   MAGNESIUM-OXIDE 400 (241.3 Mg) MG TABS tablet TK 1 T PO ONCE D 3/18/20  Yes Historical Provider, MD   dofetilide (TIKOSYN) 250 MCG capsule Take 1 capsule by mouth 2 times daily 3/5/20  Yes Maryann Monteiro MD   Multiple Vitamins-Minerals (PRESERVISION AREDS 2 PO) Take 1 tablet by mouth 2 times daily   Yes Historical Provider, MD   Cholecalciferol (VITAMIN D) 2000 UNITS CAPS capsule Take 1 capsule by mouth daily    Yes Historical Provider, MD   Handicap Placard MISC by Does not apply route Disp #: 1  Duration: 5 years.  20   Maryann Monteiro MD       Current medications:    Current Facility-Administered Medications   Medication Dose Route Frequency Provider Last Rate Last Admin    vitamin D (CHOLECALCIFEROL) tablet 2,000 Units  2,000 Units Oral Daily Abraham Kayser, MD   2,000 Units at 09/04/21 0905    dofetilide (TIKOSYN) capsule 250 mcg  250 mcg Oral BID Abraham Kayser, MD   250 mcg at 09/04/21 0905    ferrous sulfate (IRON 325) tablet 325 mg  325 mg Oral Daily with breakfast Abraham Kayser, MD   325 mg at 09/04/21 0908    magnesium oxide (MAG-OX) tablet 400 mg  400 mg Oral Daily Abraham Kayser, MD   400 mg at 09/04/21 0904    metoprolol tartrate (LOPRESSOR) tablet 75 mg  75 mg Oral Daily Abraham Kayser, MD   75 mg at 09/04/21 0904    pantoprazole (PROTONIX) tablet 40 mg  40 mg Oral Daily Abraham Kayser, MD   40 mg at 09/04/21 0905    pravastatin (PRAVACHOL) tablet 40 mg  40 mg Oral Daily Abraham Kayser, MD   40 mg at 09/04/21 0904    rivaroxaban (XARELTO) tablet 20 mg  20 mg Oral Daily Abraham Kayser, MD   20 mg at 09/03/21 1740    metoprolol tartrate (LOPRESSOR) tablet 50 mg  50 mg Oral Nightly Abraham Kayser, MD   50 mg at 09/03/21 2117    sodium chloride flush 0.9 % injection 5-40 mL  5-40 mL IntraVENous 2 times per day Jerral Leather, APRN - NP   10 mL at 09/04/21 0909    sodium chloride flush 0.9 % injection 5-40 mL  5-40 mL IntraVENous PRN Jerral Leather, APRN - NP        0.9 % sodium chloride infusion  25 mL IntraVENous PRN Jerral Leather, APRN - NP        acetaminophen (TYLENOL) tablet 650 mg  650 mg Oral Q6H PRN Jerral Leather, APRN - NP        Or   [employer] acetaminophen (TYLENOL) suppository 650 mg  650 mg Rectal Q6H PRN Jerral Leather, APRN - NP        polyethylene glycol (GLYCOLAX) packet 17 g  17 g Oral Daily PRN Jerral Leather, APRN - NP        potassium chloride (KLOR-CON M) extended release tablet 40 mEq  40 mEq Oral PRN Jerral Leather, APRN - NP        Or    potassium bicarb-citric acid (EFFER-K) effervescent tablet 40 mEq  40 mEq Oral PRN Jerral Leather, APRN - NP        Or    potassium chloride 10 mEq/100 mL IVPB (Peripheral Line)  10 mEq IntraVENous PRN Suzanne Santanaure, APRN - NP        magnesium sulfate 2000 mg in 50 mL IVPB premix  2,000 mg IntraVENous PRN Muralialykateryna Denise, APRN - NP        furosemide (LASIX) tablet 40 mg  40 mg Oral Daily Unique Chua MD   40 mg at 09/04/21 5931    potassium chloride (KLOR-CON M) extended release tablet 10 mEq  10 mEq Oral Daily with breakfast Unique Chua MD   10 mEq at 09/04/21 6053     Facility-Administered Medications Ordered in Other Encounters   Medication Dose Route Frequency Provider Last Rate Last Admin    0.9 % sodium chloride infusion   IntraVENous Continuous PRN Jose Ohm, APRN - CRNA   New Bag at 09/04/21 1353       Allergies:  No Known Allergies    Problem List:    Patient Active Problem List   Diagnosis Code    Chronic combined systolic and diastolic CHF (congestive heart failure) (HCC) I50.42    S/P TAVR (transcatheter aortic valve replacement) Z95.2    Chronic atrial fibrillation I48.20    Coronary artery disease involving native coronary artery of native heart without angina pectoris I25.10    Essential hypertension I10    Hyperlipidemia LDL goal <100 E78.5    GIB (gastrointestinal bleeding) K92.2    ICD (implantable cardioverter-defibrillator), dual, in situ Z95.810    Ischemic heart disease I25.9    Vitamin D deficiency E55.9    Other insomnia G47.09    SOB (shortness of breath) R06.02    Lung nodule R91.1    Acute on chronic diastolic (congestive) heart failure (HCC) I50.33    Pure hypercholesterolemia E78.00    Stage 3b chronic kidney disease (HCC) N18.32    Transient cerebral ischemia G45.9    Aortic valve disease I35.9    Pre-syncope R55       Past Medical History:        Diagnosis Date    [facility]fib ([facility]     Arrhythmia     Carotid artery stenosis     CHF (congestive heart failure) ([facility]     Heart valve problem     Hyperlipidemia     Hypertension     ICD (implantable cardiac defibrillator) in place 2007    MI (mitral incompetence) 2003    MI (myocardial infarction) ([facility]        Past Surgical History:        Procedure Laterality Date    CARDIAC CATHETERIZATION Right 2017    Dr. Joleen Schaeffer  . 2008 78 shocks replaced  [employer]     CARDIAC DEFIBRILLATOR PLACEMENT  2016    [employer] Dual ICD gen change    CARDIOVERSION  10/29/2020    Successful    (Dr. Audrey Sales)    COLONOSCOPY N/A 2020    COLONOSCOPY DIAGNOSTIC performed by Jerri Traylor MD at [address]  2003    DIAGNOSTIC CARDIAC CATH LAB PROCEDURE      DIAGNOSTIC CARDIAC CATH LAB PROCEDURE  2008    stent    JOINT REPLACEMENT  2011    r hip surgery Dr Chelsea Schroeder  2017    Dr. Mary Omer and Dr. Rosalind Dickerson- TAVR Josie 26mm valve    UPPER GASTROINTESTINAL ENDOSCOPY N/A 9/3/2019    EGD ESOPHAGOGASTRODUODENOSCOPY performed by Ana Lucas MD at [address] [place] [address] N/A 2020    EGD ESOPHAGOGASTRODUODENOSCOPY performed by Jerri Traylor MD at [facility]         Social History:    Social History     Tobacco Use    Smoking status: Former Smoker     Packs/day: 0.50     Years: 3.00     Pack years: 1.50     Quit date: 1973     Years since quittin.6    Smokeless tobacco: Never Used    Tobacco comment: Quit smoking in    Substance Use Topics    Alcohol use:  No                                Counseling given: Not Answered  Comment: Quit smoking in       Vital Signs (Current):   Vitals:    21 0825 21 2117 21 2315 21 0900   BP: (!) 141/74 123/69 123/69 130/73   Pulse: 77 79 64 72   Resp: 18  18 18   Temp: 97.8 °F (36.6 °C)  96.1 °F (35.6 °C) 97.8 °F (36.6 °C)   TempSrc: Temporal  Temporal Temporal   SpO2: 95%   93%   Weight:   168 lb (76.2 kg)    Height:                                                  BP Readings from Last 3 Encounters:   21 130/73 09/02/21 (!) 144/76   07/30/21 112/60       NPO Status:                                                                                 BMI:   Wt Readings from Last 3 Encounters:   09/03/21 168 lb (76.2 kg)   09/02/21 172 lb (78 kg)   07/30/21 176 lb 4.8 oz (80 kg)     Body mass index is 25.54 kg/m². CBC:   Lab Results   Component Value Date    WBC 7.2 09/04/2021    RBC 4.07 09/04/2021    HGB 12.6 09/04/2021    HCT 37.2 09/04/2021    MCV 91.4 09/04/2021    RDW 14.5 09/04/2021     09/04/2021       CMP:   Lab Results   Component Value Date     09/04/2021    K 4.4 09/04/2021     09/04/2021    CO2 31 09/04/2021    BUN 25 09/04/2021    CREATININE 1.3 09/04/2021    GFRAA >60 09/04/2021    LABGLOM 53 09/04/2021    GLUCOSE 100 09/04/2021    PROT 6.7 09/03/2021    CALCIUM 8.9 09/04/2021    BILITOT 0.6 09/03/2021    ALKPHOS 124 09/03/2021    AST 20 09/03/2021    ALT 12 09/03/2021       POC Tests: No results for input(s): POCGLU, POCNA, POCK, POCCL, POCBUN, POCHEMO, POCHCT in the last 72 hours.     Coags:   Lab Results   Component Value Date    PROTIME 16.0 09/02/2021    INR 1.4 09/02/2021    APTT 35.3 09/02/2021       HCG (If Applicable): No results found for: PREGTESTUR, PREGSERUM, HCG, HCGQUANT     ABGs: No results found for: PHART, PO2ART, XFM5DDZ, JCR8NVU, BEART, S4IMWLCW     Type & Screen (If Applicable):  No results found for: LABABO, LABRH    Drug/Infectious Status (If Applicable):  No results found for: HIV, HEPCAB    COVID-19 Screening (If Applicable):   Lab Results   Component Value Date    COVID19 Not Detected 10/29/2020           Anesthesia Evaluation  Patient summary reviewed no history of anesthetic complications:   Airway: Mallampati: II  TM distance: >3 FB   Neck ROM: full  Mouth opening: > = 3 FB Dental:          Pulmonary: breath sounds clear to auscultation                            ROS comment: CXR 9/2/2021:  No interval change   Cardiovascular:    (+) hypertension:, pacemaker: AICD, CAD:, CABG/stent (CABG 2003 and 2007):, dysrhythmias (s/p AICD): ventricular tachycardia, hyperlipidemia      ECG reviewed  Rhythm: irregular  Rate: normal  Echocardiogram reviewed               ROS comment: Echo 10/2020:   Summary   Left ventricle is normal in size . Mild asymmetric septal hypertrophy. No regional wall motion abnormalities seen. Normal left ventricular ejection fraction. The left atrium is severely dilated. Mild mitral annular calcification. Mild mitral regurgitation is present. Normally functioning bioprosthetic valve in aortic position. Physiologic and/or trace tricuspid regurgitation. Summary   Left ventricle is normal in size . Mild asymmetric septal hypertrophy. No regional wall motion abnormalities seen. Normal left ventricular ejection fraction. The left atrium is severely dilated. Mild mitral annular calcification. Mild mitral regurgitation is present. Normally functioning bioprosthetic valve in aortic position. Physiologic and/or trace tricuspid regurgitation. EKG 9/2021:  Ventricular-paced rhythm with premature ventricular or aberrantly conducted complexes Atrial fibrillation Abnormal ECG When compared with ECG of 29-OCT-2020 17:27, Significant changes have occurred Confirmed by Josefina Jiménez ([id number]) on 9/3/2021 10:49:08 AM    S/p TAVR     Neuro/Psych:                ROS comment: CT Head 9/2021:  No acute intracranial abnormality.   Chronic findings as described. XR Lspine 2021:  Severe degenerative spondylotic changes. Mild lumbar dextroscoliosis. GI/Hepatic/Renal:   (+) renal disease: CRI,           Endo/Other:    (+) blood dyscrasia: anticoagulation therapy:., .                 Abdominal:             Vascular: negative vascular ROS. Other Findings:             Anesthesia Plan      MAC     ASA 3       Induction: intravenous. Anesthetic plan and risks discussed with patient.                       Abdias Ward MD 9/4/2021

## 2021-09-04 NOTE — ANESTHESIA POSTPROCEDURE EVALUATION
Department of Anesthesiology  Postprocedure Note    Patient: Gilberto Fernández  MRN: [id number]  YOB: 1935  Date of evaluation: 9/4/2021  Time:  2:21 PM     Procedure Summary     Date: 09/04/21 Room / Location: [facility] OR  / [facility]    Anesthesia Start: 0530 Anesthesia Stop: 5262    Procedure: cardioversion (N/A ) Diagnosis: (cardioversion)    Surgeons: Gregoria Dill MD Responsible Provider: Emma Calzada MD    Anesthesia Type: MAC ASA Status: 3          Anesthesia Type: MAC    Alfred Phase I:      Alferd Phase II: Alfred Score: 9    Last vitals: Reviewed and per EMR flowsheets.        Anesthesia Post Evaluation    Patient location during evaluation: PACU  Patient participation: complete - patient participated  Level of consciousness: awake and alert  Airway patency: patent  Nausea & Vomiting: no nausea and no vomiting  Complications: no  Cardiovascular status: hemodynamically stable  Respiratory status: acceptable  Hydration status: euvolemic

## 2021-09-04 NOTE — PROGRESS NOTES
Patient arrived in [place] for cardioversion. Placed on cardiac monitor , lead 2. Pulse ox and dinamapp applied. Placed on team cart monitor by Dr Bipin Castellanos. Arms secured at side.

## 2021-09-04 NOTE — PROGRESS NOTES
Electrophysiology progress note         Date:  9/4/2021  Patient: Alison Shay  Admission:  9/2/2021  6:23 PM  Admit DX: Pre-syncope [R55]  Age:  82 y. o., 1935     LOS: 0 days     Reason for evaluation:   Persistent atrial fibrillation, ischemic heart disease and near syncope      SUBJECTIVE:    The patient was seen and examined. Notes and labs reviewed. There were no complications over night. Patient's cardiac review of systems: negative. The patient is generally feeling gradually improving. OBJECTIVE:    Telemetry: Sinus rhythm,atrial pacing  BP (!) 150/67   Pulse 72   Temp 97.4 °F (36.3 °C) (Temporal)   Resp 19   Ht 5' 8\" (1.727 m)   Wt 168 lb (76.2 kg)   SpO2 96%   BMI 25.54 kg/m²   No intake or output data in the 24 hours ending 09/04/21 1437    EXAM:   CONSTITUTIONAL:  awake, alert, cooperative, no apparent distress, and appears stated age. HEENT: Normal jugular venous pulsations, Head is atraumatic, normocephalic. Eyes and oral mucosa are normal.  LUNGS: Good respiratory effort. No for increased work of breathing. On auscultation: clear to auscultation bilaterally  CARDIOVASCULAR:  regular rate and rhythm, normal S1 and S2, no S3   ABDOMEN: Soft, nontender, nondistended. [place] SKIN: Warm and dry. EXTREMITIES: No lower extremity edema. Motor movement grossly intact. No cyanosis or clubbing.     Current Inpatient Medications:   vitamin D  2,000 Units Oral Daily    dofetilide  250 mcg Oral BID    ferrous sulfate  325 mg Oral Daily with breakfast    magnesium oxide  400 mg Oral Daily    metoprolol tartrate  75 mg Oral Daily    pantoprazole  40 mg Oral Daily    pravastatin  40 mg Oral Daily    rivaroxaban  20 mg Oral Daily    metoprolol tartrate  50 mg Oral Nightly    sodium chloride flush  5-40 mL IntraVENous 2 times per day    furosemide  40 mg Oral Daily    potassium chloride  10 mEq Oral Daily with breakfast       IV Infusions (if any):   sodium chloride         Diagnostics: EKG: normal sinus rhythm, QTc 460-470 msec  ECHO: reviewed. Ejection fraction: 50%    Labs:   CBC:   Recent Labs     09/02/21  1852 09/04/21  0538   WBC 7.3 7.2   HGB 12.9 12.6   HCT 38.1 37.2    196     BMP:   Recent Labs     09/03/21  0825 09/04/21  0538    140   K 4.0 4.4   CO2 30* 31*   BUN 24* 25*   CREATININE 1.3* 1.3*   LABGLOM 53 53   GLUCOSE 90 100*     BNP: No results for input(s): BNP in the last 72 hours. PT/INR:   Recent Labs     09/02/21 1852   PROTIME 16.0*   INR 1.4     APTT:  Recent Labs     09/02/21 1852   APTT 35.3*     CARDIAC ENZYMES:No results for input(s): CKTOTAL, CKMB, CKMBINDEX, TROPONINI in the last 72 hours. FASTING LIPID PANEL:  Lab Results   Component Value Date    HDL 38 09/03/2021    LDLCALC 56 09/03/2021    TRIG 230 09/03/2021     LIVER PROFILE:  Recent Labs     09/02/21  1852 09/03/21  0825   AST 24 20   ALT 13 12   LABALBU 4.2 4.2       ASSESSMENT:    Patient Active Problem List   Diagnosis    Chronic combined systolic and diastolic CHF (congestive heart failure) (HCC)       Status post TAVR for aortic stenosis in 2017      Coronary artery disease involving native coronary artery of native heart without angina pectoris      Essential hypertension      Hyperlipidemia LDL goal <100      ICD (implantable cardioverter-defibrillator), dual, in situ      Stage 3b chronic kidney disease ([address]      Pre-syncope this admission. Paroxysmal atrial fibrillation, on dofetilide and Xarelto----this episode has been about 3 weeks in duration  Post cardioversion now    PLAN:    Resume all home medications as prior to admission  Okay for discharge home this p.m. Follow-up in the office as scheduled. Discussed with patient and wife. Please see orders. Discussed with patient and nursing.     Ad Porter MD,FACC

## 2021-09-04 NOTE — DISCHARGE SUMMARY
Hospitalist Discharge Summary    Patient ID: Ligia iDaz   Patient : 1935  Patient's PCP: Bernadine Ziegler MD    Admit Date: 2021   Admitting Physician: Kristi Holloway MD    Discharge Date:  2021   Discharge Physician: GINETTE Bates NP   Discharge Condition: Stable  Discharge Disposition: [facility] course in brief:  (Please refer to daily progress notes for a comprehensive review of the hospitalization by requesting medical records)    Patient presented to the ED with a presyncopal event. He states that he was sitting down when he began having dizziness. Dizziness is not abnormal for him he has baseline vertigo for which she was prescribed as needed meclizine. He got up to take the meclizine and on the way back to sit down the dizziness was complicated by a feeling of faintness though he did not lose consciousness. He denies any chest pain, shortness of breath, palpitations. On arrival to ED he was found to be in atrial fibrillation. Cardiology and EP were consulted and patient underwent cardioversion. He is now stable for discharge home with OP follow up. Consults:   IP CONSULT TO INTERNAL MEDICINE  IP CONSULT TO CARDIOLOGY  IP CONSULT TO ELECTROPHYSIOLOGY    Discharge Diagnoses:  Presyncope  Atrial fibrillation anticoagulated on Xeralto s/p DCCV  CKD IIIb  CAD s/p CABG  &    Ischemic cardiomyopathy  HFpEF - last echo with EF 65%  VT s/p ICD  VHD s/p TAVR  Hyperlipidemia  Hypertension    Discharge Instructions / Follow up: Follow up with PCP within one week of discharge. Follow up with cardiology and electrophysiology as indicated. Future Appointments   Date Time Provider Cezar Gamez   2021  1:00 PM [facility]       The patient's condition is stable. At this time the patient is without objective evidence of an acute process requiring continuing hospitalization or inpatient management.   They are stable for discharge with outpatient follow-up. I have spoken with the patient and discussed the results of the current hospitalization, in addition to providing specific details for the plan of care and counseling regarding the diagnosis and prognosis. The plan has been discussed in detail and they are aware of the specific conditions for emergent return, as well as the importance of follow-up. Their questions are answered at this time and they are agreeable with the plan for discharge home. Continued appropriate risk factor modification of blood pressure, diabetes and serum lipids will remain essential to reducing risk of future atherosclerotic development    Activity: activity as tolerated    Physical exam:  General appearance: Elderly male in no apparent distress, appears stated age, and cooperative. HEENT: Normal cephalic, atraumatic without obvious deformity. Pupils equal, round, and reactive to light. Extra ocular muscles intact. Conjunctivae/corneas clear. Neck: Supple, with full range of motion. No jugular venous distention. Trachea midline. Respiratory:  Clear to auscultation bilaterally. No apparent distress. Cardiovascular:  Regular rate and rhythm. S1, S2 without murmurs, rubs, or gallops. PV: Brisk capillary refill. +2 pedal and radial pulses bilaterally. No clubbing, cyanosis, edema of bilateral lower extremities. Abdomen: Soft, non-tender, non-distended. +BS  Musculoskeletal: No obvious deformities or erythematous or edematous joints. Skin: Normal skin color. No rashes or lesions. Neurologic:  Neurovascularly intact without any focal sensory/motor deficits.  Cranial nerves: II-XII intact, grossly non-focal.  Psychiatric: Alert and oriented, thought content appropriate, normal insight    Significant labs:  CBC:   Recent Labs     09/02/21  1000 09/02/21  1852 09/04/21  0538   WBC 8.0 7.3 7.2   RBC 4.60 4.32 4.07   HGB 13.8 12.9 12.6   HCT 41.5 38.1 37.2   MCV 90.2 88.2 91.4   RDW 14.0 13.8 14.5    192 196     BMP:   Recent Labs     09/02/21  1852 09/03/21  0825 09/04/21  0538    142 140   K 4.1 4.0 4.4   CL 99 100 102   CO2 30* 30* 31*   BUN 27* 24* 25*   CREATININE 1.3* 1.3* 1.3*   MG  --  2.4  --      LFT:  Recent Labs     09/02/21  1000 09/02/21  1852 09/03/21  0825   PROT 7.1 7.1 6.7   ALKPHOS 133* 133* 124   ALT 13 13 12   AST 22 24 20   BILITOT 0.5 0.5 0.6     PT/INR:   Recent Labs     09/02/21 1852   INR 1.4   APTT 35.3*     BNP: No results for input(s): BNP in the last 72 hours. Hgb A1C:   Lab Results   Component Value Date    LABA1C 5.5 09/03/2021     Folate and B12: No results found for: [id number], No results found for: FOLATE  Thyroid Studies:   Lab Results   Component Value Date    TSH 1.890 09/03/2021    L7WWBNT 107.80 12/08/2016    K0FIUTP 8.2 12/08/2016       Urinalysis:    Lab Results   Component Value Date    NITRU Negative 09/02/2021    WBCUA 1-3 08/28/2013    BACTERIA FEW 08/28/2013    RBCUA 0-1 08/28/2013    BLOODU Negative 09/02/2021    SPECGRAV 1.015 09/02/2021    GLUCOSEU Negative 09/02/2021       Imaging:  XR CHEST (2 VW)    Result Date: 9/2/2021  EXAMINATION: TWO XRAY VIEWS OF THE CHEST 9/2/2021 9:57 am COMPARISON: 02/08/2021 HISTORY: ORDERING SYSTEM PROVIDED HISTORY: Cough TECHNOLOGIST PROVIDED HISTORY: Reason for exam:->chronic cough. FINDINGS: Mild-to-moderate chronic elevation of the right hemidiaphragm and some calcified pleural plaques on the left are unchanged. There is some pleural thickening left costophrenic angle. There has been sternotomy and TAVR procedure. Heart size is normal.  There are no acute infiltrates or effusions. There is moderate degenerative change in the thoracolumbar junction.      No interval change     CT Head WO Contrast    Result Date: 9/2/2021  EXAMINATION: CT OF THE HEAD WITHOUT CONTRAST  9/2/2021 8:42 pm TECHNIQUE: CT of the head was performed without the administration of intravenous contrast. Dose modulation, iterative reconstruction, and/or weight based adjustment of the mA/kV was utilized to reduce the radiation dose to as low as reasonably achievable. COMPARISON: 07/08/2020 unenhanced head CT. HISTORY: ORDERING SYSTEM PROVIDED HISTORY: dizziness TECHNOLOGIST PROVIDED HISTORY: Reason for exam:->dizziness Has a \"code stroke\" or \"stroke alert\" been called? ->No Decision Support Exception - unselect if not a suspected or confirmed emergency medical condition->Emergency Medical Condition (MA) What reading provider will be dictating this exam?->CRC FINDINGS: BRAIN/VENTRICLES: There is no acute intracranial hemorrhage, mass effect or midline shift. No abnormal extra-axial fluid collection. The gray-white differentiation is maintained without evidence of an acute infarct. There is no evidence of hydrocephalus. There is senescent cerebral and cerebellar atrophy. Patchy periventricular white matter hypodensity is noted, consistent with moderate to advanced chronic small vessel ischemic change. ORBITS: The visualized portion of the orbits demonstrate no acute abnormality. Native ocular lenses are not seen, compatible with prior bilateral cataract surgery. SINUSES: The visualized paranasal sinuses and mastoid air cells demonstrate no acute abnormality. Mild mucosal thickening within left maxillary sinus and bilateral frontoethmoid recesses. SOFT TISSUES/SKULL:  No acute abnormality of the visualized skull or soft tissues. There is trace venous gas seen in the region of the right greater than left bitemporal and  musculature, typically cephalad migration from venous access/instrumentation. No acute intracranial abnormality. Chronic findings as described.        Discharge Medications:      Medication List      CONTINUE taking these medications    dofetilide 250 MCG capsule  Commonly known as: TIKOSYN  Take 1 capsule by mouth 2 times daily     ferrous sulfate 325 (65 Fe) MG tablet  Commonly known as: IRON 325  Take 1 tablet by mouth daily (with breakfast)     furosemide 40 MG tablet  Commonly known as: LASIX  TAKE 1 TABLET BY MOUTH  DAILY     Handicap Placard Misc  by Does not apply route Disp #: 1  Duration: 5 years. MAGnesium-Oxide 400 (241.3 Mg) MG Tabs tablet  Generic drug: magnesium oxide     metoprolol tartrate 50 MG tablet  Commonly known as: LOPRESSOR  TAKE 1 AND 1/2 TABLETS BY  MOUTH IN THE MORNING AND 1  TABLET BY MOUTH AT BEDTIME     pantoprazole 40 MG tablet  Commonly known as: PROTONIX  TAKE 1 TABLET BY MOUTH  DAILY     potassium chloride 10 MEQ extended release capsule  Commonly known as: MICRO-K  Take 1 capsule by mouth daily     pravastatin 40 MG tablet  Commonly known as: PRAVACHOL  TAKE 1 TABLET BY MOUTH  DAILY     PRESERVISION AREDS 2 PO     vitamin D 50 MCG (2000 UT) Caps capsule     Xarelto 20 MG Tabs tablet  Generic drug: rivaroxaban  TAKE 1 TABLET BY MOUTH  DAILY            Time Spent on discharge is more than 45 minutes in the examination, evaluation, counseling and review of medications and discharge plan.    +++++++++++++++++++++++++++++++++++++++++++++++++  Natalia Aviles, [address]  +++++++++++++++++++++++++++++++++++++++++++++++++  NOTE: This report was transcribed using voice recognition software. Every effort was made to ensure accuracy; however, inadvertent computerized transcription errors may be present.

## 2021-09-07 ENCOUNTER — CARE COORDINATION (OUTPATIENT)
Dept: CASE MANAGEMENT | Age: 86
End: 2021-09-07

## 2021-09-21 ENCOUNTER — HOSPITAL ENCOUNTER (EMERGENCY)
Age: 86
Discharge: HOME | End: 2021-09-21
Attending: STUDENT IN AN ORGANIZED HEALTH CARE EDUCATION/TRAINING PROGRAM
Payer: MEDICARE

## 2021-09-21 ENCOUNTER — TELEPHONE (OUTPATIENT)
Dept: OTHER | Facility: CLINIC | Age: 86
End: 2021-09-21

## 2021-09-21 ENCOUNTER — APPOINTMENT (OUTPATIENT)
Dept: GENERAL RADIOLOGY | Age: 86
End: 2021-09-21
Payer: MEDICARE

## 2021-09-21 ENCOUNTER — APPOINTMENT (OUTPATIENT)
Dept: CT IMAGING | Age: 86
End: 2021-09-21
Payer: MEDICARE

## 2021-09-21 VITALS
RESPIRATION RATE: 18 BRPM | SYSTOLIC BLOOD PRESSURE: 149 MMHG | TEMPERATURE: 98.4 F | HEART RATE: 65 BPM | HEIGHT: 68 IN | WEIGHT: 170 LBS | BODY MASS INDEX: 25.76 KG/M2 | DIASTOLIC BLOOD PRESSURE: 82 MMHG | OXYGEN SATURATION: 97 %

## 2021-09-21 DIAGNOSIS — Z45.02 AICD DISCHARGE: ICD-10-CM

## 2021-09-21 DIAGNOSIS — R55 SYNCOPE AND COLLAPSE: Primary | ICD-10-CM

## 2021-09-21 LAB
ANION GAP SERPL CALCULATED.3IONS-SCNC: 8 MMOL/L (ref 7–16)
BASOPHILS ABSOLUTE: 0.04 E9/L (ref 0–0.2)
BASOPHILS RELATIVE PERCENT: 0.5 % (ref 0–2)
BUN BLDV-MCNC: 25 MG/DL (ref 6–23)
CALCIUM SERPL-MCNC: 9 MG/DL (ref 8.6–10.2)
CHLORIDE BLD-SCNC: 100 MMOL/L (ref 98–107)
CO2: 31 MMOL/L (ref 22–29)
CREAT SERPL-MCNC: 1.1 MG/DL (ref 0.7–1.2)
D DIMER: <200 NG/ML DDU
EKG ATRIAL RATE: 61 BPM
EKG ATRIAL RATE: 63 BPM
EKG P AXIS: 54 DEGREES
EKG P-R INTERVAL: 230 MS
EKG P-R INTERVAL: 286 MS
EKG Q-T INTERVAL: 438 MS
EKG Q-T INTERVAL: 504 MS
EKG QRS DURATION: 112 MS
EKG QRS DURATION: 118 MS
EKG QTC CALCULATION (BAZETT): 440 MS
EKG QTC CALCULATION (BAZETT): 535 MS
EKG R AXIS: -37 DEGREES
EKG R AXIS: -58 DEGREES
EKG T AXIS: -25 DEGREES
EKG T AXIS: 1 DEGREES
EKG VENTRICULAR RATE: 61 BPM
EKG VENTRICULAR RATE: 68 BPM
EOSINOPHILS ABSOLUTE: 0.16 E9/L (ref 0.05–0.5)
EOSINOPHILS RELATIVE PERCENT: 2.1 % (ref 0–6)
GFR AFRICAN AMERICAN: >60
GFR NON-AFRICAN AMERICAN: >60 ML/MIN/1.73
GLUCOSE BLD-MCNC: 100 MG/DL (ref 74–99)
GLUCOSE BLD-MCNC: 97 MG/DL
HCT VFR BLD CALC: 37 % (ref 37–54)
HEMOGLOBIN: 12.5 G/DL (ref 12.5–16.5)
IMMATURE GRANULOCYTES #: 0.03 E9/L
IMMATURE GRANULOCYTES %: 0.4 % (ref 0–5)
LACTIC ACID, SEPSIS: 1.5 MMOL/L (ref 0.5–1.9)
LYMPHOCYTES ABSOLUTE: 1.27 E9/L (ref 1.5–4)
LYMPHOCYTES RELATIVE PERCENT: 16.7 % (ref 20–42)
MAGNESIUM: 2.5 MG/DL (ref 1.6–2.6)
MCH RBC QN AUTO: 30.3 PG (ref 26–35)
MCHC RBC AUTO-ENTMCNC: 33.8 % (ref 32–34.5)
MCV RBC AUTO: 89.6 FL (ref 80–99.9)
METER GLUCOSE: 97 MG/DL (ref 74–99)
MONOCYTES ABSOLUTE: 0.66 E9/L (ref 0.1–0.95)
MONOCYTES RELATIVE PERCENT: 8.7 % (ref 2–12)
NEUTROPHILS ABSOLUTE: 5.46 E9/L (ref 1.8–7.3)
NEUTROPHILS RELATIVE PERCENT: 71.6 % (ref 43–80)
PDW BLD-RTO: 13.7 FL (ref 11.5–15)
PLATELET # BLD: 191 E9/L (ref 130–450)
PMV BLD AUTO: 11 FL (ref 7–12)
POTASSIUM REFLEX MAGNESIUM: 4.5 MMOL/L (ref 3.5–5)
PRO-BNP: 548 PG/ML (ref 0–450)
RBC # BLD: 4.13 E12/L (ref 3.8–5.8)
SODIUM BLD-SCNC: 139 MMOL/L (ref 132–146)
TROPONIN, HIGH SENSITIVITY: 18 NG/L (ref 0–11)
TROPONIN, HIGH SENSITIVITY: 21 NG/L (ref 0–11)
WBC # BLD: 7.6 E9/L (ref 4.5–11.5)

## 2021-09-21 PROCEDURE — 83735 ASSAY OF MAGNESIUM: CPT

## 2021-09-21 PROCEDURE — 93005 ELECTROCARDIOGRAM TRACING: CPT | Performed by: STUDENT IN AN ORGANIZED HEALTH CARE EDUCATION/TRAINING PROGRAM

## 2021-09-21 PROCEDURE — 80048 BASIC METABOLIC PNL TOTAL CA: CPT

## 2021-09-21 PROCEDURE — 71045 X-RAY EXAM CHEST 1 VIEW: CPT

## 2021-09-21 PROCEDURE — 82962 GLUCOSE BLOOD TEST: CPT

## 2021-09-21 PROCEDURE — 83880 ASSAY OF NATRIURETIC PEPTIDE: CPT

## 2021-09-21 PROCEDURE — 83605 ASSAY OF LACTIC ACID: CPT

## 2021-09-21 PROCEDURE — 99284 EMERGENCY DEPT VISIT MOD MDM: CPT

## 2021-09-21 PROCEDURE — 85378 FIBRIN DEGRADE SEMIQUANT: CPT

## 2021-09-21 PROCEDURE — 84484 ASSAY OF TROPONIN QUANT: CPT

## 2021-09-21 PROCEDURE — 85025 COMPLETE CBC W/AUTO DIFF WBC: CPT

## 2021-09-21 PROCEDURE — 36415 COLL VENOUS BLD VENIPUNCTURE: CPT

## 2021-09-21 PROCEDURE — 70450 CT HEAD/BRAIN W/O DYE: CPT

## 2021-09-21 NOTE — ED PROVIDER NOTES
HPI:  9/21/21, Time: 11:08 AM EDT         Renate Galeano is a 82 y.o. male presenting to the ED for acute LOC, beginning \"around an hour ago\". Patent was watching TV on sofa with wife and went to check his BP. Patient reports it was in the 759'J systolic and went back to sofa. After sitting down in sofa patient felt a strange feeling in his chest. This sensation was not painful, and not described as palpitations, and was associated with diaphoresis. Shortly after this sensation patient lost consciousness. Patient says wife witnessed the event and called EMS. Patient was defibrillated and returned to consciousness and currently feels \"like himself\". Patient's electrophysiologist called ER and explained that patient went into V-fib and implanted defibrillator activated. Patient currently has no symptoms. Patient denies SOB, chest pain, changes in vision or hearing, focal deficits, paresthesia, wetting himself or defecating on himself during episode, nausea, vomiting, fevers, chills, tongue biting. ROS:   Pertinent positives and negatives are stated within HPI, all other systems reviewed and are negative.  --------------------------------------------- PAST HISTORY ---------------------------------------------  Past Medical History:  has a past medical history of A-fib ([address] Arrhythmia, Carotid artery stenosis, CHF (congestive heart failure) ([address] Heart valve problem, Hyperlipidemia, Hypertension, ICD (implantable cardiac defibrillator) in place, MI (mitral incompetence), and MI (myocardial infarction) ([address] 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 2008); Cardiac defibrillator placement (07/28/2016);  Cardiac catheterization (Right, performed during the hospital encounter of 09/21/21   CBC Auto Differential   Result Value Ref Range    WBC 7.6 4.5 - 11.5 E9/L    RBC 4.13 3.80 - 5.80 E12/L    Hemoglobin 12.5 12.5 - 16.5 g/dL    Hematocrit 37.0 37.0 - 54.0 %    MCV 89.6 80.0 - 99.9 fL    MCH 30.3 26.0 - 35.0 pg    MCHC 33.8 32.0 - 34.5 %    RDW 13.7 11.5 - 15.0 fL    Platelets 717 357 - 131 E9/L    MPV 11.0 7.0 - 12.0 fL    Neutrophils % 71.6 43.0 - 80.0 %    Immature Granulocytes % 0.4 0.0 - 5.0 %    Lymphocytes % 16.7 (L) 20.0 - 42.0 %    Monocytes % 8.7 2.0 - 12.0 %    Eosinophils % 2.1 0.0 - 6.0 %    Basophils % 0.5 0.0 - 2.0 %    Neutrophils Absolute 5.46 1.80 - 7.30 E9/L    Immature Granulocytes # 0.03 E9/L    Lymphocytes Absolute 1.27 (L) 1.50 - 4.00 E9/L    Monocytes Absolute 0.66 0.10 - 0.95 E9/L    Eosinophils Absolute 0.16 0.05 - 0.50 E9/L    Basophils Absolute 0.04 0.00 - 0.20 A1/W   Basic Metabolic Panel w/ Reflex to MG   Result Value Ref Range    Sodium 139 132 - 146 mmol/L    Potassium reflex Magnesium 4.5 3.5 - 5.0 mmol/L    Chloride 100 98 - 107 mmol/L    CO2 31 (H) 22 - 29 mmol/L    Anion Gap 8 7 - 16 mmol/L    Glucose 100 (H) 74 - 99 mg/dL    BUN 25 (H) 6 - 23 mg/dL    CREATININE 1.1 0.7 - 1.2 mg/dL    GFR Non-African American >60 >=60 mL/min/1.73    GFR African American >60     Calcium 9.0 8.6 - 10.2 mg/dL   Troponin   Result Value Ref Range    Troponin, High Sensitivity 21 (H) 0 - 11 ng/L   Brain Natriuretic Peptide   Result Value Ref Range    Pro- (H) 0 - 450 pg/mL   D-Dimer, Quantitative   Result Value Ref Range    D-Dimer, Quant <200 ng/mL DDU   Lactate, Sepsis   Result Value Ref Range    Lactic Acid, Sepsis 1.5 0.5 - 1.9 mmol/L   Magnesium   Result Value Ref Range    Magnesium 2.5 1.6 - 2.6 mg/dL   POCT Glucose   Result Value Ref Range    Glucose 97 mg/dL   POCT Glucose   Result Value Ref Range    Meter Glucose 97 74 - 99 mg/dL   EKG 12 Lead   Result Value Ref Range    Ventricular Rate 61 BPM    Atrial Rate 61 BPM P-R Interval 286 ms    QRS Duration 112 ms    Q-T Interval 438 ms    QTc Calculation (Bazett) 440 ms    R Axis -37 degrees    T Axis 1 degrees       RADIOLOGY:  Interpreted by Radiologist.  CT Head WO Contrast   Final Result   No acute intracranial abnormality. XR CHEST PORTABLE   Final Result   1. There are no findings of failure or pneumonia   2. EKG Interpretation  Interpreted by emergency department physician    Rhythm: paced rhythm   Rate: normal  Axis: left  Conduction: right bundle branch block (complete)  ST Segments: no acute change  T Waves: no acute change    Clinical Impression: no acute changes  Comparison to prior EKG: stable as compared to patient's most recent EKG and None      ------------------------- NURSING NOTES AND VITALS REVIEWED ---------------------------   The nursing notes within the ED encounter and vital signs as below have been reviewed by myself. BP (!) 165/61   Pulse 67   Temp 98.4 °F (36.9 °C) (Oral)   Resp 16   Ht 5' 8\" (1.727 m)   Wt 170 lb (77.1 kg)   SpO2 96%   BMI 25.85 kg/m²   Oxygen Saturation Interpretation: Normal    The patients available past medical records and past encounters were reviewed. ------------------------------ ED COURSE/MEDICAL DECISION MAKING----------------------  Medications - No data to display          Medical Decision Making:      Patient on exam does not have any focal defecits, clear lungs, and relatively normal heart exam. Patient's defibrillator was interrogated and confirmed delivery of 1 shock for V-fib with successful conversion to normal sinus rhythm. We believe this is what caused the syncopal episode. The only other abnormalities seen on [employer] report noted 6 episodes of NS Vtach. Initial workup consisted of Covid-19 testing, CBC, BMP, BNP, D-dimer, lactate, troponin, magnesium, Chest X-ray, CT scan head . CXR returned and showed: No findings of failure or pneumonia.  CT head returned negative for intracranial pathology. EP was able to see patient at bedside and reprogram [employer] device. Rate was increased to prevent extended pausing that was thought may have induced V-fib episode. Troponin returned and was elevated at 21 however, this number was similar to previously recorded numbers and was thought to be around baseline. Decision was made in conjunction with EP that, due to improving clinical condition and negative workup, if 2nd troponin was not elevated patient would be safe for discharge. Patient was agreeable to plan. A 2nd troponin was drawn to assess for a delta. Electrolytes, CBC, CMP, D-dimer all returned and were within normal limits. 2nd troponin returned with negative delta and was 18. With patient's condition returning to baseline, no focal deficits, no further symptoms, and negative workup, decision was made to discharge patient to home. Re-Evaluations:             Re-evaluation. Patients symptoms show no change      Consultations:             Electrophysiology     Critical Care: This patient's ED course included: a personal history and physicial examination, re-evaluation prior to disposition and multiple bedside re-evaluations    This patient has remained hemodynamically stable and improved during their ED course. Counseling: The emergency provider has spoken with the patient and discussed todays results, in addition to providing specific details for the plan of care and counseling regarding the diagnosis and prognosis. Questions are answered at this time and they are agreeable with the plan.       --------------------------------- IMPRESSION AND DISPOSITION ---------------------------------    IMPRESSION  1. Syncope and collapse    2. AICD discharge        DISPOSITION  Disposition: Discharge to home  Patient condition is fair        NOTE: This report was transcribed using voice recognition software.  Every effort was made to ensure accuracy; however, inadvertent computerized transcription errors may be present       Kady Hua MD  Resident  09/21/21 [id number]

## 2021-09-21 NOTE — CONSULTS
leg edema. Past Medical History:   has a past medical history of A-fib ([facility] Arrhythmia, Carotid artery stenosis, CHF (congestive heart failure) ([facility] Heart valve problem, Hyperlipidemia, Hypertension, ICD (implantable cardiac defibrillator) in place, MI (mitral incompetence), and MI (myocardial infarction) ([facility] Past Surgical History:   has a past surgical history that includes Varicose vein surgery (1970's); Coronary artery bypass graft (05/23/2003 03/16/2007); Diagnostic Cardiac Cath Lab Procedure (2007); Diagnostic Cardiac Cath Lab Procedure (03/29/2008); joint replacement (2011); Cardiac defibrillator placement (2007. 2008); Cardiac defibrillator placement (07/28/2016); Cardiac catheterization (Right, 03/03/2017); other surgical history (03/29/2017); Upper gastrointestinal endoscopy (N/A, 9/3/2019); Upper gastrointestinal endoscopy (N/A, 2/18/2020); Colonoscopy (N/A, 2/24/2020); Cardioversion (10/29/2020); and Cardiac surgery (N/A, 9/4/2021). Home Medications:    Prior to Admission medications    Medication Sig Start Date End Date Taking? Authorizing Provider   potassium chloride (MICRO-K) 10 MEQ extended release capsule Take 1 capsule by mouth daily 8/30/21   Oralia Castanon MD   XARELTO 20 MG TABS tablet TAKE 1 TABLET BY MOUTH  DAILY 3/29/21   Mirna Christianson,    metoprolol tartrate (LOPRESSOR) 50 MG tablet TAKE 1 AND 1/2 TABLETS BY  MOUTH IN THE MORNING AND 1  TABLET BY MOUTH AT BEDTIME 3/11/21   Oralia Castanon MD   pravastatin (PRAVACHOL) 40 MG tablet TAKE 1 TABLET BY MOUTH  DAILY 3/11/21   Oralia Castanon MD   furosemide (LASIX) 40 MG tablet TAKE 1 TABLET BY MOUTH  DAILY 3/11/21   Oralia Castanon MD   pantoprazole (PROTONIX) 40 MG tablet TAKE 1 TABLET BY MOUTH  DAILY 1/4/21   Oralia Castanon MD   Handicap Placard MISC by Does not apply route Disp #: 1  Duration: 5 years.  11/16/20   Oralia Castanon MD   ferrous sulfate (IRON 325) 325 (65 Fe) MG tablet Take 1 tablet by mouth daily (with breakfast) 11/5/20   Iain Nath MD   MAGNESIUM-OXIDE 400 (241.3 Mg) MG TABS tablet TK 1 T PO ONCE D 3/18/20   Historical Provider, MD   dofetilide (TIKOSYN) 250 MCG capsule Take 1 capsule by mouth 2 times daily 3/5/20   Iain Nath MD   Multiple Vitamins-Minerals (PRESERVISION AREDS 2 PO) Take 1 tablet by mouth 2 times daily    Historical Provider, MD   Cholecalciferol (VITAMIN D) 2000 UNITS CAPS capsule Take 1 capsule by mouth daily     Historical Provider, MD       Allergies:  Patient has no known allergies. Social History:   reports that he quit smoking about 48 years ago. He has a 1.50 pack-year smoking history. He has never used smokeless tobacco. He reports that he does not drink alcohol and does not use drugs. Family History: family history is not on file. No h/o sudden cardiac death. No for premature CAD    REVIEW OF SYSTEMS:    · Constitutional: there has been no unanticipated weight loss. There's been No change in energy level, No change in activity level. · Eyes: No visual changes or diplopia. No scleral icterus. · ENT: No Headaches, hearing loss or vertigo. No mouth sores or sore throat. · Cardiovascular: No chest pain, No dyspnea on exertion, No palpitations or Yes loss of consciousness. No cough, hemoptysis, No pleuritic pain, or phlebitis. · Respiratory: No cough or wheezing, no sputum production. No hematemesis. · Gastrointestinal: No abdominal pain, appetite loss, blood in stools. No change in bowel or bladder habits. · Genitourinary: No dysuria, trouble voiding, or hematuria. · Musculoskeletal:  No gait disturbance, No weakness or joint complaints. · Integumentary: No rash or pruritis. · Neurological: No headache, diplopia, change in muscle strength, numbness or tingling. No change in gait, balance, coordination, mood, affect, memory, mentation, behavior. · Psychiatric: No anxiety, or depression. · Endocrine: No temperature intolerance.  No excessive thirst, fluid intake, or urination. No tremor. · Hematologic/Lymphatic: No abnormal bruising or bleeding, blood clots or swollen lymph nodes. · Allergic/Immunologic: No nasal congestion or hives. PHYSICAL EXAM:      BP (!) 165/61   Pulse 67   Temp 98.4 °F (36.9 °C) (Oral)   Resp 16   Ht 5' 8\" (1.727 m)   Wt 170 lb (77.1 kg)   SpO2 96%   BMI 25.85 kg/m²    Constitutional and General Appearance: alert, cooperative, no distress and appears stated age  HEENT: PERRL, no cervical lymphadenopathy. No masses palpable. Normal oral mucosa  Respiratory:  · Normal excursion and expansion without use of accessory muscles  · Resp Auscultation: Good respiratory effort. No for increased work of breathing. On auscultation: clear to auscultation bilaterally  Cardiovascular:  · The apical impulse is laterally displaced  · Heart tones are crisp and normal. regular S1 and S2.  · Jugular venous pulsation Normal  · The carotid upstroke is normal in amplitude and contour without delay or bruit  · Peripheral pulses are symmetrical and full  Abdomen:  · No masses or tenderness  · Bowel sounds present  Extremities:  ·  No Cyanosis or Clubbing  ·  Lower extremity edema: No  · Skin: Warm and dry  Neurological:  · Alert and oriented. · Moves all extremities well  · No abnormalities of mood, affect, memory, mentation, or behavior are noted    DATA:    Diagnostics:    EKG: normal sinus rhythm, QTc 440 msec  ECHO: reviewed. Ejection fraction: 55%  Conclusions      Summary   Left ventricle is normal in size . Mild asymmetric septal hypertrophy. No regional wall motion abnormalities seen. Normal left ventricular ejection fraction. The left atrium is severely dilated. Mild mitral annular calcification. Mild mitral regurgitation is present. Normally functioning bioprosthetic valve in aortic position. Physiologic and/or trace tricuspid regurgitation.       Signature ----------------------------------------------------------------   Electronically signed by Bert Mayo MD(Interpreting   physician) on 10/30/2020 12:08 PM  Stress Test: not obtained. Cardiac Angiography: not obtained. Device Evaluation    Make/model Medtronic Evera XT DR  Mode MVPR  P waves 1.4     mV     Impedance 456       ohms   Pacing threshold   0.625 [email or web address]   msec  R waves   6.3    MV     Impedance 342       ohms   Pacing threshold 0.625 [email or web address]   msec   Pacing percentage      V   0.2%  High voltage impedance   36 ohms  Battery longevity 21 months  Arrhythmias  Episodes of nonsustained polymorphic VT as well as ventricular fibrillation related to ventricular pacing following a pause--characteristic of MVP R mode  Device programmed to DDDR mode at a lower rate of 70 bpm      Evaluation and reprogramming included   Overall device function is normal  All  device programmable settings were evaluated per above, along with iterative adjustments (capture thresholds) to assess and select the most appropriate final programming for consistent delivery off the appropriate therapy and to verify function of the device. Labs:   CBC:   Recent Labs     09/21/21  1136   WBC 7.6   HGB 12.5   HCT 37.0        BMP:   Recent Labs     09/21/21  1136 09/21/21  1144     --    K 4.5  --    CO2 31*  --    BUN 25*  --    CREATININE 1.1  --    LABGLOM >60  --    GLUCOSE 100* 97     BNP: No results for input(s): BNP in the last 72 hours. PT/INR: No results for input(s): PROTIME, INR in the last 72 hours. APTT:No results for input(s): APTT in the last 72 hours. CARDIAC ENZYMES:No results for input(s): CKTOTAL, CKMB, CKMBINDEX, TROPONINI in the last 72 hours. FASTING LIPID PANEL:  Lab Results   Component Value Date    HDL 38 09/03/2021    LDLCALC 56 09/03/2021    TRIG 230 09/03/2021     LIVER PROFILE:No results for input(s): AST, ALT, LABALBU in the last 72 hours.     IMPRESSION:    Patient Active Problem List Diagnosis    Chronic combined systolic and diastolic CH[facility]congestive heart failure) ([facility])    S/P TAVR (transcatheter aortic valve replacement)    Coronary artery disease involving native coronary artery of native heart without angina pectoris    Essential hypertension          ICD (implantable cardioverter-defibrillator), dual, in situ--checked at bedside and reprogrammed as noted above      Aortic valve disease--aortic stenosis, s/p TAVR in 2017       Syncope--related to an episode of ventricular fibrillation which triggered an appropriate ICD shock. Baseline EKG does not show significant QT prolongation. Episode of VF may have been triggered by ventricular pacing following a pause and therefore the device was reprogrammed as noted above   Laboratory work-up reviewed. Renal function is stable. Electrolytes unremarkable    RECOMMENDATIONS:    Continue all current medications as prior to this emergency room visit  ICD checked and reprogrammed as noted above  Follow-up in the office in 2 weeks  All of the above explained to the patient and his wife in detail    Discussed with patient and spouse and Nurse.     Henrry Aiken MD, [facility] [place]

## 2021-09-21 NOTE — ED PROVIDER NOTES
ATTENDING PROVIDER ATTESTATION:     Ligia Diza presented to the emergency department for evaluation of Loss of Consciousness (states that he passed out and his AICD fired)  . The patient was initially evaluated by the Medical Resident. Please see their note for further details, HPI, and ED course. I have reviewed & discussed the patient's case in details, including pertinent history & exam findings, with the Medical Resident and have personally participated in and/or performed the history, physical examination, medical decision-making, and procedure(s). I agree with all pertinent clinical information and any changes or corrections are noted below. I have reviewed my findings and recommendations with the assigned Medical Resident, patient, and family member(s) present at the time of disposition. I have performed a history and physical examination of this patient and directly supervised the resident caring for this patient. I have directly supervised any procedures performed by the resident and was present for the procedure including all critical portions of the procedure(s).     --------------------[place]------------------      Ligia Diaz is a 82 y.o. male presenting to the ED for syncope/LOC. ED received call from Dr Lavelle Aguilera (EP) regarding patient episode of VFIB and shock PTA. Aware patient is in ED for w/u. Patient reports that he was sitting in his chair shortly prior to arrival when he lost consciousness and then was seen to have been shocked by his defibrillator. He reports that this is happened \"many times in the past.\"  Denies any prodromal dizziness or lightheadedness, but had a vague sense \"that an attack was can happen,\" but denies nausea, vomiting, diarrhea, constipation, fever chills, recent illness. Denies shortness of breath. Denies cough/congestion/rhinorrhea.   Onset: Sudden  Timing: Single episode  Duration: Seconds to minutes  Associated symptoms: As above  Severity: straight leg raise, 5 out of 5 strength knee flexion and extension, 5 out of 5 strength ankle dorsiflexion and plantarflexion, sensation intact  Psychiatric: Normal Affect, behavior normal      -------------------------------------------------- RESULTS -------------------------------------------------  I have personally reviewed all laboratory and imaging results for this patient. RADIOLOGY:  Imaging interpreted by Radiologist unless otherwise specified  CT Head WO Contrast   Final Result   No acute intracranial abnormality. XR CHEST PORTABLE   Final Result   1. There are no findings of failure or pneumonia   2. Chest x-ray is interpreted by me: Sternotomy wires present, AICD present with leads in appropriate position, no pneumothorax, no pulmonary edema, no pleural effusion, no consolidation concerning for pneumonia, no osseous abnormality      EKG Interpretation  Interpreted by emergency department physician, Dr. Rola Chaves    Date of EK21    Rhythm: paced  Rate: normal  Axis: Left  Conduction: nonspecific interventricular conduction block  ST Segments: no acute change  T Waves: no acute change    Clinical Impression: Atrial paced rhythm, left axis deviation, right bundle branch block, inferior Q waves  Comparison to prior EKG: changes compared to previous EKG      ------------------------- NURSING NOTES AND VITALS REVIEWED ---------------------------  The nursing notes within the ED encounter and vital signs as below have been reviewed by myself  BP (!) 165/61   Pulse 67   Temp 98.4 °F (36.9 °C) (Oral)   Resp 16   Ht 5' 8\" (1.727 m)   Wt 170 lb (77.1 kg)   SpO2 96%   BMI 25.85 kg/m²      The patients available past medical records and past encounters were reviewed. ------------------------------ ED COURSE/MEDICAL DECISION MAKING----------------------         The cardiac monitor revealed paced rhythm with a heart rate in the 60s as interpreted by me.  The cardiac monitor was ordered secondary to patients clinical status and to monitor the patient for dysrhythmia(s), tachycardia/bradycardia and/or changes in rhythm. Medical Decision Making:   Sabas Early is a 82 y.o. male presenting to the ED for syncope/near syncope. Differential includes TIA/CVA, electrolyte abnormality, ICH, tumor/mass, arrhythmia, ACS/MI, medication effect, orthostasis, BPPV -- known episode of VFIB so low suspicion for alternate etiology, but patient certainly high risk for other intracranial etiologies for his syncope as well, warranting CT head. Will add dimer for possible PE precipitating this. Workup: CBC, Bmp, troponin, EKG, CT head, mag, dimer      Re-Evaluation:   Patient interrogation report by my interpretation shows 6 episodes of nonsustained VT, one episode of shock terminated VF    Spoke to patient's EP who arrives as consult at bedside who reports she will reprogram defibrillator and if workup otherwise negative can d/c. She subsequently reports she has made appropriate changes to AICD and ok with discharge from her standpoint. This patient's ED course as detailed above reviewed by me      ED Counseling: This emergency provider has spoken with the patient and any family present to discuss clinical status, results, plan of care, diagnosis and prognosis as able to be determined at this time. Any questions were answered and patient and/or family/POA are agreeable with the plan. This report was transcribed using voice recognition software. Every effort was made to ensure accuracy; however, transcription errors may be present.        --------------------------------- IMPRESSION AND DISPOSITION ---------------------------------    IMPRESSION  1. Syncope and collapse    2. AICD discharge        DISPOSITION  Disposition: Discharge to home  Patient condition is good      This report was transcribed using voice recognition software.  Every effort was made to ensure accuracy; however, transcription errors may be present.            Yovana Kimball MD  09/21/21 [id number]

## 2021-10-07 RX ORDER — MECLIZINE HYDROCHLORIDE 25 MG/1
25 TABLET ORAL 3 TIMES DAILY PRN
Qty: 30 TABLET | Refills: 0 | Status: SHIPPED
Start: 2021-10-07 | End: 2021-10-18 | Stop reason: SDUPTHER

## 2021-10-07 NOTE — TELEPHONE ENCOUNTER
Pt requesting a refill for Meclizine 25 mg 1 tablet once daily. He previously received a script when he was in the emergency room.   He will need 1 wk sent to [facility] in [place] and a 90 day supply sent to [facility] rx

## 2021-10-18 RX ORDER — MECLIZINE HYDROCHLORIDE 25 MG/1
25 TABLET ORAL 3 TIMES DAILY PRN
Qty: 270 TABLET | Refills: 0 | Status: SHIPPED | OUTPATIENT
Start: 2021-10-18 | End: 2022-01-16

## 2021-10-23 ENCOUNTER — APPOINTMENT (OUTPATIENT)
Dept: GENERAL RADIOLOGY | Age: 86
End: 2021-10-23
Payer: MEDICARE

## 2021-10-23 ENCOUNTER — HOSPITAL ENCOUNTER (EMERGENCY)
Age: 86
Discharge: HOME | End: 2021-10-23
Attending: EMERGENCY MEDICINE
Payer: MEDICARE

## 2021-10-23 VITALS
RESPIRATION RATE: 19 BRPM | DIASTOLIC BLOOD PRESSURE: 84 MMHG | SYSTOLIC BLOOD PRESSURE: 180 MMHG | TEMPERATURE: 98.7 F | HEART RATE: 70 BPM | OXYGEN SATURATION: 92 %

## 2021-10-23 DIAGNOSIS — R00.2 PALPITATIONS: Primary | ICD-10-CM

## 2021-10-23 LAB
ALBUMIN SERPL-MCNC: 4.4 G/DL (ref 3.5–5.2)
ALP BLD-CCNC: 134 U/L (ref 40–129)
ALT SERPL-CCNC: 15 U/L (ref 0–40)
ANION GAP SERPL CALCULATED.3IONS-SCNC: 14 MMOL/L (ref 7–16)
APTT: 37.6 SEC (ref 24.5–35.1)
AST SERPL-CCNC: 24 U/L (ref 0–39)
BASOPHILS ABSOLUTE: 0.03 E9/L (ref 0–0.2)
BASOPHILS RELATIVE PERCENT: 0.4 % (ref 0–2)
BILIRUB SERPL-MCNC: 0.4 MG/DL (ref 0–1.2)
BUN BLDV-MCNC: 25 MG/DL (ref 6–23)
CALCIUM SERPL-MCNC: 9.3 MG/DL (ref 8.6–10.2)
CHLORIDE BLD-SCNC: 100 MMOL/L (ref 98–107)
CO2: 27 MMOL/L (ref 22–29)
CREAT SERPL-MCNC: 1 MG/DL (ref 0.7–1.2)
EOSINOPHILS ABSOLUTE: 0.17 E9/L (ref 0.05–0.5)
EOSINOPHILS RELATIVE PERCENT: 2.1 % (ref 0–6)
GFR AFRICAN AMERICAN: >60
GFR NON-AFRICAN AMERICAN: >60 ML/MIN/1.73
GLUCOSE BLD-MCNC: 109 MG/DL (ref 74–99)
HCT VFR BLD CALC: 37.7 % (ref 37–54)
HEMOGLOBIN: 13 G/DL (ref 12.5–16.5)
IMMATURE GRANULOCYTES #: 0.02 E9/L
IMMATURE GRANULOCYTES %: 0.2 % (ref 0–5)
INR BLD: 1.2
LYMPHOCYTES ABSOLUTE: 1.64 E9/L (ref 1.5–4)
LYMPHOCYTES RELATIVE PERCENT: 20.4 % (ref 20–42)
MAGNESIUM: 2.4 MG/DL (ref 1.6–2.6)
MCH RBC QN AUTO: 30 PG (ref 26–35)
MCHC RBC AUTO-ENTMCNC: 34.5 % (ref 32–34.5)
MCV RBC AUTO: 87.1 FL (ref 80–99.9)
MONOCYTES ABSOLUTE: 0.74 E9/L (ref 0.1–0.95)
MONOCYTES RELATIVE PERCENT: 9.2 % (ref 2–12)
NEUTROPHILS ABSOLUTE: 5.45 E9/L (ref 1.8–7.3)
NEUTROPHILS RELATIVE PERCENT: 67.7 % (ref 43–80)
PDW BLD-RTO: 13.8 FL (ref 11.5–15)
PLATELET # BLD: 188 E9/L (ref 130–450)
PMV BLD AUTO: 10.5 FL (ref 7–12)
POTASSIUM SERPL-SCNC: 4.4 MMOL/L (ref 3.5–5)
PROTHROMBIN TIME: 13 SEC (ref 9.3–12.4)
RBC # BLD: 4.33 E12/L (ref 3.8–5.8)
SODIUM BLD-SCNC: 141 MMOL/L (ref 132–146)
TOTAL PROTEIN: 7 G/DL (ref 6.4–8.3)
TROPONIN, HIGH SENSITIVITY: 21 NG/L (ref 0–11)
TROPONIN, HIGH SENSITIVITY: 22 NG/L (ref 0–11)
WBC # BLD: 8.1 E9/L (ref 4.5–11.5)

## 2021-10-23 PROCEDURE — 83735 ASSAY OF MAGNESIUM: CPT

## 2021-10-23 PROCEDURE — 80053 COMPREHEN METABOLIC PANEL: CPT

## 2021-10-23 PROCEDURE — 36415 COLL VENOUS BLD VENIPUNCTURE: CPT

## 2021-10-23 PROCEDURE — 93005 ELECTROCARDIOGRAM TRACING: CPT | Performed by: STUDENT IN AN ORGANIZED HEALTH CARE EDUCATION/TRAINING PROGRAM

## 2021-10-23 PROCEDURE — 85025 COMPLETE CBC W/AUTO DIFF WBC: CPT

## 2021-10-23 PROCEDURE — 85730 THROMBOPLASTIN TIME PARTIAL: CPT

## 2021-10-23 PROCEDURE — 99284 EMERGENCY DEPT VISIT MOD MDM: CPT

## 2021-10-23 PROCEDURE — 84484 ASSAY OF TROPONIN QUANT: CPT

## 2021-10-23 PROCEDURE — 71045 X-RAY EXAM CHEST 1 VIEW: CPT

## 2021-10-23 PROCEDURE — 85610 PROTHROMBIN TIME: CPT

## 2021-10-23 ASSESSMENT — ENCOUNTER SYMPTOMS
WHEEZING: 0
BACK PAIN: 0
COUGH: 0
DIARRHEA: 0
VOMITING: 0
ABDOMINAL PAIN: 0
NAUSEA: 0
COLOR CHANGE: 0
SHORTNESS OF BREATH: 1
STRIDOR: 0
CONSTIPATION: 0
ABDOMINAL DISTENTION: 0

## 2021-10-24 LAB
EKG ATRIAL RATE: 70 BPM
EKG P-R INTERVAL: 294 MS
EKG Q-T INTERVAL: 408 MS
EKG QRS DURATION: 106 MS
EKG QTC CALCULATION (BAZETT): 440 MS
EKG R AXIS: -40 DEGREES
EKG T AXIS: 10 DEGREES
EKG VENTRICULAR RATE: 70 BPM

## 2021-10-24 PROCEDURE — 93010 ELECTROCARDIOGRAM REPORT: CPT | Performed by: INTERNAL MEDICINE

## 2021-10-24 NOTE — ED PROVIDER NOTES
[address] [facility] ENCOUNTER      Pt Name: Landen Evans  MRN: [id number]  [place] 1935  Date of evaluation: 10/23/2021      CHIEF COMPLAINT       Chief Complaint   Patient presents with    Palpitations     pt states he felt like his heart was racing starting today.  Shortness of Breath        HPI  Landen Evans is a 82 y.o. male past medical history of A. fib, CAD, internal defibrillator, pacemaker, on T consent, and Xarelto presents with complaints of palpitations at rest.  Patient describes that he was sitting down and chair and had a bout of chest pain for the last 15 minutes. States that it resolved. As that he is short of breath when he had that episode. No alleviating or exacerbating factors. Not taking any medications meds alleviate his symptoms. States that he did not feel any shocks from his defibrillator as he had in the past.  Denies any recent fevers, chills, nausea, vomiting, chest pain, shortness of breath, abdominal pain, dysuria, hematuria, diarrhea, constipation, new rashes or sores. Except as noted above the remainder of the review of systems was reviewed and negative. Review of Systems   Constitutional: Negative for activity change, appetite change, diaphoresis, fatigue, fever and unexpected weight change. HENT: Negative for congestion. Eyes: Negative for visual disturbance. Respiratory: Positive for shortness of breath. Negative for cough, wheezing and stridor. Cardiovascular: Positive for palpitations. Negative for chest pain and leg swelling. Gastrointestinal: Negative for abdominal distention, abdominal pain, constipation, diarrhea, nausea and vomiting. Endocrine: Negative for polyphagia and polyuria. Genitourinary: Negative for dysuria and hematuria. Musculoskeletal: Negative for back pain. Skin: Negative for color change, pallor, rash and wound.    Neurological: Negative for dizziness and syncope. Hematological: Negative for adenopathy. Does not bruise/bleed easily. Psychiatric/Behavioral: Negative for agitation. Physical Exam  Vitals and nursing note reviewed. Constitutional:       General: He is not in acute distress. Appearance: Normal appearance. He is not ill-appearing or diaphoretic. HENT:      Head: Normocephalic and atraumatic. Nose: Nose normal.   Eyes:      Extraocular Movements: Extraocular movements intact. Pupils: Pupils are equal, round, and reactive to light. Cardiovascular:      Rate and Rhythm: Normal rate and regular rhythm. Pulses: Normal pulses. Heart sounds: Normal heart sounds. Pulmonary:      Effort: Pulmonary effort is normal.      Breath sounds: Normal breath sounds. No decreased breath sounds, wheezing, rhonchi or rales. Comments: Internal defibrillator patient's left upper chest.  Chest:      Chest wall: No mass or tenderness. Abdominal:      General: Bowel sounds are normal.      Palpations: Abdomen is soft. Tenderness: There is no abdominal tenderness. There is no right CVA tenderness, left CVA tenderness or rebound. Negative signs include Mcpherson's sign, Rovsing's sign and McBurney's sign. Musculoskeletal:         General: Normal range of motion. Cervical back: Normal range of motion. Skin:     General: Skin is warm and dry. Capillary Refill: Capillary refill takes less than 2 seconds. Neurological:      General: No focal deficit present. Mental Status: He is alert and oriented to person, place, and time.    Psychiatric:         Mood and Affect: Mood normal.          Procedures     MDM  Number of Diagnoses or Management Options  Palpitations  Diagnosis management comments: 73-year-old male history of CAD, with internal defibrillator, pacemaker, on Tikosyn and Xarelto presents with complaints of palpitations and shortness of breath lasting 15 minutes at rest.  Vitals within normal limits. Physical exam patient no acute stressors being. Alert oriented x3. Lungs clear to auscultation bilaterally no wheeze rales rhonchi. Normal S1-S2. M soft nontender no rebound or guarding. Diagnostic labs and imaging interpreted and reviewed. Patient's Medtronic pacemaker was interrogated. No concerning activity within the last 24 hours. Delta troponin negative. Spoke with patient's cardiologist who is agreeable to seeing patient as an outpatient if delta troponin was negative. Patient was given aspirin prior to arrival.  Patient on reevaluation looks well he was updated about his work-up. They are agreeable plan for discharge with follow-up with cardiology as an outpatient. Amount and/or Complexity of Data Reviewed  Decide to obtain previous medical records or to obtain history from someone other than the patient: yes           Patient is awake alert, hemodynamic stable, afebrile and in no respiratory distress. Discussed with patient plan for close outpatient follow-up with the patient's PCP as well as return precautions and the patient understands and agrees to the plan. Patient was seen with attending. ED Course as of Oct 23 2044   Sat Oct 23, 2021   1853 EKG read interpreted by me. Heart rate 70. Atrial paced rhythm. With prolonged AV conduction. Left axis deviation. No acute elongation. No ST elevations or depressions. Stable as compared to previous EKG. [JV]   [facility] Pro[employer] read shows no treated events of V. fib V. tach or A. fib. [JV]   1919 Spoke with Dr. Gabe Hemphill. She evaluated patient's labs as well as his interrogation. She is comfortable with patient following closely as an outpatient if delta troponin negative. [JV]      ED Course User Index  [JV] Ana Smalls MD           ED Course as of Oct 23 2044   Sat Oct 23, 2021   1853 EKG read interpreted by me. Heart rate 70. Atrial paced rhythm. With prolonged AV conduction.   Left axis deviation. No acute elongation. No ST elevations or depressions. Stable as compared to previous EKG. [JV]   [id number] [employer] read shows no treated events of V. fib V. tach or A. fib. [JV]   1919 Spoke with Dr. Lauren Adams. She evaluated patient's labs as well as his interrogation. She is comfortable with patient following closely as an outpatient if delta troponin negative. [JV]      ED Course User Index  [JV] Benny Woodard MD       --------------------------------------------- PAST HISTORY ---------------------------------------------  Past Medical History:  has a past medical history of A-fib [facility]), Arrhythmia, Carotid artery stenosis, CHF (congestive heart failure) ([address] Heart valve problem, Hyperlipidemia, Hypertension, ICD (implantable cardiac defibrillator) in place, MI (mitral incompetence), and MI (myocardial infarction) ([address] 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 2008); Cardiac defibrillator placement (07/28/2016); Cardiac catheterization (Right, 03/03/2017); other surgical history (03/29/2017); Upper gastrointestinal endoscopy (N/A, 9/3/2019); Upper gastrointestinal endoscopy (N/A, 2/18/2020); Colonoscopy (N/A, 2/24/2020); Cardioversion (10/29/2020); and Cardiac surgery (N/A, 9/4/2021). Social History:  reports that he quit smoking about 48 years ago. He has a 1.50 pack-year smoking history. He has never used smokeless tobacco. He reports that he does not drink alcohol and does not use drugs. Family History: family history is not on file. The patients home medications have been reviewed. Allergies: Patient has no known allergies.     -------------------------------------------------- RESULTS -------------------------------------------------  Labs:  Results for orders placed or performed during the hospital encounter of 10/23/21   CBC Auto Differential   Result Value Ref Range    WBC 8.1 4.5 - 11.5 E9/L    RBC 4.33 3.80 - 5.80 E12/L    Hemoglobin 13.0 12.5 - 16.5 g/dL    Hematocrit 37.7 37.0 - 54.0 %    MCV 87.1 80.0 - 99.9 fL    MCH 30.0 26.0 - 35.0 pg    MCHC 34.5 32.0 - 34.5 %    RDW 13.8 11.5 - 15.0 fL    Platelets 558 005 - 007 E9/L    MPV 10.5 7.0 - 12.0 fL    Neutrophils % 67.7 43.0 - 80.0 %    Immature Granulocytes % 0.2 0.0 - 5.0 %    Lymphocytes % 20.4 20.0 - 42.0 %    Monocytes % 9.2 2.0 - 12.0 %    Eosinophils % 2.1 0.0 - 6.0 %    Basophils % 0.4 0.0 - 2.0 %    Neutrophils Absolute 5.45 1.80 - 7.30 E9/L    Immature Granulocytes # 0.02 E9/L    Lymphocytes Absolute 1.64 1.50 - 4.00 E9/L    Monocytes Absolute 0.74 0.10 - 0.95 E9/L    Eosinophils Absolute 0.17 0.05 - 0.50 E9/L    Basophils Absolute 0.03 0.00 - 0.20 E9/L   Comprehensive Metabolic Panel   Result Value Ref Range    Sodium 141 132 - 146 mmol/L    Potassium 4.4 3.5 - 5.0 mmol/L    Chloride 100 98 - 107 mmol/L    CO2 27 22 - 29 mmol/L    Anion Gap 14 7 - 16 mmol/L    Glucose 109 (H) 74 - 99 mg/dL    BUN 25 (H) 6 - 23 mg/dL    CREATININE 1.0 0.7 - 1.2 mg/dL    GFR Non-African American >60 >=60 mL/min/1.73    GFR African American >60     Calcium 9.3 8.6 - 10.2 mg/dL    Total Protein 7.0 6.4 - 8.3 g/dL    Albumin 4.4 3.5 - 5.2 g/dL    Total Bilirubin 0.4 0.0 - 1.2 mg/dL    Alkaline Phosphatase 134 (H) 40 - 129 U/L    ALT 15 0 - 40 U/L    AST 24 0 - 39 U/L   Magnesium   Result Value Ref Range    Magnesium 2.4 1.6 - 2.6 mg/dL   Troponin   Result Value Ref Range    Troponin, High Sensitivity 21 (H) 0 - 11 ng/L   Protime-INR   Result Value Ref Range    Protime 13.0 (H) 9.3 - 12.4 sec    INR 1.2    APTT   Result Value Ref Range    aPTT 37.6 (H) 24.5 - 35.1 sec   Troponin   Result Value Ref Range    Troponin, High Sensitivity 22 (H) 0 - 11 ng/L EKG 12 Lead   Result Value Ref Range    Ventricular Rate 70 BPM    Atrial Rate 70 BPM    P-R Interval 294 ms    QRS Duration 106 ms    Q-T Interval 408 ms    QTc Calculation (Bazett) 440 ms    R Axis -40 degrees    T Axis 10 degrees       ECG:  Please refer to workup tab for EKG read    Radiology:  XR CHEST PORTABLE   Final Result   1. Cardiomegaly with mild pulmonary vascular congestion. 2. No airspace opacity or pleural effusion.             ------------------------- NURSING NOTES AND VITALS REVIEWED ---------------------------  Date / Time Roomed:  10/23/2021  5:58 PM  ED Bed Assignment:  10/10    The nursing notes within the ED encounter and vital signs as below have been reviewed. BP (!) 180/84   Pulse 70   Temp 98.7 °F (37.1 °C) (Oral)   Resp 19   SpO2 92%   Oxygen Saturation Interpretation: normal      ------------------------------------------ PROGRESS NOTES ------------------------------------------    I have spoken with the patient and discussed todays results, in addition to providing specific details for the plan of care and counseling regarding the diagnosis and prognosis. Their questions are answered at this time and they are agreeable with the plan. I discussed at length with them reasons for immediate return here for re evaluation. They will followup with their PCP by calling their office tomorrow. --------------------------------- ADDITIONAL PROVIDER NOTES ---------------------------------  At this time the patient is without objective evidence of an acute process requiring hospitalization or inpatient management. They have remained hemodynamically stable throughout their entire ED visit and are stable for discharge with outpatient follow-up. The plan has been discussed in detail and they are aware of the specific conditions for emergent return, as well as the importance of follow-up. New Prescriptions    No medications on file       Diagnosis:  1.  Palpitations Disposition:  Patient's disposition: Discharge to home  Patient's condition is stable.         Shahriar Atkins MD  Resident  10/23/21 [id number]

## 2021-11-11 ENCOUNTER — OFFICE VISIT (OUTPATIENT)
Dept: CARDIOLOGY CLINIC | Age: 86
End: 2021-11-11
Payer: MEDICARE

## 2021-11-11 VITALS
WEIGHT: 174.9 LBS | RESPIRATION RATE: 16 BRPM | HEART RATE: 73 BPM | SYSTOLIC BLOOD PRESSURE: 138 MMHG | HEIGHT: 68 IN | BODY MASS INDEX: 26.51 KG/M2 | DIASTOLIC BLOOD PRESSURE: 74 MMHG

## 2021-11-11 DIAGNOSIS — I25.10 CORONARY ARTERY DISEASE INVOLVING NATIVE CORONARY ARTERY OF NATIVE HEART WITHOUT ANGINA PECTORIS: ICD-10-CM

## 2021-11-11 DIAGNOSIS — I50.42 CHRONIC COMBINED SYSTOLIC AND DIASTOLIC CHF (CONGESTIVE HEART FAILURE) (HCC): Primary | ICD-10-CM

## 2021-11-11 DIAGNOSIS — I48.20 CHRONIC ATRIAL FIBRILLATION (HCC): ICD-10-CM

## 2021-11-11 DIAGNOSIS — I25.9 ISCHEMIC HEART DISEASE: ICD-10-CM

## 2021-11-11 DIAGNOSIS — I10 ESSENTIAL HYPERTENSION: ICD-10-CM

## 2021-11-11 PROCEDURE — G8417 CALC BMI ABV UP PARAM F/U: HCPCS | Performed by: INTERNAL MEDICINE

## 2021-11-11 PROCEDURE — G8427 DOCREV CUR MEDS BY ELIG CLIN: HCPCS | Performed by: INTERNAL MEDICINE

## 2021-11-11 PROCEDURE — 1036F TOBACCO NON-USER: CPT | Performed by: INTERNAL MEDICINE

## 2021-11-11 PROCEDURE — 93000 ELECTROCARDIOGRAM COMPLETE: CPT | Performed by: INTERNAL MEDICINE

## 2021-11-11 PROCEDURE — 99214 OFFICE O/P EST MOD 30 MIN: CPT | Performed by: INTERNAL MEDICINE

## 2021-11-11 PROCEDURE — 4040F PNEUMOC VAC/ADMIN/RCVD: CPT | Performed by: INTERNAL MEDICINE

## 2021-11-11 PROCEDURE — G8484 FLU IMMUNIZE NO ADMIN: HCPCS | Performed by: INTERNAL MEDICINE

## 2021-11-11 PROCEDURE — 1123F ACP DISCUSS/DSCN MKR DOCD: CPT | Performed by: INTERNAL MEDICINE

## 2021-11-11 NOTE — PROGRESS NOTES
CHIEF COMPLAINT: Possible TIA/CAD-CABG/PAF/ICD    HISTORY OF PRESENT ILLNESS: Patient is a 82 y.o. male seen at the request of Brenda Fisher MD.      In sinus. No CP. Some TORRES. PMH:   1. MI, 2003. Cardiac catheterization: 85% ostial, proximal and mid LAD narrowings. LMC 70% stenosis. D1 occluded. D2 50% stenosis. OM1 80% ostial stenosis. Mid CX occluded. Dominant RCA severe disease. LVEF 40-45%. 2. CABG, 05/23/2003, [facility] [place] [place] with LIMA-LAD, SVG-RI, SVG-OM. 3. Hyperlipidemia. 4. Mild carotid plaque on US, 2003. 5. Echo, 07/2006. Mild LVE, normal EF, Stage II diastolic dysfunction, moderate AS, mild MR, mild TR. 6. Right leg vein stripping, 1970's. 7. No history diabetes mellitus, stroke or COPD. 8. Nonsmoker for many years. 9. VT causing cardiac arrest after stress test, 03/15/2007. He was successfully cardioverted. 10. Cardiac catheterization, 03/16/2007 with normal LVEF, severe native three vessel coronary disease. SVG-RI, SVG-OM both occluded. LIMA-LAD patent. 11. Re-do CABG, [facility], 03/2007 with radial artery graft to D2 and OM2. 12. Elective PCI with CONNOR native OM2 and native LAD, 03/2007 following CABG. This was done because of inadequate conduits. 13. ICD placement, [facility], 03/2007. 14. ICD lead recall, early 2008, but he was followed electively because his device was functioning normally. 15. Presentation [facility], 03/29/2008 with multiple ICD inappropriate shocks. Transfer [facility] where ICD and lead were replaced. He reports cardiac catheterization that revealed patent LIMA-LAD and patent stents in native coronary arteries. 16. Atypical chest pain and anxiety with admission [facility], 05/2008. Troponin minimally elevated. Beta blocker dose increased. [address] admission, 06/13/2009 with lightheadedness, orthostatic hypotension, drop in hemoglobin to 10.5 from 14.9 over two months with an increase in BUN from 16 to 68 over the same time.  Dark heme positive stools noted. EKG NSR, incomplete RBBB. 18. Echo, 06/15/2009 with normal LVEF, moderate AS, peak gradient 38 mmHg, BLOSSOM 1.1 cm², moderate MR, LAE. 19. Transtelephonic ICD check, 01/11/2010. No ventricular tachyarrhythmias. Two AF episodes, the longest for 14 hours. 20. Echo, 06/16/2010 with LVE, borderline LVH, normal systolic and diastolic function, normal LA, ICD electrode right heart. Trileaflet AV with moderate to severe AS, mean/peak gradient 20/31 mmHg. BLOSSOM 1.0 cm². MAC with mild MR, normal RVSP.  21. No drug allergies. 25. Family history negative for premature vascular disease. 23. PAF with DCCV, [facility], 12/2010.   24. [facility]. 25. [facility], 06/05/2012. Moderate sized fixed basal inferior defect, probably artifact. 26. [facility]. 1+ AR, moderate AS, mild MR, mild TR, normal LVEF.   27. Echo, 01/31/2013. LV not dilated. Mild concentric LVH. Septal paradox. EF 50-55%. BLOSSOM 1.2 cm² consistent with moderate stenosis. Peak/mean gradient 38/21. Stage II diastolic dysfunction. 28. [facility] admission, 08/28/2013 with dizziness, Hb 7.7, WBC 19.0. CXR negative except cardiomegaly. EKG NSR, leftward axis, RBBB. BMP negative except for elevation in BUN to 55 with Cr 1.3.   29. EGD, 08/28/2013. Large pyloric channel ulcer with clot treated with PRBCs and fresh frozen plasma. Hb 8.7 on day of discharge and stable. Pradaxa was temporarily held. 30. CT abdomen, 09/08/2014. 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 31. CT chest, 09/17/2014. Consolidation and infiltrate at the left lung base, stable when compared to previous exams with less pleural thickening and calcified plaque in the left pleural cavity without any recent change.  Chronic obstructive airways disease. 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   32. ICD programmed to DDDR mode by Dr. Gant Fraction, 03/26/2015. Device function normal.  33. Echo 10/16/2015. EF 39%. Stage II diastolic dysfunction. Aortic stenosis with peak/mean gradients of 48/24 mmHg. Estimated valve area 1.0 cm². 34. ICD generator change for battery depletion, 07/28/2016, Dr. Rebel Saldivar. Stalin Fraction. 35. Echo, 02/03/2017.  Normal LV size with mild LVH.  Normal regional wall motion and overall systolic function.  Diastole could not be assessed, but was presumed to be impaired.  The RV was dilated with impaired global systolic function.  The LA was enlarged.  Mild MAC with mild mitral insufficiency.  Moderate tricuspid insufficiency.  Aortic valve gradients are peak 47 mmHg with a mean of 27.  Dimensionless ratio 0.21. Valve area calculated 0.8 cm².    36.  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  37. Echocardiogram, [facility], 4/19/2018, EF 60%. Low normal RV function. Stage 2 diastolic dysfunction. Mild-to-moderate MR. S/P TAVR with a mean gradient of 10 mmHg.   RVSP 31 mg.     38. S/P Cardioversion by Dr. Gilberto Lockhart, 05/30/2017.   19 Rohith Howard evaluation, 09/07/2018, for 2 appropriate ICD discharges for polymorphic ventricular tachycardia, which occurred after yard work and moving heavy furniture.       Past Medical History:   Diagnosis Date    A-fib [facility])     Arrhythmia     Carotid artery stenosis     [facility]congestive heart failure) ([facility]     Heart valve problem     Hyperlipidemia     Hypertension     ICD (implantable cardiac defibrillator) in place 2007    MI (mitral incompetence) 2003    MI (myocardial infarction) ([facility] 2003       Patient Active Problem List   Diagnosis    Chronic combined systolic and diastolic CHF (congestive heart failure) [facility]    S/P TAVR (transcatheter aortic valve replacement)    Chronic atrial fibrillation    Coronary artery disease involving native coronary artery of native heart without angina pectoris    Essential hypertension    Hyperlipidemia LDL goal <100    GIB (gastrointestinal bleeding)    ICD (implantable cardioverter-defibrillator), dual, in situ    Ischemic heart disease    Vitamin D deficiency    Other insomnia    SOB (shortness of breath)    Lung nodule    Acute on chronic diastolic (congestive) heart failure (HCC)    Pure hypercholesterolemia    Stage 3b chronic kidney disease (HCC)    Transient cerebral ischemia    Aortic valve disease    Pre-syncope       No Known Allergies    Current Outpatient Medications   Medication Sig Dispense Refill    meclizine (ANTIVERT) 25 MG tablet Take 1 tablet by mouth 3 times daily as needed for Dizziness 270 tablet 0    potassium chloride (MICRO-K) 10 MEQ extended release capsule Take 1 capsule by mouth daily 90 capsule 1    XARELTO 20 MG TABS tablet TAKE 1 TABLET BY MOUTH  DAILY 90 tablet 3    metoprolol tartrate (LOPRESSOR) 50 MG tablet TAKE 1 AND 1/2 TABLETS BY  MOUTH IN THE MORNING AND 1  TABLET BY MOUTH AT BEDTIME 225 tablet 3    pravastatin (PRAVACHOL) 40 MG tablet TAKE 1 TABLET BY MOUTH  DAILY 90 tablet 3    furosemide (LASIX) 40 MG tablet TAKE 1 TABLET BY MOUTH  DAILY 90 tablet 3    pantoprazole (PROTONIX) 40 MG tablet TAKE 1 TABLET BY MOUTH  DAILY 90 tablet 3    Handicap Placard MISC by Does not apply route Disp #: 1  Duration: 5 years. 1 each 0    ferrous sulfate (IRON 325) 325 (65 Fe) MG tablet Take 1 tablet by mouth daily (with breakfast) 90 tablet 1    MAGNESIUM-OXIDE 400 (241.3 Mg) MG TABS tablet TK 1 T PO ONCE D      dofetilide (TIKOSYN) 250 MCG capsule Take 1 capsule by mouth 2 times daily 90 capsule 0    Multiple Vitamins-Minerals (PRESERVISION AREDS 2 PO) Take 1 tablet by mouth 2 times daily      Cholecalciferol (VITAMIN D) 2000 UNITS CAPS capsule Take 1 capsule by mouth daily        No current facility-administered medications for this visit.        Social History Socioeconomic History    Marital status:      Spouse name: Not on file    Number of children: Not on file    Years of education: Not on file    Highest education level: Not on file   Occupational History    Not on file   Tobacco Use    Smoking status: Former Smoker     Packs/day: 0.50     Years: 3.00     Pack years: 1.50     Quit date: 1973     Years since quittin.8    Smokeless tobacco: Never Used    Tobacco comment: Quit smoking in    Vaping Use    Vaping Use: Never used   Substance and Sexual Activity    Alcohol use: No    Drug use: No    Sexual activity: Not on file   Other Topics Concern    Not on file   Social History Narrative    1 cup coffee daily; occ pop     Social Determinants of Health     Financial Resource Strain: Low Risk     Difficulty of Paying Living Expenses: Not hard at all   Food Insecurity: No Food Insecurity    Worried About Running Out of Food in the Last Year: Never true    [address] in the Last Year: Never true   Transportation Needs:     Lack of Transportation (Medical): Not on file    Lack of Transportation (Non-Medical):  Not on file   Physical Activity:     Days of Exercise per Week: Not on file    Minutes of Exercise per Session: Not on file   Stress:     Feeling of Stress : Not on file   Social Connections:     Frequency of Communication with Friends and Family: Not on file    Frequency of Social Gatherings with Friends and Family: Not on file    Attends [employer] Services: Not on file    Active Member of Clubs or Organizations: Not on file    Attends Club or Organization Meetings: Not on file    Marital Status: Not on file   Intimate Partner Violence:     Fear of Current or Ex-Partner: Not on file    Emotionally Abused: Not on file    Physically Abused: Not on file    Sexually Abused: Not on file   Housing Stability:     Unable to Pay for Housing in the Last Year: Not on file    Number of [place] in the Last Year: Not on file    Unstable Housing in the Last Year: Not on file       History reviewed. No pertinent family history. Review of Systems:  Heart: as above   Lungs: as above   Eyes: denies changes in vision or discharge. Ears: denies changes in hearing or pain. Nose: denies epistaxis or masses   Throat: denies sore throat or trouble swallowing. Neuro: denies numbness, tingling, tremors. Skin: denies rashes or itching. : denies hematuria, dysuria   GI: denies vomiting, diarrhea   Psych: denies mood changed, anxiety, depression. All other systems negative. Physical Exam   /74   Pulse 73   Resp 16   Ht 5' 8\" (1.727 m)   Wt 174 lb 14.4 oz (79.3 kg)   BMI 26.59 kg/m²   Constitutional: Oriented to person, place, and time. Well-developed and well-nourished. No distress. Head: Normocephalic and atraumatic. Eyes: EOM are normal. Pupils are equal, round, and reactive to light. Neck: Normal range of motion. Neck supple. No hepatojugular reflux and no JVD present. Carotid bruit is not present. No tracheal deviation present. No thyromegaly present. Cardiovascular: Normal rate, regular rhythm, normal heart sounds and intact distal pulses. Exam reveals no gallop and no friction rub. No murmur heard. Pulmonary/Chest: Effort normal and breath sounds normal. No respiratory distress. No wheezes. No rales. No tenderness. Abdominal: Soft. Bowel sounds are normal. No distension and no mass. No tenderness. No rebound and no guarding. Musculoskeletal: Normal range of motion. No edema and no tenderness. Lymphadenopathy:   No cervical adenopathy. No groin adenopathy. Neurological: Alert and oriented to person, place, and time. Skin: Skin is warm and dry. No rash noted. Not diaphoretic. No erythema. Psychiatric: Normal mood and affect.  Behavior is normal.     EKG:  normal sinus rhythm, A paced, V sensed.     Echo Summary 8/16/19:   Ejection fraction is visually estimated at 60%.   The inferior basal wall is hypokinetic.   No regional wall motion abnormalities seen.   Normal right ventricle structure and function.   There is doppler evidence of stage II diastolic dysfunction.   Left atrial volume index of 39 ml per meters squared BSA.   Hx of TAVR with 26 mm S3 3/29/17. The aortic valve area is 1.6 cm2 with a maximum gradient of 15 mmHg and a mean gradient of 7 mmHg.   Mild aortic regurgitation is noted.   Mild mitral regurgitation is present.   Mild tricuspid regurgitation.   Agitated saline injected for shunt evaluation.   No evidence of patent foramen ovale.   No evidence of atrial septal defect. Echo Summary 10/30/2020:   LVEF is 65%. Left ventricle is normal in size . Mild asymmetric septal hypertrophy. No regional wall motion abnormalities seen. Normal left ventricular ejection fraction. The left atrium is severely dilated. Mild mitral annular calcification. Mild mitral regurgitation is present. Normally functioning bioprosthetic valve in aortic position. Physiologic and/or trace tricuspid regurgitation. ASSESSMENT AND PLAN:  Patient Active Problem List   Diagnosis    Chronic combined systolic and diastolic CHF (congestive heart failure) ([facility])    S/P TAVR (transcatheter aortic valve replacement)    Chronic atrial fibrillation    Coronary artery disease involving native coronary artery of native heart without angina pectoris    Essential hypertension    Hyperlipidemia LDL goal <100    GIB (gastrointestinal bleeding)    ICD (implantable cardioverter-defibrillator), dual, in situ    Ischemic heart disease    Vitamin D deficiency    Other insomnia    SOB (shortness of breath)    Lung nodule    Acute on chronic diastolic (congestive) heart failure ([address]    Pure hypercholesterolemia    Stage 3b chronic kidney disease ([address]    Transient cerebral ischemia    Aortic valve disease    Pre-syncope     1. ICD/ICD discharge: Per EP. 2. PAF: In sinus. Xarelto/tikosyn. 3. CAD-CABG: Stable symptoms. BB/statin. Not on ASA to this point due to Xarelto. 4. TAVR: Stable echo 8/16/19.     5. TIA:     Echo no PFO. Xarelto/statin. Drea Mireles D.O.   Cardiologist  Cardiology, [id number]

## 2021-11-22 ENCOUNTER — OFFICE VISIT (OUTPATIENT)
Dept: PRIMARY CARE CLINIC | Age: 86
End: 2021-11-22
Payer: MEDICARE

## 2021-11-22 VITALS
TEMPERATURE: 97.5 F | HEIGHT: 68 IN | WEIGHT: 174 LBS | RESPIRATION RATE: 16 BRPM | HEART RATE: 66 BPM | SYSTOLIC BLOOD PRESSURE: 148 MMHG | BODY MASS INDEX: 26.37 KG/M2 | OXYGEN SATURATION: 96 % | DIASTOLIC BLOOD PRESSURE: 62 MMHG

## 2021-11-22 DIAGNOSIS — R05.9 COUGH: Primary | ICD-10-CM

## 2021-11-22 DIAGNOSIS — J01.00 ACUTE NON-RECURRENT MAXILLARY SINUSITIS: ICD-10-CM

## 2021-11-22 DIAGNOSIS — R06.09 DOE (DYSPNEA ON EXERTION): ICD-10-CM

## 2021-11-22 PROCEDURE — G8417 CALC BMI ABV UP PARAM F/U: HCPCS | Performed by: FAMILY MEDICINE

## 2021-11-22 PROCEDURE — 4040F PNEUMOC VAC/ADMIN/RCVD: CPT | Performed by: FAMILY MEDICINE

## 2021-11-22 PROCEDURE — G8484 FLU IMMUNIZE NO ADMIN: HCPCS | Performed by: FAMILY MEDICINE

## 2021-11-22 PROCEDURE — 1123F ACP DISCUSS/DSCN MKR DOCD: CPT | Performed by: FAMILY MEDICINE

## 2021-11-22 PROCEDURE — G8427 DOCREV CUR MEDS BY ELIG CLIN: HCPCS | Performed by: FAMILY MEDICINE

## 2021-11-22 PROCEDURE — 1036F TOBACCO NON-USER: CPT | Performed by: FAMILY MEDICINE

## 2021-11-22 PROCEDURE — 99213 OFFICE O/P EST LOW 20 MIN: CPT | Performed by: FAMILY MEDICINE

## 2021-11-22 RX ORDER — AZITHROMYCIN 250 MG/1
250 TABLET, FILM COATED ORAL SEE ADMIN INSTRUCTIONS
Qty: 6 TABLET | Refills: 0 | Status: SHIPPED | OUTPATIENT
Start: 2021-11-22 | End: 2021-11-27

## 2021-11-22 ASSESSMENT — ENCOUNTER SYMPTOMS
SINUS PRESSURE: 1
COUGH: 1
SHORTNESS OF BREATH: 1
RHINORRHEA: 1
SORE THROAT: 0

## 2021-11-22 NOTE — PROGRESS NOTES
AT BEDTIME Yes Martha Maddox MD   pravastatin (PRAVACHOL) 40 MG tablet TAKE 1 TABLET BY MOUTH  DAILY Yes Martha Maddox MD   furosemide (LASIX) 40 MG tablet TAKE 1 TABLET BY MOUTH  DAILY  Patient taking differently: Take 40 mg by mouth Alternate 1 tablet and 1/2 tablet every other day Yes Martha Maddox MD   pantoprazole (PROTONIX) 40 MG tablet TAKE 1 TABLET BY MOUTH  DAILY Yes Martha Maddox MD   Handicap Placard MISC by Does not apply route Disp #: 1  Duration: 5 years. Yes Martha Maddox MD   ferrous sulfate (IRON 325) 325 (65 Fe) MG tablet Take 1 tablet by mouth daily (with breakfast) Yes Martha Maddox MD   MAGNESIUM-OXIDE 400 (241.3 Mg) MG TABS tablet TK 1 T PO ONCE D Yes Historical Provider, MD   dofetilide (TIKOSYN) 250 MCG capsule Take 1 capsule by mouth 2 times daily Yes Martha Maddox MD   Multiple Vitamins-Minerals (PRESERVISION AREDS 2 PO) Take 1 tablet by mouth 2 times daily Yes Historical Provider, MD   Cholecalciferol (VITAMIN D) 2000 UNITS CAPS capsule Take 1 capsule by mouth daily  Yes Historical Provider, MD        No Known Allergies    Social History     Tobacco Use    Smoking status: Former Smoker     Packs/day: 0.50     Years: 3.00     Pack years: 1.50     Quit date: 1973     Years since quittin.8    Smokeless tobacco: Never Used    Tobacco comment: Quit smoking in    Substance Use Topics    Alcohol use: No           Vitals:    21 1441   BP: (!) 148/62   Pulse: 66   Resp: 16   Temp: 97.5 °F (36.4 °C)   TempSrc: Temporal   SpO2: 96%   Weight: 174 lb (78.9 kg)   Height: 5' 8\" (1.727 m)     Estimated body mass index is 26.46 kg/m² as calculated from the following:    Height as of this encounter: 5' 8\" (1.727 m). Weight as of this encounter: 174 lb (78.9 kg). Physical Exam  Constitutional:       Appearance: He is well-developed. HENT:      Head: Normocephalic.       Right Ear: Tympanic membrane normal.      Left Ear: Tympanic membrane normal. Mouth/Throat:      Pharynx: Posterior oropharyngeal erythema present. No oropharyngeal exudate. Comments: Mild posterior oropharyngeal cobblestoning. Eyes:      Extraocular Movements: Extraocular movements intact. Conjunctiva/sclera: Conjunctivae normal.   Cardiovascular:      Rate and Rhythm: Normal rate and regular rhythm. Heart sounds: Normal heart sounds. No murmur heard. Pulmonary:      Effort: Pulmonary effort is normal.      Breath sounds: Normal breath sounds. No wheezing or rales. Abdominal:      General: Bowel sounds are normal.      Palpations: Abdomen is soft. Tenderness: There is no abdominal tenderness. Neurological:      General: No focal deficit present. Mental Status: He is alert. Comments: Cranial nerves grossly intact   Psychiatric:         Mood and Affect: Mood normal.         Judgment: Judgment normal.         ASSESSMENT/PLAN:  Davina Uriostegui was seen today for uri. Diagnoses and all orders for this visit:    Cough  -     azithromycin (ZITHROMAX) 250 MG tablet; Take 1 tablet by mouth See Admin Instructions for 5 days 500mg on day 1 followed by 250mg on days 2 - 5  -     Cancel: XR CHEST (2 VW); Future    Acute non-recurrent maxillary sinusitis  -     azithromycin (ZITHROMAX) 250 MG tablet; Take 1 tablet by mouth See Admin Instructions for 5 days 500mg on day 1 followed by 250mg on days 2 - 5  -     Cancel: XR CHEST (2 VW); Future    TORRES (dyspnea on exertion)  -     Cancel: XR CHEST (2 VW); Future    Symptoms may secondary to seasonal allergy-like issues versus possible bacterial sinus infection. Will treat with antibiotics as noted above. Side effects reviewed. All questions and concerns answered. Patient also to start daily dosing of his meclizine. Patient also start Flonase. Side effects reviewed. With any new or worsening symptoms patient to return to walk-in clinic.   With any significantly worsening symptoms or severe allergic reaction patient to proceed to the ER. Return if symptoms worsen or fail to improve. An electronicsignature was used to authenticate this note.     --Desmond Abernathy MD on 11/22/21 at 2:52 PM EST

## 2021-12-15 DIAGNOSIS — K92.1 GASTROINTESTINAL HEMORRHAGE WITH MELENA: ICD-10-CM

## 2021-12-15 RX ORDER — PANTOPRAZOLE SODIUM 40 MG/1
40 TABLET, DELAYED RELEASE ORAL DAILY
Qty: 90 TABLET | Refills: 3 | Status: SHIPPED | OUTPATIENT
Start: 2021-12-15

## 2021-12-27 ENCOUNTER — OFFICE VISIT (OUTPATIENT)
Dept: PRIMARY CARE CLINIC | Age: 86
End: 2021-12-27
Payer: MEDICARE

## 2021-12-27 VITALS
SYSTOLIC BLOOD PRESSURE: 136 MMHG | HEIGHT: 68 IN | OXYGEN SATURATION: 96 % | HEART RATE: 72 BPM | RESPIRATION RATE: 18 BRPM | DIASTOLIC BLOOD PRESSURE: 70 MMHG | TEMPERATURE: 97.5 F | WEIGHT: 174 LBS | BODY MASS INDEX: 26.37 KG/M2

## 2021-12-27 DIAGNOSIS — I48.20 CHRONIC ATRIAL FIBRILLATION (HCC): ICD-10-CM

## 2021-12-27 DIAGNOSIS — N18.32 STAGE 3B CHRONIC KIDNEY DISEASE (HCC): Primary | ICD-10-CM

## 2021-12-27 DIAGNOSIS — R79.89 ELEVATED LFTS: ICD-10-CM

## 2021-12-27 DIAGNOSIS — H81.10 BENIGN PAROXYSMAL POSITIONAL VERTIGO, UNSPECIFIED LATERALITY: ICD-10-CM

## 2021-12-27 DIAGNOSIS — I50.42 CHRONIC COMBINED SYSTOLIC AND DIASTOLIC CHF (CONGESTIVE HEART FAILURE) (HCC): ICD-10-CM

## 2021-12-27 DIAGNOSIS — M51.36 DDD (DEGENERATIVE DISC DISEASE), LUMBAR: ICD-10-CM

## 2021-12-27 DIAGNOSIS — E78.5 HYPERLIPIDEMIA LDL GOAL <100: ICD-10-CM

## 2021-12-27 DIAGNOSIS — R05.3 CHRONIC COUGH: ICD-10-CM

## 2021-12-27 DIAGNOSIS — I10 ESSENTIAL HYPERTENSION: ICD-10-CM

## 2021-12-27 DIAGNOSIS — I25.10 CORONARY ARTERY DISEASE INVOLVING NATIVE CORONARY ARTERY OF NATIVE HEART WITHOUT ANGINA PECTORIS: ICD-10-CM

## 2021-12-27 DIAGNOSIS — E55.9 VITAMIN D DEFICIENCY: ICD-10-CM

## 2021-12-27 DIAGNOSIS — Z95.810 ICD (IMPLANTABLE CARDIOVERTER-DEFIBRILLATOR), DUAL, IN SITU: ICD-10-CM

## 2021-12-27 PROCEDURE — 1036F TOBACCO NON-USER: CPT | Performed by: FAMILY MEDICINE

## 2021-12-27 PROCEDURE — G8427 DOCREV CUR MEDS BY ELIG CLIN: HCPCS | Performed by: FAMILY MEDICINE

## 2021-12-27 PROCEDURE — G8417 CALC BMI ABV UP PARAM F/U: HCPCS | Performed by: FAMILY MEDICINE

## 2021-12-27 PROCEDURE — 1123F ACP DISCUSS/DSCN MKR DOCD: CPT | Performed by: FAMILY MEDICINE

## 2021-12-27 PROCEDURE — 4040F PNEUMOC VAC/ADMIN/RCVD: CPT | Performed by: FAMILY MEDICINE

## 2021-12-27 PROCEDURE — 99214 OFFICE O/P EST MOD 30 MIN: CPT | Performed by: FAMILY MEDICINE

## 2021-12-27 PROCEDURE — G8484 FLU IMMUNIZE NO ADMIN: HCPCS | Performed by: FAMILY MEDICINE

## 2021-12-27 ASSESSMENT — ENCOUNTER SYMPTOMS
CONSTIPATION: 0
WHEEZING: 0
SORE THROAT: 0
COUGH: 1
SHORTNESS OF BREATH: 0
VOMITING: 0
NAUSEA: 0
DIARRHEA: 0
ABDOMINAL PAIN: 0
RHINORRHEA: 0

## 2021-12-27 NOTE — PROGRESS NOTES
2021     Chief Complaint   Patient presents with    Hypertension     HPI  Landen Evans (:  1935) is a 82 y.o. male, here for evaluation of the following medical concerns:    Patient is a 63-year-old male with a past medical history of CHF, atrial fibrillation, nonsustained ventricular tachycardia, hyperlipidemia, hypertension, history of MI, insomnia, remote history of gastrointestinal hemorrhage with melena, ICD placement, and vitamin D deficiency who presents today to for a 6 month follow-up apt.     Occasional cough w/ cold foods. On PPI and allergy medication. Not always present. Not interested in seeing GI or Gen Surg.      Lower back issues: No sig back or knee pain. No swelling. Numbness in his right thigh to foot. Hx of bypass surgery. Was sent to PT in the past.      History of nonsustained ventricular tachycardia: On Tikosyn and, Lasix, and metoprolol. Patient follows both with cardiology and electrophysiology for this issue.     History of atrial fibrillation: Patient is rate controlled on metoprolol and anticoagulated on   Xarelto.     CHF: Patient is to be followed with cardiology in regards to this issue. No recent LE swelling. (Chronic rales on exam).      Hyperlipidemia: Currently on statin. Following with cardiology. Tolerating statin well without any side effects.     Hypertension: Blood pressure is appropriate on repeat today. Still would like to have BP <140/90. Tolerating his blood pressure medications well. No side effects.     History of MI: Patient's 1st heart attack was in . Has had multiple stents and bypass surgeries.  Patient is following with cardiology.     Insomnia: Some issues with awaking at night.      Lung Nodule: Has been discussed in the past in regards to the risk of lung cancer which could result in death. Patient would still like to continue to hold off on bx.      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       CKD III: Stable.     Vit D Def: On vitamin D supplementation.     Hx of GI bleed: Not on ASA due to [address]  Patient does have an ICD in place.     Cough: Chronic. Started on Zyrtec 5mg daily at last apt. Chest XR was normal.  Patient has tried H1 blockers in the past.  Patient currently is not on such.  Is using nasal spray without significant relief in regards to cough. Reports improved post nasal drip.      Health maintenance: Patient is due shingles immunization. Review of Systems   Constitutional: Negative for chills and fever. HENT: Negative for congestion, rhinorrhea and sore throat. Respiratory: Positive for cough. Negative for shortness of breath and wheezing. Cardiovascular: Negative for chest pain and leg swelling. Gastrointestinal: Negative for abdominal pain, constipation, diarrhea, nausea and vomiting. Skin: Negative for rash. Neurological: Negative for light-headedness and headaches. Past Medical History:   Diagnosis Date    A-fib [facility])     Arrhythmia     Carotid artery stenosis     [facility]     Heart valve problem     Hyperlipidemia     Hypertension     ICD (implantable cardiac defibrillator) in place 2007    MI (mitral incompetence) 2003    MI (myocardial infarction) ([facility] [address] 2003       Prior to Visit Medications    Medication Sig Taking?  Authorizing Provider   pantoprazole (PROTONIX) 40 MG tablet TAKE 1 TABLET BY MOUTH  DAILY Yes Kristina Mcdowell MD   meclizine (ANTIVERT) 25 MG tablet Take 1 tablet by mouth 3 times daily as needed for Dizziness Yes Kristina Mcdowell MD   potassium chloride (MICRO-K) 10 MEQ extended release capsule Take 1 capsule by mouth daily Yes Kristina Mcdowell MD   XARELTO 20 MG TABS tablet TAKE 1 TABLET BY MOUTH  DAILY Yes Chino Menendez DO   metoprolol tartrate (LOPRESSOR) 50 MG tablet TAKE 1 AND 1/2 TABLETS BY  MOUTH IN THE MORNING AND 1  TABLET BY MOUTH AT BEDTIME Yes Kristina Mcdowell MD   pravastatin (PRAVACHOL) 40 MG tablet TAKE 1 TABLET BY MOUTH sounds: Normal heart sounds. No murmur heard. Pulmonary:      Effort: Pulmonary effort is normal.      Breath sounds: Rales present. No wheezing. Abdominal:      General: Bowel sounds are normal.      Palpations: Abdomen is soft. Tenderness: There is no abdominal tenderness. Musculoskeletal:      Right lower leg: No edema. Left lower leg: No edema. Neurological:      General: No focal deficit present. Mental Status: He is alert. Comments: Cranial nerves grossly intact   Psychiatric:         Mood and Affect: Mood normal.         Judgment: Judgment normal.         ASSESSMENT/PLAN:  Yvon Velasquez was seen today for hypertension. Diagnoses and all orders for this visit:    Stage 3b chronic kidney disease ([facility] [address]  Following every 4 to 6 months with lab work. Stable on recent blood work. Benign paroxysmal positional vertigo, unspecified laterality  Patient has seen ENT for this in the past.  Was advised on physical therapy but has yet to complete physical therapy. Does have dizziness at times. Using meclizine regularly with modest benefit. Coronary artery disease involving native coronary artery of native heart without angina pectoris  Patient is on appropriate therapy. Recently saw cardiology. No change in medications. No additional work-up or recommendations advised by cardiology. Chronic atrial fibrillation (HCC)  Irregular rhythm today. Patient is following with cardiology and electrophysiology. Patient anticoagulated. No bleeding or bruising issues. Chronic combined systolic and diastolic CHF (congestive heart failure) (HCC)  Stable. Chronic rales on exam.  No acute exacerbation. No lower extremity swelling. Follow cardiology. DDD (degenerative disc disease), lumbar  Stable. No issues. Hyperlipidemia LDL goal <100  -     Lipid Panel; Future  Tolerating statin well without any issue. Continue to follow.     Vitamin D deficiency  -     Vitamin D 25 Hydroxy; Future    Elevated LFTs  Repeat CMP prior to next appointment. Essential hypertension  -     CBC Auto Differential; Future  -     Comprehensive Metabolic Panel; Future  -     Microalbumin / Creatinine Urine Ratio; Future    ICD (implantable cardioverter-defibrillator), dual, in situ  Patient need to follow-up with electrophysiology in the near future. Chronic cough  -     FL ESOPHAGRAM; Future  I tried the patient on multiple medications in regards to postnasal drip issues with only mild benefit. Will check swallow study. HM: Up-to-date. Labs and esophagram prior to next appointment. Return in about 4 months (around 4/27/2022) for Routine Follow-up of Chronic Conditions, To Discuss Labs Results. An electronicsignature was used to authenticate this note.     --Merlin Smith MD on 12/27/21 at 10:37 AM EST

## 2022-01-13 ENCOUNTER — TELEPHONE (OUTPATIENT)
Dept: CARDIOLOGY CLINIC | Age: 87
End: 2022-01-13

## 2022-01-13 ENCOUNTER — TELEPHONE (OUTPATIENT)
Dept: ADMINISTRATIVE | Age: 87
End: 2022-01-13

## 2022-01-13 RX ORDER — AMLODIPINE BESYLATE 2.5 MG/1
2.5 TABLET ORAL DAILY
COMMUNITY
End: 2022-01-13 | Stop reason: SDUPTHER

## 2022-01-13 RX ORDER — AMLODIPINE BESYLATE 2.5 MG/1
2.5 TABLET ORAL DAILY
Qty: 90 TABLET | Refills: 1 | Status: SHIPPED
Start: 2022-01-13 | End: 2022-02-24 | Stop reason: SDUPTHER

## 2022-01-13 NOTE — TELEPHONE ENCOUNTER
Patient states that his bp has been elevated lately with systolic average 351, this am his bp was 193/90 so wife called the EMTs  And they checked his bp again and it was 160/93, patient is not having any CP or SOB, states feels fine but asked if he should resume amlodipine which was discontinued at hospital visit in 2/2020, please advise

## 2022-01-13 NOTE — TELEPHONE ENCOUNTER
Galo greerc. Srinivas Gutierrez D.O.   Cardiologist  Cardiology, [facility]

## 2022-01-13 NOTE — TELEPHONE ENCOUNTER
Pt called to schedule appt w/ Valentin Venegas, his bp is elevated, wife called ambulance, chandrakant 160/93 this morning, they advised he call his dr, please advise pt at [phone]

## 2022-01-19 ENCOUNTER — CARE COORDINATION (OUTPATIENT)
Dept: CARE COORDINATION | Age: 87
End: 2022-01-19

## 2022-01-19 NOTE — CARE COORDINATION
-ACM attempted to reach patient to offer enrollment into Care Coordination program, however no answer. -HIPAA compliant VM left introducing self, reason for call, and brief explanation of program.  -Left ACM's contact information, requesting call back. If no return call, will attempt outreach again.

## 2022-01-21 ENCOUNTER — CARE COORDINATION (OUTPATIENT)
Dept: CARE COORDINATION | Age: 87
End: 2022-01-21

## 2022-01-21 SDOH — HEALTH STABILITY: PHYSICAL HEALTH: ON AVERAGE, HOW MANY DAYS PER WEEK DO YOU ENGAGE IN MODERATE TO STRENUOUS EXERCISE (LIKE A BRISK WALK)?: 0 DAYS

## 2022-01-21 SDOH — ECONOMIC STABILITY: TRANSPORTATION INSECURITY
IN THE PAST 12 MONTHS, HAS LACK OF TRANSPORTATION KEPT YOU FROM MEETINGS, WORK, OR FROM GETTING THINGS NEEDED FOR DAILY LIVING?: NO

## 2022-01-21 SDOH — HEALTH STABILITY: PHYSICAL HEALTH: ON AVERAGE, HOW MANY MINUTES DO YOU ENGAGE IN EXERCISE AT THIS LEVEL?: 0 MIN

## 2022-01-21 SDOH — ECONOMIC STABILITY: TRANSPORTATION INSECURITY
IN THE PAST 12 MONTHS, HAS THE LACK OF TRANSPORTATION KEPT YOU FROM MEDICAL APPOINTMENTS OR FROM GETTING MEDICATIONS?: NO

## 2022-01-21 ASSESSMENT — SOCIAL DETERMINANTS OF HEALTH (SDOH)
HOW OFTEN DO YOU ATTENT MEETINGS OF THE CLUB OR ORGANIZATION YOU BELONG TO?: NEVER
DO YOU BELONG TO ANY CLUBS OR ORGANIZATIONS SUCH AS CHURCH GROUPS UNIONS, FRATERNAL OR ATHLETIC GROUPS, OR SCHOOL GROUPS?: NO
HOW OFTEN DO YOU ATTEND CHURCH OR RELIGIOUS SERVICES?: NEVER
HOW OFTEN DO YOU GET TOGETHER WITH FRIENDS OR RELATIVES?: ONCE A WEEK
IN A TYPICAL WEEK, HOW MANY TIMES DO YOU TALK ON THE PHONE WITH FAMILY, FRIENDS, OR NEIGHBORS?: THREE TIMES A WEEK

## 2022-01-21 ASSESSMENT — LIFESTYLE VARIABLES: HOW OFTEN DO YOU HAVE A DRINK CONTAINING ALCOHOL: NEVER

## 2022-01-21 NOTE — CARE COORDINATION
-ACM spoke to patient to offer enrollment into Care Coordination program.  -Introduced self, reason for call, and explanation of program.   -Patient accepted. -Plan: Patient agrees to enroll into Care Coordination today. Rometta Favre

## 2022-01-21 NOTE — CARE COORDINATION
Ambulatory Care Coordination Note  1/21/2022  CM Risk Score: 6  Charlson 10 Year Mortality Risk Score: 100%     ACC: Teo Park, RN    Summary Note:   ACM contacted patient to complete Care Coordination enrollment regarding CHF, HTN, PAP (Xarelto, Tikosyn) & explore community resources.   -Specialists noted: Dr. Robbin Arrington (Cardio), Dr. Jonny Alves (ENT), Dr. Jorge Sanchez (EP Cardio). -Pt lives with his wife, Rudi Mccall in a ranch style home with one step to enter the front door, 2 steps to enter the back door and laundry in the basement.   -Pt has a BP monitor, weight scale & nebulizer.   -Pt uses [facility] for pharmacy needs.   -Pt has a straight & quad cane, walker, wheelchair x2, BSC and shower chair. Pt said he has all this equipment but he does not use any at this time.   -Pt reports he has macular degeneration and his eye sight is poor. Pt said he has not driven for 5 or 6 years and it's difficult to read. Rudi Mccall is the main . Educated Pt on the [facility] transportation [place] for PCP appts. Pt appreciated the information.   -Pt has 2 adult children that live local.   -Rudi Mccall does all the cooking, laundry and grocery shopping. Pt said he assists with light house keeping and folds the clothes. Pt reports Rudi Mccall monitors sodium with cooking.   -Pt reports he does not exercise much. He said he has Silver Sneakers with his insurance. He is going to look into getting back to exercising. CHF / HTN  -Goal weight in the 170's lbs range.   -Pt reports he weights himself about twice a week. Last weight was 174 lbs on 1-  Pt said his weight was as high as 190's lbs and he wants to stay in the 170's range.   -Discussed the best time to weight self is first thing in the morning, after bladder emptied and minimal clothes on. Pt said he understands.   -Discussed monitoring weight daily, logging the results and follow the trend.  Discussed how his weight may increase related to water weight and how it will reflect on his weight on the scale. Will mail CHF Zone tool.   -Pt reports he only becomes SOB with exertion if he walks too fast. He normally walks taking his time.   -Pt reports a cough on occasion if he feels some mucous.     -Pt reports he is on a low sodium diet. He does not add salt to his food. Pt said his wife does all the cooking and she monitors for sodium and reads labels. Discussed how salt is hidden in food. Examples given are canned soup & vegetables, lunch meats, boxed mac & cheese, packaged rice, condiments, marcano, sausage. ..... Sandra Dove Will mail information on monitoring sodium and reading labels.  -Offered dietitian referral, Pt declined.   -Pt reports no ankle/feet edema noted. Resources  -Pt reports Xarelto and Tikosyn can be costly. Offer PAP, Pt accepted. PAP referral made.   -Pt declined referrals for pharmacist, dietitian and Social Work at this time. Will offer ACP. Pt does not feel he needs a life alert button.   -Pt reports they have the [place]. PLAN:  -Continue Care Coordination  -Reinforce Zone Management Tools (CHF)  -PAP referral > Xarelto, Tikosyn  -Offer ACP  -Esophagram 2-  -PCP appr 4-  -F/u exercise > [place]  -F/u weighing self daily, logging results and following trend.   -Will mail education CHF Zone tool, HTN info, monitoring sodium, reading labels and monitoring log for weight and BP.       -Next anticipated outreach by [address] [place] [address] Team: 1 weeks. Ambulatory Care Coordination Assessment    Care Coordination Protocol  Program Enrollment: Complex Care  Referral from Primary Care Provider: No  Week 1 - Initial Assessment     Do you have all of your prescriptions and are they filled?: Yes  Barriers to medication adherence: None  Are you able to afford your medications?: No  How often do you have trouble taking your medications the way you have been told to take them?: I always take them as prescribed.      Do you have Home O2 Therapy?: No Ability to seek help/take action for Emergent Urgent situations i.e. fire, crime, inclement weather or health crisis. : Independent  Ability to ambulate to restroom: Independent  Ability handle personal hygeine needs (bathing/dressing/grooming): Independent  Ability to manage Medications: Independent  Ability to prepare Food Preparation: Independent  Ability to maintain home (clean home, laundry): Independent  Ability to drive and/or has transportation: Dependent  Ability to do shopping: Independent  Ability to manage finances: Dependent  Is patient able to live independently?: Yes     Current Housing: Private Residence        Per the Fall Risk Screening, did the patient have 2 or more falls or 1 fall with injury in the past year?: No     Frequent urination at night?: No  Do you use rails/bars?: Yes  Do you have a non-slip tub mat?: Yes     Are you experiencing loss of meaning?: No  Are you experiencing loss of hope and peace?: No     Thinking about your patient's physical health needs, are there any symptoms or problems (risk indicators) you are unsure about that require further investigation?: No identified areas of uncertainly or problems already being investigated   Are the patients physical health problems impacting on their mental well-being?: No identified areas of concern   Are there any problems with your patients lifestyle behaviors (alcohol, drugs, diet, exercise) that are impacting on physical or mental well-being?: Some mild concern of potential negative impact on well-being   Do you have any other concerns about your patients mental well-being?  How would you rate their severity and impact on the patient?: No identified areas of concern   How would you rate their home environment in terms of safety and stability (including domestic violence, insecure housing, neighbor harassment)?: Consistently safe, supportive, stable, no identified problems   How do daily activities impact on the patient's 12/15/21  Yes Veronika Wisdom MD   potassium chloride (MICRO-K) 10 MEQ extended release capsule Take 1 capsule by mouth daily 8/30/21  Yes Veronika Wisdom MD   XARELTO 20 MG TABS tablet TAKE 1 TABLET BY MOUTH  DAILY 3/29/21  Yes Monique Dry, DO   metoprolol tartrate (LOPRESSOR) 50 MG tablet TAKE 1 AND 1/2 TABLETS BY  MOUTH IN THE MORNING AND 1  TABLET BY MOUTH AT BEDTIME 3/11/21  Yes Veronika Wisdom MD   pravastatin (PRAVACHOL) 40 MG tablet TAKE 1 TABLET BY MOUTH  DAILY 3/11/21  Yes Veronika Wisdom MD   furosemide (LASIX) 40 MG tablet TAKE 1 TABLET BY MOUTH  DAILY  Patient taking differently: Take 40 mg by mouth Alternate 1 tablet and 1/2 tablet every other day 3/11/21  Yes Veronika Wisdom MD   Handicap Placard MISC by Does not apply route Disp #: 1  Duration: 5 years.  11/16/20  Yes Veronika Wisdom MD   ferrous sulfate (IRON 325) 325 (65 Fe) MG tablet Take 1 tablet by mouth daily (with breakfast) 11/5/20  Yes Veronika Wisdom MD   MAGNESIUM-OXIDE 400 (241.3 Mg) MG TABS tablet TK 1 T PO ONCE D 3/18/20  Yes Historical Provider, MD   dofetilide (TIKOSYN) 250 MCG capsule Take 1 capsule by mouth 2 times daily 3/5/20  Yes Veronika Wisdom MD   Multiple Vitamins-Minerals (PRESERVISION AREDS 2 PO) Take 1 tablet by mouth 2 times daily   Yes Historical Provider, MD   Cholecalciferol (VITAMIN D) 2000 UNITS CAPS capsule Take 1 capsule by mouth daily    Yes Historical Provider, MD       Future Appointments   Date Time Provider Cezar Gamez   2/21/2022 11:00 AM SEB XRAY RM 2 [facility] RAD [facility] Radiolog   4/27/2022  1:00 PM MD [facility]      and   Congestive Heart Failure Assessment    Are you currently restricting fluids?: No Restriction  Do you understand a low sodium diet?: Yes  Do you understand how to read food labels?: Yes  How many restaurant meals do you eat per week?: 1-2  Do you salt your food before tasting it?: No     No patient-reported symptoms      Symptoms:  None: Yes

## 2022-01-24 RX ORDER — POTASSIUM CHLORIDE 750 MG/1
TABLET, EXTENDED RELEASE ORAL
Qty: 90 TABLET | Refills: 3 | Status: SHIPPED
Start: 2022-01-24 | End: 2022-06-27

## 2022-01-28 ENCOUNTER — CARE COORDINATION (OUTPATIENT)
Dept: CARE COORDINATION | Age: 87
End: 2022-01-28

## 2022-01-28 NOTE — CARE COORDINATION
Ambulatory Care Coordination Note  1/28/2022  CM Risk Score: 6  Charlson 10 Year Mortality Risk Score: 100%     ACC: Holley Landa RN    Summary Note:   ACM contacted patient to follow up on status regarding CHF. HTN, PAP for Care Coordination.  -Pt reports complete medication compliance.   -Pt has macular degeneration. Pt no longer drives and has reading difficulties.   -Pt reports he is unable to attempt exercise with Silver Sneakers until the weather breaks. His wife is his transportation.   -Pt reports he has not received education yet from the mail. CHF/HTN:  -Condition remains stable  -Goal weight in the 170's lbs range.   -On 1- Pt reports weight 174 lbs & /65.  -12- PCP office visit: /70 & weight 174 lbs.  -Pt reports some SOB with exertion and climbing up steps. -Occasional cough is baseline.    -Pt reports no ankle/feetedema.   -Reviewed CHF zone tool. Resources  -Pt reports he spoke with PAP. Pt has to spend $1000 out of pocket for Xarelto. There is program for Tikosyn, Pt will be enrolling. PLAN:  -Continue Care Coordination  -Reinforce Zone Management Tools (CHF)  -F/u -PAP referral > Xarelto, Tikosyn  -Offer ACP  -Esophagram 2-  -PCP appr 4-  -F/u exercise > Silver Sneakers when weather improves  -F/u weighing self daily, logging results and following trend.   -Will mail education CHF Zone tool, HTN info, monitoring sodium, reading labels and monitoring log for weight and BP.       -Next anticipated outreach by [address]  Team: 1 weeks.           Care Coordination Interventions    Program Enrollment: Complex Care  Referral from Primary Care Provider: No  Suggested Interventions and [employer] on Wheels: Declined  Medication Assistance Program: Completed (Comment: 1/21/22 Costly medications (Xarelto, Tikosyn), PAP referral made. )  Pharmacist: Declined  Registered Dietician: Declined  Social Work: Declined  Transportation Support: Completed  Zone Management Tools: Completed (Comment: 1/21/22 Mailed CHF zone tool. )  Other Services or Interventions: Will mail CHR zone, HTN info, monitoring sodium, reading labels, monitoring log for BP & weight. Goals Addressed                 This Visit's Progress     Medication Management        I will talk with PAP  in attempt to find a more cost effective price for Xarelto & Tikosyn. Barriers: financial and lack of education  Plan for overcoming my barriers: Care Coordination, PAP referral  Confidence: 8/10  Anticipated Goal Completion Date: 5- 1-28-22 PAP reports no assistance for Xarelto until Pt reaches $1000 out of pocket. There is a program for [employer]. Pt will be enrolling. Prior to Admission medications    Medication Sig Start Date End Date Taking? Authorizing Provider   potassium chloride (KLOR-CON M) 10 MEQ extended release tablet TAKE 1 TABLET BY MOUTH  DAILY 1/24/22   Mary Torres MD   amLODIPine (NORVASC) 2.5 MG tablet Take 1 tablet by mouth daily 1/13/22   José Garcia DO   pantoprazole (PROTONIX) 40 MG tablet TAKE 1 TABLET BY MOUTH  DAILY 12/15/21   Mary Torres MD   potassium chloride (MICRO-K) 10 MEQ extended release capsule Take 1 capsule by mouth daily 8/30/21   Mary Torres MD   XARELTO 20 MG TABS tablet TAKE 1 TABLET BY MOUTH  DAILY 3/29/21   José Garcia DO   metoprolol tartrate (LOPRESSOR) 50 MG tablet TAKE 1 AND 1/2 TABLETS BY  MOUTH IN THE MORNING AND 1  TABLET BY MOUTH AT BEDTIME 3/11/21   Mary Torres MD   pravastatin (PRAVACHOL) 40 MG tablet TAKE 1 TABLET BY MOUTH  DAILY 3/11/21   Mary Torres MD   furosemide (LASIX) 40 MG tablet TAKE 1 TABLET BY MOUTH  DAILY  Patient taking differently: Take 40 mg by mouth Alternate 1 tablet and 1/2 tablet every other day 3/11/21   Mary Torres MD   Handicap Placard MISC by Does not apply route Disp #: 1  Duration: 5 years.  11/16/20   Mary Torres MD   ferrous sulfate (IRON 325) 325 (65 Fe) MG tablet Take 1 tablet by mouth daily (with breakfast) 11/5/20   Marva Montes MD   MAGNESIUM-OXIDE 400 (241.3 Mg) MG TABS tablet TK 1 T PO ONCE D 3/18/20   Historical Provider, MD   dofetilide (TIKOSYN) 250 MCG capsule Take 1 capsule by mouth 2 times daily 3/5/20   Marva Montes MD   Multiple Vitamins-Minerals (PRESERVISION AREDS 2 PO) Take 1 tablet by mouth 2 times daily    Historical Provider, MD   Cholecalciferol (VITAMIN D) 2000 UNITS CAPS capsule Take 1 capsule by mouth daily     Historical Provider, MD       Future Appointments   Date Time Provider Cezar Cruzi   2/21/2022 11:00 AM SEB XRAY RM 2 [facility] RAD SEB Radiolog   4/27/2022  1:00 PM MD PR[facility]CILLA [facility]      and   Congestive Heart Failure Assessment    Are you currently restricting fluids?: No Restriction  Do you understand a low sodium diet?: Yes  Do you understand how to read food labels?: Yes  How many restaurant meals do you eat per week?: 1-2  Do you salt your food before tasting it?: No     No patient-reported symptoms      Symptoms:  None: Yes      Symptom course: stable  Patient-reported weight (lb): 174  Weight trend: stable

## 2022-02-04 ENCOUNTER — CARE COORDINATION (OUTPATIENT)
Dept: CARE COORDINATION | Age: 87
End: 2022-02-04

## 2022-02-04 NOTE — CARE COORDINATION
Ambulatory Care Coordination Note  2/4/2022  CM Risk Score: 6  Charlson 10 Year Mortality Risk Score: 100%     ACC: Jovany Juarez RN    Summary Note:   ACM contacted patient to follow up on status CHF, HTN & PAP (Tikosyn) for Care Coordination.  -Pt reports complete medication compliance.   -Pt has macular degeneration. Pt no longer drives and has reading difficulties.   -Pt reports he is unable to attempt exercise with Silver Sneakers until the weather breaks. His wife is his transportation.   -Pt reports he has received the education in the mail. However, he has read some but what he is unable to read, his wife Cristofer Mayfield will read to him. CHF:  -Condition remains stable  -Goal weight in the 170's lbs range.   -Today's weight 173 lbs & /77.  -12- PCP office visit: /70 & weight 174 lbs.  -Pt reports SOB with exertion and climbing up steps is unchanged.   -Rare cough is baseline unless he has mucous to cough out.    -Pt reports no ankle/feetedema.   -Reviewed CHF zone tool.     Resources  -Pt reports his daughter in law is filling out the paper work for [employer]. She has not been able to come to his home due to the weather. When the paper are completed, he will submit.   -Offered ACP for LW & POA, Pt declined. He said he has them.   -Pt MyChart indicates active. Pt said he doesn't go on the computer due to his vision.   -Pt reports he has had all three Covid vaccines. PLAN:  -Continue Care Coordination  -Reinforce Zone Management Tools (CHF)  -F/u -PAP referral >Tikosyn  -Esophagram 2-  -PCP appr 4-  -F/u exercise > Silver Sneakers when weather improves  -F/u weighing self daily, logging results and following trend.     -Next anticipated outreach by [address] [place] [address] Team: 1 weeks.        Care Coordination Interventions    Program Enrollment: Complex Care  Referral from Primary Care Provider: No  Suggested Interventions and [employer] on Wheels: Declined  Medication Assistance Program: Completed (Comment: 1/21/22 Costly medications (Xarelto, Tikosyn), PAP referral made. )  Pharmacist: [address] [facility] [address]: Declined  Social Work: Declined  Transportation Support: Completed  Zone Management Tools: Completed (Comment: 1/21/22 Mailed CHF zone tool. )  Other Services or Interventions: Will mail CHR zone, HTN info, monitoring sodium, reading labels, monitoring log for BP & weight. Goals Addressed    None         Prior to Admission medications    Medication Sig Start Date End Date Taking? Authorizing Provider   potassium chloride (KLOR-CON M) 10 MEQ extended release tablet TAKE 1 TABLET BY MOUTH  DAILY 1/24/22   Elaine Penaloza MD   amLODIPine (NORVASC) 2.5 MG tablet Take 1 tablet by mouth daily 1/13/22   Juan Shoemaker DO   pantoprazole (PROTONIX) 40 MG tablet TAKE 1 TABLET BY MOUTH  DAILY 12/15/21   Elaine Penaloza MD   potassium chloride (MICRO-K) 10 MEQ extended release capsule Take 1 capsule by mouth daily 8/30/21   Elaine Penaloza MD   XARELTO 20 MG TABS tablet TAKE 1 TABLET BY MOUTH  DAILY 3/29/21   Juan Shoemaker DO   metoprolol tartrate (LOPRESSOR) 50 MG tablet TAKE 1 AND 1/2 TABLETS BY  MOUTH IN THE MORNING AND 1  TABLET BY MOUTH AT BEDTIME 3/11/21   Elaine Penaloza MD   pravastatin (PRAVACHOL) 40 MG tablet TAKE 1 TABLET BY MOUTH  DAILY 3/11/21   Elaine Penaloza MD   furosemide (LASIX) 40 MG tablet TAKE 1 TABLET BY MOUTH  DAILY  Patient taking differently: Take 40 mg by mouth Alternate 1 tablet and 1/2 tablet every other day 3/11/21   Elaine Penaloza MD   Handicap Placard MISC by Does not apply route Disp #: 1  Duration: 5 years.  11/16/20   Eliane Penaloza MD   ferrous sulfate (IRON 325) 325 (65 Fe) MG tablet Take 1 tablet by mouth daily (with breakfast) 11/5/20   Elaine Penaloza MD   MAGNESIUM-OXIDE 400 (241.3 Mg) MG TABS tablet TK 1 T PO ONCE D 3/18/20   Historical Provider, MD   dofetilide (TIKOSYN) 250 MCG capsule Take 1 capsule by mouth 2 times daily 3/5/20   Teja Mart MD   Multiple Vitamins-Minerals (PRESERVISION AREDS 2 PO) Take 1 tablet by mouth 2 times daily    Historical Provider, MD   Cholecalciferol (VITAMIN D) 2000 UNITS CAPS capsule Take 1 capsule by mouth daily     Historical Provider, MD       Future Appointments   Date Time Provider Cezar Hamptonisti   2/21/2022 11:00 AM SEB XRAY RM 2 SEBZ RAD SEB Radiolog   4/27/2022  1:00 PM MD [facility]      and   Congestive Heart Failure Assessment    Are you currently restricting fluids?: No Restriction  Do you understand a low sodium diet?: Yes  Do you understand how to read food labels?: Yes  How many restaurant meals do you eat per week?: 1-2  Do you salt your food before tasting it?: No     No patient-reported symptoms      Symptoms:  None: Yes      Symptom course: stable  Patient-reported weight (lb): 173  Weight trend: stable  Salt intake watch compared to last visit: stable

## 2022-02-11 ENCOUNTER — CARE COORDINATION (OUTPATIENT)
Dept: CARE COORDINATION | Age: 87
End: 2022-02-11

## 2022-02-11 NOTE — CARE COORDINATION
Ambulatory Care Coordination Note  2/11/2022  CM Risk Score: 6  Charlson 10 Year Mortality Risk Score: 100%     ACC: Modesta Vargas, CHADD    Summary Note:   ACM contacted patient to follow up on his status regarding CHF, HTN, PAP (Tikosyn) for Care Coordination.  -Pt reports complete medication compliance.   -Pt has macular degeneration. Pt no longer drives and has reading difficulties. Pt does have eyeglasses with one special lens that help some. Pt has magnifying glass and he said no help.   -Pt reports he is unable to attempt exercise with Silver Sneakers until the weather breaks. His wife is his transportation. CHF:  -Condition remains stable  -Goal weight in the 170's lbs range.   -Today's weight 170 lbs & /66.  -12- PCP office visit: /70 & weight 174 lbs. -SOB with exertion and climbing up steps is baseline.   -Rare cough is baseline unless he has mucous to cough out.    -Pt reports no ankle/feetedema.   -Reviewed CHF zone tool. Resources  -Pt reports he has an eye appt with Dr Pablo Tracey in a couple months.   -Pt reports he is waiting for his [phone] for the PAP paper work for "[employer]. When forms completed he will mail back. PLAN:  -Continue Care Coordination  -Reinforce Zone Management Tools (CHF)  -F/u -PAP referral >Tikosyn > await 1099  -Esophagram 2-  -PCP appr 4-  -F/u exercise > Silver Sneakers when weather improves  -F/u weighing self daily, logging results and following trend. -F/u eye appt couple weeks    -Next anticipated outreach by [address] [place] [address] Team: 2 weeks.           Care Coordination Interventions    Program Enrollment: Complex Care  Referral from Primary Care Provider: No  Suggested Interventions and [employer] on Wheels: Declined  Medication Assistance Program: Completed (Comment: 1/21/22 Costly medications (Xarelto, Tikosyn), PAP referral made. )  Pharmacist: Declined  Registered Dietician: [facility]  Social Work: Declined  Transportation Support: Completed  Zone Management Tools: Completed (Comment: 1/21/22 Mailed CHF zone tool. )  Other Services or Interventions: Will mail CHR zone, HTN info, monitoring sodium, reading labels, monitoring log for BP & weight. Goals Addressed                 This Visit's Progress     Medication Management   No change     I will talk with PAP  in attempt to find a more cost effective price for Xarelto & Tikosyn. Barriers: financial and lack of education  Plan for overcoming my barriers: Care Coordination, PAP referral  Confidence: 8/10  Anticipated Goal Completion Date: 5- 1-28-22 PAP reports no assistance for Xarelto until Pt reaches $1000 out of pocket. There is a program for Brammo. Pt will be enrolling. 2-4-22 Pt's daughter in law is filling out the papers for Tikosyn. She has not been over with the bad weather. As soon as papers complete, he will mail them. 2- Pt waiting for [phone] before returning. Prior to Admission medications    Medication Sig Start Date End Date Taking?  Authorizing Provider   potassium chloride (KLOR-CON M) 10 MEQ extended release tablet TAKE 1 TABLET BY MOUTH  DAILY 1/24/22   Dayanna Schwab MD   amLODIPine (NORVASC) 2.5 MG tablet Take 1 tablet by mouth daily 1/13/22   Pearl Nevarez DO   pantoprazole (PROTONIX) 40 MG tablet TAKE 1 TABLET BY MOUTH  DAILY 12/15/21   Dayanna Schwab MD   potassium chloride (MICRO-K) 10 MEQ extended release capsule Take 1 capsule by mouth daily 8/30/21   Dayanna Schwab MD   XARELTO 20 MG TABS tablet TAKE 1 TABLET BY MOUTH  DAILY 3/29/21   Pearl Nevarez DO   metoprolol tartrate (LOPRESSOR) 50 MG tablet TAKE 1 AND 1/2 TABLETS BY  MOUTH IN THE MORNING AND 1  TABLET BY MOUTH AT BEDTIME 3/11/21   Dayanna Schwab MD   pravastatin (PRAVACHOL) 40 MG tablet TAKE 1 TABLET BY MOUTH  DAILY 3/11/21   Dayanna Schwab MD   furosemide (LASIX) 40 MG tablet TAKE 1 TABLET BY MOUTH DAILY  Patient taking differently: Take 40 mg by mouth Alternate 1 tablet and 1/2 tablet every other day 3/11/21   Bobo Osullivan MD   Handicap Placard MISC by Does not apply route Disp #: 1  Duration: 5 years.  11/16/20   Bobo Osullivan MD   ferrous sulfate (IRON 325) 325 (65 Fe) MG tablet Take 1 tablet by mouth daily (with breakfast) 11/5/20   Bobo Osullivan MD   MAGNESIUM-OXIDE 400 (241.3 Mg) MG TABS tablet TK 1 T PO ONCE D 3/18/20   Historical Provider, MD   dofetilide (TIKOSYN) 250 MCG capsule Take 1 capsule by mouth 2 times daily 3/5/20   Bobo Osullivan MD   Multiple Vitamins-Minerals (PRESERVISION AREDS 2 PO) Take 1 tablet by mouth 2 times daily    Historical Provider, MD   Cholecalciferol (VITAMIN D) 2000 UNITS CAPS capsule Take 1 capsule by mouth daily     Historical Provider, MD       Future Appointments   Date Time Provider Cezar Gamez   2/21/2022 11:00 AM SEB XRAY RM 2 SEBZ RAD SEB Radiolog   4/27/2022  1:00 PM MD [facility]      and   Congestive Heart Failure Assessment    Are you currently restricting fluids?: No Restriction  Do you understand a low sodium diet?: Yes  Do you understand how to read food labels?: Yes  How many restaurant meals do you eat per week?: 1-2  Do you salt your food before tasting it?: No     No patient-reported symptoms      Symptoms:  None: Yes    (Comment: 2/11/22 Baseline)      Symptom course: stable  Patient-reported weight (lb): 170  Weight trend: stable  Salt intake watch compared to last visit: stable

## 2022-02-16 DIAGNOSIS — I25.5 ISCHEMIC CARDIOMYOPATHY: Chronic | ICD-10-CM

## 2022-02-16 DIAGNOSIS — Z45.02 AICD DISCHARGE: ICD-10-CM

## 2022-02-16 RX ORDER — FUROSEMIDE 40 MG/1
TABLET ORAL
Qty: 90 TABLET | Refills: 3 | Status: ON HOLD
Start: 2022-02-16 | End: 2022-03-04 | Stop reason: HOSPADM

## 2022-02-16 RX ORDER — METOPROLOL TARTRATE 50 MG/1
TABLET, FILM COATED ORAL
Qty: 225 TABLET | Refills: 3 | Status: SHIPPED
Start: 2022-02-16 | End: 2022-06-27

## 2022-02-16 RX ORDER — PRAVASTATIN SODIUM 40 MG
40 TABLET ORAL DAILY
Qty: 90 TABLET | Refills: 3 | Status: SHIPPED
Start: 2022-02-16 | End: 2022-06-27

## 2022-02-21 ENCOUNTER — HOSPITAL ENCOUNTER (OUTPATIENT)
Dept: GENERAL RADIOLOGY | Age: 87
Discharge: HOME | End: 2022-02-23
Payer: MEDICARE

## 2022-02-21 DIAGNOSIS — R05.3 CHRONIC COUGH: ICD-10-CM

## 2022-02-21 PROCEDURE — 74220 X-RAY XM ESOPHAGUS 1CNTRST: CPT

## 2022-02-21 PROCEDURE — 2500000003 HC RX 250 WO HCPCS: Performed by: RADIOLOGY

## 2022-02-21 PROCEDURE — A4641 RADIOPHARM DX AGENT NOC: HCPCS | Performed by: RADIOLOGY

## 2022-02-21 PROCEDURE — 6360000004 HC RX CONTRAST MEDICATION: Performed by: RADIOLOGY

## 2022-02-21 RX ADMIN — BARIUM SULFATE 140 ML: 980 POWDER, FOR SUSPENSION ORAL at 14:18

## 2022-02-21 RX ADMIN — BARIUM SULFATE 176 G: 960 POWDER, FOR SUSPENSION ORAL at 14:18

## 2022-02-21 RX ADMIN — BARIUM SULFATE 1 TABLET: 700 TABLET ORAL at 14:17

## 2022-02-24 RX ORDER — AMLODIPINE BESYLATE 2.5 MG/1
2.5 TABLET ORAL DAILY
Qty: 90 TABLET | Refills: 3 | Status: SHIPPED | OUTPATIENT
Start: 2022-02-24

## 2022-03-01 ENCOUNTER — HOSPITAL ENCOUNTER (INPATIENT)
Age: 87
LOS: 3 days | Discharge: HOME | DRG: 291 | End: 2022-03-04
Attending: EMERGENCY MEDICINE | Admitting: FAMILY MEDICINE
Payer: MEDICARE

## 2022-03-01 ENCOUNTER — OFFICE VISIT (OUTPATIENT)
Dept: FAMILY MEDICINE CLINIC | Age: 87
End: 2022-03-01
Payer: MEDICARE

## 2022-03-01 ENCOUNTER — APPOINTMENT (OUTPATIENT)
Dept: GENERAL RADIOLOGY | Age: 87
DRG: 291 | End: 2022-03-01
Payer: MEDICARE

## 2022-03-01 VITALS
BODY MASS INDEX: 26.76 KG/M2 | HEART RATE: 78 BPM | TEMPERATURE: 97.5 F | SYSTOLIC BLOOD PRESSURE: 128 MMHG | OXYGEN SATURATION: 99 % | WEIGHT: 176 LBS | DIASTOLIC BLOOD PRESSURE: 58 MMHG

## 2022-03-01 DIAGNOSIS — I10 ESSENTIAL HYPERTENSION: ICD-10-CM

## 2022-03-01 DIAGNOSIS — N18.32 STAGE 3B CHRONIC KIDNEY DISEASE (HCC): ICD-10-CM

## 2022-03-01 DIAGNOSIS — K92.2 GASTROINTESTINAL HEMORRHAGE, UNSPECIFIED GASTROINTESTINAL HEMORRHAGE TYPE: ICD-10-CM

## 2022-03-01 DIAGNOSIS — I48.91 ATRIAL FIBRILLATION, UNSPECIFIED TYPE (HCC): ICD-10-CM

## 2022-03-01 DIAGNOSIS — R06.09 DYSPNEA ON EXERTION: Primary | ICD-10-CM

## 2022-03-01 DIAGNOSIS — I50.43 ACUTE ON CHRONIC COMBINED SYSTOLIC AND DIASTOLIC CONGESTIVE HEART FAILURE (HCC): Primary | ICD-10-CM

## 2022-03-01 PROBLEM — I25.810 CORONARY ARTERY DISEASE INVOLVING CORONARY BYPASS GRAFT: Status: ACTIVE | Noted: 2022-03-01

## 2022-03-01 PROBLEM — I50.40 COMBINED CONGESTIVE SYSTOLIC AND DIASTOLIC HEART FAILURE (HCC): Status: ACTIVE | Noted: 2022-03-01

## 2022-03-01 PROBLEM — K21.9 GASTROESOPHAGEAL REFLUX DISEASE WITHOUT ESOPHAGITIS: Status: ACTIVE | Noted: 2022-03-01

## 2022-03-01 LAB
ALBUMIN SERPL-MCNC: 4.3 G/DL (ref 3.5–5.2)
ALP BLD-CCNC: 125 U/L (ref 40–129)
ALT SERPL-CCNC: 15 U/L (ref 0–40)
ANION GAP SERPL CALCULATED.3IONS-SCNC: 12 MMOL/L (ref 7–16)
AST SERPL-CCNC: 23 U/L (ref 0–39)
BASOPHILS ABSOLUTE: 0.05 E9/L (ref 0–0.2)
BASOPHILS RELATIVE PERCENT: 0.5 % (ref 0–2)
BILIRUB SERPL-MCNC: 0.3 MG/DL (ref 0–1.2)
BUN BLDV-MCNC: 42 MG/DL (ref 6–23)
CALCIUM SERPL-MCNC: 8.9 MG/DL (ref 8.6–10.2)
CHLORIDE BLD-SCNC: 99 MMOL/L (ref 98–107)
CO2: 28 MMOL/L (ref 22–29)
CREAT SERPL-MCNC: 1.1 MG/DL (ref 0.7–1.2)
EOSINOPHILS ABSOLUTE: 0.12 E9/L (ref 0.05–0.5)
EOSINOPHILS RELATIVE PERCENT: 1.2 % (ref 0–6)
GFR AFRICAN AMERICAN: >60
GFR NON-AFRICAN AMERICAN: >60 ML/MIN/1.73
GLUCOSE BLD-MCNC: 123 MG/DL (ref 74–99)
HCT VFR BLD CALC: 30.2 % (ref 37–54)
HEMOGLOBIN: 9.9 G/DL (ref 12.5–16.5)
IMMATURE GRANULOCYTES #: 0.04 E9/L
IMMATURE GRANULOCYTES %: 0.4 % (ref 0–5)
IRON SATURATION: 34 % (ref 20–55)
IRON: 116 MCG/DL (ref 59–158)
LACTIC ACID: 1.3 MMOL/L (ref 0.5–2.2)
LYMPHOCYTES ABSOLUTE: 1.66 E9/L (ref 1.5–4)
LYMPHOCYTES RELATIVE PERCENT: 16.5 % (ref 20–42)
MAGNESIUM: 2.3 MG/DL (ref 1.6–2.6)
MCH RBC QN AUTO: 31.2 PG (ref 26–35)
MCHC RBC AUTO-ENTMCNC: 32.8 % (ref 32–34.5)
MCV RBC AUTO: 95.3 FL (ref 80–99.9)
MONOCYTES ABSOLUTE: 0.87 E9/L (ref 0.1–0.95)
MONOCYTES RELATIVE PERCENT: 8.6 % (ref 2–12)
NEUTROPHILS ABSOLUTE: 7.32 E9/L (ref 1.8–7.3)
NEUTROPHILS RELATIVE PERCENT: 72.8 % (ref 43–80)
PDW BLD-RTO: 14.6 FL (ref 11.5–15)
PLATELET # BLD: 209 E9/L (ref 130–450)
PMV BLD AUTO: 10.3 FL (ref 7–12)
POTASSIUM SERPL-SCNC: 4.2 MMOL/L (ref 3.5–5)
PRO-BNP: 1344 PG/ML (ref 0–450)
PROCALCITONIN: 0.08 NG/ML (ref 0–0.08)
RBC # BLD: 3.17 E12/L (ref 3.8–5.8)
SODIUM BLD-SCNC: 139 MMOL/L (ref 132–146)
TOTAL IRON BINDING CAPACITY: 344 MCG/DL (ref 250–450)
TOTAL PROTEIN: 6.6 G/DL (ref 6.4–8.3)
TROPONIN, HIGH SENSITIVITY: 21 NG/L (ref 0–11)
TROPONIN, HIGH SENSITIVITY: 28 NG/L (ref 0–11)
TSH SERPL DL<=0.05 MIU/L-ACNC: 1.28 UIU/ML (ref 0.27–4.2)
WBC # BLD: 10.1 E9/L (ref 4.5–11.5)

## 2022-03-01 PROCEDURE — 83605 ASSAY OF LACTIC ACID: CPT

## 2022-03-01 PROCEDURE — 99215 OFFICE O/P EST HI 40 MIN: CPT | Performed by: NURSE PRACTITIONER

## 2022-03-01 PROCEDURE — 93005 ELECTROCARDIOGRAM TRACING: CPT | Performed by: PHYSICIAN ASSISTANT

## 2022-03-01 PROCEDURE — G8427 DOCREV CUR MEDS BY ELIG CLIN: HCPCS | Performed by: NURSE PRACTITIONER

## 2022-03-01 PROCEDURE — 6370000000 HC RX 637 (ALT 250 FOR IP): Performed by: NURSE PRACTITIONER

## 2022-03-01 PROCEDURE — 99285 EMERGENCY DEPT VISIT HI MDM: CPT

## 2022-03-01 PROCEDURE — 99223 1ST HOSP IP/OBS HIGH 75: CPT | Performed by: FAMILY MEDICINE

## 2022-03-01 PROCEDURE — 93000 ELECTROCARDIOGRAM COMPLETE: CPT | Performed by: NURSE PRACTITIONER

## 2022-03-01 PROCEDURE — 36415 COLL VENOUS BLD VENIPUNCTURE: CPT

## 2022-03-01 PROCEDURE — 1123F ACP DISCUSS/DSCN MKR DOCD: CPT | Performed by: NURSE PRACTITIONER

## 2022-03-01 PROCEDURE — 2580000003 HC RX 258: Performed by: NURSE PRACTITIONER

## 2022-03-01 PROCEDURE — 1036F TOBACCO NON-USER: CPT | Performed by: NURSE PRACTITIONER

## 2022-03-01 PROCEDURE — 4040F PNEUMOC VAC/ADMIN/RCVD: CPT | Performed by: NURSE PRACTITIONER

## 2022-03-01 PROCEDURE — 83880 ASSAY OF NATRIURETIC PEPTIDE: CPT

## 2022-03-01 PROCEDURE — 84443 ASSAY THYROID STIM HORMONE: CPT

## 2022-03-01 PROCEDURE — 84145 PROCALCITONIN (PCT): CPT

## 2022-03-01 PROCEDURE — 1200000000 HC SEMI PRIVATE

## 2022-03-01 PROCEDURE — 71045 X-RAY EXAM CHEST 1 VIEW: CPT

## 2022-03-01 PROCEDURE — 83550 IRON BINDING TEST: CPT

## 2022-03-01 PROCEDURE — 80053 COMPREHEN METABOLIC PANEL: CPT

## 2022-03-01 PROCEDURE — G8417 CALC BMI ABV UP PARAM F/U: HCPCS | Performed by: NURSE PRACTITIONER

## 2022-03-01 PROCEDURE — 83540 ASSAY OF IRON: CPT

## 2022-03-01 PROCEDURE — 85025 COMPLETE CBC W/AUTO DIFF WBC: CPT

## 2022-03-01 PROCEDURE — G8484 FLU IMMUNIZE NO ADMIN: HCPCS | Performed by: NURSE PRACTITIONER

## 2022-03-01 PROCEDURE — 87040 BLOOD CULTURE FOR BACTERIA: CPT

## 2022-03-01 PROCEDURE — APPSS45 APP SPLIT SHARED TIME 31-45 MINUTES: Performed by: NURSE PRACTITIONER

## 2022-03-01 PROCEDURE — 84484 ASSAY OF TROPONIN QUANT: CPT

## 2022-03-01 PROCEDURE — 83735 ASSAY OF MAGNESIUM: CPT

## 2022-03-01 RX ORDER — SODIUM CHLORIDE 0.9 % (FLUSH) 0.9 %
5-40 SYRINGE (ML) INJECTION PRN
Status: DISCONTINUED | OUTPATIENT
Start: 2022-03-01 | End: 2022-03-04 | Stop reason: HOSPADM

## 2022-03-01 RX ORDER — VITAMIN B COMPLEX
2000 TABLET ORAL DAILY
Status: DISCONTINUED | OUTPATIENT
Start: 2022-03-01 | End: 2022-03-04 | Stop reason: HOSPADM

## 2022-03-01 RX ORDER — ACETAMINOPHEN 650 MG/1
650 SUPPOSITORY RECTAL EVERY 6 HOURS PRN
Status: DISCONTINUED | OUTPATIENT
Start: 2022-03-01 | End: 2022-03-04 | Stop reason: HOSPADM

## 2022-03-01 RX ORDER — FUROSEMIDE 10 MG/ML
40 INJECTION INTRAMUSCULAR; INTRAVENOUS DAILY
Status: DISCONTINUED | OUTPATIENT
Start: 2022-03-02 | End: 2022-03-02

## 2022-03-01 RX ORDER — SODIUM CHLORIDE 9 MG/ML
25 INJECTION, SOLUTION INTRAVENOUS PRN
Status: DISCONTINUED | OUTPATIENT
Start: 2022-03-01 | End: 2022-03-04 | Stop reason: HOSPADM

## 2022-03-01 RX ORDER — PROCHLORPERAZINE EDISYLATE 5 MG/ML
10 INJECTION INTRAMUSCULAR; INTRAVENOUS EVERY 6 HOURS PRN
Status: DISCONTINUED | OUTPATIENT
Start: 2022-03-01 | End: 2022-03-01

## 2022-03-01 RX ORDER — METOPROLOL TARTRATE 50 MG/1
50 TABLET, FILM COATED ORAL NIGHTLY
Status: DISCONTINUED | OUTPATIENT
Start: 2022-03-01 | End: 2022-03-04 | Stop reason: HOSPADM

## 2022-03-01 RX ORDER — PRAVASTATIN SODIUM 20 MG
40 TABLET ORAL DAILY
Status: DISCONTINUED | OUTPATIENT
Start: 2022-03-01 | End: 2022-03-04 | Stop reason: HOSPADM

## 2022-03-01 RX ORDER — POLYETHYLENE GLYCOL 3350 17 G/17G
17 POWDER, FOR SOLUTION ORAL DAILY PRN
Status: DISCONTINUED | OUTPATIENT
Start: 2022-03-01 | End: 2022-03-04 | Stop reason: HOSPADM

## 2022-03-01 RX ORDER — AMLODIPINE BESYLATE 2.5 MG/1
2.5 TABLET ORAL DAILY
Status: DISCONTINUED | OUTPATIENT
Start: 2022-03-01 | End: 2022-03-04 | Stop reason: HOSPADM

## 2022-03-01 RX ORDER — PANTOPRAZOLE SODIUM 40 MG/1
40 TABLET, DELAYED RELEASE ORAL DAILY
Status: DISCONTINUED | OUTPATIENT
Start: 2022-03-01 | End: 2022-03-04 | Stop reason: HOSPADM

## 2022-03-01 RX ORDER — SODIUM CHLORIDE 0.9 % (FLUSH) 0.9 %
5-40 SYRINGE (ML) INJECTION EVERY 12 HOURS SCHEDULED
Status: DISCONTINUED | OUTPATIENT
Start: 2022-03-01 | End: 2022-03-04 | Stop reason: HOSPADM

## 2022-03-01 RX ORDER — DOFETILIDE 0.25 MG/1
250 CAPSULE ORAL 2 TIMES DAILY
Status: DISCONTINUED | OUTPATIENT
Start: 2022-03-01 | End: 2022-03-04 | Stop reason: HOSPADM

## 2022-03-01 RX ORDER — ACETAMINOPHEN 325 MG/1
650 TABLET ORAL EVERY 6 HOURS PRN
Status: DISCONTINUED | OUTPATIENT
Start: 2022-03-01 | End: 2022-03-04 | Stop reason: HOSPADM

## 2022-03-01 RX ORDER — FUROSEMIDE 10 MG/ML
40 INJECTION INTRAMUSCULAR; INTRAVENOUS ONCE
Status: DISCONTINUED | OUTPATIENT
Start: 2022-03-01 | End: 2022-03-04 | Stop reason: HOSPADM

## 2022-03-01 RX ADMIN — RIVAROXABAN 20 MG: 20 TABLET, FILM COATED ORAL at 21:33

## 2022-03-01 RX ADMIN — AMLODIPINE BESYLATE 2.5 MG: 2.5 TABLET ORAL at 18:15

## 2022-03-01 RX ADMIN — Medication 400 MG: at 18:15

## 2022-03-01 RX ADMIN — PANTOPRAZOLE SODIUM 40 MG: 40 TABLET, DELAYED RELEASE ORAL at 18:15

## 2022-03-01 RX ADMIN — Medication 2000 UNITS: at 18:15

## 2022-03-01 RX ADMIN — PRAVASTATIN SODIUM 40 MG: 20 TABLET ORAL at 21:32

## 2022-03-01 RX ADMIN — DOFETILIDE 250 MCG: 0.25 CAPSULE ORAL at 21:33

## 2022-03-01 RX ADMIN — Medication 10 ML: at 21:39

## 2022-03-01 RX ADMIN — METOPROLOL TARTRATE 50 MG: 50 TABLET, FILM COATED ORAL at 21:32

## 2022-03-01 ASSESSMENT — PAIN SCALES - GENERAL
PAINLEVEL_OUTOF10: 0
PAINLEVEL_OUTOF10: 0

## 2022-03-01 NOTE — PROGRESS NOTES
Chief Complaint:       Shortness of Breath (exertion pt w heart problems )    History of Present Illness   Source of history provided by:  patient. Glenroy Blanchard is a 82 y.o. old male who presents to walk-in with complaints of shortness of breath which began 4 days ago. Reports that his SOB is worse with exertion. He has an extensive heart history including atrial fibrillation, CHF, MI and CABG with ICD in place. Patient reports his ICD is set to fire if he is in a-fib for more than 24 hours. Pt states they have a productive cough and mild nasal congestion. Since onset the symptoms have been progressive. Pt denies any fever, chills, N/V/D, abdominal pain, chest pain, leg swelling or recent weight gain. Review of Systems   Unless otherwise stated in this report or unable to obtain because of the patient's clinical or mental status as evidenced by the medical record, this patients's positive and negative responses for Review of Systems, constitutional, psych, eyes, ENT, cardiovascular, respiratory, gastrointestinal, neurological, genitourinary, musculoskeletal, integument systems and systems related to the presenting problem are either stated in the preceding or were not pertinent or were negative for the symptoms and/or complaints related to the medical problem. Past Medical History:  has a past medical history of A-fib ([address] Arrhythmia, Carotid artery stenosis, CHF (congestive heart failure) ([address] Heart valve problem, Hyperlipidemia, Hypertension, ICD (implantable cardiac defibrillator) in place, MI (mitral incompetence), and MI (myocardial infarction) ([address] 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 2008);  Cardiac defibrillator placement (07/28/2016); Cardiac catheterization (Right, 03/03/2017); other surgical history (03/29/2017); Upper gastrointestinal endoscopy (N/A, 9/3/2019); Upper gastrointestinal endoscopy (N/A, 2/18/2020); Colonoscopy (N/A, 2/24/2020); Cardioversion (10/29/2020); and Cardiac surgery (N/A, 9/4/2021). Social History:  reports that he quit smoking about 49 years ago. He has a 1.50 pack-year smoking history. He has never used smokeless tobacco. He reports that he does not drink alcohol and does not use drugs. Family History: family history is not on file. Allergies: Patient has no known allergies. Physical Exam   Vital Signs:  BP (!) 128/58   Pulse 78   Temp 97.5 °F (36.4 °C)   Wt 176 lb (79.8 kg)   SpO2 99%   BMI 26.76 kg/m²    Oxygen Saturation Interpretation: Normal.    General Appearance/Constitutional:  Alert, NAD. HEENT:  NC/NT. PERRLA. Airway patent. Mild posterior pharyngeal erythema without edema. Neck:  Normal ROM. Supple. No adenopathy. Lungs: Inspiratory and expiratory breath sounds with no wheezing, rales or rhonchi. No prolonged expiratory phase. No retractions. Heart:  Regular rate and rhythm, normal heart sounds, without pathological murmurs, ectopy, gallops, or rubs. .  Abdomen:  Soft, nontender, good bowel sounds. No firm or pulsatile mass. Skin:  Normal turgor. Warm, dry, without visible rash. Extremities:  No clubbing, cyanosis, or edema bilaterally. Neurological:  Oriented x3. Motor functions intact. Test Results Section   (All laboratory and radiology results have been personally reviewed by myself)  Labs:  No results found for this visit on 03/01/22. Imaging: All Radiology results interpreted by Radiologist unless otherwise noted. EKG #1:  Interpreted by me unless otherwise noted.   Time:  12:51 PM    Rate: 83  Rhythm: irrregularly irregular  Interpretation: Atrial fibrillation, occasional PVC, Left axis, RBBB  Comparison: Changes when compared to most recent EKG. Assessment / Plan   Impression(s):  Odell Casillas was seen today for shortness of breath. Diagnoses and all orders for this visit:    Dyspnea on exertion  -     EKG 12 lead; Future  -     EKG 12 lead    Atrial fibrillation, unspecified type ([address]    Vital signs reviewed - normal. Based on extensive cardiac history and TORRES Pt advised that he needs a comprehensive cardiopulmonary workup including STAT labs and imaging that is unable to be performed in an urgent care setting. EMS offered to patient but refused at this time. Pt advised to go straight to the ED for further evaluation and management. Pt agreed with this care plan and agreed to go immediately. Pt was transported to the ED by private vehicle. Pt left our office in stable condition. Further disposition to follow. All questions answered. Electronically signed by GINETTE Spencer CNP   DD: 3/1/22    **This report was transcribed using voice recognition software. Every effort was made to ensure accuracy; however, inadvertent computerized transcription errors may be present.

## 2022-03-01 NOTE — PROGRESS NOTES
Admission database completed to best of this RN's ability. Care plan and education initiated. Pt independent from home with wife. States he has a cane, walker, and WC at home but does not use them. Denies any HHC prior to admission.

## 2022-03-01 NOTE — PLAN OF CARE
Problem: Falls - Risk of:  Goal: Will remain free from falls  Description: Will remain free from falls  Outcome: Met This Shift     Problem: HEMODYNAMIC STATUS  Goal: Patient has stable vital signs and fluid balance  Outcome: Met This Shift

## 2022-03-01 NOTE — H&P
[facility]   History and Physical      CHIEF COMPLAINT:  SOB    History of Present Illness:  82 y.o. male with a past medical  history of CHF, atrial fibrillation, nonsustained ventricular tachycardia, ischemic heart disease,  hyperlipidemia, hypertension, history of MI, insomnia, remote history of gastrointestinal hemorrhage with melena, ICD placement, vitamin D deficiency , macular degeneration who  presents with worsening shortness of breath over the past few days. Wife states patient had appointment today but was cancelled by office. Patient reports dyspnea with exertion. Denies chest pain. Wife states patient has new persistent cough. Patient denies any recent weight gain, no extremity edema. Informant(s) for H&P: patient, wife    REVIEW OF SYSTEMS:    Denies CP,  subjective fever/chills, congestion, n/v/d, ha, vision/hearing changes, wt changes, hot/cold flashes, other open skin lesions, constipation, dysuria/hematuria unless noted in HPI. Complete ROS performed with the patient and is otherwise negative.       PMH:  Past Medical History:   Diagnosis Date    A-fib [facility])     Arrhythmia     Carotid artery stenosis     [facility]     Heart valve problem     Hyperlipidemia     Hypertension     ICD (implantable cardiac defibrillator) in place 2007    MI (mitral incompetence) 2003    MI (myocardial infarction) ([facility] 2003       Surgical History:  Past Surgical History:   Procedure Laterality Date    CARDIAC CATHETERIZATION Right 03/03/2017    Dr. Destiny Vega  2007. 2008 2007 78 shocks replaced 2008 [employer]     CARDIAC DEFIBRILLATOR PLACEMENT  07/28/2016    [employer] Dual ICD gen change    CARDIAC SURGERY N/A 9/4/2021    cardioversion performed by Khanh Cloud MD at [address] [place] [address]  10/29/2020    Successful    (Dr. Goran Campa)    COLONOSCOPY N/A 2/24/2020    COLONOSCOPY DIAGNOSTIC performed by Sandra Bardales MD at [address] GRAFT  05/23/2003 03/16/2007    DIAGNOSTIC CARDIAC CATH LAB PROCEDURE  2007    DIAGNOSTIC CARDIAC CATH LAB PROCEDURE  03/29/2008    stent    JOINT REPLACEMENT  2011    r hip surgery Dr Delgado Tolland  03/29/2017    Dr. Noe Lovett and Dr. Shields- TAVR Josie 26mm valve    UPPER GASTROINTESTINAL ENDOSCOPY N/A 9/3/2019    EGD ESOPHAGOGASTRODUODENOSCOPY performed by Mari Davis MD at [address] [place] [address],Third Floor N/A 2/18/2020    EGD ESOPHAGOGASTRODUODENOSCOPY performed by Sandra Bardales MD at [address]       Medications Prior to Admission:    Prior to Admission medications    Medication Sig Start Date End Date Taking?  Authorizing Provider   amLODIPine (NORVASC) 2.5 MG tablet Take 1 tablet by mouth daily 2/24/22  Yes Viktoria Bower DO   metoprolol tartrate (LOPRESSOR) 50 MG tablet TAKE 1 AND 1/2 TABLETS BY  MOUTH IN THE MORNING AND 1  TABLET BY MOUTH AT BEDTIME 2/16/22  Yes Carlin Collazo MD   furosemide (LASIX) 40 MG tablet Alternate 1 tablet and 1/2 tablet every other day 2/16/22  Yes Carlin Collazo MD   pravastatin (PRAVACHOL) 40 MG tablet TAKE 1 TABLET BY MOUTH  DAILY 2/16/22  Yes Carlin Collazo MD   potassium chloride (KLOR-CON M) 10 MEQ extended release tablet TAKE 1 TABLET BY MOUTH  DAILY 1/24/22  Yes Carlin Collazo MD   pantoprazole (PROTONIX) 40 MG tablet TAKE 1 TABLET BY MOUTH  DAILY 12/15/21  Yes Carlin Collazo MD   XARELTO 20 MG TABS tablet TAKE 1 TABLET BY MOUTH  DAILY 3/29/21  Yes Viktoria Bower DO   ferrous sulfate (IRON 325) 325 (65 Fe) MG tablet Take 1 tablet by mouth daily (with breakfast) 11/5/20  Yes Carlin Collazo MD   MAGNESIUM-OXIDE 400 (241.3 Mg) MG TABS tablet TK 1 T PO ONCE D 3/18/20  Yes Historical Provider, MD   dofetilide (TIKOSYN) 250 MCG capsule Take 1 capsule by mouth 2 times daily 3/5/20  Yes Carlin Collazo MD Multiple Vitamins-Minerals (PRESERVISION AREDS 2 PO) Take 1 tablet by mouth 2 times daily   Yes Historical Provider, MD   Cholecalciferol (VITAMIN D) 2000 UNITS CAPS capsule Take 1 capsule by mouth daily    Yes Historical Provider, MD   potassium chloride (MICRO-K) 10 MEQ extended release capsule Take 1 capsule by mouth daily 8/30/21   Teja Mart MD   Handicap Placard MISC by Does not apply route Disp #: 1  Duration: 5 years. 11/16/20   Teja Mart MD       Allergies:    Patient has no known allergies. Social History:    reports that he quit smoking about 49 years ago. He has a 1.50 pack-year smoking history. He has never used smokeless tobacco. He reports that he does not drink alcohol and does not use drugs. Family History:   family history is not on file.      PHYSICAL EXAM:  Vitals:  /60   Pulse 100   Temp 96.3 °F (35.7 °C) (Temporal)   Resp 20   SpO2 95%     General Appearance: alert and oriented to person, place and time and in no acute distress  Skin: warm and dry  Head: normocephalic and atraumatic  Eyes: pupils equal, round, and reactive to light, extraocular eye movements intact, conjunctivae normal  Neck: neck supple and non tender without mass   Pulmonary/Chest: fine crackles in bases- no wheezes,norrmal air movement, no respiratory distress, left chest pacemaker  Cardiovascular: normal rate, irregular rhythm, normal S1 and S2 and no carotid bruits  Abdomen: soft, non-tender, non-distended, normal bowel sounds, no masses or organomegaly  Extremities: no cyanosis, no clubbing and no edema  Neurologic: no cranial nerve deficit and speech normal    LABS:  Recent Labs     03/01/22  1354      K 4.2   CL 99   CO2 28   BUN 42*   CREATININE 1.1   GLUCOSE 123*   CALCIUM 8.9       Recent Labs     03/01/22  1354   WBC 10.1   RBC 3.17*   HGB 9.9*   HCT 30.2*   MCV 95.3   MCH 31.2   MCHC 32.8   RDW 14.6      MPV 10.3       No results for input(s): POCGLU in the last 72 hours. Radiology: No results found. EKG:         ASSESSMENT:      Principal Problem:    Acute on chronic combined systolic and diastolic CHF (congestive heart failure) (HCC)  Active Problems:    Chronic atrial fibrillation    Ischemic heart disease    Stage 3b chronic kidney disease (HCC)    Coronary artery disease involving coronary bypass graft    S/P TAVR (transcatheter aortic valve replacement)    Essential hypertension    Hyperlipidemia LDL goal <100    ICD (implantable cardioverter-defibrillator), dual, in situ    Gastroesophageal reflux disease without esophagitis  Resolved Problems:    * No resolved hospital problems. *      PLAN:    1. Acute on chronic  exacerbation CHF: follows with [facility]-will consult. BNP 1344. Last Echo October 2020- Ejection fraction is visually estimated at 65%. Indeterminate diastolic function. , will obtain Echo this admission. Continue Lasix IV 40 mg daily, 2L daily fluid restriction, daily weights, strict I&O, 2 gm sodium diet. 2. Atrial fibrillation: on Xarelto, metoprolol, Tikosyn  3. CKD stage 3b: Cr 1.1. Baseline appears to be 1.3. Follow BMP. 4. CAD: s/p MI with CABG in 2003, redo 2007, TAVR 2017. On statin. Cardiac diet. 5. Hx VT: s/p ICD 2007 with lead replacement 2008. Follows with Dr. Kourtney Cat  6. HTN: resume home amlodipine, metoprolol  7. GERD: PPI  8.  HLD: on statin    Code Status: limited -no intubation  DVT prophylaxis: Xarelto         Electronically signed by GINETTE Norton CNP on 3/1/2022 at 4:41 PM

## 2022-03-01 NOTE — PROGRESS NOTES
Drug interaction-- tikosyn and lasix  Please monitor potasium and magnesium   Potassium level should be within normal range prior to and during treatment with dofetilide

## 2022-03-01 NOTE — ED PROVIDER NOTES
HPI:  3/1/22, Time: 2:06 PM XIN Dumont is a 82 y.o. male presenting to the ED for presents with shortness of breath dyspnea on exertion several days, beginning 3 day ago. The complaint has been persistent, mild in severity, and worsened by walking, light exertion. Patient has history of atrial fibrillation currently has a defibrillator in place. Also suffers from congestive heart failure. He is complaining of some palpitations he was outpatient clinic today found he was in A. fib. He states his defibrillator goes off after 24 hours. Having a nonproductive cough shortness of breath he also has platypnea on his right side. No fevers or chills reported. He has been vaccinated for COVID-19. He has no hemoptysis. He denies abdominal pain nausea vomiting. ROS:   Pertinent positives and negatives are stated within HPI, all other systems reviewed and are negative.  --------------------------------------------- PAST HISTORY ---------------------------------------------  Past Medical History:  has a past medical history of A-fib ([facility] [address] Arrhythmia, Carotid artery stenosis, CHF (congestive heart failure) ([facility] [address] Heart valve problem, Hyperlipidemia, Hypertension, ICD (implantable cardiac defibrillator) in place, MI (mitral incompetence), and MI (myocardial infarction) ([facility] [address] 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 2008); Cardiac defibrillator placement (07/28/2016); Cardiac catheterization (Right, 03/03/2017); other surgical history (03/29/2017); Upper gastrointestinal endoscopy (N/A, 9/3/2019); Upper gastrointestinal endoscopy (N/A, 2/18/2020); Colonoscopy (N/A, 2/24/2020);  Cardioversion (10/29/2020); and Cardiac surgery (N/A, 9/4/2021). Social History:  reports that he quit smoking about 49 years ago. He has a 1.50 pack-year smoking history. He has never used smokeless tobacco. He reports that he does not drink alcohol and does not use drugs. Family History: family history is not on file. The patients home medications have been reviewed. Allergies: Patient has no known allergies. ---------------------------------------------------PHYSICAL EXAM--------------------------------------    Constitutional/General: Alert and oriented x3, well appearing, non toxic in NAD  Head: Normocephalic and atraumatic  Eyes: PERRL, EOMI  Mouth: Oropharynx clear, handling secretions, no trismus  Neck: Supple, full ROM, non tender to palpation in the midline, no stridor, no crepitus, no meningeal signs  Pulmonary: Lung sounds demonstrate rhonchi at the bases on the right. Patient has mild wheezing. Cardiovascular:  Iregular rate. Regular rhythm. No murmurs, gallops, or rubs. 2+ distal pulses  Chest: no chest wall tenderness  Abdomen: Soft. Non tender. Non distended. +BS. No rebound, guarding, or rigidity. No pulsatile masses appreciated. Musculoskeletal: Moves all extremities x 4. Warm and well perfused, no clubbing, cyanosis, or no edema. Capillary refill <3 seconds  Skin: warm and dry. No rashes. Neurologic: GCS 15, CN 2-12 grossly intact, no focal deficits, symmetric strength 5/5 in the upper and lower extremities bilaterally  Psych: Normal Affect    -------------------------------------------------- RESULTS -------------------------------------------------  I have personally reviewed all laboratory and imaging results for this patient. Results are listed below.      LABS:  Results for orders placed or performed during the hospital encounter of 03/01/22   CBC with Auto Differential   Result Value Ref Range    WBC 10.1 4.5 - 11.5 E9/L    RBC 3.17 (L) 3.80 - 5.80 E12/L    Hemoglobin 9.9 (L) 12.5 - 16.5 g/dL    Hematocrit 30.2 (L) 37.0 - 54.0 %    MCV 95.3 80.0 - 99.9 fL    MCH 31.2 26.0 - 35.0 pg    MCHC 32.8 32.0 - 34.5 %    RDW 14.6 11.5 - 15.0 fL    Platelets 416 393 - 040 E9/L    MPV 10.3 7.0 - 12.0 fL    Neutrophils % 72.8 43.0 - 80.0 %    Immature Granulocytes % 0.4 0.0 - 5.0 %    Lymphocytes % 16.5 (L) 20.0 - 42.0 %    Monocytes % 8.6 2.0 - 12.0 %    Eosinophils % 1.2 0.0 - 6.0 %    Basophils % 0.5 0.0 - 2.0 %    Neutrophils Absolute 7.32 (H) 1.80 - 7.30 E9/L    Immature Granulocytes # 0.04 E9/L    Lymphocytes Absolute 1.66 1.50 - 4.00 E9/L    Monocytes Absolute 0.87 0.10 - 0.95 E9/L    Eosinophils Absolute 0.12 0.05 - 0.50 E9/L    Basophils Absolute 0.05 0.00 - 0.20 E9/L   Comprehensive Metabolic Panel   Result Value Ref Range    Sodium 139 132 - 146 mmol/L    Potassium 4.2 3.5 - 5.0 mmol/L    Chloride 99 98 - 107 mmol/L    CO2 28 22 - 29 mmol/L    Anion Gap 12 7 - 16 mmol/L    Glucose 123 (H) 74 - 99 mg/dL    BUN 42 (H) 6 - 23 mg/dL    CREATININE 1.1 0.7 - 1.2 mg/dL    GFR Non-African American >60 >=60 mL/min/1.73    GFR African American >60     Calcium 8.9 8.6 - 10.2 mg/dL    Total Protein 6.6 6.4 - 8.3 g/dL    Albumin 4.3 3.5 - 5.2 g/dL    Total Bilirubin 0.3 0.0 - 1.2 mg/dL    Alkaline Phosphatase 125 40 - 129 U/L    ALT 15 0 - 40 U/L    AST 23 0 - 39 U/L   Troponin   Result Value Ref Range    Troponin, High Sensitivity 28 (H) 0 - 11 ng/L   Magnesium   Result Value Ref Range    Magnesium 2.3 1.6 - 2.6 mg/dL   Lactic Acid   Result Value Ref Range    Lactic Acid 1.3 0.5 - 2.2 mmol/L   Brain Natriuretic Peptide   Result Value Ref Range    Pro-BNP 1,344 (H) 0 - 450 pg/mL   Troponin   Result Value Ref Range    Troponin, High Sensitivity 21 (H) 0 - 11 ng/L   EKG 12 Lead   Result Value Ref Range    Ventricular Rate 80 BPM    Atrial Rate 59 BPM    QRS Duration 128 ms    Q-T Interval 426 ms    QTc Calculation (Bazett) 491 ms    R Axis -75 degrees    T Axis 18 degrees       RADIOLOGY:  Interpreted by Radiologist.  XR CHEST PORTABLE    (Results Pending)         EKG Interpretation  Interpreted by emergency department physician    Rhythm: paced  Rate: 80  Axis: left  Conduction: right bundle branch block (complete)  ST Segments: nonspecific changes  T Waves: non specific changes    Clinical Impression: non-specific EKG  Comparison to prior EKG: None      ------------------------- NURSING NOTES AND VITALS REVIEWED ---------------------------   The nursing notes within the ED encounter and vital signs as below have been reviewed by myself. /60   Pulse 100   Temp 96.3 °F (35.7 °C) (Temporal)   Resp 20   SpO2 95%   Oxygen Saturation Interpretation: Normal    The patients available past medical records and past encounters were reviewed. ------------------------------ ED COURSE/MEDICAL DECISION MAKING----------------------  Medications   furosemide (LASIX) injection 40 mg (has no administration in time range)             Medical Decision Making:    Patient presents with acute on chronic A. fib shortness of breath. Concern for pneumonia versus CHF. Re-Evaluations:             Re-evaluation. Patients symptoms are improving      Consultations:             Jj will admit. Critical Care: NONE        This patient's ED course included: a personal history and physicial examination, re-evaluation prior to disposition, multiple bedside re-evaluations, IV medications, cardiac monitoring, continuous pulse oximetry, complex medical decision making and emergency management and a personal history and physicial eaxmination    This patient has remained hemodynamically stable, improved and been closely monitored during their ED course. Counseling: The emergency provider has spoken with the patient and discussed todays results, in addition to providing specific details for the plan of care and counseling regarding the diagnosis and prognosis. Questions are answered at this time and they are agreeable with the plan. --------------------------------- IMPRESSION AND DISPOSITION ---------------------------------    IMPRESSION  1. Acute on chronic combined systolic and diastolic congestive heart failure ([facility] [address]        DISPOSITION  Disposition: Admit to telemetry  Patient condition is serious        NOTE: This report was transcribed using voice recognition software.  Every effort was made to ensure accuracy; however, inadvertent computerized transcription errors may be present        Jose Quinones DO  03/01/22 1536

## 2022-03-02 LAB
ANION GAP SERPL CALCULATED.3IONS-SCNC: 11 MMOL/L (ref 7–16)
BASOPHILS ABSOLUTE: 0.03 E9/L (ref 0–0.2)
BASOPHILS RELATIVE PERCENT: 0.4 % (ref 0–2)
BUN BLDV-MCNC: 38 MG/DL (ref 6–23)
CALCIUM SERPL-MCNC: 9 MG/DL (ref 8.6–10.2)
CHLORIDE BLD-SCNC: 101 MMOL/L (ref 98–107)
CHOLESTEROL, TOTAL: 176 MG/DL (ref 0–199)
CO2: 28 MMOL/L (ref 22–29)
CREAT SERPL-MCNC: 1.3 MG/DL (ref 0.7–1.2)
EKG ATRIAL RATE: 59 BPM
EKG Q-T INTERVAL: 426 MS
EKG QRS DURATION: 128 MS
EKG QTC CALCULATION (BAZETT): 491 MS
EKG R AXIS: -75 DEGREES
EKG T AXIS: 18 DEGREES
EKG VENTRICULAR RATE: 80 BPM
EOSINOPHILS ABSOLUTE: 0.08 E9/L (ref 0.05–0.5)
EOSINOPHILS RELATIVE PERCENT: 1 % (ref 0–6)
GFR AFRICAN AMERICAN: >60
GFR NON-AFRICAN AMERICAN: 53 ML/MIN/1.73
GLUCOSE BLD-MCNC: 136 MG/DL (ref 74–99)
HCT VFR BLD CALC: 28.2 % (ref 37–54)
HDLC SERPL-MCNC: 34 MG/DL
HEMOGLOBIN: 9.2 G/DL (ref 12.5–16.5)
IMMATURE GRANULOCYTES #: 0.02 E9/L
IMMATURE GRANULOCYTES %: 0.2 % (ref 0–5)
LDL CHOLESTEROL CALCULATED: ABNORMAL MG/DL (ref 0–99)
LV EF: 60 %
LVEF MODALITY: NORMAL
LYMPHOCYTES ABSOLUTE: 1.53 E9/L (ref 1.5–4)
LYMPHOCYTES RELATIVE PERCENT: 18.6 % (ref 20–42)
MAGNESIUM: 2.3 MG/DL (ref 1.6–2.6)
MCH RBC QN AUTO: 30.9 PG (ref 26–35)
MCHC RBC AUTO-ENTMCNC: 32.6 % (ref 32–34.5)
MCV RBC AUTO: 94.6 FL (ref 80–99.9)
MONOCYTES ABSOLUTE: 0.63 E9/L (ref 0.1–0.95)
MONOCYTES RELATIVE PERCENT: 7.6 % (ref 2–12)
NEUTROPHILS ABSOLUTE: 5.95 E9/L (ref 1.8–7.3)
NEUTROPHILS RELATIVE PERCENT: 72.2 % (ref 43–80)
PDW BLD-RTO: 15.2 FL (ref 11.5–15)
PLATELET # BLD: 211 E9/L (ref 130–450)
PMV BLD AUTO: 10.7 FL (ref 7–12)
POTASSIUM SERPL-SCNC: 4 MMOL/L (ref 3.5–5)
RBC # BLD: 2.98 E12/L (ref 3.8–5.8)
SODIUM BLD-SCNC: 140 MMOL/L (ref 132–146)
TRIGL SERPL-MCNC: 430 MG/DL (ref 0–149)
TROPONIN, HIGH SENSITIVITY: 24 NG/L (ref 0–11)
VLDLC SERPL CALC-MCNC: ABNORMAL MG/DL
WBC # BLD: 8.2 E9/L (ref 4.5–11.5)

## 2022-03-02 PROCEDURE — 80048 BASIC METABOLIC PNL TOTAL CA: CPT

## 2022-03-02 PROCEDURE — 2580000003 HC RX 258: Performed by: NURSE PRACTITIONER

## 2022-03-02 PROCEDURE — 6370000000 HC RX 637 (ALT 250 FOR IP): Performed by: NURSE PRACTITIONER

## 2022-03-02 PROCEDURE — 36415 COLL VENOUS BLD VENIPUNCTURE: CPT

## 2022-03-02 PROCEDURE — 85025 COMPLETE CBC W/AUTO DIFF WBC: CPT

## 2022-03-02 PROCEDURE — 1200000000 HC SEMI PRIVATE

## 2022-03-02 PROCEDURE — APPSS60 APP SPLIT SHARED TIME 46-60 MINUTES: Performed by: NURSE PRACTITIONER

## 2022-03-02 PROCEDURE — 99233 SBSQ HOSP IP/OBS HIGH 50: CPT | Performed by: INTERNAL MEDICINE

## 2022-03-02 PROCEDURE — APPSS30 APP SPLIT SHARED TIME 16-30 MINUTES: Performed by: NURSE PRACTITIONER

## 2022-03-02 PROCEDURE — 97161 PT EVAL LOW COMPLEX 20 MIN: CPT

## 2022-03-02 PROCEDURE — 6360000002 HC RX W HCPCS: Performed by: NURSE PRACTITIONER

## 2022-03-02 PROCEDURE — 97165 OT EVAL LOW COMPLEX 30 MIN: CPT

## 2022-03-02 PROCEDURE — 93010 ELECTROCARDIOGRAM REPORT: CPT | Performed by: INTERNAL MEDICINE

## 2022-03-02 PROCEDURE — 80061 LIPID PANEL: CPT

## 2022-03-02 PROCEDURE — 93306 TTE W/DOPPLER COMPLETE: CPT

## 2022-03-02 PROCEDURE — 83735 ASSAY OF MAGNESIUM: CPT

## 2022-03-02 PROCEDURE — 84484 ASSAY OF TROPONIN QUANT: CPT

## 2022-03-02 PROCEDURE — 99223 1ST HOSP IP/OBS HIGH 75: CPT | Performed by: INTERNAL MEDICINE

## 2022-03-02 RX ORDER — SENNA AND DOCUSATE SODIUM 50; 8.6 MG/1; MG/1
2 TABLET, FILM COATED ORAL 2 TIMES DAILY
Status: DISCONTINUED | OUTPATIENT
Start: 2022-03-02 | End: 2022-03-04 | Stop reason: HOSPADM

## 2022-03-02 RX ORDER — FUROSEMIDE 10 MG/ML
60 INJECTION INTRAMUSCULAR; INTRAVENOUS DAILY
Status: DISCONTINUED | OUTPATIENT
Start: 2022-03-03 | End: 2022-03-04

## 2022-03-02 RX ADMIN — METOPROLOL TARTRATE 50 MG: 50 TABLET, FILM COATED ORAL at 20:25

## 2022-03-02 RX ADMIN — Medication 10 ML: at 20:27

## 2022-03-02 RX ADMIN — FUROSEMIDE 40 MG: 10 INJECTION, SOLUTION INTRAMUSCULAR; INTRAVENOUS at 10:20

## 2022-03-02 RX ADMIN — DOFETILIDE 250 MCG: 0.25 CAPSULE ORAL at 11:04

## 2022-03-02 RX ADMIN — AMLODIPINE BESYLATE 2.5 MG: 2.5 TABLET ORAL at 09:35

## 2022-03-02 RX ADMIN — PRAVASTATIN SODIUM 40 MG: 20 TABLET ORAL at 09:38

## 2022-03-02 RX ADMIN — Medication 10 ML: at 10:20

## 2022-03-02 RX ADMIN — METOPROLOL TARTRATE 75 MG: 50 TABLET, FILM COATED ORAL at 09:34

## 2022-03-02 RX ADMIN — Medication 400 MG: at 09:35

## 2022-03-02 RX ADMIN — DOCUSATE SODIUM 50 MG AND SENNOSIDES 8.6 MG 2 TABLET: 8.6; 5 TABLET, FILM COATED ORAL at 11:44

## 2022-03-02 RX ADMIN — RIVAROXABAN 15 MG: 20 TABLET, FILM COATED ORAL at 18:02

## 2022-03-02 RX ADMIN — DOFETILIDE 250 MCG: 0.25 CAPSULE ORAL at 20:23

## 2022-03-02 RX ADMIN — PANTOPRAZOLE SODIUM 40 MG: 40 TABLET, DELAYED RELEASE ORAL at 09:35

## 2022-03-02 RX ADMIN — Medication 2000 UNITS: at 09:35

## 2022-03-02 ASSESSMENT — PAIN SCALES - GENERAL
PAINLEVEL_OUTOF10: 0
PAINLEVEL_OUTOF10: 0

## 2022-03-02 NOTE — PROGRESS NOTES
[address] [place], [place] [address]ist   Progress Note    Admitting Date and Time: 3/1/2022  1:22 PM  Admit Dx: Acute on chronic combined systolic and diastolic congestive heart failure (HCC) [I50.43]  Combined congestive systolic and diastolic heart failure ([facility] [address] [I50.40]  Acute on chronic combined systolic and diastolic CHF (congestive heart failure) ([facility] [address] [I50.43]    Subjective:    Patient seen and examined. Offers no complaints. Has ambulated to bathroom with no dyspnea, dizziness. Asking when he can go home    ROS:   Denies CP, SOB, subjective fever, chills, N/V/D, abdominal pain,  HA. All systems reviewed and negative unless otherwise documented.       furosemide  40 mg IntraVENous Once    amLODIPine  2.5 mg Oral Daily    Vitamin D  2,000 Units Oral Daily    dofetilide  250 mcg Oral BID    furosemide  40 mg IntraVENous Daily    magnesium oxide  400 mg Oral Daily    metoprolol tartrate  75 mg Oral QAM    metoprolol tartrate  50 mg Oral Nightly    pantoprazole  40 mg Oral Daily    pravastatin  40 mg Oral Daily    rivaroxaban  20 mg Oral Daily    sodium chloride flush  5-40 mL IntraVENous 2 times per day     sodium chloride flush, 5-40 mL, PRN  sodium chloride, 25 mL, PRN  polyethylene glycol, 17 g, Daily PRN  acetaminophen, 650 mg, Q6H PRN   Or  acetaminophen, 650 mg, Q6H PRN  perflutren lipid microspheres, 1.5 mL, ONCE PRN  trimethobenzamide, 200 mg, Q6H PRN         Objective:    BP (!) 113/58   Pulse 75   Temp 98.2 °F (36.8 °C) (Oral)   Resp 16   Wt 170 lb 8 oz (77.3 kg)   SpO2 93%   BMI 25.92 kg/m²   General Appearance: alert and oriented to person, place and time and in no acute distress  Skin: warm and dry  Head: normocephalic and atraumatic  Eyes: pupils equal, round, and reactive to light, extraocular eye movements intact, conjunctivae normal  Neck: neck supple and non tender without mass   Pulmonary/Chest: clear to auscultation bilaterally- no wheezes, rales or rhonchi, normal air movement, no respiratory distress  Cardiovascular: normal rate, normal S1 and S2 and no carotid bruits  Abdomen: soft, non-tender, non-distended, normal bowel sounds, no masses or organomegaly  Extremities: no cyanosis, no clubbing and no edema  Neurologic: no cranial nerve deficit and speech normal      Recent Labs     03/01/22  1354      K 4.2   CL 99   CO2 28   BUN 42*   CREATININE 1.1   GLUCOSE 123*   CALCIUM 8.9       Recent Labs     03/01/22  1354   ALKPHOS 125   PROT 6.6   LABALBU 4.3   BILITOT 0.3   AST 23   ALT 15       Recent Labs     03/01/22  1354   WBC 10.1   RBC 3.17*   HGB 9.9*   HCT 30.2*   MCV 95.3   MCH 31.2   MCHC 32.8   RDW 14.6      MPV 10.3         Radiology:   XR CHEST PORTABLE   Final Result   1. Cardiomegaly. There are no findings of CHF or pneumonia. XR CHEST PORTABLE    Result Date: 3/1/2022  EXAMINATION: ONE XRAY VIEW OF THE CHEST 3/1/2022 2:03 pm COMPARISON: None. HISTORY: ORDERING SYSTEM PROVIDED HISTORY: A-fib TECHNOLOGIST PROVIDED HISTORY: Reason for exam:->A-fib FINDINGS: Cardiac silhouette is enlarged. Postop sternotomy changes are noted. There is a dual lead cardiac pacer on the left The lungs are clear. No findings of CHF or pneumonia. 1. Cardiomegaly. There are no findings of CHF or pneumonia. Assessment:  Principal Problem:    Acute on chronic combined systolic and diastolic CHF (congestive heart failure) (HCC)  Active Problems:    Chronic atrial fibrillation    Ischemic heart disease    Stage 3b chronic kidney disease (HCC)    Coronary artery disease involving coronary bypass graft    S/P TAVR (transcatheter aortic valve replacement)    Essential hypertension    Hyperlipidemia LDL goal <100    ICD (implantable cardioverter-defibrillator), dual, in situ    Gastroesophageal reflux disease without esophagitis  Resolved Problems:    * No resolved hospital problems. *      Plan:    1. Acute on chronic  exacerbation CHF:  BNP 1344.  Last Echo atrial fibrillation    Essential hypertension    Hyperlipidemia LDL goal <100    ICD (implantable cardioverter-defibrillator), dual, in situ    Ischemic heart disease    Stage 3b chronic kidney disease ([facility] [address]    Gastroesophageal reflux disease without esophagitis    Coronary artery disease involving coronary bypass graft  Resolved Problems:    * No resolved hospital problems. *      My findings/plan include:    1. Dyspnea possibly due to CHF exacerbation - Procalcitonin is unremarkable. Patient is already on xarelto and so PE seems less likely  2. Acute HFpEF exacerbation - Will repeat Echo. Patient takes lasix 60 mg daily at home. I have started him on lasix 60 mg IV daily here. Will monitor I&O. Thus far there is a net negative fluid balance of 1.1 L. Will place awad  3. H/O CAD S/P CABG times 3 and PCI  4. Ischemic cardiomyopathy S/P AICD for secondary prevention  5. H/P PAF S/P TAVR - Continue tikosyn and xarelto    NOTE: This report was transcribed using voice recognition software. Every effort was made to ensure accuracy; however, inadvertent computerized transcription errors may be present.     Electronically signed by Elizabeth Luis DO on 3/2/2022 at 10:18 AM

## 2022-03-02 NOTE — PROGRESS NOTES
Physical Therapy    Facility/Department: [address] MED SURG/TELE  Initial Assessment    NAME: Terrence Jalloh  : 1935  MRN: [id number]    Date of Service: 3/2/2022      Attending Provider:  Roxann Barraza DO    Evaluating PT:  Carlos Mock, P.T. Room #:  1298/6693-B  Diagnosis:  Acute on chronic combined systolic and diastolic congestive heart failure (HCC) [I50.43]  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) [I50.40]  Acute on chronic combined systolic and diastolic CH[facility] (congestive [facility] failure) [facility] [I50.43]  Precautions: none    SUBJECTIVE:    Pt lives with his wife in a 1 story home with 3 stairs and 1 rail to enter. Pt ambulated with no AD PTA. OBJECTIVE:   Initial Evaluation  Date: 3/2/22   Was pt agreeable to Eval/treatment? yes   Does pt have pain? No c/o pain   Bed Mobility  Rolling: Independent  Supine to sit: Independent  Sit to supine: Independent  Scooting: Independent   Transfers Sit to stand: Independent  Stand to sit: Independent  Stand pivot: Independent   Ambulation   250 feet with no AD Independent   Stair negotiation: ascended and descended 3 steps with 1 rail Independent    AM-PAC 6 Clicks 80/90     BLE ROM is WFL. BLE strength is grossly 4/5 to 4+/5. Sensation:  Pt c/o chronic numbness and tingling to B hands and feet due to neuropathy. Balance: sitting is Independent and standing with no AD is Independent   Endurance: good    ASSESSMENT:    Comments:  Pt is Independent with functional mobility and has no skilled PT needs at this time. He walked with slow ans steady gait and had no increased SOB with activity at this time. Pt's/ family goals   1. To go home. Patient and or family understand(s) diagnosis, prognosis, and plan of care. PHYSICAL THERAPY PLAN OF CARE:    PT will discharge pt from our service at this time.     Referring provider/PT Order:  PT eval and treat  Diagnosis:  Acute on chronic combined systolic and diastolic congestive heart failure (HCC) [I50.43]  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) [I50.40]  Acute on chronic combined systolic and diastolic [facility] (congestive [facility] [I50.43]    Time in  12:10  Time out  12:25    Evaluation Time includes thorough review of current medical information, gathering information on past medical history/social history and prior level of function, completion of standardized testing/informal observation of tasks, assessment of data and education on plan of care and goals. CPT codes:  [x] Low Complexity PT evaluation 69653  [] Moderate Complexity PT evaluation 96953  [] High Complexity PT evaluation 31401  [] PT Re-evaluation 25446  [] Gait training 43136 ** minutes  [] Manual therapy 59535 ** minutes  [] Therapeutic activities 75721 ** minutes  [] Therapeutic exercises 80231 ** minutes  [] Neuromuscular reeducation 95276 ** minutes     Benoit Diaz., P.T.   License Number: [id number]

## 2022-03-02 NOTE — CARE COORDINATION
Pt lives with spouse in 1 story home; uses no assistive devices(has ww/cane, doesn't use). CHF ed to follow. PCP is Maritza Brooks. currently on room air; declines any needs at discharge. Plan is home with wife. IV lasix -1.1 liters. Will follow. Kandice Sumner.

## 2022-03-02 NOTE — CONSULTS
Patient currently admitted with diagnosis of Diastolic heart failure. Patient was awake and alert, sitting up in the chair during the consultation. He was engaged and asked appropriate questions throughout the education session. He is agreeable to heart failure education while here and follow up in the clinic after discharge. Future Appointments   Date Time Provider Cezar Gamez   3/3/2022  9:00 AM GINETTE Ramirez - CNP [facility]   4/7/2022  1:15 PM DO Jacky Agustin [facility]   4/27/2022  1:00 PM MD PRISCILLA Will [facility]            We reviewed the introduction to Heart Failure, the HF zones, signs and symptoms to report on day 1 of onset, medications, medication compliance, the importance of obtaining daily weights, following a low sodium diet, reading food labels for the sodium content, keeping physician appointments, and smoking cessation. We discussed writing / tracking daily weights on a calendar / log and taking charted weights to your doctor appointments to be reviewed. Contributing risk factors for Heart Failure are identified as shortness of breath, dyspnea on exertion. I advised patient they can reduce the risk for Heart Failure exacerbations by modifying / controlling the risk factors. We discussed self-managed care which includes the following:  to take medications as prescribed, report any intolerable side effects of medications to the cardiologist / doctor, do not just stop taking the medication; follow a cardiac heart healthy / low sodium diet; weigh yourself daily, exercise regularly- per doctor recommendation and not to smoke or use an excess amount of alcohol. We discussed calling the cardiologist / doctor with a weight gain of 3 pounds in one day or a total of 5 pounds or more in one week. Also, if you should have a significant weight loss of 3# or more in one day to call the doctor, they may need to decrease or hold the diuretic dose.  On days you feels nauseated and not eating / drinking, having emesis or diarrhea,  informed to call the cardiologist  / doctor, they may need to decrease or hold diuretic to avoid dehydration. I stressed the importance of informing their cardiologist the first day of onset of any of the signs and symptoms in the \"Yellow Zone\" of the HF Zones. Patient verbalizes understanding. Greater than 30 minutes was spent educating patient. The Heart Failure Booklet given to the patient with additional patient education addressing:  · What is Heart Failure? · Things You Can Do to Live Well with HF  · How to Take Your Medications  · How to Eat Less Salt  · Exercising Well with Heart Failure  · Signs and symptoms of HF to report  · Weight Yourself Each Day  · Home Self Management- activity, weight tracking, taking medications as prescribed, meals /diet planning (sodium and fluid restriction), how to read food labels, keeping physician follow ups, smoking cessation, follow the Heart Failure Zones  · The Heart Failure zones  · Sodium content pamphlet  · What foods I should avoid tip sheet    Instructed  to call 911 if you have any of the following symptoms:    ·    Struggling to breathe unrelieved with rest,  ·    Having chest pain, confusion or can't think clearly  ·    Have confusion or cant think clearly    The patient is ordered:  Diet: ADULT DIET;  Regular; Low Fat/Low Chol/High Fiber/LUZ; Low Sodium (2 gm); 2000 ml   Sodium controlled diet Yes  Fluid restriction daily ordered (fluid restriction recommended if patient is hyponatremic and/or diuretic is initiated or increased) Yes  FR:   Daily Weights:   Patient Vitals for the past 96 hrs (Last 3 readings):   Weight   03/02/22 0314 170 lb 8 oz (77.3 kg)   03/01/22 1930 173 lb 3.2 oz (78.6 kg)     I/O:     Intake/Output Summary (Last 24 hours) at 3/2/2022 1246  Last data filed at 3/1/2022 2142  Gross per 24 hour   Intake --   Output 1150 ml   Net -1150 ml     Aurelia Gleason RN LYNDA, RN  Heart Failure Navigator    CONGESTIVE HEART FAILURE (CHF) AHA GUIDELINES  (Must be completed for Primary Diagnosis CHF or History of CHF)    Discharge Plan:  I placed the Heart Failure Home Instructions in patient's discharge instructions. Per Heart Failure GWTG, the patient should have a follow-up appointment made within 7 days of discharge.     New Diagnosis No    Lab Results   Component Value Date    LVEF 60 2019    LVEFMODE Echo 2018                                        Social History     Tobacco Use   Smoking Status Former Smoker    Packs/day: 0.50    Years: 3.00    Pack years: 1.50    Quit date: 1973    Years since quittin.1   Smokeless Tobacco Never Used   Tobacco Comment    Quit smoking in 's        Immunization History   Administered Date(s) Administered    COVID-19, Pfizer Purple top, DILUTE for use, 12+ yrs, 30mcg/0.3mL dose 2021, 2021, 2021    Influenza A ([id number]),all Formulations 2016    Influenza Vaccine, unspecified formulation 2013, 2014, 10/10/2016    Influenza Whole 10/19/2007    Influenza, High Dose (Fluzone 65 yrs and older) 2015, 2016, 2017, 2018, 2020    Influenza, Quadv, adjuvanted, 65 yrs +, IM, PF (Fluad) 2020    Influenza, Triv, inactivated, subunit, adjuvanted, IM (Fluad 65 yrs and older) 2019    Pneumococcal Conjugate 13-valent ([id number]) 2018    Pneumococcal Polysaccharide ([id number]) 2020    Tdap (Boostrix, Adacel) 2014      Angiotensin-Converting-Enzyme (ACE) inhibitor ordered:  [] Yes  [x] No (specify contraindication): No LVSD   [] Contraindicated  [] Hypotensive patient who was at immediate risk of cardiogenic shock  [] Hospitalized patient who experienced marked azotemia  [] Other Contraindications  [] Not Eligible  [] Not Tolerant  [] Patient Reason  [] System Reason  [] Other Reason    Angiotensin II receptor blockers (ARB) ordered:  [] Yes  [x] No (specify contraindication): No LVSD   [] Contraindicated  [] Hypotensive patient who was at immediate risk of cardiogenic shock  [] Hospitalized patient who experienced marked azotemia  [] Other Contraindications    ARNI - Angiotensin Receptor Neprilysin Inhibitor ordered:  [] Yes  [x] No (specify contraindication):    [] ACE inhibitor use within the prior 36 hours  [] Allergy  [] Hyperkalemia  [] Hypotension  [] Renal dysfunction defined as creatinine > 2.5 mg/dL in men or > 2.0 mg/dL in women  [] Other Contraindications  [] Not Eligible  [] Not Tolerant  [] Patient Reason  []System Reason  [x]Other Reason      Beta Blocker (Carvedilol, Metoprolol Succinate, or Bisoprolol) ordered:    [] Yes  [x] No (specify contraindication):    [] Contraindicated  [] Asthma  [] Fluid Overload  [] Low Blood Pressure  [] Patient recently treated with an intravenous positive inotropic agent  [x] Other Contraindications  [] Not Eligible  [] Not Tolerant  [] Patient Reason  [] System Reason    SGLT2 Inhibitor ordered:  [] Yes  [x] No (specify contraindication):    [] Contraindicated  [] Patient currently on dialysis  [] Ketoacidosis  [] Known hypersensitivity to the medication  [] Type I diabetes (not approved for use in patients with Type I diabetes due to increased risk of ketoacidosis)  [] Other Contraindications  [] Not Eligible  [] Not Tolerant  [] Patient Reason  [] System Reason  [x] Other Reason    Aldosterone Antagonist ordered:  [] Yes  [] No (specify contraindication):    [] Contraindicated  [] Allergy due to aldosterone receptor antagonist  [] Hyperkalemia  [] Renal dysfunction defined as creatinine >2.5 mg/dL in men or >2.0 mg/dL in women.   [] Other contraindications  [] Not Eligible  [] Not Tolerant  [] Patient Reason  [] System Reason  [x] Other Reason

## 2022-03-02 NOTE — CONSULTS
Inpatient Cardiology Consultation      Reason for Consult:  CHF    Consulting Physician: Dr. Quin Parkinson    Requesting Physician:  Dr. Donna Waite    Date of Consultation: 3/2/2022    HISTORY OF PRESENT ILLNESS:   Mr. Bharathi Shore is a 83 y/o male who is known to Dr. Iris Stark and Dr. Oliva Davila (EP). PMH: Coronary artery disease s/p CABG x3 (LIMA-LAD, SVG-RI, SVG-OM. 2003), redo CABG (radial artery graft to D2 and OM2. 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 Patient was seen by urgent care on 3/1/2022 as a walk-in for complaints of TORRES x 4 days, early satiety, belly bloating without chest pain, orthopnea, weight gain or PND. He denies palpitations, feeling of his heart racing. He stated that he has chronic TORRES (x 1 year ambulating 50 ft) but had progressively worsened x 2-3 days. Patient was advised to go to the ED (refused an ambulance) and drove himself to [facility] on 3/1/2022. Upon arrival to the ED: Blood pressure 126/60, heart rate 100, afebrile, 95% on room air. Labs: Sodium 139, potassium 4.2, BUN 42, creatinine 1.1, magnesium 2.3, lactic acid 1.3, procalcitonin normal.  proBNP 1344 (prior proBNP  548 9/21/2021). High-sensitivity troponin 28, 21 (chronically elevated troponin with most recent high-sensitivity troponin 22 on 10/23/2021). TSH normal.  WBC 10.1, H/H 9.9/30.2 (prior hemoglobin 13.0 on 10/23/2021), platelet count 385. Blood cultures: Pending. CXR: Cardiomegaly, no findings of CHF or pneumonia. EKG: Paced with underlying AF, rate 80 bpm.   ER medications: Lasix 40 mg IV x 1. Patient was admitted to a telemetry monitored unit. Upon initial consultation, patient is sitting up in bed and appears to be in no acute distress.  He denies CP but continues to have mild TORRES with ambulating to the bathroom. He has diuresed 1.1L overnight. Please note: past medical records were reviewed per electronic medical record (EMR) - see detailed reports under Past Medical/ Surgical History. Past Medical History:    1. MI, 2003. Cardiac catheterization: 85% ostial, proximal and mid LAD narrowings. LMC 70% stenosis. D1 occluded. D2 50% stenosis. OM1 80% ostial stenosis. Mid CX occluded. Dominant RCA severe disease. LVEF 40-45%. 2. CABG, 05/23/[facility] with LIMA-LAD, SVG-RI, SVG-OM. 3. Hypertension  4. Hyperlipidemia. 5. Mild carotid plaque on US, 2003. 6. Echo, 07/2006. Mild LVE, normal EF, Stage II diastolic dysfunction, moderate AS, mild MR, mild TR.  7. Right leg vein stripping, 1970's. 8. No history diabetes mellitus, stroke or COPD. 9. Nonsmoker for many years. 10. VT causing cardiac arrest after stress test, 03/15/2007. He was successfully cardioverted. 11. Cardiac catheterization, 03/16/2007 with normal LVEF, severe native three vessel coronary disease. SVG-RI, SVG-OM both occluded. LIMA-LAD patent. 12. Re-do CABG, [facility], 03/2007 with radial artery graft to D2 and OM2. 13. Elective PCI with CONNOR native OM2 and native LAD, 03/2007 following CABG. This was done because of inadequate conduits. 14. ICD placement, [facility], 03/2007. 15. ICD lead recall, early 2008, but he was followed electively because his device was functioning normally. 16. Presentation [facility], 03/29/2008 with multiple ICD inappropriate shocks. Transfer [facility] where ICD and lead were replaced. He reports cardiac catheterization that revealed patent LIMA-LAD and patent stents in native coronary arteries. 17. Atypical chest pain and anxiety with admission [facility] 3, 05/2008. Troponin minimally elevated. Beta blocker dose increased.    25. [facility], 06/13/2009 with lightheadedness, orthostatic hypotension, drop in hemoglobin to 10.5 from 14.9 over two months with an increase in BUN cavity without any recent change. Chronic obstructive airways disease. 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   33. ICD programmed to DDDR mode by Dr. Teressa Fang, 03/26/2015. Device function normal.  34. Echo 10/16/2015. EF 39%. Stage II diastolic dysfunction. Aortic stenosis with peak/mean gradients of 48/24 mmHg. Estimated valve area 1.0 cm². 35. ICD generator change for battery depletion, 07/28/2016, Dr. Barnhart Loop. Teressa Fang. 36. Echo, 02/03/2017.  Normal LV size with mild LVH.  Normal regional wall motion and overall systolic function.  Diastole could not be assessed, but was presumed to be impaired.  The RV was dilated with impaired global systolic function.  The LA was enlarged.  Mild MAC with mild mitral insufficiency.  Moderate tricuspid insufficiency.  Aortic valve gradients are peak 47 mmHg with a mean of 27.  Dimensionless ratio 0.21. Valve area calculated 0.8 cm².    37.  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 38. Echocardiogram, [facility], 4/19/2018, EF 60%.  Low normal RV function.  Stage 2 diastolic dysfunction.  Mild-to-moderate MR. S/P TAVR with a mean gradient of 10 mmHg.  RVSP 31 mg.     39. S/P Cardioversion by Dr. Radha Mosher, 05/30/2017.   40. Hospital evaluation, 09/07/2018, for 2 appropriate ICD discharges for polymorphic ventricular tachycardia, which occurred after yard work and moving heavy furniture. 41. [facility] 2017 ([place]): Left main: 0% stenosis LAD: 100 % stenosis Circumflex: 100 % Stenosis AFTER OM1 RCA: Dominant. ANEURYSMAL  DILATATION IN MID PORTION. LONG 75 % stenosis IN MID AND DISTAL VESSEL. LIMA - LAD: PATENT RADIAL - D2: PATENT  RADIAL - OM2: PATENT  42. 10/2020 hospitalized for HFpEF and Afib RVR -- dccv 10/29/2020   43. [facility]ar) 10/30/2020:  Left ventricle is normal in size  Mild asymmetric septal hypertrophy. EF 65%  No regional wall motion abnormalities seen.  Normal left ventricular ejection fraction.  The left atrium is severely dilated.  Mild mitral annular calcification.   Mild mitral regurgitation is present.  Normally functioning bioprosthetic valve in aortic position.  Physiologic and/or trace tricuspid regurgitation. 44. CKD: stage 3b: Baseline creatinine around 1.3.           Medications Prior to admit:  Prior to Admission medications    Medication Sig Start Date End Date Taking?  Authorizing Provider   amLODIPine (NORVASC) 2.5 MG tablet Take 1 tablet by mouth daily 2/24/22  Yes Jp Welsh DO   metoprolol tartrate (LOPRESSOR) 50 MG tablet TAKE 1 AND 1/2 TABLETS BY  MOUTH IN THE MORNING AND 1  TABLET BY MOUTH AT BEDTIME 2/16/22  Yes Connie Galeana MD   furosemide (LASIX) 40 MG tablet Alternate 1 tablet and 1/2 tablet every other day 2/16/22  Yes Connie Galeana MD   pravastatin (PRAVACHOL) 40 MG tablet TAKE 1 TABLET BY MOUTH  DAILY 2/16/22  Yes Connie Galeana MD   potassium chloride (KLOR-CON M) 10 MEQ extended release tablet TAKE 1 TABLET BY MOUTH  DAILY 1/24/22  Yes Connie Galeana MD   pantoprazole (PROTONIX) 40 MG tablet TAKE 1 TABLET BY MOUTH  DAILY 12/15/21  Yes Connie Galeana MD   XARELTO 20 MG TABS tablet TAKE 1 TABLET BY MOUTH  DAILY 3/29/21  Yes Jp Welsh DO   ferrous sulfate (IRON 325) 325 (65 Fe) MG tablet Take 1 tablet by mouth daily (with breakfast) 11/5/20  Yes Connie Galeana MD   MAGNESIUM-OXIDE 400 (241.3 Mg) MG TABS tablet TK 1 T PO ONCE D 3/18/20  Yes Historical Provider, MD   dofetilide (TIKOSYN) 250 MCG capsule Take 1 capsule by mouth 2 times daily 3/5/20  Yes Connie Galeana MD   Multiple Vitamins-Minerals (PRESERVISION AREDS 2 PO) Take 1 tablet by mouth 2 times daily   Yes Historical Provider, MD   Cholecalciferol (VITAMIN D) 2000 UNITS CAPS capsule Take 1 capsule by mouth daily    Yes Historical Provider, MD   potassium chloride (MICRO-K) 10 MEQ extended release capsule Take 1 capsule by mouth daily 8/30/21   Connie Galeana MD   Handicap Placard MISC by Does not apply route Disp #: 1  Duration: 5 years. 11/16/20   Magali Gage MD       Current Medications:    Current Facility-Administered Medications: furosemide (LASIX) injection 40 mg, 40 mg, IntraVENous, Once  amLODIPine (NORVASC) tablet 2.5 mg, 2.5 mg, Oral, Daily  vitamin D (CHOLECALCIFEROL) tablet 2,000 Units, 2,000 Units, Oral, Daily  dofetilide (TIKOSYN) capsule 250 mcg, 250 mcg, Oral, BID  furosemide (LASIX) injection 40 mg, 40 mg, IntraVENous, Daily  magnesium oxide (MAG-OX) tablet 400 mg, 400 mg, Oral, Daily  metoprolol tartrate (LOPRESSOR) tablet 75 mg, 75 mg, Oral, QAM  metoprolol tartrate (LOPRESSOR) tablet 50 mg, 50 mg, Oral, Nightly  pantoprazole (PROTONIX) tablet 40 mg, 40 mg, Oral, Daily  pravastatin (PRAVACHOL) tablet 40 mg, 40 mg, Oral, Daily  rivaroxaban (XARELTO) tablet 20 mg, 20 mg, Oral, Daily  sodium chloride flush 0.9 % injection 5-40 mL, 5-40 mL, IntraVENous, 2 times per day  sodium chloride flush 0.9 % injection 5-40 mL, 5-40 mL, IntraVENous, PRN  0.9 % sodium chloride infusion, 25 mL, IntraVENous, PRN  polyethylene glycol (GLYCOLAX) packet 17 g, 17 g, Oral, Daily PRN  acetaminophen (TYLENOL) tablet 650 mg, 650 mg, Oral, Q6H PRN **OR** acetaminophen (TYLENOL) suppository 650 mg, 650 mg, Rectal, Q6H PRN  perflutren lipid microspheres (DEFINITY) injection 1.65 mg, 1.5 mL, IntraVENous, ONCE PRN  trimethobenzamide (TIGAN) injection 200 mg, 200 mg, IntraMUSCular, Q6H PRN    Allergies:  Patient has no known allergies. Social History:    Patient resides with his wife at home and denies using walker or cane for ambulation. Denies alcohol, illicit drug use and tobacco use. Limited code: No intubation, defibrillation/cardioversion (yes), chest compressions (yes) and resuscitative medications (ES). Family History: Noncontributory due to advanced age. REVIEW OF SYSTEMS:     · Constitutional: + fatigue.  Denies fevers, chills or night sweats  · Eyes: Denies visual changes or drainage  · ENT: Denies headaches or hearing loss. No mouth sores or sore throat. No epistaxis   · Cardiovascular: See HPI. · Respiratory: + TORRES, + chronic cough (x 4 months),Denies  orthopnea or PND. No hemoptysis   · Gastrointestinal: Denies hematemesis or anorexia. No hematochezia or melena    · Genitourinary: Denies urgency, dysuria or hematuria. · Musculoskeletal: Denies gait disturbance, weakness or joint complaints  · Integumentary: Denies rash, hives or pruritis   · Neurological: Denies dizziness, headaches or seizures. No numbness or tingling  · Psychiatric: Denies anxiety or depression. · Endocrine: Denies temperature intolerance. No recent weight change. .  · Hematologic/Lymphatic: Denies abnormal bruising or bleeding. No swollen lymph nodes    PHYSICAL EXAM:   BP (!) 113/58   Pulse 75   Temp 98.2 °F (36.8 °C) (Oral)   Resp 16   Wt 170 lb 8 oz (77.3 kg)   SpO2 93%   BMI 25.92 kg/m²   CONST:  Well developed, well nourished elderly male who appears of stated age. Awake, alert and cooperative. No apparent distress. HEENT:   Head- Normocephalic, atraumatic   Eyes- Conjunctivae pink, anicteric  Throat- Oral mucosa pink and moist  Neck-  No stridor, trachea midline, + jugular venous distention. No carotid bruit. CHEST: Chest symmetrical and non-tender to palpation. No accessory muscle use or intercostal retractions. ICD site intact. RESPIRATORY: Lung sounds - clear throughout fields. On RA. CARDIOVASCULAR:     Heart Inspection- shows no noted pulsations  Heart Palpation- no heaves or thrills; PMI is non-displaced   Heart Ausculation- Regular rate and rhythm, +2/6 JASSI. No s3, s4 or rub   PV: No lower extremity edema. + varicosities. Pedal pulses palpable, no clubbing or cyanosis   ABDOMEN: Soft, non-tender to light palpation. Bowel sounds present. No palpable masses no organomegaly; no abdominal bruit  MS: Good muscle strength and tone. No atrophy or abnormal movements.    : Deferred  SKIN: Warm and dry no statis dermatitis or ulcers   NEURO / PSYCH: Oriented to person, place and time. Speech clear and appropriate. Follows all commands. Pleasant affect     DATA:    ECG: See HPI. Tele strips: Intermittently Paced, underlying SR. Earlier today, patient was in AF. Diagnostic:      Intake/Output Summary (Last 24 hours) at 3/2/2022 0717  Last data filed at 3/1/2022 2142  Gross per 24 hour   Intake --   Output 1150 ml   Net -1150 ml       Labs:   CBC:   Recent Labs     03/01/22  1354   WBC 10.1   HGB 9.9*   HCT 30.2*        BMP:   Recent Labs     03/01/22  1354      K 4.2   CO2 28   BUN 42*   CREATININE 1.1   LABGLOM >60   CALCIUM 8.9     Estimated Creatinine Clearance: 41 mL/min (A) (based on SCr of 1.3 mg/dL (H)). Mag:   Recent Labs     03/01/22  1428   MG 2.3     Phos: No results for input(s): PHOS in the last 72 hours. TFT:   Lab Results   Component Value Date    TSH 1.280 03/01/2022    L7AYWCH 107.80 12/08/2016    H3IRTEN 8.2 12/08/2016    T4FREE 1.30 10/29/2020      HgA1c:   Lab Results   Component Value Date    LABA1C 5.5 09/03/2021     No results found for: EAG  proBNP:   Recent Labs     03/01/22  1428   PROBNP 1,344*     PT/INR: No results for input(s): PROTIME, INR in the last 72 hours. APTT:No results for input(s): APTT in the last 72 hours. CARDIAC ENZYMES:  Recent Labs     03/01/22  1354 03/01/22  1428   TROPHS 28* 21*     FASTING LIPID PANEL:  Lab Results   Component Value Date    CHOL 140 09/03/2021    HDL 38 09/03/2021    LDLCALC 56 09/03/2021    TRIG 230 09/03/2021     LIVER PROFILE:  Recent Labs     03/01/22  1354   AST 23   ALT 15   LABALBU 4.3       CXR: 3/1/2022    Cardiomegaly.  There are no findings of CHF or pneumonia. Most recent cardiac testing:     [facility] 2017 [facility]): Left main: 0% stenosis LAD: 100 % stenosis Circumflex: 100 % Stenosis AFTER OM1 RCA: Dominant. ANEURYSMAL  DILATATION IN MID PORTION. LONG 75 % stenosis IN MID AND DISTAL VESSEL.   LIMA - LAD: PATENT RADIAL - D2: PATENT  RADIAL - OM2: PATENT     [facility]) 10/30/2020:  Left ventricle is normal in size  Mild asymmetric septal hypertrophy. EF 65%  No regional wall motion abnormalities seen.  Normal left ventricular ejection fraction.  The left atrium is severely dilated.  Mild mitral annular calcification.   Mild mitral regurgitation is present.  Normally functioning bioprosthetic valve in aortic position.  Physiologic and/or trace tricuspid regurgitation. Echocardiogram: 3/2/22 (Dr. Jimena Benitez)   Normal left ventricular systolic function. Ejection fraction is visually estimated at 60%. Mildly dilated right ventricle with normal right ventricular function. There is doppler evidence of stage II diastolic dysfunction. Moderately dilated left atrium by volume index. Mild mitral regurgitation. 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 No significant paravalvular aortic regurgitation. AV peak velocity 2.1 m/s. AV mean gradient 9 mmHg. AV area 1.5 cm2. Mild tricuspid regurgitation. PASP is estimated at 44 mmHg. Assessment/Plan:  1. Acute on Chronic HFpEF  · proBNP 1344  2. ACC stage C / NYHA Class III  3. Hypervolemic   4. CAD status post CABG x3 (LIMA-LAD, SVG-RI, SVG-OM; 2003). Redo CABG in 2007 (radial artery graft to D2 and OM2). Patent grafts in 2017.  5. Chronic RBBB/LAFB  6. History of VT status post ICD (2007) and lead replacement 2008 and generator change 2016. 7. PAF on chronic anticoagulation (Xarelto) and Tikosyn. 8. Severe AS status post TAVR (3/29/2017). 9. Hypertension  10. Hyperlipidemia: on pravastatin (unable to tolerate Crestor/Lipitor)  11. Limited code  12. CKD, stage 3b: Baseline SCr 1.3. Admission: 1.1.   13. Hx of BPV  14. Anemia: Hgb 13.0 (10/23/2021) --> 9.9. Denies bloody stools.       · Results of today's echocardiogram outlined above  · Continue IV lasix for today  · Daily weights, monitor renal function and strict I/O's -- net negative 1.1 L  · Change Xarelto to 15 mg QD (CrCl < 50)  · Monitor Hgb in the setting of acute anemia   · Interrogate ICD (Medtronic) to assess AF burden. · Monitor QTc on Tikosyn  · Monitor telemetry  · The above was discussed with the patient and his family today    The above assessment and treatment plan was made in collaboration with Dr. Adolph Thakkar.  Electronically signed by GINETTE Paiz CNP on 3/2/22 at 9:15 AM EST    _______________________________________________________________________________  I independently interviewed and examined the patient. I have reviewed the above documentation completed by the TITO. Please see my additional contributions to the HPI, physical exam, and assessment / medical decision making. HPI, ROS, PMH, PSH, [address], [place], and medications independently reviewed (agree; see above documentation)    History of Present Illness:  Currently with no chest pain, respiratory distress, or palpitations. AP/VS on telemetry.     Review of Systems:   Cardiac: As per HPI  General: No fever, chills  Pulmonary: As per HPI  HEENT: No visual disturbances, difficult swallowing  GI: No nausea, vomiting  : No dysuria, hematuria  Endocrine: No thyroid disease or DM  Musculoskeletal: ROBLERO x 4, no focal motor deficits  Skin: Intact, no rashes  Neuro: No headache, seizures  Psych: Currently with no depression, anxiety    Physical Exam:  /60   Pulse 78   Temp 98.1 °F (36.7 °C) (Oral)   Resp 18   Wt 170 lb 8 oz (77.3 kg)   SpO2 93%   BMI 25.92 kg/m²   Wt Readings from Last 3 Encounters:   03/02/22 170 lb 8 oz (77.3 kg)   03/01/22 176 lb (79.8 kg)   12/27/21 174 lb (78.9 kg)     Appearance: Awake, alert, no acute respiratory distress  Skin: Intact, no rash  Head: Normocephalic, atraumatic  Eyes: EOMI, no conjunctival erythema  ENMT: No pharyngeal erythema, MMM, no rhinorrhea  Neck: Supple, no carotid bruits  Lungs: Decreased BS B/L, no wheezing  Cardiac: Regular rate and rhythm, 2/6 JASSI > RUSB  Abdomen: Soft, nontender, +bowel sounds  Extremities: Moves all extremities x 4, no lower extremity edema  Neurologic: No focal motor deficits apparent, normal mood and affect    Laboratory Tests:  Recent Labs     03/01/22  1354 03/02/22  1010    140   K 4.2 4.0   CL 99 101   CO2 28 28   BUN 42* 38*   CREATININE 1.1 1.3*   GLUCOSE 123* 136*   CALCIUM 8.9 9.0     Estimated Creatinine Clearance: 41 mL/min (A) (based on SCr of 1.3 mg/dL (H)).     Lab Results   Component Value Date    MG 2.3 03/02/2022     Recent Labs     03/01/22  1354   ALKPHOS 125   ALT 15   AST 23   PROT 6.6   BILITOT 0.3   LABALBU 4.3     Recent Labs     03/01/22  1354 03/02/22  1010   WBC 10.1 8.2   RBC 3.17* 2.98*   HGB 9.9* 9.2*   HCT 30.2* 28.2*   MCV 95.3 94.6   MCH 31.2 30.9   MCHC 32.8 32.6   RDW 14.6 15.2*    211   MPV 10.3 10.7     Lab Results   Component Value Date    CKTOTAL 94 06/28/2017    CKMB 3.8 06/28/2017    TROPONINI <0.01 10/29/2020    TROPONINI <0.01 10/29/2020    TROPONINI <0.01 10/28/2020     Recent Labs     03/01/22  1354 03/01/22  1428 03/02/22  1010   TROPHS 28* 21* 24*     Lab Results   Component Value Date    TSH 1.280 03/01/2022     Lab Results   Component Value Date    LABA1C 5.5 09/03/2021     No results found for: EAG  Lab Results   Component Value Date    CHOL 176 03/02/2022    CHOL 140 09/03/2021    CHOL 154 06/08/2021     Lab Results   Component Value Date    TRIG 430 (H) 03/02/2022    TRIG 230 (H) 09/03/2021    TRIG 339 06/08/2021     Lab Results   Component Value Date    HDL 34 03/02/2022    HDL 38 09/03/2021    HDL 37 06/08/2021     Lab Results   Component Value Date    LDLCALC - (AA) 03/02/2022    LDLCALC 56 09/03/2021    LDLCALC 49 06/08/2021     Lab Results   Component Value Date    LABVLDL - (AA) 03/02/2022    LABVLDL 46 09/03/2021    LABVLDL - (AA) 08/17/2020     Lab Results   Component Value Date    CHOLHDLRATIO 4 06/08/2021     Recent Labs     03/01/22  1428   PROBNP 1,344*     Cardiac Tests:  Telemetry reviewed (date: 3/2/2022): AP/VS, rate 70's    Echocardiogram: 3/2/22 (Dr. Otoniel Woods)   Normal left ventricular systolic function. Ejection fraction is visually estimated at 60%. Mildly dilated right ventricle with normal right ventricular function. There is doppler evidence of stage II diastolic dysfunction. Moderately dilated left atrium by volume index. Mild mitral regurgitation. 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 No significant paravalvular aortic regurgitation. AV peak velocity 2.1 m/s. AV mean gradient 9 mmHg. AV area 1.5 cm2. Mild tricuspid regurgitation. PASP is estimated at 44 mmHg. · Results of today's echocardiogram outlined above  · Continue IV lasix for today / monitor BMP and I&O's (net negative 1.1 L)  · Adjust xarelto dose to 15 mg daily (Estimated Creatinine Clearance: 41 mL/min (A) (based on SCr of 1.3 mg/dL (H)). · Monitor Hgb in the setting of anemia   · Interrogate ICD (Medtronic) to assess AF burden  · Monitor QTc on Tikosyn  · Monitor telemetry  · The above was discussed with the patient and his family today    Thank you for allowing me to participate in your patient's care. Please feel free to contact me if you have any questions or concerns.     Don Hernandez MD  [facility]) Cardiology

## 2022-03-02 NOTE — CONSULTS
Nutrition Education    · Verbally reviewed information with Patient and Family  · Educated on Heart Healthy Reduced Sodium Nutrition Therapy guidelines with cooking tips and educational handouts. Patient/family engaged in conversation, asked questions, and are ready to make lifestyle diet changes. · Written educational materials provided. · Contact name and number provided.     Electronically signed by Otto Hemphill MS, RD, LD on 3/2/22 at 1:57 PM EST    Contact: [phone]

## 2022-03-02 NOTE — PROGRESS NOTES
Pacer interrogation completed at bedside. Spoke with [employer] rep - Report completed successfully and will be faxed shortly. Cardiology updated.

## 2022-03-02 NOTE — PLAN OF CARE
Problem: Falls - Risk of:  Goal: Will remain free from falls  Description: Will remain free from falls  Outcome: Met This Shift     Problem: OXYGENATION/RESPIRATORY FUNCTION  Goal: Patient will maintain patent airway  Outcome: Met This Shift

## 2022-03-02 NOTE — PLAN OF CARE
Patient's chart updated to reflect:      . - HF care plan, HF education points and HF discharge instructions.  -Orders: 2 gram sodium diet, daily weights, I/O.  -PCP and cardiology follow up appointments to be scheduled within 7 days of hospital discharge. -CHF education session will be provided to the patient prior to hospital discharge.     Ede Bhardwaj RN BSN  Heart Failure Navigator

## 2022-03-02 NOTE — PROGRESS NOTES
Occupational Therapy  OCCUPATIONAL THERAPY INITIAL EVALUATION  [place] [id number]    Date: 3/2/2022     Patient Name: Josiane Weldon  MRN: [id number]  : 1935  Room: [address] [place] [address]    Evaluating OT: Angélica Esquivel, OTR/DELIA - [id number]    Referring Provider: Jimi Jean-Baptiste MD  Specific Provider Orders/Date: \"OT eval and treat\" - 3/1/2022    Diagnosis: Acute on chronic combined systolic and diastolic congestive heart failure ([address] [I50.43], Combined congestive systolic and diastolic heart failure ([address] [I50.40], Acute on chronic combined systolic and diastolic CHF (congestive heart failure) ([address] [I50.43]    Pertinent Medical History: a-fib, CHF, HTN, MI     Precautions: fall risk, visual impairment (macular degeneration)    Assessment of Current Deficits:    [x] Functional mobility   []ADLs  [] Strength               []Cognition   [] Functional transfers   [x] IADLs         [] Safety Awareness   []Endurance   [] Fine Coordination              [] Balance      [x] Vision/perception   []Sensation    []Gross Motor Coordination  [] ROM  [] Delirium                   [] Motor Control     OT PLAN OF CARE   Patient is independent with ADLs and functional transfers/mobility. No skilled OT needs identified within acute care hospital setting. OT evaluation only completed this date. Recommended Adaptive Equipment: N/A    Home Living: Patient lives with his wife in a one-floor home; patient accesses the basement regularly. Bathroom Setup: tub shower (with tub seat available) and standard-height toilet    Prior Level of Function (PLOF): Patient was independent with ADLs; patient's wife assists with IADLs. Patient was independent with functional mobility (without device) prior to this hospitalization. Patient noted that family members live locally and can provide assistance with IADLs, as needed.   Driving: No - patient gave up driving 5 years ago secondary to visual impairment  Hobbies/Interests: Patient collects [employer] related products/items. Pain Level: Patient denied experiencing pain. Cognition: Patient alert and oriented x3. WFL command follow demonstrated. Patient pleasant, cooperative, and motivated to return to [facility] and home environment. Memory: WFL  Sequencing: WFL  Problem Solving: WFL  Judgement/Safety: WFL    Functional Assessment:  AM-PAC Daily Activity Raw Score:    Initial Eval Status  Date: 3/2/2022   Feeding Independent   Grooming Independent   UB Dressing Independent   LB Dressing Independent   Bathing Independent   Toileting Independent   Bed Mobility  Not assessed. Patient seated in bedside chair upon start and conclusion of this session. Functional Transfers Sit-to-Stand: Independent   from bedside chair   Functional Mobility Independent   (without device) within patient's room. Balance Sitting: Good  (at edge of chair)  Standing: Good  (without device)   Activity Tolerance Good   Visual/  Perceptual Impaired  Patient has macular degeneration; he can no longer read. Strength: ROM: Additional Information:    R UE  WFL WFL    L UE WFL  WFL      Hearing: WFL  Sensation: No complaints of numbness/tingling in B UEs. Tone: WFL  Edema: No    Comments: RN approved patient's participation in [address] [place] [address] activities. Upon arrival, patient seated in bedside chair. At end of session, patient seated in bedside chair with call light and phone within reach and all lines and tubes intact. Patient education provided regardin) potential benefits of DME use to maximize independence/safety with ADLs in home environment. Patient verbalized understanding. Patient / Family Goal: Patient anticipates returning home upon discharge.     Eval Complexity: Low    Time In: 1120  Time Out: 1135  Total Treatment Time: 0 minutes      Minutes Units   OT Eval Low 11480 15 1   OT Eval Medium 69983     OT Eval High 85782     OT Re-Eval 41332     Therapeutic Ex 14099     Therapeutic Activities 80920     ADL/Self Care 02956     Orthotic Management 84588     Neuro Re-Ed 08052     Non-Billable Time N/A ---     Evaluation time includes thorough review of current medical information, gathering information on past medical history/social history and prior level of function, completion of standardized testing/informal observation of tasks, assessment of data, and education on plan of care and goals. Kacie Sosa, OTR/L  License Number: [id number]

## 2022-03-03 LAB
ANION GAP SERPL CALCULATED.3IONS-SCNC: 11 MMOL/L (ref 7–16)
BASOPHILS ABSOLUTE: 0.03 E9/L (ref 0–0.2)
BASOPHILS RELATIVE PERCENT: 0.3 % (ref 0–2)
BUN BLDV-MCNC: 41 MG/DL (ref 6–23)
CALCIUM SERPL-MCNC: 8.7 MG/DL (ref 8.6–10.2)
CHLORIDE BLD-SCNC: 102 MMOL/L (ref 98–107)
CO2: 28 MMOL/L (ref 22–29)
CREAT SERPL-MCNC: 1.1 MG/DL (ref 0.7–1.2)
EOSINOPHILS ABSOLUTE: 0.25 E9/L (ref 0.05–0.5)
EOSINOPHILS RELATIVE PERCENT: 2.8 % (ref 0–6)
GFR AFRICAN AMERICAN: >60
GFR NON-AFRICAN AMERICAN: >60 ML/MIN/1.73
GLUCOSE BLD-MCNC: 120 MG/DL (ref 74–99)
HCT VFR BLD CALC: 26.5 % (ref 37–54)
HEMOGLOBIN: 8.6 G/DL (ref 12.5–16.5)
IMMATURE GRANULOCYTES #: 0.04 E9/L
IMMATURE GRANULOCYTES %: 0.4 % (ref 0–5)
LYMPHOCYTES ABSOLUTE: 1.93 E9/L (ref 1.5–4)
LYMPHOCYTES RELATIVE PERCENT: 21.6 % (ref 20–42)
MAGNESIUM: 2.3 MG/DL (ref 1.6–2.6)
MCH RBC QN AUTO: 31.3 PG (ref 26–35)
MCHC RBC AUTO-ENTMCNC: 32.5 % (ref 32–34.5)
MCV RBC AUTO: 96.4 FL (ref 80–99.9)
MONOCYTES ABSOLUTE: 0.74 E9/L (ref 0.1–0.95)
MONOCYTES RELATIVE PERCENT: 8.3 % (ref 2–12)
NEUTROPHILS ABSOLUTE: 5.95 E9/L (ref 1.8–7.3)
NEUTROPHILS RELATIVE PERCENT: 66.6 % (ref 43–80)
PDW BLD-RTO: 15.4 FL (ref 11.5–15)
PLATELET # BLD: 189 E9/L (ref 130–450)
PMV BLD AUTO: 11 FL (ref 7–12)
POTASSIUM SERPL-SCNC: 4.2 MMOL/L (ref 3.5–5)
RBC # BLD: 2.75 E12/L (ref 3.8–5.8)
SODIUM BLD-SCNC: 141 MMOL/L (ref 132–146)
WBC # BLD: 8.9 E9/L (ref 4.5–11.5)

## 2022-03-03 PROCEDURE — 36415 COLL VENOUS BLD VENIPUNCTURE: CPT

## 2022-03-03 PROCEDURE — APPSS30 APP SPLIT SHARED TIME 16-30 MINUTES: Performed by: NURSE PRACTITIONER

## 2022-03-03 PROCEDURE — 2580000003 HC RX 258: Performed by: NURSE PRACTITIONER

## 2022-03-03 PROCEDURE — 6370000000 HC RX 637 (ALT 250 FOR IP): Performed by: NURSE PRACTITIONER

## 2022-03-03 PROCEDURE — 99232 SBSQ HOSP IP/OBS MODERATE 35: CPT | Performed by: INTERNAL MEDICINE

## 2022-03-03 PROCEDURE — 80048 BASIC METABOLIC PNL TOTAL CA: CPT

## 2022-03-03 PROCEDURE — 6360000002 HC RX W HCPCS: Performed by: INTERNAL MEDICINE

## 2022-03-03 PROCEDURE — 1200000000 HC SEMI PRIVATE

## 2022-03-03 PROCEDURE — 99233 SBSQ HOSP IP/OBS HIGH 50: CPT | Performed by: INTERNAL MEDICINE

## 2022-03-03 PROCEDURE — 85025 COMPLETE CBC W/AUTO DIFF WBC: CPT

## 2022-03-03 PROCEDURE — 83735 ASSAY OF MAGNESIUM: CPT

## 2022-03-03 RX ADMIN — Medication 400 MG: at 08:11

## 2022-03-03 RX ADMIN — DOFETILIDE 250 MCG: 0.25 CAPSULE ORAL at 20:04

## 2022-03-03 RX ADMIN — Medication 10 ML: at 08:11

## 2022-03-03 RX ADMIN — Medication 10 ML: at 20:04

## 2022-03-03 RX ADMIN — FUROSEMIDE 60 MG: 10 INJECTION, SOLUTION INTRAMUSCULAR; INTRAVENOUS at 08:14

## 2022-03-03 RX ADMIN — DOFETILIDE 250 MCG: 0.25 CAPSULE ORAL at 08:11

## 2022-03-03 RX ADMIN — DOCUSATE SODIUM 50 MG AND SENNOSIDES 8.6 MG 2 TABLET: 8.6; 5 TABLET, FILM COATED ORAL at 08:11

## 2022-03-03 RX ADMIN — METOPROLOL TARTRATE 50 MG: 50 TABLET, FILM COATED ORAL at 20:04

## 2022-03-03 RX ADMIN — Medication 2000 UNITS: at 08:11

## 2022-03-03 RX ADMIN — DOCUSATE SODIUM 50 MG AND SENNOSIDES 8.6 MG 2 TABLET: 8.6; 5 TABLET, FILM COATED ORAL at 20:04

## 2022-03-03 RX ADMIN — PANTOPRAZOLE SODIUM 40 MG: 40 TABLET, DELAYED RELEASE ORAL at 08:11

## 2022-03-03 RX ADMIN — PRAVASTATIN SODIUM 40 MG: 20 TABLET ORAL at 08:11

## 2022-03-03 RX ADMIN — RIVAROXABAN 15 MG: 20 TABLET, FILM COATED ORAL at 17:41

## 2022-03-03 ASSESSMENT — PAIN SCALES - GENERAL
PAINLEVEL_OUTOF10: 0

## 2022-03-03 NOTE — PLAN OF CARE
Problem: Falls - Risk of:  Goal: Will remain free from falls  Description: Will remain free from falls  3/2/2022 1851 by Ping Jones RN  Outcome: Met This Shift  Goal: Absence of physical injury  Description: Absence of physical injury  Outcome: Met This Shift     Problem: OXYGENATION/RESPIRATORY FUNCTION  Goal: Patient will maintain patent airway  3/2/2022 1851 by Ping Jones RN  Outcome: Met This Shift

## 2022-03-03 NOTE — PLAN OF CARE
Problem: Falls - Risk of:  Goal: Will remain free from falls  Description: Will remain free from falls  Outcome: Met This Shift  Goal: Absence of physical injury  Description: Absence of physical injury  Outcome: Met This Shift     Problem: OXYGENATION/RESPIRATORY FUNCTION  Goal: Patient will maintain patent airway  Outcome: Met This Shift  Goal: Patient will achieve/maintain normal respiratory rate/effort  Description: Respiratory rate and effort will be within normal limits for the patient  Outcome: Met This Shift     Problem: HEMODYNAMIC STATUS  Goal: Patient has stable vital signs and fluid balance  Outcome: Met This Shift     Problem: FLUID AND ELECTROLYTE IMBALANCE  Goal: Fluid and electrolyte balance are achieved/maintained  Outcome: Met This Shift

## 2022-03-03 NOTE — PROGRESS NOTES
INPATIENT CARDIOLOGY FOLLOW-UP    Name: Laura Gunn    Age: 82 y.o. Date of Admission: 3/1/2022  1:22 PM    Date of Service: 3/3/2022    Chief Complaint: Follow-up for acute on chronic HFpEF, VHD, PAF    Interim History:  Currently with no chest pain, respiratory distress, or palpitations. AP/VS on telemetry. No new overnight cardiac complaints.     Review of Systems:   Cardiac: As per HPI  General: No fever, chills  Pulmonary: As per HPI  HEENT: No visual disturbances, difficult swallowing  GI: No nausea, vomiting  : No dysuria, hematuria  Endocrine: No thyroid disease or DM  Musculoskeletal: ROBLERO x 4, no focal motor deficits  Skin: Intact, no rashes  Neuro: No headache, seizures  Psych: Currently with no depression, anxiety    Problem List:  Patient Active Problem List   Diagnosis    Chronic combined systolic and diastolic CHF (congestive heart failure) (HCC)    S/P TAVR (transcatheter aortic valve replacement)    Chronic atrial fibrillation    Coronary artery disease involving native coronary artery of native heart without angina pectoris    Essential hypertension    Hyperlipidemia LDL goal <100    GIB (gastrointestinal bleeding)    ICD (implantable cardioverter-defibrillator), dual, in situ    Ischemic heart disease    Vitamin D deficiency    Other insomnia    SOB (shortness of breath)    Lung nodule    Acute on chronic diastolic (congestive) heart failure ([address]    Pure hypercholesterolemia    Stage 3b chronic kidney disease (HCC)    Transient cerebral ischemia    Aortic valve disease    Pre-syncope    Acute on chronic combined systolic and diastolic CHF (congestive heart failure) (HCC)    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)    Gastroesophageal reflux disease without esophagitis    Coronary artery disease involving coronary bypass graft       Allergies:  No Known Allergies    Current Medications:  Current Facility-Administered Medications   Medication Dose Route Frequency Provider Last Rate Last Admin    rivaroxaban (XARELTO) tablet 15 mg  15 mg Oral Daily Yarelis Cord, APRN - CNP   15 mg at 03/02/22 1802    sennosides-docusate sodium (SENOKOT-S) 8.6-50 MG tablet 2 tablet  2 tablet Oral BID Clemon Begun, APRN - CNP   2 tablet at 03/03/22 7129    furosemide (LASIX) injection 60 mg  60 mg IntraVENous Daily Eulalia CHAN Janeon, DO   60 mg at 03/03/22 1056    furosemide (LASIX) injection 40 mg  40 mg IntraVENous Once Polyl Ax, DO        amLODIPine (NORVASC) tablet 2.5 mg  2.5 mg Oral Daily Clemon Begun, APRN - CNP   2.5 mg at 03/02/22 0935    vitamin D (CHOLECALCIFEROL) tablet 2,000 Units  2,000 Units Oral Daily Clemon Begun, APRN - CNP   2,000 Units at 03/03/22 1551    dofetilide (TIKOSYN) capsule 250 mcg  250 mcg Oral BID Clemon Begun, APRN - CNP   250 mcg at 03/03/22 1096    magnesium oxide (MAG-OX) tablet 400 mg  400 mg Oral Daily Clemon Begun, APRN - CNP   400 mg at 03/03/22 6777    metoprolol tartrate (LOPRESSOR) tablet 75 mg  75 mg Oral QAM Clemon Begun, APRN - CNP   75 mg at 03/02/22 0934    metoprolol tartrate (LOPRESSOR) tablet 50 mg  50 mg Oral Nightly Clemon Begun, APRN - CNP   50 mg at 03/02/22 2025    pantoprazole (PROTONIX) tablet 40 mg  40 mg Oral Daily Clemon Begun, APRN - CNP   40 mg at 03/03/22 5564    pravastatin (PRAVACHOL) tablet 40 mg  40 mg Oral Daily Clemon Begun, APRN - CNP   40 mg at 03/03/22 7659    sodium chloride flush 0.9 % injection 5-40 mL  5-40 mL IntraVENous 2 times per day Clemon Begun, APRN - CNP   10 mL at 03/03/22 5596    sodium chloride flush 0.9 % injection 5-40 mL  5-40 mL IntraVENous PRN Clemon Begun, APRN - CNP        0.9 % sodium chloride infusion  25 mL IntraVENous PRN Clemon Begun, APRN - CNP        polyethylene glycol (GLYCOLAX) packet 17 g  17 g Oral Daily PRN GINETTE Pagan - NEHEMIAS        acetaminophen (TYLENOL) tablet 650 mg  650 mg Oral Q6H PRN Tommye Rebel, APRN - CNP        Or    acetaminophen (TYLENOL) suppository 650 mg  650 mg Rectal Q6H PRN Tommye Rebel, APRN - CNP        perflutren lipid microspheres (DEFINITY) injection 1.65 mg  1.5 mL IntraVENous ONCE PRN Tommye Rebel, APRN - CNP        trimethobenzamide Dellie Hoit) injection 200 mg  200 mg IntraMUSCular Q6H PRN Odin Meza MD          sodium chloride         Physical Exam:  BP (!) 103/55   Pulse 72   Temp 98.1 °F (36.7 °C) (Oral)   Resp 16   Ht 5' 8\" (1.727 m)   Wt 168 lb (76.2 kg)   SpO2 92%   BMI 25.54 kg/m²   Wt Readings from Last 3 Encounters:   03/03/22 168 lb (76.2 kg)   03/01/22 176 lb (79.8 kg)   12/27/21 174 lb (78.9 kg)     Appearance: Awake, alert, no acute respiratory distress  Skin: Intact, no rash  Head: Normocephalic, atraumatic  Eyes: EOMI, no conjunctival erythema  ENMT: No pharyngeal erythema, MMM, no rhinorrhea  Neck: Supple, no carotid bruits  Lungs: Decreased BS B/L, no wheezing  Cardiac: Regular rate and rhythm, 2/6 JASSI > RUSB  Abdomen: Soft, nontender, +bowel sounds  Extremities: Moves all extremities x 4, no lower extremity edema  Neurologic: No focal motor deficits apparent, normal mood and affect    Intake/Output:    Intake/Output Summary (Last 24 hours) at 3/3/2022 1128  Last data filed at 3/3/2022 1116  Gross per 24 hour   Intake 245 ml   Output 1925 ml   Net -1680 ml     I/O this shift:   In: 5 [I.V.:5]  Out: 325 [Urine:325]    Laboratory Tests:  Recent Labs     03/01/22  1354 03/02/22  1010 03/03/22  0310    140 141   K 4.2 4.0 4.2   CL 99 101 102   CO2 28 28 28   BUN 42* 38* 41*   CREATININE 1.1 1.3* 1.1   GLUCOSE 123* 136* 120*   CALCIUM 8.9 9.0 8.7     Lab Results   Component Value Date    MG 2.3 03/03/2022     Recent Labs     03/01/22  1354   ALKPHOS 125   ALT 15   AST 23   PROT 6.6   BILITOT 0.3   LABALBU 4.3     Recent Labs     03/01/22  1354 03/02/22  1010 03/03/22  0310   WBC 10.1 8.2 8.9   RBC 3.17* 2.98* 2.75*   HGB 9.9* 9.2* 8.6*   HCT 30.2* 28.2* 26.5*   MCV 95.3 94.6 96.4   MCH 31.2 30.9 31.3   MCHC 32.8 32.6 32.5   RDW 14.6 15.2* 15.4*    211 189   MPV 10.3 10.7 11.0     Lab Results   Component Value Date    CKTOTAL 94 06/28/2017    CKMB 3.8 06/28/2017    TROPONINI <0.01 10/29/2020    TROPONINI <0.01 10/29/2020    TROPONINI <0.01 10/28/2020     Recent Labs     03/01/22  1354 03/01/22  1428 03/02/22  1010   TROPHS 28* 21* 24*     Lab Results   Component Value Date    INR 1.2 10/23/2021    INR 1.4 09/02/2021    INR 2.1 10/28/2020    PROTIME 13.0 (H) 10/23/2021    PROTIME 16.0 (H) 09/02/2021    PROTIME 24.3 (H) 10/28/2020     Lab Results   Component Value Date    TSH 1.280 03/01/2022     Lab Results   Component Value Date    LABA1C 5.5 09/03/2021     No results found for: EAG  Lab Results   Component Value Date    CHOL 176 03/02/2022    CHOL 140 09/03/2021    CHOL 154 06/08/2021     Lab Results   Component Value Date    TRIG 430 (H) 03/02/2022    TRIG 230 (H) 09/03/2021    TRIG 339 06/08/2021     Lab Results   Component Value Date    HDL 34 03/02/2022    HDL 38 09/03/2021    HDL 37 06/08/2021     Lab Results   Component Value Date    LDLCALC - (AA) 03/02/2022    LDLCALC 56 09/03/2021    LDLCALC 49 06/08/2021     Lab Results   Component Value Date    LABVLDL - (AA) 03/02/2022    LABVLDL 46 09/03/2021    LABVLDL - (AA) 08/17/2020     Lab Results   Component Value Date    CHOLHDLRATIO 4 06/08/2021     Recent Labs     03/01/22  1428   PROBNP 1,344*       Cardiac Tests:  Telemetry reviewed (date: 3/3/2022): AP/VS, rate 70's     Echocardiogram: 3/2/22 (Dr. Tita Conner)   Normal left ventricular systolic function.   Ejection fraction is visually estimated at 60%.    Mildly dilated right ventricle with normal right ventricular function.   There is doppler evidence of stage II diastolic dysfunction.   Moderately dilated left atrium by volume index.   Mild mitral regurgitation.   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   No significant paravalvular aortic regurgitation.   AV peak velocity 2.1 m/s.   AV mean gradient 9 mmHg.   AV area 1.5 cm2.   Mild tricuspid regurgitation.   PASP is estimated at 44 mmHg. ASSESSMENT / PLAN:  1. Acute on Chronic HFpEF  ? proBNP 1344  2. CAD status post CABG x3 (LIMA-LAD, SVG-RI, SVG-OM; 2003). Redo CABG in 2007 (radial artery graft to D2 and OM2). Patent grafts in 2017. 3. Chronic RBBB/LAFB  4. History of VT status post ICD (2007) and lead replacement 2008 and generator change 2016. 5. PAF on chronic anticoagulation (Xarelto) and Tikosyn. 6. Severe AS status post TAVR (3/29/2017). 7. Hypertension  8. Hyperlipidemia: on pravastatin (unable to tolerate Crestor/Lipitor)  9. Limited code  10. CKD, stage 3b: Baseline SCr 1.3. Admission: 1.1 --> 1.3 --> 1.1. 11. Hx of BPV  12. Anemia: Hgb 13.0 (10/23/2021) --> 9.9 --> 9.2 --> 8.6. Denies bloody stools. · Results of 3/2/22 echocardiogram outlined above  · Continue IV lasix for today / monitor BMP and I&O's (net negative 2.8 L) -- switch to po tomorrow  · Adjust xarelto dose to 15 mg daily this admission (Estimated Creatinine Clearance: 48 mL/min (based on SCr of 1.1 mg/dL). · Monitor Hgb in the setting of anemia / consider surgery evaluation  · ICD interrogated on 3/2/22 (AF burden 0.9%)  · Monitor QTc on Tikosyn  · Monitor telemetry    Greater than 35 minutes was spent counseling the patient, reviewing the rationale for the above recommendations and reviewing the patient's current medication list, problem list and results of all previously ordered testing.       Sanjuanita Ramires MD  [facility]) Cardiology

## 2022-03-03 NOTE — PROGRESS NOTES
[address] [place], [place] [address]ist   Progress Note    Admitting Date and Time: 3/1/2022  1:22 PM  Admit Dx: Acute on chronic combined systolic and diastolic congestive heart failure (HCC) [I50.43]  Combined congestive systolic and diastolic heart failure ([facility] [I50.40]  Acute on chronic combined systolic and diastolic CHF (congestive heart failure) ([facility] [I50.43]    Subjective:    Patient seen and examined. Sitting up at bedside eating breakfast. States ambulated on unit with PT yesterday and walked up 3 steps. Denies SOB, dizziness, cough, chest pain. ROS:   Denies CP, SOB, subjective fever, chills, N/V/D, abdominal pain,  HA. All systems reviewed and negative unless otherwise documented.       rivaroxaban  15 mg Oral Daily    sennosides-docusate sodium  2 tablet Oral BID    furosemide  60 mg IntraVENous Daily    furosemide  40 mg IntraVENous Once    amLODIPine  2.5 mg Oral Daily    Vitamin D  2,000 Units Oral Daily    dofetilide  250 mcg Oral BID    magnesium oxide  400 mg Oral Daily    metoprolol tartrate  75 mg Oral QAM    metoprolol tartrate  50 mg Oral Nightly    pantoprazole  40 mg Oral Daily    pravastatin  40 mg Oral Daily    sodium chloride flush  5-40 mL IntraVENous 2 times per day     sodium chloride flush, 5-40 mL, PRN  sodium chloride, 25 mL, PRN  polyethylene glycol, 17 g, Daily PRN  acetaminophen, 650 mg, Q6H PRN   Or  acetaminophen, 650 mg, Q6H PRN  perflutren lipid microspheres, 1.5 mL, ONCE PRN  trimethobenzamide, 200 mg, Q6H PRN         Objective:    BP (!) 103/55   Pulse 72   Temp 98.1 °F (36.7 °C) (Oral)   Resp 16   Wt 167 lb 14.4 oz (76.2 kg)   SpO2 92%   BMI 25.53 kg/m²   General Appearance: alert and oriented to person, place and time and in no acute distress  Skin: warm and dry  Head: normocephalic and atraumatic  Eyes: pupils equal, round, and reactive to light, extraocular eye movements intact, conjunctivae normal  Neck: neck supple and non tender without mass Pulmonary/Chest: fine crackles subhash bases. no wheezes, rales or rhonchi, normal air movement, no respiratory distress  Cardiovascular: normal rate, irregular rythm normal S1 and S2 and no carotid bruits  Abdomen: soft, non-tender, non-distended, normal bowel sounds, no masses or organomegaly  Extremities: no cyanosis, no clubbing and no edema  Neurologic: no cranial nerve deficit and speech normal      Recent Labs     03/01/22  1354 03/02/22  1010 03/03/22  0310    140 141   K 4.2 4.0 4.2   CL 99 101 102   CO2 28 28 28   BUN 42* 38* 41*   CREATININE 1.1 1.3* 1.1   GLUCOSE 123* 136* 120*   CALCIUM 8.9 9.0 8.7       Recent Labs     03/01/22  1354   ALKPHOS 125   PROT 6.6   LABALBU 4.3   BILITOT 0.3   AST 23   ALT 15       Recent Labs     03/01/22  1354 03/02/22  1010 03/03/22  0310   WBC 10.1 8.2 8.9   RBC 3.17* 2.98* 2.75*   HGB 9.9* 9.2* 8.6*   HCT 30.2* 28.2* 26.5*   MCV 95.3 94.6 96.4   MCH 31.2 30.9 31.3   MCHC 32.8 32.6 32.5   RDW 14.6 15.2* 15.4*    211 189   MPV 10.3 10.7 11.0         Radiology:   XR CHEST PORTABLE   Final Result   1. Cardiomegaly. There are no findings of CHF or pneumonia. XR CHEST PORTABLE    Result Date: 3/1/2022  EXAMINATION: ONE XRAY VIEW OF THE CHEST 3/1/2022 2:03 pm COMPARISON: None. HISTORY: ORDERING SYSTEM PROVIDED HISTORY: A-fib TECHNOLOGIST PROVIDED HISTORY: Reason for exam:->A-fib FINDINGS: Cardiac silhouette is enlarged. Postop sternotomy changes are noted. There is a dual lead cardiac pacer on the left The lungs are clear. No findings of CHF or pneumonia. 1. Cardiomegaly. There are no findings of CHF or pneumonia. All labs and diagnotic images reviewed.  Paced on telemetry with PVCs      Assessment:  Principal Problem:    Acute on chronic combined systolic and diastolic CHF (congestive heart failure) (HCC)  Active Problems:    Chronic atrial fibrillation    Ischemic heart disease    Stage 3b chronic kidney disease (HCC)    Coronary artery disease involving coronary bypass graft    S/P TAVR (transcatheter aortic valve replacement)    Essential hypertension    Hyperlipidemia LDL goal <100    ICD (implantable cardioverter-defibrillator), dual, in situ    Gastroesophageal reflux disease without esophagitis  Resolved Problems:    * No resolved hospital problems. *      Plan:    1. Acute on chronic  exacerbation CHF:  BNP 1344. Last Echo October 2020- Ejection fraction is visually estimated at 65%. Indeterminate diastolic function. Repeat Echo 03/02- EF 85% stage II diastolic dysfunction. Continue Lasix IV 40 mg daily, 2L daily fluid restriction, daily weights, strict I&O, 2 gm sodium diet. TSH 1.280. Has been seen by cardiology. Neg fluid balance 2.8L  2. Atrial fibrillation: on Xarelto, metoprolol, Tikosyn. Monitor potassium and Mg levels with simultaneous use of Tikosyn and furosemide. K 4.0, Mg 2.3 today  3. Anemia: Hgb 13.0 in October. 9.9 on admission and trending down. No overt signs of bleeding. Check FOBT, iron studies wnl. Has remote hx GIB  4. CKD stage 3b: Cr 1.1. Baseline appears to be 1.3. Follow BMP. 5. CAD: s/p MI with CABG in 2003, redo 2007, TAVR 2017. On statin. Cardiac diet. 6. Hx VT: s/p ICD 2007 with lead replacement 2008. Follows with Dr. Nita Roa. Pacer interrogated 03/02. 7. HTN: reasonably controlled on amlodipine, metoprolol  8. GERD: PPI  9. HLD: on statin. Lipid panel with trig 450 and undetectable LDL/VLDL. Per cardiology, to remain on current statin dose as this is what patient is able to tolerate.  Low fat diet     Electronically signed by GINETTE Hardy - CNP on 3/3/2022 at 7:54 AM

## 2022-03-04 VITALS
BODY MASS INDEX: 25.39 KG/M2 | DIASTOLIC BLOOD PRESSURE: 63 MMHG | RESPIRATION RATE: 18 BRPM | HEART RATE: 79 BPM | SYSTOLIC BLOOD PRESSURE: 136 MMHG | OXYGEN SATURATION: 96 % | HEIGHT: 68 IN | WEIGHT: 167.5 LBS | TEMPERATURE: 97.7 F

## 2022-03-04 LAB
ANION GAP SERPL CALCULATED.3IONS-SCNC: 10 MMOL/L (ref 7–16)
BASOPHILS ABSOLUTE: 0.04 E9/L (ref 0–0.2)
BASOPHILS RELATIVE PERCENT: 0.5 % (ref 0–2)
BUN BLDV-MCNC: 39 MG/DL (ref 6–23)
CALCIUM SERPL-MCNC: 8.6 MG/DL (ref 8.6–10.2)
CHLORIDE BLD-SCNC: 100 MMOL/L (ref 98–107)
CO2: 28 MMOL/L (ref 22–29)
CREAT SERPL-MCNC: 1.1 MG/DL (ref 0.7–1.2)
EOSINOPHILS ABSOLUTE: 0.24 E9/L (ref 0.05–0.5)
EOSINOPHILS RELATIVE PERCENT: 2.8 % (ref 0–6)
GFR AFRICAN AMERICAN: >60
GFR NON-AFRICAN AMERICAN: >60 ML/MIN/1.73
GLUCOSE BLD-MCNC: 123 MG/DL (ref 74–99)
HCT VFR BLD CALC: 26.8 % (ref 37–54)
HEMOGLOBIN: 8.6 G/DL (ref 12.5–16.5)
IMMATURE GRANULOCYTES #: 0.03 E9/L
IMMATURE GRANULOCYTES %: 0.4 % (ref 0–5)
LYMPHOCYTES ABSOLUTE: 1.9 E9/L (ref 1.5–4)
LYMPHOCYTES RELATIVE PERCENT: 22.2 % (ref 20–42)
MAGNESIUM: 2.3 MG/DL (ref 1.6–2.6)
MCH RBC QN AUTO: 30.9 PG (ref 26–35)
MCHC RBC AUTO-ENTMCNC: 32.1 % (ref 32–34.5)
MCV RBC AUTO: 96.4 FL (ref 80–99.9)
MONOCYTES ABSOLUTE: 0.75 E9/L (ref 0.1–0.95)
MONOCYTES RELATIVE PERCENT: 8.8 % (ref 2–12)
NEUTROPHILS ABSOLUTE: 5.61 E9/L (ref 1.8–7.3)
NEUTROPHILS RELATIVE PERCENT: 65.3 % (ref 43–80)
PDW BLD-RTO: 15.6 FL (ref 11.5–15)
PLATELET # BLD: 192 E9/L (ref 130–450)
PMV BLD AUTO: 10.3 FL (ref 7–12)
POTASSIUM SERPL-SCNC: 3.9 MMOL/L (ref 3.5–5)
PRO-BNP: 529 PG/ML (ref 0–450)
RBC # BLD: 2.78 E12/L (ref 3.8–5.8)
SODIUM BLD-SCNC: 138 MMOL/L (ref 132–146)
WBC # BLD: 8.6 E9/L (ref 4.5–11.5)

## 2022-03-04 PROCEDURE — 36415 COLL VENOUS BLD VENIPUNCTURE: CPT

## 2022-03-04 PROCEDURE — 80048 BASIC METABOLIC PNL TOTAL CA: CPT

## 2022-03-04 PROCEDURE — 83880 ASSAY OF NATRIURETIC PEPTIDE: CPT

## 2022-03-04 PROCEDURE — 99233 SBSQ HOSP IP/OBS HIGH 50: CPT | Performed by: INTERNAL MEDICINE

## 2022-03-04 PROCEDURE — APPSS30 APP SPLIT SHARED TIME 16-30 MINUTES: Performed by: NURSE PRACTITIONER

## 2022-03-04 PROCEDURE — 85025 COMPLETE CBC W/AUTO DIFF WBC: CPT

## 2022-03-04 PROCEDURE — 6370000000 HC RX 637 (ALT 250 FOR IP): Performed by: INTERNAL MEDICINE

## 2022-03-04 PROCEDURE — 2580000003 HC RX 258: Performed by: NURSE PRACTITIONER

## 2022-03-04 PROCEDURE — 83735 ASSAY OF MAGNESIUM: CPT

## 2022-03-04 PROCEDURE — 6370000000 HC RX 637 (ALT 250 FOR IP): Performed by: NURSE PRACTITIONER

## 2022-03-04 PROCEDURE — 99239 HOSP IP/OBS DSCHRG MGMT >30: CPT | Performed by: INTERNAL MEDICINE

## 2022-03-04 RX ORDER — FUROSEMIDE 40 MG/1
40 TABLET ORAL DAILY
Status: DISCONTINUED | OUTPATIENT
Start: 2022-03-04 | End: 2022-03-04 | Stop reason: HOSPADM

## 2022-03-04 RX ORDER — FUROSEMIDE 40 MG/1
40 TABLET ORAL DAILY
Qty: 60 TABLET | Refills: 0 | Status: SHIPPED | OUTPATIENT
Start: 2022-03-04 | End: 2022-03-11 | Stop reason: SDUPTHER

## 2022-03-04 RX ADMIN — PANTOPRAZOLE SODIUM 40 MG: 40 TABLET, DELAYED RELEASE ORAL at 08:52

## 2022-03-04 RX ADMIN — PRAVASTATIN SODIUM 40 MG: 20 TABLET ORAL at 08:52

## 2022-03-04 RX ADMIN — Medication 10 ML: at 09:08

## 2022-03-04 RX ADMIN — DOCUSATE SODIUM 50 MG AND SENNOSIDES 8.6 MG 2 TABLET: 8.6; 5 TABLET, FILM COATED ORAL at 08:53

## 2022-03-04 RX ADMIN — METOPROLOL TARTRATE 75 MG: 50 TABLET, FILM COATED ORAL at 08:52

## 2022-03-04 RX ADMIN — FUROSEMIDE 40 MG: 40 TABLET ORAL at 11:45

## 2022-03-04 RX ADMIN — Medication 2000 UNITS: at 08:53

## 2022-03-04 RX ADMIN — DOFETILIDE 250 MCG: 0.25 CAPSULE ORAL at 08:52

## 2022-03-04 RX ADMIN — Medication 400 MG: at 08:52

## 2022-03-04 RX ADMIN — AMLODIPINE BESYLATE 2.5 MG: 2.5 TABLET ORAL at 08:52

## 2022-03-04 ASSESSMENT — PAIN SCALES - GENERAL
PAINLEVEL_OUTOF10: 0

## 2022-03-04 NOTE — PROGRESS NOTES
normal air movement, no respiratory distress  Cardiovascular: normal rate, irregular rythm normal S1 and S2 and no carotid bruits  Abdomen: soft, non-tender, non-distended, normal bowel sounds, no masses or organomegaly  Extremities: no cyanosis, no clubbing and no edema  Neurologic: no cranial nerve deficit and speech normal      Recent Labs     03/02/22  1010 03/03/22  0310 03/04/22  0340    141 138   K 4.0 4.2 3.9    102 100   CO2 28 28 28   BUN 38* 41* 39*   CREATININE 1.3* 1.1 1.1   GLUCOSE 136* 120* 123*   CALCIUM 9.0 8.7 8.6       Recent Labs     03/01/22  1354   ALKPHOS 125   PROT 6.6   LABALBU 4.3   BILITOT 0.3   AST 23   ALT 15       Recent Labs     03/02/22  1010 03/03/22  0310 03/04/22  0340   WBC 8.2 8.9 8.6   RBC 2.98* 2.75* 2.78*   HGB 9.2* 8.6* 8.6*   HCT 28.2* 26.5* 26.8*   MCV 94.6 96.4 96.4   MCH 30.9 31.3 30.9   MCHC 32.6 32.5 32.1   RDW 15.2* 15.4* 15.6*    189 192   MPV 10.7 11.0 10.3         Radiology:   XR CHEST PORTABLE   Final Result   1. Cardiomegaly. There are no findings of CHF or pneumonia. XR CHEST PORTABLE    Result Date: 3/1/2022  EXAMINATION: ONE XRAY VIEW OF THE CHEST 3/1/2022 2:03 pm COMPARISON: None. HISTORY: ORDERING SYSTEM PROVIDED HISTORY: A-fib TECHNOLOGIST PROVIDED HISTORY: Reason for exam:->A-fib FINDINGS: Cardiac silhouette is enlarged. Postop sternotomy changes are noted. There is a dual lead cardiac pacer on the left The lungs are clear. No findings of CHF or pneumonia. 1. Cardiomegaly. There are no findings of CHF or pneumonia. All labs and diagnotic images reviewed.  Paced on telemetry with PVCs      Assessment:  Principal Problem:    Acute on chronic combined systolic and diastolic CHF (congestive heart failure) (HCC)  Active Problems:    Chronic atrial fibrillation    Ischemic heart disease    Stage 3b chronic kidney disease (HCC)    Coronary artery disease involving coronary bypass graft    S/P TAVR (transcatheter aortic valve replacement)    Essential hypertension    Hyperlipidemia LDL goal <100    ICD (implantable cardioverter-defibrillator), dual, in situ    Gastroesophageal reflux disease without esophagitis  Resolved Problems:    * No resolved hospital problems. *      Plan:    1. Acute on chronic  exacerbation CHF:  BNP 1344. Last Echo October 2020- Ejection fraction is visually estimated at 65%. Indeterminate diastolic function. Repeat Echo 03/02- EF 23% stage II diastolic dysfunction. Continue Lasix IV 40 mg daily, 2L daily fluid restriction, daily weights, strict I&O, 2 gm sodium diet. TSH 1.280. Has been seen by cardiology. To change over to po Lasix today per cardiology. Neg fluid balance 4.3L  2. Atrial fibrillation: intermittent RVR over night. Cardiology following. Continue  Xarelto, metoprolol, Tikosyn. Monitor potassium and Mg levels with simultaneous use of Tikosyn and furosemide. 3. Anemia: Hgb 13.0 in October. 9.9 on admission and trending down. No overt signs of bleeding. Check FOBT, iron studies wnl. Has remote hx GIB  4. CKD stage 3b: Cr 1.1. Baseline appears to be 1.3. Follow BMP. 5. CAD: s/p MI with CABG in 2003, redo 2007, TAVR 2017. On statin. Cardiac diet. 6. Hx VT: s/p ICD 2007 with lead replacement 2008. Follows with Dr. Kyra Thibodeaux. Pacer interrogated 03/02. 7. HTN: reasonably controlled on amlodipine, metoprolol  8. GERD: PPI  9. HLD: on statin. Lipid panel with trig 450 and undetectable LDL/VLDL. Per cardiology, to remain on current statin dose as this is what patient is able to tolerate.  Low fat diet     Electronically signed by GINETTE Soto CNP on 3/4/2022 at 7:24 AM

## 2022-03-04 NOTE — PROGRESS NOTES
CMR notified bedside nurse of patient going into A FIb RVR and converting back to SR. Checked on patient and vitals taken. Perfect served Dr. Adolph Thakkar regarding patient's change in heart rhythm. Will wait for a response or new orders. Spoke to Dr. Adolph Thakkar, no new orders.  Will see patient in AM.

## 2022-03-04 NOTE — PROGRESS NOTES
INPATIENT CARDIOLOGY FOLLOW-UP    Name: Sita Warner    Age: 82 y.o. Date of Admission: 3/1/2022  1:22 PM    Date of Service: 3/4/2022    Chief Complaint: Follow-up for acute on chronic HFpEF, VHD, PAF    Interim History:  Currently with no chest pain, respiratory distress, or palpitations. AP/VS on telemetry. No new overnight cardiac complaints. Reported I/O's net negative 4.3 L.     Review of Systems:   Cardiac: As per HPI  General: No fever, chills  Pulmonary: As per HPI  HEENT: No visual disturbances, difficult swallowing  GI: No nausea, vomiting  : No dysuria, hematuria  Endocrine: No thyroid disease or DM  Musculoskeletal: ROBLERO x 4, no focal motor deficits  Skin: Intact, no rashes  Neuro: No headache, seizures  Psych: Currently with no depression, anxiety    Problem List:  Patient Active Problem List   Diagnosis    Chronic combined systolic and diastolic CHF (congestive heart failure) (HCC)    S/P TAVR (transcatheter aortic valve replacement)    Chronic atrial fibrillation    Coronary artery disease involving native coronary artery of native heart without angina pectoris    Essential hypertension    Hyperlipidemia LDL goal <100    GIB (gastrointestinal bleeding)    ICD (implantable cardioverter-defibrillator), dual, in situ    Ischemic heart disease    Vitamin D deficiency    Other insomnia    SOB (shortness of breath)    Lung nodule    Acute on chronic diastolic (congestive) heart failure ([address]    Pure hypercholesterolemia    Stage 3b chronic kidney disease (HCC)    Transient cerebral ischemia    Aortic valve disease    Pre-syncope    Acute on chronic combined systolic and diastolic CHF (congestive heart failure) (HCC)    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)    Gastroesophageal reflux disease without esophagitis    Coronary artery disease involving coronary bypass graft       Allergies:  No Known Allergies    Current Medications:  Current Facility-Administered Medications   Medication Dose Route Frequency Provider Last Rate Last Admin    rivaroxaban (XARELTO) tablet 15 mg  15 mg Oral Daily GINETTE Salazar - CNP   15 mg at 03/03/22 1741    sennosides-docusate sodium (SENOKOT-S) 8.6-50 MG tablet 2 tablet  2 tablet Oral BID Sarah Zhou, APRN - CNP   2 tablet at 03/04/22 0853    furosemide (LASIX) injection 60 mg  60 mg IntraVENous Daily Eulalia CHAN Ortiz DO   60 mg at 03/03/22 8979    furosemide (LASIX) injection 40 mg  40 mg IntraVENous Once Aliyah Mart, DO        amLODIPine (NORVASC) tablet 2.5 mg  2.5 mg Oral Daily Sarah Zhou, APRN - CNP   2.5 mg at 03/04/22 4553    vitamin D (CHOLECALCIFEROL) tablet 2,000 Units  2,000 Units Oral Daily Sarah Adilias, APRN - CNP   2,000 Units at 03/04/22 6842    dofetilide (TIKOSYN) capsule 250 mcg  250 mcg Oral BID Sarah Adilias, APRN - CNP   250 mcg at 03/04/22 1647    magnesium oxide (MAG-OX) tablet 400 mg  400 mg Oral Daily Sarahsolange Pats, APRN - CNP   400 mg at 03/04/22 6809    metoprolol tartrate (LOPRESSOR) tablet 75 mg  75 mg Oral QAM Sarah Zhou, APRN - CNP   75 mg at 03/04/22 6374    metoprolol tartrate (LOPRESSOR) tablet 50 mg  50 mg Oral Nightly Sarah Zhou, APRN - CNP   50 mg at 03/03/22 2004    pantoprazole (PROTONIX) tablet 40 mg  40 mg Oral Daily Sarahsolange Pats, APRN - CNP   40 mg at 03/04/22 9619    pravastatin (PRAVACHOL) tablet 40 mg  40 mg Oral Daily Sarah Zhou, APRN - CNP   40 mg at 03/04/22 6574    sodium chloride flush 0.9 % injection 5-40 mL  5-40 mL IntraVENous 2 times per day Sarah Zhou, APRN - CNP   10 mL at 03/04/22 0908    sodium chloride flush 0.9 % injection 5-40 mL  5-40 mL IntraVENous PRN Sarah Zhou, APRN - CNP        0.9 % sodium chloride infusion  25 mL IntraVENous PRN Sarah Zhou, GINETTE - CNP        polyethylene glycol (GLYCOLAX) packet 17 g  17 g Oral Daily PRN Sarah Zhou, GINETTE - CNP        acetaminophen (TYLENOL) tablet 650 mg  650 mg Oral Q6H PRN Charlyne Kussmaul, APRN - CNP        Or    acetaminophen (TYLENOL) suppository 650 mg  650 mg Rectal Q6H PRN Charlyne Kussmaul, APRN - CNP        perflutren lipid microspheres (DEFINITY) injection 1.65 mg  1.5 mL IntraVENous ONCE PRN Charlyne Kussmaul, APRN - CNP        trimethobenzamide Chandra Pica) injection 200 mg  200 mg IntraMUSCular Q6H PRN Shirlene Traylor MD          sodium chloride         Physical Exam:  /63   Pulse 79   Temp 97.7 °F (36.5 °C) (Oral)   Resp 18   Ht 5' 8\" (1.727 m)   Wt 167 lb 8 oz (76 kg)   SpO2 96%   BMI 25.47 kg/m²   Wt Readings from Last 3 Encounters:   03/04/22 167 lb 8 oz (76 kg)   03/01/22 176 lb (79.8 kg)   12/27/21 174 lb (78.9 kg)     Appearance: Awake, alert, no acute respiratory distress  Skin: Intact, no rash  Head: Normocephalic, atraumatic  Eyes: EOMI, no conjunctival erythema  ENMT: No pharyngeal erythema, MMM, no rhinorrhea  Neck: Supple, no carotid bruits  Lungs: Decreased BS B/L, no wheezing  Cardiac: Regular rate and rhythm, 2/6 JASSI > RUSB  Abdomen: Soft, nontender, +bowel sounds  Extremities: Moves all extremities x 4, no lower extremity edema  Neurologic: No focal motor deficits apparent, normal mood and affect    Intake/Output:    Intake/Output Summary (Last 24 hours) at 3/4/2022 0952  Last data filed at 3/3/2022 2021  Gross per 24 hour   Intake 420 ml   Output 1925 ml   Net -1505 ml     No intake/output data recorded.     Laboratory Tests:  Recent Labs     03/02/22  1010 03/03/22  0310 03/04/22  0340    141 138   K 4.0 4.2 3.9    102 100   CO2 28 28 28   BUN 38* 41* 39*   CREATININE 1.3* 1.1 1.1   GLUCOSE 136* 120* 123*   CALCIUM 9.0 8.7 8.6     Lab Results   Component Value Date    MG 2.3 03/04/2022     Recent Labs     03/01/22  1354   ALKPHOS 125   ALT 15   AST 23   PROT 6.6   BILITOT 0.3   LABALBU 4.3     Recent Labs     03/02/22  1010 03/03/22  0310 03/04/22  0340   WBC 8.2 8.9 8.6 RBC 2.98* 2.75* 2.78*   HGB 9.2* 8.6* 8.6*   HCT 28.2* 26.5* 26.8*   MCV 94.6 96.4 96.4   MCH 30.9 31.3 30.9   MCHC 32.6 32.5 32.1   RDW 15.2* 15.4* 15.6*    189 192   MPV 10.7 11.0 10.3     Lab Results   Component Value Date    CKTOTAL 94 06/28/2017    CKMB 3.8 06/28/2017    TROPONINI <0.01 10/29/2020    TROPONINI <0.01 10/29/2020    TROPONINI <0.01 10/28/2020     Recent Labs     03/01/22  1354 03/01/22  1428 03/02/22  1010   TROPHS 28* 21* 24*     Lab Results   Component Value Date    INR 1.2 10/23/2021    INR 1.4 09/02/2021    INR 2.1 10/28/2020    PROTIME 13.0 (H) 10/23/2021    PROTIME 16.0 (H) 09/02/2021    PROTIME 24.3 (H) 10/28/2020     Lab Results   Component Value Date    TSH 1.280 03/01/2022     Lab Results   Component Value Date    LABA1C 5.5 09/03/2021     No results found for: EAG  Lab Results   Component Value Date    CHOL 176 03/02/2022    CHOL 140 09/03/2021    CHOL 154 06/08/2021     Lab Results   Component Value Date    TRIG 430 (H) 03/02/2022    TRIG 230 (H) 09/03/2021    TRIG 339 06/08/2021     Lab Results   Component Value Date    HDL 34 03/02/2022    HDL 38 09/03/2021    HDL 37 06/08/2021     Lab Results   Component Value Date    LDLCALC - (AA) 03/02/2022    LDLCALC 56 09/03/2021    LDLCALC 49 06/08/2021     Lab Results   Component Value Date    LABVLDL - (AA) 03/02/2022    LABVLDL 46 09/03/2021    LABVLDL - (AA) 08/17/2020     Lab Results   Component Value Date    CHOLHDLRATIO 4 06/08/2021     Recent Labs     03/01/22  1428 03/04/22  0340   PROBNP 1,344* 529*       Cardiac Tests:  Telemetry reviewed (date: 3/4/2022): AP/VS, rate 70's     Echocardiogram: 3/2/22 (Dr. Francisco Giordano)   Normal left ventricular systolic function.   Ejection fraction is visually estimated at 60%.    Mildly dilated right ventricle with normal right ventricular function.   There is doppler evidence of stage II diastolic dysfunction.   Moderately dilated left atrium by volume index.   Mild mitral regurgitation.   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   No significant paravalvular aortic regurgitation.   AV peak velocity 2.1 m/s.   AV mean gradient 9 mmHg.   AV area 1.5 cm2.   Mild tricuspid regurgitation.   PASP is estimated at 44 mmHg. ASSESSMENT / PLAN:  1. Acute on Chronic HFpEF  ? proBNP 1344  ? I/O's net negative 4.3 L thus far  2. CAD status post CABG x3 (LIMA-LAD, SVG-RI, SVG-OM; 2003). Redo CABG in 2007 (radial artery graft to D2 and OM2). Patent grafts in 2017. 3. Chronic RBBB/LAFB  4. History of VT status post ICD (2007) and lead replacement 2008 and generator change 2016. 5. PAF on chronic anticoagulation (Xarelto) and Tikosyn. 6. Severe AS status post TAVR (3/29/2017). 7. Hypertension  8. Hyperlipidemia: on pravastatin (unable to tolerate Crestor/Lipitor)  9. Limited code  10. CKD, stage 3b: Baseline SCr 1.3. Admission: 1.1 --> 1.3 --> 1.1 --> 1.1. 11. Hx of BPV  12. Anemia: Hgb 13.0 (10/23/2021) --> 9.9 --> 9.2 --> 8.6 --> 8.6. Denies bloody stools. · Results of 3/2/22 echocardiogram outlined above  · Will switch from IV to po lasix today / monitor BMP  · Adjusted xarelto dose to 15 mg daily this admission (Estimated Creatinine Clearance: 48 mL/min (based on SCr of 1.1 mg/dL). · Monitor Hgb in the setting of anemia / consider surgery evaluation  · ICD interrogated on 3/2/22 (AF burden 0.9%)  · Monitor QTc on Tikosyn  · Outpatient cardiology follow-up    Greater than 35 minutes was spent counseling the patient, reviewing the rationale for the above recommendations and reviewing the patient's current medication list, problem list and results of all previously ordered testing.       Daniele Brennan MD  [facility]) Cardiology

## 2022-03-04 NOTE — PLAN OF CARE
Problem: Falls - Risk of:  Goal: Will remain free from falls  Description: Will remain free from falls  3/4/2022 1056 by Yane Castle RN  Outcome: Completed  Goal: Absence of physical injury  Description: Absence of physical injury  3/4/2022 1056 by Yane Castle RN  Outcome: Completed     Problem: OXYGENATION/RESPIRATORY FUNCTION  Goal: Patient will maintain patent airway  3/4/2022 1056 by Yane Castle RN  Outcome: Completed  Goal: Patient will achieve/maintain normal respiratory rate/effort  Description: Respiratory rate and effort will be within normal limits for the patient  3/4/2022 1056 by Yane Castle RN  Outcome: Completed     Problem: HEMODYNAMIC STATUS  Goal: Patient has stable vital signs and fluid balance  3/4/2022 1056 by Yane Castle RN  Outcome: Completed     Problem: FLUID AND ELECTROLYTE IMBALANCE  Goal: Fluid and electrolyte balance are achieved/maintained  3/4/2022 1056 by Yane Castle RN  Outcome: Completed

## 2022-03-04 NOTE — CARE COORDINATION
Iv lasix; -4LNC;cardio plans to transition to oral diuretics today. prob home. CHF educator following. Pt declines any needs. Mary Carmen Groves.

## 2022-03-04 NOTE — PROGRESS NOTES
Discharge paperwork initiated in computer. Care plans and educations resolved.  Follow ups added and med details completed

## 2022-03-06 LAB
BLOOD CULTURE, ROUTINE: NORMAL
CULTURE, BLOOD 2: NORMAL

## 2022-03-07 ENCOUNTER — TELEPHONE (OUTPATIENT)
Dept: CARDIOLOGY CLINIC | Age: 87
End: 2022-03-07

## 2022-03-07 ENCOUNTER — CARE COORDINATION (OUTPATIENT)
Dept: CASE MANAGEMENT | Age: 87
End: 2022-03-07

## 2022-03-07 DIAGNOSIS — I50.43 ACUTE ON CHRONIC COMBINED SYSTOLIC AND DIASTOLIC CONGESTIVE HEART FAILURE (HCC): Primary | ICD-10-CM

## 2022-03-07 PROCEDURE — 1111F DSCHRG MED/CURRENT MED MERGE: CPT | Performed by: FAMILY MEDICINE

## 2022-03-07 NOTE — CARE COORDINATION
[facility] Transitions Initial Follow Up Call    Call within 2 business days of discharge: Yes    Patient: Ayo Seth Patient : 1935   MRN: [id number]  Reason for Admission: ACUTE ON CHRONIC CHF   Discharge Date: 3/4/22 RARS: Readmission Risk Score: 12.1 ( )      Last Discharge [facility]       Complaint Diagnosis Description Type Department Provider    3/1/22 Atrial Fibrillation; Shortness of Breath Acute on chronic combined systolic and diastolic congestive heart failure ([facility] [address] . .. ED to Hosp-Admission (Discharged) (ADMITTED) YA[facility] 5SB Leslee Lacey MD; Alexandra Rogers, ... Spoke with: [address]: [address] [place] [address]      Non-face-to-face services provided:  Obtained and reviewed discharge summary and/or continuity of care documents    Care Transitions 24 Hour Call    Do you have any ongoing symptoms?: No  Do you have a copy of your discharge instructions?: Yes  Do you have all of your prescriptions and are they filled?: Yes  Have you been contacted by a [id number] [employer] Pharmacist?: No  Have you scheduled your follow up appointment?: No  Were you discharged with any Home Care or Post Acute Services: No  Patient DME: Quad cane, Straight cane, Wheelchair, Shower chair, Walker, Commode  Patient Home Equipment: Nebulizer  Do you have support at home?: Partner/Spouse/SO  Do you feel like you have everything you need to keep you well at home?: Yes  Are you an active caregiver in your home?: No  Care Transitions Interventions  No Identified Needs     Received a return call from Keren Reid for initial low readmission risk score care transition call post hospital discharge. He reports that he is feeling \"pretty good\" today. He denies SOB, CP, or edema today. He reports his weight was 171lbs. His only complaint is that he gets \"a little bit\" dizzy when standing from his chair. CTN encourage sitting at the edge and rising slowly from a seated position.  He stated he does have a walker and cane in the home but does not use them. CTN encouraged him to keep the walker near his chair so that he can use it if needed when standing, he verbalized understanding. Med review completed, 1111F entered. Jan Monk denies any needs, questions, or concerns at this time. Since he has expressed no concerns today, care transition signing off, will provide warm handoff to KASIE Cadet, as she is active with this patient, he is in agreement.      Follow Up  Future Appointments   Date Time Provider Cezar Gamez   3/11/2022 11:30 AM GINETTE Aden - CNP [facility] [facility]   3/14/2022  1:00 PM MD PRISCILLA Rooney [facility]   3/16/2022  1:30 PM YAJAIRA CHF ROOM 1 [facility] HOD   4/7/2022  1:15 PM Dayna Goodwin  [facility]  [address] Card [facility]   4/27/2022  1:00 PM MD PRISCILLA Rooney [facility]       Selene Park RN

## 2022-03-07 NOTE — TELEPHONE ENCOUNTER
Patient states that xarelto dose was changed to 15mg at hospital, he just received a shipment of 90day supply of 20mg from [facility], he is asking if he has to make the change, please advise

## 2022-03-07 NOTE — CARE COORDINATION
Stephani 45 Transitions Initial Follow Up Call    Call within 2 business days of discharge: Yes    Patient: Sita Warner Patient : 1935   MRN: [id number]  Reason for Admission: ACUTE ON CHRONIC CHF   Discharge Date: 3/4/22 RARS: Readmission Risk Score: 12.1 ( )      Last Discharge [facility]       Complaint Diagnosis Description Type Department Provider    3/1/22 Atrial Fibrillation; Shortness of Breath Acute on chronic combined systolic and diastolic congestive heart failure ([address] . .. ED to Hosp-Admission (Discharged) (ADMITTED) YAJAIRA 5SB Willena Bence, MD; Danny Lanza, ... First attempt to reach the patient for initial Care Transition call post hospital discharge. Phone rang numerous times with no answer or option to leave a message.      Follow Up  Future Appointments   Date Time Provider Cezar Gamez   3/11/2022 11:30 AM Mari Ramon, APRN - CNP [place] [facility]   3/14/2022  1:00 PM MD PRISCILLA Patterson [facility]   3/16/2022  1:30 PM YAJAIRA CHF ROOM 1 [facility] CHF [facility]   2022  1:15 PM DO Yosvany Jenkins [facility]   2022  1:00 PM MD PRISCILLA Patterson [facility]       Donald Pierre RN

## 2022-03-07 NOTE — TELEPHONE ENCOUNTER
Xarelto 20 mg daily is the appropriate dose based on his kidney function. José Garcia D.O.   Cardiologist  Cardiology, [facility]

## 2022-03-11 ENCOUNTER — OFFICE VISIT (OUTPATIENT)
Dept: CARDIOLOGY CLINIC | Age: 87
End: 2022-03-11
Payer: MEDICARE

## 2022-03-11 ENCOUNTER — CARE COORDINATION (OUTPATIENT)
Dept: CARE COORDINATION | Age: 87
End: 2022-03-11

## 2022-03-11 VITALS
OXYGEN SATURATION: 95 % | HEIGHT: 68 IN | SYSTOLIC BLOOD PRESSURE: 104 MMHG | RESPIRATION RATE: 14 BRPM | WEIGHT: 171.58 LBS | HEART RATE: 75 BPM | BODY MASS INDEX: 26 KG/M2 | DIASTOLIC BLOOD PRESSURE: 38 MMHG

## 2022-03-11 DIAGNOSIS — D50.9 IRON DEFICIENCY ANEMIA, UNSPECIFIED IRON DEFICIENCY ANEMIA TYPE: ICD-10-CM

## 2022-03-11 DIAGNOSIS — I50.42 CHRONIC COMBINED SYSTOLIC AND DIASTOLIC CHF (CONGESTIVE HEART FAILURE) (HCC): Primary | ICD-10-CM

## 2022-03-11 DIAGNOSIS — I50.32 CHRONIC HEART FAILURE WITH PRESERVED EJECTION FRACTION (HCC): ICD-10-CM

## 2022-03-11 LAB
ANION GAP SERPL CALCULATED.3IONS-SCNC: 12 MMOL/L (ref 7–16)
BASOPHILS ABSOLUTE: 0.05 E9/L (ref 0–0.2)
BASOPHILS RELATIVE PERCENT: 0.5 % (ref 0–2)
BUN BLDV-MCNC: 37 MG/DL (ref 6–23)
CALCIUM SERPL-MCNC: 8.7 MG/DL (ref 8.6–10.2)
CHLORIDE BLD-SCNC: 99 MMOL/L (ref 98–107)
CO2: 27 MMOL/L (ref 22–29)
CREAT SERPL-MCNC: 1.3 MG/DL (ref 0.7–1.2)
EOSINOPHILS ABSOLUTE: 0.12 E9/L (ref 0.05–0.5)
EOSINOPHILS RELATIVE PERCENT: 1.2 % (ref 0–6)
GFR AFRICAN AMERICAN: >60
GFR NON-AFRICAN AMERICAN: 53 ML/MIN/1.73
GLUCOSE BLD-MCNC: 173 MG/DL (ref 74–99)
HCT VFR BLD CALC: 23.7 % (ref 37–54)
HEMOGLOBIN: 7 G/DL (ref 12.5–16.5)
IMMATURE GRANULOCYTES #: 0.06 E9/L
IMMATURE GRANULOCYTES %: 0.6 % (ref 0–5)
LYMPHOCYTES ABSOLUTE: 1.99 E9/L (ref 1.5–4)
LYMPHOCYTES RELATIVE PERCENT: 20 % (ref 20–42)
MCH RBC QN AUTO: 30.7 PG (ref 26–35)
MCHC RBC AUTO-ENTMCNC: 29.5 % (ref 32–34.5)
MCV RBC AUTO: 103.9 FL (ref 80–99.9)
MONOCYTES ABSOLUTE: 0.73 E9/L (ref 0.1–0.95)
MONOCYTES RELATIVE PERCENT: 7.3 % (ref 2–12)
NEUTROPHILS ABSOLUTE: 7.02 E9/L (ref 1.8–7.3)
NEUTROPHILS RELATIVE PERCENT: 70.4 % (ref 43–80)
PDW BLD-RTO: 17.2 FL (ref 11.5–15)
PLATELET # BLD: 311 E9/L (ref 130–450)
PMV BLD AUTO: 11.5 FL (ref 7–12)
POTASSIUM SERPL-SCNC: 5.1 MMOL/L (ref 3.5–5)
PRO-BNP: 906 PG/ML (ref 0–450)
RBC # BLD: 2.28 E12/L (ref 3.8–5.8)
SODIUM BLD-SCNC: 138 MMOL/L (ref 132–146)
WBC # BLD: 10 E9/L (ref 4.5–11.5)

## 2022-03-11 PROCEDURE — 1111F DSCHRG MED/CURRENT MED MERGE: CPT | Performed by: NURSE PRACTITIONER

## 2022-03-11 PROCEDURE — G8417 CALC BMI ABV UP PARAM F/U: HCPCS | Performed by: NURSE PRACTITIONER

## 2022-03-11 PROCEDURE — G8427 DOCREV CUR MEDS BY ELIG CLIN: HCPCS | Performed by: NURSE PRACTITIONER

## 2022-03-11 PROCEDURE — 93000 ELECTROCARDIOGRAM COMPLETE: CPT | Performed by: INTERNAL MEDICINE

## 2022-03-11 PROCEDURE — G8484 FLU IMMUNIZE NO ADMIN: HCPCS | Performed by: NURSE PRACTITIONER

## 2022-03-11 PROCEDURE — 99214 OFFICE O/P EST MOD 30 MIN: CPT | Performed by: NURSE PRACTITIONER

## 2022-03-11 PROCEDURE — 4040F PNEUMOC VAC/ADMIN/RCVD: CPT | Performed by: NURSE PRACTITIONER

## 2022-03-11 PROCEDURE — 1123F ACP DISCUSS/DSCN MKR DOCD: CPT | Performed by: NURSE PRACTITIONER

## 2022-03-11 PROCEDURE — 1036F TOBACCO NON-USER: CPT | Performed by: NURSE PRACTITIONER

## 2022-03-11 RX ORDER — FUROSEMIDE 40 MG/1
40 TABLET ORAL DAILY
Qty: 60 TABLET | Refills: 0 | Status: SHIPPED
Start: 2022-03-11 | End: 2022-05-12

## 2022-03-11 RX ORDER — MECLIZINE HYDROCHLORIDE 25 MG/1
25 TABLET ORAL DAILY PRN
COMMUNITY
End: 2022-07-01 | Stop reason: SDUPTHER

## 2022-03-11 NOTE — CARE COORDINATION
Ambulatory Care Coordination Note  3/11/2022  CM Risk Score: 6  [place] 10 Year Mortality Risk Score: 100%     ACC: Arron Cook, RN    Summary Note:   ACM contacted patient to follow up on his status regarding CHF, HTN, PAP for care Coordination. Pt reports complete medication compliance.   -Pt has macular degeneration. Pt no longer drives and has reading difficulties. Pt does have eyeglasses with one special lens that help some. Pt has magnifying glass and he said no help.   -Pt reports he is enrolled in a chair aerobic class at a senior center. He is going to ask PCP at next appt if he has any exercise restrictions. Pt has Silver Sneakers. CHF/HTN:  Educated [employer] in. Pt's MyChart is active but has not been viewed. Will have [employer] in mailed to patient.   -Goal weight in the 170's lbs range.   -[place] & /50.  -12- PCP office visit: /70 & weight 174 lbs.  -Pt reports he is monitoring sodium closely and reading labels.   -Pt reports he is keeping hydrated by drinking water. -SOB with exertion and climbing up steps is baseline.   -Rare cough is baseline unless he has mucous (clear) to cough out.    -Pt reports no ankle/feetedema. Pt reports he is going to start wearing compression stockings tomorrow.   -Pt reports he was at the Cardiologist office today. He is going to alternate Lasix 40 mg and 20 mg every other day. -Reviewed CHF zone tool. Resources  -Pt reports he has an eye appt with Dr Eliza Youngblood in a couple months.   -Pt reports he is waiting for his [phone] for the PAP paper work for [employer]. When forms completed he will mail back.       PLAN:  -Continue Care Coordination  -Reinforce Zone Management Tools (CHF)  -F/u -PAP referral >Tikosyn > await [phone]  -PCP appr 3-  -CHF Clinic 3-  -Cardiology appt 4-7-2022  -F/u wearing compression stockings  -F/u exercise > chair aerobics  -F/u weighing self daily, logging results and following trend.   -F/u eye appt couple weeks    -Next anticipated outreach by [address] Team: 2 weeks. Care Coordination Interventions    Program Enrollment: Complex Care  Referral from Primary Care Provider: No  Suggested Interventions and [employer] on Wheels: Declined  Medication Assistance Program: Completed (Comment: 1/21/22 Costly medications (Xarelto, Tikosyn), PAP referral made. )  Pharmacist: Declined  Registered Dietician: Declined  Social Work: Declined  Transportation Support: Completed  Zone Management Tools: Completed (Comment: 1/21/22 Mailed [facility] zone tool. )  Other Services or Interventions: Will mail CHR zone, HTN info, monitoring sodium, reading labels, monitoring log for BP & weight. Goals Addressed                 This Visit's Progress     Medication Management   No change     I will talk with PAP  in attempt to find a more cost effective price for Xarelto & Tikosyn. Barriers: financial and lack of education  Plan for overcoming my barriers: Care Coordination, PAP referral  Confidence: 8/10  Anticipated Goal Completion Date: 5- 1-28-22 PAP reports no assistance for Xarelto until Pt reaches $1000 out of pocket. There is a program for [employer]. Pt will be enrolling. 2-4-22 Pt's daughter in law is filling out the papers for Tikosyn. She has not been over with the bad weather. As soon as papers complete, he will mail them. 2- Pt waiting for [phone] before returning. Prior to Admission medications    Medication Sig Start Date End Date Taking?  Authorizing Provider   Multiple Vitamin (MULTIVITAMIN ADULT PO) Take by mouth    Historical Provider, MD   meclizine (ANTIVERT) 25 MG tablet Take 25 mg by mouth daily as needed    Historical Provider, MD   furosemide (LASIX) 40 MG tablet Take 1 tablet by mouth daily Alternate 40 mg and 20 mg dosing every other day 3/11/22 4/10/22  Raman Gleason, APRN - CNP   rivaroxaban (XARELTO) 20 MG TABS tablet Take 1 tablet by mouth daily 3/9/22   Srinivasan Moody DO   amLODIPine (NORVASC) 2.5 MG tablet Take 1 tablet by mouth daily 2/24/22   Srinivasan Moody DO   metoprolol tartrate (LOPRESSOR) 50 MG tablet TAKE 1 AND 1/2 TABLETS BY  MOUTH IN THE MORNING AND 1  TABLET BY MOUTH AT BEDTIME 2/16/22   Jony Oreilly MD   pravastatin (PRAVACHOL) 40 MG tablet TAKE 1 TABLET BY MOUTH  DAILY 2/16/22   Jony Oreilly MD   potassium chloride (KLOR-CON M) 10 MEQ extended release tablet TAKE 1 TABLET BY MOUTH  DAILY 1/24/22   Jony Oreilly MD   pantoprazole (PROTONIX) 40 MG tablet TAKE 1 TABLET BY MOUTH  DAILY 12/15/21   Jony Oreilly MD   Handicap Placard MISC by Does not apply route Disp #: 1  Duration: 5 years.  11/16/20   Jony Oreilly MD   ferrous sulfate (IRON 325) 325 (65 Fe) MG tablet Take 1 tablet by mouth daily (with breakfast) 11/5/20   Jony Oreilly MD   MAGNESIUM-OXIDE 400 (241.3 Mg) MG TABS tablet TK 1 T PO ONCE D 3/18/20   Historical Provider, MD   dofetilide (TIKOSYN) 250 MCG capsule Take 1 capsule by mouth 2 times daily 3/5/20   Jony Oreilly MD   Multiple Vitamins-Minerals (PRESERVISION AREDS 2 PO) Take 1 tablet by mouth 2 times daily    Historical Provider, MD   Cholecalciferol (VITAMIN D) 2000 UNITS CAPS capsule Take 1 capsule by mouth daily     Historical Provider, MD       Future Appointments   Date Time Provider Cezar Gamez   3/14/2022  1:00 PM MD PRISCILLA [facility]   3/16/2022  1:30 PM [facility] CHF ROOM 1 [facility]   4/7/2022  1:15 PM Srinivasan Moody DO [facility]   4/27/2022  1:00 PM MD PRISCILLA [facility]      and   Congestive Heart Failure Assessment    Are you currently restricting fluids?: No Restriction  Do you understand a low sodium diet?: Yes  Do you understand how to read food labels?: Yes  How many restaurant meals do you eat per week?: 1-2  Do you salt your food before tasting it?: No     No patient-reported symptoms Symptoms:  CHF associated dyspnea on exertion: Pos      Symptom course: improving  Patient-reported weight (lb): 171  Weight trend: stable  Salt intake watch compared to last visit: stable

## 2022-03-11 NOTE — PROGRESS NOTES
[facility] Cardiology  Office Visit         Reason for Visit: Heart Failure    Primary Cardiologist: Dr. Connie Earl         History of Present Illness:     Mr. Annemarie Partida is a 82year old male with a PMHx of chronic HFpEF, CAD s/p CABG and redo CABG, hx of VT s/p ICD in situ, PAF, s/p TAVR, HTN, CKD, HLD. He recently presented to the ED with complaints of increased shortness of breath and abdominal bloating. He was admitted to the hospital for acute heart failure he was IV diuresed and during hospitalization he underwent TTE that demonstrated LVEF 60%, stage II DD, mild MR, TAVR with mean gradient of 9 mmHg, mild TR. He subjectively improved and was discharged to home. He presents today in post acute heart failure hospitalization follow-up, since discharge from the hospital he has been compliant with all of his current cardiac medications. He had good urinary response to oral furosemide with clear yellow urine production. He has improvement in shortness of breath, denies any orthopnea PND, abdominal bloating or lower extremity edema. His main complaint in office is ongoing lightheadedness when going from a sitting to a standing position. He does have a history of vertigo with as needed meclizine, he has not used any doses of this since discharge from the hospital.  He denies any falls, near syncope or syncope. He owns compression stockings but has not been wearing them. He denies dyspnea with exertion, shortness of breath, or decline in overall functional capacity. He denies orthopnea, PND, nocturnal cough or hemoptysis. He denies abdominal distention or bloating, early satiety, anorexia/change in appetite, unintentional weight loss. He does not lower extremity edema. Review of systems is negative for chest pain, pressure, discomfort. When ambulating on an incline, He does not leg claudication.  History is negative for neurological symptoms including transient loss of vision, asymmetric weakness, aphasia, dysphasia, numbness, tingling. Patient Active Problem List    Diagnosis Date Noted    Chronic combined systolic and diastolic CHF (congestive heart failure) ([facility] 10/08/2015     Priority: High    Acute on chronic combined systolic and diastolic CHF (congestive heart failure) ([facility] 03/01/2022    Combined congestive systolic and diastolic heart failure ([facility] 03/01/2022    Gastroesophageal reflux disease without esophagitis 03/01/2022    Coronary artery disease involving coronary bypass graft 03/01/2022    Pre-syncope 09/02/2021    SOB (shortness of breath) 10/29/2020    Lung nodule     Acute on chronic diastolic (congestive) heart failure ([facility]     Pure hypercholesterolemia     Stage 3b chronic kidney disease ([facility]     Transient cerebral ischemia     Aortic valve disease     Other insomnia 05/07/2020    Ischemic heart disease     ICD (implantable cardioverter-defibrillator), dual, in situ     GIB (gastrointestinal bleeding) 02/17/2020    Hyperlipidemia LDL goal <100 09/02/2019    S/P TAVR (transcatheter aortic valve replacement)     Chronic atrial fibrillation     Coronary artery disease involving native coronary artery of native heart without angina pectoris     Essential hypertension     Vitamin D deficiency 05/06/2011     Past Medical History:    1. MI, 2003. Cardiac catheterization: 85% ostial, proximal and mid LAD narrowings. LMC 70% stenosis. D1 occluded. D2 50% stenosis. OM1 80% ostial stenosis. Mid CX occluded. Dominant RCA severe disease. LVEF 40-45%. 2. CABG, 05/23/2003, [facility] [place] with LIMA-LAD, SVG-RI, SVG-OM. 3. Hypertension  4. Hyperlipidemia. 5. Mild carotid plaque on US, 2003. 6. Echo, 07/2006. Mild LVE, normal EF, Stage II diastolic dysfunction, moderate AS, mild MR, mild TR.  7. Right leg vein stripping, 1970's. 8. No history diabetes mellitus, stroke or COPD. 9. Nonsmoker for many years. 10. VT causing cardiac arrest after stress test, 03/15/2007. He was successfully cardioverted. 11. Cardiac catheterization, 03/16/2007 with normal LVEF, severe native three vessel coronary disease. SVG-RI, SVG-OM both occluded. LIMA-LAD patent. 12. Re-do CABG, [facility], 03/2007 with radial artery graft to D2 and OM2.   13. Elective PCI with CONNOR native OM2 and native LAD, 03/2007 following CABG. This was done because of inadequate conduits. 14. ICD placement, [facility], 03/2007. 15. ICD lead recall, early 2008, but he was followed electively because his device was functioning normally. 12. Presentation [facility], 03/29/2008 with multiple ICD inappropriate shocks. Transfer [facility] where ICD and lead were replaced. He reports cardiac catheterization that revealed patent LIMA-LAD and patent stents in native coronary arteries. 17. Atypical chest pain and anxiety with admission [place] [address], 05/2008. Troponin minimally elevated. Beta blocker dose increased. 25. [place] [address] admission, 06/13/2009 with lightheadedness, orthostatic hypotension, drop in hemoglobin to 10.5 from 14.9 over two months with an increase in BUN from 16 to 68 over the same time. Dark heme positive stools noted. EKG NSR, incomplete RBBB. 19. Echo, 06/15/2009 with normal LVEF, moderate AS, peak gradient 38 mmHg, BLOSSOM 1.1 cm², moderate MR, LAE. 20. Transtelephonic ICD check, 01/11/2010. No ventricular tachyarrhythmias. Two AF episodes, the longest for 14 hours. 21. Echo, 06/16/2010 with LVE, borderline LVH, normal systolic and diastolic function, normal LA, ICD electrode right heart. Trileaflet AV with moderate to severe AS, mean/peak gradient 20/31 mmHg. BLOSSOM 1.0 cm². MAC with mild MR, normal RVSP.  22. No drug allergies. 21. Family history negative for premature vascular disease. 24. PAF with DCCV, [facility], 12/2010.   25. Hip replacement surgery, 2010 or 2011 [facility] Dr. Mickey Caban. 26. MPS [facility], 06/05/2012. Moderate sized fixed basal inferior defect, probably artifact.    27. Echo [facility], 10/20/2011. 1+ AR, moderate AS, mild MR, mild TR, normal LVEF.   28. Echo, 01/31/2013. LV not dilated. Mild concentric LVH. Septal paradox. EF 50-55%. BLOSSOM 1.2 cm² consistent with moderate stenosis. Peak/mean gradient 38/21. Stage II diastolic dysfunction. 34. [address] admission, 08/28/2013 with dizziness, Hb 7.7, WBC 19.0. CXR negative except cardiomegaly. EKG NSR, leftward axis, RBBB. BMP negative except for elevation in BUN to 55 with Cr 1.3.   30. EGD, 08/28/2013. Large pyloric channel ulcer with clot treated with PRBCs and fresh frozen plasma. Hb 8.7 on day of discharge and stable. Pradaxa was temporarily held. 31. CT abdomen, 09/08/2014. 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 32. CT chest, 09/17/2014. Consolidation and infiltrate at the left lung base, stable when compared to previous exams with less pleural thickening and calcified plaque in the left pleural cavity without any recent change. Chronic obstructive airways disease. 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   33. ICD programmed to DDDR mode by Dr. Harini Reaves, 03/26/2015. Device function normal.  34. Echo 10/16/2015. EF 39%. Stage II diastolic dysfunction. Aortic stenosis with peak/mean gradients of 48/24 mmHg. Estimated valve area 1.0 cm². 35. ICD generator change for battery depletion, 07/28/2016, Dr. Gonzalez White. Harini Reaves. 36. Echo, 02/03/2017.  Normal LV size with mild LVH.  Normal regional wall motion and overall systolic function.  Diastole could not be assessed, but was presumed to be impaired.  The RV was dilated with impaired global systolic function.  The LA was enlarged.  Mild MAC with mild mitral insufficiency.  Moderate tricuspid insufficiency.  Aortic valve gradients are peak 47 mmHg with a mean of 27.  Dimensionless ratio 0.21. Valve area calculated 0.8 cm².    37.  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 38. Echocardiogram, [facility], 4/19/2018, EF 60%.  Low normal RV function.  Stage 2 diastolic dysfunction.  Mild-to-moderate MR. S/P TAVR with a mean gradient of 10 mmHg.  RVSP 31 mg.     39. S/P Cardioversion by Dr. Rossana Wisdom, 05/30/2017.   40. Hospital evaluation, 09/07/2018, for 2 appropriate ICD discharges for polymorphic ventricular tachycardia, which occurred after yard work and moving heavy furniture.   41. [facility] 2017 ([place]): Left main: 0% stenosis LAD: 100 % stenosis Circumflex: 100 % Stenosis AFTER OM1 RCA: Dominant. ANEURYSMAL  DILATATION IN MID PORTION. LONG 75 % stenosis IN MID AND DISTAL VESSEL.  LIMA - LAD: PATENT RADIAL - D2: PATENT  RADIAL - OM2: PATENT  42. 10/2020 hospitalized for HFpEF and Afib RVR -- dccv 10/29/2020   43. TTE ([facility]) 10/30/2020:  Left ventricle is normal in size  Mild asymmetric septal hypertrophy.  EF 65%  No regional wall motion abnormalities seen.  Normal left ventricular ejection fraction.  The left atrium is severely dilated.  Mild mitral annular calcification.   Mild mitral regurgitation is present.  Normally functioning bioprosthetic valve in aortic position.  Physiologic and/or trace tricuspid regurgitation.   44. CKD: stage 3b: Baseline creatinine around 1.3.       Past Medical History:   Diagnosis Date    A-fib [facility])     Arrhythmia     Carotid artery stenosis     [facility]     Heart valve problem     Hyperlipidemia     Hypertension     ICD (implantable cardiac defibrillator) in place 2007    MI (mitral incompetence) 2003    MI (myocardial infarction) ([facility] [address] 2003       Past Surgical History:   Procedure Laterality Date    CARDIAC CATHETERIZATION Right 03/03/2017    Dr. Izaiah Higgins  2007. 2008 2007 78 shocks replaced 2008 Medtronic     CARDIAC DEFIBRILLATOR PLACEMENT  07/28/2016    Medtronic Dual ICD gen change    CARDIAC SURGERY N/A 9/4/2021    cardioversion performed by Khanh Cloud MD at [address] [place] [address]  10/29/2020    Successful    (Dr. Goran Campa)    COLONOSCOPY N/A 2/24/2020    COLONOSCOPY DIAGNOSTIC performed by Mally Ponce MD at [address]  05/23/2003 03/16/2007    DIAGNOSTIC CARDIAC CATH LAB PROCEDURE  2007    DIAGNOSTIC CARDIAC CATH LAB PROCEDURE  03/29/2008    stent    JOINT REPLACEMENT  2011    r hip surgery Dr Ranulfo Rosales  03/29/2017    Dr. Tejal Walker and Dr. Chante Bacon- TAVR Josie 26mm valve    UPPER GASTROINTESTINAL ENDOSCOPY N/A 9/3/2019    EGD ESOPHAGOGASTRODUODENOSCOPY performed by Claire Del Valle MD at [facility] N/A 2/18/2020    EGD ESOPHAGOGASTRODUODENOSCOPY performed by Mally Ponce MD at [address]       No Known Allergies      Outpatient Medications Marked as Taking for the 3/11/22 encounter (Office Visit) with GINETTE Mcintosh - CNP   Medication Sig Dispense Refill    Multiple Vitamin (MULTIVITAMIN ADULT PO) Take by mouth      meclizine (ANTIVERT) 25 MG tablet Take 25 mg by mouth daily as needed      rivaroxaban (XARELTO) 20 MG TABS tablet Take 1 tablet by mouth daily 90 tablet 3    furosemide (LASIX) 40 MG tablet Take 1 tablet by mouth daily New lower dose 60 tablet 0    amLODIPine (NORVASC) 2.5 MG tablet Take 1 tablet by mouth daily 90 tablet 3    metoprolol tartrate (LOPRESSOR) 50 MG tablet TAKE 1 AND 1/2 TABLETS BY  MOUTH IN THE MORNING AND 1  TABLET BY MOUTH AT BEDTIME 225 tablet 3    pravastatin (PRAVACHOL) 40 MG tablet TAKE 1 TABLET BY MOUTH  DAILY 90 tablet 3    potassium chloride (KLOR-CON M) 10 MEQ extended release tablet TAKE 1 TABLET BY MOUTH  DAILY 90 tablet 3    pantoprazole (PROTONIX) 40 MG tablet TAKE 1 TABLET BY MOUTH  DAILY 90 tablet 3    Handicap Placard MISC by Does not apply route Disp #: 1  Duration: 5 years. 1 each 0    ferrous sulfate (IRON 325) 325 (65 Fe) MG tablet Take 1 tablet by mouth daily (with breakfast) 90 tablet 1    MAGNESIUM-OXIDE 400 (241.3 Mg) MG TABS tablet TK 1 T PO ONCE D      dofetilide (TIKOSYN) 250 MCG capsule Take 1 capsule by mouth 2 times daily 90 capsule 0    Multiple Vitamins-Minerals (PRESERVISION AREDS 2 PO) Take 1 tablet by mouth 2 times daily      Cholecalciferol (VITAMIN D) 2000 UNITS CAPS capsule Take 1 capsule by mouth daily          Review of Systems:   Cardiac: As per HPI  General: No fever, chills, rigors  Pulmonary: As per HPI  HEENT: No visual disturbances, difficult swallowing  GI: No nausea, vomiting, abdominal pain  : No dysuria or hematuria  Endocrine: No thyroid disease or diabetes  Musculoskeletal: ROBLERO x 4, no focal motor deficits  Skin: Intact, no rashes  Neuro/Psych: No headache or seizures      Weights: Wt Readings from Last 3 Encounters:   03/11/22 171 lb 9.3 oz (77.8 kg)   03/04/22 167 lb 8 oz (76 kg)   03/01/22 176 lb (79.8 kg)         Physical Examination:     BP (!) 126/52 (Site: Left Upper Arm, Position: Sitting, Cuff Size: Medium Adult)   Pulse 70   Resp 14   Ht 5' 8\" (1.727 m)   Wt 171 lb 9.3 oz (77.8 kg)   SpO2 95%   BMI 26.09 kg/m²     Orthostatic BP/Pulse:      CONSTITUTIONAL: Alert and oriented times 3, no acute distress and cooperative to examination with proper mood and affect. SKIN: Skin color, texture, turgor normal. No rashes or lesions. LYMPH: no cervical nodes, no inguinal nodes  HEENT: Head is normocephalic, atraumatic. EOMI, PERRLA. NECK: Supple, symmetrical, trachea midline, no adenopathy, thyroid symmetric, not enlarged and no tenderness, skin normal.  CHEST/LUNGS: chest symmetric with normal A/P diameter, normal respiratory rate and rhythm, lungs clear to auscultation without wheezes, rales or rhonchi. No accessory muscle use.  Scars None   CARDIOVASCULAR: Heart sounds are normal.  Regular rate and rhythm without murmur, gallop or rub. Normal S1 and S2. . Carotid and femoral pulses 2+/4 and equal bilaterally. ABDOMEN: Normal shape. No and Laparoscopic scar(s) present. Normal bowel sounds. No bruits. soft, nondistended, no masses or organomegaly. no evidence of hernia. Percussion: Normal without hepatosplenomegally. Tenderness: absent. RECTAL: deferred, not clinically indicated  NEUROLOGIC: There are no focalizing motor or sensory deficits. CN II-XII are grossly intact. Aron Miyamoto EXTREMITIES: no cyanosis, no clubbing and no edema. Warm and well perfused. All the following diagnostics were personally reviewed and interpreted by me. LAB DATA:     3/2/2022 10:10 3/3/2022 03:10 3/4/2022 03:40   Sodium 140 141 138   Potassium 4.0 4.2 3.9   Chloride 101 102 100   CO2 28 28 28   BUN,BUNPL 38 (H) 41 (H) 39 (H)   Creatinine 1.3 (H) 1.1 1.1   Anion Gap 11 11 10   GFR Non- 53 >60 >60   GFR  >60 >60 >60   Magnesium 2.3 2.3 2.3   GLUCOSE, FASTING, (H) 120 (H) 123 (H)   CALCIUM, SERUM, 516044 9.0 8.7 8.6   Pro-BNP   529 (H)   Troponin, High Sensitivity 24 (H)     CHOLESTEROL, TOTAL, 983624 176     HDL Cholesterol 34     LDL Calculated - (AA)     Triglycerides 430 (H)     VLDL Cholesterol Calculated - (AA)     WBC 8.2 8.9 8.6   RBC 2.98 (L) 2.75 (L) 2.78 (L)   Hemoglobin Quant 9.2 (L) 8.6 (L) 8.6 (L)   Hematocrit 28.2 (L) 26.5 (L) 26.8 (L)   MCV 94.6 96.4 96.4   MCH 30.9 31.3 30.9   MCHC 32.6 32.5 32.1   MPV 10.7 11.0 10.3   RDW 15.2 (H) 15.4 (H) 15.6 (H)   Platelet Count 998 713 192       IMAGING:    CXR (3/1/2022)  FINDINGS:  Cardiac silhouette is enlarged.  Postop sternotomy changes are noted. Felicity Ortez is a dual lead cardiac pacer on the left  The lungs are clear.  No findings of CHF or pneumonia. Impression:  Cardiomegaly. Felicity Ortez are no findings of CHF or pneumonia. CARDIAC TESTING:    TTE (3/2/2022)   Summary   Normal left ventricular systolic function.    Ejection fraction is visually estimated at 60%.   Mildly dilated right ventricle with normal right ventricular function. There is doppler evidence of stage II diastolic dysfunction. Moderately dilated left atrium by volume index. Mild mitral regurgitation. 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 No significant paravalvular aortic regurgitation. AV peak velocity 2.1 m/s. AV mean gradient 9 mmHg. AV area 1.5 cm2. Mild tricuspid regurgitation. PASP is estimated at 44 mmHg. EKG  Electronic atrial pacemaker  -possibly demand type   -Right bundle branch block.    -Old inferior infarct. ICD interrogated on 3/2/22 (AF burden 0.9%)      ASSESSMENT:  1. Chronic HFpEF  2. ACC stage C / NYHA class II  3. Euvolemic  4. CAD status post CABG x3 (LIMA-LAD, SVG-RI, SVG-OM; 2003).  Redo CABG in 2007 (radial artery graft to D2 and OM2). Patent grafts in 2017.  5. Chronic RBBB/LAFB  6. History of VT status post ICD (2007) and lead replacement 2008 and generator change 2016. 7. PAF on chronic anticoagulation (Xarelto) and Tikosyn. 8. Severe AS status post TAVR (3/29/2017)  9. HTN  10. CKD  11. HLD - on pravastatin (unable to tolerate Crestor/Lipitor)  12. Hx of BPH  13. Recent anemia during hospitalization, on iron supplement        PLAN:  1. Decrease lasix, alternate 40 mg and 20 mg    2. Wear compression stockings, on in AM and off in PM    3. Stay hydrated    4. Get follow up blood work (BMP, BNP, CBC)    5. Follow up with CHF clinic as scheduled    6. Follow up with Dr. Hailee Cardona in 1 month - sooner if needed please call    7.  Weigh yourself daily    -Stay Hydrated    -Diet should sodium restricted to 2 grams    -Again watch your daily weight trends and if you gain water weight please follow below instructions.    -If you gain 3-5 pounds in 2-3 days OR notice that you are retaining fluid in anyway just like you did before then take an extra dose of your water pill (furosemide/Lasix) every day until you lose the weight or feel better.     -If you notice that you have taken more than 2 extra doses in 1 week then please call and let us know. -If at any time you feel that you are retaining fluid, your medications are not working, or you feel ill in anyway, then please call us for either same day appointment or the next day, and for instructions. Our goal is to keep you out of the emergency room and the hospital and we have ways to do it. You just need to call us in a timely manner.     -If you become sick for other reasons, and notice that you are not urinating as much, the urine is very dark, you have significant diarrhea or vomiting, then please DO NOT take your water pill and CALL US immediately.     Fransisco Leal APRN-CNP  [id number] [facility]

## 2022-03-11 NOTE — PATIENT INSTRUCTIONS
1. Decrease lasix, alternate 40 mg and 20 mg    2. Wear compression stockings, on in AM and off in PM    3. Stay hydrated    4. Get follow up blood work (BMP, BNP, CBC)    5. Follow up with CHF clinic as scheduled    6. Follow up with Dr. Radha Kumari in 1 month - sooner if needed please call    7. Weigh yourself daily    -Stay Hydrated    -Diet should sodium restricted to 2 grams    -Again watch your daily weight trends and if you gain water weight please follow below instructions.    -If you gain 3-5 pounds in 2-3 days OR notice that you are retaining fluid in anyway just like you did before then take an extra dose of your water pill (furosemide/Lasix) every day until you lose the weight or feel better.     -If you notice that you have taken more than 2 extra doses in 1 week then please call and let us know. -If at any time you feel that you are retaining fluid, your medications are not working, or you feel ill in anyway, then please call us for either same day appointment or the next day, and for instructions. Our goal is to keep you out of the emergency room and the hospital and we have ways to do it. You just need to call us in a timely manner.     -If you become sick for other reasons, and notice that you are not urinating as much, the urine is very dark, you have significant diarrhea or vomiting, then please DO NOT take your water pill and CALL US immediately.

## 2022-03-12 ENCOUNTER — APPOINTMENT (OUTPATIENT)
Dept: GENERAL RADIOLOGY | Age: 87
DRG: 378 | End: 2022-03-12
Payer: MEDICARE

## 2022-03-12 ENCOUNTER — HOSPITAL ENCOUNTER (INPATIENT)
Age: 87
LOS: 5 days | Discharge: HOME | DRG: 378 | End: 2022-03-17
Attending: EMERGENCY MEDICINE | Admitting: INTERNAL MEDICINE
Payer: MEDICARE

## 2022-03-12 DIAGNOSIS — D64.9 ANEMIA REQUIRING TRANSFUSIONS: ICD-10-CM

## 2022-03-12 DIAGNOSIS — D64.9 SYMPTOMATIC ANEMIA: Primary | ICD-10-CM

## 2022-03-12 DIAGNOSIS — K92.2 GASTROINTESTINAL HEMORRHAGE, UNSPECIFIED GASTROINTESTINAL HEMORRHAGE TYPE: ICD-10-CM

## 2022-03-12 LAB
ABO/RH: NORMAL
ALBUMIN SERPL-MCNC: 3.8 G/DL (ref 3.5–5.2)
ALP BLD-CCNC: 102 U/L (ref 40–129)
ALT SERPL-CCNC: 10 U/L (ref 0–40)
ANION GAP SERPL CALCULATED.3IONS-SCNC: 11 MMOL/L (ref 7–16)
ANTIBODY SCREEN: NORMAL
APTT: 34.6 SEC (ref 24.5–35.1)
AST SERPL-CCNC: 17 U/L (ref 0–39)
BACTERIA: NORMAL /HPF
BASOPHILS ABSOLUTE: 0.03 E9/L (ref 0–0.2)
BASOPHILS RELATIVE PERCENT: 0.3 % (ref 0–2)
BILIRUB SERPL-MCNC: 0.2 MG/DL (ref 0–1.2)
BILIRUBIN URINE: NEGATIVE
BLOOD BANK DISPENSE STATUS: NORMAL
BLOOD BANK PRODUCT CODE: NORMAL
BLOOD, URINE: NEGATIVE
BPU ID: NORMAL
BUN BLDV-MCNC: 39 MG/DL (ref 6–23)
CALCIUM SERPL-MCNC: 8.1 MG/DL (ref 8.6–10.2)
CHLORIDE BLD-SCNC: 98 MMOL/L (ref 98–107)
CLARITY: CLEAR
CO2: 26 MMOL/L (ref 22–29)
COLOR: YELLOW
CREAT SERPL-MCNC: 1.2 MG/DL (ref 0.7–1.2)
DESCRIPTION BLOOD BANK: NORMAL
EOSINOPHILS ABSOLUTE: 0.13 E9/L (ref 0.05–0.5)
EOSINOPHILS RELATIVE PERCENT: 1.5 % (ref 0–6)
GFR AFRICAN AMERICAN: >60
GFR NON-AFRICAN AMERICAN: 58 ML/MIN/1.73
GLUCOSE BLD-MCNC: 129 MG/DL (ref 74–99)
GLUCOSE URINE: NEGATIVE MG/DL
HCT VFR BLD CALC: 19.4 % (ref 37–54)
HCT VFR BLD CALC: 22.7 % (ref 37–54)
HEMOGLOBIN: 6.1 G/DL (ref 12.5–16.5)
HEMOGLOBIN: 7.1 G/DL (ref 12.5–16.5)
IMMATURE GRANULOCYTES #: 0.05 E9/L
IMMATURE GRANULOCYTES %: 0.6 % (ref 0–5)
INR BLD: 1.4
KETONES, URINE: NEGATIVE MG/DL
LEUKOCYTE ESTERASE, URINE: NEGATIVE
LYMPHOCYTES ABSOLUTE: 1.53 E9/L (ref 1.5–4)
LYMPHOCYTES RELATIVE PERCENT: 17.6 % (ref 20–42)
MCH RBC QN AUTO: 31.4 PG (ref 26–35)
MCHC RBC AUTO-ENTMCNC: 31.4 % (ref 32–34.5)
MCV RBC AUTO: 100 FL (ref 80–99.9)
MONOCYTES ABSOLUTE: 0.81 E9/L (ref 0.1–0.95)
MONOCYTES RELATIVE PERCENT: 9.3 % (ref 2–12)
NEUTROPHILS ABSOLUTE: 6.16 E9/L (ref 1.8–7.3)
NEUTROPHILS RELATIVE PERCENT: 70.7 % (ref 43–80)
NITRITE, URINE: NEGATIVE
PDW BLD-RTO: 17.3 FL (ref 11.5–15)
PH UA: 5 (ref 5–9)
PLATELET # BLD: 263 E9/L (ref 130–450)
PMV BLD AUTO: 10.7 FL (ref 7–12)
POTASSIUM REFLEX MAGNESIUM: 4.3 MMOL/L (ref 3.5–5)
PRO-BNP: 841 PG/ML (ref 0–450)
PROTEIN UA: NEGATIVE MG/DL
PROTHROMBIN TIME: 15.5 SEC (ref 9.3–12.4)
RBC # BLD: 1.94 E12/L (ref 3.8–5.8)
RBC UA: NORMAL /HPF (ref 0–2)
SODIUM BLD-SCNC: 135 MMOL/L (ref 132–146)
SPECIFIC GRAVITY UA: 1.01 (ref 1–1.03)
TOTAL PROTEIN: 5.7 G/DL (ref 6.4–8.3)
TROPONIN, HIGH SENSITIVITY: 22 NG/L (ref 0–11)
TROPONIN, HIGH SENSITIVITY: 23 NG/L (ref 0–11)
UROBILINOGEN, URINE: 0.2 E.U./DL
WBC # BLD: 8.7 E9/L (ref 4.5–11.5)
WBC UA: NORMAL /HPF (ref 0–5)

## 2022-03-12 PROCEDURE — 85025 COMPLETE CBC W/AUTO DIFF WBC: CPT

## 2022-03-12 PROCEDURE — 86900 BLOOD TYPING SEROLOGIC ABO: CPT

## 2022-03-12 PROCEDURE — 2580000003 HC RX 258: Performed by: INTERNAL MEDICINE

## 2022-03-12 PROCEDURE — 85730 THROMBOPLASTIN TIME PARTIAL: CPT

## 2022-03-12 PROCEDURE — P9016 RBC LEUKOCYTES REDUCED: HCPCS

## 2022-03-12 PROCEDURE — 93005 ELECTROCARDIOGRAM TRACING: CPT | Performed by: STUDENT IN AN ORGANIZED HEALTH CARE EDUCATION/TRAINING PROGRAM

## 2022-03-12 PROCEDURE — 86901 BLOOD TYPING SEROLOGIC RH(D): CPT

## 2022-03-12 PROCEDURE — 85014 HEMATOCRIT: CPT

## 2022-03-12 PROCEDURE — C9113 INJ PANTOPRAZOLE SODIUM, VIA: HCPCS | Performed by: STUDENT IN AN ORGANIZED HEALTH CARE EDUCATION/TRAINING PROGRAM

## 2022-03-12 PROCEDURE — 86850 RBC ANTIBODY SCREEN: CPT

## 2022-03-12 PROCEDURE — 85610 PROTHROMBIN TIME: CPT

## 2022-03-12 PROCEDURE — 99223 1ST HOSP IP/OBS HIGH 75: CPT | Performed by: INTERNAL MEDICINE

## 2022-03-12 PROCEDURE — 36415 COLL VENOUS BLD VENIPUNCTURE: CPT

## 2022-03-12 PROCEDURE — 81001 URINALYSIS AUTO W/SCOPE: CPT

## 2022-03-12 PROCEDURE — 99283 EMERGENCY DEPT VISIT LOW MDM: CPT

## 2022-03-12 PROCEDURE — 96374 THER/PROPH/DIAG INJ IV PUSH: CPT

## 2022-03-12 PROCEDURE — 1200000000 HC SEMI PRIVATE

## 2022-03-12 PROCEDURE — 84484 ASSAY OF TROPONIN QUANT: CPT

## 2022-03-12 PROCEDURE — 86923 COMPATIBILITY TEST ELECTRIC: CPT

## 2022-03-12 PROCEDURE — 71045 X-RAY EXAM CHEST 1 VIEW: CPT

## 2022-03-12 PROCEDURE — 80053 COMPREHEN METABOLIC PANEL: CPT

## 2022-03-12 PROCEDURE — 6370000000 HC RX 637 (ALT 250 FOR IP): Performed by: INTERNAL MEDICINE

## 2022-03-12 PROCEDURE — 85018 HEMOGLOBIN: CPT

## 2022-03-12 PROCEDURE — 6360000002 HC RX W HCPCS: Performed by: STUDENT IN AN ORGANIZED HEALTH CARE EDUCATION/TRAINING PROGRAM

## 2022-03-12 PROCEDURE — 83880 ASSAY OF NATRIURETIC PEPTIDE: CPT

## 2022-03-12 RX ORDER — MECLIZINE HCL 12.5 MG/1
25 TABLET ORAL 2 TIMES DAILY PRN
Status: DISCONTINUED | OUTPATIENT
Start: 2022-03-12 | End: 2022-03-17 | Stop reason: HOSPADM

## 2022-03-12 RX ORDER — DOFETILIDE 0.25 MG/1
250 CAPSULE ORAL 2 TIMES DAILY
Status: DISCONTINUED | OUTPATIENT
Start: 2022-03-12 | End: 2022-03-17 | Stop reason: HOSPADM

## 2022-03-12 RX ORDER — SODIUM CHLORIDE 9 MG/ML
10 INJECTION INTRAVENOUS DAILY
Status: DISCONTINUED | OUTPATIENT
Start: 2022-03-13 | End: 2022-03-17 | Stop reason: HOSPADM

## 2022-03-12 RX ORDER — ONDANSETRON 4 MG/1
4 TABLET, ORALLY DISINTEGRATING ORAL EVERY 8 HOURS PRN
Status: DISCONTINUED | OUTPATIENT
Start: 2022-03-12 | End: 2022-03-13

## 2022-03-12 RX ORDER — AMLODIPINE BESYLATE 2.5 MG/1
2.5 TABLET ORAL DAILY
Status: DISCONTINUED | OUTPATIENT
Start: 2022-03-12 | End: 2022-03-13

## 2022-03-12 RX ORDER — ACETAMINOPHEN 650 MG/1
650 SUPPOSITORY RECTAL EVERY 6 HOURS PRN
Status: DISCONTINUED | OUTPATIENT
Start: 2022-03-12 | End: 2022-03-17 | Stop reason: HOSPADM

## 2022-03-12 RX ORDER — SODIUM CHLORIDE 0.9 % (FLUSH) 0.9 %
5-40 SYRINGE (ML) INJECTION EVERY 12 HOURS SCHEDULED
Status: DISCONTINUED | OUTPATIENT
Start: 2022-03-12 | End: 2022-03-17 | Stop reason: HOSPADM

## 2022-03-12 RX ORDER — SODIUM CHLORIDE 9 MG/ML
25 INJECTION, SOLUTION INTRAVENOUS PRN
Status: DISCONTINUED | OUTPATIENT
Start: 2022-03-12 | End: 2022-03-17 | Stop reason: HOSPADM

## 2022-03-12 RX ORDER — CHOLECALCIFEROL (VITAMIN D3) 50 MCG
2000 TABLET ORAL DAILY
Status: DISCONTINUED | OUTPATIENT
Start: 2022-03-12 | End: 2022-03-17 | Stop reason: HOSPADM

## 2022-03-12 RX ORDER — PANTOPRAZOLE SODIUM 40 MG/10ML
80 INJECTION, POWDER, LYOPHILIZED, FOR SOLUTION INTRAVENOUS ONCE
Status: COMPLETED | OUTPATIENT
Start: 2022-03-12 | End: 2022-03-12

## 2022-03-12 RX ORDER — SODIUM CHLORIDE 9 MG/ML
INJECTION, SOLUTION INTRAVENOUS PRN
Status: DISCONTINUED | OUTPATIENT
Start: 2022-03-12 | End: 2022-03-16 | Stop reason: SDUPTHER

## 2022-03-12 RX ORDER — ACETAMINOPHEN 325 MG/1
650 TABLET ORAL EVERY 6 HOURS PRN
Status: DISCONTINUED | OUTPATIENT
Start: 2022-03-12 | End: 2022-03-17 | Stop reason: HOSPADM

## 2022-03-12 RX ORDER — PANTOPRAZOLE SODIUM 40 MG/10ML
40 INJECTION, POWDER, LYOPHILIZED, FOR SOLUTION INTRAVENOUS DAILY
Status: DISCONTINUED | OUTPATIENT
Start: 2022-03-13 | End: 2022-03-17 | Stop reason: HOSPADM

## 2022-03-12 RX ORDER — ONDANSETRON 2 MG/ML
4 INJECTION INTRAMUSCULAR; INTRAVENOUS EVERY 6 HOURS PRN
Status: DISCONTINUED | OUTPATIENT
Start: 2022-03-12 | End: 2022-03-13

## 2022-03-12 RX ORDER — FERROUS SULFATE 325(65) MG
325 TABLET ORAL
Status: DISCONTINUED | OUTPATIENT
Start: 2022-03-13 | End: 2022-03-17 | Stop reason: HOSPADM

## 2022-03-12 RX ORDER — SODIUM CHLORIDE 0.9 % (FLUSH) 0.9 %
5-40 SYRINGE (ML) INJECTION PRN
Status: DISCONTINUED | OUTPATIENT
Start: 2022-03-12 | End: 2022-03-17 | Stop reason: HOSPADM

## 2022-03-12 RX ADMIN — Medication 2000 UNITS: at 20:18

## 2022-03-12 RX ADMIN — AMLODIPINE BESYLATE 2.5 MG: 2.5 TABLET ORAL at 20:18

## 2022-03-12 RX ADMIN — DOFETILIDE 250 MCG: 0.25 CAPSULE ORAL at 22:13

## 2022-03-12 RX ADMIN — PANTOPRAZOLE SODIUM 80 MG: 40 INJECTION, POWDER, FOR SOLUTION INTRAVENOUS at 13:54

## 2022-03-12 RX ADMIN — Medication 10 ML: at 20:18

## 2022-03-12 RX ADMIN — METOPROLOL TARTRATE 75 MG: 50 TABLET, FILM COATED ORAL at 20:18

## 2022-03-12 ASSESSMENT — PAIN SCALES - GENERAL: PAINLEVEL_OUTOF10: 0

## 2022-03-12 NOTE — RESULT ENCOUNTER NOTE
Patient was seen in office yesterday  I had him get follow up blood work given ongoing lightheadedness since discharge from the hospital including CBC as his hgb was low during recent hospitalization  He was due to see PCP Monday to discuss further evaluation    Labs demonstrated :Hgb 7.0    He is on xarelto for atrial fibrillation and he has not had a colonoscopy evaluation in many years. In office yesterday he denied any hematochezia, melena (however patient on iron supplementation) or hematuria. I called and spoke with Ghazala Ace and directed him to the ED given significant decline in hgb 13.0 (4 mo ago) > 9.9 (11 days ago) > 7.0 (yesterday). He acknowledged understanding and was willing to be further evaluated in the ED.

## 2022-03-12 NOTE — ED PROVIDER NOTES
[address]  Department of Emergency Medicine     Written by: Martin Handley DO  Patient Name: Prashant Caputo  Attending Provider: Enzo Roblero MD  Admit Date: 3/12/2022 12:46 PM  MRN: [id number]    : 1935        Chief Complaint   Patient presents with    Abnormal Lab     Labs done yedsterday. Low HGB 7.0. Sent in by PCP    Dizziness    - Chief complaint    HPI   Prashant Caputo is a 82 y.o. male presenting to the ED for evaluation of Abnormal Lab (Labs done yedsterday. Low HGB 7.0. Sent in by PCP) and Dizziness    Patient has a past medical history of HTN, HLD, CHF, s/p TAVR, CAD, CKD, s/p ICD, on Xarelto; prior GIB in May 2020. He presents today for evaluation of fatigue, lightheadedness, and low hb on outpatient labs. Symptoms present for the past several days, have been persistent, and moderate in severity; patient states he has had some dark stools but does note that he takes iron supplements and is unsure if it is due to that. Denies any bright red blood in his stools. Patient states the lightheadedness is worse with exertion. Denies any other specific aggravating factors or alleviating factors. Denies any fevers, neck pain or stiffness, cough or sore throat, chest pain or palpitations, shortness of breath, abdominal pain, nausea or vomiting, diarrhea, urinary symptoms, numbness or tingling anywhere, lower extremity edema or tenderness. Review of Systems   Constitutional: Positive for fatigue. Negative for chills and fever. HENT: Negative for rhinorrhea and sore throat. Eyes: Negative for visual disturbance. Respiratory: Negative for cough and shortness of breath. Cardiovascular: Negative for chest pain and palpitations. Gastrointestinal: Negative for abdominal pain, diarrhea, nausea and vomiting. Dark stools     Genitourinary: Negative for dysuria and frequency. Musculoskeletal: Negative for back pain and myalgias.    Skin: Negative for rash and wound.   Neurological: Positive for light-headedness. Negative for weakness and headaches. Psychiatric/Behavioral: Negative for confusion. All other systems reviewed and are negative. Physical Exam  Vitals and nursing note reviewed. Constitutional:       General: He is not in acute distress. Appearance: He is not toxic-appearing. HENT:      Head: Normocephalic and atraumatic. Right Ear: External ear normal.      Left Ear: External ear normal.      Nose: Nose normal. No rhinorrhea. Mouth/Throat:      Mouth: Mucous membranes are moist.      Pharynx: Oropharynx is clear. Eyes:      Extraocular Movements: Extraocular movements intact. Pupils: Pupils are equal, round, and reactive to light. Comments: Conjunctival pallor     Cardiovascular:      Rate and Rhythm: Normal rate and regular rhythm. Pulses: Normal pulses. Heart sounds: Normal heart sounds. Pulmonary:      Effort: Pulmonary effort is normal. No respiratory distress. Breath sounds: Normal breath sounds. No wheezing or rales. Abdominal:      General: Bowel sounds are normal.      Palpations: Abdomen is soft. Tenderness: There is no abdominal tenderness. There is no guarding or rebound. Genitourinary:     Rectum: Guaiac result positive. Musculoskeletal:         General: No tenderness. Normal range of motion. Cervical back: Normal range of motion and neck supple. Right lower leg: No edema. Left lower leg: No edema. Skin:     General: Skin is warm and dry. Capillary Refill: Capillary refill takes 2 to 3 seconds. Coloration: Skin is not jaundiced or pale. Findings: No rash. Neurological:      General: No focal deficit present. Mental Status: He is alert and oriented to person, place, and time. Cranial Nerves: No cranial nerve deficit. Sensory: No sensory deficit. Motor: No weakness.    Psychiatric:         Mood and Affect: Mood normal. Behavior: Behavior normal.          Procedures       [facility]     ED Course as of 03/12/22 2221   Sat Mar 12, 2022   1336 Rectal exam performed, stool appears dark brown and is hemoccult positive. [VG]   1337 Hb 6.1 [VG]   1406 1 unit PRBC ordered to transfuse [VG]   65 Spoke with Dr. Chante Rojas, discussed case,  [VG]      ED Course User Index  [VG] Viki Davis        [facility]    This is a 82 y.o. male presenting for evaluation of fatigue, lightheadedness, low hb, dark stool. On arrival patient is alert and oriented, in no acute distress. Vitals are stable. Abdomen soft and non-tender. Rectal exam performed, stool dark brown in color and hemoccult positive. No gross or bright red blood noted. Patient given IV protonix bolus. Labs reviewed, hb 6.1; 1U PRBCs ordered to transfuse. Patient hemodynamically stable and without sign of profuse bleeding, do not feel reversal of Xarelto is required at this time. Decision made to admit patient for symptomatic anemia, GIB, anemia requiring transfusion. Discussed results and plan with patient, he voiced understanding and is amenable. Spoke with admitting provider, patient is accepted. EKG reviewed and interpreted by me:  Atrial-paced rhythm; LAD, RBBB; no ST elevations; poor quality/wandering baseline EKG. Generally unchanged when compared to previous EKG. Vent. rate 70 BPM  AZ interval 270 ms  QRS duration 140 ms  QT/QTc 448/483 ms      I have discussed this patient with my attending, who has seen the patient and agrees with this disposition.     Patient was seen and evaluated by myself and my attending Jozef Olivarez MD. Assessment and Plan discussed with attending provider, please see attestation for final plan of care.         --------------------------------------------- PAST HISTORY ---------------------------------------------  Past Medical History:  has a past medical history of A-fib [facility]), Arrhythmia, Carotid artery stenosis, CHF (congestive heart failure) ([facility] Heart valve problem, Hyperlipidemia, Hypertension, ICD (implantable cardiac defibrillator) in place, MI (mitral incompetence), and MI (myocardial infarction) ([facility] [address] [facility] 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 2008); Cardiac defibrillator placement (07/28/2016); Cardiac catheterization (Right, 03/03/2017); other surgical history (03/29/2017); Upper gastrointestinal endoscopy (N/A, 9/3/2019); Upper gastrointestinal endoscopy (N/A, 2/18/2020); Colonoscopy (N/A, 2/24/2020); Cardioversion (10/29/2020); and Cardiac surgery (N/A, 9/4/2021). Social History:  reports that he quit smoking about 49 years ago. He has a 1.50 pack-year smoking history. He has never used smokeless tobacco. He reports that he does not drink alcohol and does not use drugs. Family History: family history is not on file. The patients home medications have been reviewed. Allergies: Patient has no known allergies.     -------------------------------------------------- RESULTS -------------------------------------------------    LABS:  Results for orders placed or performed during the hospital encounter of 03/12/22   CBC with Auto Differential   Result Value Ref Range    WBC 8.7 4.5 - 11.5 E9/L    RBC 1.94 (L) 3.80 - 5.80 E12/L    Hemoglobin 6.1 (LL) 12.5 - 16.5 g/dL    Hematocrit 19.4 (L) 37.0 - 54.0 %    .0 (H) 80.0 - 99.9 fL    MCH 31.4 26.0 - 35.0 pg    MCHC 31.4 (L) 32.0 - 34.5 %    RDW 17.3 (H) 11.5 - 15.0 fL    Platelets 899 817 - 315 E9/L    MPV 10.7 7.0 - 12.0 fL    Neutrophils % 70.7 43.0 - 80.0 %    Immature Granulocytes % 0.6 0.0 - 5.0 %    Lymphocytes % 17.6 (L) 20.0 - 42.0 %    Monocytes % 9.3 2.0 - 12.0 %    Eosinophils % 1.5 0.0 - 6.0 %    Basophils % 0.3 0.0 - 2.0 %    Neutrophils Absolute 6.16 1.80 - 7.30 E9/L    Immature Granulocytes # 0.05 E9/L    Lymphocytes Absolute 1.53 1.50 - 4.00 E9/L    Monocytes Absolute 0.81 0.10 - 0.95 E9/L    Eosinophils Absolute 0.13 0.05 - 0.50 E9/L    Basophils Absolute 0.03 0.00 - 0.20 E9/L   Comprehensive Metabolic Panel w/ Reflex to MG   Result Value Ref Range    Sodium 135 132 - 146 mmol/L    Potassium reflex Magnesium 4.3 3.5 - 5.0 mmol/L    Chloride 98 98 - 107 mmol/L    CO2 26 22 - 29 mmol/L    Anion Gap 11 7 - 16 mmol/L    Glucose 129 (H) 74 - 99 mg/dL    BUN 39 (H) 6 - 23 mg/dL    CREATININE 1.2 0.7 - 1.2 mg/dL    GFR Non-African American 58 >=60 mL/min/1.73    GFR African American >60     Calcium 8.1 (L) 8.6 - 10.2 mg/dL    Total Protein 5.7 (L) 6.4 - 8.3 g/dL    Albumin 3.8 3.5 - 5.2 g/dL    Total Bilirubin 0.2 0.0 - 1.2 mg/dL    Alkaline Phosphatase 102 40 - 129 U/L    ALT 10 0 - 40 U/L    AST 17 0 - 39 U/L   Troponin   Result Value Ref Range    Troponin, High Sensitivity 23 (H) 0 - 11 ng/L   Brain Natriuretic Peptide   Result Value Ref Range    Pro- (H) 0 - 450 pg/mL   Protime-INR   Result Value Ref Range    Protime 15.5 (H) 9.3 - 12.4 sec    INR 1.4    APTT   Result Value Ref Range    aPTT 34.6 24.5 - 35.1 sec   Urinalysis with Microscopic   Result Value Ref Range    Color, UA Yellow Straw/Yellow    Clarity, UA Clear Clear    Glucose, Ur Negative Negative mg/dL    Bilirubin Urine Negative Negative    Ketones, Urine Negative Negative mg/dL    Specific Gravity, UA 1.010 1.005 - 1.030    Blood, Urine Negative Negative    pH, UA 5.0 5.0 - 9.0    Protein, UA Negative Negative mg/dL    Urobilinogen, Urine 0.2 <2.0 E.U./dL    Nitrite, Urine Negative Negative    Leukocyte Esterase, Urine Negative Negative    WBC, UA NONE 0 - 5 /HPF    RBC, UA NONE 0 - 2 /HPF    Bacteria, UA NONE SEEN None Seen /HPF   Troponin   Result Value Ref Range    Troponin, High Sensitivity 22 (H) 0 - 11 ng/L   Hemoglobin and Hematocrit Result Value Ref Range    Hemoglobin 7.1 (L) 12.5 - 16.5 g/dL    Hematocrit 22.7 (L) 37.0 - 54.0 %   EKG 12 Lead   Result Value Ref Range    Ventricular Rate 70 BPM    Atrial Rate 45 BPM    P-R Interval 270 ms    QRS Duration 140 ms    Q-T Interval 448 ms    QTc Calculation (Bazett) 483 ms    P Axis -59 degrees    R Axis -31 degrees    T Axis 27 degrees   TYPE AND SCREEN   Result Value Ref Range    ABO/Rh O POS     Antibody Screen NEG    PREPARE RBC (CROSSMATCH), 1 Units   Result Value Ref Range    Product Code Blood Bank [id number]     Description Blood Bank Red Blood Cells, Leuko-reduced     Unit Number [id number]     Dispense Status Blood Bank issued        RADIOLOGY:  XR CHEST PORTABLE   Final Result   1. Chronic subsegmental atelectasis or scarring in left lung base. 2. No focal pneumonia or pleural effusion.               ------------------------- NURSING NOTES AND VITALS REVIEWED ---------------------------  Date / Time Roomed:  3/12/2022 12:46 PM  ED Bed Assignment:  [id number]    The nursing notes within the ED encounter and vital signs as below have been reviewed. Patient Vitals for the past 24 hrs:   BP Temp Temp src Pulse Resp SpO2 Height Weight   03/12/22 1919 127/64 97.8 °F (36.6 °C) Oral 70 18 98 % -- --   03/12/22 1826 (!) 131/58 98.1 °F (36.7 °C) -- 69 16 99 % -- --   03/12/22 1550 (!) 120/52 98.1 °F (36.7 °C) Oral 69 16 94 % -- --   03/12/22 1525 (!) 124/57 98.2 °F (36.8 °C) Oral 61 16 96 % -- --   03/12/22 1245 127/60 97 °F (36.1 °C) -- 73 15 99 % 5' 8\" (1.727 m) 171 lb (77.6 kg)       Oxygen Saturation Interpretation: Normal    ------------------------------------------ PROGRESS NOTES ------------------------------------------  Re-evaluation(s):  Please see ED course    Counseling:  I have spoken with the patient and discussed todays results, in addition to providing specific details for the plan of care and counseling regarding the diagnosis and prognosis.   Their questions are answered at this time and they are agreeable with the plan of admission.    --------------------------------- ADDITIONAL PROVIDER NOTES ---------------------------------  Consultations:  Please see ED course    This patient's ED course included: a personal history and physicial examination, re-evaluation prior to disposition, multiple bedside re-evaluations, IV medications, cardiac monitoring, continuous pulse oximetry and complex medical decision making and emergency management    This patient has remained hemodynamically stable during their ED course. Diagnosis:  1. Symptomatic anemia    2. Gastrointestinal hemorrhage, unspecified gastrointestinal hemorrhage type    3. Anemia requiring transfusions        Disposition:  Patient's disposition: Admit to telemetry  Patient's condition is serious.        Deepa Leger DO  Resident  03/13/22 [id number]

## 2022-03-12 NOTE — ED NOTES
Answered phone call from lab, Hgb 6.1, relayed to Dr Tammy Childs and resident.       Bettie Bronson, RN  03/12/22 9722

## 2022-03-12 NOTE — H&P
[facility] History and Physical      CHIEF COMPLAINT: GI bleed    History of Present Illness: This is a pleasant 61-year-old male who presents to [facility] ([facility] chief complaint anemia. Patient states that he had a recent cardiology visit and had laboratory values, hemoglobin. Patient is on chronic systemic anticoagulation Xarelto. Denies abdominal pain, nausea, vomiting. Stool guaiac was positive. Patient states that he had a previous \" bleeding ulcer\" many years ago. Patient is otherwise in his usual baseline health. Does endorse fatigue, dizziness. Initial hemoglobin 6.1, he is currently undergoing transfusion. Past medical history significant for congestive heart failure with preserved ejection fraction, coronary artery disease status post bypass, history of ventricular tachycardia, paroxysmal atrial fibrillation, severe aortic stenosis status post TAVR (2017), benign essential hypertension, chronic kidney disease, hyperlipidemia, benign prostatic hypertrophy, anemia.     Informant(s) for H&P: Patient    REVIEW OF SYSTEMS:  A comprehensive review of systems was negative except for: what is in the HPI      PMH:  Past Medical History:   Diagnosis Date    A-fib [facility])     Arrhythmia     Carotid artery stenosis     [facility]congestive heart failure) ([facility] [address] [facility]     Heart valve problem     Hyperlipidemia     Hypertension     ICD (implantable cardiac defibrillator) in place 2007    MI (mitral incompetence) 2003    MI (myocardial infarction) ([facility] [address] 2003       Surgical History:  Past Surgical History:   Procedure Laterality Date    CARDIAC CATHETERIZATION Right 03/03/2017    Dr. Collin Carnes  2007. 2008 2007 78 shocks replaced 2008 [employer]     CARDIAC DEFIBRILLATOR PLACEMENT  07/28/2016    [employer] Dual ICD gen change    CARDIAC SURGERY N/A 9/4/2021    cardioversion performed by Jae Stewart MD at [address] [place] [address] 10/29/2020    Successful    (Dr. Neela Garcia)    COLONOSCOPY N/A 2/24/2020    COLONOSCOPY DIAGNOSTIC performed by Zena Celis MD at [address]  05/23/2003 03/16/2007    DIAGNOSTIC CARDIAC CATH LAB PROCEDURE  2007    DIAGNOSTIC CARDIAC CATH LAB PROCEDURE  03/29/2008    stent    JOINT REPLACEMENT  2011    r hip surgery Dr Bishop Carlos  03/29/2017    Dr. Chelsy Mcdaniel and Dr. Vu Messina- TAVR Josie 26mm valve    UPPER GASTROINTESTINAL ENDOSCOPY N/A 9/3/2019    EGD ESOPHAGOGASTRODUODENOSCOPY performed by Phyllis Blevins MD at [facility] N/A 2/18/2020    EGD ESOPHAGOGASTRODUODENOSCOPY performed by Zena Celis MD at [address]       Medications Prior to Admission:    Prior to Admission medications    Medication Sig Start Date End Date Taking?  Authorizing Provider   Multiple Vitamin (MULTIVITAMIN ADULT PO) Take by mouth    Historical Provider, MD   meclizine (ANTIVERT) 25 MG tablet Take 25 mg by mouth daily as needed    Historical Provider, MD   furosemide (LASIX) 40 MG tablet Take 1 tablet by mouth daily Alternate 40 mg and 20 mg dosing every other day 3/11/22 4/10/22  Kia Hercules APRN - CNP   rivaroxaban (XARELTO) 20 MG TABS tablet Take 1 tablet by mouth daily 3/9/22   Josefina Heredia,    amLODIPine (NORVASC) 2.5 MG tablet Take 1 tablet by mouth daily 2/24/22   Josefina Heredia, DO   metoprolol tartrate (LOPRESSOR) 50 MG tablet TAKE 1 AND 1/2 TABLETS BY  MOUTH IN THE MORNING AND 1  TABLET BY MOUTH AT BEDTIME 2/16/22   Una Azevedo MD   pravastatin (PRAVACHOL) 40 MG tablet TAKE 1 TABLET BY MOUTH  DAILY 2/16/22   Una Azevedo MD   potassium chloride (KLOR-CON M) 10 MEQ extended release tablet TAKE 1 TABLET BY MOUTH  DAILY 1/24/22   Una Azevedo MD   pantoprazole (PROTONIX) 40 MG tablet TAKE 1 TABLET BY MOUTH  DAILY 12/15/21   Una Azevedo MD   Handney Winters MISC by Does not apply route Disp #: 1  Duration: 5 years. 11/16/20   Elaine Penaloza MD   ferrous sulfate (IRON 325) 325 (65 Fe) MG tablet Take 1 tablet by mouth daily (with breakfast) 11/5/20   Elaine Penaloza MD   MAGNESIUM-OXIDE 400 (241.3 Mg) MG TABS tablet TK 1 T PO ONCE D 3/18/20   Historical Provider, MD   dofetilide (TIKOSYN) 250 MCG capsule Take 1 capsule by mouth 2 times daily 3/5/20   Elaine Penaloza MD   Multiple Vitamins-Minerals (PRESERVISION AREDS 2 PO) Take 1 tablet by mouth 2 times daily    Historical Provider, MD   Cholecalciferol (VITAMIN D) 2000 UNITS CAPS capsule Take 1 capsule by mouth daily     Historical Provider, MD       Allergies:    Patient has no known allergies. Social History:    reports that he quit smoking about 49 years ago. He has a 1.50 pack-year smoking history. He has never used smokeless tobacco. He reports that he does not drink alcohol and does not use drugs. Family History:   No significant family history related to GI bleed.     PHYSICAL EXAM:  Vitals:  BP (!) 120/52   Pulse 69   Temp 98.1 °F (36.7 °C) (Oral)   Resp 16   Ht 5' 8\" (1.727 m)   Wt 171 lb (77.6 kg)   SpO2 94%   BMI 26.00 kg/m²     General Appearance: alert and oriented to person, place and time and in no acute distress  Skin: warm and dry  Head: normocephalic and atraumatic  Eyes: pupils equal, round, and reactive to light, extraocular eye movements intact, conjunctivae normal  Neck: neck supple and non tender without mass   Pulmonary/Chest: clear to auscultation bilaterally  Cardiovascular: Irregular , normal S1 and S2   Abdomen: soft, non-tender, non-distended, normal bowel sounds  Extremities: no cyanosis, no clubbing and no edema  Neurologic: no cranial nerve deficit and speech normal    LABS:  Recent Labs     03/11/22  1359 03/12/22  1318    135   K 5.1* 4.3   CL 99 98   CO2 27 26   BUN 37* 39*   CREATININE 1.3* 1.2   GLUCOSE 173* 129*   CALCIUM 8.7 8.1*       Recent Labs 03/11/22  1359 03/12/22  1318   WBC 10.0 8.7   RBC 2.28* 1.94*   HGB 7.0* 6.1*   HCT 23.7* 19.4*   .9* 100.0*   MCH 30.7 31.4   MCHC 29.5* 31.4*   RDW 17.2* 17.3*    263   MPV 11.5 10.7       No results for input(s): POCGLU in the last 72 hours. Radiology:   XR CHEST PORTABLE   Final Result   1. Chronic subsegmental atelectasis or scarring in left lung base. 2. No focal pneumonia or pleural effusion. ASSESSMENT:      Principal Problem:  Symptomatic anemia  Active Problems:  GI bleed  History of congestive heart failure with preserved ejection fraction  History of coronary artery disease  History of ventricular tachycardia  Severe aortic stenosis status post TAVR  History of benign essential hypertension  History of chronic kidney disease  History of hyperlipidemia  History of BPH    PLAN:    1. Admission. Please consult with gastroenterology. Packed red blood cell transfusion has been ordered by emergency room. Serial hemoglobin. Discontinue systemic anticoagulation. Hold all antiplatelets. Clear liquid diet until gastroenterology evaluation. PPI given history of unspecified ulcer many years ago. Will discuss case with gastroenterology. Up with assist only. 2.  Resume home medications except for systemic anticoagulation and antiplatelets. Disposition; greater than 2 midnight stay expected. Further work-up for GI bleed. NOTE: This report was transcribed using voice recognition software. Every effort was made to ensure accuracy; however, inadvertent computerized transcription errors may be present.   Electronically signed by Юлия Squires MD on 3/12/2022 at 6:04 PM

## 2022-03-13 ENCOUNTER — APPOINTMENT (OUTPATIENT)
Dept: NUCLEAR MEDICINE | Age: 87
DRG: 378 | End: 2022-03-13
Payer: MEDICARE

## 2022-03-13 LAB
APTT: 30.6 SEC (ref 24.5–35.1)
BLOOD BANK DISPENSE STATUS: NORMAL
BLOOD BANK PRODUCT CODE: NORMAL
BPU ID: NORMAL
DESCRIPTION BLOOD BANK: NORMAL
EKG ATRIAL RATE: 45 BPM
EKG P AXIS: -59 DEGREES
EKG P-R INTERVAL: 270 MS
EKG Q-T INTERVAL: 448 MS
EKG QRS DURATION: 140 MS
EKG QTC CALCULATION (BAZETT): 483 MS
EKG R AXIS: -31 DEGREES
EKG T AXIS: 27 DEGREES
EKG VENTRICULAR RATE: 70 BPM
HCT VFR BLD CALC: 21.3 % (ref 37–54)
HCT VFR BLD CALC: 23.6 % (ref 37–54)
HCT VFR BLD CALC: 25.4 % (ref 37–54)
HEMOGLOBIN: 6.7 G/DL (ref 12.5–16.5)
HEMOGLOBIN: 7.5 G/DL (ref 12.5–16.5)
HEMOGLOBIN: 8.1 G/DL (ref 12.5–16.5)
INR BLD: 1.3
IRON SATURATION: 11 % (ref 20–55)
IRON: 35 MCG/DL (ref 59–158)
PROTHROMBIN TIME: 14 SEC (ref 9.3–12.4)
TOTAL IRON BINDING CAPACITY: 333 MCG/DL (ref 250–450)

## 2022-03-13 PROCEDURE — 99232 SBSQ HOSP IP/OBS MODERATE 35: CPT | Performed by: INTERNAL MEDICINE

## 2022-03-13 PROCEDURE — A9560 TC99M LABELED RBC: HCPCS | Performed by: RADIOLOGY

## 2022-03-13 PROCEDURE — 83550 IRON BINDING TEST: CPT

## 2022-03-13 PROCEDURE — 85730 THROMBOPLASTIN TIME PARTIAL: CPT

## 2022-03-13 PROCEDURE — 6360000002 HC RX W HCPCS: Performed by: INTERNAL MEDICINE

## 2022-03-13 PROCEDURE — 85018 HEMOGLOBIN: CPT

## 2022-03-13 PROCEDURE — 85610 PROTHROMBIN TIME: CPT

## 2022-03-13 PROCEDURE — 2580000003 HC RX 258: Performed by: INTERNAL MEDICINE

## 2022-03-13 PROCEDURE — 85014 HEMATOCRIT: CPT

## 2022-03-13 PROCEDURE — 36415 COLL VENOUS BLD VENIPUNCTURE: CPT

## 2022-03-13 PROCEDURE — 93010 ELECTROCARDIOGRAM REPORT: CPT | Performed by: INTERNAL MEDICINE

## 2022-03-13 PROCEDURE — 83540 ASSAY OF IRON: CPT

## 2022-03-13 PROCEDURE — 3430000000 HC RX DIAGNOSTIC RADIOPHARMACEUTICAL: Performed by: RADIOLOGY

## 2022-03-13 PROCEDURE — 6370000000 HC RX 637 (ALT 250 FOR IP): Performed by: INTERNAL MEDICINE

## 2022-03-13 PROCEDURE — C9113 INJ PANTOPRAZOLE SODIUM, VIA: HCPCS | Performed by: INTERNAL MEDICINE

## 2022-03-13 PROCEDURE — 1200000000 HC SEMI PRIVATE

## 2022-03-13 PROCEDURE — 78278 ACUTE GI BLOOD LOSS IMAGING: CPT

## 2022-03-13 RX ORDER — AMLODIPINE BESYLATE 2.5 MG/1
2.5 TABLET ORAL NIGHTLY
Status: DISCONTINUED | OUTPATIENT
Start: 2022-03-13 | End: 2022-03-17 | Stop reason: HOSPADM

## 2022-03-13 RX ORDER — SODIUM CHLORIDE 9 MG/ML
INJECTION, SOLUTION INTRAVENOUS PRN
Status: DISCONTINUED | OUTPATIENT
Start: 2022-03-13 | End: 2022-03-16 | Stop reason: SDUPTHER

## 2022-03-13 RX ADMIN — FERROUS SULFATE TAB 325 MG (65 MG ELEMENTAL FE) 325 MG: 325 (65 FE) TAB at 08:48

## 2022-03-13 RX ADMIN — SODIUM CHLORIDE, PRESERVATIVE FREE 10 ML: 5 INJECTION INTRAVENOUS at 08:49

## 2022-03-13 RX ADMIN — Medication 10 ML: at 20:12

## 2022-03-13 RX ADMIN — Medication 2000 UNITS: at 08:48

## 2022-03-13 RX ADMIN — DOFETILIDE 250 MCG: 0.25 CAPSULE ORAL at 08:48

## 2022-03-13 RX ADMIN — Medication 20 MILLICURIE: at 11:19

## 2022-03-13 RX ADMIN — METOPROLOL TARTRATE 75 MG: 50 TABLET, FILM COATED ORAL at 08:48

## 2022-03-13 RX ADMIN — METOPROLOL TARTRATE 75 MG: 50 TABLET, FILM COATED ORAL at 20:12

## 2022-03-13 RX ADMIN — DOFETILIDE 250 MCG: 0.25 CAPSULE ORAL at 20:12

## 2022-03-13 RX ADMIN — PANTOPRAZOLE SODIUM 40 MG: 40 INJECTION, POWDER, FOR SOLUTION INTRAVENOUS at 08:48

## 2022-03-13 RX ADMIN — AMLODIPINE BESYLATE 2.5 MG: 2.5 TABLET ORAL at 20:12

## 2022-03-13 ASSESSMENT — ENCOUNTER SYMPTOMS
DIARRHEA: 0
COUGH: 0
RHINORRHEA: 0
BACK PAIN: 0
SHORTNESS OF BREATH: 0
ABDOMINAL PAIN: 0
VOMITING: 0
SORE THROAT: 0
NAUSEA: 0

## 2022-03-13 ASSESSMENT — PAIN SCALES - GENERAL
PAINLEVEL_OUTOF10: 0

## 2022-03-13 NOTE — PROGRESS NOTES
[facility] Progress Note    Admitting Date and Time: 3/12/2022 12:46 PM  Admit Dx: GI bleed [K92.2]  Anemia requiring transfusions [D64.9]  Symptomatic anemia [D64.9]  Gastrointestinal hemorrhage, unspecified gastrointestinal hemorrhage type [K92.2]    Subjective:  Patient is being followed for GI bleed [K92.2]  Anemia requiring transfusions [D64.9]  Symptomatic anemia [D64.9]  Gastrointestinal hemorrhage, unspecified gastrointestinal hemorrhage type [K92.2]     Patient seen and examined 3/13/2022. No acute events overnight. Hemoglobin again low this morning, being transfused packed red blood cells. Denies abdominal pain, nausea, vomiting.      amLODIPine  2.5 mg Oral Nightly    vitamin D  2,000 Units Oral Daily    dofetilide  250 mcg Oral BID    ferrous sulfate  325 mg Oral Daily with breakfast    metoprolol tartrate  75 mg Oral BID    sodium chloride flush  5-40 mL IntraVENous 2 times per day    pantoprazole  40 mg IntraVENous Daily    And    sodium chloride (PF)  10 mL IntraVENous Daily     sodium chloride, , PRN  sodium chloride, , PRN  meclizine, 25 mg, BID PRN  sodium chloride flush, 5-40 mL, PRN  sodium chloride, 25 mL, PRN  ondansetron, 4 mg, Q8H PRN   Or  ondansetron, 4 mg, Q6H PRN  acetaminophen, 650 mg, Q6H PRN   Or  acetaminophen, 650 mg, Q6H PRN         Objective:    /77   Pulse 69   Temp 98.1 °F (36.7 °C) (Oral)   Resp 18   Ht 5' 8\" (1.727 m)   Wt 171 lb (77.6 kg)   SpO2 95%   BMI 26.00 kg/m²     General Appearance: alert and oriented to person, place and time and in no acute distress  Pulmonary/Chest: clear to auscultation bilaterally  Cardiovascular: Irregular, normal S1 and S2   Abdomen: soft, non-tender, non-distended, normal bowel sounds  Extremities: no cyanosis, no clubbing and no edema  Neurologic: no cranial nerve deficit and speech normal    Recent Labs     03/11/22  1359 03/12/22  1318    135   K 5.1* 4.3   CL 99 98   CO2 27 26   BUN 37* 39* CREATININE 1.3* 1.2   GLUCOSE 173* 129*   CALCIUM 8.7 8.1*       Recent Labs     03/11/22  1359 03/11/22  1359 03/12/22  1318 03/12/22  2200 03/13/22  0420   WBC 10.0  --  8.7  --   --    RBC 2.28*  --  1.94*  --   --    HGB 7.0*   < > 6.1* 7.1* 6.7*   HCT 23.7*   < > 19.4* 22.7* 21.3*   .9*  --  100.0*  --   --    MCH 30.7  --  31.4  --   --    MCHC 29.5*  --  31.4*  --   --    RDW 17.2*  --  17.3*  --   --      --  263  --   --    MPV 11.5  --  10.7  --   --     < > = values in this interval not displayed. This is a pleasant 72-year-old male who presented to [facility] with a chief complaint of anemia. Patient was sent to hospital by outpatient provider. Stool guaiac positive. Since has undergone 2 unit packed red blood cell transfusion. Remote history of endoscopy secondary to unspecified ulcers. Endorsed fatigue and dizziness. Gastroenterology was consulted for evaluation and possible endoscopy/colonoscopy. Assessment:  Principal Problem:  Symptomatic anemia  Active Problems:  GI bleed  History of congestive heart failure with preserved ejection fraction  History of coronary artery disease  History of ventricular tachycardia  Severe aortic stenosis status post TAVR  History of benign essential hypertension  History of chronic kidney disease  History of hyperlipidemia  History of BPH      Plan:  1. Gastroenterology following. Maintain IV hydration. Serial hemoglobin. Transfuse as indicated for hemoglobin less than 7.0. Previous systemic anticoagulation has been discontinued. Holding all antiplatelets. PPI. Further work-up to continue. Anticipate endoscopy//C-scope. 2.  Monitor electrolytes for history of ventricular tachycardia, magnesium greater than 2.0 potassium greater than 4.0.      NOTE: This report was transcribed using voice recognition software.  Every effort was made to ensure accuracy; however, inadvertent computerized transcription errors may be present.   Electronically signed by Courtney Lopez MD on 3/13/2022 at 10:16 AM

## 2022-03-13 NOTE — PROGRESS NOTES
Pt's one unit blood transfuse started at 0607. Monitored pt for 15 min , no reaction ,no distress this time. Continue to monitor pt's condition.

## 2022-03-13 NOTE — PROGRESS NOTES
Pt's lab result show hgb 6.7 at 0420. Notified Dr. Anay Oliveira at 2171 in 6 W and get new order for treatment.

## 2022-03-14 ENCOUNTER — ANESTHESIA (OUTPATIENT)
Dept: ENDOSCOPY | Age: 87
DRG: 378 | End: 2022-03-14
Payer: MEDICARE

## 2022-03-14 ENCOUNTER — ANESTHESIA EVENT (OUTPATIENT)
Dept: ENDOSCOPY | Age: 87
DRG: 378 | End: 2022-03-14
Payer: MEDICARE

## 2022-03-14 VITALS — SYSTOLIC BLOOD PRESSURE: 125 MMHG | OXYGEN SATURATION: 98 % | DIASTOLIC BLOOD PRESSURE: 62 MMHG

## 2022-03-14 LAB
ALBUMIN SERPL-MCNC: 3.3 G/DL (ref 3.5–5.2)
ALP BLD-CCNC: 90 U/L (ref 40–129)
ALT SERPL-CCNC: 8 U/L (ref 0–40)
ANION GAP SERPL CALCULATED.3IONS-SCNC: 8 MMOL/L (ref 7–16)
AST SERPL-CCNC: 17 U/L (ref 0–39)
BASOPHILS ABSOLUTE: 0.05 E9/L (ref 0–0.2)
BASOPHILS RELATIVE PERCENT: 0.7 % (ref 0–2)
BILIRUB SERPL-MCNC: 0.4 MG/DL (ref 0–1.2)
BUN BLDV-MCNC: 25 MG/DL (ref 6–23)
CALCIUM SERPL-MCNC: 8.2 MG/DL (ref 8.6–10.2)
CHLORIDE BLD-SCNC: 104 MMOL/L (ref 98–107)
CO2: 26 MMOL/L (ref 22–29)
CREAT SERPL-MCNC: 1.1 MG/DL (ref 0.7–1.2)
EOSINOPHILS ABSOLUTE: 0.22 E9/L (ref 0.05–0.5)
EOSINOPHILS RELATIVE PERCENT: 3 % (ref 0–6)
GFR AFRICAN AMERICAN: >60
GFR NON-AFRICAN AMERICAN: >60 ML/MIN/1.73
GLUCOSE BLD-MCNC: 100 MG/DL (ref 74–99)
HCT VFR BLD CALC: 23.2 % (ref 37–54)
HCT VFR BLD CALC: 24 % (ref 37–54)
HCT VFR BLD CALC: 24.6 % (ref 37–54)
HCT VFR BLD CALC: 26.1 % (ref 37–54)
HEMOGLOBIN: 7.3 G/DL (ref 12.5–16.5)
HEMOGLOBIN: 7.5 G/DL (ref 12.5–16.5)
HEMOGLOBIN: 7.6 G/DL (ref 12.5–16.5)
HEMOGLOBIN: 8 G/DL (ref 12.5–16.5)
IMMATURE GRANULOCYTES #: 0.04 E9/L
IMMATURE GRANULOCYTES %: 0.5 % (ref 0–5)
INR BLD: 1.3
LYMPHOCYTES ABSOLUTE: 1.65 E9/L (ref 1.5–4)
LYMPHOCYTES RELATIVE PERCENT: 22.5 % (ref 20–42)
MAGNESIUM: 2.2 MG/DL (ref 1.6–2.6)
MCH RBC QN AUTO: 30.6 PG (ref 26–35)
MCHC RBC AUTO-ENTMCNC: 31.3 % (ref 32–34.5)
MCV RBC AUTO: 98 FL (ref 80–99.9)
MONOCYTES ABSOLUTE: 0.72 E9/L (ref 0.1–0.95)
MONOCYTES RELATIVE PERCENT: 9.8 % (ref 2–12)
NEUTROPHILS ABSOLUTE: 4.64 E9/L (ref 1.8–7.3)
NEUTROPHILS RELATIVE PERCENT: 63.5 % (ref 43–80)
PDW BLD-RTO: 17.1 FL (ref 11.5–15)
PHOSPHORUS: 3.6 MG/DL (ref 2.5–4.5)
PLATELET # BLD: 237 E9/L (ref 130–450)
PMV BLD AUTO: 10.5 FL (ref 7–12)
POTASSIUM SERPL-SCNC: 4.3 MMOL/L (ref 3.5–5)
PROTHROMBIN TIME: 13.9 SEC (ref 9.3–12.4)
RBC # BLD: 2.45 E12/L (ref 3.8–5.8)
SODIUM BLD-SCNC: 138 MMOL/L (ref 132–146)
TOTAL PROTEIN: 5.2 G/DL (ref 6.4–8.3)
WBC # BLD: 7.3 E9/L (ref 4.5–11.5)

## 2022-03-14 PROCEDURE — 85018 HEMOGLOBIN: CPT

## 2022-03-14 PROCEDURE — 0W3P8ZZ CONTROL BLEEDING IN GASTROINTESTINAL TRACT, VIA NATURAL OR ARTIFICIAL OPENING ENDOSCOPIC: ICD-10-PCS | Performed by: INTERNAL MEDICINE

## 2022-03-14 PROCEDURE — 3609013000 HC EGD TRANSORAL CONTROL BLEEDING ANY METHOD: Performed by: INTERNAL MEDICINE

## 2022-03-14 PROCEDURE — 2580000003 HC RX 258: Performed by: INTERNAL MEDICINE

## 2022-03-14 PROCEDURE — 6370000000 HC RX 637 (ALT 250 FOR IP): Performed by: INTERNAL MEDICINE

## 2022-03-14 PROCEDURE — 1200000000 HC SEMI PRIVATE

## 2022-03-14 PROCEDURE — 83735 ASSAY OF MAGNESIUM: CPT

## 2022-03-14 PROCEDURE — 3700000001 HC ADD 15 MINUTES (ANESTHESIA): Performed by: INTERNAL MEDICINE

## 2022-03-14 PROCEDURE — 2500000003 HC RX 250 WO HCPCS: Performed by: NURSE ANESTHETIST, CERTIFIED REGISTERED

## 2022-03-14 PROCEDURE — 6360000002 HC RX W HCPCS: Performed by: INTERNAL MEDICINE

## 2022-03-14 PROCEDURE — 2720000010 HC SURG SUPPLY STERILE: Performed by: INTERNAL MEDICINE

## 2022-03-14 PROCEDURE — 99232 SBSQ HOSP IP/OBS MODERATE 35: CPT | Performed by: INTERNAL MEDICINE

## 2022-03-14 PROCEDURE — C9113 INJ PANTOPRAZOLE SODIUM, VIA: HCPCS | Performed by: INTERNAL MEDICINE

## 2022-03-14 PROCEDURE — 2580000003 HC RX 258: Performed by: NURSE ANESTHETIST, CERTIFIED REGISTERED

## 2022-03-14 PROCEDURE — 85014 HEMATOCRIT: CPT

## 2022-03-14 PROCEDURE — 6360000002 HC RX W HCPCS: Performed by: NURSE ANESTHETIST, CERTIFIED REGISTERED

## 2022-03-14 PROCEDURE — 7100000010 HC PHASE II RECOVERY - FIRST 15 MIN: Performed by: INTERNAL MEDICINE

## 2022-03-14 PROCEDURE — 85025 COMPLETE CBC W/AUTO DIFF WBC: CPT

## 2022-03-14 PROCEDURE — 36415 COLL VENOUS BLD VENIPUNCTURE: CPT

## 2022-03-14 PROCEDURE — 2709999900 HC NON-CHARGEABLE SUPPLY: Performed by: INTERNAL MEDICINE

## 2022-03-14 PROCEDURE — 7100000011 HC PHASE II RECOVERY - ADDTL 15 MIN: Performed by: INTERNAL MEDICINE

## 2022-03-14 PROCEDURE — 3700000000 HC ANESTHESIA ATTENDED CARE: Performed by: INTERNAL MEDICINE

## 2022-03-14 PROCEDURE — 84100 ASSAY OF PHOSPHORUS: CPT

## 2022-03-14 PROCEDURE — 80053 COMPREHEN METABOLIC PANEL: CPT

## 2022-03-14 PROCEDURE — 85610 PROTHROMBIN TIME: CPT

## 2022-03-14 RX ORDER — LIDOCAINE HYDROCHLORIDE 20 MG/ML
INJECTION, SOLUTION EPIDURAL; INFILTRATION; INTRACAUDAL; PERINEURAL PRN
Status: DISCONTINUED | OUTPATIENT
Start: 2022-03-14 | End: 2022-03-14 | Stop reason: SDUPTHER

## 2022-03-14 RX ORDER — GLYCOPYRROLATE 1 MG/5 ML
SYRINGE (ML) INTRAVENOUS PRN
Status: DISCONTINUED | OUTPATIENT
Start: 2022-03-14 | End: 2022-03-14 | Stop reason: SDUPTHER

## 2022-03-14 RX ORDER — SODIUM CHLORIDE 9 MG/ML
INJECTION, SOLUTION INTRAVENOUS CONTINUOUS PRN
Status: DISCONTINUED | OUTPATIENT
Start: 2022-03-14 | End: 2022-03-14 | Stop reason: SDUPTHER

## 2022-03-14 RX ORDER — PROPOFOL 10 MG/ML
INJECTION, EMULSION INTRAVENOUS PRN
Status: DISCONTINUED | OUTPATIENT
Start: 2022-03-14 | End: 2022-03-14 | Stop reason: SDUPTHER

## 2022-03-14 RX ADMIN — Medication 0.1 MG: at 14:25

## 2022-03-14 RX ADMIN — FERROUS SULFATE TAB 325 MG (65 MG ELEMENTAL FE) 325 MG: 325 (65 FE) TAB at 08:45

## 2022-03-14 RX ADMIN — PANTOPRAZOLE SODIUM 40 MG: 40 INJECTION, POWDER, FOR SOLUTION INTRAVENOUS at 08:45

## 2022-03-14 RX ADMIN — PROPOFOL 170 MG: 10 INJECTION, EMULSION INTRAVENOUS at 14:26

## 2022-03-14 RX ADMIN — Medication 2000 UNITS: at 08:45

## 2022-03-14 RX ADMIN — METOPROLOL TARTRATE 75 MG: 50 TABLET, FILM COATED ORAL at 21:04

## 2022-03-14 RX ADMIN — DOFETILIDE 250 MCG: 0.25 CAPSULE ORAL at 21:04

## 2022-03-14 RX ADMIN — METOPROLOL TARTRATE 75 MG: 50 TABLET, FILM COATED ORAL at 08:45

## 2022-03-14 RX ADMIN — LIDOCAINE HYDROCHLORIDE 50 MG: 20 INJECTION, SOLUTION EPIDURAL; INFILTRATION; INTRACAUDAL; PERINEURAL at 14:26

## 2022-03-14 RX ADMIN — SODIUM CHLORIDE: 9 INJECTION, SOLUTION INTRAVENOUS at 14:21

## 2022-03-14 RX ADMIN — DOFETILIDE 250 MCG: 0.25 CAPSULE ORAL at 08:45

## 2022-03-14 RX ADMIN — SODIUM CHLORIDE, PRESERVATIVE FREE 10 ML: 5 INJECTION INTRAVENOUS at 08:47

## 2022-03-14 RX ADMIN — AMLODIPINE BESYLATE 2.5 MG: 2.5 TABLET ORAL at 21:04

## 2022-03-14 RX ADMIN — Medication 10 ML: at 08:50

## 2022-03-14 RX ADMIN — Medication 10 ML: at 21:05

## 2022-03-14 ASSESSMENT — PAIN SCALES - GENERAL
PAINLEVEL_OUTOF10: 0

## 2022-03-14 ASSESSMENT — PAIN - FUNCTIONAL ASSESSMENT: PAIN_FUNCTIONAL_ASSESSMENT: 0-10

## 2022-03-14 NOTE — PLAN OF CARE
Problem: Falls - Risk of:  Goal: Will remain free from falls  Description: Will remain free from falls  3/14/2022 0052 by Shelby Cobian RN  Outcome: Ongoing  3/13/2022 1856 by Karo Jean Baptiste RN  Outcome: Ongoing  Goal: Absence of physical injury  Description: Absence of physical injury  3/14/2022 0052 by Shelby Cobian RN  Outcome: Ongoing  3/13/2022 1856 by Karo Jean Baptiste RN  Outcome: Ongoing     Problem: Bleeding:  Goal: Will show no signs and symptoms of excessive bleeding  Description: Will show no signs and symptoms of excessive bleeding  3/14/2022 0052 by Shelby Cobian RN  Outcome: Ongoing  3/13/2022 1856 by Karo Jean Baptiste RN  Outcome: Ongoing

## 2022-03-14 NOTE — PROGRESS NOTES
Patient for EGD today with Dr. Laura Lewis  Additional questions and concerns addressed  Labs/chart reviewed.   Consent signed  [id number] Audrey Begum to proceed    GINETTE Goodwin - CNP 3/14/2022 8:56 AM

## 2022-03-14 NOTE — PROGRESS NOTES
[facility] Progress Note    Admitting Date and Time: 3/12/2022 12:46 PM  Admit Dx: GI bleed [K92.2]  Anemia requiring transfusions [D64.9]  Symptomatic anemia [D64.9]  Gastrointestinal hemorrhage, unspecified gastrointestinal hemorrhage type [K92.2]    Subjective:  Patient is being followed for GI bleed [K92.2]  Anemia requiring transfusions [D64.9]  Symptomatic anemia [D64.9]  Gastrointestinal hemorrhage, unspecified gastrointestinal hemorrhage type [K92.2]     Patient seen and examined 3/14/2022. No acute events overnight. Plan is for EGD. Denies abdominal pain, nausea, vomiting.      amLODIPine  2.5 mg Oral Nightly    vitamin D  2,000 Units Oral Daily    dofetilide  250 mcg Oral BID    ferrous sulfate  325 mg Oral Daily with breakfast    metoprolol tartrate  75 mg Oral BID    sodium chloride flush  5-40 mL IntraVENous 2 times per day    pantoprazole  40 mg IntraVENous Daily    And    sodium chloride (PF)  10 mL IntraVENous Daily     sodium chloride, , PRN  sodium chloride, , PRN  meclizine, 25 mg, BID PRN  sodium chloride flush, 5-40 mL, PRN  sodium chloride, 25 mL, PRN  acetaminophen, 650 mg, Q6H PRN   Or  acetaminophen, 650 mg, Q6H PRN         Objective:    /60   Pulse 71   Temp 98.2 °F (36.8 °C) (Oral)   Resp 18   Ht 5' 8\" (1.727 m)   Wt 171 lb (77.6 kg)   SpO2 94%   BMI 26.00 kg/m²     General Appearance: alert and oriented to person, place and time and in no acute distress  Pulmonary/Chest: clear to auscultation bilaterally  Cardiovascular: Irregular, normal S1 and S2   Abdomen: soft, non-tender, non-distended, normal bowel sounds  Extremities: no cyanosis, no clubbing and no edema  Neurologic: no cranial nerve deficit and speech normal    Recent Labs     03/11/22  1359 03/12/22  1318 03/14/22  0508    135 138   K 5.1* 4.3 4.3   CL 99 98 104   CO2 27 26 26   BUN 37* 39* 25*   CREATININE 1.3* 1.2 1.1   GLUCOSE 173* 129* 100*   CALCIUM 8.7 8.1* 8.2* Recent Labs     03/11/22  1359 03/11/22  1359 03/12/22  1318 03/12/22  2200 03/13/22  1830 03/14/22  0015 03/14/22  0508   WBC 10.0  --  8.7  --   --   --  7.3   RBC 2.28*  --  1.94*  --   --   --  2.45*   HGB 7.0*   < > 6.1*   < > 7.5* 7.3* 7.5*   HCT 23.7*   < > 19.4*   < > 23.6* 23.2* 24.0*   .9*  --  100.0*  --   --   --  98.0   MCH 30.7  --  31.4  --   --   --  30.6   MCHC 29.5*  --  31.4*  --   --   --  31.3*   RDW 17.2*  --  17.3*  --   --   --  17.1*     --  263  --   --   --  237   MPV 11.5  --  10.7  --   --   --  10.5    < > = values in this interval not displayed. This is a pleasant 82-year-old male who presented to [facility] with a chief complaint of anemia. Patient was sent to hospital by outpatient provider. Stool guaiac positive. Since has undergone 2 unit packed red blood cell transfusion. Remote history of endoscopy secondary to unspecified ulcers. Endorsed fatigue and dizziness. Gastroenterology was consulted for evaluation and possible endoscopy/colonoscopy. Assessment:  Principal Problem:  Symptomatic anemia  Active Problems:  GI bleed  History of congestive heart failure with preserved ejection fraction  History of coronary artery disease  History of ventricular tachycardia  Severe aortic stenosis status post TAVR  History of benign essential hypertension  History of chronic kidney disease  History of hyperlipidemia  History of BPH      Plan:  1. Gastroenterology following. Maintain IV hydration. Serial hemoglobin. Transfuse as indicated for hemoglobin less than 7.0. Previous systemic anticoagulation has been discontinued. Holding all antiplatelets. PPI. EGD 3/14/2022. .  2.  Monitor electrolytes for history of ventricular tachycardia, magnesium greater than 2.0 potassium greater than 4.0.      NOTE: This report was transcribed using voice recognition software.  Every effort was made to ensure accuracy; however, inadvertent computerized transcription errors may be present.   Electronically signed by Tara Mead MD on 3/14/2022 at 11:43 AM

## 2022-03-14 NOTE — ANESTHESIA PRE PROCEDURE
Department of Anesthesiology  Preprocedure Note       Name:  Ayo Seth   Age:  82 y.o.  :  1935                                          MRN:  [id number]         Date:  3/14/2022      Surgeon: Marge Paris):  Gwenn Heimlich, MD    Procedure: Procedure(s):  EGD ESOPHAGOGASTRODUODENOSCOPY    Medications prior to admission:   Prior to Admission medications    Medication Sig Start Date End Date Taking? Authorizing Provider   amLODIPine (NORVASC) 2.5 MG tablet Take 1 tablet by mouth daily 22  Yes Mechelle Green DO   dofetilide (TIKOSYN) 250 MCG capsule Take 1 capsule by mouth 2 times daily 3/5/20  Yes Sixto Tyler MD   Multiple Vitamin (MULTIVITAMIN ADULT PO) Take by mouth    Historical Provider, MD   meclizine (ANTIVERT) 25 MG tablet Take 25 mg by mouth daily as needed    Historical Provider, MD   furosemide (LASIX) 40 MG tablet Take 1 tablet by mouth daily Alternate 40 mg and 20 mg dosing every other day 3/11/22 4/10/22  Lenora Blum APRN - CNP   rivaroxaban (XARELTO) 20 MG TABS tablet Take 1 tablet by mouth daily 3/9/22   Mechelle Green DO   metoprolol tartrate (LOPRESSOR) 50 MG tablet TAKE 1 AND 1/2 TABLETS BY  MOUTH IN THE MORNING AND 1  TABLET BY MOUTH AT BEDTIME 22   Sixto Tyler MD   pravastatin (PRAVACHOL) 40 MG tablet TAKE 1 TABLET BY MOUTH  DAILY 22   Sixto Tyler MD   potassium chloride (KLOR-CON M) 10 MEQ extended release tablet TAKE 1 TABLET BY MOUTH  DAILY 22   Sixto Tyler MD   pantoprazole (PROTONIX) 40 MG tablet TAKE 1 TABLET BY MOUTH  DAILY 12/15/21   Sixto Tyler MD   Handicap Placard MISC by Does not apply route Disp #: 1  Duration: 5 years.  20   Sixto Tyler MD   ferrous sulfate (IRON 325) 325 (65 Fe) MG tablet Take 1 tablet by mouth daily (with breakfast) 20   Sixto Tyler MD   MAGNESIUM-OXIDE 400 (241.3 Mg) MG TABS tablet TK 1 T PO ONCE D 3/18/20   Historical Provider, MD   Multiple Vitamins-Minerals (PRESERVISION AREDS 2 PO) Take 1 tablet by mouth 2 times daily    Historical Provider, MD   Cholecalciferol (VITAMIN D) 2000 UNITS CAPS capsule Take 1 capsule by mouth daily     Historical Provider, MD       Current medications:    Current Facility-Administered Medications   Medication Dose Route Frequency Provider Last Rate Last Admin    0.9 % sodium chloride infusion   IntraVENous PRN Kisha Daniels MD        amLODIPine (NORVASC) tablet 2.5 mg  2.5 mg Oral Nightly Kristan Boxer, MD   2.5 mg at 03/13/22 2012    0.9 % sodium chloride infusion   IntraVENous PRN Kristan Boxer, MD        vitamin D (CHOLECALCIFEROL) tablet 2,000 Units  2,000 Units Oral Daily Kristan Boxer, MD   2,000 Units at 03/14/22 0845    dofetilide (TIKOSYN) capsule 250 mcg  250 mcg Oral BID Kristan Boxer, MD   250 mcg at 03/14/22 0845    ferrous sulfate (IRON 325) tablet 325 mg  325 mg Oral Daily with breakfast Kristan Boxer, MD   325 mg at 03/14/22 0845    meclizine (ANTIVERT) tablet 25 mg  25 mg Oral BID PRN Kristan Boxer, MD        metoprolol tartrate (LOPRESSOR) tablet 75 mg  75 mg Oral BID Kristan Boxer, MD   75 mg at 03/14/22 0845    sodium chloride flush 0.9 % injection 5-40 mL  5-40 mL IntraVENous 2 times per day Kristan Boxer, MD   10 mL at 03/14/22 0850    sodium chloride flush 0.9 % injection 5-40 mL  5-40 mL IntraVENous PRN Kristan Boxer, MD        0.9 % sodium chloride infusion  25 mL IntraVENous PRN Kristan Boxer, MD        acetaminophen (TYLENOL) tablet 650 mg  650 mg Oral Q6H PRN Kristan Boxer, MD        Or    acetaminophen (TYLENOL) suppository 650 mg  650 mg Rectal Q6H PRN Kristan Boxer, MD        pantoprazole (PROTONIX) injection 40 mg  40 mg IntraVENous Daily Kristan Boxer, MD   40 mg at 03/14/22 0845    And    sodium chloride (PF) 0.9 % injection 10 mL  10 mL IntraVENous Daily Kristan Boxer, MD   10 mL at 03/14/22 0847       Allergies:  No Known Allergies    Problem List:    Patient Active Problem List Diagnosis Code    Chronic combined systolic and diastolic CHF (congestive heart failure) ([facility]) I50.42    S/P TAVR (transcatheter aortic valve replacement) Z95.2    Chronic atrial fibrillation I48.20    Coronary artery disease involving native coronary artery of native heart without angina pectoris I25.10    Essential hypertension I10    Hyperlipidemia LDL goal <100 E78.5    GIB (gastrointestinal bleeding) K92.2    ICD (implantable cardioverter-defibrillator), dual, in situ Z95.810    Ischemic heart disease I25.9    Vitamin D deficiency E55.9    Other insomnia G47.09    SOB (shortness of breath) R06.02    Lung nodule R91.1    Acute on chronic diastolic (congestive) heart failure ([facility]) I50.33    Pure hypercholesterolemia E78.00    Stage 3b chronic kidney disease ([facility]) N18.32    Transient cerebral ischemia G45.9    Aortic valve disease I35.9    Pre-syncope R55    Acute on chronic combined systolic and diastolic CHF (congestive heart failure) ([facility]) I50.43    Combined congestive systolic and diastolic heart failure ([facility]) I50.40    Gastroesophageal reflux disease without esophagitis K21.9    Coronary artery disease involving coronary bypass graft I25.810    Symptomatic anemia D64.9    GI bleed K92.2       Past Medical History:        Diagnosis Date    A-fib ([address]     Arrhythmia     Carotid artery stenosis     CHF (congestive heart failure) ([address]     Heart valve problem     Hyperlipidemia     Hypertension     ICD (implantable cardiac defibrillator) in place 2007    MI (mitral incompetence) 2003    MI (myocardial infarction) ([address] 2003       Past Surgical History:        Procedure Laterality Date    CARDIAC CATHETERIZATION Right 03/03/2017    Dr. Johnathon Brewer  2007. 2008 2007 78 shocks replaced 2008 [employer]     CARDIAC DEFIBRILLATOR PLACEMENT  07/28/2016    [employer] Dual ICD gen change    CARDIAC SURGERY N/A 9/4/2021    cardioversion performed by Da Medina MD at [address] [place] [address]  10/29/2020    Successful    (Dr. Ana Verduzco)    COLONOSCOPY N/A 2020    COLONOSCOPY DIAGNOSTIC performed by Tonio Elizalde MD at [place] [address]  2003    DIAGNOSTIC CARDIAC CATH LAB PROCEDURE      DIAGNOSTIC CARDIAC CATH LAB PROCEDURE  2008    stent    JOINT REPLACEMENT  2011    r hip surgery Dr Tasha Flood  2017    Dr. Sherry Clement and Dr. Shameka Patel- TAVR Josie 26mm valve    UPPER GASTROINTESTINAL ENDOSCOPY N/A 9/3/2019    EGD ESOPHAGOGASTRODUODENOSCOPY performed by Steph Jo MD at [address] [place] [address],[address] Floor N/A 2020    EGD ESOPHAGOGASTRODUODENOSCOPY performed by Tonio Elizalde MD at [facility]         Social History:    Social History     Tobacco Use    Smoking status: Former Smoker     Packs/day: 0.50     Years: 3.00     Pack years: 1.50     Quit date: 1973     Years since quittin.1    Smokeless tobacco: Never Used    Tobacco comment: Quit smoking in    Substance Use Topics    Alcohol use:  No                                Counseling given: Not Answered  Comment: Quit smoking in       Vital Signs (Current):   Vitals:    22 0841 22 1600 22 0745   BP: 118/77 124/60 127/60 117/60   Pulse: 69 70 70 71   Resp: 18 16 17 18   Temp: 98.1 °F (36.7 °C) 98 °F (36.7 °C) 98 °F (36.7 °C) 98.2 °F (36.8 °C)   TempSrc: Oral Oral Oral Oral   SpO2: 95% 93% 94% 94%   Weight:       Height:                                                  BP Readings from Last 3 Encounters:   22 117/60   22 (!) 104/38   22 136/63       NPO Status: Time of last liquid consumption:                         Time of last solid consumption:                         Date of last liquid consumption: 22                        Date of last solid food consumption: 03/13/22    BMI:   Wt Readings from Last 3 Encounters:   03/12/22 171 lb (77.6 kg)   03/11/22 171 lb 9.3 oz (77.8 kg)   03/04/22 167 lb 8 oz (76 kg)     Body mass index is 26 kg/m². CBC:   Lab Results   Component Value Date    WBC 7.3 03/14/2022    RBC 2.45 03/14/2022    HGB 8.0 03/14/2022    HCT 26.1 03/14/2022    MCV 98.0 03/14/2022    RDW 17.1 03/14/2022     03/14/2022       CMP:   Lab Results   Component Value Date     03/14/2022    K 4.3 03/14/2022    K 4.3 03/12/2022     03/14/2022    CO2 26 03/14/2022    BUN 25 03/14/2022    CREATININE 1.1 03/14/2022    GFRAA >60 03/14/2022    LABGLOM >60 03/14/2022    GLUCOSE 100 03/14/2022    PROT 5.2 03/14/2022    CALCIUM 8.2 03/14/2022    BILITOT 0.4 03/14/2022    ALKPHOS 90 03/14/2022    AST 17 03/14/2022    ALT 8 03/14/2022       POC Tests: No results for input(s): POCGLU, POCNA, POCK, POCCL, POCBUN, POCHEMO, POCHCT in the last 72 hours.     Coags:   Lab Results   Component Value Date    PROTIME 13.9 03/14/2022    INR 1.3 03/14/2022    APTT 30.6 03/13/2022       HCG (If Applicable): No results found for: PREGTESTUR, PREGSERUM, HCG, HCGQUANT     ABGs: No results found for: PHART, PO2ART, GBA5UNP, USY1DVS, BEART, D9ZRAZEW     Type & Screen (If Applicable):  No results found for: LABABO, LABRH    Drug/Infectious Status (If Applicable):  No results found for: HIV, HEPCAB    COVID-19 Screening (If Applicable):   Lab Results   Component Value Date    COVID19 Not Detected 10/29/2020           Anesthesia Evaluation  Patient summary reviewed no history of anesthetic complications:   Airway: Mallampati: II  TM distance: >3 FB   Neck ROM: full  Mouth opening: > = 3 FB Dental:          Pulmonary: breath sounds clear to auscultation                            ROS comment: CXR 9/2/2021:  No interval change   Cardiovascular:    (+) hypertension:, pacemaker: AICD, past MI:, CAD:, CABG/stent (CABG 2003 and 2007):, dysrhythmias (s/p AICD): ventricular tachycardia, CHF:, hyperlipidemia      ECG reviewed  Rhythm: irregular  Rate: normal  Echocardiogram reviewed         Beta Blocker:  Dose within 24 Hrs      ROS comment: Echo 10/2020:   Summary   Left ventricle is normal in size . Mild asymmetric septal hypertrophy. No regional wall motion abnormalities seen. Normal left ventricular ejection fraction. The left atrium is severely dilated. Mild mitral annular calcification. Mild mitral regurgitation is present. Normally functioning bioprosthetic valve in aortic position. Physiologic and/or trace tricuspid regurgitation. Summary   Left ventricle is normal in size . Mild asymmetric septal hypertrophy. No regional wall motion abnormalities seen. Normal left ventricular ejection fraction. The left atrium is severely dilated. Mild mitral annular calcification. Mild mitral regurgitation is present. Normally functioning bioprosthetic valve in aortic position. Physiologic and/or trace tricuspid regurgitation. EKG 9/2021:  Ventricular-paced rhythm with premature ventricular or aberrantly conducted complexes Atrial fibrillation Abnormal ECG When compared with ECG of 29-OCT-2020 17:27, Significant changes have occurred Confirmed by Dimitris Barnhart ([id number]) on 9/3/2021 10:49:08 AM    S/p TAVR     Neuro/Psych:   (+) TIA,              ROS comment: CT Head 9/2021:  No acute intracranial abnormality.   Chronic findings as described. XR Lspine 2021:  Severe degenerative spondylotic changes. Mild lumbar dextroscoliosis. GI/Hepatic/Renal:   (+) renal disease: CRI,           Endo/Other:    (+) blood dyscrasia: anticoagulation therapy:., .                 Abdominal:             Vascular: negative vascular ROS. Other Findings:               Anesthesia Plan      MAC     ASA 3       Induction: intravenous. MIPS: Prophylactic antiemetics administered. Anesthetic plan and risks discussed with patient. Plan discussed with CRNA.           Chart reviewed . Patient assessed before induction. I agree with the above note.   Rickey Blunt 57, DO   3/14/2022

## 2022-03-14 NOTE — CARE COORDINATION
Social work / Discharge Planning:       Patient is a readmission. From home with his wife. Independent. On room air. Social work will follow to assist with any discharge needs identified.    Electronically signed by HUSAM Alamo on 3/14/2022 at 1:02 PM

## 2022-03-14 NOTE — PROGRESS NOTES
Report called to 6th floor rn. Vs stable. Denies pain, family waiting called to have family go to patient room.

## 2022-03-14 NOTE — PROGRESS NOTES
Physician Progress Note      PATIENTBasilio Shah  [facility] #:                  [id number]  :                       1935  ADMIT DATE:       3/12/2022 12:46 PM  DISCH DATE:  RESPONDING  PROVIDER #:        Bernadine Lynn          QUERY TEXT:    Dear Attending Physician,    Pt admitted with GIB and has Anemia documented. If possible, please document   in progress notes and discharge summary further specificity regarding the   acuity and type of Anemia:[[The medical record reflects the following:  Risk Factors: chronic systemic anticoagulation Xarelto., Afib ,CHF, HTN  Clinical Indicators: Per PCP notes,\". .Principal Problem:Symptomatic anemia   Active Problems:GI bleed . Thurl Mutton \"   Per GI 3/14,\". .- Anemia, suspicious for acute   GI bleed, hemoccult positive; iron deficiency . Thurl Mutton EGD -  BLEEDING AVMs  AT THE   GASTRIC FUNDUS . APC AND CLIPS . Thurl Mutton Thurl Mutton \"  Treatment: PPI, APC, Clips    Thank you,  Lili Campos RN [facility]  Clinical Documentation Improvement Specialist  [phone]  Options provided:  -- Anemia due to acute blood loss  -- Anemia due to acute on chronic blood loss  -- Other - I will add my own diagnosis  -- Disagree - Not applicable / Not valid  -- Disagree - Clinically unable to determine / Unknown  -- Refer to Clinical Documentation Reviewer    PROVIDER RESPONSE TEXT:    This patient has acute blood loss anemia.     Query created by: Josiah Boyle on 3/14/2022 3:50 PM      Electronically signed by:  Bernadine Lynn 3/14/2022 5:28 PM

## 2022-03-14 NOTE — BRIEF OP NOTE
Brief Postoperative Note    Keshia Morillo  YOB: 1935  [id number]    Procedure: EGD with biopsy    Anesthesia: [facility]    Surgeon:  Sarah Paniagua MD    Findings:       Esophagus:  GERD      Stomach: BLEEDING AVMs  AT THE GASTRIC FUNDUS .  APC AND CLIPS WERE DONE WITH MARKED DECREASE AND IMPROVED HEMOSTASIS           Duodenum:  Normal          Complications: None      Estimated blood loss: none      Ursula Veronica MD

## 2022-03-15 ENCOUNTER — ANESTHESIA EVENT (OUTPATIENT)
Dept: ENDOSCOPY | Age: 87
DRG: 378 | End: 2022-03-15
Payer: MEDICARE

## 2022-03-15 LAB
ALBUMIN SERPL-MCNC: 3.4 G/DL (ref 3.5–5.2)
ALP BLD-CCNC: 92 U/L (ref 40–129)
ALT SERPL-CCNC: 8 U/L (ref 0–40)
ANION GAP SERPL CALCULATED.3IONS-SCNC: 11 MMOL/L (ref 7–16)
AST SERPL-CCNC: 18 U/L (ref 0–39)
BASOPHILS ABSOLUTE: 0.04 E9/L (ref 0–0.2)
BASOPHILS RELATIVE PERCENT: 0.5 % (ref 0–2)
BILIRUB SERPL-MCNC: 0.4 MG/DL (ref 0–1.2)
BUN BLDV-MCNC: 22 MG/DL (ref 6–23)
CALCIUM SERPL-MCNC: 8.3 MG/DL (ref 8.6–10.2)
CHLORIDE BLD-SCNC: 103 MMOL/L (ref 98–107)
CO2: 22 MMOL/L (ref 22–29)
CREAT SERPL-MCNC: 1.1 MG/DL (ref 0.7–1.2)
EOSINOPHILS ABSOLUTE: 0.2 E9/L (ref 0.05–0.5)
EOSINOPHILS RELATIVE PERCENT: 2.3 % (ref 0–6)
GFR AFRICAN AMERICAN: >60
GFR NON-AFRICAN AMERICAN: >60 ML/MIN/1.73
GLUCOSE BLD-MCNC: 107 MG/DL (ref 74–99)
HCT VFR BLD CALC: 23.3 % (ref 37–54)
HCT VFR BLD CALC: 23.9 % (ref 37–54)
HCT VFR BLD CALC: 25.6 % (ref 37–54)
HCT VFR BLD CALC: 25.8 % (ref 37–54)
HEMOGLOBIN: 7.2 G/DL (ref 12.5–16.5)
HEMOGLOBIN: 7.5 G/DL (ref 12.5–16.5)
HEMOGLOBIN: 7.7 G/DL (ref 12.5–16.5)
HEMOGLOBIN: 7.8 G/DL (ref 12.5–16.5)
IMMATURE GRANULOCYTES #: 0.04 E9/L
IMMATURE GRANULOCYTES %: 0.5 % (ref 0–5)
LYMPHOCYTES ABSOLUTE: 1.62 E9/L (ref 1.5–4)
LYMPHOCYTES RELATIVE PERCENT: 18.5 % (ref 20–42)
MCH RBC QN AUTO: 30.9 PG (ref 26–35)
MCHC RBC AUTO-ENTMCNC: 31.4 % (ref 32–34.5)
MCV RBC AUTO: 98.4 FL (ref 80–99.9)
MONOCYTES ABSOLUTE: 0.62 E9/L (ref 0.1–0.95)
MONOCYTES RELATIVE PERCENT: 7.1 % (ref 2–12)
NEUTROPHILS ABSOLUTE: 6.26 E9/L (ref 1.8–7.3)
NEUTROPHILS RELATIVE PERCENT: 71.1 % (ref 43–80)
PDW BLD-RTO: 16.7 FL (ref 11.5–15)
PLATELET # BLD: 238 E9/L (ref 130–450)
PMV BLD AUTO: 10.2 FL (ref 7–12)
POTASSIUM SERPL-SCNC: 4.2 MMOL/L (ref 3.5–5)
RBC # BLD: 2.43 E12/L (ref 3.8–5.8)
SODIUM BLD-SCNC: 136 MMOL/L (ref 132–146)
TOTAL PROTEIN: 5.3 G/DL (ref 6.4–8.3)
WBC # BLD: 8.8 E9/L (ref 4.5–11.5)

## 2022-03-15 PROCEDURE — 85018 HEMOGLOBIN: CPT

## 2022-03-15 PROCEDURE — 2580000003 HC RX 258: Performed by: INTERNAL MEDICINE

## 2022-03-15 PROCEDURE — C9113 INJ PANTOPRAZOLE SODIUM, VIA: HCPCS | Performed by: INTERNAL MEDICINE

## 2022-03-15 PROCEDURE — A4216 STERILE WATER/SALINE, 10 ML: HCPCS | Performed by: INTERNAL MEDICINE

## 2022-03-15 PROCEDURE — 99232 SBSQ HOSP IP/OBS MODERATE 35: CPT | Performed by: INTERNAL MEDICINE

## 2022-03-15 PROCEDURE — 85025 COMPLETE CBC W/AUTO DIFF WBC: CPT

## 2022-03-15 PROCEDURE — 1200000000 HC SEMI PRIVATE

## 2022-03-15 PROCEDURE — 36415 COLL VENOUS BLD VENIPUNCTURE: CPT

## 2022-03-15 PROCEDURE — 80053 COMPREHEN METABOLIC PANEL: CPT

## 2022-03-15 PROCEDURE — 6370000000 HC RX 637 (ALT 250 FOR IP): Performed by: INTERNAL MEDICINE

## 2022-03-15 PROCEDURE — 85014 HEMATOCRIT: CPT

## 2022-03-15 PROCEDURE — 6360000002 HC RX W HCPCS: Performed by: INTERNAL MEDICINE

## 2022-03-15 RX ORDER — SODIUM CHLORIDE 9 MG/ML
500 INJECTION, SOLUTION INTRAVENOUS CONTINUOUS
Status: DISCONTINUED | OUTPATIENT
Start: 2022-03-16 | End: 2022-03-16

## 2022-03-15 RX ADMIN — Medication 5 ML: at 08:38

## 2022-03-15 RX ADMIN — DOFETILIDE 250 MCG: 0.25 CAPSULE ORAL at 21:01

## 2022-03-15 RX ADMIN — SODIUM CHLORIDE, PRESERVATIVE FREE 10 ML: 5 INJECTION INTRAVENOUS at 08:38

## 2022-03-15 RX ADMIN — FERROUS SULFATE TAB 325 MG (65 MG ELEMENTAL FE) 325 MG: 325 (65 FE) TAB at 08:33

## 2022-03-15 RX ADMIN — METOPROLOL TARTRATE 75 MG: 50 TABLET, FILM COATED ORAL at 21:01

## 2022-03-15 RX ADMIN — PANTOPRAZOLE SODIUM 40 MG: 40 INJECTION, POWDER, FOR SOLUTION INTRAVENOUS at 08:38

## 2022-03-15 RX ADMIN — Medication 2000 UNITS: at 08:33

## 2022-03-15 RX ADMIN — Medication 10 ML: at 21:02

## 2022-03-15 RX ADMIN — DOFETILIDE 250 MCG: 0.25 CAPSULE ORAL at 08:33

## 2022-03-15 RX ADMIN — AMLODIPINE BESYLATE 2.5 MG: 2.5 TABLET ORAL at 21:01

## 2022-03-15 ASSESSMENT — PAIN SCALES - GENERAL: PAINLEVEL_OUTOF10: 0

## 2022-03-15 NOTE — CARE COORDINATION
Care Coordination:  Met with patient bedside re: discharge planning. Patient day 1 s/p EGD by GI; second EGD to be done tomorrow. Plan at discharge is home where patient resides independently with wife. No - ZULEYMA or C. PCP is Dr. Felipe Yi. No reported issues with obtaining medications. Will follow if any needs arise.      Rodolfo Rosales, RN

## 2022-03-15 NOTE — ANESTHESIA PRE PROCEDURE
Department of Anesthesiology  Preprocedure Note       Name:  Cristopher Mishra   Age:  82 y.o.  :  1935                                          MRN:  [id number]         Date:  3/15/2022      Surgeon: Vibha Hanley):  Yobani Hawkins MD    Procedure: Procedure(s):  EGD ESOPHAGOGASTRODUODENOSCOPY    Medications prior to admission:   Prior to Admission medications    Medication Sig Start Date End Date Taking? Authorizing Provider   amLODIPine (NORVASC) 2.5 MG tablet Take 1 tablet by mouth daily 22  Yes Stephy Rosado DO   dofetilide (TIKOSYN) 250 MCG capsule Take 1 capsule by mouth 2 times daily 3/5/20  Yes Alexandria Joseph MD   Multiple Vitamin (MULTIVITAMIN ADULT PO) Take by mouth    Historical Provider, MD   meclizine (ANTIVERT) 25 MG tablet Take 25 mg by mouth daily as needed    Historical Provider, MD   furosemide (LASIX) 40 MG tablet Take 1 tablet by mouth daily Alternate 40 mg and 20 mg dosing every other day 3/11/22 4/10/22  Florencio Fofana, APRN - CNP   rivaroxaban (XARELTO) 20 MG TABS tablet Take 1 tablet by mouth daily 3/9/22   Stephy Rosado DO   metoprolol tartrate (LOPRESSOR) 50 MG tablet TAKE 1 AND 1/2 TABLETS BY  MOUTH IN THE MORNING AND 1  TABLET BY MOUTH AT BEDTIME 22   Alexandria Joseph MD   pravastatin (PRAVACHOL) 40 MG tablet TAKE 1 TABLET BY MOUTH  DAILY 22   Alexandria Joseph MD   potassium chloride (KLOR-CON M) 10 MEQ extended release tablet TAKE 1 TABLET BY MOUTH  DAILY 22   Alexandria Joseph MD   pantoprazole (PROTONIX) 40 MG tablet TAKE 1 TABLET BY MOUTH  DAILY 12/15/21   Alexandria Joseph MD   Handicap Placard MISC by Does not apply route Disp #: 1  Duration: 5 years.  20   Alexandria Joseph MD   ferrous sulfate (IRON 325) 325 (65 Fe) MG tablet Take 1 tablet by mouth daily (with breakfast) 20   Alexandria Joseph MD   MAGNESIUM-OXIDE 400 (241.3 Mg) MG TABS tablet TK 1 T PO ONCE D 3/18/20   Historical Provider, MD   Multiple Vitamins-Minerals (PRESERVISION AREDS 2 PO) Take 1 tablet by mouth 2 times daily    Historical Provider, MD   Cholecalciferol (VITAMIN D) 2000 UNITS CAPS capsule Take 1 capsule by mouth daily     Historical Provider, MD       Current medications:    Current Facility-Administered Medications   Medication Dose Route Frequency Provider Last Rate Last Admin    [START ON 3/16/2022] 0.9 % sodium chloride infusion  500 mL IntraVENous Continuous Danni Bermudez APRPRISCILLA - CNP        0.9 % sodium chloride infusion   IntraVENous PRN Tyrone Cam MD        amLODIPine (NORVASC) tablet 2.5 mg  2.5 mg Oral Nightly Tyrone Cam MD   2.5 mg at 03/14/22 2104    0.9 % sodium chloride infusion   IntraVENous PRN Tyrone Cam MD        vitamin D (CHOLECALCIFEROL) tablet 2,000 Units  2,000 Units Oral Daily Tyrone Cam MD   2,000 Units at 03/15/22 9644    dofetilide (TIKOSYN) capsule 250 mcg  250 mcg Oral BID Tyrone Cam MD   250 mcg at 03/15/22 9260    ferrous sulfate (IRON 325) tablet 325 mg  325 mg Oral Daily with breakfast Tyrone Cam MD   325 mg at 03/15/22 4086    meclizine (ANTIVERT) tablet 25 mg  25 mg Oral BID PRN Tyrone Cam MD        metoprolol tartrate (LOPRESSOR) tablet 75 mg  75 mg Oral BID Tyrone Cam MD   75 mg at 03/14/22 2104    sodium chloride flush 0.9 % injection 5-40 mL  5-40 mL IntraVENous 2 times per day Tyrone Cam MD   5 mL at 03/15/22 0838    sodium chloride flush 0.9 % injection 5-40 mL  5-40 mL IntraVENous PRN Tyrone Cam MD        0.9 % sodium chloride infusion  25 mL IntraVENous PRN Tyrone Cam MD        acetaminophen (TYLENOL) tablet 650 mg  650 mg Oral Q6H PRN Tyrone Cam MD        Or    acetaminophen (TYLENOL) suppository 650 mg  650 mg Rectal Q6H PRN Tyrone Cam MD        pantoprazole (PROTONIX) injection 40 mg  40 mg IntraVENous Daily Tyrone Cam MD   40 mg at 03/15/22 1841    And    sodium chloride (PF) 0.9 % injection 10 mL  10 mL IntraVENous Daily Tyrone Cam MD   10 mL at 03/15/22 5185       Allergies:  No Known Allergies    Problem List:    Patient Active Problem List   Diagnosis Code    Chronic combined systolic and diastolic CHF (congestive heart failure) ([facility]) I50.42    S/P TAVR (transcatheter aortic valve replacement) Z95.2    Chronic atrial fibrillation I48.20    Coronary artery disease involving native coronary artery of native heart without angina pectoris I25.10    Essential hypertension I10    Hyperlipidemia LDL goal <100 E78.5    GIB (gastrointestinal bleeding) K92.2    ICD (implantable cardioverter-defibrillator), dual, in situ Z95.810    Ischemic heart disease I25.9    Vitamin D deficiency E55.9    Other insomnia G47.09    SOB (shortness of breath) R06.02    Lung nodule R91.1    Acute on chronic diastolic (congestive) heart failure ([facility]) I50.33    Pure hypercholesterolemia E78.00    Stage 3b chronic kidney disease ([facility]) N18.32    Transient cerebral ischemia G45.9    Aortic valve disease I35.9    Pre-syncope R55    Acute on chronic combined systolic and diastolic CHF (congestive heart failure) ([facility]) I50.43    Combined congestive systolic and diastolic heart failure ([facility]) I50.40    Gastroesophageal reflux disease without esophagitis K21.9    Coronary artery disease involving coronary bypass graft I25.810    Symptomatic anemia D64.9    GI bleed K92.2       Past Medical History:        Diagnosis Date    A-fib [facility]     Arrhythmia     Carotid artery stenosis     CHF (congestive heart failure) ([address]     Heart valve problem     Hyperlipidemia     Hypertension     ICD (implantable cardiac defibrillator) in place 2007    MI (mitral incompetence) 2003    MI (myocardial infarction) ([address] 2003       Past Surgical History:        Procedure Laterality Date    CARDIAC CATHETERIZATION Right 03/03/2017    Dr. Ainsley Renteria  2007. 2008 2007 78 shocks replaced 2008 [employer]     CARDIAC DEFIBRILLATOR PLACEMENT 2016    [employer] Dual ICD gen change    CARDIAC SURGERY N/A 2021    cardioversion performed by Xi Doherty MD at [address] [facility]  10/29/2020    Successful    (Dr. Cleo Garcia)    COLONOSCOPY N/A 2020    COLONOSCOPY DIAGNOSTIC performed by Lisandra Steel MD at [address]  2003    DIAGNOSTIC CARDIAC CATH LAB PROCEDURE      DIAGNOSTIC CARDIAC CATH LAB PROCEDURE  2008    stent    JOINT REPLACEMENT  2011    r hip surgery Dr Catina Beck  2017    Dr. Indio Rojas and Dr. William Liner- TAVR Josie 26mm valve    UPPER GASTROINTESTINAL ENDOSCOPY N/A 9/3/2019    EGD ESOPHAGOGASTRODUODENOSCOPY performed by Loirn Kamara MD at [place] [address] 2020    EGD ESOPHAGOGASTRODUODENOSCOPY performed by Lisandra Steel MD at [place] [address] 3/14/2022    EGD CONTROL HEMORRHAGE performed by Cm Le MD at [address]         Social History:    Social History     Tobacco Use    Smoking status: Former Smoker     Packs/day: 0.50     Years: 3.00     Pack years: 1.50     Quit date: 1973     Years since quittin.1    Smokeless tobacco: Never Used    Tobacco comment: Quit smoking in    Substance Use Topics    Alcohol use:  No                                Counseling given: Not Answered  Comment: Quit smoking in       Vital Signs (Current):   Vitals:    22 1700 22 1903 03/15/22 0504 03/15/22 0830   BP: (!) 150/65 (!) 115/54  (!) 118/47   Pulse: 71 72  71   Resp: 18 18  17   Temp: 36.8 °C (98.2 °F) 36.8 °C (98.2 °F)  36.5 °C (97.7 °F)   TempSrc: Oral Oral  Oral   SpO2: 97% 95%  95%   Weight:   177 lb 8 oz (80.5 kg)    Height:                                                  BP Readings from Last 3 Encounters:   03/15/22 (!) 118/47   22 125/62   22 (!) 104/38       NPO Status: Time of last liquid consumption: 0900 (am meds with sips of water)                        Time of last solid consumption: 1800                        Date of last liquid consumption: 03/14/22                        Date of last solid food consumption: 03/12/22    BMI:   Wt Readings from Last 3 Encounters:   03/15/22 177 lb 8 oz (80.5 kg)   03/11/22 171 lb 9.3 oz (77.8 kg)   03/04/22 167 lb 8 oz (76 kg)     Body mass index is 26.99 kg/m². CBC:   Lab Results   Component Value Date    WBC 8.8 03/15/2022    RBC 2.43 03/15/2022    HGB 7.5 03/15/2022    HCT 23.9 03/15/2022    MCV 98.4 03/15/2022    RDW 16.7 03/15/2022     03/15/2022       CMP:   Lab Results   Component Value Date     03/15/2022    K 4.2 03/15/2022    K 4.3 03/12/2022     03/15/2022    CO2 22 03/15/2022    BUN 22 03/15/2022    CREATININE 1.1 03/15/2022    GFRAA >60 03/15/2022    LABGLOM >60 03/15/2022    GLUCOSE 107 03/15/2022    PROT 5.3 03/15/2022    CALCIUM 8.3 03/15/2022    BILITOT 0.4 03/15/2022    ALKPHOS 92 03/15/2022    AST 18 03/15/2022    ALT 8 03/15/2022       POC Tests: No results for input(s): POCGLU, POCNA, POCK, POCCL, POCBUN, POCHEMO, POCHCT in the last 72 hours.     Coags:   Lab Results   Component Value Date    PROTIME 13.9 03/14/2022    INR 1.3 03/14/2022    APTT 30.6 03/13/2022       HCG (If Applicable): No results found for: PREGTESTUR, PREGSERUM, HCG, HCGQUANT     ABGs: No results found for: PHART, PO2ART, LUM9BRR, ZOX1OYB, BEART, K7SCPTSS     Type & Screen (If Applicable):  No results found for: LABABO, LABRH    Drug/Infectious Status (If Applicable):  No results found for: HIV, HEPCAB    COVID-19 Screening (If Applicable):   Lab Results   Component Value Date    COVID19 Not Detected 10/29/2020           Anesthesia Evaluation  Patient summary reviewed and Nursing notes reviewed no history of anesthetic complications:   Airway: Mallampati: I  TM distance: >3 FB   Neck ROM: full  Mouth opening: > = 3 FB Dental: normal exam         Pulmonary:Negative Pulmonary ROS and normal exam    (+) shortness of breath:                             Cardiovascular:    (+) hypertension:, pacemaker (ICD (implantable cardiac defibrillator) in place):, past MI: no interval change, CAD:, dysrhythmias (comes and goes): atrial fibrillation, CHF:,         Rhythm: irregular  Rate: normal           Beta Blocker:  Not on Beta Blocker         Neuro/Psych:   Negative Neuro/Psych ROS              GI/Hepatic/Renal:   (+) GERD:, renal disease (pt denies): CRI,      (-) liver disease       Endo/Other:    (+) blood dyscrasia: anemia and anticoagulation therapy, arthritis: OA., .                  ROS comment: GI bleed Abdominal:       Abdomen: soft. Vascular: negative vascular ROS. Other Findings:           Anesthesia Plan      MAC     ASA 3     (GI bleed transfused 3 units on this admission since Saturday)  Induction: intravenous. Anesthetic plan and risks discussed with patient. Use of blood products discussed with patient whom consented to blood products. Plan discussed with CRNA. Patient seen and evaluated  Kindra Jay, [facility], RN   3/15/2022    Pt see questions answered for repeat EGD f/u for GI bleeding. Shivam Avila M.D 03/16/2022 9071.

## 2022-03-15 NOTE — PROGRESS NOTES
[facility] Quality Flow/Interdisciplinary Rounds Progress Note        Quality Flow Rounds held on March 15, 2022    Disciplines Attending:  Bedside Nurse, ,  and Nursing Unit Leadership    Ronita Cranker was admitted on 3/12/2022 12:46 PM    Anticipated Discharge Date:  Expected Discharge Date: 03/17/22    Disposition:    Michael Score:  Michael Scale Score: 18    Readmission Risk              Risk of Unplanned Readmission:  24           Discussed patient goal for the day, patient clinical progression, and barriers to discharge. The following Goal(s) of the Day/Commitment(s) have been identified:  Monitor Hgb, check GI plan.       Francisco Luong RN  March 15, 2022

## 2022-03-15 NOTE — PROGRESS NOTES
PROGRESS NOTE    Patient Presents with/Seen in Consultation For      *GI bleed  CHIEF COMPLAINT:  Abnormal labs and dizziness    Subjective:     Patient seen laying in bed. States he is feeling good today. Tolerating liquid diet, denies abdominal pain, nausea or vomiting. Bowel movement last night described as dark and loose. EGD results reviewed with patient, all questions answered. Review of Systems  Aside from what was mentioned in the PMH and HPI, essentially unremarkable, all others negative. Objective:     BP (!) 118/47   Pulse 71   Temp 97.7 °F (36.5 °C) (Oral)   Resp 17   Ht 5' 8\" (1.727 m)   Wt 177 lb 8 oz (80.5 kg)   SpO2 95%   BMI 26.99 kg/m²     General appearance: alert, awake, laying in bed, and cooperative  Eyes: conjunctiva normal, sclera anicteric. PERRL.   Lungs: clear to auscultation bilaterally  Heart: regular rate and rhythm, no murmur, 2+ pulses; no edema  Abdomen: soft, non-tender; bowel sounds normal; no masses,  no organomegaly  Extremities: extremities without edema  Pulses: 2+ and symmetric  Skin: Skin color, texture, turgor normal.   Neurologic: Grossly normal    0.9 % sodium chloride infusion, PRN  amLODIPine (NORVASC) tablet 2.5 mg, Nightly  0.9 % sodium chloride infusion, PRN  vitamin D (CHOLECALCIFEROL) tablet 2,000 Units, Daily  dofetilide (TIKOSYN) capsule 250 mcg, BID  ferrous sulfate (IRON 325) tablet 325 mg, Daily with breakfast  meclizine (ANTIVERT) tablet 25 mg, BID PRN  metoprolol tartrate (LOPRESSOR) tablet 75 mg, BID  sodium chloride flush 0.9 % injection 5-40 mL, 2 times per day  sodium chloride flush 0.9 % injection 5-40 mL, PRN  0.9 % sodium chloride infusion, PRN  acetaminophen (TYLENOL) tablet 650 mg, Q6H PRN   Or  acetaminophen (TYLENOL) suppository 650 mg, Q6H PRN  pantoprazole (PROTONIX) injection 40 mg, Daily   And  sodium chloride (PF) 0.9 % injection 10 mL, Daily         Data Review  CBC:   Lab Results   Component Value Date    WBC 8.8 03/15/2022 RBC 2.43 03/15/2022    HGB 7.5 03/15/2022    HCT 23.9 03/15/2022    MCV 98.4 03/15/2022    MCH 30.9 03/15/2022    MCHC 31.4 03/15/2022    RDW 16.7 03/15/2022     03/15/2022    MPV 10.2 03/15/2022     CMP:    Lab Results   Component Value Date     03/15/2022    K 4.2 03/15/2022    K 4.3 03/12/2022     03/15/2022    CO2 22 03/15/2022    BUN 22 03/15/2022    CREATININE 1.1 03/15/2022    GFRAA >60 03/15/2022    LABGLOM >60 03/15/2022    GLUCOSE 107 03/15/2022    PROT 5.3 03/15/2022    LABALBU 3.4 03/15/2022    CALCIUM 8.3 03/15/2022    BILITOT 0.4 03/15/2022    ALKPHOS 92 03/15/2022    AST 18 03/15/2022    ALT 8 03/15/2022     Hepatic Function Panel:    Lab Results   Component Value Date    ALKPHOS 92 03/15/2022    ALT 8 03/15/2022    AST 18 03/15/2022    PROT 5.3 03/15/2022    BILITOT 0.4 03/15/2022    BILIDIR <0.2 06/28/2017    IBILI see below 06/28/2017    LABALBU 3.4 03/15/2022     No components found for: CHLPL  Lab Results   Component Value Date    TRIG 430 (H) 03/02/2022    TRIG 230 (H) 09/03/2021    TRIG 339 06/08/2021     Lab Results   Component Value Date    HDL 34 03/02/2022    HDL 38 09/03/2021    HDL 37 06/08/2021     Lab Results   Component Value Date    LDLCALC - (AA) 03/02/2022    LDLCALC 56 09/03/2021    LDLCALC 49 06/08/2021     Lab Results   Component Value Date    LABVLDL - (AA) 03/02/2022    LABVLDL 46 09/03/2021    LABVLDL - (AA) 08/17/2020      PT/INR:    Lab Results   Component Value Date    PROTIME 13.9 03/14/2022    INR 1.3 03/14/2022     IRON:    Lab Results   Component Value Date    IRON 35 03/13/2022   FERRITIN:    Lab Results   Component Value Date    FERRITIN 180 12/17/2020         Assessment:   Active problems:  ? Anemia, suspicious for acute GI bleed, hemoccult positive; iron deficiency   ? Dizziness  ? Hx A. Fib on Xarelto   ? CHF- defer to PCP  ? Hx GERD, gastritis  ? EGD 3/14/2022-Active bleeding AVM at the fundus of the stomach, a very difficult anatomic position.

## 2022-03-15 NOTE — OP NOTE
[address] [address]                                OPERATIVE REPORT    PATIENT NAME: Allison Joaquin                    :        1935  MED REC NO:   [id number]                            ROOM:       0253  ACCOUNT NO:   [de-identified]                           ADMIT DATE: 2022  PROVIDER:     Janifer Sever, MD    DATE OF PROCEDURE:  2022    PROCEDURE PERFORMED:  Upper endoscopy with APC and clip application. PREOPERATIVE DIAGNOSIS:  Evaluation for GI bleed and anemia. POSTOPERATIVE DIAGNOSES:  Active bleeding AVM at the fundus of the  stomach, a very difficult anatomic position. Three different lesions. APC was applied and two of the lesions were obliterated; however, the  third one secondary to anatomic difficulty was coagulated with _____ of  the bleeding and then two clips were placed successfully. The patient  will undergo a second look in 48 hours. ANESTHESIA:  LMAC. ESTIMATED BLOOD LOSS:  N/A    NOTE:  Prior to the procedure an informed consent was obtained from the  patient after explaining the benefits as well as the risks,  alternatives, and complications of the procedure to the patient, who  understood and agreed. PROCEDURE:  With the patient in the left lateral decubitus position, the  Olympus GIF-100 forward-viewing videoscope was introduced into the  esophagus, the evaluation of which showed grade B GERD and no hiatal  hernia was seen. The scope was then advanced through the gastroesophageal junction into  the gastric body, along the greater curvature. Evaluation of the body  of the stomach showed normal finding except for a fresh blood found at  the fundus with irrigation and three AVMs were discovered actively  bleeding. Two of the AVMs were coagulated using a straight shooting APC  stomach settings. The third was mostly obliterated using an APC side  shooting probe.

## 2022-03-15 NOTE — ANESTHESIA POSTPROCEDURE EVALUATION
Department of Anesthesiology  Postprocedure Note    Patient: Jared Hernandez  MRN: [id number]  YOB: 1935  Date of evaluation: 3/15/2022  Time:  6:49 AM     Procedure Summary     Date: 03/14/22 Room / Location: [facility] / [address] [facility] [address]    Anesthesia Start: 0807 Anesthesia Stop: 5351    Procedure: EGD CONTROL HEMORRHAGE (N/A ) Diagnosis: (/)    Surgeons: Aura Lynne MD Responsible Provider: Lorenzo August, DO    Anesthesia Type: MAC ASA Status: 3          Anesthesia Type: MAC    Alfred Phase I: Alfred Score: 10    Alfred Phase II: Alfred Score: 10    Last vitals: Reviewed and per EMR flowsheets.        Anesthesia Post Evaluation    Patient location during evaluation: PACU  Patient participation: complete - patient participated  Level of consciousness: awake and alert  Airway patency: patent  Nausea & Vomiting: no nausea and no vomiting  Complications: no  Cardiovascular status: hemodynamically stable  Respiratory status: acceptable  Hydration status: euvolemic

## 2022-03-15 NOTE — PROGRESS NOTES
[facility] Progress Note    Admitting Date and Time: 3/12/2022 12:46 PM  Admit Dx: GI bleed [K92.2]  Anemia requiring transfusions [D64.9]  Symptomatic anemia [D64.9]  Gastrointestinal hemorrhage, unspecified gastrointestinal hemorrhage type [K92.2]    Subjective:  Patient is being followed for GI bleed [K92.2]  Anemia requiring transfusions [D64.9]  Symptomatic anemia [D64.9]  Gastrointestinal hemorrhage, unspecified gastrointestinal hemorrhage type [K92.2]     Patient seen and examined 3/15/2022. No acute events overnight. Status post EGD. Denies abdominal pain, nausea, vomiting.      amLODIPine  2.5 mg Oral Nightly    vitamin D  2,000 Units Oral Daily    dofetilide  250 mcg Oral BID    ferrous sulfate  325 mg Oral Daily with breakfast    metoprolol tartrate  75 mg Oral BID    sodium chloride flush  5-40 mL IntraVENous 2 times per day    pantoprazole  40 mg IntraVENous Daily    And    sodium chloride (PF)  10 mL IntraVENous Daily     sodium chloride, , PRN  sodium chloride, , PRN  meclizine, 25 mg, BID PRN  sodium chloride flush, 5-40 mL, PRN  sodium chloride, 25 mL, PRN  acetaminophen, 650 mg, Q6H PRN   Or  acetaminophen, 650 mg, Q6H PRN         Objective:    BP (!) 118/47   Pulse 71   Temp 97.7 °F (36.5 °C) (Oral)   Resp 17   Ht 5' 8\" (1.727 m)   Wt 177 lb 8 oz (80.5 kg)   SpO2 95%   BMI 26.99 kg/m²     General Appearance: alert and oriented to person, place and time and in no acute distress  Pulmonary/Chest: clear to auscultation bilaterally  Cardiovascular: Irregular, normal S1 and S2   Abdomen: soft, non-tender, non-distended, normal bowel sounds  Extremities: no cyanosis, no clubbing and no edema  Neurologic: no cranial nerve deficit and speech normal    Recent Labs     03/12/22  1318 03/14/22  0508 03/15/22  0505    138 136   K 4.3 4.3 4.2   CL 98 104 103   CO2 26 26 22   BUN 39* 25* 22   CREATININE 1.2 1.1 1.1   GLUCOSE 129* 100* 107*   CALCIUM 8.1* 8.2* 8.3* Recent Labs     03/12/22  1318 03/12/22  2200 03/14/22  0508 03/14/22  1229 03/14/22  1820 03/15/22  0029 03/15/22  0505   WBC 8.7  --  7.3  --   --   --  8.8   RBC 1.94*  --  2.45*  --   --   --  2.43*   HGB 6.1*   < > 7.5*   < > 7.6* 7.2* 7.5*   HCT 19.4*   < > 24.0*   < > 24.6* 23.3* 23.9*   .0*  --  98.0  --   --   --  98.4   MCH 31.4  --  30.6  --   --   --  30.9   MCHC 31.4*  --  31.3*  --   --   --  31.4*   RDW 17.3*  --  17.1*  --   --   --  16.7*     --  237  --   --   --  238   MPV 10.7  --  10.5  --   --   --  10.2    < > = values in this interval not displayed. This is a pleasant 82-year-old male who presented to [facility], [place], [place] with a chief complaint of anemia. Patient was sent to hospital by outpatient provider. Stool guaiac positive. Since has undergone 2 unit packed red blood cell transfusion. Remote history of endoscopy secondary to unspecified ulcers. Endorsed fatigue and dizziness. Gastroenterology was consulted for evaluation and possible endoscopy/colonoscopy. Patient did undergo EGD 3/14/2021. Operative findings bleeding AVMs. APC and clips were done with improved hemostasis. Assessment:  Principal Problem:  Symptomatic anemia  Active Problems:  GI bleed  History of congestive heart failure with preserved ejection fraction  History of coronary artery disease  History of ventricular tachycardia  Severe aortic stenosis status post TAVR  History of benign essential hypertension  History of chronic kidney disease  History of hyperlipidemia  History of BPH      Plan:  1. Gastroenterology following. Maintain IV hydration. Serial hemoglobin. Transfuse as indicated for hemoglobin less than 7.0. Previous systemic anticoagulation has been discontinued. Holding all antiplatelets. PPI. EGD 3/14/2022: Bleeding AVMs at the gastric fundus. APC and clips were done with improved hemostasis. Plan is for repeat EGD 3/16/2022.   2.  Monitor electrolytes for history of ventricular tachycardia, magnesium greater than 2.0 potassium greater than 4.0.      NOTE: This report was transcribed using voice recognition software. Every effort was made to ensure accuracy; however, inadvertent computerized transcription errors may be present.   Electronically signed by Magaly Ledesma MD on 3/15/2022 at 11:50 AM

## 2022-03-15 NOTE — ACP (ADVANCE CARE PLANNING)
Advance Care Planning   Healthcare Decision Maker:    Primary Decision Maker: Nelson Postal Spouse - [phone]    Secondary Decision Maker: Anya Mack - Child - [phone]      Today we documented Decision Maker(s) consistent with Legal Next of Kin hierarchy.

## 2022-03-16 ENCOUNTER — ANESTHESIA (OUTPATIENT)
Dept: ENDOSCOPY | Age: 87
DRG: 378 | End: 2022-03-16
Payer: MEDICARE

## 2022-03-16 VITALS
OXYGEN SATURATION: 100 % | RESPIRATION RATE: 23 BRPM | DIASTOLIC BLOOD PRESSURE: 61 MMHG | SYSTOLIC BLOOD PRESSURE: 128 MMHG

## 2022-03-16 LAB
ABO/RH: NORMAL
ALBUMIN SERPL-MCNC: 3.4 G/DL (ref 3.5–5.2)
ALP BLD-CCNC: 97 U/L (ref 40–129)
ALT SERPL-CCNC: 7 U/L (ref 0–40)
ANION GAP SERPL CALCULATED.3IONS-SCNC: 7 MMOL/L (ref 7–16)
ANTIBODY SCREEN: NORMAL
AST SERPL-CCNC: 19 U/L (ref 0–39)
BASOPHILS ABSOLUTE: 0.03 E9/L (ref 0–0.2)
BASOPHILS RELATIVE PERCENT: 0.5 % (ref 0–2)
BILIRUB SERPL-MCNC: 0.4 MG/DL (ref 0–1.2)
BLOOD BANK DISPENSE STATUS: NORMAL
BLOOD BANK PRODUCT CODE: NORMAL
BPU ID: NORMAL
BUN BLDV-MCNC: 15 MG/DL (ref 6–23)
CALCIUM SERPL-MCNC: 8.1 MG/DL (ref 8.6–10.2)
CHLORIDE BLD-SCNC: 107 MMOL/L (ref 98–107)
CO2: 24 MMOL/L (ref 22–29)
CREAT SERPL-MCNC: 1.1 MG/DL (ref 0.7–1.2)
DESCRIPTION BLOOD BANK: NORMAL
EOSINOPHILS ABSOLUTE: 0.19 E9/L (ref 0.05–0.5)
EOSINOPHILS RELATIVE PERCENT: 2.9 % (ref 0–6)
GFR AFRICAN AMERICAN: >60
GFR NON-AFRICAN AMERICAN: >60 ML/MIN/1.73
GLUCOSE BLD-MCNC: 107 MG/DL (ref 74–99)
HCT VFR BLD CALC: 22.5 % (ref 37–54)
HCT VFR BLD CALC: 23.2 % (ref 37–54)
HCT VFR BLD CALC: 26.2 % (ref 37–54)
HEMOGLOBIN: 7 G/DL (ref 12.5–16.5)
HEMOGLOBIN: 7.1 G/DL (ref 12.5–16.5)
HEMOGLOBIN: 8.1 G/DL (ref 12.5–16.5)
IMMATURE GRANULOCYTES #: 0.03 E9/L
IMMATURE GRANULOCYTES %: 0.5 % (ref 0–5)
LYMPHOCYTES ABSOLUTE: 1.36 E9/L (ref 1.5–4)
LYMPHOCYTES RELATIVE PERCENT: 21 % (ref 20–42)
MCH RBC QN AUTO: 30.6 PG (ref 26–35)
MCHC RBC AUTO-ENTMCNC: 31.1 % (ref 32–34.5)
MCV RBC AUTO: 98.3 FL (ref 80–99.9)
MONOCYTES ABSOLUTE: 0.5 E9/L (ref 0.1–0.95)
MONOCYTES RELATIVE PERCENT: 7.7 % (ref 2–12)
NEUTROPHILS ABSOLUTE: 4.38 E9/L (ref 1.8–7.3)
NEUTROPHILS RELATIVE PERCENT: 67.4 % (ref 43–80)
PDW BLD-RTO: 16.2 FL (ref 11.5–15)
PLATELET # BLD: 227 E9/L (ref 130–450)
PMV BLD AUTO: 10.2 FL (ref 7–12)
POTASSIUM SERPL-SCNC: 4.8 MMOL/L (ref 3.5–5)
RBC # BLD: 2.29 E12/L (ref 3.8–5.8)
SODIUM BLD-SCNC: 138 MMOL/L (ref 132–146)
TOTAL PROTEIN: 5.1 G/DL (ref 6.4–8.3)
WBC # BLD: 6.5 E9/L (ref 4.5–11.5)

## 2022-03-16 PROCEDURE — 2709999900 HC NON-CHARGEABLE SUPPLY: Performed by: INTERNAL MEDICINE

## 2022-03-16 PROCEDURE — 2500000003 HC RX 250 WO HCPCS: Performed by: NURSE ANESTHETIST, CERTIFIED REGISTERED

## 2022-03-16 PROCEDURE — 1200000000 HC SEMI PRIVATE

## 2022-03-16 PROCEDURE — 99232 SBSQ HOSP IP/OBS MODERATE 35: CPT | Performed by: INTERNAL MEDICINE

## 2022-03-16 PROCEDURE — 2580000003 HC RX 258: Performed by: NURSE PRACTITIONER

## 2022-03-16 PROCEDURE — 86901 BLOOD TYPING SEROLOGIC RH(D): CPT

## 2022-03-16 PROCEDURE — 36415 COLL VENOUS BLD VENIPUNCTURE: CPT

## 2022-03-16 PROCEDURE — 6360000002 HC RX W HCPCS: Performed by: INTERNAL MEDICINE

## 2022-03-16 PROCEDURE — 80053 COMPREHEN METABOLIC PANEL: CPT

## 2022-03-16 PROCEDURE — 7100000011 HC PHASE II RECOVERY - ADDTL 15 MIN: Performed by: INTERNAL MEDICINE

## 2022-03-16 PROCEDURE — 7100000010 HC PHASE II RECOVERY - FIRST 15 MIN: Performed by: INTERNAL MEDICINE

## 2022-03-16 PROCEDURE — C9113 INJ PANTOPRAZOLE SODIUM, VIA: HCPCS | Performed by: INTERNAL MEDICINE

## 2022-03-16 PROCEDURE — 86850 RBC ANTIBODY SCREEN: CPT

## 2022-03-16 PROCEDURE — 86923 COMPATIBILITY TEST ELECTRIC: CPT

## 2022-03-16 PROCEDURE — 6360000002 HC RX W HCPCS: Performed by: NURSE ANESTHETIST, CERTIFIED REGISTERED

## 2022-03-16 PROCEDURE — 85014 HEMATOCRIT: CPT

## 2022-03-16 PROCEDURE — 36430 TRANSFUSION BLD/BLD COMPNT: CPT

## 2022-03-16 PROCEDURE — P9040 RBC LEUKOREDUCED IRRADIATED: HCPCS

## 2022-03-16 PROCEDURE — 6370000000 HC RX 637 (ALT 250 FOR IP): Performed by: INTERNAL MEDICINE

## 2022-03-16 PROCEDURE — 3700000000 HC ANESTHESIA ATTENDED CARE: Performed by: INTERNAL MEDICINE

## 2022-03-16 PROCEDURE — 85018 HEMOGLOBIN: CPT

## 2022-03-16 PROCEDURE — 0W3P8ZZ CONTROL BLEEDING IN GASTROINTESTINAL TRACT, VIA NATURAL OR ARTIFICIAL OPENING ENDOSCOPIC: ICD-10-PCS | Performed by: INTERNAL MEDICINE

## 2022-03-16 PROCEDURE — 85025 COMPLETE CBC W/AUTO DIFF WBC: CPT

## 2022-03-16 PROCEDURE — 2580000003 HC RX 258: Performed by: INTERNAL MEDICINE

## 2022-03-16 PROCEDURE — 86900 BLOOD TYPING SEROLOGIC ABO: CPT

## 2022-03-16 PROCEDURE — 3609017100 HC EGD: Performed by: INTERNAL MEDICINE

## 2022-03-16 RX ORDER — SODIUM CHLORIDE 9 MG/ML
INJECTION, SOLUTION INTRAVENOUS PRN
Status: DISCONTINUED | OUTPATIENT
Start: 2022-03-16 | End: 2022-03-17 | Stop reason: HOSPADM

## 2022-03-16 RX ORDER — LIDOCAINE HYDROCHLORIDE 20 MG/ML
INJECTION, SOLUTION EPIDURAL; INFILTRATION; INTRACAUDAL; PERINEURAL PRN
Status: DISCONTINUED | OUTPATIENT
Start: 2022-03-16 | End: 2022-03-16 | Stop reason: SDUPTHER

## 2022-03-16 RX ORDER — PROPOFOL 10 MG/ML
INJECTION, EMULSION INTRAVENOUS PRN
Status: DISCONTINUED | OUTPATIENT
Start: 2022-03-16 | End: 2022-03-16 | Stop reason: SDUPTHER

## 2022-03-16 RX ADMIN — PROPOFOL 80 MG: 10 INJECTION, EMULSION INTRAVENOUS at 13:37

## 2022-03-16 RX ADMIN — DOFETILIDE 250 MCG: 0.25 CAPSULE ORAL at 09:11

## 2022-03-16 RX ADMIN — DOFETILIDE 250 MCG: 0.25 CAPSULE ORAL at 20:22

## 2022-03-16 RX ADMIN — PROPOFOL 30 MG: 10 INJECTION, EMULSION INTRAVENOUS at 13:38

## 2022-03-16 RX ADMIN — FERROUS SULFATE TAB 325 MG (65 MG ELEMENTAL FE) 325 MG: 325 (65 FE) TAB at 09:11

## 2022-03-16 RX ADMIN — SODIUM CHLORIDE, PRESERVATIVE FREE 10 ML: 5 INJECTION INTRAVENOUS at 09:11

## 2022-03-16 RX ADMIN — PANTOPRAZOLE SODIUM 40 MG: 40 INJECTION, POWDER, FOR SOLUTION INTRAVENOUS at 09:11

## 2022-03-16 RX ADMIN — METOPROLOL TARTRATE 75 MG: 50 TABLET, FILM COATED ORAL at 20:22

## 2022-03-16 RX ADMIN — Medication 2000 UNITS: at 09:11

## 2022-03-16 RX ADMIN — Medication 10 ML: at 20:27

## 2022-03-16 RX ADMIN — SODIUM CHLORIDE 500 ML: 9 INJECTION, SOLUTION INTRAVENOUS at 00:12

## 2022-03-16 RX ADMIN — LIDOCAINE HYDROCHLORIDE 40 MG: 20 INJECTION, SOLUTION EPIDURAL; INFILTRATION; INTRACAUDAL; PERINEURAL at 13:37

## 2022-03-16 RX ADMIN — AMLODIPINE BESYLATE 2.5 MG: 2.5 TABLET ORAL at 20:22

## 2022-03-16 RX ADMIN — Medication 10 ML: at 09:12

## 2022-03-16 RX ADMIN — METOPROLOL TARTRATE 75 MG: 50 TABLET, FILM COATED ORAL at 09:11

## 2022-03-16 ASSESSMENT — PAIN SCALES - GENERAL
PAINLEVEL_OUTOF10: 0

## 2022-03-16 ASSESSMENT — ENCOUNTER SYMPTOMS: SHORTNESS OF BREATH: 1

## 2022-03-16 NOTE — BRIEF OP NOTE
Brief Postoperative Note    Qing Caruso  YOB: 1935  [id number]    Procedure: EGD with biopsy    Anesthesia: [facility]    Surgeon:  Soila Adler MD    Findings:       Esophagus:  GERD. Stomach: COMPLETE RESOLUTION OF PREVIOUS GASTRIC AVM BLEEDING.  NO BLOOD SEEN AND CLIPS ARE IN PLACE    Duodenum:  Normal           Complications: None      Estimated blood loss: none      Nyasia Maldonado MD

## 2022-03-16 NOTE — CARE COORDINATION
Care coordination: Plan for repeat EGD today. Plan at discharge remains home with wife. No further needs from CM/SW.   Electronically signed by Gilberto Britt RN on 3/16/2022 at 11:55 AM

## 2022-03-16 NOTE — PROGRESS NOTES
[facility] Quality Flow/Interdisciplinary Rounds Progress Note        Quality Flow Rounds held on March 16, 2022    Disciplines Attending:  Bedside Nurse, ,  and Nursing Unit Leadership    Ciara Milan was admitted on 3/12/2022 12:46 PM    Anticipated Discharge Date:  Expected Discharge Date: 03/17/22    Disposition:    Michael Score:  Michael Scale Score: 19    Readmission Risk              Risk of Unplanned Readmission:  22           Discussed patient goal for the day, patient clinical progression, and barriers to discharge. The following Goal(s) of the Day/Commitment(s) have been identified:  EGD today, check GI plan afterwards, monitor Hgb.       Boby Helm RN  March 16, 2022

## 2022-03-16 NOTE — PROGRESS NOTES
gastric fundus. APC and clips were done with improved hemostasis. Plan is for repeat EGD 3/16/2022. Additional transfusion 3/16/2022 1 unit packed red blood cells. 2.  Monitor electrolytes for history of ventricular tachycardia, magnesium greater than 2.0 potassium greater than 4.0. Disposition; earliest discharge 3/17/2022. Will need to stabilize hemoglobin prior to discharge. NOTE: This report was transcribed using voice recognition software. Every effort was made to ensure accuracy; however, inadvertent computerized transcription errors may be present.   Electronically signed by Poonam Brown MD on 3/16/2022 at 11:11 AM

## 2022-03-16 NOTE — ANESTHESIA POSTPROCEDURE EVALUATION
Department of Anesthesiology  Postprocedure Note    Patient: Ronita Cranker  MRN: [id number]  YOB: 1935  Date of evaluation: 3/16/2022  Time:  1:43 PM     Procedure Summary     Date: 03/16/22 Room / Location: [facility] 02 / [facility]    Anesthesia Start: 2636 Anesthesia Stop:     Procedure: EGD ESOPHAGOGASTRODUODENOSCOPY (N/A ) Diagnosis: (/)    Surgeons: Enoch Neville MD Responsible Provider: Wellington Gonzalez MD    Anesthesia Type: MAC ASA Status: 3          Anesthesia Type: No value filed. Alfred Phase I: Alfred Score: 10    Alfred Phase II: Alfred Score: 10    Last vitals: Reviewed and per EMR flowsheets.        Anesthesia Post Evaluation    Patient location during evaluation: PACU  Patient participation: complete - patient participated  Level of consciousness: awake  Airway patency: patent  Nausea & Vomiting: no nausea and no vomiting  Complications: no  Cardiovascular status: hemodynamically stable  Respiratory status: acceptable  Hydration status: euvolemic

## 2022-03-16 NOTE — ANESTHESIA POSTPROCEDURE EVALUATION
Department of Anesthesiology  Postprocedure Note    Patient: Keshia Morillo  MRN: [id number]  YOB: 1935  Date of evaluation: 3/16/2022  Time:  3:15 PM     Procedure Summary     Date: 03/16/22 Room / Location: [address] [place] [address]    Anesthesia Start: 4977 Anesthesia Stop: 1343    Procedure: EGD ESOPHAGOGASTRODUODENOSCOPY (N/A ) Diagnosis: (/)    Surgeons: Ursula Veronica MD Responsible Provider: Eleni Garrido MD    Anesthesia Type: MAC ASA Status: 3          Anesthesia Type: MAC    Alfred Phase I: Alfred Score: 10    Alfred Phase II: Alfred Score: 10    Last vitals: Reviewed and per EMR flowsheets.        Anesthesia Post Evaluation

## 2022-03-17 VITALS
BODY MASS INDEX: 25.14 KG/M2 | DIASTOLIC BLOOD PRESSURE: 62 MMHG | HEIGHT: 68 IN | SYSTOLIC BLOOD PRESSURE: 123 MMHG | WEIGHT: 165.9 LBS | OXYGEN SATURATION: 96 % | RESPIRATION RATE: 16 BRPM | HEART RATE: 71 BPM | TEMPERATURE: 97.6 F

## 2022-03-17 LAB
ALBUMIN SERPL-MCNC: 3.5 G/DL (ref 3.5–5.2)
ALP BLD-CCNC: 112 U/L (ref 40–129)
ALT SERPL-CCNC: 9 U/L (ref 0–40)
ANION GAP SERPL CALCULATED.3IONS-SCNC: 9 MMOL/L (ref 7–16)
AST SERPL-CCNC: 23 U/L (ref 0–39)
BASOPHILS ABSOLUTE: 0.04 E9/L (ref 0–0.2)
BASOPHILS RELATIVE PERCENT: 0.7 % (ref 0–2)
BILIRUB SERPL-MCNC: 0.6 MG/DL (ref 0–1.2)
BUN BLDV-MCNC: 12 MG/DL (ref 6–23)
CALCIUM SERPL-MCNC: 8.4 MG/DL (ref 8.6–10.2)
CHLORIDE BLD-SCNC: 107 MMOL/L (ref 98–107)
CO2: 24 MMOL/L (ref 22–29)
CREAT SERPL-MCNC: 1.1 MG/DL (ref 0.7–1.2)
EOSINOPHILS ABSOLUTE: 0.22 E9/L (ref 0.05–0.5)
EOSINOPHILS RELATIVE PERCENT: 3.6 % (ref 0–6)
GFR AFRICAN AMERICAN: >60
GFR NON-AFRICAN AMERICAN: >60 ML/MIN/1.73
GLUCOSE BLD-MCNC: 93 MG/DL (ref 74–99)
HCT VFR BLD CALC: 25.5 % (ref 37–54)
HCT VFR BLD CALC: 26.7 % (ref 37–54)
HCT VFR BLD CALC: 26.9 % (ref 37–54)
HEMOGLOBIN: 8 G/DL (ref 12.5–16.5)
HEMOGLOBIN: 8.2 G/DL (ref 12.5–16.5)
HEMOGLOBIN: 8.3 G/DL (ref 12.5–16.5)
IMMATURE GRANULOCYTES #: 0.02 E9/L
IMMATURE GRANULOCYTES %: 0.3 % (ref 0–5)
LYMPHOCYTES ABSOLUTE: 1.21 E9/L (ref 1.5–4)
LYMPHOCYTES RELATIVE PERCENT: 19.9 % (ref 20–42)
MCH RBC QN AUTO: 29.6 PG (ref 26–35)
MCHC RBC AUTO-ENTMCNC: 30.7 % (ref 32–34.5)
MCV RBC AUTO: 96.4 FL (ref 80–99.9)
MONOCYTES ABSOLUTE: 0.5 E9/L (ref 0.1–0.95)
MONOCYTES RELATIVE PERCENT: 8.2 % (ref 2–12)
NEUTROPHILS ABSOLUTE: 4.09 E9/L (ref 1.8–7.3)
NEUTROPHILS RELATIVE PERCENT: 67.3 % (ref 43–80)
PDW BLD-RTO: 16.7 FL (ref 11.5–15)
PLATELET # BLD: 234 E9/L (ref 130–450)
PMV BLD AUTO: 10.1 FL (ref 7–12)
POTASSIUM SERPL-SCNC: 4.2 MMOL/L (ref 3.5–5)
RBC # BLD: 2.77 E12/L (ref 3.8–5.8)
SODIUM BLD-SCNC: 140 MMOL/L (ref 132–146)
TOTAL PROTEIN: 5.5 G/DL (ref 6.4–8.3)
WBC # BLD: 6.1 E9/L (ref 4.5–11.5)

## 2022-03-17 PROCEDURE — 85014 HEMATOCRIT: CPT

## 2022-03-17 PROCEDURE — 6370000000 HC RX 637 (ALT 250 FOR IP): Performed by: INTERNAL MEDICINE

## 2022-03-17 PROCEDURE — 99239 HOSP IP/OBS DSCHRG MGMT >30: CPT | Performed by: INTERNAL MEDICINE

## 2022-03-17 PROCEDURE — 85018 HEMOGLOBIN: CPT

## 2022-03-17 PROCEDURE — C9113 INJ PANTOPRAZOLE SODIUM, VIA: HCPCS | Performed by: INTERNAL MEDICINE

## 2022-03-17 PROCEDURE — 2580000003 HC RX 258: Performed by: INTERNAL MEDICINE

## 2022-03-17 PROCEDURE — 85025 COMPLETE CBC W/AUTO DIFF WBC: CPT

## 2022-03-17 PROCEDURE — 6360000002 HC RX W HCPCS: Performed by: INTERNAL MEDICINE

## 2022-03-17 PROCEDURE — 36415 COLL VENOUS BLD VENIPUNCTURE: CPT

## 2022-03-17 PROCEDURE — 80053 COMPREHEN METABOLIC PANEL: CPT

## 2022-03-17 RX ADMIN — METOPROLOL TARTRATE 75 MG: 50 TABLET, FILM COATED ORAL at 10:22

## 2022-03-17 RX ADMIN — Medication 2000 UNITS: at 10:22

## 2022-03-17 RX ADMIN — DOFETILIDE 250 MCG: 0.25 CAPSULE ORAL at 10:26

## 2022-03-17 RX ADMIN — SODIUM CHLORIDE, PRESERVATIVE FREE 10 ML: 5 INJECTION INTRAVENOUS at 10:22

## 2022-03-17 RX ADMIN — Medication 10 ML: at 10:26

## 2022-03-17 RX ADMIN — FERROUS SULFATE TAB 325 MG (65 MG ELEMENTAL FE) 325 MG: 325 (65 FE) TAB at 10:22

## 2022-03-17 RX ADMIN — PANTOPRAZOLE SODIUM 40 MG: 40 INJECTION, POWDER, FOR SOLUTION INTRAVENOUS at 10:22

## 2022-03-17 ASSESSMENT — PAIN SCALES - GENERAL: PAINLEVEL_OUTOF10: 0

## 2022-03-17 NOTE — OP NOTE
[address] [address]                                OPERATIVE REPORT    PATIENT NAME: Lorrie Polanco                    :        1935  MED REC NO:   [id number]                            ROOM:       0199  ACCOUNT NO:   [de-identified]                           ADMIT DATE: 2022  PROVIDER:     Niru Reynolds MD    DATE OF PROCEDURE:  2022    PROCEDURE PERFORMED:  Upper endoscopy. 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 This  procedure is done for a second look after 48 hours from prior endoscopy. POSTOPERATIVE DIAGNOSES:  Complete normalization of the area. Clips  were in place. Sites of previously coagulated AVM appeared  unremarkable. No evidence of old or active bleeding seen. ANESTHESIA:  LMAC. ESTIMATED BLOOD LOSS:  N/A    NOTE:  Prior to the procedure an informed consent was obtained from the  patient after explaining the benefits as well as the risks,  alternatives, and complications of the procedure to the patient, who  understood and agreed. PROCEDURE:  With the patient in the left lateral decubitus position, the  Olympus GIF-100 forward-viewing videoscope was introduced into the  esophagus, the evaluation of which showed grade B LA classification  GERD, no bleeding seen, and no hiatal hernia was seen. The scope was then advanced through the gastroesophageal junction into  the gastric body, along the greater curvature. Evaluation of the body  of the stomach showed gastritis; however, no old or active bleeding was  seen. Retroflexion showed the previous AV malformation bleeding site  was dry and clean. Clips were in place.     The scope was then advanced through the pylorus into the duodenal bulb  and second portion of the duodenum, both of which appeared to be  unremarkable. Duodenum was unremarkable. The scope was then straightened, the area deflated, and the procedure  was terminated by withdrawing the scope and conducting a second look on  the way out, which was essentially the same. The patient tolerated the procedure well.         Vanessa Barcenas MD    D: 03/16/2022 17:15:31       T: 03/16/2022 17:17:52     [email or web address]  Job#: [id number]     Doc#: [id number]    CC:  Gabriel Webster MD

## 2022-03-17 NOTE — PLAN OF CARE
Problem: Falls - Risk of:  Goal: Will remain free from falls  Description: Will remain free from falls  Outcome: Met This Shift  Goal: Absence of physical injury  Description: Absence of physical injury  Outcome: Met This Shift     Problem: Bleeding:  Goal: Will show no signs and symptoms of excessive bleeding  Description: Will show no signs and symptoms of excessive bleeding  Outcome: Met This Shift     Problem: Skin Integrity:  Goal: Will show no infection signs and symptoms  Description: Will show no infection signs and symptoms  Outcome: Met This Shift  Goal: Absence of new skin breakdown  Description: Absence of new skin breakdown  Outcome: Met This Shift     Problem: Discharge Planning:  Goal: Discharged to appropriate level of care  Description: Discharged to appropriate level of care  Outcome: Met This Shift     Problem:  Bowel Function - Altered:  Goal: Bowel elimination is within specified parameters  Description: Bowel elimination is within specified parameters  Outcome: Met This Shift     Problem: Fluid Volume - Imbalance:  Goal: Will show no signs and symptoms of excessive bleeding  Description: Will show no signs and symptoms of excessive bleeding  Outcome: Met This Shift  Goal: Absence of imbalanced fluid volume signs and symptoms  Description: Absence of imbalanced fluid volume signs and symptoms  Outcome: Met This Shift     Problem: Nausea/Vomiting:  Goal: Absence of nausea/vomiting  Description: Absence of nausea/vomiting  Outcome: Met This Shift  Goal: Able to drink  Description: Able to drink  Outcome: Met This Shift  Goal: Able to eat  Description: Able to eat  Outcome: Met This Shift  Goal: Ability to achieve adequate nutritional intake will improve  Description: Ability to achieve adequate nutritional intake will improve  Outcome: Met This Shift

## 2022-03-17 NOTE — PROGRESS NOTES
PROGRESS NOTE        Patient Presents with/Seen in Consultation For        *GI bleed  CHIEF COMPLAINT:  Abnormal labs and dizziness    Subjective:     Patient seen Zabrina Finley in bed in no apparent distress. EGD findings discussed with patient in detail with all questions concerns addressed. No gross bleeding reported. No BM today. No nausea or vomiting. Hemoglobin stable. Plan of care discussed discussed with patient. Review of Systems  Aside from what was mentioned in the PMH and HPI, essentially unremarkable, all others negative. Objective:     /62   Pulse 71   Temp 97.6 °F (36.4 °C) (Oral)   Resp 16   Ht 5' 8\" (1.727 m)   Wt 165 lb 14.4 oz (75.3 kg)   SpO2 96%   BMI 25.23 kg/m²     General appearance: alert, awake, laying in bed, and cooperative  Eyes: conjunctiva normal, sclera anicteric. PERRL.   Lungs: clear to auscultation bilaterally  Heart: regular rate and rhythm, no murmur, 2+ pulses; no edema  Abdomen: soft, non-tender; bowel sounds normal; no masses,  no organomegaly  Extremities: extremities without edema  Pulses: 2+ and symmetric  Skin: Skin color, texture, turgor normal.   Neurologic: Grossly normal    0.9 % sodium chloride infusion, PRN  amLODIPine (NORVASC) tablet 2.5 mg, Nightly  vitamin D (CHOLECALCIFEROL) tablet 2,000 Units, Daily  dofetilide (TIKOSYN) capsule 250 mcg, BID  ferrous sulfate (IRON 325) tablet 325 mg, Daily with breakfast  meclizine (ANTIVERT) tablet 25 mg, BID PRN  metoprolol tartrate (LOPRESSOR) tablet 75 mg, BID  sodium chloride flush 0.9 % injection 5-40 mL, 2 times per day  sodium chloride flush 0.9 % injection 5-40 mL, PRN  0.9 % sodium chloride infusion, PRN  acetaminophen (TYLENOL) tablet 650 mg, Q6H PRN   Or  acetaminophen (TYLENOL) suppository 650 mg, Q6H PRN  pantoprazole (PROTONIX) injection 40 mg, Daily   And  sodium chloride (PF) 0.9 % injection 10 mL, Daily         Data Review  CBC:   Lab Results   Component Value Date    WBC 6.1 03/17/2022 of the stomach, a very difficult anatomic position. Three different lesions. APC was applied and two of the lesions were obliterated; however, thethird one secondary to anatomic difficulty was coagulated with hemostasis of the bleeding and then two clips were placed successfully. The patient will undergo a second look in 48 hours  ? EGD 3/16/22- Complete normalization of the area. Clips were in place. Sites of previously coagulated AVM appeared unremarkable. No evidence of old or active bleeding seen. Plan:   ? Monitor hemoglobin, transfuse per PCP, currently stable   ? Monitor CBC, CMP daily  ? Diet as tolerated   ? Continue protonix 40 MG daily  ? Supportive care   ? Continue to monitor  ? Discharge per PCP, ok from GI POV if hemoglobin remains stable  ? Colonoscopy to be arranged as outpatient        Note: This report was completed utilizing computer voice recognition software. Every effort has been made to ensure accuracy, however; inadvertent computerized transcription errors may be present.      Discussed with Dr. Yasmine Palencia per Dr. Kallie Roman APRN-NP-C 3/17/2022  9:51 AM For Dr. Jacobo Pérez

## 2022-03-17 NOTE — DISCHARGE SUMMARY
[facility] [place] [facility] Physician Discharge Summary       Rhea Woody MD  [address] [place] [address]  Vesta Tineo [facility]  [phone]    Schedule an appointment as soon as possible for a visit  As needed      Activity level: As tolerated    Diet: ADULT DIET; Easy to Chew; GI Harris (GERD/Peptic Ulcer)    Labs: Repeat CBC after discharge - if hgb stable can restart xarelto if recommended by primary care provider. Dispo: home    Condition at discharge: stable    Patient ID:  Amanda Lee  [id number]  82 y.o.  1935    Admit date: 3/12/2022    Discharge date and time:  3/17/2022  2:35 PM    Admission Diagnoses: Principal Problem:    Symptomatic anemia  Active Problems:    GI bleed  Resolved Problems:    * No resolved hospital problems. *      Discharge Diagnoses: Principal Problem:    Symptomatic anemia  Active Problems:    GI bleed  Resolved Problems:    * No resolved hospital problems. *      Consults:  IP CONSULT TO GI  IP CONSULT TO IV TEAM    Procedures: EGD twice     Hospital Course: Patient was admitted with GI bleed [K92.2]  Anemia requiring transfusions [D64.9]  Symptomatic anemia [D64.9]  Gastrointestinal hemorrhage, unspecified gastrointestinal hemorrhage type [K92.2]. 72-year-old male with history of atrial fibrillation, congestive heart failure, ischemic cardiomyopathy status post ICD placement. With positive anemia on laboratory testing as outpatient and is on Xarelto so came to the emergency room. He was guaiac positive and initial hemoglobin here was 6.1 and he was transfused. GI consulted EGD on 3/14. This showed active bleeding AVM in the fundus of the stomach. There were 3 different lesions in 2 were obliterated with APC. The third required coagulation and 2 clips. The Protonix, liquid diet was started and he was taken back for a repeat look due to the high risk nature of the lesion.   Repeat EGD on 3/16 showed complete normalization of the area with clips stable in place no evidence of old or active bleeding. After this and diet that was stable for discharge. His hemoglobin remained stable over the last 48 hours and was 8.3 at the time of discharge. Outpatient medication regimen except for at this time holding Xarelto. He will have a repeat CBC done and if hemoglobin remains stable and no signs of bleeding may at that time resume Xarelto per his primary care physician. Discharge Exam:  Vitals:    03/16/22 2022 03/17/22 0045 03/17/22 0352 03/17/22 0726   BP: 128/60 134/68  123/62   Pulse: 70 70  71   Resp: 16 16 16   Temp:  98.1 °F (36.7 °C)  97.6 °F (36.4 °C)   TempSrc:  Oral  Oral   SpO2:  94%  96%   Weight:   165 lb 14.4 oz (75.3 kg)    Height:         General Appearance: alert and oriented to person, place and time  Pulmonary/Chest: clear to auscultation bilaterally  Cardiovascular: Irregular, normal S1 and S2   Abdomen: soft, non-tender, non-distended, normal bowel sounds  Extremities: no cyanosis, no clubbing and no edema  Neurologic: no cranial nerve deficit and speech normal    LABS:  Recent Labs     03/15/22  0505 03/16/22  0435 03/17/22  0614    138 140   K 4.2 4.8 4.2    107 107   CO2 22 24 24   BUN 22 15 12   CREATININE 1.1 1.1 1.1   GLUCOSE 107* 107* 93   CALCIUM 8.3* 8.1* 8.4*       Recent Labs     03/15/22  0505 03/15/22  1258 03/16/22  0435 03/16/22  1755 03/17/22  0013 03/17/22  0614 03/17/22  1140   WBC 8.8  --  6.5  --   --  6.1  --    RBC 2.43*  --  2.29*  --   --  2.77*  --    HGB 7.5*   < > 7.0*   < > 8.0* 8.2* 8.3*   HCT 23.9*   < > 22.5*   < > 25.5* 26.7* 26.9*   MCV 98.4  --  98.3  --   --  96.4  --    MCH 30.9  --  30.6  --   --  29.6  --    MCHC 31.4*  --  31.1*  --   --  30.7*  --    RDW 16.7*  --  16.2*  --   --  16.7*  --      --  227  --   --  234  --    MPV 10.2  --  10.2  --   --  10.1  --     < > = values in this interval not displayed.        Imaging:   NM GI BLOOD LOSS   Final Result   No evidence of active GI bleeding during acquisition. RECOMMENDATIONS:   If the patient shows hemodynamic signs of an active bleed in the next 20   hours, additional images can be acquired. Unavailable         XR CHEST PORTABLE   Final Result   1. Chronic subsegmental atelectasis or scarring in left lung base. 2. No focal pneumonia or pleural effusion. Patient Instructions:      Medication List      CONTINUE taking these medications    amLODIPine 2.5 MG tablet  Commonly known as: NORVASC  Take 1 tablet by mouth daily     dofetilide 250 MCG capsule  Commonly known as: TIKOSYN  Take 1 capsule by mouth 2 times daily     ferrous sulfate 325 (65 Fe) MG tablet  Commonly known as: IRON 325  Take 1 tablet by mouth daily (with breakfast)     furosemide 40 MG tablet  Commonly known as: LASIX  Take 1 tablet by mouth daily Alternate 40 mg and 20 mg dosing every other day     Handicap Placard Misc  by Does not apply route Disp #: 1  Duration: 5 years.      MAGnesium-Oxide 400 (241.3 Mg) MG Tabs tablet  Generic drug: magnesium oxide     meclizine 25 MG tablet  Commonly known as: ANTIVERT     metoprolol tartrate 50 MG tablet  Commonly known as: LOPRESSOR  TAKE 1 AND 1/2 TABLETS BY  MOUTH IN THE MORNING AND 1  TABLET BY MOUTH AT BEDTIME     MULTIVITAMIN ADULT PO     pantoprazole 40 MG tablet  Commonly known as: PROTONIX  TAKE 1 TABLET BY MOUTH  DAILY     potassium chloride 10 MEQ extended release tablet  Commonly known as: KLOR-CON M  TAKE 1 TABLET BY MOUTH  DAILY     pravastatin 40 MG tablet  Commonly known as: PRAVACHOL  TAKE 1 TABLET BY MOUTH  DAILY     PRESERVISION AREDS 2 PO     vitamin D 50 MCG (2000 UT) Caps capsule        STOP taking these medications    rivaroxaban 20 MG Tabs tablet  Commonly known as: Xarelto              Note that more than 30 minutes was spent in preparing discharge papers, discussing discharge with patient, medication review, etc.    Signed:  Electronically signed by Lulu Patton MD on 3/17/2022 at 2:35 PM    NOTE: This report was transcribed using voice recognition software. Every effort was made to ensure accuracy; however, inadvertent computerized transcription errors may be present.

## 2022-03-18 ENCOUNTER — TELEPHONE (OUTPATIENT)
Dept: PRIMARY CARE CLINIC | Age: 87
End: 2022-03-18

## 2022-03-18 ENCOUNTER — CARE COORDINATION (OUTPATIENT)
Dept: CASE MANAGEMENT | Age: 87
End: 2022-03-18

## 2022-03-18 DIAGNOSIS — K92.1 GASTROINTESTINAL HEMORRHAGE WITH MELENA: Primary | ICD-10-CM

## 2022-03-18 PROCEDURE — 1111F DSCHRG MED/CURRENT MED MERGE: CPT | Performed by: FAMILY MEDICINE

## 2022-03-18 NOTE — TELEPHONE ENCOUNTER
----- Message from Gera Huynh sent at 3/18/2022  9:19 AM EDT -----  Subject: Message to Provider    QUESTIONS  Information for Provider? Pt was taking Xarelto and was taken off of it at   the hospital, wanting to know if he can resume takin the medication and at   what dosage. [phone]  ---------------------------------------------------------------------------  --------------  Umair Danger INFO  What is the best way for the office to contact you? OK to leave message on   voicemail  Preferred Call Back Phone Number? [phone]  ---------------------------------------------------------------------------  --------------  SCRIPT ANSWERS  Relationship to Patient? Other  Representative Name? Lakshmi Gavin  Is the Representative on the appropriate HIPAA document in Epic?  Yes

## 2022-03-18 NOTE — TELEPHONE ENCOUNTER
Per Dr. Shelton Wen- hold med until he is seen on Monday and will have labs redrawn.     Daughter notified of this

## 2022-03-18 NOTE — CARE COORDINATION
[facility] Transitions Initial Follow Up Call    Call within 2 business days of discharge: Yes    Patient: Rachael Baires Patient : 1935   MRN: <[id number]>  Reason for Admission: 3/12/2022 - 3/17/2022 [address] [place] [address] Blvd. Symptomatic anemia on Xarelto. Bleeding AV malformation at the fundus of the stomach, APC was used as well as clips. Recent CHF. Discharge Date: 3/17/22 RARS: Readmission Risk Score: 17 ( )  Readmit  CT  ACM: Lashonda Bazan RN    Last Discharge [facility]       Complaint Diagnosis Description Type Department Provider    3/12/22 Abnormal Lab; Dizziness Symptomatic anemia . .. ED to Hosp-Admission (Discharged) (ADMITTED) YAJAIRA Lovett MD; Yasir Christensen,... [facility] 3/29 1:30    PCP 3/21 4;15  Dr Jorgensen/cardio  1:15     Transitions of Care Initial Call    Was this an external facility discharge? No Discharge Facility:     Challenges to be reviewed by the provider   Additional needs identified to be addressed with provider: No  none             Method of communication with provider : none      Advance Care Planning:   Does patient have an Advance Directive: Mary Anne Mangham primary decision maker [phone]   Was this a readmission? Yes  Patient stated reason for admission: GIB    Current: Odell Casillas denies any abd pain/cramp, N/V, or distention. Had BM today and no blood present. Xarelto on hold. Advised of appts, v/u. Has/taking meds as directed. Appetitie fair and hydrating. No noted LE edema or SOB. Reviewed s/s developing/worsening anemia to return to ED, v/u. Pt v/u to return to ED for black tarry stools w/ strong odor, maroon or red stools, increasing dizziness, SOB, chest pain, increasing weakness and pallor. ACM advised to resume fu. No HHC needs. Patients top risk factors for readmission: Recent GIB    Care Transition Nurse (CTN) contacted the patient by telephone to perform post hospital discharge assessment.  Verified name and  with patient as identifiers. Provided introduction to self, and explanation of the CTN role. CTN reviewed discharge instructions, medical action plan and red flags with patient who verbalized understanding. Patient given an opportunity to ask questions and does not have any further questions or concerns at this time. Were discharge instructions available to patient? Yes. Reviewed appropriate site of care based on symptoms and resources available to patient including: When to call 911. The patient agrees to contact the PCP office for questions related to their healthcare. Medication reconciliation was performed with patient, who verbalizes understanding of administration of home medications. Advised obtaining a 90-day supply of all daily and as-needed medications. CTN provided contact information. CTN s/o. ACM to resume fu.       Care Transitions 24 Hour Call    Do you have any ongoing symptoms?: Yes  Patient-reported symptoms: Fatigue  Do you have a copy of your discharge instructions?: Yes  Do you have all of your prescriptions and are they filled?: Yes  Were you discharged with any Home Care or Post Acute Services: Yes  Patient DME: Quad cane, Straight cane, Wheelchair, Shower chair, Walker, Commode  Patient Home Equipment: Nebulizer  Do you have support at home?: Partner/Spouse/SO  Do you feel like you have everything you need to keep you well at home?: Yes  Are you an active caregiver in your home?: No  Care Transitions Interventions     Other Services: Completed (Comment: 3/18/2022 ACM notified to resume services.)          Follow Up  Future Appointments   Date Time Provider Cezar Gamez   3/21/2022  4:15 PM MD PRISCILLA Jacob [facility]   3/29/2022  1:30 PM YAJAIRA [facility] ROOM 1 YAJAIRA [facility]   4/7/2022  1:15 PM Babs Benedict DO [address] [facility]   4/27/2022  1:00 PM MD PRISCILLA Jacob [facility]       Kallie Samuels RN

## 2022-03-21 ENCOUNTER — OFFICE VISIT (OUTPATIENT)
Dept: PRIMARY CARE CLINIC | Age: 87
End: 2022-03-21
Payer: MEDICARE

## 2022-03-21 VITALS
RESPIRATION RATE: 16 BRPM | HEIGHT: 68 IN | DIASTOLIC BLOOD PRESSURE: 60 MMHG | OXYGEN SATURATION: 97 % | WEIGHT: 165 LBS | TEMPERATURE: 97.5 F | HEART RATE: 70 BPM | SYSTOLIC BLOOD PRESSURE: 138 MMHG | BODY MASS INDEX: 25.01 KG/M2

## 2022-03-21 DIAGNOSIS — I48.20 CHRONIC ATRIAL FIBRILLATION (HCC): ICD-10-CM

## 2022-03-21 DIAGNOSIS — D50.0 BLOOD LOSS ANEMIA: ICD-10-CM

## 2022-03-21 DIAGNOSIS — Z09 HOSPITAL DISCHARGE FOLLOW-UP: Primary | ICD-10-CM

## 2022-03-21 DIAGNOSIS — I10 ESSENTIAL HYPERTENSION: ICD-10-CM

## 2022-03-21 DIAGNOSIS — I25.10 CORONARY ARTERY DISEASE INVOLVING NATIVE CORONARY ARTERY OF NATIVE HEART WITHOUT ANGINA PECTORIS: ICD-10-CM

## 2022-03-21 DIAGNOSIS — K92.1 GASTROINTESTINAL HEMORRHAGE WITH MELENA: ICD-10-CM

## 2022-03-21 LAB
BASOPHILS ABSOLUTE: 0.04 E9/L (ref 0–0.2)
BASOPHILS RELATIVE PERCENT: 0.6 % (ref 0–2)
EOSINOPHILS ABSOLUTE: 0.17 E9/L (ref 0.05–0.5)
EOSINOPHILS RELATIVE PERCENT: 2.4 % (ref 0–6)
HCT VFR BLD CALC: 32.3 % (ref 37–54)
HEMOGLOBIN: 9.8 G/DL (ref 12.5–16.5)
IMMATURE GRANULOCYTES #: 0.03 E9/L
IMMATURE GRANULOCYTES %: 0.4 % (ref 0–5)
LYMPHOCYTES ABSOLUTE: 1.77 E9/L (ref 1.5–4)
LYMPHOCYTES RELATIVE PERCENT: 24.9 % (ref 20–42)
MCH RBC QN AUTO: 29.2 PG (ref 26–35)
MCHC RBC AUTO-ENTMCNC: 30.3 % (ref 32–34.5)
MCV RBC AUTO: 96.1 FL (ref 80–99.9)
MONOCYTES ABSOLUTE: 0.7 E9/L (ref 0.1–0.95)
MONOCYTES RELATIVE PERCENT: 9.9 % (ref 2–12)
NEUTROPHILS ABSOLUTE: 4.39 E9/L (ref 1.8–7.3)
NEUTROPHILS RELATIVE PERCENT: 61.8 % (ref 43–80)
PDW BLD-RTO: 14.3 FL (ref 11.5–15)
PLATELET # BLD: 315 E9/L (ref 130–450)
PMV BLD AUTO: 11 FL (ref 7–12)
RBC # BLD: 3.36 E12/L (ref 3.8–5.8)
WBC # BLD: 7.1 E9/L (ref 4.5–11.5)

## 2022-03-21 PROCEDURE — 99496 TRANSJ CARE MGMT HIGH F2F 7D: CPT | Performed by: FAMILY MEDICINE

## 2022-03-21 PROCEDURE — 1111F DSCHRG MED/CURRENT MED MERGE: CPT | Performed by: FAMILY MEDICINE

## 2022-03-21 ASSESSMENT — ENCOUNTER SYMPTOMS
DIARRHEA: 0
ABDOMINAL PAIN: 0
CONSTIPATION: 0
RHINORRHEA: 0
SORE THROAT: 0
WHEEZING: 0
SHORTNESS OF BREATH: 0
VOMITING: 0
NAUSEA: 0

## 2022-03-21 ASSESSMENT — PATIENT HEALTH QUESTIONNAIRE - PHQ9
SUM OF ALL RESPONSES TO PHQ9 QUESTIONS 1 & 2: 0
SUM OF ALL RESPONSES TO PHQ QUESTIONS 1-9: 0
1. LITTLE INTEREST OR PLEASURE IN DOING THINGS: 0
SUM OF ALL RESPONSES TO PHQ QUESTIONS 1-9: 0
2. FEELING DOWN, DEPRESSED OR HOPELESS: 0

## 2022-03-21 NOTE — PROGRESS NOTES
Post-Discharge Transitional Care Follow Up      Derick Alfredo   YOB: 1935    Date of Office Visit:  3/21/2022  Date of Hospital Admission: 3/12/22  Date of Hospital Discharge: 3/17/22  Readmission Risk Score (high >=14%. Medium >=10%):Readmission Risk Score: 17 ( )      Care management risk score Rising risk (score 2-5) and Complex Care (Scores >=6): 6     Non face to face  following discharge, date last encounter closed (first attempt may have been earlier): 3/18/2022 11:42 AM     Call initiated 2 business days of discharge: Yes     Hospital discharge follow-up  -     LA DISCHARGE MEDS RECONCILED W/ CURRENT OUTPATIENT MED LIST  Chronic atrial fibrillation ([facility] [address]  -     CBC with Auto Differential; Future  -     BLOOD OCCULT STOOL SCREEN #1; Future  -     Blood Occult Stool Screen #2; Future  Coronary artery disease involving native coronary artery of native heart without angina pectoris  Gastrointestinal hemorrhage with melena/Blood loss anemia/Hypertension   -     CBC with Auto Differential; Future  -     BLOOD OCCULT STOOL SCREEN #1; Future  -     Blood Occult Stool Screen #2; Future    Blood pressure stable. Goal is to keep BP <140/90. Patient's medications were reviewed and updated. Patient is continue to hold her Xarelto will check CBC and fecal occult test today. If patient CBC is stable and fecal occult test is negative we will restart Xarelto. Otherwise we will have the patient continue to hold Xarelto until his CBC is stable and his fecal occult test is negative. If patient is still having concerns for GI bleed he will need to contact his cardiologist to review his anticoagulation given his history of atrial fibrillation. Patient does appear to be in sinus rhythm today during exam.    Stools. May be secondary to blood in the stool versus iron supplementation. Patient to follow-up with myself in approximately 1 month. Will call patient with lab results.     Medical Decision Making: moderate complexity and high complexity  Return in 1 month (on 4/21/2022). On this date 3/21/2022 I have spent 40 minutes reviewing previous notes, test results and face to face with the patient discussing the diagnosis and importance of compliance with the treatment plan as well as documenting on the day of the visit. Chief Complaint   Patient presents with   [facility] Management     hospital discharge 3/17/22,  GI bleed-discuss xarelto       Subjective:   HPI    Inpatient course: Discharge summary reviewed- see chart. Patient was recently admitted to the hospital with regards to anemia on laboratory testing as an outpatient. Patient had a hemoglobin of approximately 6.1. Patient was guaiac positive. Patient did receive a transfusion. GI was consulted. 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 There were 3 different lesions. Patient had APC, coagulation, and clipped. Patient was placed on Protonix. Repeat EGD showed normalization of these areas without any active bleeding. Patient was discharged after 48 hours of a stable hemoglobin. Hemoglobin at discharge was 8.3. Patient was advised to continue all of his outpatient medications besides Xarelto. Xarelto was held. Patient was advised to follow-up with myself for repeat testing today and discussions in regards to restarting his Xarelto. Patient does have a past medical history of atrial fibrillation, congestive heart failure, ischemic cardiomyopathy status post ICD placement. Interval history/Current status: Doing well. No blood in his stools but his stool is still dark. Patient is on iron supplementation. Patient reports normal bowel movements. Patient no longer on Xarelto at this time. Patient to follow-up with both cardiology and GI within the next 2 weeks.     Patient Active Problem List   Diagnosis    Chronic combined systolic and diastolic CHF (congestive heart failure) (HCC)    S/P TAVR (transcatheter aortic valve replacement)    Chronic atrial fibrillation    Coronary artery disease involving native coronary artery of native heart without angina pectoris    Essential hypertension    Hyperlipidemia LDL goal <100    GIB (gastrointestinal bleeding)    ICD (implantable cardioverter-defibrillator), dual, in situ    Ischemic heart disease    Vitamin D deficiency    Other insomnia    SOB (shortness of breath)    Lung nodule    Acute on chronic diastolic (congestive) heart failure (HCC)    Pure hypercholesterolemia    Stage 3b chronic kidney disease (HCC)    Transient cerebral ischemia    Aortic valve disease    Pre-syncope    Acute on chronic combined systolic and diastolic CHF (congestive heart failure) (HCC)    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)    Gastroesophageal reflux disease without esophagitis    Coronary artery disease involving coronary bypass graft    Symptomatic anemia    GI bleed       Medications listed as ordered at the time of discharge from hospital  [unfilled]     Medications marked \"taking\" at this time  Outpatient Medications Marked as Taking for the 3/21/22 encounter (Office Visit) with Serg Ott MD   Medication Sig Dispense Refill    Multiple Vitamin (MULTIVITAMIN ADULT PO) Take by mouth      meclizine (ANTIVERT) 25 MG tablet Take 25 mg by mouth daily as needed       furosemide (LASIX) 40 MG tablet Take 1 tablet by mouth daily Alternate 40 mg and 20 mg dosing every other day 60 tablet 0    amLODIPine (NORVASC) 2.5 MG tablet Take 1 tablet by mouth daily 90 tablet 3    metoprolol tartrate (LOPRESSOR) 50 MG tablet TAKE 1 AND 1/2 TABLETS BY  MOUTH IN THE MORNING AND 1  TABLET BY MOUTH AT BEDTIME 225 tablet 3    pravastatin (PRAVACHOL) 40 MG tablet TAKE 1 TABLET BY MOUTH  DAILY 90 tablet 3    potassium chloride (KLOR-CON M) 10 MEQ extended release tablet TAKE 1 TABLET BY MOUTH  DAILY 90 tablet 3    pantoprazole (PROTONIX) 40 MG tablet TAKE 1 TABLET BY MOUTH  DAILY 90 tablet 3    Handicap Placard MISC by Does not apply route Disp #: 1  Duration: 5 years. 1 each 0    ferrous sulfate (IRON 325) 325 (65 Fe) MG tablet Take 1 tablet by mouth daily (with breakfast) 90 tablet 1    MAGNESIUM-OXIDE 400 (241.3 Mg) MG TABS tablet TK 1 T PO ONCE D      dofetilide (TIKOSYN) 250 MCG capsule Take 1 capsule by mouth 2 times daily 90 capsule 0    Multiple Vitamins-Minerals (PRESERVISION AREDS 2 PO) Take 1 tablet by mouth 2 times daily      Cholecalciferol (VITAMIN D) 2000 UNITS CAPS capsule Take 1 capsule by mouth daily           Medications patient taking as of now reconciled against medications ordered at time of hospital discharge: Yes    Review of Systems   Constitutional: Negative for chills and fever. HENT: Negative for congestion, rhinorrhea and sore throat. Respiratory: Negative for shortness of breath and wheezing. Cardiovascular: Negative for chest pain and leg swelling. Gastrointestinal: Negative for abdominal pain, constipation, diarrhea, nausea and vomiting. Skin: Negative for rash. Neurological: Negative for light-headedness and headaches. Objective:    /60   Pulse 70   Temp 97.5 °F (36.4 °C) (Temporal)   Resp 16   Ht 5' 8\" (1.727 m)   Wt 165 lb (74.8 kg)   SpO2 97%   BMI 25.09 kg/m²   Physical Exam  Constitutional:       Appearance: He is well-developed. HENT:      Head: Normocephalic. Eyes:      Extraocular Movements: Extraocular movements intact. Conjunctiva/sclera: Conjunctivae normal.   Cardiovascular:      Rate and Rhythm: Normal rate and regular rhythm. Heart sounds: Normal heart sounds. No murmur heard. Pulmonary:      Effort: Pulmonary effort is normal.      Breath sounds: Normal breath sounds. No wheezing or rales. Abdominal:      General: Bowel sounds are normal.      Palpations: Abdomen is soft. Tenderness: There is no abdominal tenderness.    Musculoskeletal:      Comments: Mild bilateral pedal edema. Neurological:      General: No focal deficit present. Mental Status: He is alert. Comments: Cranial nerves grossly intact   Psychiatric:         Mood and Affect: Mood normal.         Judgment: Judgment normal.         An electronic signature was used to authenticate this note.   --Alexandria Joseph MD

## 2022-03-23 DIAGNOSIS — K92.1 GASTROINTESTINAL HEMORRHAGE WITH MELENA: ICD-10-CM

## 2022-03-23 DIAGNOSIS — I48.20 CHRONIC ATRIAL FIBRILLATION (HCC): ICD-10-CM

## 2022-03-24 DIAGNOSIS — K92.1 GASTROINTESTINAL HEMORRHAGE WITH MELENA: Primary | ICD-10-CM

## 2022-03-24 DIAGNOSIS — D50.0 BLOOD LOSS ANEMIA: ICD-10-CM

## 2022-03-24 DIAGNOSIS — I48.20 CHRONIC ATRIAL FIBRILLATION (HCC): ICD-10-CM

## 2022-03-24 DIAGNOSIS — I48.0 PAROXYSMAL ATRIAL FIBRILLATION (HCC): ICD-10-CM

## 2022-03-24 LAB — OCCULT BLOOD SCREENING: NORMAL

## 2022-03-25 ENCOUNTER — CARE COORDINATION (OUTPATIENT)
Dept: CARE COORDINATION | Age: 87
End: 2022-03-25

## 2022-03-25 NOTE — CARE COORDINATION
Ambulatory Care Coordination Note  3/25/2022  CM Risk Score: 10  Charlson 10 Year Mortality Risk Score: 100%     ACC: Ronna Rodriguez RN    Summary Note:   ACM contacted patient to follow up on his status of anemia, CHF, HTN, PAP for Care Coordination. Pt reports complete medication compliance.   -Pt has macular degeneration. Pt no longer drives and has reading difficulties. Pt does have eyeglasses with one special lens that help some. Pt has magnifying glass and he said their no help.   -Pt reports he is waiting for a phone call tostart chair aerobic class at a senior [place]. He said the [facility] is having furnace/airconditioning issues. Pt has Silver Sneakers. Anemia  -Pt reports he feels well. No black or tarry stools, maroon or red stools, dizziness, abdominal pain or cramping, N/V, distention, SOB, chest pain, increased weakness or pallor.   -Pt reports he had a PCP appt 3-21-22 and his blood was stable (Hgb 9.8) and his occult blood test was negative.   -Pt reports he restarted Xarelto 3/22/22 with no sign or symptoms of bleeding since.  -Pt said he has lab work and fecal test again next week to check after the Xarelto restarted. -GI, Dr. Dorota Reeves appt 4-5-2022. -PCP appt 4-    CHF/HTN:   -Goal weight in the 170's lbs range.   -Today's weight 171 lbs & /65.  -3- PCP office visit: /60 & weight 165 lbs.  -Pt continues to monitor sodium closely and reading labels.   -Pt continues to keep hydrated by drinking water. -SOB with exertion has improved and less SOB noted going up or down stairs.   -Rare cough unless he has mucous (clear) to cough out is baseline.    -Pt reports no ankle/feet edema. Pt said he is wearing the compression stockings.   -Pt awaiting [facility] [employer] education via mail.  -Reviewed [facility] zone tool. Resources  -Pt reports he has not made an eye appt with Dr Melissa Robin yet.  He said he has two doctor appts coming up soon and there has been a lot going on.    -Pt reports he received a 90 day supply of Tikosyn at no cost.    PLAN:  -Continue Care Coordination  -Reinforce Zone Management Tools (CHF)  -F/u active bleeding > recheck CBC  -CHF Clinic 3-  -GI appt 4-5-2022  -PCP appr 4-  -Cardiology appt 4-7-2022  -F/u exercise > chair aerobics  -F/u weighing self daily, logging results and following trend.   -F/u eye appt needs scheduled    -Next anticipated outreach by [address]  Team: 2 weeks. Care Coordination Interventions    Program Enrollment: Complex Care  Referral from Primary Care Provider: No  Suggested Interventions and [employer] on Wheels: Declined  Medication Assistance Program: Completed (Comment: 1/21/22 Costly medications (Xarelto, Tikosyn), PAP referral made. )  Pharmacist: Declined  Registered Dietician: Declined  Social Work: Declined  Transportation Support: Completed  Zone Management Tools: Completed (Comment: 1/21/22 Mailed CHF zone tool. )  Other Services or Interventions: Will mail CHR zone, HTN info, monitoring sodium, reading labels, monitoring log for BP & weight. Goals Addressed                 This Visit's Progress     Medication Management   On track     I will talk with PAP  in attempt to find a more cost effective price for Xarelto & Tikosyn. Barriers: financial and lack of education  Plan for overcoming my barriers: Care Coordination, PAP referral  Confidence: 8/10  Anticipated Goal Completion Date: 5- 1-28-22 PAP reports no assistance for Xarelto until Pt reaches $1000 out of pocket. There is a program for [employer]. Pt will be enrolling. 2-4-22 Pt's daughter in law is filling out the papers for Tikosyn. She has not been over with the bad weather. As soon as papers complete, he will mail them. 2- Pt waiting for [phone] before returning.    3- Pt just picked up 90 day supply of Tikosyn at no cost.             Prior to Admission medications    Medication Sig Start Date End Date Taking? Authorizing Provider   Multiple Vitamin (MULTIVITAMIN ADULT PO) Take by mouth    Historical Provider, MD   meclizine (ANTIVERT) 25 MG tablet Take 25 mg by mouth daily as needed     Historical Provider, MD   furosemide (LASIX) 40 MG tablet Take 1 tablet by mouth daily Alternate 40 mg and 20 mg dosing every other day 3/11/22 4/10/22  Ana Shirely, APRN - CNP   amLODIPine (NORVASC) 2.5 MG tablet Take 1 tablet by mouth daily 2/24/22   Giorgi Ortiz DO   metoprolol tartrate (LOPRESSOR) 50 MG tablet TAKE 1 AND 1/2 TABLETS BY  MOUTH IN THE MORNING AND 1  TABLET BY MOUTH AT BEDTIME 2/16/22   Christiano Davidson MD   pravastatin (PRAVACHOL) 40 MG tablet TAKE 1 TABLET BY MOUTH  DAILY 2/16/22   Christiano Davidson MD   potassium chloride (KLOR-CON M) 10 MEQ extended release tablet TAKE 1 TABLET BY MOUTH  DAILY 1/24/22   Christiano Davidson MD   pantoprazole (PROTONIX) 40 MG tablet TAKE 1 TABLET BY MOUTH  DAILY 12/15/21   Christiano Davidson MD   Handicap Placard MISC by Does not apply route Disp #: 1  Duration: 5 years.  11/16/20   Christiano Davidson MD   ferrous sulfate (IRON 325) 325 (65 Fe) MG tablet Take 1 tablet by mouth daily (with breakfast) 11/5/20   Christiano Davidson MD   MAGNESIUM-OXIDE 400 (241.3 Mg) MG TABS tablet TK 1 T PO ONCE D 3/18/20   Historical Provider, MD   dofetilide (TIKOSYN) 250 MCG capsule Take 1 capsule by mouth 2 times daily 3/5/20   Christiano Davidson MD   Multiple Vitamins-Minerals (PRESERVISION AREDS 2 PO) Take 1 tablet by mouth 2 times daily    Historical Provider, MD   Cholecalciferol (VITAMIN D) 2000 UNITS CAPS capsule Take 1 capsule by mouth daily     Historical Provider, MD       Future Appointments   Date Time Provider Cezar Gamez   3/29/2022  1:30 PM Y[facility] CHF ROOM 1 [facility]   4/7/2022  1:15 PM Giorgi Ortiz DO [facility]   4/27/2022  1:00 PM MD PRISCILLA Rojas [facility]      and   Congestive Heart Failure Assessment    Are you currently restricting fluids?: No Restriction  Do you understand a low sodium diet?: Yes  Do you understand how to read food labels?: Yes  How many restaurant meals do you eat per week?: 1-2  Do you salt your food before tasting it?: No     No patient-reported symptoms      Symptoms:  None: Yes    (Comment: 3/25/22 SOB with exertion improved.)      Symptom course: stable  Patient-reported weight (lb): 171  Weight trend: stable  Salt intake watch compared to last visit: stable

## 2022-03-29 ENCOUNTER — HOSPITAL ENCOUNTER (OUTPATIENT)
Dept: OTHER | Age: 87
Setting detail: THERAPIES SERIES
Discharge: HOME | End: 2022-03-29
Payer: MEDICARE

## 2022-03-29 VITALS
DIASTOLIC BLOOD PRESSURE: 66 MMHG | OXYGEN SATURATION: 95 % | RESPIRATION RATE: 16 BRPM | HEART RATE: 70 BPM | SYSTOLIC BLOOD PRESSURE: 149 MMHG | BODY MASS INDEX: 25.85 KG/M2 | WEIGHT: 170 LBS

## 2022-03-29 LAB
ANION GAP SERPL CALCULATED.3IONS-SCNC: 12 MMOL/L (ref 7–16)
BUN BLDV-MCNC: 29 MG/DL (ref 6–23)
CALCIUM SERPL-MCNC: 8.6 MG/DL (ref 8.6–10.2)
CHLORIDE BLD-SCNC: 101 MMOL/L (ref 98–107)
CO2: 27 MMOL/L (ref 22–29)
CREAT SERPL-MCNC: 1.2 MG/DL (ref 0.7–1.2)
GFR AFRICAN AMERICAN: >60
GFR NON-AFRICAN AMERICAN: 58 ML/MIN/1.73
GLUCOSE BLD-MCNC: 119 MG/DL (ref 74–99)
POTASSIUM SERPL-SCNC: 4.2 MMOL/L (ref 3.5–5)
PRO-BNP: 570 PG/ML (ref 0–450)
SODIUM BLD-SCNC: 140 MMOL/L (ref 132–146)

## 2022-03-29 PROCEDURE — 36415 COLL VENOUS BLD VENIPUNCTURE: CPT

## 2022-03-29 PROCEDURE — 83880 ASSAY OF NATRIURETIC PEPTIDE: CPT

## 2022-03-29 PROCEDURE — 99204 OFFICE O/P NEW MOD 45 MIN: CPT

## 2022-03-29 PROCEDURE — 96374 THER/PROPH/DIAG INJ IV PUSH: CPT

## 2022-03-29 PROCEDURE — 6360000002 HC RX W HCPCS

## 2022-03-29 PROCEDURE — 80048 BASIC METABOLIC PNL TOTAL CA: CPT

## 2022-03-29 PROCEDURE — 2580000003 HC RX 258

## 2022-03-29 RX ORDER — FUROSEMIDE 10 MG/ML
40 INJECTION INTRAMUSCULAR; INTRAVENOUS ONCE
Status: COMPLETED | OUTPATIENT
Start: 2022-03-29 | End: 2022-03-29

## 2022-03-29 RX ORDER — SODIUM CHLORIDE 0.9 % (FLUSH) 0.9 %
10 SYRINGE (ML) INJECTION ONCE
Status: COMPLETED | OUTPATIENT
Start: 2022-03-29 | End: 2022-03-29

## 2022-03-29 RX ORDER — FUROSEMIDE 10 MG/ML
INJECTION INTRAMUSCULAR; INTRAVENOUS
Status: COMPLETED
Start: 2022-03-29 | End: 2022-03-29

## 2022-03-29 RX ORDER — SODIUM CHLORIDE 0.9 % (FLUSH) 0.9 %
SYRINGE (ML) INJECTION
Status: COMPLETED
Start: 2022-03-29 | End: 2022-03-29

## 2022-03-29 RX ADMIN — Medication 10 ML: at 14:26

## 2022-03-29 RX ADMIN — FUROSEMIDE 40 MG: 10 INJECTION, SOLUTION INTRAMUSCULAR; INTRAVENOUS at 14:26

## 2022-03-29 RX ADMIN — FUROSEMIDE 40 MG: 10 INJECTION INTRAMUSCULAR; INTRAVENOUS at 14:26

## 2022-03-29 RX ADMIN — SODIUM CHLORIDE, PRESERVATIVE FREE 10 ML: 5 INJECTION INTRAVENOUS at 14:26

## 2022-03-29 NOTE — PROGRESS NOTES
Congestive Heart Failure [address]   CHF Clinic [facility]  [address]   1935          Referring Provider: LAWRENCE Lopez  Primary Care Physician: Dr. Kristin Mcmanus  Cardiologist: Dr. Ashtyn Alfaro  Nephrologist:         History of Present Illness:     Major Liang is a 82 y.o. male with a history of HFpEF, most recent EF 60% on 3/2/2022. Patient Story:    He does  have dyspnea with exertion, shortness of breath, or decline in overall functional capacity. He does not have orthopnea, PND, nocturnal cough or hemoptysis. He does not have abdominal distention or bloating, early satiety, anorexia/change in appetite. He does has a good urinary response to  oral diuretic. He does have  lower extremity edema. He denies lightheadedness, dizziness. He denies palpitations, syncope or near syncope. He does not complain of chest pain, pressure, discomfort. No Known Allergies      Outpatient Medications Marked as Taking for the 3/29/22 encounter [facility] Encounter) with [facility] CHF ROOM 1   Medication Sig Dispense Refill    rivaroxaban (XARELTO) 20 MG TABS tablet Take 20 mg by mouth Daily with supper        Prior to Visit Medications    Medication Sig Taking?  Authorizing Provider   rivaroxaban (XARELTO) 20 MG TABS tablet Take 20 mg by mouth Daily with supper  Yes Historical Provider, MD   Multiple Vitamin (MULTIVITAMIN ADULT PO) Take by mouth daily   Historical Provider, MD   meclizine (ANTIVERT) 25 MG tablet Take 25 mg by mouth daily as needed   Historical Provider, MD   furosemide (LASIX) 40 MG tablet Take 1 tablet by mouth daily Alternate 40 mg and 20 mg dosing every other day  Patient taking differently: Take 40 mg by mouth daily Alternate 40 mg and 20 mg dosing every other day (20mg on odd days)  GINETTE Alonzo CNP   amLODIPine (NORVASC) 2.5 MG tablet Take 1 tablet by mouth daily  Katharina Chacko,    metoprolol tartrate (LOPRESSOR) 50 MG tablet TAKE 1 AND 1/2 TABLETS BY  MOUTH IN THE MORNING AND 1  TABLET BY MOUTH AT BEDTIME  Murali Baca MD   pravastatin (PRAVACHOL) 40 MG tablet TAKE 1 TABLET BY MOUTH  DAILY  Murali Baca MD   potassium chloride (KLOR-CON M) 10 MEQ extended release tablet TAKE 1 TABLET BY Kerry Judd MD   pantoprazole (PROTONIX) 40 MG tablet TAKE 1 TABLET BY MOUTH  DAILY  Murali Baca MD   Handicap Placard MISC by Does not apply route Disp #: 1  Duration: 5 years. Murali Baca MD   ferrous sulfate (IRON 325) 325 (65 Fe) MG tablet Take 1 tablet by mouth daily (with breakfast)  Murali Baca MD   MAGNESIUM-OXIDE 400 (241.3 Mg) MG TABS tablet TK 1 T PO ONCE D  Historical Provider, MD   dofetilide (TIKOSYN) 250 MCG capsule Take 1 capsule by mouth 2 times daily  Murali Baca MD   Multiple Vitamins-Minerals (PRESERVISION AREDS 2 PO) Take 1 tablet by mouth 2 times daily  Historical Provider, MD   Cholecalciferol (VITAMIN D) 2000 UNITS CAPS capsule Take 1 capsule by mouth daily   Historical Provider, MD           Guideline directed medical:  ARNI/ACE I/ARB: No  Beta blocker: Yes  Aldosterone antagonist:  No        Physical Examination:     BP (!) 149/66   Pulse 70   Resp 16   Wt 170 lb (77.1 kg) Comment: Simultaneous filing. User may not have seen previous data.   SpO2 95%   BMI 25.85 kg/m²     Assessment  Charting Type: Shift assessment (CHF Clinic)    Neurological  Level of Consciousness: Alert (0)         HEENT  HEENT (WDL): Exceptions to WDL  Right Eye: Impaired vision (macular degeneration)  Left Eye: Impaired vision (macular degeneration)    Respiratory  Respiratory Pattern: Regular  Respiratory Depth: Normal  L Breath Sounds: Fine Crackles,Clear  R Breath Sounds: Clear,Fine Crackles    Breath Sounds  Right Upper Lobe: Clear  Right Middle Lobe: Fine Crackles  Right Lower Lobe: Fine Crackles  Left Upper Lobe: Clear  Left Lower Lobe: Fine Crackles Cardiac  Cardiac Rhythm: Atrial paced    Rhythm Interpretation  Pulse: 70    Cardiac Monitor  Telemetry Monitor On: Yes    Gastrointestinal  Abdominal (WDL): Within Defined Limits  Abdomen Inspection: Soft,Rounded  RUQ Bowel Sounds: Active  LUQ Bowel Sounds: Active  RLQ Bowel Sounds: Active  LLQ Bowel Sounds: Active          Bowel Sounds  RUQ Bowel Sounds: Active  LUQ Bowel Sounds: Active  RLQ Bowel Sounds: Active  LLQ Bowel Sounds: Active    Peripheral Vascular  Peripheral Vascular (WDL): Exceptions to WDL  Edema: Left lower extremity  LLE Edema: +2,Pitting                        Psychosocial  Psychosocial (WDL): Within Defined Limits                        Pulse: 70                     LAB DATA:    Last 3 BMP      Sodium (mmol/L)   Date Value   03/29/2022 140   03/17/2022 140   03/16/2022 138     Potassium (mmol/L)   Date Value   03/29/2022 4.2   03/17/2022 4.2   03/16/2022 4.8     Potassium reflex Magnesium (mmol/L)   Date Value   03/12/2022 4.3   09/21/2021 4.5   09/04/2021 4.4     Chloride (mmol/L)   Date Value   03/29/2022 101   03/17/2022 107   03/16/2022 107     CO2 (mmol/L)   Date Value   03/29/2022 27   03/17/2022 24   03/16/2022 24     BUN (mg/dL)   Date Value   03/29/2022 29 (H)   03/17/2022 12   03/16/2022 15     Glucose (mg/dL)   Date Value   03/29/2022 119 (H)   03/17/2022 93   03/16/2022 107 (H)     Calcium (mg/dL)   Date Value   03/29/2022 8.6   03/17/2022 8.4 (L)   03/16/2022 8.1 (L)       Last 3 BNP       Pro-BNP (pg/mL)   Date Value   03/29/2022 570 (H)   03/12/2022 841 (H)   03/11/2022 906 (H)          CBC: No results for input(s): WBC, HGB, PLT in the last 72 hours. BMP:    Recent Labs     03/29/22  0230      K 4.2      CO2 27   BUN 29*   CREATININE 1.2   GLUCOSE 119*     Hepatic: No results for input(s): AST, ALT, ALB, BILITOT, ALKPHOS in the last 72 hours. Troponin: No results for input(s): TROPONINI in the last 72 hours.   BNP: No results for input(s): BNP in the last 72 hours. Lipids: No results for input(s): CHOL, HDL in the last 72 hours. Invalid input(s): LDLCALCU  INR: No results for input(s): INR in the last 72 hours. WEIGHTS:    Wt Readings from Last 3 Encounters:   03/29/22 170 lb (77.1 kg)   03/21/22 165 lb (74.8 kg)   03/17/22 165 lb 14.4 oz (75.3 kg)         TELEMETRY:  Cardiac Regularity: Regular  Cardiac Rhythm/Interpretation: A paced        ASSESSMENT:  Konrad Kim is hypervolemic with stable weights     Patient has fine crackles in bilateral lungs and pitting edema in left leg. Dyspnea on exertion but no dizziness/lightheadedness. He urinates 12 times when he takes the 40mg tablet and 20mg tablet he urinates about 6 times in 24 hour period. First visit with CHF clinic today. Patient presented with daughter in law Robert Cesar. [facility] and importance of daily weights and doing a self check every day to monitor for changes. Went over the three heart failure zones and to call cardiologist if in yellow zone immediately to prevent any further decline. Patient has a working scale. Patient is agreeable to future CHF clinic visits. Next 3 consecutive weekly appointments made today so that patient has a total of 4 visits within first 30 days.     Interventions completed this visit:  IV diuretics given yes, 40mg IV Lasix  Lab work obtained yes, proBNP, BMP   Reviewed currently prescribed medications with patient, educated on importance of compliance and answered any questions regarding their medication  Educated on signs and symptoms of HF  Educated on low sodium diet    PLAN:  Scheduled to follow up in CHF clinic on   Future Appointments   Date Time Provider Cezar Gamez   4/7/2022  1:15 PM Dara Christopher DO [facility]   4/12/2022  9:00 AM [facility] ROOM 1 [facility]   [facility]   [facility]20[facility]   4/27/2022 1:00 PM MD PRISCILLA X[facility]     Given clinic phone number [phone] and aware of signs and symptoms to call with any HF change in symptoms.

## 2022-03-29 NOTE — PLAN OF CARE
Problem:  Activity:  Goal: Capacity to carry out activities will improve  Outcome: Ongoing  Goal: Will verbalize the importance of balancing activity with adequate rest periods  Outcome: Ongoing     Problem: Cardiac:  Goal: Hemodynamic stability will improve  Outcome: Ongoing  Goal: Ability to maintain an adequate cardiac output will improve  Outcome: Ongoing     Problem: Coping:  Goal: Verbalizations of decreased anxiety will decrease  Outcome: Ongoing     Problem: Fluid Volume:  Goal: Risk for excess fluid volume will decrease  Outcome: Ongoing  Goal: Maintenance of adequate hydration will improve  Outcome: Ongoing  Goal: Will show no signs and symptoms of electrolyte imbalance  Outcome: Ongoing     Problem: Health Behavior:  Goal: Ability to manage health-related needs will improve  Outcome: Ongoing     Problem: Nutritional:  Goal: Maintenance of adequate nutrition will improve  Outcome: Ongoing     Problem: Physical Regulation:  Goal: Complications related to the disease process, condition or treatment will be avoided or minimized  Outcome: Ongoing     Problem: Respiratory:  Goal: Ability to maintain adequate ventilation will improve  Outcome: Ongoing  Goal: Respiratory status will improve  Outcome: Ongoing       Continue plan of care

## 2022-03-31 DIAGNOSIS — I48.0 PAROXYSMAL ATRIAL FIBRILLATION (HCC): ICD-10-CM

## 2022-03-31 DIAGNOSIS — D50.0 BLOOD LOSS ANEMIA: ICD-10-CM

## 2022-03-31 DIAGNOSIS — K92.1 GASTROINTESTINAL HEMORRHAGE WITH MELENA: ICD-10-CM

## 2022-03-31 LAB
BASOPHILS ABSOLUTE: 0.05 E9/L (ref 0–0.2)
BASOPHILS RELATIVE PERCENT: 0.7 % (ref 0–2)
EOSINOPHILS ABSOLUTE: 0.24 E9/L (ref 0.05–0.5)
EOSINOPHILS RELATIVE PERCENT: 3.2 % (ref 0–6)
HCT VFR BLD CALC: 35.1 % (ref 37–54)
HEMOGLOBIN: 10.4 G/DL (ref 12.5–16.5)
IMMATURE GRANULOCYTES #: 0.03 E9/L
IMMATURE GRANULOCYTES %: 0.4 % (ref 0–5)
LYMPHOCYTES ABSOLUTE: 1.6 E9/L (ref 1.5–4)
LYMPHOCYTES RELATIVE PERCENT: 21.5 % (ref 20–42)
MCH RBC QN AUTO: 29.5 PG (ref 26–35)
MCHC RBC AUTO-ENTMCNC: 29.6 % (ref 32–34.5)
MCV RBC AUTO: 99.4 FL (ref 80–99.9)
MONOCYTES ABSOLUTE: 0.76 E9/L (ref 0.1–0.95)
MONOCYTES RELATIVE PERCENT: 10.2 % (ref 2–12)
NEUTROPHILS ABSOLUTE: 4.76 E9/L (ref 1.8–7.3)
NEUTROPHILS RELATIVE PERCENT: 64 % (ref 43–80)
OCCULT BLOOD SCREENING: NORMAL
PDW BLD-RTO: 14.8 FL (ref 11.5–15)
PLATELET # BLD: 272 E9/L (ref 130–450)
PMV BLD AUTO: 11.1 FL (ref 7–12)
RBC # BLD: 3.53 E12/L (ref 3.8–5.8)
WBC # BLD: 7.4 E9/L (ref 4.5–11.5)

## 2022-04-01 DIAGNOSIS — I48.20 CHRONIC ATRIAL FIBRILLATION (HCC): ICD-10-CM

## 2022-04-01 DIAGNOSIS — K92.1 GASTROINTESTINAL HEMORRHAGE WITH MELENA: Primary | ICD-10-CM

## 2022-04-04 DIAGNOSIS — K92.1 GASTROINTESTINAL HEMORRHAGE WITH MELENA: ICD-10-CM

## 2022-04-04 DIAGNOSIS — I48.20 CHRONIC ATRIAL FIBRILLATION (HCC): ICD-10-CM

## 2022-04-04 LAB
BASOPHILS ABSOLUTE: 0.05 E9/L (ref 0–0.2)
BASOPHILS RELATIVE PERCENT: 0.7 % (ref 0–2)
EOSINOPHILS ABSOLUTE: 0.17 E9/L (ref 0.05–0.5)
EOSINOPHILS RELATIVE PERCENT: 2.3 % (ref 0–6)
HCT VFR BLD CALC: 37.2 % (ref 37–54)
HEMOGLOBIN: 11.3 G/DL (ref 12.5–16.5)
IMMATURE GRANULOCYTES #: 0.02 E9/L
IMMATURE GRANULOCYTES %: 0.3 % (ref 0–5)
LYMPHOCYTES ABSOLUTE: 1.61 E9/L (ref 1.5–4)
LYMPHOCYTES RELATIVE PERCENT: 21.8 % (ref 20–42)
MCH RBC QN AUTO: 29.4 PG (ref 26–35)
MCHC RBC AUTO-ENTMCNC: 30.4 % (ref 32–34.5)
MCV RBC AUTO: 96.6 FL (ref 80–99.9)
MONOCYTES ABSOLUTE: 0.69 E9/L (ref 0.1–0.95)
MONOCYTES RELATIVE PERCENT: 9.3 % (ref 2–12)
NEUTROPHILS ABSOLUTE: 4.84 E9/L (ref 1.8–7.3)
NEUTROPHILS RELATIVE PERCENT: 65.6 % (ref 43–80)
PDW BLD-RTO: 14.8 FL (ref 11.5–15)
PLATELET # BLD: 255 E9/L (ref 130–450)
PMV BLD AUTO: 11.3 FL (ref 7–12)
RBC # BLD: 3.85 E12/L (ref 3.8–5.8)
WBC # BLD: 7.4 E9/L (ref 4.5–11.5)

## 2022-04-05 DIAGNOSIS — D50.0 BLOOD LOSS ANEMIA: ICD-10-CM

## 2022-04-05 DIAGNOSIS — K92.1 GASTROINTESTINAL HEMORRHAGE WITH MELENA: Primary | ICD-10-CM

## 2022-04-05 DIAGNOSIS — I48.20 CHRONIC ATRIAL FIBRILLATION (HCC): ICD-10-CM

## 2022-04-05 DIAGNOSIS — K92.1 GASTROINTESTINAL HEMORRHAGE WITH MELENA: ICD-10-CM

## 2022-04-05 LAB — OCCULT BLOOD DIAGNOSTIC: NORMAL

## 2022-04-07 ENCOUNTER — OFFICE VISIT (OUTPATIENT)
Dept: CARDIOLOGY CLINIC | Age: 87
End: 2022-04-07
Payer: MEDICARE

## 2022-04-07 VITALS
HEIGHT: 68 IN | WEIGHT: 170.1 LBS | DIASTOLIC BLOOD PRESSURE: 80 MMHG | SYSTOLIC BLOOD PRESSURE: 138 MMHG | HEART RATE: 99 BPM | RESPIRATION RATE: 18 BRPM | BODY MASS INDEX: 25.78 KG/M2

## 2022-04-07 DIAGNOSIS — I25.10 CORONARY ARTERY DISEASE INVOLVING NATIVE CORONARY ARTERY OF NATIVE HEART WITHOUT ANGINA PECTORIS: Primary | ICD-10-CM

## 2022-04-07 DIAGNOSIS — I50.42 CHRONIC COMBINED SYSTOLIC AND DIASTOLIC CHF (CONGESTIVE HEART FAILURE) (HCC): ICD-10-CM

## 2022-04-07 PROCEDURE — 99214 OFFICE O/P EST MOD 30 MIN: CPT | Performed by: INTERNAL MEDICINE

## 2022-04-07 PROCEDURE — 1036F TOBACCO NON-USER: CPT | Performed by: INTERNAL MEDICINE

## 2022-04-07 PROCEDURE — G8427 DOCREV CUR MEDS BY ELIG CLIN: HCPCS | Performed by: INTERNAL MEDICINE

## 2022-04-07 PROCEDURE — 1111F DSCHRG MED/CURRENT MED MERGE: CPT | Performed by: INTERNAL MEDICINE

## 2022-04-07 PROCEDURE — 4040F PNEUMOC VAC/ADMIN/RCVD: CPT | Performed by: INTERNAL MEDICINE

## 2022-04-07 PROCEDURE — G8417 CALC BMI ABV UP PARAM F/U: HCPCS | Performed by: INTERNAL MEDICINE

## 2022-04-07 PROCEDURE — 93000 ELECTROCARDIOGRAM COMPLETE: CPT | Performed by: INTERNAL MEDICINE

## 2022-04-07 PROCEDURE — 1123F ACP DISCUSS/DSCN MKR DOCD: CPT | Performed by: INTERNAL MEDICINE

## 2022-04-07 NOTE — PROGRESS NOTES
stools noted. EKG NSR, incomplete RBBB. 18. Echo, 06/15/2009 with normal LVEF, moderate AS, peak gradient 38 mmHg, BLOSSOM 1.1 cm², moderate MR, LAE. 19. Transtelephonic ICD check, 01/11/2010. No ventricular tachyarrhythmias. Two AF episodes, the longest for 14 hours. 20. Echo, 06/16/2010 with LVE, borderline LVH, normal systolic and diastolic function, normal LA, ICD electrode right heart. Trileaflet AV with moderate to severe AS, mean/peak gradient 20/31 mmHg. BLOSSOM 1.0 cm². MAC with mild MR, normal RVSP.  21. No drug allergies. 25. Family history negative for premature vascular disease. 23. PAF with DCCV, [facility], 12/2010.   24. [facility]. 25. [facility], 06/05/2012. Moderate sized fixed basal inferior defect, probably artifact. 26. [facility]. 1+ AR, moderate AS, mild MR, mild TR, normal LVEF.   27. Echo, 01/31/2013. LV not dilated. Mild concentric LVH. Septal paradox. EF 50-55%. BLOSSOM 1.2 cm² consistent with moderate stenosis. Peak/mean gradient 38/21. Stage II diastolic dysfunction. 28. [address] admission, 08/28/2013 with dizziness, Hb 7.7, WBC 19.0. CXR negative except cardiomegaly. EKG NSR, leftward axis, RBBB. BMP negative except for elevation in BUN to 55 with Cr 1.3.   29. EGD, 08/28/2013. Large pyloric channel ulcer with clot treated with PRBCs and fresh frozen plasma. Hb 8.7 on day of discharge and stable. Pradaxa was temporarily held. 30. CT abdomen, 09/08/2014. 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 31. CT chest, 09/17/2014. Consolidation and infiltrate at the left lung base, stable when compared to previous exams with less pleural thickening and calcified plaque in the left pleural cavity without any recent change.  Chronic artery of native heart without angina pectoris    Essential hypertension    Hyperlipidemia LDL goal <100    GIB (gastrointestinal bleeding)    ICD (implantable cardioverter-defibrillator), dual, in situ    Ischemic heart disease    Vitamin D deficiency    Other insomnia    SOB (shortness of breath)    Lung nodule    Acute on chronic diastolic (congestive) heart failure (HCC)    Pure hypercholesterolemia    Stage 3b chronic kidney disease (HCC)    Transient cerebral ischemia    Aortic valve disease    Pre-syncope    Acute on chronic combined systolic and diastolic CHF (congestive heart failure) (HCC)    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)    Gastroesophageal reflux disease without esophagitis    Coronary artery disease involving coronary bypass graft    Symptomatic anemia    GI bleed       No Known Allergies    Current Outpatient Medications   Medication Sig Dispense Refill    rivaroxaban (XARELTO) 20 MG TABS tablet Take 20 mg by mouth Daily with supper       Multiple Vitamin (MULTIVITAMIN ADULT PO) Take by mouth daily       meclizine (ANTIVERT) 25 MG tablet Take 25 mg by mouth daily as needed       furosemide (LASIX) 40 MG tablet Take 1 tablet by mouth daily Alternate 40 mg and 20 mg dosing every other day (Patient taking differently: Take 40 mg by mouth daily Alternate 40 mg and 20 mg dosing every other day (20mg on odd days)) 60 tablet 0    amLODIPine (NORVASC) 2.5 MG tablet Take 1 tablet by mouth daily 90 tablet 3    metoprolol tartrate (LOPRESSOR) 50 MG tablet TAKE 1 AND 1/2 TABLETS BY  MOUTH IN THE MORNING AND 1  TABLET BY MOUTH AT BEDTIME 225 tablet 3    pravastatin (PRAVACHOL) 40 MG tablet TAKE 1 TABLET BY MOUTH  DAILY 90 tablet 3    potassium chloride (KLOR-CON M) 10 MEQ extended release tablet TAKE 1 TABLET BY MOUTH  DAILY 90 tablet 3    pantoprazole (PROTONIX) 40 MG tablet TAKE 1 TABLET BY MOUTH  DAILY 90 tablet 3    Handicap Placard MISC by Does Isolated    Frequency of Communication with Friends and Family: Three times a week    Frequency of Social Gatherings with Friends and Family: Once a week    Attends Muslim Services: Never    Active Member of Clubs or Organizations: No    Attends Club or Organization Meetings: Never    Marital Status:    Intimate Partner Violence:     Fear of Current or Ex-Partner: Not on file    Emotionally Abused: Not on file    Physically Abused: Not on file    Sexually Abused: Not on file   Housing Stability:     Unable to Pay for Housing in the Last Year: Not on file    Number of [place] in the Last Year: Not on file    Unstable Housing in the Last Year: Not on file       History reviewed. No pertinent family history. Review of Systems:  Heart: as above   Lungs: as above   Eyes: denies changes in vision or discharge. Ears: denies changes in hearing or pain. Nose: denies epistaxis or masses   Throat: denies sore throat or trouble swallowing. Neuro: denies numbness, tingling, tremors. Skin: denies rashes or itching. : denies hematuria, dysuria   GI: denies vomiting, diarrhea   Psych: denies mood changed, anxiety, depression. All other systems negative. Physical Exam   /80   Pulse 99   Resp 18   Ht 5' 8\" (1.727 m)   Wt 170 lb 1.6 oz (77.2 kg)   BMI 25.86 kg/m²   Constitutional: Oriented to person, place, and time. Well-developed and well-nourished. No distress. Head: Normocephalic and atraumatic. Eyes: EOM are normal. Pupils are equal, round, and reactive to light. Neck: Normal range of motion. Neck supple. No hepatojugular reflux and no JVD present. Carotid bruit is not present. No tracheal deviation present. No thyromegaly present. Cardiovascular: Normal rate, regular rhythm, normal heart sounds and intact distal pulses. Exam reveals no gallop and no friction rub. No murmur heard. Pulmonary/Chest: Effort normal and breath sounds normal. No respiratory distress. No wheezes. No rales. No tenderness. Abdominal: Soft. Bowel sounds are normal. No distension and no mass. No tenderness. No rebound and no guarding. Musculoskeletal: Normal range of motion. No edema and no tenderness. Lymphadenopathy:   No cervical adenopathy. No groin adenopathy. Neurological: Alert and oriented to person, place, and time. Skin: Skin is warm and dry. No rash noted. Not diaphoretic. No erythema. Psychiatric: Normal mood and affect. Behavior is normal.     EKG:  normal sinus rhythm, A paced, V sensed.     Echo Summary 8/16/19:   Ejection fraction is visually estimated at 60%.   The inferior basal wall is hypokinetic.   No regional wall motion abnormalities seen.   Normal right ventricle structure and function.   There is doppler evidence of stage II diastolic dysfunction.   Left atrial volume index of 39 ml per meters squared BSA.   Hx of TAVR with 26 mm S3 3/29/17. The aortic valve area is 1.6 cm2 with a maximum gradient of 15 mmHg and a mean gradient of 7 mmHg.   Mild aortic regurgitation is noted.   Mild mitral regurgitation is present.   Mild tricuspid regurgitation.   Agitated saline injected for shunt evaluation.   No evidence of patent foramen ovale.   No evidence of atrial septal defect. Echo Summary 10/30/2020:   LVEF is 65%. Left ventricle is normal in size . Mild asymmetric septal hypertrophy. No regional wall motion abnormalities seen. Normal left ventricular ejection fraction. The left atrium is severely dilated. Mild mitral annular calcification. Mild mitral regurgitation is present. Normally functioning bioprosthetic valve in aortic position. Physiologic and/or trace tricuspid regurgitation. Echo Summary 3/2/2022:   Normal left ventricular systolic function. Ejection fraction is visually estimated at 60%. Mildly dilated right ventricle with normal right ventricular function. There is doppler evidence of stage II diastolic dysfunction. Moderately dilated left atrium by volume index. Mild mitral regurgitation. 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 No significant paravalvular aortic regurgitation. AV peak velocity 2.1 m/s. AV mean gradient 9 mmHg. AV area 1.5 cm2. Mild tricuspid regurgitation. PASP is estimated at 44 mmHg. ASSESSMENT AND PLAN:  Patient Active Problem List   Diagnosis    Chronic combined systolic and diastolic CHF (congestive heart failure) ([facility])    S/P TAVR (transcatheter aortic valve replacement)    Chronic atrial fibrillation    Coronary artery disease involving native coronary artery of native heart without angina pectoris    Essential hypertension    Hyperlipidemia LDL goal <100    GIB (gastrointestinal bleeding)    ICD (implantable cardioverter-defibrillator), dual, in situ    Ischemic heart disease    Vitamin D deficiency    Other insomnia    SOB (shortness of breath)    Lung nodule    Acute on chronic diastolic (congestive) heart failure ([address]    Pure hypercholesterolemia    Stage 3b chronic kidney disease ([address]    Transient cerebral ischemia    Aortic valve disease    Pre-syncope    Acute on chronic combined systolic and diastolic CHF (congestive heart failure) (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)    Gastroesophageal reflux disease without esophagitis    Coronary artery disease involving coronary bypass graft    Symptomatic anemia    GI bleed     1. ICD: Per EP. 2. PAF: In sinus. Xarelto/tikosyn. 3. CAD-CABG: Stable symptoms. BB/statin. Not on ASA to this point due to Xarelto. 4. TAVR: Stable echo 8/16/19.     5. TIA: Echo no PFO. Xarelto/statin. 6. Anemia: Follow labs. 7. GIB: Follows with GI. Nathaly Carroll D.O.   Cardiologist  Cardiology, [facility]

## 2022-04-11 ENCOUNTER — TELEPHONE (OUTPATIENT)
Dept: PRIMARY CARE CLINIC | Age: 87
End: 2022-04-11

## 2022-04-11 NOTE — TELEPHONE ENCOUNTER
Patient having colonoscopy on Friday. Dr. Jacque Horner performing. Was told to wait until after that to have his CBC rechecked.  States he will go next week for repeat CBC

## 2022-04-12 ENCOUNTER — HOSPITAL ENCOUNTER (OUTPATIENT)
Dept: OTHER | Age: 87
Setting detail: THERAPIES SERIES
Discharge: HOME | End: 2022-04-12
Payer: MEDICARE

## 2022-04-12 VITALS
DIASTOLIC BLOOD PRESSURE: 62 MMHG | HEART RATE: 70 BPM | SYSTOLIC BLOOD PRESSURE: 130 MMHG | WEIGHT: 169.8 LBS | OXYGEN SATURATION: 93 % | BODY MASS INDEX: 25.82 KG/M2 | RESPIRATION RATE: 18 BRPM

## 2022-04-12 LAB
ANION GAP SERPL CALCULATED.3IONS-SCNC: 10 MMOL/L (ref 7–16)
BUN BLDV-MCNC: 21 MG/DL (ref 6–23)
CALCIUM SERPL-MCNC: 9.1 MG/DL (ref 8.6–10.2)
CHLORIDE BLD-SCNC: 101 MMOL/L (ref 98–107)
CO2: 27 MMOL/L (ref 22–29)
CREAT SERPL-MCNC: 1.1 MG/DL (ref 0.7–1.2)
GFR AFRICAN AMERICAN: >60
GFR NON-AFRICAN AMERICAN: >60 ML/MIN/1.73
GLUCOSE BLD-MCNC: 98 MG/DL (ref 74–99)
POTASSIUM SERPL-SCNC: 4.1 MMOL/L (ref 3.5–5)
PRO-BNP: 478 PG/ML (ref 0–450)
SODIUM BLD-SCNC: 138 MMOL/L (ref 132–146)

## 2022-04-12 PROCEDURE — 36415 COLL VENOUS BLD VENIPUNCTURE: CPT

## 2022-04-12 PROCEDURE — 6360000002 HC RX W HCPCS

## 2022-04-12 PROCEDURE — 99214 OFFICE O/P EST MOD 30 MIN: CPT

## 2022-04-12 PROCEDURE — 96374 THER/PROPH/DIAG INJ IV PUSH: CPT

## 2022-04-12 PROCEDURE — 80048 BASIC METABOLIC PNL TOTAL CA: CPT

## 2022-04-12 PROCEDURE — 83880 ASSAY OF NATRIURETIC PEPTIDE: CPT

## 2022-04-12 PROCEDURE — 2580000003 HC RX 258

## 2022-04-12 RX ORDER — SODIUM CHLORIDE 0.9 % (FLUSH) 0.9 %
10 SYRINGE (ML) INJECTION ONCE
Status: DISCONTINUED | OUTPATIENT
Start: 2022-04-12 | End: 2022-04-13 | Stop reason: HOSPADM

## 2022-04-12 RX ORDER — FUROSEMIDE 10 MG/ML
40 INJECTION INTRAMUSCULAR; INTRAVENOUS ONCE
Status: DISCONTINUED | OUTPATIENT
Start: 2022-04-12 | End: 2022-04-13 | Stop reason: HOSPADM

## 2022-04-12 RX ORDER — SODIUM CHLORIDE 0.9 % (FLUSH) 0.9 %
SYRINGE (ML) INJECTION
Status: COMPLETED
Start: 2022-04-12 | End: 2022-04-12

## 2022-04-12 RX ORDER — FUROSEMIDE 10 MG/ML
INJECTION INTRAMUSCULAR; INTRAVENOUS
Status: COMPLETED
Start: 2022-04-12 | End: 2022-04-12

## 2022-04-12 RX ADMIN — SODIUM CHLORIDE, PRESERVATIVE FREE 10 ML: 5 INJECTION INTRAVENOUS at 09:24

## 2022-04-12 RX ADMIN — FUROSEMIDE 40 MG: 10 INJECTION, SOLUTION INTRAVENOUS at 09:24

## 2022-04-12 ASSESSMENT — PAIN SCALES - GENERAL: PAINLEVEL_OUTOF10: 0

## 2022-04-12 NOTE — PLAN OF CARE
Problem: Fluid Volume:  Goal: Will show no signs and symptoms of electrolyte imbalance  Description: Will show no signs and symptoms of electrolyte imbalance  Outcome: Ongoing   Continue plan of care

## 2022-04-12 NOTE — PROGRESS NOTES
Congestive Heart Failure [address]   [facility] [place]  [address]   1935          Referring Provider: LAWRENCE Osborn  Primary Care Physician: Dr. Lucy Hernandez  Cardiologist: Dr. Kaylen Holcomb  Nephrologist:         History of Present Illness:     Jenise De Luna is a 82 y.o. male with a history of HFpEF, most recent EF 60% on 3/2/2022. Patient Story:    He does  have dyspnea with exertion, shortness of breath, or decline in overall functional capacity. He does not have orthopnea, PND, nocturnal cough or hemoptysis. He does not have abdominal distention or bloating, early satiety, anorexia/change in appetite. He does has a good urinary response to  oral diuretic. He does have  lower extremity edema. He denies lightheadedness, dizziness. He denies palpitations, syncope or near syncope. He does not complain of chest pain, pressure, discomfort. No Known Allergies        Prior to Visit Medications    Medication Sig Taking?  Authorizing Provider   rivaroxaban (XARELTO) 20 MG TABS tablet Take 20 mg by mouth Daily with supper   Historical Provider, MD   Multiple Vitamin (MULTIVITAMIN ADULT PO) Take by mouth daily   Historical Provider, MD   meclizine (ANTIVERT) 25 MG tablet Take 25 mg by mouth daily as needed   Historical Provider, MD   furosemide (LASIX) 40 MG tablet Take 1 tablet by mouth daily Alternate 40 mg and 20 mg dosing every other day  Patient taking differently: Take 40 mg by mouth daily Alternate 40 mg and 20 mg dosing every other day (20mg on odd days)  GINETTE Oden CNP   amLODIPine (NORVASC) 2.5 MG tablet Take 1 tablet by mouth daily  Kristi Villegas DO   metoprolol tartrate (LOPRESSOR) 50 MG tablet TAKE 1 AND 1/2 TABLETS BY  MOUTH IN THE MORNING AND 1  TABLET BY MOUTH AT BEDTIME  Adriana Harrison MD   pravastatin (PRAVACHOL) 40 MG tablet TAKE 1 TABLET BY MOUTH  DAILY  Adriana Harrison MD   potassium chloride (KLOR-CON M) 10 MEQ extended release tablet TAKE 1 TABLET BY Miri Graf MD   pantoprazole (PROTONIX) 40 MG tablet TAKE 1 TABLET BY MOUTH  DAILY  Sunil Leach MD   Handicap Placard MISC by Does not apply route Disp #: 1  Duration: 5 years. Sunil Leach MD   ferrous sulfate (IRON 325) 325 (65 Fe) MG tablet Take 1 tablet by mouth daily (with breakfast)  Sunil Leach MD   MAGNESIUM-OXIDE 400 (241.3 Mg) MG TABS tablet TK 1 T PO ONCE D  Historical Provider, MD   dofetilide (TIKOSYN) 250 MCG capsule Take 1 capsule by mouth 2 times daily  Sunil Leach MD   Multiple Vitamins-Minerals (PRESERVISION AREDS 2 PO) Take 1 tablet by mouth 2 times daily  Historical Provider, MD   Cholecalciferol (VITAMIN D) 2000 UNITS CAPS capsule Take 1 capsule by mouth daily   Historical Provider, MD           Guideline directed medical:  ARNI/ACE I/ARB: No  Beta blocker:   Yes  Aldosterone antagonist:  No        Physical Examination:     /62   Pulse 70   Resp 18   Wt 169 lb 12.8 oz (77 kg)   SpO2 93%   BMI 25.82 kg/m²     Assessment  Charting Type: Shift assessment (chf clinic)    Neurological  Level of Consciousness: Alert (0)         HEENT  HEENT (WDL): Exceptions to WDL (macular deg)  Right Eye: Impaired vision  Left Eye: Impaired vision         Breath Sounds  Right Upper Lobe: Clear  Right Middle Lobe: Crackles  Right Lower Lobe: Crackles  Left Upper Lobe: Clear  Left Lower Lobe: Crackles         Cardiac  Cardiac Rhythm: Atrial paced    Rhythm Interpretation  Pulse: 70         Gastrointestinal  Abdominal (WDL): Within Defined Limits  Abdomen Inspection: Rounded,Soft               Peripheral Vascular  Peripheral Vascular (WDL): Within Defined Limits (suppot hose are on)  Edema: Right lower extremity,Left lower extremity  RLE Edema: None  LLE Edema: None                   Genitourinary  Genitourinary (WDL): Within Defined Limits    Psychosocial  Psychosocial (WDL): Within Defined clinic visit. Patient educated on low sodium diet and handout given. Interventions completed this visit:  IV diuretics given yes, 40mg IV Lasix for SOB and Crackles subhash lower lobes And Right mid lobe  Lab work obtained yes, proBNP, BMP   Reviewed currently prescribed medications with patient, educated on importance of compliance and answered any questions regarding their medication  Educated on signs and symptoms of HF  Educated on low sodium diet    PLAN:  Scheduled to follow up in CHF clinic on   Future Appointments   Date Time Provider Cezar Gamez   4/19/2022 12:30 PM [facility] ROOM 1 [facility]   4/26/2022 12:30 PM [facility] ROOM 1 [facility]   4/27/2022  1:00 PM MD PRISCILLA Melgoza [facility]   5/13/2022  3:00 PM Mirna Christianson DO [address]     Given clinic phone number [phone] and aware of signs and symptoms to call with any HF change in symptoms.

## 2022-04-14 ENCOUNTER — CARE COORDINATION (OUTPATIENT)
Dept: CARE COORDINATION | Age: 87
End: 2022-04-14

## 2022-04-14 NOTE — CARE COORDINATION
Ambulatory Care Coordination Note  4/14/2022  CM Risk Score: 10  [place] 10 Year Mortality Risk Score: 100%     ACC: Shahida Crook RN    Summary Note:   -[facility] discussed Graduation from Care Coordination with patient.  -Patient feels he is able to manage his health care needs without further assistance from [facility], and feels he can graduate from Care Coordination. -ACM educated patient that if he needs additional assistance in managing his health care in the future, he can call [facility] to re-enroll in Care Coordination.   -Pt has [facility] contact information. [facility] contacted patient to follow up on his status regarding CHF and HTN for Care Coordination.  -Pt has macular degeneration. Pt no longer drives and has reading difficulties. Pt does have eyeglasses with one special lens that help some. Pt has magnifying glass and he said their no help.   -Pt reports complete medication compliance.   -Pt reports he has not started chair aerobic class at a senior center yet. He said the senior center is having furnace/airconditioning issues. Pt has Silver Sneakers.     AICD  -Pt reports his defibrillator went off Tuesday, 4-. He had an office visit with Dr. Neeta Fuentes today and she reset the ICD. -Pt said the ICD shock was nothing serious.   -Pt said he is taking it easy today. Anemia  -Pt reports no sign of active bleeding. No black or tarry stools, maroon or red stools, dizziness, abdominal pain or cramping, N/V, distention, SOB, chest pain, increased weakness or pallor. -4/4/2022 Hgb 11.3.  -Pt reports he was scheduled for colonoscopy tomorrow, 4- but had to cancel. Pt's ICD defibrillator went off on 4/12/22 and had to cancel. Pt is waiting for the office to call and reschedule.   -Pt reports Xarelto continues.     -PCP appt 4-    CHF:  -Goal weight in the 170's lbs range.   -Today's weight 167 lbs & /72.  -3- PCP office visit: /60 & weight 165 lbs.  -Pt continues to monitor sodium closely and reading labels.   -Pt continues to keep hydrated by drinking water.   -SOB with exertion is baseline.   -Rare cough unless he has mucous (clear) to cough out is baseline.    -Pt reports no ankle/feet edema. Pt said he is wearing the compression stockings.   -Pt reports he did not receive the [facility] tu education, will mail again.    -Reviewed [facility] zone tool.     Resources  --Pt reports no eye appt with Dr Tiffany Kenyon yet. He said too much going on. PLAN:  -Graduate patient  -Update pCP             Care Coordination Interventions    Program Enrollment: Complex Care  Referral from Primary Care Provider: No  Suggested Interventions and [employer] on Wheels: Declined  Medication Assistance Program: Completed (Comment: 1/21/22 Costly medications (Xarelto, Tikosyn), PAP referral made. )  Pharmacist: Declined  Registered Dietician: Declined  Social Work: Declined  Transportation Support: Completed  Zone Management Tools: Completed (Comment: 1/21/22 Mailed [facility] zone tool. )  Other Services or Interventions: Will mail CHR zone, HTN info, monitoring sodium, reading labels, monitoring log for BP & weight. Goals Addressed    None         Prior to Admission medications    Medication Sig Start Date End Date Taking?  Authorizing Provider   rivaroxaban (XARELTO) 20 MG TABS tablet Take 20 mg by mouth Daily with supper     Historical Provider, MD   Multiple Vitamin (MULTIVITAMIN ADULT PO) Take by mouth daily     Historical Provider, MD   meclizine (ANTIVERT) 25 MG tablet Take 25 mg by mouth daily as needed     Historical Provider, MD   furosemide (LASIX) 40 MG tablet Take 1 tablet by mouth daily Alternate 40 mg and 20 mg dosing every other day  Patient taking differently: Take 40 mg by mouth daily Alternate 40 mg and 20 mg dosing every other day (20mg on odd days) 3/11/22 4/10/22  Renetta Nail, APRN - CNP   amLODIPine (NORVASC) 2.5 MG tablet Take 1 tablet by mouth daily 2/24/22   Margret Aragon Zachary Reveles DO   metoprolol tartrate (LOPRESSOR) 50 MG tablet TAKE 1 AND 1/2 TABLETS BY  MOUTH IN THE MORNING AND 1  TABLET BY MOUTH AT BEDTIME 2/16/22   Lachelle Ross MD   pravastatin (PRAVACHOL) 40 MG tablet TAKE 1 TABLET BY MOUTH  DAILY 2/16/22   Lachelle Ross MD   potassium chloride (KLOR-CON M) 10 MEQ extended release tablet TAKE 1 TABLET BY MOUTH  DAILY 1/24/22   Lachelle Ross MD   pantoprazole (PROTONIX) 40 MG tablet TAKE 1 TABLET BY MOUTH  DAILY 12/15/21   Lachelle Ross MD   Handicap Placard MISC by Does not apply route Disp #: 1  Duration: 5 years.  11/16/20   Lachelle Ross MD   ferrous sulfate (IRON 325) 325 (65 Fe) MG tablet Take 1 tablet by mouth daily (with breakfast) 11/5/20   Lachelle Ross MD   MAGNESIUM-OXIDE 400 (241.3 Mg) MG TABS tablet TK 1 T PO ONCE D 3/18/20   Historical Provider, MD   dofetilide (TIKOSYN) 250 MCG capsule Take 1 capsule by mouth 2 times daily 3/5/20   Lachelle Ross MD   Multiple Vitamins-Minerals (PRESERVISION AREDS 2 PO) Take 1 tablet by mouth 2 times daily    Historical Provider, MD   Cholecalciferol (VITAMIN D) 2000 UNITS CAPS capsule Take 1 capsule by mouth daily     Historical Provider, MD       Future Appointments   Date Time Provider Cezar Gamez   4/19/2022 12:30 PM [facility] ROOM 1 [facility]   4/26/2022 12:30 PM [facility] ROOM 1 [facility]   4/27/2022  1:00 PM MD PRISCILLA Styles [facility]   5/13/2022  3:00 PM Johan Armstrong  [facility], [address] Card [facility]      and   Congestive Heart Failure Assessment    Are you currently restricting fluids?: No Restriction  Do you understand a low sodium diet?: Yes  Do you understand how to read food labels?: Yes  How many restaurant meals do you eat per week?: 1-2  Do you salt your food before tasting it?: No     No patient-reported symptoms      Symptoms:  None: Yes      Symptom course: stable  Patient-reported weight (lb): 167  Weight trend: stable  Salt intake watch compared to last visit: stable

## 2022-04-19 ENCOUNTER — HOSPITAL ENCOUNTER (OUTPATIENT)
Dept: OTHER | Age: 87
Setting detail: THERAPIES SERIES
Discharge: HOME | End: 2022-04-19
Payer: MEDICARE

## 2022-04-19 VITALS
BODY MASS INDEX: 26 KG/M2 | DIASTOLIC BLOOD PRESSURE: 60 MMHG | RESPIRATION RATE: 16 BRPM | SYSTOLIC BLOOD PRESSURE: 148 MMHG | OXYGEN SATURATION: 95 % | WEIGHT: 171 LBS | HEART RATE: 70 BPM

## 2022-04-19 LAB
ANION GAP SERPL CALCULATED.3IONS-SCNC: 8 MMOL/L (ref 7–16)
BUN BLDV-MCNC: 21 MG/DL (ref 6–23)
CALCIUM SERPL-MCNC: 9 MG/DL (ref 8.6–10.2)
CHLORIDE BLD-SCNC: 99 MMOL/L (ref 98–107)
CO2: 32 MMOL/L (ref 22–29)
CREAT SERPL-MCNC: 1.1 MG/DL (ref 0.7–1.2)
GFR AFRICAN AMERICAN: >60
GFR NON-AFRICAN AMERICAN: >60 ML/MIN/1.73
GLUCOSE BLD-MCNC: 115 MG/DL (ref 74–99)
POTASSIUM SERPL-SCNC: 4.3 MMOL/L (ref 3.5–5)
PRO-BNP: 496 PG/ML (ref 0–450)
SODIUM BLD-SCNC: 139 MMOL/L (ref 132–146)

## 2022-04-19 PROCEDURE — 99214 OFFICE O/P EST MOD 30 MIN: CPT

## 2022-04-19 PROCEDURE — 80048 BASIC METABOLIC PNL TOTAL CA: CPT

## 2022-04-19 PROCEDURE — 36415 COLL VENOUS BLD VENIPUNCTURE: CPT

## 2022-04-19 PROCEDURE — 83880 ASSAY OF NATRIURETIC PEPTIDE: CPT

## 2022-04-19 ASSESSMENT — PAIN SCALES - GENERAL: PAINLEVEL_OUTOF10: 0

## 2022-04-19 NOTE — PLAN OF CARE
Problem: Fluid Volume:  Goal: Risk for excess fluid volume will decrease  Description: Risk for excess fluid volume will decrease  Outcome: Ongoing   Continue plan of care

## 2022-04-19 NOTE — PROGRESS NOTES
Congestive Heart Failure [address]   CHF Clinic [facility]  [address]   1935          Referring Provider: LAWRENCE Conner  Primary Care Physician: Dr. Kevin Jensen  Cardiologist: Dr. Telma Greenfield  Nephrologist:         History of Present Illness:     Berta Betancourt is a 82 y.o. male with a history of HFpEF, most recent EF 60% on 3/2/2022. Patient Story:    He does  have dyspnea with exertion, shortness of breath, or decline in overall functional capacity. He does not have orthopnea, PND, nocturnal cough or hemoptysis. He does not have abdominal distention or bloating, early satiety, anorexia/change in appetite. He does has a good urinary response to  oral diuretic. He does not have  lower extremity edema. He denies lightheadedness, dizziness. He denies palpitations, syncope or near syncope. He does not complain of chest pain, pressure, discomfort. No Known Allergies        Prior to Visit Medications    Medication Sig Taking?  Authorizing Provider   rivaroxaban (XARELTO) 20 MG TABS tablet Take 20 mg by mouth Daily with supper   Historical Provider, MD   Multiple Vitamin (MULTIVITAMIN ADULT PO) Take by mouth daily   Historical Provider, MD   meclizine (ANTIVERT) 25 MG tablet Take 25 mg by mouth daily as needed   Historical Provider, MD   furosemide (LASIX) 40 MG tablet Take 1 tablet by mouth daily Alternate 40 mg and 20 mg dosing every other day  Patient taking differently: Take 40 mg by mouth daily Alternate 40 mg and 20 mg dosing every other day (20mg on odd days)  GINETTE Pino CNP   amLODIPine (NORVASC) 2.5 MG tablet Take 1 tablet by mouth daily  Gregoria Yap DO   metoprolol tartrate (LOPRESSOR) 50 MG tablet TAKE 1 AND 1/2 TABLETS BY  MOUTH IN THE MORNING AND 1  TABLET BY MOUTH AT BEDTIME  Khushi Layton MD   pravastatin (PRAVACHOL) 40 MG tablet TAKE 1 TABLET BY MOUTH  DAILY  Khushi Layton MD   potassium chloride (KLOR-CON M) 10 MEQ extended release tablet TAKE 1 TABLET BY Bertin Damon MD   pantoprazole (PROTONIX) 40 MG tablet TAKE 1 TABLET BY MOUTH  DAILY  Stella Garces MD   Handicap Placard MISC by Does not apply route Disp #: 1  Duration: 5 years. Stella Garces MD   ferrous sulfate (IRON 325) 325 (65 Fe) MG tablet Take 1 tablet by mouth daily (with breakfast)  Stella Garces MD   MAGNESIUM-OXIDE 400 (241.3 Mg) MG TABS tablet TK 1 T PO ONCE D  Historical Provider, MD   dofetilide (TIKOSYN) 250 MCG capsule Take 1 capsule by mouth 2 times daily  Stella Garces MD   Multiple Vitamins-Minerals (PRESERVISION AREDS 2 PO) Take 1 tablet by mouth 2 times daily  Historical Provider, MD   Cholecalciferol (VITAMIN D) 2000 UNITS CAPS capsule Take 1 capsule by mouth daily   Historical Provider, MD           Guideline directed medical:  ARNI/ACE I/ARB: No  Beta blocker:   Yes  Aldosterone antagonist:  No        Physical Examination:     BP (!) 148/60   Pulse 70   Resp 16   Wt 171 lb (77.6 kg)   SpO2 95%   BMI 26.00 kg/m²     Assessment  Charting Type: Shift assessment (chf clinic)    Neurological  Level of Consciousness: Alert (0)         HEENT  HEENT (WDL): Exceptions to WDL (macular deg)  Right Eye: Impaired vision  Left Eye: Impaired vision         Breath Sounds  Right Upper Lobe: Clear  Right Middle Lobe: Clear  Right Lower Lobe: Fine Crackles  Left Upper Lobe: Clear  Left Lower Lobe: Diminished         Cardiac  Cardiac Rhythm: Atrial paced (hx AICD  )    Rhythm Interpretation  Pulse: 70         Gastrointestinal  Abdominal (WDL): Within Defined Limits  Abdomen Inspection: Rounded,Soft               Peripheral Vascular  Peripheral Vascular (WDL): Within Defined Limits (suppport hose are on)  Edema: Right lower extremity,Left lower extremity  RLE Edema: None  LLE Edema: None                   Genitourinary  Genitourinary (WDL): Within Defined Limits    Psychosocial  Psychosocial (WDL): Within Defined Limits    Pain Assessment  Pain Level: 0                   Pulse: 70                     LAB DATA:    Last 3 BMP      Sodium (mmol/L)   Date Value   04/12/2022 138   03/29/2022 140   03/17/2022 140     Potassium (mmol/L)   Date Value   04/12/2022 4.1   03/29/2022 4.2   03/17/2022 4.2     Potassium reflex Magnesium (mmol/L)   Date Value   03/12/2022 4.3   09/21/2021 4.5   09/04/2021 4.4     Chloride (mmol/L)   Date Value   04/12/2022 101   03/29/2022 101   03/17/2022 107     CO2 (mmol/L)   Date Value   04/12/2022 27   03/29/2022 27   03/17/2022 24     BUN (mg/dL)   Date Value   04/12/2022 21   03/29/2022 29 (H)   03/17/2022 12     Glucose (mg/dL)   Date Value   04/12/2022 98   03/29/2022 119 (H)   03/17/2022 93     Calcium (mg/dL)   Date Value   04/12/2022 9.1   03/29/2022 8.6   03/17/2022 8.4 (L)       Last 3 BNP       Pro-BNP (pg/mL)   Date Value   04/12/2022 478 (H)   03/29/2022 570 (H)   03/12/2022 841 (H)          CBC: No results for input(s): WBC, HGB, PLT in the last 72 hours. BMP:    No results for input(s): NA, K, CL, CO2, BUN, CREATININE, GLUCOSE in the last 72 hours. Hepatic: No results for input(s): AST, ALT, ALB, BILITOT, ALKPHOS in the last 72 hours. Troponin: No results for input(s): TROPONINI in the last 72 hours. BNP: No results for input(s): BNP in the last 72 hours. Lipids: No results for input(s): CHOL, HDL in the last 72 hours. Invalid input(s): LDLCALCU  INR: No results for input(s): INR in the last 72 hours.         WEIGHTS:    Wt Readings from Last 3 Encounters:   04/19/22 171 lb (77.6 kg)   04/12/22 169 lb 12.8 oz (77 kg)   04/07/22 170 lb 1.6 oz (77.2 kg)         TELEMETRY:  Cardiac Regularity: Regular  Cardiac Rhythm/Interpretation: A paced        ASSESSMENT:  Monica Murphy has stable weights Patient has fine crackles right base,  SOB with activity and recovers with rest. Patient states he has  a great response to oral diuretic  Patient educated on heart failure medication and hand out given Patient states his defibrillator fired 4-12-22 and he followed up with Dr Miriam Menezes 4-13-22 and he states she reprogrammed device. Interventions completed this visit:  IV diuretics given no  Lab work obtained yes, proBNP, BMP   Reviewed currently prescribed medications with patient, educated on importance of compliance and answered any questions regarding their medication  Educated on signs and symptoms of HF  Educated on low sodium diet    PLAN:  Scheduled to follow up in CHF clinic on   Future Appointments   Date Time Provider Cezar Gamez   4/26/2022 12:30 PM [facility] ROOM 1 [facility]   4/27/2022  1:00 PM MD PRISCILLA [facility]   5/13/2022  3:00 PM Gregoria Yap DO [address] [facility]     Given clinic phone number [phone] and aware of signs and symptoms to call with any HF change in symptoms.

## 2022-04-26 ENCOUNTER — HOSPITAL ENCOUNTER (OUTPATIENT)
Dept: OTHER | Age: 87
Setting detail: THERAPIES SERIES
Discharge: HOME | End: 2022-04-26
Payer: MEDICARE

## 2022-04-26 VITALS
SYSTOLIC BLOOD PRESSURE: 155 MMHG | RESPIRATION RATE: 16 BRPM | HEART RATE: 70 BPM | OXYGEN SATURATION: 93 % | DIASTOLIC BLOOD PRESSURE: 69 MMHG

## 2022-04-26 LAB
ANION GAP SERPL CALCULATED.3IONS-SCNC: 10 MMOL/L (ref 7–16)
BUN BLDV-MCNC: 18 MG/DL (ref 6–23)
CALCIUM SERPL-MCNC: 9.1 MG/DL (ref 8.6–10.2)
CHLORIDE BLD-SCNC: 99 MMOL/L (ref 98–107)
CO2: 29 MMOL/L (ref 22–29)
CREAT SERPL-MCNC: 1.1 MG/DL (ref 0.7–1.2)
GFR AFRICAN AMERICAN: >60
GFR NON-AFRICAN AMERICAN: >60 ML/MIN/1.73
GLUCOSE BLD-MCNC: 110 MG/DL (ref 74–99)
POTASSIUM SERPL-SCNC: 4.3 MMOL/L (ref 3.5–5)
PRO-BNP: 633 PG/ML (ref 0–450)
SODIUM BLD-SCNC: 138 MMOL/L (ref 132–146)

## 2022-04-26 PROCEDURE — 36415 COLL VENOUS BLD VENIPUNCTURE: CPT

## 2022-04-26 PROCEDURE — 80048 BASIC METABOLIC PNL TOTAL CA: CPT

## 2022-04-26 PROCEDURE — 99214 OFFICE O/P EST MOD 30 MIN: CPT

## 2022-04-26 PROCEDURE — 83880 ASSAY OF NATRIURETIC PEPTIDE: CPT

## 2022-04-26 ASSESSMENT — PAIN SCALES - GENERAL: PAINLEVEL_OUTOF10: 0

## 2022-04-26 NOTE — PROGRESS NOTES
[facility] PRE-ADMISSION TESTING INSTRUCTIONS        Have you been tested for COVID  No           Have you been told you were positive for COVID No  Have you had any known exposure to someone that is positive for COVID No  Do you have a cough                   No              Do you have shortness of breath No                 Do you have a sore throat            No                Are you having chills                    No                Are you having muscle aches. No                    Please come to the hospital wearing a mask and have your significant other wear a mask as well. Both of you should check your temperature before leaving to come here,  if it is 100 or higher please call [phone] for instruction. ARRIVAL INSTRUCTIONS:  [x] Parking the day of Surgery is located in the [facility]. Upon entering the main door make an immediate right to the surgery reception desk. [x] Bring photo ID and insurance card    [] Bring in a copy of Living will or Durable Power of  papers.     [x] Please be sure to arrange for responsible adult to provide transportation to and from the hospital    [x] Please arrange for responsible adult to be with you for the 24 hour period post procedure due to having anesthesia      GENERAL INSTRUCTIONS:    [x] Nothing by mouth after midnight, including gum, candy, mints or water    [x] You may brush your teeth, but do not swallow any water    [x] Take medications as instructed with 1-2 oz of water    [x] Stop herbal supplements and vitamins 5 days prior to procedure    [x] Follow preop dosing of blood thinners per physician instructions    [] Take 1/2 dose of evening insulin, but no insulin after midnight    [] No oral diabetic medications after midnight    [] If diabetic and have low blood sugar or feel symptomatic, take 1-2oz apple juice only    [] Bring inhalers day of surgery    [] Bring C-PAP/ Bi-Pap day of surgery    [] Bring urine specimen day of surgery    [x] Shower or bath with soap, lather and rinse well, AM of Surgery, no lotion, powders or creams to surgical site    [x] Follow bowel prep as instructed per surgeon    [x] No tobacco products within 24 hours of surgery     [x] No alcohol or illegal drug use within 24 hours of surgery.     [x] Jewelry, body piercing's, eyeglasses, contact lenses and dentures are not permitted into surgery (bring cases)      [x] Please do not wear any nail polish, make up or hair products on the day of surgery    [x] You can expect a call the business day prior to procedure to notify you if your arrival time changes    [x] If you receive a survey after surgery we would greatly appreciate your comments    [x] Please notify surgeon if you develop any illness between now and time of surgery (cold, cough, sore throat, fever, nausea, vomiting) or any signs of infections  including skin, wounds, and dental.    []  The Outpatient Pharm[facility] is available to fill your prescription here on your day of surgery, ask your preop nurse for details

## 2022-04-26 NOTE — PROGRESS NOTES
chloride (KLOR-CON M) 10 MEQ extended release tablet TAKE 1 TABLET BY Juan Bedoya MD   pantoprazole (PROTONIX) 40 MG tablet TAKE 1 TABLET BY MOUTH  DAILY  Matt Garg MD   Handicap Placard MISC by Does not apply route Disp #: 1  Duration: 5 years. Matt Garg MD   ferrous sulfate (IRON 325) 325 (65 Fe) MG tablet Take 1 tablet by mouth daily (with breakfast)  Matt Garg MD   MAGNESIUM-OXIDE 400 (241.3 Mg) MG TABS tablet TK 1 T PO ONCE D  Historical Provider, MD   dofetilide (TIKOSYN) 250 MCG capsule Take 1 capsule by mouth 2 times daily  Matt Garg MD   Multiple Vitamins-Minerals (PRESERVISION AREDS 2 PO) Take 2 tablets by mouth daily   Historical Provider, MD   Cholecalciferol (VITAMIN D) 2000 UNITS CAPS capsule Take 1 capsule by mouth daily   Historical Provider, MD           Guideline directed medical:  ARNI/ACE I/ARB: No  Beta blocker:   Yes  Aldosterone antagonist:  No        Physical Examination:     BP (!) 155/69   Pulse 70   Resp 16   SpO2 93%     Assessment  Charting Type: Shift assessment (chf clinic)    Neurological  Level of Consciousness: Alert (0)                   Breath Sounds  Right Upper Lobe: Clear  Right Middle Lobe: Clear  Right Lower Lobe: Fine Crackles  Left Upper Lobe: Clear  Left Lower Lobe: Clear         Cardiac  Cardiac Rhythm: Atrial paced (hx aicd)    Rhythm Interpretation  Pulse: 70         Gastrointestinal  Abdominal (WDL): Within Defined Limits  Abdomen Inspection: Rounded,Soft               Peripheral Vascular  Peripheral Vascular (WDL): Within Defined Limits (support hose are on)  Edema: Right lower extremity,Left lower extremity  RLE Edema: None  LLE Edema: None                   Genitourinary  Genitourinary (WDL): Within Defined Limits    Psychosocial  Psychosocial (WDL): Within Defined Limits    Pain Assessment  Pain Level: 0                   Pulse: 70                     LAB DATA:    Last 3 BMP      Sodium (mmol/L)   Date Value 04/19/2022 139   04/12/2022 138   03/29/2022 140     Potassium (mmol/L)   Date Value   04/19/2022 4.3   04/12/2022 4.1   03/29/2022 4.2     Potassium reflex Magnesium (mmol/L)   Date Value   03/12/2022 4.3   09/21/2021 4.5   09/04/2021 4.4     Chloride (mmol/L)   Date Value   04/19/2022 99   04/12/2022 101   03/29/2022 101     CO2 (mmol/L)   Date Value   04/19/2022 32 (H)   04/12/2022 27   03/29/2022 27     BUN (mg/dL)   Date Value   04/19/2022 21   04/12/2022 21   03/29/2022 29 (H)     Glucose (mg/dL)   Date Value   04/19/2022 115 (H)   04/12/2022 98   03/29/2022 119 (H)     Calcium (mg/dL)   Date Value   04/19/2022 9.0   04/12/2022 9.1   03/29/2022 8.6       Last 3 BNP       Pro-BNP (pg/mL)   Date Value   04/19/2022 496 (H)   04/12/2022 478 (H)   03/29/2022 570 (H)          CBC: No results for input(s): WBC, HGB, PLT in the last 72 hours. BMP:    No results for input(s): NA, K, CL, CO2, BUN, CREATININE, GLUCOSE in the last 72 hours. Hepatic: No results for input(s): AST, ALT, ALB, BILITOT, ALKPHOS in the last 72 hours. Troponin: No results for input(s): TROPONINI in the last 72 hours. BNP: No results for input(s): BNP in the last 72 hours. Lipids: No results for input(s): CHOL, HDL in the last 72 hours. Invalid input(s): LDLCALCU  INR: No results for input(s): INR in the last 72 hours. WEIGHTS:    Wt Readings from Last 3 Encounters:   04/19/22 171 lb (77.6 kg)   04/12/22 169 lb 12.8 oz (77 kg)   04/07/22 170 lb 1.6 oz (77.2 kg)         TELEMETRY:  Cardiac Regularity: Regular  Cardiac Rhythm/Interpretation: A paced        ASSESSMENT:  Marah Recinos has stable weights Patient has fine crackles right base,  SOB with activity and recovers with rest. Patient states he has  a great response to oral diuretic. Patient states his defibrillator fired 4-12-22 and he followed up with Dr Harvinder Redman 4-13-22 and he states she reprogrammed device.  Patient states he is having colonoscopy Monday 5-2-22 and then he and his wife are planning  To schedule to go to chair aerobics. Interventions completed this visit:  IV diuretics given no  Lab work obtained yes, proBNP, BMP   Reviewed currently prescribed medications with patient, educated on importance of compliance and answered any questions regarding their medication  Educated on signs and symptoms of HF  Educated on low sodium diet    PLAN:  Scheduled to follow up in CHF clinic on   Future Appointments   Date Time Provider Cezar Gamez   4/27/2022  1:00 PM MD [facility]   5/13/2022  3:00 PM Charity Novoa DO [address] [place] [address]   5/17/2022 10:00 AM [facility] ROOM 1 [facility] [address]     Given clinic phone number [phone] and aware of signs and symptoms to call with any HF change in symptoms.

## 2022-04-27 ENCOUNTER — OFFICE VISIT (OUTPATIENT)
Dept: PRIMARY CARE CLINIC | Age: 87
End: 2022-04-27
Payer: MEDICARE

## 2022-04-27 VITALS
SYSTOLIC BLOOD PRESSURE: 130 MMHG | WEIGHT: 171 LBS | DIASTOLIC BLOOD PRESSURE: 72 MMHG | BODY MASS INDEX: 26 KG/M2 | HEART RATE: 76 BPM | TEMPERATURE: 97.4 F | OXYGEN SATURATION: 94 %

## 2022-04-27 DIAGNOSIS — N18.32 STAGE 3B CHRONIC KIDNEY DISEASE (HCC): ICD-10-CM

## 2022-04-27 DIAGNOSIS — I50.42 CHRONIC COMBINED SYSTOLIC AND DIASTOLIC CHF (CONGESTIVE HEART FAILURE) (HCC): ICD-10-CM

## 2022-04-27 DIAGNOSIS — K92.1 GASTROINTESTINAL HEMORRHAGE WITH MELENA: Primary | ICD-10-CM

## 2022-04-27 DIAGNOSIS — I71.40 ABDOMINAL AORTIC ANEURYSM (AAA) 3.0 CM TO 5.5 CM IN DIAMETER IN MALE: ICD-10-CM

## 2022-04-27 DIAGNOSIS — I25.5 ISCHEMIC CARDIOMYOPATHY: ICD-10-CM

## 2022-04-27 DIAGNOSIS — Z95.810 ICD (IMPLANTABLE CARDIOVERTER-DEFIBRILLATOR), DUAL, IN SITU: ICD-10-CM

## 2022-04-27 DIAGNOSIS — I10 ESSENTIAL HYPERTENSION: ICD-10-CM

## 2022-04-27 DIAGNOSIS — D50.0 BLOOD LOSS ANEMIA: ICD-10-CM

## 2022-04-27 DIAGNOSIS — I25.10 CORONARY ARTERY DISEASE INVOLVING NATIVE CORONARY ARTERY OF NATIVE HEART WITHOUT ANGINA PECTORIS: ICD-10-CM

## 2022-04-27 LAB
BASOPHILS ABSOLUTE: 0.03 E9/L (ref 0–0.2)
BASOPHILS RELATIVE PERCENT: 0.5 % (ref 0–2)
EOSINOPHILS ABSOLUTE: 0.24 E9/L (ref 0.05–0.5)
EOSINOPHILS RELATIVE PERCENT: 3.8 % (ref 0–6)
HCT VFR BLD CALC: 40 % (ref 37–54)
HEMOGLOBIN: 12.4 G/DL (ref 12.5–16.5)
IMMATURE GRANULOCYTES #: 0.02 E9/L
IMMATURE GRANULOCYTES %: 0.3 % (ref 0–5)
LYMPHOCYTES ABSOLUTE: 1.6 E9/L (ref 1.5–4)
LYMPHOCYTES RELATIVE PERCENT: 25.3 % (ref 20–42)
MCH RBC QN AUTO: 28.9 PG (ref 26–35)
MCHC RBC AUTO-ENTMCNC: 31 % (ref 32–34.5)
MCV RBC AUTO: 93.2 FL (ref 80–99.9)
MONOCYTES ABSOLUTE: 0.75 E9/L (ref 0.1–0.95)
MONOCYTES RELATIVE PERCENT: 11.9 % (ref 2–12)
NEUTROPHILS ABSOLUTE: 3.68 E9/L (ref 1.8–7.3)
NEUTROPHILS RELATIVE PERCENT: 58.2 % (ref 43–80)
PDW BLD-RTO: 13.9 FL (ref 11.5–15)
PLATELET # BLD: 214 E9/L (ref 130–450)
PMV BLD AUTO: 11.6 FL (ref 7–12)
RBC # BLD: 4.29 E12/L (ref 3.8–5.8)
WBC # BLD: 6.3 E9/L (ref 4.5–11.5)

## 2022-04-27 PROCEDURE — 4040F PNEUMOC VAC/ADMIN/RCVD: CPT | Performed by: FAMILY MEDICINE

## 2022-04-27 PROCEDURE — 1036F TOBACCO NON-USER: CPT | Performed by: FAMILY MEDICINE

## 2022-04-27 PROCEDURE — G8427 DOCREV CUR MEDS BY ELIG CLIN: HCPCS | Performed by: FAMILY MEDICINE

## 2022-04-27 PROCEDURE — 99214 OFFICE O/P EST MOD 30 MIN: CPT | Performed by: FAMILY MEDICINE

## 2022-04-27 PROCEDURE — 1123F ACP DISCUSS/DSCN MKR DOCD: CPT | Performed by: FAMILY MEDICINE

## 2022-04-27 PROCEDURE — G8417 CALC BMI ABV UP PARAM F/U: HCPCS | Performed by: FAMILY MEDICINE

## 2022-04-27 ASSESSMENT — ENCOUNTER SYMPTOMS
RHINORRHEA: 0
WHEEZING: 0
SHORTNESS OF BREATH: 0
COUGH: 1
SORE THROAT: 0
NAUSEA: 0
VOMITING: 0
CONSTIPATION: 0
ABDOMINAL PAIN: 0
DIARRHEA: 0

## 2022-04-27 NOTE — PROGRESS NOTES
2022     Chief Complaint   Patient presents with    Discuss Labs     HPI  Sylvia Duke (:  1935) is a 82 y.o. male, here for evaluation of the following medical concerns:    Patient is a 30-year-old male with a past medical history of CHF, atrial fibrillation, nonsustained ventricular tachycardia, hyperlipidemia, hypertension, history of MI, insomnia, remote history of gastrointestinal hemorrhage with melena, ICD placement, and vitamin D deficiency who presents today for a 4 month follow-up apt. This recently patient was in the hospital for anemia due to a GI bleed. Patient had his anticoagulation held. CBC and fecal testing with trend. Patient has stabilization. Was restarted on his Xarelto. Will stop his Xarelto 4 days before upcoming colonoscopy. Labs: . BMP essentially within normal notes besides elevated glucose. Most recent CBC shows improved hemoglobin of 11.3. Most recent occult blood was positive. Most recent lipid showed total cholesterol 176, triglycerides 438, and HDL 34.     Occasional cough w/ cold foods. On PPI and allergy medication. Not always present.      Lower back issues: No sig back or knee pain. No swelling. Numbness in his right thigh to foot. Hx of bypass surgery. Was sent to PT in the past.      History of nonsustained ventricular tachycardia: On Tikosyn and, Lasix, and metoprolol. Patient follows both with cardiology and electrophysiology for this issue.     History of atrial fibrillation: Patient is rate controlled on metoprolol and anticoagulated on   Xarelto.     CHF: Patient is to be followed with cardiology and CHF clinic. No recent LE swelling. (Chronic rales on exam).  Patient is currently alternating his Lasix 20 and 40 every other day.     Hyperlipidemia: Currently on statin. Following with cardiology. Tolerating statin well without any side effects.     Hypertension: Blood pressure is appropriate on repeat today.  Still would like to have BP <140/90. Tolerating his blood pressure medications well. No side effects.     History of MI: Patient's 1st heart attack was in 2003. Has had multiple stents and bypass surgeries.  Patient is following with cardiology.     Insomnia: Some issues with awaking at night.      Lung Nodule: Has been discussed in the past in regards to the risk of lung cancer which could result in death. Patient would still like to continue to hold off on bx.      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    CKD III/II: Stable.     Vit D Def: On vitamin D supplementation.      Hx of GI bleed: Not on ASA due to [address]  Patient does have an ICD in place. Recently this did fire. Patient had to see his electrophysiologist to have his ICD recalibrated. This also delayed his colonoscopy.     AAA: Repeat ultrasound or CT scan due.     Health maintenance: Patient is due for shingles immunization. Review of Systems   Constitutional: Negative for chills and fever. HENT: Negative for congestion, rhinorrhea and sore throat. Respiratory: Positive for cough. Negative for shortness of breath and wheezing. Dyspnea on significant exertion. Cardiovascular: Negative for chest pain and leg swelling. Gastrointestinal: Negative for abdominal pain, constipation, diarrhea, nausea and vomiting. Skin: Negative for rash. Neurological: Negative for light-headedness and headaches. Past Medical History:   Diagnosis Date    A-fib [facility])     Arrhythmia     Carotid artery stenosis     CHF (congestive heart failure) (HCC)     Heart valve problem     Hyperlipidemia     Hypertension     ICD (implantable cardiac defibrillator) in place 2007    [employer] [facility] YJ#[id number] generator changed 7/28/16  Dr Hughes Manual    MI (mitral incompetence) 2003    MI (myocardial infarction) ([facility] [address] 2003       Prior to Visit Medications    Medication Sig Taking?  Authorizing Provider   rivaroxaban (XARELTO) 20 MG TABS tablet Take 20 mg by mouth Daily with supper  Yes Historical Provider, MD   Multiple Vitamin (MULTIVITAMIN ADULT PO) Take by mouth daily  Yes Historical Provider, MD   meclizine (ANTIVERT) 25 MG tablet Take 25 mg by mouth daily as needed  Yes Historical Provider, MD   amLODIPine (NORVASC) 2.5 MG tablet Take 1 tablet by mouth daily Yes Robert Beckham DO   metoprolol tartrate (LOPRESSOR) 50 MG tablet TAKE 1 AND 1/2 TABLETS BY  MOUTH IN THE MORNING AND 1  TABLET BY MOUTH AT BEDTIME Yes Iain Nath MD   pravastatin (PRAVACHOL) 40 MG tablet TAKE 1 TABLET BY MOUTH  DAILY Yes Iain Nath MD   potassium chloride (KLOR-CON M) 10 MEQ extended release tablet TAKE 1 TABLET BY MOUTH  DAILY Yes Iain Nath MD   pantoprazole (PROTONIX) 40 MG tablet TAKE 1 TABLET BY MOUTH  DAILY Yes Iain Nath MD   Handicap Placard MISC by Does not apply route Disp #: 1  Duration: 5 years.  Yes Iain Nath MD   ferrous sulfate (IRON 325) 325 (65 Fe) MG tablet Take 1 tablet by mouth daily (with breakfast) Yes Iain Nath MD   MAGNESIUM-OXIDE 400 (241.3 Mg) MG TABS tablet TK 1 T PO ONCE D Yes Historical Provider, MD   dofetilide (TIKOSYN) 250 MCG capsule Take 1 capsule by mouth 2 times daily Yes Iain Nath MD   Multiple Vitamins-Minerals (PRESERVISION AREDS 2 PO) Take 2 tablets by mouth daily  Yes Historical Provider, MD   Cholecalciferol (VITAMIN D) 2000 UNITS CAPS capsule Take 1 capsule by mouth daily  Yes Historical Provider, MD   furosemide (LASIX) 40 MG tablet Take 1 tablet by mouth daily Alternate 40 mg and 20 mg dosing every other day  Patient taking differently: Take 40 mg by mouth daily Alternate 40 mg and 20 mg dosing every other day (20mg on odd days)  Rashid , APRN - CNP        No Known Allergies    Social History     Tobacco Use    Smoking status: Former Smoker     Packs/day: 0.50     Years: 3.00     Pack years: 1.50     Quit date: 1973     Years since quittin.2    Smokeless tobacco: Never Used    Tobacco comment: Quit such.    Coronary artery disease involving native coronary artery of native heart without angina pectoris  Patient is on appropriate therapy. Not on aspirin due to GI bleed issues. However he is on Xarelto. Patient has not had any changes in his medications recently besides his Lasix as noted below. No additional work-up or recommendations by cardiology have been advised. Patient is to hold his Xarelto for 4 days prior to his upcoming colonoscopy. Essential hypertension  Blood pressure is stable and well controlled on his current regimen. Goal to keep blood pressure less than 140/90. Ischemic cardiomyopathy/Chronic combined systolic and diastolic CHF (congestive heart failure) (HCC)  Stable. Patient does follow with cardiology and with CHF clinic. Patient did have modification of his Lasix to 20 mg alternating with 40 mg every other day. No other recent medication changes. ICD (implantable cardioverter-defibrillator), dual, in situ  Recent triggering of ICD. Patient did follow-up with electrophysiology for reprogramming. Abdominal aortic aneurysm (AAA) 3.0 cm to 5.5 cm in diameter in male [facility])  -     US SCREENING FOR AAA; Future  Previously 3.9 cm on CT scan. Check ultrasound. Will check every 2 years. Stage 3b chronic kidney disease ([facility] [address]  Patient's kidney function has slightly improved. Patient is between chronic kidney disease stage II and stage III. Following with lab work every 4 to 6 months. Will follow patient up in approximately 1 month in regards to his colonoscopy and GI bleed issues. Patient has a screening ultrasound ordered and CBC. Patient complete such prior to his next appointment. Patient is also to complete his shingles vaccine series prior to his next appointment. Return in 4 weeks (on 5/25/2022) for Follow-up Appointment From Today's Visit. An [employer] was used to authenticate this note.     --Celsa Alex MD on 4/27/22 at 1:05 PM EDT

## 2022-05-03 ENCOUNTER — PREP FOR PROCEDURE (OUTPATIENT)
Dept: GASTROENTEROLOGY | Age: 87
End: 2022-05-03

## 2022-05-03 RX ORDER — SODIUM CHLORIDE 9 MG/ML
INJECTION, SOLUTION INTRAVENOUS CONTINUOUS
Status: CANCELLED | OUTPATIENT
Start: 2022-05-03

## 2022-05-03 RX ORDER — SODIUM CHLORIDE 0.9 % (FLUSH) 0.9 %
5-40 SYRINGE (ML) INJECTION EVERY 12 HOURS SCHEDULED
Status: CANCELLED | OUTPATIENT
Start: 2022-05-03

## 2022-05-03 RX ORDER — SODIUM CHLORIDE 0.9 % (FLUSH) 0.9 %
5-40 SYRINGE (ML) INJECTION PRN
Status: CANCELLED | OUTPATIENT
Start: 2022-05-03

## 2022-05-03 RX ORDER — SODIUM CHLORIDE 9 MG/ML
25 INJECTION, SOLUTION INTRAVENOUS PRN
Status: CANCELLED | OUTPATIENT
Start: 2022-05-03

## 2022-05-03 NOTE — H&P
Gastroenterology      Pre-operative History and Physical      HISTORY OF PRESENT ILLNESS:   Ivan Lopez is seen for a follow-up visit  Patient has followed up with PCP since being discharged from the hospital. Blood was found to be present in the stools, per occult testing completed x3. States his labs were normal per last labs. patient states his PCP wants to retest blood & stool next week. Patient and wife educated that this isn't necessary or needed at this time. Patient states he is feeling well, no complaints at this time. Patient will be scheduled for a colon at this time, will need a  to and from the facility the day of the procedure, all questions answered. Prior EGD findings reviewed with the patient and wife, all questions answered. Patient denies melena, hematochezia, hematemesis    HISTORY:   Past Medical History:   Diagnosis Date    A-fib [facility])     Arrhythmia     Carotid artery stenosis     [facility])     Heart valve problem     Hyperlipidemia     Hypertension     ICD (implantable cardiac defibrillator) in place 2007    [facility] EY#[id number] generator changed 7/28/16  Dr Jennifer Kaye    MI (mitral incompetence) 2003    MI (myocardial infarction) ([facility] 2003       PERTINENT FAMILY HISTORY:  No family history on file.     MEDICATIONS:    Current Outpatient Medications:     rivaroxaban (XARELTO) 20 MG TABS tablet, Take 20 mg by mouth Daily with supper , Disp: , Rfl:     Multiple Vitamin (MULTIVITAMIN ADULT PO), Take by mouth daily , Disp: , Rfl:     meclizine (ANTIVERT) 25 MG tablet, Take 25 mg by mouth daily as needed , Disp: , Rfl:     furosemide (LASIX) 40 MG tablet, Take 1 tablet by mouth daily Alternate 40 mg and 20 mg dosing every other day (Patient taking differently: Take 40 mg by mouth daily Alternate 40 mg and 20 mg dosing every other day (20mg on odd days)), Disp: 60 tablet, Rfl: 0    amLODIPine (NORVASC) 2.5 MG tablet, Take 1 tablet by mouth daily, Disp: 90 tablet, Rfl: 3    metoprolol tartrate (LOPRESSOR) 50 MG tablet, TAKE 1 AND 1/2 TABLETS BY  MOUTH IN THE MORNING AND 1  TABLET BY MOUTH AT BEDTIME, Disp: 225 tablet, Rfl: 3    pravastatin (PRAVACHOL) 40 MG tablet, TAKE 1 TABLET BY MOUTH  DAILY, Disp: 90 tablet, Rfl: 3    potassium chloride (KLOR-CON M) 10 MEQ extended release tablet, TAKE 1 TABLET BY MOUTH  DAILY, Disp: 90 tablet, Rfl: 3    pantoprazole (PROTONIX) 40 MG tablet, TAKE 1 TABLET BY MOUTH  DAILY, Disp: 90 tablet, Rfl: 3    Handicap Placard MISC, by Does not apply route Disp #: 1 Duration: 5 years. , Disp: 1 each, Rfl: 0    ferrous sulfate (IRON 325) 325 (65 Fe) MG tablet, Take 1 tablet by mouth daily (with breakfast), Disp: 90 tablet, Rfl: 1    MAGNESIUM-OXIDE 400 (241.3 Mg) MG TABS tablet, TK 1 T PO ONCE D, Disp: , Rfl:     dofetilide (TIKOSYN) 250 MCG capsule, Take 1 capsule by mouth 2 times daily, Disp: 90 capsule, Rfl: 0    Multiple Vitamins-Minerals (PRESERVISION AREDS 2 PO), Take 2 tablets by mouth daily , Disp: , Rfl:     Cholecalciferol (VITAMIN D) 2000 UNITS CAPS capsule, Take 1 capsule by mouth daily , Disp: , Rfl:     ALLERGIES:  Patient has no known allergies. PHYSICAL EXAM/GENERAL APPEARANCE:     Constitutional:  Appearance: well-developed, appropriate grooming, in no acute distress. .  Communication: conversation appropriate. Skin:  Inspection: no rashes, ulcers, icterus or other lesions. Eyes:  Conjunctivae/lids: lids normal,anicteric sclerae, moist conjunctivae. Pupils/irises: PERRLA. ENMT:  External: normal external inspection of the nose and ears, atraumautic. Hearing: within normal limits. Oropharynx: normal tongue, hard and soft palate; posterior pharynx without erythema, exudate or lesions. Neck:  Neck: normal motion and central trachea. Thyroid: normal size, consistency and position; no masses or tenderness.   Respiratory:  Effort: normal respiratory effort and no intercostal retractions. Auscultation: normal breath sounds, no rubs, wheezes or rhonchi. Chest:  Inspection: symetrical without visualized masses. Cardiovascular:  Palpation: normal size,PMI is palpable in the 5th intercostal space, left midclavicular line,normal rythym. Auscultation: normal, S1 and S2,no gallops,no rubs or murmurs. Gastrointestinal/Abdomen:  Abdomen: normal consistency, no tenderness or masses,normal bowel sounds. Liver/Spleen: normal,normal size,not palpable. Extremities:  RLE: no cyanosis, clubbing, or edema. Normal peripheral pulses. [place] LLE: no cyanosis, clubbing or edema. Normal peripheral pulses. [place] Psychiatric:  Orientation: oriented to time, space and person.     OTHER SIGNIFICANT FINDINGS: None    IMPRESSION/INITIAL DIAGNOSIS:   AVMs of fundus in stomach- ablated & clipped with prior EGD 3/16/22  Occult Blood +  Gastric ulcer  Rectal bleeding        Electronically signed by Bing Parker MD on 5/3/2022 at 8:00 AM

## 2022-05-04 ENCOUNTER — ANESTHESIA EVENT (OUTPATIENT)
Dept: ENDOSCOPY | Age: 87
End: 2022-05-04
Payer: MEDICARE

## 2022-05-04 ENCOUNTER — ANESTHESIA (OUTPATIENT)
Dept: ENDOSCOPY | Age: 87
End: 2022-05-04
Payer: MEDICARE

## 2022-05-04 ENCOUNTER — HOSPITAL ENCOUNTER (OUTPATIENT)
Age: 87
Setting detail: OUTPATIENT SURGERY
Discharge: HOME | End: 2022-05-04
Attending: INTERNAL MEDICINE | Admitting: INTERNAL MEDICINE
Payer: MEDICARE

## 2022-05-04 VITALS — SYSTOLIC BLOOD PRESSURE: 98 MMHG | DIASTOLIC BLOOD PRESSURE: 57 MMHG | OXYGEN SATURATION: 99 %

## 2022-05-04 VITALS
DIASTOLIC BLOOD PRESSURE: 55 MMHG | SYSTOLIC BLOOD PRESSURE: 110 MMHG | WEIGHT: 169 LBS | BODY MASS INDEX: 25.61 KG/M2 | OXYGEN SATURATION: 94 % | RESPIRATION RATE: 16 BRPM | TEMPERATURE: 97.8 F | HEIGHT: 68 IN | HEART RATE: 83 BPM

## 2022-05-04 PROCEDURE — 3700000001 HC ADD 15 MINUTES (ANESTHESIA): Performed by: INTERNAL MEDICINE

## 2022-05-04 PROCEDURE — 88305 TISSUE EXAM BY PATHOLOGIST: CPT | Performed by: ANESTHESIOLOGY

## 2022-05-04 PROCEDURE — 2709999900 HC NON-CHARGEABLE SUPPLY: Performed by: INTERNAL MEDICINE

## 2022-05-04 PROCEDURE — 2580000003 HC RX 258: Performed by: INTERNAL MEDICINE

## 2022-05-04 PROCEDURE — 3609010300 HC COLONOSCOPY W/BIOPSY SINGLE/MULTIPLE: Performed by: INTERNAL MEDICINE

## 2022-05-04 PROCEDURE — 6360000002 HC RX W HCPCS: Performed by: NURSE ANESTHETIST, CERTIFIED REGISTERED

## 2022-05-04 PROCEDURE — 7100000010 HC PHASE II RECOVERY - FIRST 15 MIN: Performed by: INTERNAL MEDICINE

## 2022-05-04 PROCEDURE — 6360000002 HC RX W HCPCS: Performed by: INTERNAL MEDICINE

## 2022-05-04 PROCEDURE — 7100000011 HC PHASE II RECOVERY - ADDTL 15 MIN: Performed by: INTERNAL MEDICINE

## 2022-05-04 PROCEDURE — 3700000000 HC ANESTHESIA ATTENDED CARE: Performed by: INTERNAL MEDICINE

## 2022-05-04 PROCEDURE — 88305 TISSUE EXAM BY PATHOLOGIST: CPT

## 2022-05-04 RX ORDER — PROPOFOL 10 MG/ML
INJECTION, EMULSION INTRAVENOUS PRN
Status: DISCONTINUED | OUTPATIENT
Start: 2022-05-04 | End: 2022-05-04 | Stop reason: SDUPTHER

## 2022-05-04 RX ORDER — SODIUM CHLORIDE 0.9 % (FLUSH) 0.9 %
5-40 SYRINGE (ML) INJECTION EVERY 12 HOURS SCHEDULED
Status: DISCONTINUED | OUTPATIENT
Start: 2022-05-04 | End: 2022-05-04 | Stop reason: HOSPADM

## 2022-05-04 RX ORDER — SODIUM CHLORIDE 9 MG/ML
25 INJECTION, SOLUTION INTRAVENOUS PRN
Status: DISCONTINUED | OUTPATIENT
Start: 2022-05-04 | End: 2022-05-04 | Stop reason: HOSPADM

## 2022-05-04 RX ORDER — SODIUM CHLORIDE 0.9 % (FLUSH) 0.9 %
5-40 SYRINGE (ML) INJECTION PRN
Status: DISCONTINUED | OUTPATIENT
Start: 2022-05-04 | End: 2022-05-04 | Stop reason: HOSPADM

## 2022-05-04 RX ORDER — GENTAMICIN SULFATE 80 MG/50ML
80 INJECTION, SOLUTION INTRAVENOUS ONCE
Status: COMPLETED | OUTPATIENT
Start: 2022-05-04 | End: 2022-05-04

## 2022-05-04 RX ORDER — SODIUM CHLORIDE 9 MG/ML
INJECTION, SOLUTION INTRAVENOUS CONTINUOUS
Status: DISCONTINUED | OUTPATIENT
Start: 2022-05-04 | End: 2022-05-04 | Stop reason: HOSPADM

## 2022-05-04 RX ADMIN — PROPOFOL 150 MG: 10 INJECTION, EMULSION INTRAVENOUS at 13:57

## 2022-05-04 RX ADMIN — AMPICILLIN SODIUM 2000 MG: 2 INJECTION, POWDER, FOR SOLUTION INTRAMUSCULAR; INTRAVENOUS at 13:41

## 2022-05-04 RX ADMIN — GENTAMICIN SULFATE 80 MG: 80 INJECTION, SOLUTION INTRAVENOUS at 14:17

## 2022-05-04 RX ADMIN — SODIUM CHLORIDE: 9 INJECTION, SOLUTION INTRAVENOUS at 13:53

## 2022-05-04 ASSESSMENT — ENCOUNTER SYMPTOMS: SHORTNESS OF BREATH: 0

## 2022-05-04 ASSESSMENT — PAIN - FUNCTIONAL ASSESSMENT: PAIN_FUNCTIONAL_ASSESSMENT: 0-10

## 2022-05-04 NOTE — BRIEF OP NOTE
Brief Postoperative Note    Nereida Damian  YOB: 1935  [id number]    Procedure: Colonoscopy    Anesthesia: [facility]    Surgeon:  Fiorella Quan MD    Findings: 2 polyps ascending colon and transverse colon both removed with bx forceps. Large internal hemorrhoids   Complications: None      Estimated blood loss: none      Follow up colonoscopy in 3 years.       Clinton Reyes MD

## 2022-05-04 NOTE — H&P
Gastroenterology      Pre-operative History and Physical      HISTORY OF PRESENT ILLNESS:   Ivan Lopez is seen for a follow-up visit  Patient has followed up with PCP since being discharged from the hospital. Blood was found to be present in the stools, per occult testing completed x3. States his labs were normal per last labs. patient states his PCP wants to retest blood & stool next week. Patient and wife educated that this isn't necessary or needed at this time. Patient states he is feeling well, no complaints at this time. Patient will be scheduled for a colon at this time, will need a  to and from the facility the day of the procedure, all questions answered. Prior EGD findings reviewed with the patient and wife, all questions answered. Patient denies melena, hematochezia, hematemesis    HISTORY:   Past Medical History:   Diagnosis Date    A-fib [facility])     Arrhythmia     Carotid artery stenosis     [facility])     Heart valve problem     Hyperlipidemia     Hypertension     ICD (implantable cardiac defibrillator) in place 2007    [email or web address] [place] [address] [place]#[id number] generator changed 7/28/16  Dr Jennifer Kaye    MI (mitral incompetence) 2003    MI (myocardial infarction) ([facility] 2003       PERTINENT FAMILY HISTORY:  History reviewed. No pertinent family history.     MEDICATIONS:    Current Outpatient Medications:     rivaroxaban (XARELTO) 20 MG TABS tablet, Take 20 mg by mouth Daily with supper , Disp: , Rfl:     Multiple Vitamin (MULTIVITAMIN ADULT PO), Take by mouth daily , Disp: , Rfl:     meclizine (ANTIVERT) 25 MG tablet, Take 25 mg by mouth daily as needed , Disp: , Rfl:     furosemide (LASIX) 40 MG tablet, Take 1 tablet by mouth daily Alternate 40 mg and 20 mg dosing every other day (Patient taking differently: Take 40 mg by mouth daily Alternate 40 mg and 20 mg dosing every other day (20mg on odd days)), Disp: 60 tablet, Rfl: 0    amLODIPine (NORVASC) 2.5 MG tablet, Take 1 tablet by mouth daily, Disp: 90 tablet, Rfl: 3    metoprolol tartrate (LOPRESSOR) 50 MG tablet, TAKE 1 AND 1/2 TABLETS BY  MOUTH IN THE MORNING AND 1  TABLET BY MOUTH AT BEDTIME, Disp: 225 tablet, Rfl: 3    pravastatin (PRAVACHOL) 40 MG tablet, TAKE 1 TABLET BY MOUTH  DAILY, Disp: 90 tablet, Rfl: 3    potassium chloride (KLOR-CON M) 10 MEQ extended release tablet, TAKE 1 TABLET BY MOUTH  DAILY, Disp: 90 tablet, Rfl: 3    pantoprazole (PROTONIX) 40 MG tablet, TAKE 1 TABLET BY MOUTH  DAILY, Disp: 90 tablet, Rfl: 3    Handicap Placard MISC, by Does not apply route Disp #: 1 Duration: 5 years. , Disp: 1 each, Rfl: 0    ferrous sulfate (IRON 325) 325 (65 Fe) MG tablet, Take 1 tablet by mouth daily (with breakfast), Disp: 90 tablet, Rfl: 1    MAGNESIUM-OXIDE 400 (241.3 Mg) MG TABS tablet, TK 1 T PO ONCE D, Disp: , Rfl:     dofetilide (TIKOSYN) 250 MCG capsule, Take 1 capsule by mouth 2 times daily, Disp: 90 capsule, Rfl: 0    Multiple Vitamins-Minerals (PRESERVISION AREDS 2 PO), Take 2 tablets by mouth daily , Disp: , Rfl:     Cholecalciferol (VITAMIN D) 2000 UNITS CAPS capsule, Take 1 capsule by mouth daily , Disp: , Rfl:     ALLERGIES:  Patient has no known allergies. PHYSICAL EXAM/GENERAL APPEARANCE:     Constitutional:  Appearance: well-developed, appropriate grooming, in no acute distress. .  Communication: conversation appropriate. Skin:  Inspection: no rashes, ulcers, icterus or other lesions. Eyes:  Conjunctivae/lids: lids normal,anicteric sclerae, moist conjunctivae. Pupils/irises: PERRLA. ENMT:  External: normal external inspection of the nose and ears, atraumautic. Hearing: within normal limits. Oropharynx: normal tongue, hard and soft palate; posterior pharynx without erythema, exudate or lesions. Neck:  Neck: normal motion and central trachea. Thyroid: normal size, consistency and position; no masses or tenderness.   Respiratory:  Effort: normal respiratory effort and no intercostal retractions. Auscultation: normal breath sounds, no rubs, wheezes or rhonchi. Chest:  Inspection: symetrical without visualized masses. Cardiovascular:  Palpation: normal size,PMI is palpable in the 5th intercostal space, left midclavicular line,normal rythym. Auscultation: normal, S1 and S2,no gallops,no rubs or murmurs. Gastrointestinal/Abdomen:  Abdomen: normal consistency, no tenderness or masses,normal bowel sounds. Liver/Spleen: normal,normal size,not palpable. Extremities:  RLE: no cyanosis, clubbing, or edema. Normal peripheral pulses. [place] LLE: no cyanosis, clubbing or edema. Normal peripheral pulses. [place] Psychiatric:  Orientation: oriented to time, space and person.     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

## 2022-05-04 NOTE — ANESTHESIA PRE PROCEDURE
Department of Anesthesiology  Preprocedure Note       Name:  Radha Hall   Age:  82 y.o.  :  1935                                          MRN:  [id number]         Date:  2022      Surgeon: Robbin Alfred):  Shelby Lovell MD    Procedure: Procedure(s):  COLONOSCOPY DIAGNOSTIC    Medications prior to admission:   Prior to Admission medications    Medication Sig Start Date End Date Taking? Authorizing Provider   rivaroxaban (XARELTO) 20 MG TABS tablet Take 20 mg by mouth Daily with supper     Historical Provider, MD   Multiple Vitamin (MULTIVITAMIN ADULT PO) Take by mouth daily     Historical Provider, MD   meclizine (ANTIVERT) 25 MG tablet Take 25 mg by mouth daily as needed     Historical Provider, MD   furosemide (LASIX) 40 MG tablet Take 1 tablet by mouth daily Alternate 40 mg and 20 mg dosing every other day  Patient taking differently: Take 40 mg by mouth daily Alternate 40 mg and 20 mg dosing every other day (20mg on odd days) 3/11/22 4/10/22  Sony Contreras, APRN - CNP   amLODIPine (NORVASC) 2.5 MG tablet Take 1 tablet by mouth daily 22   Ritika Baires DO   metoprolol tartrate (LOPRESSOR) 50 MG tablet TAKE 1 AND 1/2 TABLETS BY  MOUTH IN THE MORNING AND 1  TABLET BY MOUTH AT BEDTIME 22   Rosalie Grier MD   pravastatin (PRAVACHOL) 40 MG tablet TAKE 1 TABLET BY MOUTH  DAILY 22   Rosalie Grier MD   potassium chloride (KLOR-CON M) 10 MEQ extended release tablet TAKE 1 TABLET BY MOUTH  DAILY 22   Rosalie Grier MD   pantoprazole (PROTONIX) 40 MG tablet TAKE 1 TABLET BY MOUTH  DAILY 12/15/21   Rosalie Grier MD   Handicap Placard MISC by Does not apply route Disp #: 1  Duration: 5 years.  20   Rosalie Grier MD   ferrous sulfate (IRON 325) 325 (65 Fe) MG tablet Take 1 tablet by mouth daily (with breakfast) 20   Rosalie Grier MD   MAGNESIUM-OXIDE 400 (241.3 Mg) MG TABS tablet TK 1 T PO ONCE D 3/18/20   Historical Provider, MD   dofetilide (TIKOSYN) 250 MCG capsule Take 1 capsule by mouth 2 times daily 3/5/20   Rosalie Grier MD   Multiple Vitamins-Minerals (PRESERVISION AREDS 2 PO) Take 2 tablets by mouth daily     Historical Provider, MD   Cholecalciferol (VITAMIN D) 2000 UNITS CAPS capsule Take 1 capsule by mouth daily     Historical Provider, MD       Current medications:    No current facility-administered medications for this visit. No current outpatient medications on file.      Facility-Administered Medications Ordered in Other Visits   Medication Dose Route Frequency Provider Last Rate Last Admin    ampicillin 2000 mg ivpb mini bag  2,000 mg IntraVENous Once Shelby Lovell MD        0.9 % sodium chloride infusion   IntraVENous Continuous Shelby Lovell MD        0.9 % sodium chloride infusion  25 mL IntraVENous PRN Shelby Lovell MD        sodium chloride flush 0.9 % injection 5-40 mL  5-40 mL IntraVENous 2 times per day Shelby Lovell MD        sodium chloride flush 0.9 % injection 5-40 mL  5-40 mL IntraVENous PRN Shelby Lovell MD        gentamicin (GARAMYCIN) IVPB 80 mg  80 mg IntraVENous Once Shelby Lovell MD           Allergies:  No Known Allergies    Problem List:    Patient Active Problem List   Diagnosis Code    Chronic combined systolic and diastolic CHF (congestive heart failure) (HCC) I50.42    S/P TAVR (transcatheter aortic valve replacement) Z95.2    Chronic atrial fibrillation I48.20    Coronary artery disease involving native coronary artery of native heart without angina pectoris I25.10    Essential hypertension I10    Hyperlipidemia LDL goal <100 E78.5    GIB (gastrointestinal bleeding) K92.2    ICD (implantable cardioverter-defibrillator), dual, in situ Z95.810    Ischemic heart disease I25.9    Vitamin D deficiency E55.9    Other insomnia G47.09    SOB (shortness of breath) R06.02    Lung nodule R91.1    Acute on chronic diastolic (congestive) heart failure (HCC) I50.33    Pure hypercholesterolemia E78.00    Stage 3b chronic kidney disease ([facility] N18.32    Transient cerebral ischemia G45.9    Aortic valve disease I35.9    Pre-syncope R55    Acute on chronic combined systolic and diastolic CHF (congestive heart failure) (HCC) I50.43    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) I50.40    Gastroesophageal reflux disease without esophagitis K21.9    Coronary artery disease involving coronary bypass graft I25.810    Symptomatic anemia D64.9    GI bleed K92.2    Abdominal aortic aneurysm (AAA) 3.0 cm to 5.5 cm in diameter in male [facility]) I71.4       Past Medical History:        Diagnosis Date    A-fib [facility])     Arrhythmia     Carotid artery stenosis     CHF (congestive heart failure) ([facility]     Heart valve problem     Hyperlipidemia     Hypertension     ICD (implantable cardiac defibrillator) in place 2007    [employer]  EK#[id number] generator changed 7/28/16  Dr Saeid Tai    MI (mitral incompetence) 2003    MI (myocardial infarction) ([facility] 2003       Past Surgical History:        Procedure Laterality Date    CARDIAC CATHETERIZATION Right 03/03/2017    Dr. Judit Day  2007. 2008 2007 78 shocks replaced 2008 [employer]     CARDIAC DEFIBRILLATOR PLACEMENT  07/28/2016    [employer] Dual ICD gen change    CARDIAC SURGERY N/A 9/4/2021    cardioversion performed by Tyler Johnson MD at [address] [place] [address]  10/29/2020    Successful    (Dr. Saeid Tai)    COLONOSCOPY N/A 2/24/2020    COLONOSCOPY DIAGNOSTIC performed by Mendoza Don MD at [place] [address]  05/23/2003 03/16/2007    DIAGNOSTIC CARDIAC CATH LAB PROCEDURE  2007    DIAGNOSTIC CARDIAC CATH LAB PROCEDURE  03/29/2008    stent    JOINT REPLACEMENT  2011    r hip surgery Dr Baird Presumusama  03/29/2017    Dr. Sue Hall and Dr. Neves Better- TAVR Josie 26mm valve    UPPER GASTROINTESTINAL ENDOSCOPY N/A 9/3/2019    EGD ESOPHAGOGASTRODUODENOSCOPY performed by Michell Ambriz MD at  [facility] 2020    EGD ESOPHAGOGASTRODUODENOSCOPY performed by Ana Solo MD at  [employer] 3/14/2022    EGD CONTROL HEMORRHAGE performed by Santi Gentile MD at  [facility] N/A 3/16/2022    EGD ESOPHAGOGASTRODUODENOSCOPY performed by Santi Gentile MD at [address]         Social History:    Social History     Tobacco Use    Smoking status: Former Smoker     Packs/day: 0.50     Years: 3.00     Pack years: 1.50     Quit date: 1973     Years since quittin.2    Smokeless tobacco: Never Used    Tobacco comment: Quit smoking in    Substance Use Topics    Alcohol use: No                                Counseling given: Not Answered  Comment: Quit smoking in       Vital Signs (Current): There were no vitals filed for this visit.                                            BP Readings from Last 3 Encounters:   22 (!) 173/106   22 130/72   22 (!) 155/69       NPO Status:                                                                                 BMI:   Wt Readings from Last 3 Encounters:   22 169 lb (76.7 kg)   22 171 lb (77.6 kg)   22 171 lb (77.6 kg)     There is no height or weight on file to calculate BMI.    CBC:   Lab Results   Component Value Date    WBC 6.3 2022    RBC 4.29 2022    HGB 12.4 2022    HCT 40.0 2022    MCV 93.2 2022    RDW 13.9 2022     2022       CMP:   Lab Results   Component Value Date     2022    K 4.3 2022    K 4.3 2022    CL 99 2022    CO2 29 2022    BUN 18 2022    CREATININE 1.1 2022    GFRAA >60 2022    LABGLOM >60 2022    GLUCOSE 110 2022    PROT 5.5 2022    CALCIUM 9.1 04/26/2022    BILITOT 0.6 03/17/2022    ALKPHOS 112 03/17/2022    AST 23 03/17/2022    ALT 9 03/17/2022       POC Tests: No results for input(s): POCGLU, POCNA, POCK, POCCL, POCBUN, POCHEMO, POCHCT in the last 72 hours. Coags:   Lab Results   Component Value Date    PROTIME 13.9 03/14/2022    INR 1.3 03/14/2022    APTT 30.6 03/13/2022       HCG (If Applicable): No results found for: PREGTESTUR, PREGSERUM, HCG, HCGQUANT     ABGs: No results found for: PHART, PO2ART, SQS7GNU, AQE2MDF, BEART, Q1PERTUC     Type & Screen (If Applicable):  No results found for: LABABO, LABRH    Drug/Infectious Status (If Applicable):  No results found for: HIV, HEPCAB    COVID-19 Screening (If Applicable):   Lab Results   Component Value Date    COVID19 Not Detected 10/29/2020           Anesthesia Evaluation  Patient summary reviewed and Nursing notes reviewed no history of anesthetic complications:   Airway: Mallampati: II  TM distance: >3 FB   Neck ROM: full  Mouth opening: > = 3 FB Dental: normal exam         Pulmonary:normal exam        (-) shortness of breath                           Cardiovascular:    (+) hypertension:, pacemaker (ICD (implantable cardiac defibrillator) in place): AICD and pacemaker, past MI: no interval change and > 6 months, CAD: obstructive, dysrhythmias (comes and goes): atrial fibrillation, CHF: no interval change,       ECG reviewed  Rhythm: irregular  Rate: normal  Echocardiogram reviewed         Beta Blocker:  Not on Beta Blocker         Neuro/Psych:   Negative Neuro/Psych ROS              GI/Hepatic/Renal:   (+) GERD:, renal disease (pt denies): CRI,      (-) liver disease       Endo/Other:    (+) blood dyscrasia: anemia and anticoagulation therapy, arthritis: OA., .                  ROS comment: GI bleed Abdominal:             Vascular: negative vascular ROS. Other Findings:               Anesthesia Plan      MAC     ASA 4       Induction: intravenous.       Anesthetic plan and risks discussed with patient and spouse. Use of blood products discussed with patient whom consented to blood products. Plan discussed with CRNA.               Patient seen and evaluated  MD Lashnada Daniel MD   5/4/2022  Chart review agree above  GINETTE Thayer - CRNA

## 2022-05-04 NOTE — ANESTHESIA POSTPROCEDURE EVALUATION
Department of Anesthesiology  Postprocedure Note    Patient: Saira Self  MRN: [id number]  YOB: 1935  Date of evaluation: 5/4/2022  Time:  3:02 PM     Procedure Summary     Date: 05/04/22 Room / Location: [facility] 01 / [facility]    Anesthesia Start: 7998 Anesthesia Stop: 1772    Procedure: COLONOSCOPY WITH BIOPSY (N/A ) Diagnosis: (OCCULT POSITIVE STOOL)    Surgeons: Gareth Pineda MD Responsible Provider: Martita Solis MD    Anesthesia Type: MAC ASA Status: 4          Anesthesia Type: MAC    Alfred Phase I: Alfred Score: 10    Alfred Phase II: Alfred Score: 10    Last vitals: Reviewed and per EMR flowsheets.        Anesthesia Post Evaluation    Patient location during evaluation: PACU  Patient participation: complete - patient participated  Level of consciousness: awake and alert  Pain score: 0  Airway patency: patent  Nausea & Vomiting: no nausea and no vomiting  Complications: no  Cardiovascular status: blood pressure returned to baseline  Respiratory status: acceptable  Hydration status: euvolemic

## 2022-05-05 NOTE — OP NOTE
[address] [address]                                OPERATIVE REPORT    PATIENT NAME: Kel Morocho                    :        1935  MED REC NO:   [id number]                            ROOM:  ACCOUNT NO:   [de-identified]                           ADMIT DATE: 2022  PROVIDER:     Trina Spear MD    DATE OF PROCEDURE:  2022    PREOPERATIVE DIAGNOSES:  Rectal bleeding, anemia. POSTOPERATIVE DIAGNOSES:  Large internal hemorrhoids and two polyps, one  at the ascending colon and a medium-sized one at the transverse colon,  both removed via biopsy forceps. Followup is suggested in three years. Preop antibiotics were given for aortic valve replacement prophylaxis. OPERATION PERFORMED:  Colonoscopy with polypectomy. SURGEON:  Trina Spear MD.    ANESTHESIA:  LMAC. COLON PREP:  Adequate. DEGREE OF DIFFICULTY:  Minimal.    WITHDRAWAL TIME:  6-1/2 minutes. NOTE:  Prior to the procedure, an informed consent was obtained from the  patient after explaining the benefits as well as the risks,  alternatives, and complications of the procedure to the patient, who  understood and agreed. PROCEDURE:  With the patient in the left lateral decubitus position, the  digital rectal examination was carried out. The Olympus video colonoscope was then introduced into the anal canal,  anorectal area, rectosigmoid, sigmoid and descending colon, advanced  around the splenic flexure into the transverse colon, hepatic flexure,  ascending colon, from which into the cecum. The cecum was identified by the presence of the ileocecal valve,  appendiceal orifice, and light at the right lower quadrant. Evaluation  to the cecum showed two polyps, one in the ascending colon and one at  the transverse colon which was medium in size, both removed via biopsy  forceps, otherwise unremarkable.     The scope was then retrieved, decompressing the cecum, ascending colon,  transverse, and descending colon, conducting a second look on the way  out which was essentially unremarkable. The scope was then retroflexed  in the rectum. Retroflexion in the rectum showed large internal  hemorrhoids. The scope was then straightened, the area deflated, and the procedure  was terminated. The patient tolerated the procedure well.         Ashok Pérez MD    D: 05/04/2022 14:51:52       T: 05/04/2022 22:30:46     [email or web address]  Job#: [id number]     Doc#: [id number]    CC:  Dayanara Mendoza MD

## 2022-05-12 ENCOUNTER — ANESTHESIA (OUTPATIENT)
Dept: CARDIAC CATH/INVASIVE PROCEDURES | Age: 87
End: 2022-05-12

## 2022-05-12 ENCOUNTER — HOSPITAL ENCOUNTER (OUTPATIENT)
Dept: CARDIAC CATH/INVASIVE PROCEDURES | Age: 87
Discharge: HOME | End: 2022-05-12
Payer: MEDICARE

## 2022-05-12 ENCOUNTER — ANESTHESIA EVENT (OUTPATIENT)
Dept: CARDIAC CATH/INVASIVE PROCEDURES | Age: 87
End: 2022-05-12

## 2022-05-12 VITALS
RESPIRATION RATE: 18 BRPM | SYSTOLIC BLOOD PRESSURE: 146 MMHG | DIASTOLIC BLOOD PRESSURE: 71 MMHG | OXYGEN SATURATION: 99 %

## 2022-05-12 VITALS
HEART RATE: 70 BPM | WEIGHT: 167 LBS | HEIGHT: 68 IN | DIASTOLIC BLOOD PRESSURE: 69 MMHG | RESPIRATION RATE: 18 BRPM | TEMPERATURE: 98.2 F | OXYGEN SATURATION: 95 % | SYSTOLIC BLOOD PRESSURE: 131 MMHG | BODY MASS INDEX: 25.31 KG/M2

## 2022-05-12 DIAGNOSIS — I48.19 PERSISTENT ATRIAL FIBRILLATION (HCC): ICD-10-CM

## 2022-05-12 DIAGNOSIS — I48.0 PAROXYSMAL ATRIAL FIBRILLATION (HCC): Primary | ICD-10-CM

## 2022-05-12 LAB
ANION GAP SERPL CALCULATED.3IONS-SCNC: 9 MMOL/L (ref 7–16)
BUN BLDV-MCNC: 17 MG/DL (ref 6–23)
CALCIUM SERPL-MCNC: 9.2 MG/DL (ref 8.6–10.2)
CHLORIDE BLD-SCNC: 103 MMOL/L (ref 98–107)
CO2: 29 MMOL/L (ref 22–29)
CREAT SERPL-MCNC: 1.1 MG/DL (ref 0.7–1.2)
GFR AFRICAN AMERICAN: >60
GFR NON-AFRICAN AMERICAN: >60 ML/MIN/1.73
GLUCOSE BLD-MCNC: 101 MG/DL (ref 74–99)
HCT VFR BLD CALC: 38.3 % (ref 37–54)
HEMOGLOBIN: 12.3 G/DL (ref 12.5–16.5)
MCH RBC QN AUTO: 28.3 PG (ref 26–35)
MCHC RBC AUTO-ENTMCNC: 32.1 % (ref 32–34.5)
MCV RBC AUTO: 88.2 FL (ref 80–99.9)
PDW BLD-RTO: 13.9 FL (ref 11.5–15)
PLATELET # BLD: 222 E9/L (ref 130–450)
PMV BLD AUTO: 10.7 FL (ref 7–12)
POTASSIUM SERPL-SCNC: 4.6 MMOL/L (ref 3.5–5)
RBC # BLD: 4.34 E12/L (ref 3.8–5.8)
SODIUM BLD-SCNC: 141 MMOL/L (ref 132–146)
WBC # BLD: 6.6 E9/L (ref 4.5–11.5)

## 2022-05-12 PROCEDURE — 2709999900 HC NON-CHARGEABLE SUPPLY

## 2022-05-12 PROCEDURE — 6360000002 HC RX W HCPCS: Performed by: INTERNAL MEDICINE

## 2022-05-12 PROCEDURE — 80048 BASIC METABOLIC PNL TOTAL CA: CPT

## 2022-05-12 PROCEDURE — 6360000002 HC RX W HCPCS: Performed by: NURSE ANESTHETIST, CERTIFIED REGISTERED

## 2022-05-12 PROCEDURE — 92960 CARDIOVERSION ELECTRIC EXT: CPT

## 2022-05-12 PROCEDURE — 2580000003 HC RX 258: Performed by: NURSE ANESTHETIST, CERTIFIED REGISTERED

## 2022-05-12 PROCEDURE — 36415 COLL VENOUS BLD VENIPUNCTURE: CPT

## 2022-05-12 PROCEDURE — 6370000000 HC RX 637 (ALT 250 FOR IP): Performed by: INTERNAL MEDICINE

## 2022-05-12 PROCEDURE — 85027 COMPLETE CBC AUTOMATED: CPT

## 2022-05-12 PROCEDURE — 3700000000 HC ANESTHESIA ATTENDED CARE

## 2022-05-12 RX ORDER — POTASSIUM CHLORIDE 20 MEQ/1
40 TABLET, EXTENDED RELEASE ORAL ONCE
Status: COMPLETED | OUTPATIENT
Start: 2022-05-12 | End: 2022-05-12

## 2022-05-12 RX ORDER — PROPOFOL 10 MG/ML
INJECTION, EMULSION INTRAVENOUS PRN
Status: DISCONTINUED | OUTPATIENT
Start: 2022-05-12 | End: 2022-05-12 | Stop reason: SDUPTHER

## 2022-05-12 RX ORDER — FUROSEMIDE 40 MG/1
40 TABLET ORAL DAILY
Qty: 180 TABLET | Refills: 3 | Status: SHIPPED
Start: 2022-05-12 | End: 2022-05-17 | Stop reason: DRUGHIGH

## 2022-05-12 RX ORDER — FUROSEMIDE 10 MG/ML
20 INJECTION INTRAMUSCULAR; INTRAVENOUS ONCE
Status: COMPLETED | OUTPATIENT
Start: 2022-05-12 | End: 2022-05-12

## 2022-05-12 RX ORDER — SODIUM CHLORIDE 9 MG/ML
INJECTION, SOLUTION INTRAVENOUS CONTINUOUS PRN
Status: DISCONTINUED | OUTPATIENT
Start: 2022-05-12 | End: 2022-05-12 | Stop reason: SDUPTHER

## 2022-05-12 RX ADMIN — SODIUM CHLORIDE: 9 INJECTION, SOLUTION INTRAVENOUS at 14:35

## 2022-05-12 RX ADMIN — FUROSEMIDE 20 MG: 10 INJECTION, SOLUTION INTRAVENOUS at 14:49

## 2022-05-12 RX ADMIN — POTASSIUM CHLORIDE 40 MEQ: 20 TABLET, EXTENDED RELEASE ORAL at 14:49

## 2022-05-12 RX ADMIN — PROPOFOL 50 MG: 10 INJECTION, EMULSION INTRAVENOUS at 14:44

## 2022-05-12 ASSESSMENT — ENCOUNTER SYMPTOMS: SHORTNESS OF BREATH: 1

## 2022-05-12 NOTE — PROCEDURES
[address]                  [address] [facility],  [facility]                                 PROCEDURE NOTE    PATIENT NAME: Eliseo Rosa                    :        1935  MED REC NO:   [id number]                            ROOM:  ACCOUNT NO:   [de-identified]                           ADMIT DATE: 2022  PROVIDER:     Zana Lackey MD    DATE OF PROCEDURE:  2022    NAME OF THE PROCEDURE:  Direct current cardioversion. PREPROCEDURE DIAGNOSIS:  Atrial fibrillation. POSTPROCEDURE DIAGNOSIS:  Atrial fibrillation. :  Zana Lackey MD    COMPLICATIONS:  None. INDICATIONS:  An 51-year-old patient with a history of paroxysmal atrial  fibrillation, who also has a dual-chamber ICD in situ. The patient was  brought in for elective cardioversion for persistent atrial fibrillation  that was documented on his device and available for review on his remote  monitoring. The device in fact did discharge at home for atrial  fibrillation but did not restore sinus rhythm, and therefore, the  patient was brought in today for elective cardioversion. PROCEDURE:  The patient was brought to electrophysiology area in the  postabsorptive, nonsedated state. After the usual noninvasive blood  pressure and heart rate monitoring were instituted, the patient was  sedated with IV propofol by Anesthesia. A 200-joule synchronized shock  was delivered through anterior and posterior patches, restoring sinus  rhythm. The patient was then awakened. CONCLUSION:  Successful cardioversion to sinus rhythm. PLAN:  1. Recovery and discharge home. 2.  Follow up in the office as scheduled.         Liliane Le MD    D: 2022 14:57:09       T: 2022 15:37:16     /[email or web address]  Job#: [id number]     Doc#: [id number]    CC:  Zana Lackey MD

## 2022-05-12 NOTE — ANESTHESIA PRE PROCEDURE
Department of Anesthesiology  Preprocedure Note       Name:  Mehran Stone   Age:  82 y.o.  :  1935                                          MRN:  [id number]         Date:  2022      Surgeon: * Surgery not found *    Procedure:     Medications prior to admission:   Prior to Admission medications    Medication Sig Start Date End Date Taking? Authorizing Provider   furosemide (LASIX) 40 MG tablet Take 1 tablet by mouth daily Alternate 40 mg and 20 mg dosing every other day (20mg on odd days) 22   [facility]   rivaroxaban (XARELTO) 20 MG TABS tablet Take 20 mg by mouth Daily with supper     Historical Provider, MD   Multiple Vitamin (MULTIVITAMIN ADULT PO) Take by mouth daily     Historical Provider, MD   meclizine (ANTIVERT) 25 MG tablet Take 25 mg by mouth daily as needed     Historical Provider, MD   amLODIPine (NORVASC) 2.5 MG tablet Take 1 tablet by mouth daily [facility]   [facility]   metoprolol tartrate (LOPRESSOR) 50 MG tablet TAKE 1 AND 1/2 TABLETS BY  MOUTH IN THE MORNING AND 1  TABLET BY MOUTH AT BEDTIME 22   Nolberto Neville MD   pravastatin (PRAVACHOL) 40 MG tablet TAKE 1 TABLET BY MOUTH  DAILY 22   Nolberto Neville MD   potassium chloride (KLOR-CON M) 10 MEQ extended release tablet TAKE 1 TABLET BY MOUTH  DAILY 22   Nolberto Neville MD   pantoprazole (PROTONIX) 40 MG tablet TAKE 1 TABLET BY MOUTH  DAILY 12/15/21   Nolberto Neville MD   Handicap Placard MISC by Does not apply route Disp #: 1  Duration: 5 years.  20   Nolberto Neville MD   ferrous sulfate (IRON 325) 325 (65 Fe) MG tablet Take 1 tablet by mouth daily (with breakfast) 20   Nolberto Neville MD   MAGNESIUM-OXIDE 400 (241.3 Mg) MG TABS tablet TK 1 T PO ONCE D 3/18/20   Historical Provider, MD   dofetilide (TIKOSYN) 250 MCG capsule Take 1 capsule by mouth 2 times daily 3/5/20   Nolberto Neville MD   Multiple Vitamins-Minerals (PRESERVISION AREDS 2 PO) Take 2 tablets by mouth daily Historical Provider, MD   Cholecalciferol (VITAMIN D) 2000 UNITS CAPS capsule Take 1 capsule by mouth daily     Historical Provider, MD       Current medications:    Current Outpatient Medications   Medication Sig Dispense Refill    furosemide (LASIX) 40 MG tablet Take 1 tablet by mouth daily Alternate 40 mg and 20 mg dosing every other day (20mg on odd days) 180 tablet 3    rivaroxaban (XARELTO) 20 MG TABS tablet Take 20 mg by mouth Daily with supper       Multiple Vitamin (MULTIVITAMIN ADULT PO) Take by mouth daily       meclizine (ANTIVERT) 25 MG tablet Take 25 mg by mouth daily as needed       amLODIPine (NORVASC) 2.5 MG tablet Take 1 tablet by mouth daily 90 tablet 3    metoprolol tartrate (LOPRESSOR) 50 MG tablet TAKE 1 AND 1/2 TABLETS BY  MOUTH IN THE MORNING AND 1  TABLET BY MOUTH AT BEDTIME 225 tablet 3    pravastatin (PRAVACHOL) 40 MG tablet TAKE 1 TABLET BY MOUTH  DAILY 90 tablet 3    potassium chloride (KLOR-CON M) 10 MEQ extended release tablet TAKE 1 TABLET BY MOUTH  DAILY 90 tablet 3    pantoprazole (PROTONIX) 40 MG tablet TAKE 1 TABLET BY MOUTH  DAILY 90 tablet 3    Handicap Placard MISC by Does not apply route Disp #: 1  Duration: 5 years. 1 each 0    ferrous sulfate (IRON 325) 325 (65 Fe) MG tablet Take 1 tablet by mouth daily (with breakfast) 90 tablet 1    MAGNESIUM-OXIDE 400 (241.3 Mg) MG TABS tablet TK 1 T PO ONCE D      dofetilide (TIKOSYN) 250 MCG capsule Take 1 capsule by mouth 2 times daily 90 capsule 0    Multiple Vitamins-Minerals (PRESERVISION AREDS 2 PO) Take 2 tablets by mouth daily       Cholecalciferol (VITAMIN D) 2000 UNITS CAPS capsule Take 1 capsule by mouth daily        No current facility-administered medications for this encounter.        Allergies:  No Known Allergies    Problem List:    Patient Active Problem List   Diagnosis Code    Chronic combined systolic and diastolic CHF (congestive heart failure) ([facility]) I50.42    S/P TAVR (transcatheter aortic valve replacement) Z95.2    Chronic atrial fibrillation I48.20    Coronary artery disease involving native coronary artery of native heart without angina pectoris I25.10    Essential hypertension I10    Hyperlipidemia LDL goal <100 E78.5    GIB (gastrointestinal bleeding) K92.2    ICD (implantable cardioverter-defibrillator), dual, in situ Z95.810    Ischemic heart disease I25.9    Vitamin D deficiency E55.9    Other insomnia G47.09    SOB (shortness of breath) R06.02    Lung nodule R91.1    Acute on chronic diastolic (congestive) heart failure (HCC) I50.33    Pure hypercholesterolemia E78.00    Stage 3b chronic kidney disease (HCC) N18.32    Transient cerebral ischemia G45.9    Aortic valve disease I35.9    Pre-syncope R55    Acute on chronic combined systolic and diastolic CHF (congestive heart failure) ([facility]) I50.43    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) I50.40    Gastroesophageal reflux disease without esophagitis K21.9    Coronary artery disease involving coronary bypass graft I25.810    Symptomatic anemia D64.9    GI bleed K92.2    Abdominal aortic aneurysm (AAA) 3.0 cm to 5.5 cm in diameter in male [facility]) I71.4       Past Medical History:        Diagnosis Date    A-fib ([facility]     Arrhythmia     Carotid artery stenosis     CHF (congestive heart failure) ([facility]     Heart valve problem     Hyperlipidemia     Hypertension     ICD (implantable cardiac defibrillator) in place 2007    [employer] [place] DR HADDAD#[id number] generator changed 7/28/16  Dr Bipin Castellanos    MI (mitral incompetence) 2003    MI (myocardial infarction) ([facility] [address] 2003       Past Surgical History:        Procedure Laterality Date    CARDIAC CATHETERIZATION Right 03/03/2017    Dr. Carli Jacobs  2007. 2008 2007 78 shocks replaced 2008 [employer]     CARDIAC DEFIBRILLATOR PLACEMENT  07/28/2016    [employer] Dual ICD gen change    CARDIAC SURGERY N/A 9/4/2021 cardioversion performed by Nisha Davila MD at [facility]  10/29/2020    Successful    (Dr. Pineda [place])    COLONOSCOPY N/A 2020    COLONOSCOPY DIAGNOSTIC performed by Cristobal Hernandez MD at [address] N/A 2022    COLONOSCOPY WITH BIOPSY performed by Drake Guerra MD at [address]  2003    DIAGNOSTIC CARDIAC CATH LAB PROCEDURE      DIAGNOSTIC CARDIAC CATH LAB PROCEDURE  2008    stent    JOINT REPLACEMENT      r hip surgery Dr Babs Murphy  2017    Dr. Anthony Bah and Dr. August Starch- TAVR Josie 26mm valve    UPPER GASTROINTESTINAL ENDOSCOPY N/A 9/3/2019    EGD ESOPHAGOGASTRODUODENOSCOPY performed by Cyndy Carreon MD at [address] 2020    EGD [facility]    EGD CONTROL HEMORRHAGE performed by Drake Guerra MD at [address] N/A 3/16/2022    EGD ESOPHAGOGASTRODUODENOSCOPY performed by Drake Guerra MD at [address]         Social History:    Social History     Tobacco Use    Smoking status: Former Smoker     Packs/day: 0.50     Years: 3.00     Pack years: 1.50     Quit date: 1973     Years since quittin.3    Smokeless tobacco: Never Used    Tobacco comment: Quit smoking in    Substance Use Topics    Alcohol use:  No                                Counseling given: Not Answered  Comment: Quit smoking in       Vital Signs (Current):   Vitals:    22 1320   BP: (!) 152/76   Pulse: 70   Resp: 17   Temp: 36.8 °C (98.2 °F)   SpO2: 96%   Weight: 167 lb (75.8 kg)   Height: 5' 8\" (1.727 m)                                              BP Readings from Last 3 Encounters:   22 (!) 152/76   22 (!) 110/55   22 (!) 98/57       NPO Status:                                                                             per pt >8 hours    BMI:   Wt Readings from Last 3 Encounters:   05/12/22 167 lb (75.8 kg)   04/26/22 169 lb (76.7 kg)   04/27/22 171 lb (77.6 kg)     Body mass index is 25.39 kg/m². CBC:   Lab Results   Component Value Date    WBC 6.3 04/27/2022    RBC 4.29 04/27/2022    HGB 12.4 04/27/2022    HCT 40.0 04/27/2022    MCV 93.2 04/27/2022    RDW 13.9 04/27/2022     04/27/2022       CMP:   Lab Results   Component Value Date     04/26/2022    K 4.3 04/26/2022    K 4.3 03/12/2022    CL 99 04/26/2022    CO2 29 04/26/2022    BUN 18 04/26/2022    CREATININE 1.1 04/26/2022    GFRAA >60 04/26/2022    LABGLOM >60 04/26/2022    GLUCOSE 110 04/26/2022    PROT 5.5 03/17/2022    CALCIUM 9.1 04/26/2022    BILITOT 0.6 03/17/2022    ALKPHOS 112 03/17/2022    AST 23 03/17/2022    ALT 9 03/17/2022       POC Tests: No results for input(s): POCGLU, POCNA, POCK, POCCL, POCBUN, POCHEMO, POCHCT in the last 72 hours.     Coags:   Lab Results   Component Value Date    PROTIME 13.9 03/14/2022    INR 1.3 03/14/2022    APTT 30.6 03/13/2022       HCG (If Applicable): No results found for: PREGTESTUR, PREGSERUM, HCG, HCGQUANT     ABGs: No results found for: PHART, PO2ART, CMD8FGU, SVW0KMH, BEART, A5OAJGRG     Type & Screen (If Applicable):  No results found for: LABABO, LABRH    Drug/Infectious Status (If Applicable):  No results found for: HIV, HEPCAB    COVID-19 Screening (If Applicable):   Lab Results   Component Value Date    COVID19 Not Detected 10/29/2020           Anesthesia Evaluation  Patient summary reviewed and Nursing notes reviewed no history of anesthetic complications:   Airway: Mallampati: II  TM distance: >3 FB   Neck ROM: full  Mouth opening: > = 3 FB Dental:      Comment: Pt denies any loose chipped or missing teeth    Pulmonary: breath sounds clear to auscultation  (+) shortness of breath: Cardiovascular:    (+) hypertension:, pacemaker: AICD, past MI:, CAD:, dysrhythmias: atrial fibrillation and ventricular tachycardia, CHF:, pulmonary hypertension:, hyperlipidemia      ECG reviewed  Rhythm: irregular  Rate: normal  Echocardiogram reviewed               ROS comment: S/P TAVR (transcatheter aortic valve replacement)    ECHO 3/5/22  Summary   Normal left ventricular systolic function. Ejection fraction is visually estimated at 60%. Mildly dilated right ventricle with normal right ventricular function. There is doppler evidence of stage II diastolic dysfunction. Moderately dilated left atrium by volume index. Mild mitral regurgitation. 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 No significant paravalvular aortic regurgitation. AV peak velocity 2.1 m/s. AV mean gradient 9 mmHg. AV area 1.5 cm2. Mild tricuspid regurgitation. PASP is estimated at 44 mmHg. Neuro/Psych:               GI/Hepatic/Renal:   (+) GERD:, renal disease: CRI,           Endo/Other:    (+) blood dyscrasia ( on xeralto): anticoagulation therapy:., .                 Abdominal:             Vascular:   + PVD, aortic or cerebral, . Other Findings:           Anesthesia Plan      MAC     ASA 3       Induction: intravenous. Anesthetic plan and risks discussed with patient. Plan discussed with attending.                 GINETTE Nick - CRNA   5/12/2022

## 2022-05-12 NOTE — ANESTHESIA POSTPROCEDURE EVALUATION
Department of Anesthesiology  Postprocedure Note    Patient: Genette Bloch  MRN: [id number]  YOB: 1935  Date of evaluation: 5/12/2022  Time:  6:34 PM     Procedure Summary     Date: 05/12/22 [place] [facility] [place]    Anesthesia Start: 1886 Anesthesia Stop: 1449    Procedure: CARDIOVERSION WITH ANESTHESIA Diagnosis: Atherosclerotic heart disease of native coronary artery without angina pectoris    Scheduled Providers:  Responsible Provider: Miriam Porras MD    Anesthesia Type: MAC ASA Status: 3          Anesthesia Type: No value filed. Alfred Phase I:      Alfred Phase II:      Last vitals: Reviewed and per EMR flowsheets.        Anesthesia Post Evaluation    Patient location during evaluation: PACU  Patient participation: complete - patient participated  Level of consciousness: awake and alert  Airway patency: patent  Nausea & Vomiting: no nausea and no vomiting  Complications: no  Cardiovascular status: hemodynamically stable and blood pressure returned to baseline  Respiratory status: acceptable  Hydration status: euvolemic

## 2022-05-13 ENCOUNTER — OFFICE VISIT (OUTPATIENT)
Dept: CARDIOLOGY CLINIC | Age: 87
End: 2022-05-13
Payer: MEDICARE

## 2022-05-13 VITALS
HEIGHT: 68 IN | HEART RATE: 72 BPM | SYSTOLIC BLOOD PRESSURE: 120 MMHG | DIASTOLIC BLOOD PRESSURE: 62 MMHG | BODY MASS INDEX: 25.76 KG/M2 | WEIGHT: 170 LBS | RESPIRATION RATE: 16 BRPM

## 2022-05-13 DIAGNOSIS — I25.10 CORONARY ARTERY DISEASE INVOLVING NATIVE CORONARY ARTERY OF NATIVE HEART WITHOUT ANGINA PECTORIS: Primary | ICD-10-CM

## 2022-05-13 PROCEDURE — 4040F PNEUMOC VAC/ADMIN/RCVD: CPT | Performed by: INTERNAL MEDICINE

## 2022-05-13 PROCEDURE — 1123F ACP DISCUSS/DSCN MKR DOCD: CPT | Performed by: INTERNAL MEDICINE

## 2022-05-13 PROCEDURE — G8417 CALC BMI ABV UP PARAM F/U: HCPCS | Performed by: INTERNAL MEDICINE

## 2022-05-13 PROCEDURE — G8427 DOCREV CUR MEDS BY ELIG CLIN: HCPCS | Performed by: INTERNAL MEDICINE

## 2022-05-13 PROCEDURE — 1036F TOBACCO NON-USER: CPT | Performed by: INTERNAL MEDICINE

## 2022-05-13 PROCEDURE — 99214 OFFICE O/P EST MOD 30 MIN: CPT | Performed by: INTERNAL MEDICINE

## 2022-05-13 PROCEDURE — 93000 ELECTROCARDIOGRAM COMPLETE: CPT | Performed by: INTERNAL MEDICINE

## 2022-05-13 NOTE — PROGRESS NOTES
CHIEF COMPLAINT: TIA/CAD-CABG/PAF/ICD/GIB/Anemia    HISTORY OF PRESENT ILLNESS: Patient is a 82 y.o. male seen at the request of Zaki Welch MD.      In sinus. No CP or SOB. PMH:   1. MI, 2003. Cardiac catheterization: 85% ostial, proximal and mid LAD narrowings. LMC 70% stenosis. D1 occluded. D2 50% stenosis. OM1 80% ostial stenosis. Mid CX occluded. Dominant RCA severe disease. LVEF 40-45%. 2. CABG, 05/23/2003, [facility] [place] [place] with LIMA-LAD, SVG-RI, SVG-OM. 3. Hyperlipidemia. 4. Mild carotid plaque on US, 2003. 5. Echo, 07/2006. Mild LVE, normal EF, Stage II diastolic dysfunction, moderate AS, mild MR, mild TR. 6. Right leg vein stripping, 1970's. 7. No history diabetes mellitus, stroke or COPD. 8. Nonsmoker for many years. 9. VT causing cardiac arrest after stress test, 03/15/2007. He was successfully cardioverted. 10. Cardiac catheterization, 03/16/2007 with normal LVEF, severe native three vessel coronary disease. SVG-RI, SVG-OM both occluded. LIMA-LAD patent. 11. Re-do CABG, [facility], 03/2007 with radial artery graft to D2 and OM2. 12. Elective PCI with CONNOR native OM2 and native LAD, 03/2007 following CABG. This was done because of inadequate conduits. 13. ICD placement, [facility], 03/2007. 14. ICD lead recall, early 2008, but he was followed electively because his device was functioning normally. 15. Presentation [facility], 03/29/2008 with multiple ICD inappropriate shocks. Transfer [facility] where ICD and lead were replaced. He reports cardiac catheterization that revealed patent LIMA-LAD and patent stents in native coronary arteries. 16. Atypical chest pain and anxiety with admission [facility], 05/2008. Troponin minimally elevated. Beta blocker dose increased. [address] admission, 06/13/2009 with lightheadedness, orthostatic hypotension, drop in hemoglobin to 10.5 from 14.9 over two months with an increase in BUN from 16 to 68 over the same time.  Dark heme positive stools noted. EKG NSR, incomplete RBBB. 18. Echo, 06/15/2009 with normal LVEF, moderate AS, peak gradient 38 mmHg, BLOSSOM 1.1 cm², moderate MR, LAE. 19. Transtelephonic ICD check, 01/11/2010. No ventricular tachyarrhythmias. Two AF episodes, the longest for 14 hours. 20. Echo, 06/16/2010 with LVE, borderline LVH, normal systolic and diastolic function, normal LA, ICD electrode right heart. Trileaflet AV with moderate to severe AS, mean/peak gradient 20/31 mmHg. BLOSSOM 1.0 cm². MAC with mild MR, normal RVSP.  21. No drug allergies. 25. Family history negative for premature vascular disease. 23. PAF with DCCV, [facility], 12/2010.   24. [facility]. 25. [facility], 06/05/2012. Moderate sized fixed basal inferior defect, probably artifact. 26. [facility]. 1+ AR, moderate AS, mild MR, mild TR, normal LVEF.   27. Echo, 01/31/2013. LV not dilated. Mild concentric LVH. Septal paradox. EF 50-55%. BLOSSOM 1.2 cm² consistent with moderate stenosis. Peak/mean gradient 38/21. Stage II diastolic dysfunction. 28. [facility] admission, 08/28/2013 with dizziness, Hb 7.7, WBC 19.0. CXR negative except cardiomegaly. EKG NSR, leftward axis, RBBB. BMP negative except for elevation in BUN to 55 with Cr 1.3.   29. EGD, 08/28/2013. Large pyloric channel ulcer with clot treated with PRBCs and fresh frozen plasma. Hb 8.7 on day of discharge and stable. Pradaxa was temporarily held. 30. CT abdomen, 09/08/2014. 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 31. CT chest, 09/17/2014. Consolidation and infiltrate at the left lung base, stable when compared to previous exams with less pleural thickening and calcified plaque in the left pleural cavity without any recent change.  Chronic obstructive airways disease. 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   32. ICD programmed to DDDR mode by Dr. Jennifer Kaye, 03/26/2015. Device function normal.  33. Echo 10/16/2015. EF 39%. Stage II diastolic dysfunction. Aortic stenosis with peak/mean gradients of 48/24 mmHg. Estimated valve area 1.0 cm². 34. ICD generator change for battery depletion, 07/28/2016,  [facility]. Jennifer Kaye. 35. Echo, 02/03/2017.  Normal LV size with mild LVH.  Normal regional wall motion and overall systolic function.  Diastole could not be assessed, but was presumed to be impaired.  The RV was dilated with impaired global systolic function.  The LA was enlarged.  Mild MAC with mild mitral insufficiency.  Moderate tricuspid insufficiency.  Aortic valve gradients are peak 47 mmHg with a mean of 27.  Dimensionless ratio 0.21. Valve area calculated 0.8 cm².    36.  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  37. Echocardiogram, [facility], 4/19/2018, EF 60%. Low normal RV function. Stage 2 diastolic dysfunction. Mild-to-moderate MR. S/P TAVR with a mean gradient of 10 mmHg.   RVSP 31 mg.     38. S/P Cardioversion by Dr. Nelson Sinha, 05/30/2017.   19 Rohith Howard evaluation, 09/07/2018, for 2 appropriate ICD discharges for polymorphic ventricular tachycardia, which occurred after yard work and moving heavy furniture.       Past Medical History:   Diagnosis Date    A-fib [facility])     Arrhythmia     Carotid artery stenosis     CHF (congestive heart failure) ([address]     Heart valve problem     Hyperlipidemia     Hypertension     ICD (implantable cardiac defibrillator) in place 2007    [employer] [facility] LD#[id number] generator changed 7/28/16  Dr Jennifer Kaye    MI (mitral incompetence) 2003    MI (myocardial infarction) [facility]) 2003       Patient Active Problem List   Diagnosis    Chronic combined systolic and diastolic CHF (congestive heart failure) [facility]    S/P TAVR (transcatheter aortic valve replacement)    Chronic atrial fibrillation    Coronary artery disease involving native coronary artery of native heart without angina pectoris    Essential hypertension    Hyperlipidemia LDL goal <100    GIB (gastrointestinal bleeding)    ICD (implantable cardioverter-defibrillator), dual, in situ    Ischemic heart disease    Vitamin D deficiency    Other insomnia    SOB (shortness of breath)    Lung nodule    Acute on chronic diastolic (congestive) heart failure ([facility])    Pure hypercholesterolemia    Stage 3b chronic kidney disease ([facility])    Transient cerebral ischemia    Aortic valve disease    Pre-syncope    Acute on chronic combined systolic and diastolic CHF (congestive heart failure) ([facility])    Combined congestive systolic and diastolic heart failure ([facility])    Gastroesophageal reflux disease without esophagitis    Coronary artery disease involving coronary bypass graft    Symptomatic anemia    GI bleed    Abdominal aortic aneurysm (AAA) 3.0 cm to 5.5 cm in diameter in male ([facility])       No Known Allergies    Current Outpatient Medications   Medication Sig Dispense Refill    furosemide (LASIX) 40 MG tablet Take 1 tablet by mouth daily Alternate 40 mg and 20 mg dosing every other day (20mg on odd days) 180 tablet 3    rivaroxaban (XARELTO) 20 MG TABS tablet Take 20 mg by mouth Daily with supper       Multiple Vitamin (MULTIVITAMIN ADULT PO) Take by mouth daily       meclizine (ANTIVERT) 25 MG tablet Take 25 mg by mouth daily as needed       amLODIPine (NORVASC) 2.5 MG tablet Take 1 tablet by mouth daily 90 tablet 3    metoprolol tartrate (LOPRESSOR) 50 MG tablet TAKE 1 AND 1/2 TABLETS BY  MOUTH IN THE MORNING AND 1  TABLET BY MOUTH AT BEDTIME 225 tablet 3    pravastatin (PRAVACHOL) 40 MG tablet TAKE 1 TABLET BY MOUTH  DAILY 90 tablet 3    potassium chloride (KLOR-CON M) 10 MEQ extended release tablet TAKE 1 TABLET BY MOUTH  DAILY 90 tablet 3    pantoprazole (PROTONIX) 40 MG tablet TAKE 1 TABLET BY MOUTH  DAILY 90 tablet 3    Handicap Placard MISC by Does not apply route Disp #: 1  Duration: 5 years. 1 each 0    ferrous sulfate (IRON 325) 325 (65 Fe) MG tablet Take 1 tablet by mouth daily (with breakfast) 90 tablet 1    MAGNESIUM-OXIDE 400 (241.3 Mg) MG TABS tablet TK 1 T PO ONCE D      dofetilide (TIKOSYN) 250 MCG capsule Take 1 capsule by mouth 2 times daily 90 capsule 0    Multiple Vitamins-Minerals (PRESERVISION AREDS 2 PO) Take 2 tablets by mouth daily       Cholecalciferol (VITAMIN D) 2000 UNITS CAPS capsule Take 1 capsule by mouth daily        No current facility-administered medications for this visit. Social History     Socioeconomic History    Marital status:      Spouse name: Felicity Rocha Number of children: 2    Years of education: 15     Highest education level: Associate degree: academic program   Occupational History    Not on file   Tobacco Use    Smoking status: Former Smoker     Packs/day: 0.50     Years: 3.00     Pack years: 1.50     Quit date: 1973     Years since quittin.3    Smokeless tobacco: Never Used    Tobacco comment: Quit smoking in    Vaping Use    Vaping Use: Never used   Substance and Sexual Activity    Alcohol use: No    Drug use: No    Sexual activity: Not on file   Other Topics Concern    Not on file   Social History Narrative    1 cup coffee daily; occ pop     Social Determinants of Health     Financial Resource Strain: Low Risk     Difficulty of Paying Living Expenses: Not hard at all   Food Insecurity: No Food Insecurity    Worried About [facility] in the Last Year: Never true    [place] of Food in the Last Year: Never true   Transportation Needs: No Transportation Needs    Lack of Transportation (Medical): No    Lack of Transportation (Non-Medical):  No   Physical Activity: Inactive    Days of Exercise per Week: 0 days    Minutes of Exercise per Session: 0 min   Stress: No Stress Concern Present    Feeling of Stress : Not at all   Social Connections: Moderately Isolated    Frequency of Communication with Friends and Family: Three times a week    Frequency of Social Gatherings with Friends and Family: Once a week    Attends Buddhist Services: Never    Active Member of Clubs or Organizations: No    Attends Club or Organization Meetings: Never    Marital Status:    Intimate Partner Violence:     Fear of Current or Ex-Partner: Not on file    Emotionally Abused: Not on file    Physically Abused: Not on file    Sexually Abused: Not on file   Housing Stability:     Unable to Pay for Housing in the Last Year: Not on file    Number of [place] in the Last Year: Not on file    Unstable Housing in the Last Year: Not on file       History reviewed. No pertinent family history. Review of Systems:  Heart: as above   Lungs: as above   Eyes: denies changes in vision or discharge. Ears: denies changes in hearing or pain. Nose: denies epistaxis or masses   Throat: denies sore throat or trouble swallowing. Neuro: denies numbness, tingling, tremors. Skin: denies rashes or itching. : denies hematuria, dysuria   GI: denies vomiting, diarrhea   Psych: denies mood changed, anxiety, depression. All other systems negative. Physical Exam   /62   Pulse 72   Resp 16   Ht 5' 8\" (1.727 m)   Wt 170 lb (77.1 kg)   BMI 25.85 kg/m²   Constitutional: Oriented to person, place, and time. Well-developed and well-nourished. No distress. Head: Normocephalic and atraumatic. Eyes: EOM are normal. Pupils are equal, round, and reactive to light. Neck: Normal range of motion. Neck supple. No hepatojugular reflux and no JVD present. Carotid bruit is not present. No tracheal deviation present. No thyromegaly present. Cardiovascular: Normal rate, regular rhythm, normal heart sounds and intact distal pulses. Exam reveals no gallop and no friction rub. No murmur heard.   Pulmonary/Chest: Effort normal and breath sounds normal. No respiratory distress. No wheezes. No rales. No tenderness. Abdominal: Soft. Bowel sounds are normal. No distension and no mass. No tenderness. No rebound and no guarding. Musculoskeletal: Normal range of motion. No edema and no tenderness. Lymphadenopathy:   No cervical adenopathy. No groin adenopathy. Neurological: Alert and oriented to person, place, and time. Skin: Skin is warm and dry. No rash noted. Not diaphoretic. No erythema. Psychiatric: Normal mood and affect. Behavior is normal.     EKG:  normal sinus rhythm, A paced, V sensed.     Echo Summary 8/16/19:   Ejection fraction is visually estimated at 60%.   The inferior basal wall is hypokinetic.   No regional wall motion abnormalities seen.   Normal right ventricle structure and function.   There is doppler evidence of stage II diastolic dysfunction.   Left atrial volume index of 39 ml per meters squared BSA.   Hx of TAVR with 26 mm S3 3/29/17. The aortic valve area is 1.6 cm2 with a maximum gradient of 15 mmHg and a mean gradient of 7 mmHg.   Mild aortic regurgitation is noted.   Mild mitral regurgitation is present.   Mild tricuspid regurgitation.   Agitated saline injected for shunt evaluation.   No evidence of patent foramen ovale.   No evidence of atrial septal defect. Echo Summary 10/30/2020:   LVEF is 65%. Left ventricle is normal in size . Mild asymmetric septal hypertrophy. No regional wall motion abnormalities seen. Normal left ventricular ejection fraction. The left atrium is severely dilated. Mild mitral annular calcification. Mild mitral regurgitation is present. Normally functioning bioprosthetic valve in aortic position. Physiologic and/or trace tricuspid regurgitation. Echo Summary 3/2/2022:   Normal left ventricular systolic function. Ejection fraction is visually estimated at 60%. Mildly dilated right ventricle with normal right ventricular function.    There is doppler evidence of stage II diastolic dysfunction. Moderately dilated left atrium by volume index. Mild mitral regurgitation. 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 No significant paravalvular aortic regurgitation. AV peak velocity 2.1 m/s. AV mean gradient 9 mmHg. AV area 1.5 cm2. Mild tricuspid regurgitation. PASP is estimated at 44 mmHg. ASSESSMENT AND PLAN:  Patient Active Problem List   Diagnosis    Chronic combined systolic and diastolic [facility]cong[facility]    S/P TAVR (transcatheter aortic valve replacement)    Chronic atrial fibrillation    Coronary artery disease involving native coronary artery of native heart without angina pectoris    Essential hypertension    Hyperlipidemia LDL goal <100    GIB (gastrointestinal bleeding)    ICD (implantable cardioverter-defibrillator), dual, in situ    Ischemic heart disease    Vitamin D deficiency    Other insomnia    SOB (shortness of breath)    Lung nodule    Acute on chronic diastolic (congestive) heart failure ([address]    Pure hypercholesterolemia    Stage 3b chronic kidney disease ([address]    Transient cerebral ischemia    Aortic valve disease    Pre-syncope    Acute on chronic combined systolic and diastolic CHF (congestive heart failure) ([facility]    Combined congestive systolic and diastolic heart failure ([facility]    Gastroesophageal reflux disease without esophagitis    Coronary artery disease involving coronary bypass graft    Symptomatic anemia    GI bleed    Abdominal aortic aneurysm (AAA) 3.0 cm to 5.5 cm in diameter in male ([address]     1. ICD: Per EP. 2. PAF: In sinus. Post DCCV 5/12/2022. Xarelto/tikosyn. 3. CAD-CABG: Stable symptoms. BB/statin. Not on ASA to this point due to Xarelto. 4. TAVR: Stable echo 8/16/19.     5. TIA: Echo no PFO. Xarelto/statin. 6. Anemia: Follow labs. 7. GIB: Follows with GI. Lorrie Morrison D.O.   Cardiologist  Cardiology, [facility]

## 2022-05-17 ENCOUNTER — HOSPITAL ENCOUNTER (OUTPATIENT)
Dept: GENERAL RADIOLOGY | Age: 87
Discharge: HOME | End: 2022-05-19
Payer: MEDICARE

## 2022-05-17 ENCOUNTER — TELEPHONE (OUTPATIENT)
Dept: CARDIOLOGY CLINIC | Age: 87
End: 2022-05-17

## 2022-05-17 ENCOUNTER — HOSPITAL ENCOUNTER (OUTPATIENT)
Dept: OTHER | Age: 87
Setting detail: THERAPIES SERIES
Discharge: HOME | End: 2022-05-17
Payer: MEDICARE

## 2022-05-17 VITALS
WEIGHT: 169 LBS | HEART RATE: 70 BPM | BODY MASS INDEX: 25.7 KG/M2 | DIASTOLIC BLOOD PRESSURE: 60 MMHG | RESPIRATION RATE: 16 BRPM | SYSTOLIC BLOOD PRESSURE: 158 MMHG | OXYGEN SATURATION: 93 %

## 2022-05-17 DIAGNOSIS — I50.42 CHRONIC COMBINED SYSTOLIC AND DIASTOLIC CHF (CONGESTIVE HEART FAILURE) (HCC): Primary | ICD-10-CM

## 2022-05-17 DIAGNOSIS — Z79.899 MEDICATION DOSE CHANGED: ICD-10-CM

## 2022-05-17 DIAGNOSIS — R09.89 CHEST CRACKLES: ICD-10-CM

## 2022-05-17 LAB
ANION GAP SERPL CALCULATED.3IONS-SCNC: 10 MMOL/L (ref 7–16)
BUN BLDV-MCNC: 16 MG/DL (ref 6–23)
CALCIUM SERPL-MCNC: 9.2 MG/DL (ref 8.6–10.2)
CHLORIDE BLD-SCNC: 102 MMOL/L (ref 98–107)
CO2: 29 MMOL/L (ref 22–29)
CREAT SERPL-MCNC: 1.1 MG/DL (ref 0.7–1.2)
GFR AFRICAN AMERICAN: >60
GFR NON-AFRICAN AMERICAN: >60 ML/MIN/1.73
GLUCOSE BLD-MCNC: 103 MG/DL (ref 74–99)
POTASSIUM SERPL-SCNC: 4.6 MMOL/L (ref 3.5–5)
PRO-BNP: 802 PG/ML (ref 0–450)
SODIUM BLD-SCNC: 141 MMOL/L (ref 132–146)

## 2022-05-17 PROCEDURE — 80048 BASIC METABOLIC PNL TOTAL CA: CPT

## 2022-05-17 PROCEDURE — 36415 COLL VENOUS BLD VENIPUNCTURE: CPT

## 2022-05-17 PROCEDURE — 6360000002 HC RX W HCPCS

## 2022-05-17 PROCEDURE — 71045 X-RAY EXAM CHEST 1 VIEW: CPT

## 2022-05-17 PROCEDURE — 83880 ASSAY OF NATRIURETIC PEPTIDE: CPT

## 2022-05-17 PROCEDURE — 99214 OFFICE O/P EST MOD 30 MIN: CPT

## 2022-05-17 PROCEDURE — 2580000003 HC RX 258

## 2022-05-17 PROCEDURE — 96374 THER/PROPH/DIAG INJ IV PUSH: CPT

## 2022-05-17 RX ORDER — FUROSEMIDE 40 MG/1
TABLET ORAL
Qty: 33 TABLET | Refills: 1 | Status: SHIPPED
Start: 2022-05-17 | End: 2022-05-23 | Stop reason: SDUPTHER

## 2022-05-17 RX ORDER — SODIUM CHLORIDE 0.9 % (FLUSH) 0.9 %
10 SYRINGE (ML) INJECTION ONCE
Status: COMPLETED | OUTPATIENT
Start: 2022-05-17 | End: 2022-05-17

## 2022-05-17 RX ORDER — FUROSEMIDE 10 MG/ML
40 INJECTION INTRAMUSCULAR; INTRAVENOUS ONCE
Status: COMPLETED | OUTPATIENT
Start: 2022-05-17 | End: 2022-05-17

## 2022-05-17 RX ORDER — FUROSEMIDE 10 MG/ML
INJECTION INTRAMUSCULAR; INTRAVENOUS
Status: COMPLETED
Start: 2022-05-17 | End: 2022-05-17

## 2022-05-17 RX ORDER — SODIUM CHLORIDE 0.9 % (FLUSH) 0.9 %
SYRINGE (ML) INJECTION
Status: COMPLETED
Start: 2022-05-17 | End: 2022-05-17

## 2022-05-17 RX ADMIN — SODIUM CHLORIDE, PRESERVATIVE FREE 10 ML: 5 INJECTION INTRAVENOUS at 10:17

## 2022-05-17 RX ADMIN — FUROSEMIDE 40 MG: 10 INJECTION, SOLUTION INTRAVENOUS at 10:19

## 2022-05-17 RX ADMIN — Medication 10 ML: at 10:17

## 2022-05-17 RX ADMIN — FUROSEMIDE 40 MG: 10 INJECTION INTRAMUSCULAR; INTRAVENOUS at 10:19

## 2022-05-17 NOTE — TELEPHONE ENCOUNTER
Sd Richy (daughter in law) with Oseas Samuel at his home. I have reviewed the provider's instructions with  Sd Lockhart , answering all questions to Her  satisfaction.       Philly Hernandez RN

## 2022-05-17 NOTE — TELEPHONE ENCOUNTER
----- Message from GINETTE Garcia CNP sent at 5/17/2022 12:30 PM EDT -----  Labs and CHF clinic note reviewed  Please have patient increase lasix to 40 mg BID (6 hours apart from one another) for the next 3 days and then return to 40 mg daily  starting tomorrow   Follow up as scheduled in 1 week at CHF clinic

## 2022-05-17 NOTE — RESULT ENCOUNTER NOTE
Labs and CHF clinic note reviewed  Please have patient increase lasix to 40 mg BID (6 hours apart from one another) for the next 3 days and then return to 40 mg daily  starting tomorrow   Follow up as scheduled in 1 week at CHF clinic

## 2022-05-17 NOTE — PROGRESS NOTES
Congestive Heart Failure [address]   [facility] [place]  [address]   1935          Referring Provider: GINETTE Bell-NEHEMIAS  Primary Care Physician: Dr. Courtney Nath  Cardiologist: Dr. Denis Hess  Nephrologist:         History of Present Illness:     Genette Bloch is a 82 y.o. male with a history of HFpEF, most recent EF 60% on 3/2/2022. Patient Story:    He does  have dyspnea with exertion, shortness of breath, or decline in overall functional capacity. He does not have orthopnea, PND, nocturnal cough or hemoptysis. He does not have abdominal distention or bloating, early satiety, anorexia/change in appetite. He does has a good urinary response to  oral diuretic. He does not have  lower extremity edema. He denies lightheadedness, dizziness. He denies palpitations, syncope or near syncope. He does not complain of chest pain, pressure, discomfort. No Known Allergies        Prior to Visit Medications    Medication Sig Taking?  Authorizing Provider   furosemide (LASIX) 40 MG tablet Take 1 tablet by mouth daily Alternate 40 mg and 20 mg dosing every other day (20mg on odd days)  Refugia Mems, DO   rivaroxaban (XARELTO) 20 MG TABS tablet Take 20 mg by mouth Daily with supper   Historical Provider, MD   Multiple Vitamin (MULTIVITAMIN ADULT PO) Take by mouth daily   Historical Provider, MD   meclizine (ANTIVERT) 25 MG tablet Take 25 mg by mouth daily as needed   Historical Provider, MD   amLODIPine (NORVASC) 2.5 MG tablet Take 1 tablet by mouth daily  Refugia Mems, DO   metoprolol tartrate (LOPRESSOR) 50 MG tablet TAKE 1 AND 1/2 TABLETS BY  MOUTH IN THE MORNING AND 1  TABLET BY MOUTH AT BEDTIME  Janeth Castle MD   pravastatin (PRAVACHOL) 40 MG tablet TAKE 1 TABLET BY MOUTH  DAILY  Janeth Castle MD   potassium chloride (KLOR-CON M) 10 MEQ extended release tablet TAKE 1 TABLET Maury Lundberg MD pantoprazole (PROTONIX) 40 MG tablet TAKE 1 TABLET BY MOUTH  DAILY  Niki Zazueta MD   Handicap Placard MISC by Does not apply route Disp #: 1  Duration: 5 years. Niki Zazueta MD   ferrous sulfate (IRON 325) 325 (65 Fe) MG tablet Take 1 tablet by mouth daily (with breakfast)  Niki Zazueta MD   MAGNESIUM-OXIDE 400 (241.3 Mg) MG TABS tablet TK 1 T PO ONCE D  Historical Provider, MD   dofetilide (TIKOSYN) 250 MCG capsule Take 1 capsule by mouth 2 times daily  Niki Zazueta MD   Multiple Vitamins-Minerals (PRESERVISION AREDS 2 PO) Take 2 tablets by mouth daily   Historical Provider, MD   Cholecalciferol (VITAMIN D) 2000 UNITS CAPS capsule Take 1 capsule by mouth daily   Historical Provider, MD           Guideline directed medical:  ARNI/ACE I/ARB: No  Beta blocker: Yes  Aldosterone antagonist:  No        Physical Examination:     BP (!) 158/60 Comment: manually  Pulse 70   Resp 16   Wt 169 lb (76.7 kg)   SpO2 93%   BMI 25.70 kg/m²     Assessment  Charting Type: Shift assessment (chf clinic)    Neurological  Level of Consciousness: Alert (0)         HEENT (Head, Ears, Eyes, Nose, & Throat)  HEENT (WDL): Exceptions to WDL  Right Eye: Glasses,Impaired vision  Left Eye: Impaired vision,Glasses    Respiratory  Respiratory Pattern: Regular  Respiratory Depth: Normal  L Breath Sounds: Clear,Fine Crackles  R Breath Sounds: Clear,Fine Crackles    Breath Sounds  Right Upper Lobe: Clear  Right Middle Lobe: Fine Crackles  Right Lower Lobe: Fine Crackles  Left Upper Lobe: Clear  Left Lower Lobe: Fine Crackles         Cardiac  Cardiac Rhythm: Atrial paced    Rhythm Interpretation  Pulse: 70         Gastrointestinal  Abdominal (WDL): Within Defined Limits  Abdomen Inspection: Rounded,Soft  RUQ Bowel Sounds: Active  LUQ Bowel Sounds: Active  RLQ Bowel Sounds: Active  LLQ Bowel Sounds: Active          Bowel Sounds  RUQ Bowel Sounds: Active  LUQ Bowel Sounds: Active  RLQ Bowel Sounds:  Active  LLQ Bowel Sounds: Active    Peripheral Vascular  Peripheral Vascular (WDL): Exceptions to WDL  Edema: Right lower extremity,Left lower extremity  RLE Edema: +1,Pitting  LLE Edema: +1,Pitting                   Genitourinary  Genitourinary (WDL): Within Defined Limits    Psychosocial  Psychosocial (WDL): Within Defined Limits                        Pulse: 70                     LAB DATA:    Last 3 BMP      Sodium (mmol/L)   Date Value   05/12/2022 141   04/26/2022 138   04/19/2022 139     Potassium (mmol/L)   Date Value   05/12/2022 4.6   04/26/2022 4.3   04/19/2022 4.3     Potassium reflex Magnesium (mmol/L)   Date Value   03/12/2022 4.3   09/21/2021 4.5   09/04/2021 4.4     Chloride (mmol/L)   Date Value   05/12/2022 103   04/26/2022 99   04/19/2022 99     CO2 (mmol/L)   Date Value   05/12/2022 29   04/26/2022 29   04/19/2022 32 (H)     BUN (mg/dL)   Date Value   05/12/2022 17   04/26/2022 18   04/19/2022 21     Glucose (mg/dL)   Date Value   05/12/2022 101 (H)   04/26/2022 110 (H)   04/19/2022 115 (H)     Calcium (mg/dL)   Date Value   05/12/2022 9.2   04/26/2022 9.1   04/19/2022 9.0       Last 3 BNP       Pro-BNP (pg/mL)   Date Value   04/26/2022 633 (H)   04/19/2022 496 (H)   04/12/2022 478 (H)          CBC: No results for input(s): WBC, HGB, PLT in the last 72 hours. BMP:    No results for input(s): NA, K, CL, CO2, BUN, CREATININE, GLUCOSE in the last 72 hours. Hepatic: No results for input(s): AST, ALT, ALB, BILITOT, ALKPHOS in the last 72 hours. Troponin: No results for input(s): TROPONINI in the last 72 hours. BNP: No results for input(s): BNP in the last 72 hours. Lipids: No results for input(s): CHOL, HDL in the last 72 hours. Invalid input(s): LDLCALCU  INR: No results for input(s): INR in the last 72 hours.         WEIGHTS:    Wt Readings from Last 3 Encounters:   05/17/22 169 lb (76.7 kg)   05/13/22 170 lb (77.1 kg)   05/12/22 167 lb (75.8 kg)         TELEMETRY:  Cardiac Regularity: Regular  Cardiac Rhythm/Interpretation: A paced        ASSESSMENT:  Berta Betancourt is hypervolemic with fine crackles heard in bilateral lungs and increased swelling in BLE. He says he does have a nagging cough but no increased shortness of breath. Portable CXR obtained today. Interventions completed this visit:  IV diuretics given- yes, Lasix 40mg IVP  Lab work obtained yes, proBNP, BMP   Reviewed currently prescribed medications with patient, educated on importance of compliance and answered any questions regarding their medication  Educated on signs and symptoms of HF  Educated on low sodium diet    PLAN:  Scheduled to follow up in CHF clinic on   Future Appointments   Date Time Provider Cezar Gamez   5/17/2022 11:30 AM SEB XRAY RM 1 YAJAIRA RAD [facility] Radiolog   5/26/2022 10:45 AM YAJAIRA CHF ROOM 1 YAJAIRA [facility] [place] [facility]   6/1/2022  1:45 PM MD PRISCILLA Khan [facility]     Given clinic phone number [phone] and aware of signs and symptoms to call with any HF change in symptoms.

## 2022-05-23 DIAGNOSIS — I50.42 CHRONIC COMBINED SYSTOLIC AND DIASTOLIC CHF (CONGESTIVE HEART FAILURE) (HCC): ICD-10-CM

## 2022-05-23 DIAGNOSIS — Z79.899 MEDICATION DOSE CHANGED: ICD-10-CM

## 2022-05-23 RX ORDER — FUROSEMIDE 40 MG/1
TABLET ORAL
Qty: 90 TABLET | Refills: 3 | Status: SHIPPED
Start: 2022-05-23 | End: 2022-06-27

## 2022-05-26 ENCOUNTER — HOSPITAL ENCOUNTER (OUTPATIENT)
Dept: OTHER | Age: 87
Setting detail: THERAPIES SERIES
Discharge: HOME | End: 2022-05-26
Payer: MEDICARE

## 2022-05-26 ENCOUNTER — OFFICE VISIT (OUTPATIENT)
Dept: PRIMARY CARE CLINIC | Age: 87
End: 2022-05-26
Payer: MEDICARE

## 2022-05-26 VITALS
OXYGEN SATURATION: 93 % | BODY MASS INDEX: 25.67 KG/M2 | HEIGHT: 68 IN | WEIGHT: 169.4 LBS | TEMPERATURE: 96.9 F | HEART RATE: 71 BPM | DIASTOLIC BLOOD PRESSURE: 70 MMHG | SYSTOLIC BLOOD PRESSURE: 130 MMHG

## 2022-05-26 VITALS
HEART RATE: 68 BPM | SYSTOLIC BLOOD PRESSURE: 138 MMHG | RESPIRATION RATE: 16 BRPM | OXYGEN SATURATION: 96 % | DIASTOLIC BLOOD PRESSURE: 62 MMHG | BODY MASS INDEX: 25.7 KG/M2 | WEIGHT: 169 LBS

## 2022-05-26 DIAGNOSIS — D50.0 BLOOD LOSS ANEMIA: Primary | ICD-10-CM

## 2022-05-26 DIAGNOSIS — K92.1 GASTROINTESTINAL HEMORRHAGE WITH MELENA: ICD-10-CM

## 2022-05-26 DIAGNOSIS — I10 ESSENTIAL HYPERTENSION: ICD-10-CM

## 2022-05-26 DIAGNOSIS — I48.0 PAROXYSMAL ATRIAL FIBRILLATION (HCC): ICD-10-CM

## 2022-05-26 DIAGNOSIS — I50.42 CHRONIC COMBINED SYSTOLIC AND DIASTOLIC CHF (CONGESTIVE HEART FAILURE) (HCC): ICD-10-CM

## 2022-05-26 LAB
ANION GAP SERPL CALCULATED.3IONS-SCNC: 13 MMOL/L (ref 7–16)
BUN BLDV-MCNC: 21 MG/DL (ref 6–23)
CALCIUM SERPL-MCNC: 9.2 MG/DL (ref 8.6–10.2)
CHLORIDE BLD-SCNC: 95 MMOL/L (ref 98–107)
CO2: 28 MMOL/L (ref 22–29)
CREAT SERPL-MCNC: 1.1 MG/DL (ref 0.7–1.2)
GFR AFRICAN AMERICAN: >60
GFR NON-AFRICAN AMERICAN: >60 ML/MIN/1.73
GLUCOSE BLD-MCNC: 97 MG/DL (ref 74–99)
POTASSIUM SERPL-SCNC: 4.2 MMOL/L (ref 3.5–5)
PRO-BNP: 477 PG/ML (ref 0–450)
SODIUM BLD-SCNC: 136 MMOL/L (ref 132–146)

## 2022-05-26 PROCEDURE — 1036F TOBACCO NON-USER: CPT | Performed by: FAMILY MEDICINE

## 2022-05-26 PROCEDURE — 99214 OFFICE O/P EST MOD 30 MIN: CPT | Performed by: FAMILY MEDICINE

## 2022-05-26 PROCEDURE — 80048 BASIC METABOLIC PNL TOTAL CA: CPT

## 2022-05-26 PROCEDURE — 1123F ACP DISCUSS/DSCN MKR DOCD: CPT | Performed by: FAMILY MEDICINE

## 2022-05-26 PROCEDURE — G8427 DOCREV CUR MEDS BY ELIG CLIN: HCPCS | Performed by: FAMILY MEDICINE

## 2022-05-26 PROCEDURE — G8417 CALC BMI ABV UP PARAM F/U: HCPCS | Performed by: FAMILY MEDICINE

## 2022-05-26 PROCEDURE — 36415 COLL VENOUS BLD VENIPUNCTURE: CPT

## 2022-05-26 PROCEDURE — 83880 ASSAY OF NATRIURETIC PEPTIDE: CPT

## 2022-05-26 PROCEDURE — 99214 OFFICE O/P EST MOD 30 MIN: CPT

## 2022-05-26 ASSESSMENT — ENCOUNTER SYMPTOMS
ABDOMINAL PAIN: 0
SORE THROAT: 0
WHEEZING: 0
CONSTIPATION: 0
SHORTNESS OF BREATH: 0
NAUSEA: 0
VOMITING: 0
DIARRHEA: 0
RHINORRHEA: 0
COUGH: 1

## 2022-05-26 ASSESSMENT — PAIN SCALES - GENERAL: PAINLEVEL_OUTOF10: 0

## 2022-05-26 NOTE — PROGRESS NOTES
2022     Chief Complaint   Patient presents with    Results     xray and labs     HPI  Marah Recinos (:  1935) is a 82 y.o. male, here for evaluation of the following medical concerns:    Patient is a 72-year-old male with a past medical history of CHF, atrial fibrillation, nonsustained ventricular tachycardia, hyperlipidemia, hypertension, history of MI, insomnia, remote history of gastrointestinal hemorrhage with melena, ICD placement, and vitamin D deficiency who presents today for a 1 month f/u in regards to his chronic medical issues. Patient primarily is following up in regards to his colonoscopy and ongoing issues and concerns in regards to a possible GI bleed. Patient was noted to have a large internal hemorrhoid and 2 polyps during his colonoscopy. Patient has been advised to follow-up in approximately 3 years for repeat colonoscopy. Patient was given preop antibiotics due to aortic valve replacement history. GI does not feel that the patient needs serial CBC or stool occult testing at this time. Occasional cough w/ cold foods. On PPI and allergy medication. Not always present. Most recent CBC shows a hemoglobin of 12.3 with a MCV of 88. BMP recently was essentially normal.  Most recent BNP was 477. History of pulmonary nodule and pulmonary fibrosis. CKD III/II: Stable. Since the patient's last office appointment he has seen cardiology and has also followed up in CHF clinic. No recent additions or changes in the patient's medications were made besides modifying his Lasix to maintain euvolemia. The patient's CHF clinic providers do have some concerns in regards to his chronic findings of rales on exam.  Patient has had rales on exam since he first established with myself. Rales on the right side are slightly greater than the left. It does appear that this may be secondary to patient's CHF and history of possible pulmonary fibrosis.   Patient also has a history of a abnormal pulmonary nodule. In these regards patient has decided to not pursue additional work-up or referral to pulmonology. Patient did undergo recent cardioversion. Patient was noted to successfully convert to sinus rhythm. AAA ultrasound screening pending. Review of Systems   Constitutional: Negative for chills and fever. HENT: Negative for congestion, rhinorrhea and sore throat. Respiratory: Positive for cough. Negative for shortness of breath and wheezing. Some dyspnea on exertion at time. Cardiovascular: Negative for chest pain and leg swelling. Gastrointestinal: Negative for abdominal pain, constipation, diarrhea, nausea and vomiting. Skin: Negative for rash. Neurological: Negative for light-headedness and headaches. Past Medical History:   Diagnosis Date    [facility]fib [facility])     Arrhythmia     Carotid artery stenosis     [facility]     Heart valve problem     Hyperlipidemia     Hypertension     ICD (implantable cardiac defibrillator) in place 2007    [facility] SB#[id number] generator changed 7/28/16  Dr Jaqueline Spicer    MI (mitral incompetence) 2003    [facility]       Prior to Visit Medications    Medication Sig Taking?  Authorizing Provider   furosemide (LASIX) 40 MG tablet Starting 5/18/22 take 40mg furosemide at 8am and at 2pm for the next three days then on 5/21/22 resume taking 40mg every morning  8am.  Patient taking differently: daily  Yes GINETTE Griffin - CNP   rivaroxaban (XARELTO) 20 MG TABS tablet Take 20 mg by mouth Daily with supper  Yes Historical Provider, MD   Multiple Vitamin (MULTIVITAMIN ADULT PO) Take by mouth daily  Yes Historical Provider, MD   meclizine (ANTIVERT) 25 MG tablet Take 25 mg by mouth daily as needed  Yes Historical Provider, MD   amLODIPine (NORVASC) 2.5 MG tablet Take 1 tablet by mouth daily Yes Bladimir Perez DO   metoprolol tartrate (LOPRESSOR) 50 MG tablet TAKE 1 AND 1/2 TABLETS BY  MOUTH IN THE MORNING AND 1  TABLET BY MOUTH AT BEDTIME Yes Celsa Alex MD   pravastatin (PRAVACHOL) 40 MG tablet TAKE 1 TABLET BY MOUTH  DAILY Yes Celsa Alex MD   pantoprazole (PROTONIX) 40 MG tablet TAKE 1 TABLET BY MOUTH  DAILY Yes Celsa Alex MD   ferrous sulfate (IRON 325) 325 (65 Fe) MG tablet Take 1 tablet by mouth daily (with breakfast) Yes Celsa Alex MD   MAGNESIUM-OXIDE 400 (241.3 Mg) MG TABS tablet TK 1 T PO ONCE D Yes Historical Provider, MD   dofetilide (TIKOSYN) 250 MCG capsule Take 1 capsule by mouth 2 times daily Yes Celsa Alex MD   Multiple Vitamins-Minerals (PRESERVISION AREDS 2 PO) Take 2 tablets by mouth daily  Yes Historical Provider, MD   Cholecalciferol (VITAMIN D) 2000 UNITS CAPS capsule Take 1 capsule by mouth daily  Yes Historical Provider, MD   potassium chloride (KLOR-CON M) 10 MEQ extended release tablet TAKE 1 TABLET BY MOUTH  DAILY  Celsa Alex MD        No Known Allergies    Social History     Tobacco Use    Smoking status: Former Smoker     Packs/day: 0.50     Years: 3.00     Pack years: 1.50     Quit date: 1973     Years since quittin.3    Smokeless tobacco: Never Used    Tobacco comment: Quit smoking in    Substance Use Topics    Alcohol use: No           Vitals:    22 1546 22 1634   BP: (!) 154/72 130/70   Pulse: 71    Temp: 96.9 °F (36.1 °C)    SpO2: 93%    Weight: 169 lb 6.4 oz (76.8 kg)    Height: 5' 8\" (1.727 m)      Estimated body mass index is 25.76 kg/m² as calculated from the following:    Height as of this encounter: 5' 8\" (1.727 m). Weight as of this encounter: 169 lb 6.4 oz (76.8 kg). Physical Exam  Constitutional:       Appearance: He is well-developed. HENT:      Head: Normocephalic. Eyes:      Extraocular Movements: Extraocular movements intact. Conjunctiva/sclera: Conjunctivae normal.   Cardiovascular:      Rate and Rhythm: Normal rate and regular rhythm. Heart sounds: Normal heart sounds. No murmur heard. Pulmonary:      Effort: Pulmonary effort is normal.      Breath sounds: Normal breath sounds. No wheezing. Comments: Chronic rales on exam with right side greater than left. Abdominal:      General: Bowel sounds are normal.      Palpations: Abdomen is soft. Tenderness: There is no abdominal tenderness. Musculoskeletal:      Comments: Mild bilateral lower extremity pedal edema. Neurological:      General: No focal deficit present. Mental Status: He is alert. Comments: Cranial nerves grossly intact   Psychiatric:         Mood and Affect: Mood normal.         Judgment: Judgment normal.         ASSESSMENT/PLAN:  Brandie Woods was seen today for results. Diagnoses and all orders for this visit:    Blood loss anemia  -     CBC with Auto Differential; Future    Gastrointestinal hemorrhage with melena    Essential hypertension    Chronic combined systolic and diastolic CHF (congestive heart failure) ([facility])    Paroxysmal atrial fibrillation ([facility]    Patient is to continue to follow with all the specialist.  We will continue to occasionally check the patient's CBC. Consider checking ferritin in the near future. Patient has essentially been cleared from his GI issues after the conclusion of his recent colonoscopy. Blood pressure today on repeat was stable and below his goal of 140/90. In regards to the patient's CHF issues. He does appear to have a chronic baseline lower extremity pedal edema and rales (right> left) chronically on exam.  These findings are most likely secondary to his history of CHF and a possible history of pulmonary fibrosis as seen on previous CTA of his chest.  I do believe that the patient's baseline BNP is approximately between 400-500. I believe it is appropriate to increase the patient's Lasix as needed for significantly worsening lower extremity edema and for a BNP that doubles.   It does appear that CHF clinic has been following such recently in regards to his Lasix dosage. Patient to continue his baseline Lasix dosage today. Patient to continue to follow-up with CHF clinic. Extensive discussions in regards to this issue and his pulmonary nodule were undertaken today. Risk and benefits of referral to pulmonology and additional work-up/management were reviewed. Patient declined. Patient is aware that he may have a lung cancer. However given the patient's age and comorbidities it is unlikely that the benefits of addressing such outweigh the risk. If at any point in the future the patient changes his mind we may refer him to a pulmonologist.    Patient has had some issues with stress and anxiety at home due to his wife and her medical issues. This does seem to correlate with the patient's worsening medical issues and episodes of atrial fibrillation. Patient was provided with referral names for psychology/psychiatry locally. Patient will contact us for referral if necessary. 33 minutes in total duration was spent on the review of the patient's previous medical records and the above discussions and medical advice. Return in about 7 weeks (around 7/14/2022). An electronicsignature was used to authenticate this note.     --Stella Garces MD on 5/26/22 at 2:01 PM EDT

## 2022-05-26 NOTE — PROGRESS NOTES
Congestive Heart Failure [address]   CHF Clinic [place]  [address]   1935          Referring Provider: LAWRENCE Kinney Sa  Primary Care Physician: Dr. Nancy Kim  Cardiologist: Dr. Ivan English  Nephrologist:         History of Present Illness:     Noble Ye is a 82 y.o. male with a history of HFpEF, most recent EF 60% on 3/2/2022. Patient Story:    He does not  have dyspnea with exertion, shortness of breath, or decline in overall functional capacity. He does not have orthopnea, PND, nocturnal cough or hemoptysis. He does not have abdominal distention or bloating, early satiety, anorexia/change in appetite. He does have a good urinary response to  oral diuretic. He does  have  lower extremity edema. He denies lightheadedness, dizziness. He denies palpitations, syncope or near syncope. He does not complain of chest pain, pressure, discomfort. No Known Allergies        Prior to Visit Medications    Medication Sig Taking?  Authorizing Provider   furosemide (LASIX) 40 MG tablet Starting 5/18/22 take 40mg furosemide at 8am and at 2pm for the next three days then on 5/21/22 resume taking 40mg every morning  8am.  Patient taking differently: daily   GINETTE Agrawal CNP   rivaroxaban (XARELTO) 20 MG TABS tablet Take 20 mg by mouth Daily with supper   Historical Provider, MD   Multiple Vitamin (MULTIVITAMIN ADULT PO) Take by mouth daily   Historical Provider, MD   meclizine (ANTIVERT) 25 MG tablet Take 25 mg by mouth daily as needed   Historical Provider, MD   amLODIPine (NORVASC) 2.5 MG tablet Take 1 tablet by mouth daily  Addison Hendricks DO   metoprolol tartrate (LOPRESSOR) 50 MG tablet TAKE 1 AND 1/2 TABLETS BY  MOUTH IN THE MORNING AND 1  TABLET BY MOUTH AT BEDTIME  Niki Zazueta MD   pravastatin (PRAVACHOL) 40 MG tablet TAKE 1 TABLET BY MOUTH  DAILY  Niki Zazueta MD   potassium chloride (KLOR-CON M) 10 MEQ extended release tablet TAKE 1 TABLET BY Fredrick Bravo MD   pantoprazole (PROTONIX) 40 MG tablet TAKE 1 TABLET BY MOUTH  DAILY  Wandy Cadet MD   Handicap Placard MISC by Does not apply route Disp #: 1  Duration: 5 years. Wandy Cadet MD   ferrous sulfate (IRON 325) 325 (65 Fe) MG tablet Take 1 tablet by mouth daily (with breakfast)  Wandy Cadet MD   MAGNESIUM-OXIDE 400 (241.3 Mg) MG TABS tablet TK 1 T PO ONCE D  Historical Provider, MD   dofetilide (TIKOSYN) 250 MCG capsule Take 1 capsule by mouth 2 times daily  Wandy Cadet MD   Multiple Vitamins-Minerals (PRESERVISION AREDS 2 PO) Take 2 tablets by mouth daily   Historical Provider, MD   Cholecalciferol (VITAMIN D) 2000 UNITS CAPS capsule Take 1 capsule by mouth daily   Historical Provider, MD           Guideline directed medical:  ARNI/ACE I/ARB: No  Beta blocker: Yes  Aldosterone antagonist:  No        Physical Examination:     /62   Pulse 68   Resp 16   Wt 169 lb (76.7 kg)   SpO2 96%   BMI 25.70 kg/m²     Assessment  Charting Type: Shift assessment (chf clinic)    Neurological  Level of Consciousness: Alert (0)              Respiratory  Chest Assessment: Chest expansion symmetrical    Breath Sounds  Right Upper Lobe: Clear  Right Middle Lobe: Fine Crackles  Right Lower Lobe: Fine Crackles  Left Upper Lobe: Clear  Left Lower Lobe: Fine Crackles         Cardiac  Cardiac Rhythm: Atrial paced    Rhythm Interpretation  Heart Rate: 68         Gastrointestinal  Abdominal (WDL): Within Defined Limits  Abdomen Inspection: Soft  RUQ Bowel Sounds: Active  LUQ Bowel Sounds: Active  RLQ Bowel Sounds: Active  LLQ Bowel Sounds: Active          Bowel Sounds  RUQ Bowel Sounds: Active  LUQ Bowel Sounds: Active  RLQ Bowel Sounds: Active  LLQ Bowel Sounds:  Active    Peripheral Vascular  Peripheral Vascular (WDL): Exceptions to WDL (support hose are on)  Edema: Right lower extremity,Left lower extremity  RLE Edema: +1,Pitting  LLE Edema: None                   Genitourinary  Genitourinary (WDL): Within Defined Limits    Psychosocial  Psychosocial (WDL): Within Defined Limits    Pain Assessment  Pain Assessment: 0-10  Pain Level: 0                   Heart Rate: 68                     LAB DATA:    Last 3 BMP      Sodium (mmol/L)   Date Value   05/17/2022 141   05/12/2022 141   04/26/2022 138     Potassium (mmol/L)   Date Value   05/17/2022 4.6   05/12/2022 4.6   04/26/2022 4.3     Potassium reflex Magnesium (mmol/L)   Date Value   03/12/2022 4.3   09/21/2021 4.5   09/04/2021 4.4     Chloride (mmol/L)   Date Value   05/17/2022 102   05/12/2022 103   04/26/2022 99     CO2 (mmol/L)   Date Value   05/17/2022 29   05/12/2022 29   04/26/2022 29     BUN (mg/dL)   Date Value   05/17/2022 16   05/12/2022 17   04/26/2022 18     Glucose (mg/dL)   Date Value   05/17/2022 103 (H)   05/12/2022 101 (H)   04/26/2022 110 (H)     Calcium (mg/dL)   Date Value   05/17/2022 9.2   05/12/2022 9.2   04/26/2022 9.1       Last 3 BNP       Pro-BNP (pg/mL)   Date Value   05/17/2022 802 (H)   04/26/2022 633 (H)   04/19/2022 496 (H)          CBC: No results for input(s): WBC, HGB, PLT in the last 72 hours. BMP:    No results for input(s): NA, K, CL, CO2, BUN, CREATININE, GLUCOSE in the last 72 hours. Hepatic: No results for input(s): AST, ALT, ALB, BILITOT, ALKPHOS in the last 72 hours. Troponin: No results for input(s): TROPONINI in the last 72 hours. BNP: No results for input(s): BNP in the last 72 hours. Lipids: No results for input(s): CHOL, HDL in the last 72 hours. Invalid input(s): LDLCALCU  INR: No results for input(s): INR in the last 72 hours.         WEIGHTS:    Wt Readings from Last 3 Encounters:   05/26/22 169 lb (76.7 kg)   05/17/22 169 lb (76.7 kg)   05/13/22 170 lb (77.1 kg)         TELEMETRY:  Cardiac Regularity: Regular  Cardiac Rhythm/Interpretation: A paced        ASSESSMENT:  Elva Slater weight remain stable patient states he is not SOB nor with activity he feels his cough is better, right lower leg 1+ pitting edema left leg no edema support hose are on. Lungs remain with crackles unchanged as charted Patient Lasix increased  40mg BId for 3 days and then lasix 40mg daily. Patient states he does feel he had a good response from IV diuretic last CHF clinic visit. Interventions completed this visit:  IV diuretics given- no  Lab work obtained yes, proBNP, BMP   Reviewed currently prescribed medications with patient, educated on importance of compliance and answered any questions regarding their medication  Educated on signs and symptoms of HF  Educated on low sodium diet    PLAN:  Scheduled to follow up in CHF clinic on   Future Appointments   Date Time Provider Cezar Gamez   6/1/2022  1:45 PM MD PRISCILLA Chaidez [facility]   6/8/2022  3:15 PM Heather Mcgraw MD [address] [place] [address]   6/15/2022 10:00 AM [facility] ROOM 1 [facility]. [facility] 6     Given clinic phone number [phone] and aware of signs and symptoms to call with any HF change in symptoms.

## 2022-06-08 NOTE — PROGRESS NOTES
Cardiac Electrophysiology Outpatient Progress Note    Gilberto Fernández  1935  Date of Service: 6/8/2022  Referring Provider/PCP: Laura Valdes MD  Chief Complaint:   Complains of near syncope, ICD discharges        Patient Active Problem List    Diagnosis Date Noted    Chronic combined systolic and diastolic CHF (congestive heart failure) ([facility] [address] 10/08/2015     Priority: High    Abdominal aortic aneurysm (AAA) 3.0 cm to 5.5 cm in diameter in male ([address] 04/27/2022     Priority: Medium    Symptomatic anemia 03/12/2022    GI bleed 03/12/2022    Acute on chronic combined systolic and diastolic CHF (congestive heart failure) ([facility] [address] 03/01/2022    Combined congestive systolic and diastolic heart failure ([facility] [address] 03/01/2022    Gastroesophageal reflux disease without esophagitis 03/01/2022    Coronary artery disease involving coronary bypass graft 03/01/2022    Pre-syncope 09/02/2021    SOB (shortness of breath) 10/29/2020    Lung nodule     Acute on chronic diastolic (congestive) heart failure ([facility] [address]     Pure hypercholesterolemia     Stage 3b chronic kidney disease ([address]     Transient cerebral ischemia     Aortic valve disease     Other insomnia 05/07/2020    Ischemic heart disease     ICD (implantable cardioverter-defibrillator), dual, in situ     GIB (gastrointestinal bleeding) 02/17/2020    Hyperlipidemia LDL goal <100 09/02/2019    S/P TAVR (transcatheter aortic valve replacement)     Chronic atrial fibrillation     Coronary artery disease involving native coronary artery of native heart without angina pectoris     Essential hypertension     Vitamin D deficiency 05/06/2011       Current Outpatient Medications   Medication Sig Dispense Refill    furosemide (LASIX) 40 MG tablet Starting 5/18/22 take 40mg furosemide at 8am and at 2pm for the next three days then on 5/21/22 resume taking 40mg every morning  8am. (Patient taking differently: daily ) 90 tablet 3    rivaroxaban (XARELTO) 20 MG TABS tablet Take 20 mg by mouth Daily with supper       Multiple Vitamin (MULTIVITAMIN ADULT PO) Take by mouth daily       meclizine (ANTIVERT) 25 MG tablet Take 25 mg by mouth daily as needed       amLODIPine (NORVASC) 2.5 MG tablet Take 1 tablet by mouth daily 90 tablet 3    metoprolol tartrate (LOPRESSOR) 50 MG tablet TAKE 1 AND 1/2 TABLETS BY  MOUTH IN THE MORNING AND 1  TABLET BY MOUTH AT BEDTIME 225 tablet 3    pravastatin (PRAVACHOL) 40 MG tablet TAKE 1 TABLET BY MOUTH  DAILY 90 tablet 3    potassium chloride (KLOR-CON M) 10 MEQ extended release tablet TAKE 1 TABLET BY MOUTH  DAILY 90 tablet 3    pantoprazole (PROTONIX) 40 MG tablet TAKE 1 TABLET BY MOUTH  DAILY 90 tablet 3    ferrous sulfate (IRON 325) 325 (65 Fe) MG tablet Take 1 tablet by mouth daily (with breakfast) 90 tablet 1    MAGNESIUM-OXIDE 400 (241.3 Mg) MG TABS tablet TK 1 T PO ONCE D      dofetilide (TIKOSYN) 250 MCG capsule Take 1 capsule by mouth 2 times daily 90 capsule 0    Multiple Vitamins-Minerals (PRESERVISION AREDS 2 PO) Take 2 tablets by mouth daily       Cholecalciferol (VITAMIN D) 2000 UNITS CAPS capsule Take 1 capsule by mouth daily        No current facility-administered medications for this visit. PMH    1. History of coronary artery disease with 1st coronary artery bypass surgery in 2003. 2.Cardiac workup in 2007 revealed only the left internal mammary artery to be patent. Saphenous vein graft to OM and ramus intermedius were occluded. 3.The patient had ventricular tachycardia causing a cardiac arrest during stress testing also at that time. 4.The patient underwent redo coronary artery bypass graft at the radial artery graft to D2 and OM2. 5.History of elective percutaneous coronary intervention with drug-eluting stents in OM2 and native left anterior descending thereafter following bypass surgery. 6.Status post dual-chamber implantable cardioverter-defibrillator implantation at that time.   7.Implantable cardioverter-defibrillator lead fracture thereafter in 2008 resulting in multiple implantable cardioverter-defibrillator discharges. 8.The patient currently has a Medtronic implantable cardioverter-defibrillator with a 6947 right ventricular lead. 9.Subsequent cardiac workup over the years has also shown the presence of aortic stenosis, which has been followed by his cardiologist.  S/p TAVR procedure in 2016.  10.History of gastrointestinal bleeding. 11. Generator change out due to battery depletion. Replaced with a Medtronic Evera XT  SR# [id number] on 7-28-16. 12. Paroxysmal atrial fibrillation for which the patient has been on dofetilide therapy since 2017. Polymorphic VT has been noted during previous hospitalizations although QT interval      No Known Allergies    SUBJECTIVE: Carole Saleem presents to the office today for follow-up evaluation of recent ICD discharges. He was recently cardioverted on May 13 for persistent atrial fibrillation. Prior to that he had received an ICD discharge for an episode of polymorphic VT /VF associated with syncope. The patient is functional and is able to ambulate without any difficulty. He has had no symptoms of chest pain or shortness of breath at rest or on exertion in the recent past.  He does get extremely symptomatic when he develops persistent atrial fibrillation but has improved considerably post cardioversion. No episodes of syncope or near syncope in the last month. No recent ICD discharges post cardioversion    ROS:   Constitutional: Negative for fever, activity change and appetite change. HENT: Negative for epistaxis, difficulty swallowing. Eyes: Negative for blurred vision or double vision. Respiratory: Negative for cough, chest tightness, shortness of breath and wheezing. Cardiovascular: Negative for chest pain, palpitations and leg swelling. Gastrointestinal: Negative for abdominal pain, heartburn, or blood in stool.    Genitourinary: Negative for hematuria, burning or frequency. Musculoskeletal: Negative for myalgias, stiffness, or swelling. Skin: Negative for rash, pain, or lumps. Neurological: Negative for syncope, seizures, or headaches. Psychiatric/Behavioral: Negative for confusion and agitation. The patient is not nervous/anxious. PHYSICAL EXAM:  There were no vitals filed for this visit. Constitutional: Oriented to person, place, and time. Well-developed and cooperative. Head: Normocephalic and atraumatic. Eyes: Conjunctivae are normal. Pupils are equal, round, and reactive to light. Neck: No hepatojugular reflux and no JVD present. Carotid bruit is not present. Cardiovascular: Normally placed PMI, normal S1 and S2 with left sternal border and the apex, grade 3/6 systolic ejection murmur at the aortic area and left sternal border  Pulmonary/Chest: Effort normal and breath sounds normal. No respiratory distress. Abdominal: Soft. Normal appearance and bowel sounds are normal.    Musculoskeletal: Normal range of motion of all extremities, no muscle weakness. Neurological: Alert and oriented to person, place, and time. Gait normal.   Skin: Skin is warm and dry. No bruising, no ecchymosis and no rash noted. Extremity: No clubbing or cyanosis. No edema. Psychiatric: Normal mood and affect.  Thought content normal.     Pertinent Labs:  CBC:   WBC   Date Value Ref Range Status   05/12/2022 6.6 4.5 - 11.5 E9/L Final   04/27/2022 6.3 4.5 - 11.5 E9/L Final   04/04/2022 7.4 4.5 - 11.5 E9/L Final     Hemoglobin   Date Value Ref Range Status   05/12/2022 12.3 (L) 12.5 - 16.5 g/dL Final   04/27/2022 12.4 (L) 12.5 - 16.5 g/dL Final   04/04/2022 11.3 (L) 12.5 - 16.5 g/dL Final     Hematocrit   Date Value Ref Range Status   05/12/2022 38.3 37.0 - 54.0 % Final   04/27/2022 40.0 37.0 - 54.0 % Final   04/04/2022 37.2 37.0 - 54.0 % Final     Platelets   Date Value Ref Range Status   05/12/2022 222 130 - 450 E9/L Final   04/27/2022 214 130 - 450 E9/L Final   04/04/2022 255 130 - 450 E9/L Final     BMP:   Lab Results   Component Value Date     05/26/2022    K 4.2 05/26/2022    K 4.3 03/12/2022    CL 95 05/26/2022    CO2 28 05/26/2022    BUN 21 05/26/2022    CREATININE 1.1 05/26/2022    GLUCOSE 97 05/26/2022    CALCIUM 9.2 05/26/2022      ABGs: No results found for: PHART, PO2ART, EGL8AUF  INR:   Lab Results   Component Value Date    INR 1.3 03/14/2022    INR 1.3 03/13/2022    INR 1.4 03/12/2022     BNP:   Lab Results   Component Value Date    BNP 47 08/27/2013     TSH:   Lab Results   Component Value Date    TSH 1.280 03/01/2022      Cardiac Injury Profile: Total CK   Date Value Ref Range Status   06/28/2017 94 20 - 200 U/L Final     CK-MB   Date Value Ref Range Status   06/28/2017 3.8 0.0 - 7.7 ng/mL Final     Troponin   Date Value Ref Range Status   10/29/2020 <0.01 0.00 - 0.03 ng/mL Final     Comment:     TROPONIN T BLOOD LEVELS:         0.03 ng/mL     Upper Reference Limit  0.04 - 0.09 ng/mL     Possible myocardial injury      >= 0.10 ng/mL     Myocardial injury       Lipid Profile:   Lab Results   Component Value Date    TRIG 430 03/02/2022    HDL 34 03/02/2022    LDLCALC - 03/02/2022    CHOL 176 03/02/2022      Hemoglobin A1C:   Lab Results   Component Value Date    LABA1C 5.5 09/03/2021         Pertinent Cardiac Testing:       ECG 6/8/2022: Atrial paced rhythm,RBBB  QTc 460 msec     ICD INTERROGATION:  MVPR   1. Battery voltage: 2.90 V    9.6 seconds     9 months  2. Impedances: A: 418 ohms   V: 342 ohms   HV: 40 ohms   SVC: 48 ohms  3. P waves: 1.3 mV         R waves: 2.4 mV  4. Thresholds: A: 0.5 V @ 0.4 ms    V: 0.75 V @ 0.4 ms   5. AP 91.7 %        0.5%  6.  5 episodes of polymorphic NSVT episodes in the past month  7. Vascular Hemodynamic Monitor: No indication of fluid retention.       I have independently reviewed all of the ECGs and rhythm strips per above    I have personally reviewed the laboratory, cardiac diagnostic and radiographic testing as outlined above:    Impression:    79-year-old patient with a history of coronary artery disease--s/p coronary bypass surgery x2    Aortic stenosis, s/p TAVR    Paroxysmal atrial fibrillation--on dofetilide therapy    Recurrent episodes of nonsustained polymorphic VT as well as 1 episode of sustained polymorphic VT triggering an ICD discharge this year. QT interval is not prolonged on his EKG however. Nevertheless in view of recurrent episodes of atrial fibrillation 1 triggering  hospitalization last year as well as recurrent polymorphic VT in the past month I discussed the options of switching to amiodarone therapy with the patient and his son. Risks, benefits and alternatives of amiodarone therapy discussed in detail. Side effects of amiodarone explained. They are willing to proceed with the use of amiodarone      Recommendations:    Stop Tikosyn  Start amiodarone--200 mg twice a day for a month then 200 mg daily  TSH and CMP every 6 months  Device checks as scheduled previously, remote device check in 3 months within an office check in 6 months  Follow-up with me as needed based on episodes of AF or VT, if any    Thank you for allowing me to participate in your patient's care.     Radha Hernandez MD, [facility] [place]    Cardiac Electrophysiology  [address] [facility]  [facility] [place]: [facility] Electrophysiology

## 2022-06-09 ENCOUNTER — OFFICE VISIT (OUTPATIENT)
Dept: NON INVASIVE DIAGNOSTICS | Age: 87
End: 2022-06-09
Payer: MEDICARE

## 2022-06-09 VITALS
DIASTOLIC BLOOD PRESSURE: 67 MMHG | HEART RATE: 71 BPM | HEIGHT: 66 IN | BODY MASS INDEX: 27.26 KG/M2 | SYSTOLIC BLOOD PRESSURE: 141 MMHG | RESPIRATION RATE: 18 BRPM | WEIGHT: 169.6 LBS

## 2022-06-09 DIAGNOSIS — I48.20 CHRONIC ATRIAL FIBRILLATION (HCC): Primary | ICD-10-CM

## 2022-06-09 PROCEDURE — 99214 OFFICE O/P EST MOD 30 MIN: CPT | Performed by: INTERNAL MEDICINE

## 2022-06-09 PROCEDURE — 93290 INTERROG DEV EVAL ICPMS IP: CPT | Performed by: INTERNAL MEDICINE

## 2022-06-09 PROCEDURE — 93283 PRGRMG EVAL IMPLANTABLE DFB: CPT | Performed by: INTERNAL MEDICINE

## 2022-06-09 PROCEDURE — 1123F ACP DISCUSS/DSCN MKR DOCD: CPT | Performed by: INTERNAL MEDICINE

## 2022-06-09 RX ORDER — AMIODARONE HYDROCHLORIDE 200 MG/1
200 TABLET ORAL 2 TIMES DAILY
Qty: 90 TABLET | Refills: 1 | Status: SHIPPED
Start: 2022-06-09 | End: 2022-06-27

## 2022-06-09 RX ORDER — AMIODARONE HYDROCHLORIDE 200 MG/1
200 TABLET ORAL DAILY
Qty: 90 TABLET | Refills: 3 | Status: SHIPPED
Start: 2022-06-09 | End: 2022-06-27

## 2022-06-10 DIAGNOSIS — I48.20 CHRONIC ATRIAL FIBRILLATION (HCC): ICD-10-CM

## 2022-06-10 LAB
ALBUMIN SERPL-MCNC: 4.5 G/DL (ref 3.5–5.2)
ALP BLD-CCNC: 149 U/L (ref 40–129)
ALT SERPL-CCNC: 14 U/L (ref 0–40)
ANION GAP SERPL CALCULATED.3IONS-SCNC: 15 MMOL/L (ref 7–16)
AST SERPL-CCNC: 25 U/L (ref 0–39)
BILIRUB SERPL-MCNC: 0.4 MG/DL (ref 0–1.2)
BUN BLDV-MCNC: 16 MG/DL (ref 6–23)
CALCIUM SERPL-MCNC: 9.3 MG/DL (ref 8.6–10.2)
CHLORIDE BLD-SCNC: 101 MMOL/L (ref 98–107)
CO2: 26 MMOL/L (ref 22–29)
CREAT SERPL-MCNC: 1 MG/DL (ref 0.7–1.2)
GFR AFRICAN AMERICAN: >60
GFR NON-AFRICAN AMERICAN: >60 ML/MIN/1.73
GLUCOSE BLD-MCNC: 115 MG/DL (ref 74–99)
POTASSIUM SERPL-SCNC: 4.3 MMOL/L (ref 3.5–5)
SODIUM BLD-SCNC: 142 MMOL/L (ref 132–146)
TOTAL PROTEIN: 7.2 G/DL (ref 6.4–8.3)
TSH SERPL DL<=0.05 MIU/L-ACNC: 0.94 UIU/ML (ref 0.27–4.2)

## 2022-06-15 ENCOUNTER — APPOINTMENT (OUTPATIENT)
Dept: GENERAL RADIOLOGY | Age: 87
End: 2022-06-15
Payer: MEDICARE

## 2022-06-15 ENCOUNTER — HOSPITAL ENCOUNTER (OUTPATIENT)
Dept: OTHER | Age: 87
Setting detail: THERAPIES SERIES
Discharge: HOME | End: 2022-06-15
Payer: MEDICARE

## 2022-06-15 ENCOUNTER — HOSPITAL ENCOUNTER (EMERGENCY)
Age: 87
Discharge: HOME | End: 2022-06-16
Attending: STUDENT IN AN ORGANIZED HEALTH CARE EDUCATION/TRAINING PROGRAM
Payer: MEDICARE

## 2022-06-15 ENCOUNTER — TELEPHONE (OUTPATIENT)
Dept: NON INVASIVE DIAGNOSTICS | Age: 87
End: 2022-06-15

## 2022-06-15 VITALS
HEART RATE: 71 BPM | BODY MASS INDEX: 26.68 KG/M2 | TEMPERATURE: 98.3 F | RESPIRATION RATE: 18 BRPM | WEIGHT: 170 LBS | DIASTOLIC BLOOD PRESSURE: 70 MMHG | HEIGHT: 67 IN | OXYGEN SATURATION: 93 % | SYSTOLIC BLOOD PRESSURE: 155 MMHG

## 2022-06-15 VITALS
SYSTOLIC BLOOD PRESSURE: 138 MMHG | BODY MASS INDEX: 27.44 KG/M2 | RESPIRATION RATE: 16 BRPM | HEART RATE: 70 BPM | WEIGHT: 170 LBS | DIASTOLIC BLOOD PRESSURE: 68 MMHG | OXYGEN SATURATION: 96 %

## 2022-06-15 DIAGNOSIS — Z45.02 AICD DISCHARGE: Primary | ICD-10-CM

## 2022-06-15 LAB
ANION GAP SERPL CALCULATED.3IONS-SCNC: 11 MMOL/L (ref 7–16)
ANION GAP SERPL CALCULATED.3IONS-SCNC: 16 MMOL/L (ref 7–16)
APTT: 38.4 SEC (ref 24.5–35.1)
BASOPHILS ABSOLUTE: 0.03 E9/L (ref 0–0.2)
BASOPHILS RELATIVE PERCENT: 0.3 % (ref 0–2)
BUN BLDV-MCNC: 22 MG/DL (ref 6–23)
BUN BLDV-MCNC: 23 MG/DL (ref 6–23)
CALCIUM SERPL-MCNC: 8.8 MG/DL (ref 8.6–10.2)
CALCIUM SERPL-MCNC: 9 MG/DL (ref 8.6–10.2)
CHLORIDE BLD-SCNC: 93 MMOL/L (ref 98–107)
CHLORIDE BLD-SCNC: 98 MMOL/L (ref 98–107)
CO2: 25 MMOL/L (ref 22–29)
CO2: 27 MMOL/L (ref 22–29)
CREAT SERPL-MCNC: 1.2 MG/DL (ref 0.7–1.2)
CREAT SERPL-MCNC: 1.7 MG/DL (ref 0.7–1.2)
EOSINOPHILS ABSOLUTE: 0.06 E9/L (ref 0.05–0.5)
EOSINOPHILS RELATIVE PERCENT: 0.7 % (ref 0–6)
GFR AFRICAN AMERICAN: 47
GFR AFRICAN AMERICAN: >60
GFR NON-AFRICAN AMERICAN: 39 ML/MIN/1.73
GFR NON-AFRICAN AMERICAN: 58 ML/MIN/1.73
GLUCOSE BLD-MCNC: 112 MG/DL (ref 74–99)
GLUCOSE BLD-MCNC: 114 MG/DL (ref 74–99)
HCT VFR BLD CALC: 38.7 % (ref 37–54)
HEMOGLOBIN: 12.8 G/DL (ref 12.5–16.5)
IMMATURE GRANULOCYTES #: 0.03 E9/L
IMMATURE GRANULOCYTES %: 0.3 % (ref 0–5)
INR BLD: 1.5
LYMPHOCYTES ABSOLUTE: 1.19 E9/L (ref 1.5–4)
LYMPHOCYTES RELATIVE PERCENT: 13.6 % (ref 20–42)
MAGNESIUM: 2.1 MG/DL (ref 1.6–2.6)
MCH RBC QN AUTO: 28.4 PG (ref 26–35)
MCHC RBC AUTO-ENTMCNC: 33.1 % (ref 32–34.5)
MCV RBC AUTO: 86 FL (ref 80–99.9)
MONOCYTES ABSOLUTE: 0.61 E9/L (ref 0.1–0.95)
MONOCYTES RELATIVE PERCENT: 6.9 % (ref 2–12)
NEUTROPHILS ABSOLUTE: 6.86 E9/L (ref 1.8–7.3)
NEUTROPHILS RELATIVE PERCENT: 78.2 % (ref 43–80)
PDW BLD-RTO: 15 FL (ref 11.5–15)
PLATELET # BLD: 208 E9/L (ref 130–450)
PMV BLD AUTO: 10.7 FL (ref 7–12)
POTASSIUM SERPL-SCNC: 4.2 MMOL/L (ref 3.5–5)
POTASSIUM SERPL-SCNC: 4.4 MMOL/L (ref 3.5–5)
PRO-BNP: 609 PG/ML (ref 0–450)
PROTHROMBIN TIME: 17 SEC (ref 9.3–12.4)
RBC # BLD: 4.5 E12/L (ref 3.8–5.8)
SODIUM BLD-SCNC: 134 MMOL/L (ref 132–146)
SODIUM BLD-SCNC: 136 MMOL/L (ref 132–146)
TROPONIN, HIGH SENSITIVITY: 24 NG/L (ref 0–11)
TROPONIN, HIGH SENSITIVITY: 26 NG/L (ref 0–11)
WBC # BLD: 8.8 E9/L (ref 4.5–11.5)

## 2022-06-15 PROCEDURE — 36415 COLL VENOUS BLD VENIPUNCTURE: CPT

## 2022-06-15 PROCEDURE — 93005 ELECTROCARDIOGRAM TRACING: CPT | Performed by: STUDENT IN AN ORGANIZED HEALTH CARE EDUCATION/TRAINING PROGRAM

## 2022-06-15 PROCEDURE — 85025 COMPLETE CBC W/AUTO DIFF WBC: CPT

## 2022-06-15 PROCEDURE — 99214 OFFICE O/P EST MOD 30 MIN: CPT | Performed by: INTERNAL MEDICINE

## 2022-06-15 PROCEDURE — 71045 X-RAY EXAM CHEST 1 VIEW: CPT

## 2022-06-15 PROCEDURE — 83735 ASSAY OF MAGNESIUM: CPT

## 2022-06-15 PROCEDURE — 83880 ASSAY OF NATRIURETIC PEPTIDE: CPT

## 2022-06-15 PROCEDURE — 93283 PRGRMG EVAL IMPLANTABLE DFB: CPT | Performed by: INTERNAL MEDICINE

## 2022-06-15 PROCEDURE — 84484 ASSAY OF TROPONIN QUANT: CPT

## 2022-06-15 PROCEDURE — 80048 BASIC METABOLIC PNL TOTAL CA: CPT

## 2022-06-15 PROCEDURE — 99214 OFFICE O/P EST MOD 30 MIN: CPT

## 2022-06-15 PROCEDURE — 99285 EMERGENCY DEPT VISIT HI MDM: CPT

## 2022-06-15 PROCEDURE — 85730 THROMBOPLASTIN TIME PARTIAL: CPT

## 2022-06-15 PROCEDURE — 85610 PROTHROMBIN TIME: CPT

## 2022-06-15 ASSESSMENT — PAIN - FUNCTIONAL ASSESSMENT: PAIN_FUNCTIONAL_ASSESSMENT: NONE - DENIES PAIN

## 2022-06-15 ASSESSMENT — PAIN SCALES - GENERAL: PAINLEVEL_OUTOF10: 0

## 2022-06-15 NOTE — CONSULTS
[address] [facility] and [address] [facility] Electrophysiology  Consultation Report  PATIENT: Pierce Hamilton RECORD NUMBER: [id number]  DATE OF SERVICE:  6/15/2022  ATTENDING ELECTROPHYSIOLOGIST: Ivone Cintron MD  PRIMARY ELECTROPHYSIOLOGIST: Ivone Cintron MD  REFERRING PHYSICIAN: Garth Reilly DO and Clay Arreaga MD  CHIEF COMPLAINT: ICD shocks. HPI: This is a 82 y.o. male with a history of coronary artery disease CABG in 2003, sustained ventricular tachycardia, redo CABG, prior PCI, inappropriate ICD shocks secondary to lead fracture, aortic valve disease status post TAVR, GI bleed, persistent atrial fibrillation with prior dofetilide usage and DCCV on 05/13/2022. He also has had paroxysmal atrial fibrillation and has been on dofetilide therapy for several years. Recently however he has had recurrent episodes of atrial fibrillation requiring at least 3 cardioversions in the past year. He was recently seen in the office 06/09/2022 at which time his device interrogation revealed episodes of nonsustained and 1 episode of sustained polymorphic VT and dofetilide was abandoned at that time and he started amiodarone 200 mg p.o. twice daily. Today (06/15/22) he had 2 treated rapid VT episodes that terminated with a 35 J shocks, first episode at approximately 12 PM, V rate 222, the 2nd was at 1425 terminating with a 35 J shockv rate 214 bpm.    He has no other cardiac complaints at this time, including orthopnea, PND, chest pain, bilateral lower extremity edema.        Patient Active Problem List    Diagnosis Date Noted    Chronic combined systolic and diastolic CHF (congestive heart failure) ([facility] [address] 10/08/2015     Priority: High    Abdominal aortic aneurysm (AAA) 3.0 cm to 5.5 cm in diameter in male ([facility] [address] 04/27/2022     Priority: Medium    Symptomatic anemia 03/12/2022    GI bleed 03/12/2022    Acute on chronic combined systolic and diastolic CHF (congestive heart failure) ([facility] 2022    Combined congestive systolic and diastolic heart failure ([facility] 2022    Gastroesophageal reflux disease without esophagitis 2022    Coronary artery disease involving coronary bypass graft 2022    Pre-syncope 2021    SOB (shortness of breath) 10/29/2020    Lung nodule     Acute on chronic diastolic (congestive) heart failure (HCC)     Pure hypercholesterolemia     Stage 3b chronic kidney disease (HCC)     Transient cerebral ischemia     Aortic valve disease     Other insomnia 2020    Ischemic heart disease     ICD (implantable cardioverter-defibrillator), dual, in situ     GIB (gastrointestinal bleeding) 2020    Hyperlipidemia LDL goal <100 2019    S/P TAVR (transcatheter aortic valve replacement)     Chronic atrial fibrillation     Coronary artery disease involving native coronary artery of native heart without angina pectoris     Essential hypertension     Vitamin D deficiency 2011       Past Medical History:   Diagnosis Date    A-fib [facility])     Arrhythmia     Carotid artery stenosis     CHF (congestive heart failure) ([facility]     Heart valve problem     Hyperlipidemia     Hypertension     ICD (implantable cardiac defibrillator) in place     [employer] [place]  MS#[id number] generator changed 16  Dr Cheryl Lucas    MI (mitral incompetence)     MI (myocardial infarction) ([facility]        History reviewed. No pertinent family history. Social History     Tobacco Use    Smoking status: Former Smoker     Packs/day: 0.50     Years: 3.00     Pack years: 1.50     Quit date: 1973     Years since quittin.4    Smokeless tobacco: Never Used    Tobacco comment: Quit smoking in    Substance Use Topics    Alcohol use: No       No current facility-administered medications for this encounter.      Current Outpatient Medications   Medication Sig Dispense Refill    amiodarone (CORDARONE) 200 MG tablet Take 1 tablet by mouth 2 times daily After 1 month reduce the dose to 1 tab daily 90 tablet 1    amiodarone (CORDARONE) 200 MG tablet Take 1 tablet by mouth daily 90 tablet 3    furosemide (LASIX) 40 MG tablet Starting 5/18/22 take 40mg furosemide at 8am and at 2pm for the next three days then on 5/21/22 resume taking 40mg every morning  8am. (Patient taking differently: Take 40 mg by mouth daily ) 90 tablet 3    rivaroxaban (XARELTO) 20 MG TABS tablet Take 20 mg by mouth Daily with supper       Multiple Vitamin (MULTIVITAMIN ADULT PO) Take by mouth daily       meclizine (ANTIVERT) 25 MG tablet Take 25 mg by mouth daily as needed       amLODIPine (NORVASC) 2.5 MG tablet Take 1 tablet by mouth daily 90 tablet 3    metoprolol tartrate (LOPRESSOR) 50 MG tablet TAKE 1 AND 1/2 TABLETS BY  MOUTH IN THE MORNING AND 1  TABLET BY MOUTH AT BEDTIME 225 tablet 3    pravastatin (PRAVACHOL) 40 MG tablet TAKE 1 TABLET BY MOUTH  DAILY 90 tablet 3    potassium chloride (KLOR-CON M) 10 MEQ extended release tablet TAKE 1 TABLET BY MOUTH  DAILY 90 tablet 3    pantoprazole (PROTONIX) 40 MG tablet TAKE 1 TABLET BY MOUTH  DAILY 90 tablet 3    ferrous sulfate (IRON 325) 325 (65 Fe) MG tablet Take 1 tablet by mouth daily (with breakfast) 90 tablet 1    MAGNESIUM-OXIDE 400 (241.3 Mg) MG TABS tablet TK 1 T PO ONCE D      Multiple Vitamins-Minerals (PRESERVISION AREDS 2 PO) Take 2 tablets by mouth daily       Cholecalciferol (VITAMIN D) 2000 UNITS CAPS capsule Take 1 capsule by mouth daily           No Known Allergies    ROS:   Constitutional: Negative for fever, activity change and appetite change. HENT: Negative for epistaxis. Eyes: Negative for diploplia, blurred vision. Respiratory: Negative for cough, chest tightness, shortness of breath and wheezing. Cardiovascular: pertinent positives in HPI  Gastrointestinal: Negative for abdominal pain and blood in stool.    All other review of systems are negative     PHYSICAL EXAM: Vitals:    06/15/22 1456   BP: 139/65   Pulse: 71   Resp: 16   Temp: 98.3 °F (36.8 °C)   TempSrc: Axillary   SpO2: 93%   Weight: 170 lb (77.1 kg)   Height: 5' 7\" (1.702 m)      Constitutional: Well-developed, no acute distress  Eyes: conjunctivae normal, no xanthelasma   Ears, Nose, Throat: oral mucosa moist, no cyanosis   CV: no JVD. Regular rate and rhythm. Normal S1S2 and no S3. No murmurs, rubs, or gallops. PMI is nondisplaced  Lungs: clear to auscultation bilaterally, normal respiratory effort without used of accessory muscles  Abdomen: soft, non-tender, bowel sounds present, no masses or hepatomegaly   Musculoskeletal: no digital clubbing, no edema   Skin: warm, no rashes   Device site: Left chest, free of erosion and migration. I have personally reviewed the laboratory, cardiac diagnostic and radiographic testing as outlined below:    Data:    Recent Labs     06/15/22  1530   WBC 8.8   HGB 12.8   HCT 38.7        Recent Labs     06/15/22  1008 06/15/22  1530    136   K 4.4 4.2   CL 98 93*   CO2 25 27   BUN 23 22   CREATININE 1.2 1.7*   CALCIUM 9.0 8.8      Lab Results   Component Value Date    MG 2.1 06/15/2022     No results for input(s): TSH in the last 72 hours. Recent Labs     06/15/22  1530   INR 1.5       CXR 06/15/22: The lungs are without acute focal process.  There is no effusion or   pneumothorax.  Cardiomegaly.  Left-sided transvenous pacer device, prosthetic   heart valve and sternotomy wires noted. .  The osseous structures are without   acute process.           Impression   No acute process.       Cardiomegaly. Telemetry: AS-     EKG 06/15/22:  AP-VS rate 70 bpm,  QRS 134ms, QTc 464ms  Please see scan in Cardiology. Echocardiogram 03/02/22:    Findings      Left Ventricle   Left ventricle size is normal.   Concentric left ventricular hypertrophy. Normal left ventricular systolic function. Ejection fraction is visually estimated at 60%.    There is doppler evidence of stage II diastolic dysfunction. Right Ventricle   Mildly dilated right ventricle with normal right ventricular function. Pacer/ICD wire visualized in the right ventricle. Left Atrium   Moderately dilated left atrium by volume index. Right Atrium   Dilated right atrium. Mitral Valve   Mild mitral regurgitation. MV mean gradient 4 mmHg. Tricuspid Valve   Mild tricuspid regurgitation. PASP is estimated at 44 mmHg. Aortic Valve   History of TAVR (ZANE 3) with 26 mm bioprosthetic valve. No significant paravalvular aortic regurgitation. AV peak velocity 2.1 m/s. AV mean gradient 9 mmHg. AV area 1.5 cm2. Pulmonic Valve   Mild pulmonic regurgitation. Pericardial Effusion   No evidence of a hemodynamically significant pericardial effusion. Aorta   Aortic root dimension within normal limits. Conclusions      Summary   Normal left ventricular systolic function. Ejection fraction is visually estimated at 60%. Mildly dilated right ventricle with normal right ventricular function. There is doppler evidence of stage II diastolic dysfunction. Moderately dilated left atrium by volume index. Mild mitral regurgitation. 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 No significant paravalvular aortic regurgitation. AV peak velocity 2.1 m/s. AV mean gradient 9 mmHg. AV area 1.5 cm2. Mild tricuspid regurgitation. PASP is estimated at 44 mmHg.       Signature      ----------------------------------------------------------------   Electronically signed by Enrike Mendoza MD(Interpreting   physician) on 03/02/2022 12:04 PM   ----------------------------------------------------------------     M-Mode/2D Measurements & Calculations      LV Diastolic    LV Systolic Dimension: 3.3   AV Cusp Separation: 1.4 cmLA   Dimension: 4.7  cm                           Dimension: 4.3 cmAO Root   cm              LV Volume Diastolic: 066 ml Dofetilide discontinued  - 2 episodes of SMVT noted 06/15/22, triggered by V pacing   amiodarone increased and ICD reprogrammed to the DDDR mode    2. Paroxysmal atrial fibrillation  -CHADS-VASc Xarelto (age, Vasc, HTN)   -Direct-current cardioversion 05/12/2022.  2 cardioversions last year  -Prior dofetilide usage started in 2017, stopped 06/9/2022. 3.  Medtronic ICD  -Secondary prevention  -Noted lead fracture and 2008, status post replacement    3. Coronary artery disease  -Status post CABG x2    4. Aortic stenosis status post TAVR 2016    5. GI bleed    Recommendations:  1. Increase amiodarone to 200 mg 3 times daily   2   ICD reprogrammed as noted above  3. Continue all other cardiac medications as prior to this evaluation    I have spent a total of 10 minutes with the patient and the family reviewing the above stated recommendations. And a total of >50% of that time involved face-to-face time providing counseling and or coordination of care with the other providers. Thank you for allowing me to participate in your patient's care. Please call me if there are any questions or concerns. Discussed with Dr. Divya Danielson, [facility] [place]. [address] [facility]  [facility] [place] [facility]: [address] [facility] [address] Electrophysiology  4:40 PM  6/15/2022     I personally and independently saw and examined patient and reviewed all done pertinent laboratory data, imaging studies, ECGs and rhythm strips. I have reviewed and agree with the APRN history and physical exam as documented in the above note. History and physical data as well as assessment and plan were modified by me.   Patient's ICD was interrogated by me personally and reprogrammed as noted above     Impression/recommendation    82year-old patient well-known to me for a history of ischemic heart disease, aortic stenosis , s/p TAVR procedure who presents to the emergency room for  appropriate ICD discharges for recurrent sustained VT  Device interrogation suggests VT initiation triggered by AV block followed by V pacing. Device was therefore reprogrammed as noted above    In addition the patient was recently placed on amiodarone therapy for control of his episodes of AF and VT. We shall increase his daily dose to 200 mg 3 times a day for 2 weeks followed by 200 mg twice a day for a month.   Follow-up in the office as outpatient in 6 weeks or as scheduled already    [facility]

## 2022-06-15 NOTE — TELEPHONE ENCOUNTER
Alert from [employer] for a shock occurring in the VF zone. The shock was appropriate and successful. I talked to the patient and he had just returned for CHF clinic in [facility] and was sitting in a chair. Patient is taking all his medications.

## 2022-06-15 NOTE — PROGRESS NOTES
Congestive Heart Failure [address]   [facility]  [address]   1935          Referring Provider: LAWRENCE Raygoza  Primary Care Physician: Dr. Kana Melgoza  Cardiologist: Dr. Janna Gonzalez  Nephrologist:         History of Present Illness:     Radha Hall is a 82 y.o. male with a history of HFpEF, most recent EF 60% on 3/2/2022. Patient Story:    He does not  have dyspnea with exertion, shortness of breath, or decline in overall functional capacity. He does not have orthopnea, PND, nocturnal cough or hemoptysis. He does not have abdominal distention or bloating, early satiety, anorexia/change in appetite. He does have a good urinary response to  oral diuretic. He does not  have  lower extremity edema. He denies lightheadedness, dizziness. He denies palpitations, syncope or near syncope. He does not complain of chest pain, pressure, discomfort. No Known Allergies        Prior to Visit Medications    Medication Sig Taking?  Authorizing Provider   amiodarone (CORDARONE) 200 MG tablet Take 1 tablet by mouth 2 times daily After 1 month reduce the dose to 1 tab daily  Ad Porter MD   amiodarone (CORDARONE) 200 MG tablet Take 1 tablet by mouth daily  Ad Porter MD   furosemide (LASIX) 40 MG tablet Starting 5/18/22 take 40mg furosemide at 8am and at 2pm for the next three days then on 5/21/22 resume taking 40mg every morning  8am.  Patient taking differently: Take 40 mg by mouth daily   GINETTE Aggarwal CNP   rivaroxaban (XARELTO) 20 MG TABS tablet Take 20 mg by mouth Daily with supper   Historical Provider, MD   Multiple Vitamin (MULTIVITAMIN ADULT PO) Take by mouth daily   Historical Provider, MD   meclizine (ANTIVERT) 25 MG tablet Take 25 mg by mouth daily as needed   Historical Provider, MD   amLODIPine (NORVASC) 2.5 MG tablet Take 1 tablet by mouth daily  Rosa Bishop DO   metoprolol tartrate (LOPRESSOR) 50 MG tablet TAKE 1 AND 1/2 TABLETS BY  MOUTH IN THE MORNING AND 1  TABLET BY MOUTH AT BEDTIME  Charles Gaming MD   pravastatin (PRAVACHOL) 40 MG tablet TAKE 1 TABLET BY MOUTH  DAILY  Charles Gaming MD   potassium chloride (KLOR-CON M) 10 MEQ extended release tablet TAKE 1 TABLET BY MOUTH  DAILY  Charles Gaming MD   pantoprazole (PROTONIX) 40 MG tablet TAKE 1 TABLET BY MOUTH  DAILY  Charles Gaming MD   ferrous sulfate (IRON 325) 325 (65 Fe) MG tablet Take 1 tablet by mouth daily (with breakfast)  Chrales Gaming MD   MAGNESIUM-OXIDE 400 (241.3 Mg) MG TABS tablet TK 1 T PO ONCE D  Historical Provider, MD   Multiple Vitamins-Minerals (PRESERVISION AREDS 2 PO) Take 2 tablets by mouth daily   Historical Provider, MD   Cholecalciferol (VITAMIN D) 2000 UNITS CAPS capsule Take 1 capsule by mouth daily   Historical Provider, MD           Guideline directed medical:  ARNI/ACE I/ARB: No  Beta blocker:   Yes  Aldosterone antagonist:  No        Physical Examination:     /68   Pulse 70   Resp 16   Wt 170 lb (77.1 kg)   SpO2 96%   BMI 27.44 kg/m²     Assessment  Charting Type: Shift assessment (chf clinic)    Neurological  Level of Consciousness: Alert (0)                   Breath Sounds  Right Upper Lobe: Clear  Right Middle Lobe: Fine Crackles  Right Lower Lobe: Fine Crackles  Left Upper Lobe: Clear  Left Lower Lobe: Fine Crackles         Cardiac  Cardiac Rhythm: Atrial paced    Rhythm Interpretation  Heart Rate: 70         Gastrointestinal  Abdominal (WDL): Within Defined Limits  Abdomen Inspection: Soft               Peripheral Vascular  Peripheral Vascular (WDL): Exceptions to WDL  Edema: Right lower extremity,Left lower extremity  RLE Edema: None  LLE Edema: None                   Genitourinary  Genitourinary (WDL): Within Defined Limits    Psychosocial  Psychosocial (WDL): Within Defined Limits    Pain Assessment  Pain Assessment: 0-10  Pain Level: 0                   Heart Rate: 70 LAB DATA:    Last 3 BMP      Sodium (mmol/L)   Date Value   06/15/2022 134   06/10/2022 142   05/26/2022 136     Potassium (mmol/L)   Date Value   06/15/2022 4.4   06/10/2022 4.3   05/26/2022 4.2     Potassium reflex Magnesium (mmol/L)   Date Value   03/12/2022 4.3   09/21/2021 4.5   09/04/2021 4.4     Chloride (mmol/L)   Date Value   06/15/2022 98   06/10/2022 101   05/26/2022 95 (L)     CO2 (mmol/L)   Date Value   06/15/2022 25   06/10/2022 26   05/26/2022 28     BUN (mg/dL)   Date Value   06/15/2022 23   06/10/2022 16   05/26/2022 21     Glucose (mg/dL)   Date Value   06/15/2022 114 (H)   06/10/2022 115 (H)   05/26/2022 97     Calcium (mg/dL)   Date Value   06/15/2022 9.0   06/10/2022 9.3   05/26/2022 9.2       Last 3 BNP       Pro-BNP (pg/mL)   Date Value   06/15/2022 609 (H)   05/26/2022 477 (H)   05/17/2022 802 (H)          CBC: No results for input(s): WBC, HGB, PLT in the last 72 hours. BMP:    Recent Labs     06/15/22  1008      K 4.4   CL 98   CO2 25   BUN 23   CREATININE 1.2   GLUCOSE 114*     Hepatic: No results for input(s): AST, ALT, ALB, BILITOT, ALKPHOS in the last 72 hours. Troponin: No results for input(s): TROPONINI in the last 72 hours. BNP: No results for input(s): BNP in the last 72 hours. Lipids: No results for input(s): CHOL, HDL in the last 72 hours. Invalid input(s): LDLCALCU  INR: No results for input(s): INR in the last 72 hours. WEIGHTS:    Wt Readings from Last 3 Encounters:   06/15/22 170 lb (77.1 kg)   06/15/22 170 lb (77.1 kg)   06/09/22 169 lb 9.6 oz (76.9 kg)         TELEMETRY:  Cardiac Regularity: Regular  Cardiac Rhythm/Interpretation: A paced        ASSESSMENT:  Monica Murphy weight remain stable patient states he is not SOB nor with activity  Lungs remain with crackles unchanged as charted Dr Gurpreet Blackmon did address crackles in a note from 5-26-22.      Patient states he has a good response from oral diuretic Patient had appointment with Dr Korin Churchill and Tikosyn discontinued and started on Amiodarone. Interventions completed this visit:  IV diuretics given- no  Lab work obtained yes, proBNP, BMP   Reviewed currently prescribed medications with patient, educated on importance of compliance and answered any questions regarding their medication  Educated on signs and symptoms of HF  Educated on low sodium diet    PLAN:  Scheduled to follow up in CHF clinic on   Future Appointments   Date Time Provider Cezar Gamez   7/14/2022 10:30 AM Sunday MD PRISCILLA Dotson [facility]   8/16/2022 10:00 AM YAJAIRA [facility] ROOM 1 YAJAIRA [facility]   9/19/2022  9:20 AM SCHEDULE, [address]   12/8/2022  2:00 PM SCHEDULE, [address]     Given clinic phone number [phone] and aware of signs and symptoms to call with any HF change in symptoms.

## 2022-06-16 LAB
EKG ATRIAL RATE: 70 BPM
EKG P-R INTERVAL: 326 MS
EKG Q-T INTERVAL: 430 MS
EKG QRS DURATION: 134 MS
EKG QTC CALCULATION (BAZETT): 464 MS
EKG R AXIS: -84 DEGREES
EKG T AXIS: 28 DEGREES
EKG VENTRICULAR RATE: 70 BPM

## 2022-06-17 ENCOUNTER — TELEPHONE (OUTPATIENT)
Dept: NON INVASIVE DIAGNOSTICS | Age: 87
End: 2022-06-17

## 2022-06-17 NOTE — TELEPHONE ENCOUNTER
Spoke to Mr Patsy Mckeon to see how he was feeling this morning and to make sure he was taking his medicine correctly Amiodarone 200mg TID. States he is feeling well, just a little tired. He is taking his medicine as prescribed. Appointment scheduled for 07/07/2022 @ 2:30 with Dr Neeta Fuentes. Instructed him to call with any questions or concerns.

## 2022-06-27 ENCOUNTER — APPOINTMENT (OUTPATIENT)
Dept: GENERAL RADIOLOGY | Age: 87
End: 2022-06-27
Payer: MEDICARE

## 2022-06-27 ENCOUNTER — HOSPITAL ENCOUNTER (OUTPATIENT)
Age: 87
Setting detail: OBSERVATION
Discharge: HOME | End: 2022-06-28
Attending: EMERGENCY MEDICINE | Admitting: INTERNAL MEDICINE
Payer: MEDICARE

## 2022-06-27 ENCOUNTER — APPOINTMENT (OUTPATIENT)
Dept: CT IMAGING | Age: 87
End: 2022-06-27
Payer: MEDICARE

## 2022-06-27 DIAGNOSIS — R06.02 SHORTNESS OF BREATH: Primary | ICD-10-CM

## 2022-06-27 DIAGNOSIS — M54.50 LOW BACK PAIN WITHOUT SCIATICA, UNSPECIFIED BACK PAIN LATERALITY, UNSPECIFIED CHRONICITY: ICD-10-CM

## 2022-06-27 PROBLEM — R07.9 CHEST PAIN: Status: ACTIVE | Noted: 2022-06-27

## 2022-06-27 LAB
ALBUMIN SERPL-MCNC: 4 G/DL (ref 3.5–5.2)
ALP BLD-CCNC: 132 U/L (ref 40–129)
ALT SERPL-CCNC: 15 U/L (ref 0–40)
ANION GAP SERPL CALCULATED.3IONS-SCNC: 16 MMOL/L (ref 7–16)
AST SERPL-CCNC: 26 U/L (ref 0–39)
BASOPHILS ABSOLUTE: 0.03 E9/L (ref 0–0.2)
BASOPHILS RELATIVE PERCENT: 0.4 % (ref 0–2)
BILIRUB SERPL-MCNC: 0.4 MG/DL (ref 0–1.2)
BUN BLDV-MCNC: 21 MG/DL (ref 6–23)
CALCIUM SERPL-MCNC: 8.7 MG/DL (ref 8.6–10.2)
CHLORIDE BLD-SCNC: 93 MMOL/L (ref 98–107)
CO2: 24 MMOL/L (ref 22–29)
CREAT SERPL-MCNC: 1.2 MG/DL (ref 0.7–1.2)
EKG ATRIAL RATE: 72 BPM
EKG P-R INTERVAL: 356 MS
EKG Q-T INTERVAL: 506 MS
EKG QRS DURATION: 192 MS
EKG QTC CALCULATION (BAZETT): 549 MS
EKG R AXIS: -72 DEGREES
EKG T AXIS: 75 DEGREES
EKG VENTRICULAR RATE: 71 BPM
EOSINOPHILS ABSOLUTE: 0.17 E9/L (ref 0.05–0.5)
EOSINOPHILS RELATIVE PERCENT: 2.2 % (ref 0–6)
GFR AFRICAN AMERICAN: >60
GFR NON-AFRICAN AMERICAN: 58 ML/MIN/1.73
GLUCOSE BLD-MCNC: 98 MG/DL (ref 74–99)
HCT VFR BLD CALC: 32.9 % (ref 37–54)
HEMOGLOBIN: 11.2 G/DL (ref 12.5–16.5)
IMMATURE GRANULOCYTES #: 0.02 E9/L
IMMATURE GRANULOCYTES %: 0.3 % (ref 0–5)
LYMPHOCYTES ABSOLUTE: 1.32 E9/L (ref 1.5–4)
LYMPHOCYTES RELATIVE PERCENT: 16.9 % (ref 20–42)
MCH RBC QN AUTO: 29.4 PG (ref 26–35)
MCHC RBC AUTO-ENTMCNC: 34 % (ref 32–34.5)
MCV RBC AUTO: 86.4 FL (ref 80–99.9)
MONOCYTES ABSOLUTE: 0.78 E9/L (ref 0.1–0.95)
MONOCYTES RELATIVE PERCENT: 10 % (ref 2–12)
NEUTROPHILS ABSOLUTE: 5.51 E9/L (ref 1.8–7.3)
NEUTROPHILS RELATIVE PERCENT: 70.2 % (ref 43–80)
PDW BLD-RTO: 15.1 FL (ref 11.5–15)
PLATELET # BLD: 210 E9/L (ref 130–450)
PMV BLD AUTO: 10.4 FL (ref 7–12)
POTASSIUM REFLEX MAGNESIUM: 4.6 MMOL/L (ref 3.5–5)
PRO-BNP: 628 PG/ML (ref 0–450)
RBC # BLD: 3.81 E12/L (ref 3.8–5.8)
SODIUM BLD-SCNC: 133 MMOL/L (ref 132–146)
TOTAL PROTEIN: 6.6 G/DL (ref 6.4–8.3)
TROPONIN, HIGH SENSITIVITY: 18 NG/L (ref 0–11)
TROPONIN, HIGH SENSITIVITY: 18 NG/L (ref 0–11)
WBC # BLD: 7.8 E9/L (ref 4.5–11.5)

## 2022-06-27 PROCEDURE — 6370000000 HC RX 637 (ALT 250 FOR IP): Performed by: INTERNAL MEDICINE

## 2022-06-27 PROCEDURE — 74174 CTA ABD&PLVS W/CONTRAST: CPT

## 2022-06-27 PROCEDURE — 2580000003 HC RX 258: Performed by: INTERNAL MEDICINE

## 2022-06-27 PROCEDURE — G0378 HOSPITAL OBSERVATION PER HR: HCPCS

## 2022-06-27 PROCEDURE — 85025 COMPLETE CBC W/AUTO DIFF WBC: CPT

## 2022-06-27 PROCEDURE — 71045 X-RAY EXAM CHEST 1 VIEW: CPT

## 2022-06-27 PROCEDURE — 99213 OFFICE O/P EST LOW 20 MIN: CPT | Performed by: INTERNAL MEDICINE

## 2022-06-27 PROCEDURE — 2580000003 HC RX 258: Performed by: STUDENT IN AN ORGANIZED HEALTH CARE EDUCATION/TRAINING PROGRAM

## 2022-06-27 PROCEDURE — 83880 ASSAY OF NATRIURETIC PEPTIDE: CPT

## 2022-06-27 PROCEDURE — 84484 ASSAY OF TROPONIN QUANT: CPT

## 2022-06-27 PROCEDURE — 93005 ELECTROCARDIOGRAM TRACING: CPT | Performed by: EMERGENCY MEDICINE

## 2022-06-27 PROCEDURE — 80053 COMPREHEN METABOLIC PANEL: CPT

## 2022-06-27 PROCEDURE — 71275 CT ANGIOGRAPHY CHEST: CPT

## 2022-06-27 PROCEDURE — 93283 PRGRMG EVAL IMPLANTABLE DFB: CPT | Performed by: INTERNAL MEDICINE

## 2022-06-27 PROCEDURE — 6360000004 HC RX CONTRAST MEDICATION: Performed by: STUDENT IN AN ORGANIZED HEALTH CARE EDUCATION/TRAINING PROGRAM

## 2022-06-27 PROCEDURE — 99285 EMERGENCY DEPT VISIT HI MDM: CPT

## 2022-06-27 RX ORDER — ACETAMINOPHEN 325 MG/1
650 TABLET ORAL EVERY 6 HOURS PRN
Status: DISCONTINUED | OUTPATIENT
Start: 2022-06-27 | End: 2022-06-28 | Stop reason: HOSPADM

## 2022-06-27 RX ORDER — POLYETHYLENE GLYCOL 3350 17 G/17G
17 POWDER, FOR SOLUTION ORAL DAILY PRN
Status: DISCONTINUED | OUTPATIENT
Start: 2022-06-27 | End: 2022-06-28 | Stop reason: HOSPADM

## 2022-06-27 RX ORDER — ENOXAPARIN SODIUM 100 MG/ML
40 INJECTION SUBCUTANEOUS DAILY
Status: DISCONTINUED | OUTPATIENT
Start: 2022-06-27 | End: 2022-06-27

## 2022-06-27 RX ORDER — PRAVASTATIN SODIUM 20 MG
40 TABLET ORAL NIGHTLY
Status: DISCONTINUED | OUTPATIENT
Start: 2022-06-27 | End: 2022-06-28 | Stop reason: HOSPADM

## 2022-06-27 RX ORDER — METOPROLOL TARTRATE 75 MG/1
75 TABLET, FILM COATED ORAL EVERY MORNING
COMMUNITY

## 2022-06-27 RX ORDER — LANOLIN ALCOHOL/MO/W.PET/CERES
400 CREAM (GRAM) TOPICAL DAILY
Status: DISCONTINUED | OUTPATIENT
Start: 2022-06-28 | End: 2022-06-28 | Stop reason: HOSPADM

## 2022-06-27 RX ORDER — ONDANSETRON 2 MG/ML
4 INJECTION INTRAMUSCULAR; INTRAVENOUS EVERY 6 HOURS PRN
Status: DISCONTINUED | OUTPATIENT
Start: 2022-06-27 | End: 2022-06-27

## 2022-06-27 RX ORDER — SODIUM CHLORIDE 0.9 % (FLUSH) 0.9 %
5-40 SYRINGE (ML) INJECTION EVERY 12 HOURS SCHEDULED
Status: DISCONTINUED | OUTPATIENT
Start: 2022-06-27 | End: 2022-06-28 | Stop reason: HOSPADM

## 2022-06-27 RX ORDER — CHOLECALCIFEROL (VITAMIN D3) 50 MCG
2000 TABLET ORAL DAILY
Status: DISCONTINUED | OUTPATIENT
Start: 2022-06-28 | End: 2022-06-28 | Stop reason: HOSPADM

## 2022-06-27 RX ORDER — SODIUM CHLORIDE 0.9 % (FLUSH) 0.9 %
5-40 SYRINGE (ML) INJECTION PRN
Status: DISCONTINUED | OUTPATIENT
Start: 2022-06-27 | End: 2022-06-28 | Stop reason: HOSPADM

## 2022-06-27 RX ORDER — AMIODARONE HYDROCHLORIDE 200 MG/1
200 TABLET ORAL 2 TIMES DAILY
COMMUNITY
Start: 2022-07-04 | End: 2022-07-14 | Stop reason: ALTCHOICE

## 2022-06-27 RX ORDER — METOPROLOL TARTRATE 50 MG/1
50 TABLET, FILM COATED ORAL NIGHTLY
Status: DISCONTINUED | OUTPATIENT
Start: 2022-06-27 | End: 2022-06-28 | Stop reason: HOSPADM

## 2022-06-27 RX ORDER — FERROUS SULFATE 325(65) MG
325 TABLET ORAL
Status: DISCONTINUED | OUTPATIENT
Start: 2022-06-28 | End: 2022-06-28 | Stop reason: HOSPADM

## 2022-06-27 RX ORDER — PRAVASTATIN SODIUM 40 MG
40 TABLET ORAL NIGHTLY
COMMUNITY

## 2022-06-27 RX ORDER — FUROSEMIDE 20 MG/1
40 TABLET ORAL DAILY
Status: DISCONTINUED | OUTPATIENT
Start: 2022-06-28 | End: 2022-06-28 | Stop reason: HOSPADM

## 2022-06-27 RX ORDER — POTASSIUM CHLORIDE 750 MG/1
10 TABLET, EXTENDED RELEASE ORAL DAILY
Status: DISCONTINUED | OUTPATIENT
Start: 2022-06-28 | End: 2022-06-28 | Stop reason: HOSPADM

## 2022-06-27 RX ORDER — MECLIZINE HCL 12.5 MG/1
25 TABLET ORAL DAILY PRN
Status: DISCONTINUED | OUTPATIENT
Start: 2022-06-27 | End: 2022-06-28 | Stop reason: HOSPADM

## 2022-06-27 RX ORDER — AMIODARONE HYDROCHLORIDE 200 MG/1
200 TABLET ORAL 3 TIMES DAILY
Status: DISCONTINUED | OUTPATIENT
Start: 2022-06-27 | End: 2022-06-28 | Stop reason: HOSPADM

## 2022-06-27 RX ORDER — METOPROLOL TARTRATE 50 MG/1
50 TABLET, FILM COATED ORAL NIGHTLY
COMMUNITY

## 2022-06-27 RX ORDER — SODIUM CHLORIDE 9 MG/ML
INJECTION, SOLUTION INTRAVENOUS PRN
Status: DISCONTINUED | OUTPATIENT
Start: 2022-06-27 | End: 2022-06-28 | Stop reason: HOSPADM

## 2022-06-27 RX ORDER — FUROSEMIDE 40 MG/1
40 TABLET ORAL SEE ADMIN INSTRUCTIONS
COMMUNITY

## 2022-06-27 RX ORDER — POTASSIUM CHLORIDE 750 MG/1
10 TABLET, EXTENDED RELEASE ORAL DAILY
COMMUNITY

## 2022-06-27 RX ORDER — ACETAMINOPHEN 650 MG/1
650 SUPPOSITORY RECTAL EVERY 6 HOURS PRN
Status: DISCONTINUED | OUTPATIENT
Start: 2022-06-27 | End: 2022-06-28 | Stop reason: HOSPADM

## 2022-06-27 RX ORDER — VIT C/E/ZN/COPPR/LUTEIN/ZEAXAN 60 MG-6 MG
1 CAPSULE ORAL 2 TIMES DAILY
Status: DISCONTINUED | OUTPATIENT
Start: 2022-06-27 | End: 2022-06-28 | Stop reason: HOSPADM

## 2022-06-27 RX ORDER — PANTOPRAZOLE SODIUM 40 MG/1
40 TABLET, DELAYED RELEASE ORAL DAILY
Status: DISCONTINUED | OUTPATIENT
Start: 2022-06-28 | End: 2022-06-28 | Stop reason: HOSPADM

## 2022-06-27 RX ORDER — ONDANSETRON 4 MG/1
4 TABLET, ORALLY DISINTEGRATING ORAL EVERY 8 HOURS PRN
Status: DISCONTINUED | OUTPATIENT
Start: 2022-06-27 | End: 2022-06-27

## 2022-06-27 RX ORDER — AMLODIPINE BESYLATE 2.5 MG/1
2.5 TABLET ORAL DAILY
Status: DISCONTINUED | OUTPATIENT
Start: 2022-06-28 | End: 2022-06-28 | Stop reason: HOSPADM

## 2022-06-27 RX ORDER — SODIUM CHLORIDE 0.9 % (FLUSH) 0.9 %
10 SYRINGE (ML) INJECTION
Status: COMPLETED | OUTPATIENT
Start: 2022-06-27 | End: 2022-06-27

## 2022-06-27 RX ADMIN — Medication 10 ML: at 09:16

## 2022-06-27 RX ADMIN — Medication 1 CAPSULE: at 20:06

## 2022-06-27 RX ADMIN — Medication 10 ML: at 21:51

## 2022-06-27 RX ADMIN — PRAVASTATIN SODIUM 40 MG: 20 TABLET ORAL at 20:07

## 2022-06-27 RX ADMIN — METOPROLOL TARTRATE 50 MG: 50 TABLET, FILM COATED ORAL at 20:06

## 2022-06-27 RX ADMIN — AMIODARONE HYDROCHLORIDE 200 MG: 200 TABLET ORAL at 20:06

## 2022-06-27 RX ADMIN — IOPAMIDOL 90 ML: 755 INJECTION, SOLUTION INTRAVENOUS at 09:15

## 2022-06-27 RX ADMIN — RIVAROXABAN 20 MG: 20 TABLET, FILM COATED ORAL at 18:42

## 2022-06-27 RX ADMIN — AMIODARONE HYDROCHLORIDE 200 MG: 200 TABLET ORAL at 15:35

## 2022-06-27 ASSESSMENT — PAIN SCALES - GENERAL
PAINLEVEL_OUTOF10: 5
PAINLEVEL_OUTOF10: 0

## 2022-06-27 ASSESSMENT — PAIN - FUNCTIONAL ASSESSMENT: PAIN_FUNCTIONAL_ASSESSMENT: 0-10

## 2022-06-27 NOTE — ED PROVIDER NOTES
Patient received in signout, patient was found to have likely pna vs CHF patient was admitted for further management and was stable while in ED         --------------------------------------------- PAST HISTORY ---------------------------------------------  Past Medical History:  has a past medical history of A-fib ([facility] [address] Arrhythmia, Carotid artery stenosis, CHF (congestive heart failure) ([facility] [address] Heart valve problem, Hyperlipidemia, Hypertension, ICD (implantable cardiac defibrillator) in place, MI (mitral incompetence), and MI (myocardial infarction) ([facility] [address] 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 2008); Cardiac defibrillator placement (07/28/2016); Cardiac catheterization (Right, 03/03/2017); other surgical history (03/29/2017); Upper gastrointestinal endoscopy (N/A, 09/03/2019); Upper gastrointestinal endoscopy (N/A, 02/18/2020); Colonoscopy (N/A, 02/24/2020); Cardioversion (10/29/2020); Cardiac surgery (N/A, 09/04/2021); Upper gastrointestinal endoscopy (N/A, 03/14/2022); Upper gastrointestinal endoscopy (N/A, 03/16/2022); Colonoscopy (N/A, 05/04/2022); and Cardioversion (05/12/2022). Social History:  reports that he quit smoking about 49 years ago. He has a 1.50 pack-year smoking history. He has never used smokeless tobacco. He reports that he does not drink alcohol and does not use drugs. Family History: family history is not on file. The patients home medications have been reviewed. Allergies: Patient has no known allergies.     -------------------------------------------------- RESULTS -------------------------------------------------    LABS:  Results for orders placed or performed during the hospital encounter of 06/27/22 CBC with Auto Differential   Result Value Ref Range    WBC 7.8 4.5 - 11.5 E9/L    RBC 3.81 3.80 - 5.80 E12/L    Hemoglobin 11.2 (L) 12.5 - 16.5 g/dL    Hematocrit 32.9 (L) 37.0 - 54.0 %    MCV 86.4 80.0 - 99.9 fL    MCH 29.4 26.0 - 35.0 pg    MCHC 34.0 32.0 - 34.5 %    RDW 15.1 (H) 11.5 - 15.0 fL    Platelets 046 568 - 487 E9/L    MPV 10.4 7.0 - 12.0 fL    Neutrophils % 70.2 43.0 - 80.0 %    Immature Granulocytes % 0.3 0.0 - 5.0 %    Lymphocytes % 16.9 (L) 20.0 - 42.0 %    Monocytes % 10.0 2.0 - 12.0 %    Eosinophils % 2.2 0.0 - 6.0 %    Basophils % 0.4 0.0 - 2.0 %    Neutrophils Absolute 5.51 1.80 - 7.30 E9/L    Immature Granulocytes # 0.02 E9/L    Lymphocytes Absolute 1.32 (L) 1.50 - 4.00 E9/L    Monocytes Absolute 0.78 0.10 - 0.95 E9/L    Eosinophils Absolute 0.17 0.05 - 0.50 E9/L    Basophils Absolute 0.03 0.00 - 0.20 E9/L   Troponin   Result Value Ref Range    Troponin, High Sensitivity 18 (H) 0 - 11 ng/L   Comprehensive Metabolic Panel w/ Reflex to MG   Result Value Ref Range    Sodium 133 132 - 146 mmol/L    Potassium reflex Magnesium 4.6 3.5 - 5.0 mmol/L    Chloride 93 (L) 98 - 107 mmol/L    CO2 24 22 - 29 mmol/L    Anion Gap 16 7 - 16 mmol/L    Glucose 98 74 - 99 mg/dL    BUN 21 6 - 23 mg/dL    CREATININE 1.2 0.7 - 1.2 mg/dL    GFR Non-African American 58 >=60 mL/min/1.73    GFR African American >60     Calcium 8.7 8.6 - 10.2 mg/dL    Total Protein 6.6 6.4 - 8.3 g/dL    Albumin 4.0 3.5 - 5.2 g/dL    Total Bilirubin 0.4 0.0 - 1.2 mg/dL    Alkaline Phosphatase 132 (H) 40 - 129 U/L    ALT 15 0 - 40 U/L    AST 26 0 - 39 U/L   Brain Natriuretic Peptide   Result Value Ref Range    Pro- (H) 0 - 450 pg/mL   Troponin   Result Value Ref Range    Troponin, High Sensitivity 18 (H) 0 - 11 ng/L   Hemoglobin A1C   Result Value Ref Range    Hemoglobin A1C 5.5 4.0 - 5.6 %   Lipid Panel   Result Value Ref Range    Cholesterol, Total 142 0 - 199 mg/dL    Triglycerides 191 (H) 0 - 149 mg/dL    HDL 39 >40 mg/dL    LDL Calculated 65 0 - 99 mg/dL    VLDL Cholesterol Calculated 38 mg/dL   Magnesium   Result Value Ref Range    Magnesium 2.4 1.6 - 2.6 mg/dL   Phosphorus   Result Value Ref Range    Phosphorus 3.7 2.5 - 4.5 mg/dL   TSH   Result Value Ref Range    TSH 1.700 0.270 - 4.200 uIU/mL   CBC   Result Value Ref Range    WBC 7.8 4.5 - 11.5 E9/L    RBC 4.35 3.80 - 5.80 E12/L    Hemoglobin 12.5 12.5 - 16.5 g/dL    Hematocrit 37.9 37.0 - 54.0 %    MCV 87.1 80.0 - 99.9 fL    MCH 28.7 26.0 - 35.0 pg    MCHC 33.0 32.0 - 34.5 %    RDW 15.4 (H) 11.5 - 15.0 fL    Platelets 624 883 - 525 E9/L    MPV 10.3 7.0 - 12.0 fL   Comprehensive Metabolic Panel   Result Value Ref Range    Sodium 138 132 - 146 mmol/L    Potassium 4.5 3.5 - 5.0 mmol/L    Chloride 100 98 - 107 mmol/L    CO2 25 22 - 29 mmol/L    Anion Gap 13 7 - 16 mmol/L    Glucose 97 74 - 99 mg/dL    BUN 18 6 - 23 mg/dL    CREATININE 1.2 0.7 - 1.2 mg/dL    GFR Non-African American 58 >=60 mL/min/1.73    GFR African American >60     Calcium 8.7 8.6 - 10.2 mg/dL    Total Protein 6.4 6.4 - 8.3 g/dL    Albumin 4.0 3.5 - 5.2 g/dL    Total Bilirubin 0.6 0.0 - 1.2 mg/dL    Alkaline Phosphatase 138 (H) 40 - 129 U/L    ALT 13 0 - 40 U/L    AST 19 0 - 39 U/L   EKG 12 Lead   Result Value Ref Range    Ventricular Rate 71 BPM    Atrial Rate 72 BPM    P-R Interval 356 ms    QRS Duration 192 ms    Q-T Interval 506 ms    QTc Calculation (Bazett) 549 ms    R Axis -72 degrees    T Axis 75 degrees   EKG 12 Lead   Result Value Ref Range    Ventricular Rate 60 BPM    Atrial Rate 60 BPM    P-R Interval 356 ms    QRS Duration 134 ms    Q-T Interval 496 ms    QTc Calculation (Bazett) 496 ms    R Axis -67 degrees    T Axis -7 degrees       RADIOLOGY:  CTA CHEST W CONTRAST   Final Result   1. There is no thoracic aortic dissection   2. 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  Pleural base soft tissue masslike density within the right middle lobe   measuring 1.8 cm x 1. 1 cm. While this finding could represent focal   pneumonia underlying malignancy cannot be completely excluded. Dedicated   follow-up CT scan is recommended in 2-3 months. PET CT imaging can also be   considered. 4. Smaller 8 mm x 8 mm soft tissue pleural based focus within the right lower   lobe laterally. RECOMMENDATIONS:   Follow-up CT imaging or PET-CT scan is recommended. CTA ABDOMEN PELVIS W CONTRAST   Final Result   No evidence for acute vascular abnormality specifically no dissection with   aneurysmal infrarenal abdominal aorta measuring up to 3.7 cm in maximum   transaxial diameter. Recommendations for follow-up at this site as detail   below. 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 RECOMMENDATIONS:   3.7 cm infrarenal abdominal aortic aneurysm. Recommend follow-up every 2   years. Reference: J Am Nilda Radiol 8766;85:[id number]. XR CHEST PORTABLE   Final Result   Stable likely pleural thickening. Pleural calcification on the left suggests   possible prior asbestos exposure.             ------------------------- NURSING NOTES AND VITALS REVIEWED ---------------------------  Date / Time Roomed:  6/27/2022  5:01 AM  ED Bed Assignment:  [id number]    The nursing notes within the ED encounter and vital signs as below have been reviewed. No data found. Oxygen Saturation Interpretation: Normal    ------------------------------------------ PROGRESS NOTES ------------------------------------------  Re-evaluation(s):  Time: 11am  Patients symptoms show no change  Repeat physical examination is not changed    Counseling:  I have spoken with the patient and discussed todays results, in addition to providing specific details for the plan of care and counseling regarding the diagnosis and prognosis.   Their questions are answered at this time and they are agreeable with the plan of admission.    --------------------------------- ADDITIONAL PROVIDER NOTES ---------------------------------  Consultations:  Spoke with . Discussed case. They will admit the patient. This patient's ED course included: a personal history and physicial examination, re-evaluation prior to disposition, multiple bedside re-evaluations, cardiac monitoring and complex medical decision making and emergency management    This patient has remained hemodynamically stable during their ED course. Diagnosis:  1. Shortness of breath    2. Low back pain without sciatica, unspecified back pain laterality, unspecified chronicity        Disposition:  Patient's disposition: Admit to telemetry  Patient's condition is stable.          Mary Hernandez MD  06/29/22 1540

## 2022-06-27 NOTE — ED PROVIDER NOTES
HPI:  6/27/22, Time: 5:09 AM EDT         Oseas Samuel is a 82 y.o. male presenting to the ED for SOB, beginning hours ago. The complaint has been persistent, moderate in severity, and worsened by nothing. Patient reports he woke up to go to the bathroom started having shortness of breath. Patient reporting no chest pain he does report pain with deep breathing and pain in his lower back. Patient reporting no abdominal pain. Patient reporting no vomiting or diarrhea reports no fever chills. Patient reporting no upper or lower extremity weakness. He was recently admitted several weeks ago for his defibrillator going off. Patient reporting no calf pain. ROS:   Pertinent positives and negatives are stated within HPI, all other systems reviewed and are negative.  --------------------------------------------- PAST HISTORY ---------------------------------------------  Past Medical History:  has a past medical history of A-fib ([facility] [address] Arrhythmia, Carotid artery stenosis, CHF (congestive heart failure) ([facility] [address] Heart valve problem, Hyperlipidemia, Hypertension, ICD (implantable cardiac defibrillator) in place, MI (mitral incompetence), and MI (myocardial infarction) ([facility] [address] 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 2008); Cardiac defibrillator placement (07/28/2016); Cardiac catheterization (Right, 03/03/2017); other surgical history (03/29/2017); Upper gastrointestinal endoscopy (N/A, 09/03/2019); Upper gastrointestinal endoscopy (N/A, 02/18/2020); Colonoscopy (N/A, 02/24/2020); Cardioversion (10/29/2020); Cardiac surgery (N/A, 09/04/2021); Upper gastrointestinal endoscopy (N/A, 03/14/2022);  Upper gastrointestinal endoscopy (N/A, 03/16/2022); Colonoscopy (N/A, 05/04/2022); and Cardioversion (05/12/2022). Social History:  reports that he quit smoking about 49 years ago. He has a 1.50 pack-year smoking history. He has never used smokeless tobacco. He reports that he does not drink alcohol and does not use drugs. Family History: family history is not on file. The patients home medications have been reviewed. Allergies: Patient has no known allergies. ---------------------------------------------------PHYSICAL EXAM--------------------------------------    Constitutional/General: Alert and oriented x3, well appearing, non toxic in NAD  Head: Normocephalic and atraumatic  Eyes: PERRL, EOMI  Mouth: Oropharynx clear, handling secretions, no trismus  Neck: Supple, full ROM, non tender to palpation in the midline, no stridor, no crepitus, no meningeal signs  Pulmonary: Lungs clear to auscultation bilaterally, no wheezes, rales, or rhonchi. Not in respiratory distress  Cardiovascular:  Regular rate. Regular rhythm. No murmurs, gallops, or rubs. 2+ distal pulses  Chest: no chest wall tenderness  Abdomen: Soft. Non tender. Non distended. +BS. No rebound, guarding, or rigidity. No pulsatile masses appreciated. Musculoskeletal: Moves all extremities x 4. Tender lower lumbar spine warm and well perfused, no clubbing, cyanosis, or edema. Capillary refill <3 seconds  Skin: warm and dry. No rashes. Neurologic: GCS 15, CN 2-12 grossly intact, no focal deficits, symmetric strength 5/5 in the upper and lower extremities bilaterally  Psych: Normal Affect    -------------------------------------------------- RESULTS -------------------------------------------------  I have personally reviewed all laboratory and imaging results for this patient. Results are listed below.      LABS:  Results for orders placed or performed during the hospital encounter of 06/27/22   CBC with Auto Differential   Result Value Ref Range    WBC 7.8 4.5 - 11.5 E9/L    RBC 3.81 3.80 - 5.80 E12/L    Hemoglobin 11.2 (L) 12.5 - 16.5 g/dL    Hematocrit 32.9 (L) 37.0 - 54.0 %    MCV 86.4 80.0 - 99.9 fL    MCH 29.4 26.0 - 35.0 pg    MCHC 34.0 32.0 - 34.5 %    RDW 15.1 (H) 11.5 - 15.0 fL    Platelets 842 139 - 052 E9/L    MPV 10.4 7.0 - 12.0 fL    Neutrophils % 70.2 43.0 - 80.0 %    Immature Granulocytes % 0.3 0.0 - 5.0 %    Lymphocytes % 16.9 (L) 20.0 - 42.0 %    Monocytes % 10.0 2.0 - 12.0 %    Eosinophils % 2.2 0.0 - 6.0 %    Basophils % 0.4 0.0 - 2.0 %    Neutrophils Absolute 5.51 1.80 - 7.30 E9/L    Immature Granulocytes # 0.02 E9/L    Lymphocytes Absolute 1.32 (L) 1.50 - 4.00 E9/L    Monocytes Absolute 0.78 0.10 - 0.95 E9/L    Eosinophils Absolute 0.17 0.05 - 0.50 E9/L    Basophils Absolute 0.03 0.00 - 0.20 E9/L   EKG 12 Lead   Result Value Ref Range    Ventricular Rate 71 BPM    Atrial Rate 72 BPM    P-R Interval 356 ms    QRS Duration 192 ms    Q-T Interval 506 ms    QTc Calculation (Bazett) 549 ms    R Axis -72 degrees    T Axis 75 degrees       RADIOLOGY:  Interpreted by Radiologist.  XR CHEST PORTABLE   Final Result   Stable likely pleural thickening. Pleural calcification on the left suggests   possible prior asbestos exposure. CTA CHEST W CONTRAST    (Results Pending)   CTA ABDOMEN PELVIS W CONTRAST    (Results Pending)         EKG: This EKG is signed and interpreted by me. Rate: 71  Rhythm: Paced rhythm  Interpretation: non-specific EKG  Comparison: stable as compared to patient's most recent EKG        ------------------------- NURSING NOTES AND VITALS REVIEWED ---------------------------   The nursing notes within the ED encounter and vital signs as below have been reviewed by myself. /79   Pulse 70   Temp 97.8 °F (36.6 °C)   Resp 20   Ht 5' 7\" (1.702 m)   Wt 165 lb (74.8 kg)   SpO2 94%   BMI 25.84 kg/m²   Oxygen Saturation Interpretation: Normal    The patients available past medical records and past encounters were reviewed. ------------------------------ ED COURSE/MEDICAL DECISION MAKING----------------------  Medications - No data to display          Medical Decision Making:      Present here because of shortness of breath. Patient reporting no chest pain patient does report some lower back pain. Patient does report pain with deep breathing. Patient labs were still pending CTs were ordered. Re-Evaluations:             Re-evaluation. Patients symptoms show no change      Consultations:                 Critical Care: This patient's ED course included: a personal history and physicial eaxmination    This patient has been closely monitored during their ED course. Counseling: The emergency provider has spoken with the patient and discussed todays results, in addition to providing specific details for the plan of care and counseling regarding the diagnosis and prognosis. Questions are answered at this time and they are agreeable with the plan.       --------------------------------- IMPRESSION AND DISPOSITION ---------------------------------    IMPRESSION  1. Shortness of breath    2. Low back pain without sciatica, unspecified back pain laterality, unspecified chronicity        DISPOSITION  Disposition: turned over to oncoming ED physician  Patient condition is stable        NOTE: This report was transcribed using voice recognition software.  Every effort was made to ensure accuracy; however, inadvertent computerized transcription errors may be present          Jay Jay Parsons MD  06/27/22 [id number]       Jay Jay Parsons MD  06/27/22 1697

## 2022-06-27 NOTE — ED NOTES
Report called to floor. SBAR faxed & patient placed in transport.      Beatrice Euceda RN  06/27/22 5413

## 2022-06-27 NOTE — H&P
Inpatient H&P      PCP:  Lolly Rausch MD  Admitting Physician:  Vero Vanessa DO  Consultants: EP  Chief Complaint:    Chief Complaint   Patient presents with    Shortness of Breath     Patient to the ED for shortness of breath and new onset of lower back pain when breathing    Back Pain       History of Present Illness  Janet Martins is a 82 y.o. male who presents to [facility] ER complaining of shortness of breath. Janet aMrtins has a past medical history that includes paroxysmal atrial fibrillation, CAD, AAA, hyperlipidemia, aortic stenosis s/pTAVR, hypertension. Bhavani Leroy presented to the ER with complaint of shortness of breath that began a few hours ago. He woke up to go to the bathroom and started having shortness of breath. He reports pain with deep breathing and pain to his lower back. He states that since Friday he has felt off after taking his amiodarone pills. He was recently admitted several weeks ago after his defibrillator was going off. EP saw patient in the ER and recommended admission for cardiac work-up, per ER physician    CTA of the chest showed mild aneurysmal dilatation of the ascending thoracic aorta. Pleural soft based tissue masslike density within the right middle lobe measuring 1.8 cm x 1.1 cm. Pneumonia versus malignancy. Smaller 8 x 8 mm soft mass pleural-based focus within the right lower lobe laterally. CTA abdomen pelvis showed no dissection but aneurysmal infrarenal abdominal aorta measuring up to 3.7 cm. ER Course  Upon presentation to the ER, routine labwork was performed which revealed , troponin 18, hemoglobin 11.2, alk phos 132. Imaging results are as outlined below in the Imaging section of this note. EKG revealed AV dual paced rhythm with prolonged AV conduction. Upon arrival to the ER, patient was 144/77. The patient received no medication in the emergency room and was admitted to the [facility].     Last Charlies Hashimoto Admission - 3/12/22  Anemia requiring transfusions [D64.9]  Symptomatic anemia [D64.9]  Gastrointestinal hemorrhage    Last Echocardiogram - 3/1/22   Normal left ventricular systolic function. Ejection fraction is visually estimated at 60%. Mildly dilated right ventricle with normal right ventricular function. There is doppler evidence of stage II diastolic dysfunction. Moderately dilated left atrium by volume index. Mild mitral regurgitation. 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 No significant paravalvular aortic regurgitation. AV peak velocity 2.1 m/s. AV mean gradient 9 mmHg. AV area 1.5 cm2. Mild tricuspid regurgitation. PASP is estimated at 44 mmHg.      ED TRIAGE VITALS  BP: 134/79, Temp: 97.8 °F (36.6 °C), Heart Rate: 60, Resp: 17, SpO2: 94 %    Vitals:    06/27/22 0458 06/27/22 0600 06/27/22 0730 06/27/22 1115   BP: (!) 144/77 134/79     Pulse: 71 70 70 60   Resp: 18 26 20 17   Temp: 97.8 °F (36.6 °C)      SpO2: 94% 94% 94%    Weight: 165 lb (74.8 kg)      Height: 5' 7\" (1.702 m)            Histories  Past Medical History:   Diagnosis Date    A-fib [facility])     Arrhythmia     Carotid artery stenosis     [facility]congest[facility] failure[facility] ([facility])     Heart valve problem     Hyperlipidemia     Hypertension     ICD (implantable cardiac defibrillator) in place 2007    [address] [place] [address] CRUZ#[id number] generator changed 7/28/16  Dr Shanell Mclean    MI (mitral incompetence) 2003    MI (myocardial infarction) ([facility] [address] 2003     Past Surgical History:   Procedure Laterality Date    CARDIAC CATHETERIZATION Right 03/03/2017    Dr. Pineda Jo  2007. 2008 2007 78 shocks replaced 2008 Medtronic     CARDIAC DEFIBRILLATOR PLACEMENT  07/28/2016    Medtronic Dual ICD gen change    CARDIAC SURGERY N/A 09/04/2021    cardioversion performed by Candida Peña MD at [address] [place] [address]  10/29/2020    Successful    (Dr. Shanell Mclean)   [facility] CARDIOVERSION  05/12/2022    Successful   Dr Yari Schneider    COLONOSCOPY N/A 02/24/2020    COLONOSCOPY DIAGNOSTIC performed by Ricky Zamora MD at [address] COLONOSCOPY N/A 05/04/2022    COLONOSCOPY WITH BIOPSY performed by Raeann Moran MD at [address] [place] [address] Dr SIMPSON  05/23/2003 03/16/2007    DIAGNOSTIC CARDIAC CATH LAB PROCEDURE  2007    DIAGNOSTIC CARDIAC CATH LAB PROCEDURE  03/29/2008    stent    JOINT REPLACEMENT  2011    r hip surgery Dr June Villatoro  03/29/2017    Dr. Jennie Armstrong and Dr. Dina Parra- TAVR Josie 26mm valve    UPPER GASTROINTESTINAL ENDOSCOPY N/A 09/03/2019    EGD ESOPHAGOGASTRODUODENOSCOPY performed by Kanu Mera MD at [address] 02/18/2020    EGD ESOPHAGOGASTRODUODENOSCOPY performed by Ricky Zamora MD at [address] 03/14/2022    EGD CONTROL HEMORRHAGE performed by Raeann Moran MD at [address] N/A 03/16/2022    EGD ESOPHAGOGASTRODUODENOSCOPY performed by Raeann Moran MD at [address]  1970's     No family history on file. Home Medications  Prior to Admission medications    Medication Sig Start Date End Date Taking?  Authorizing Provider   amiodarone (CORDARONE) 200 MG tablet Take 200 mg by mouth 3 times daily   Yes Historical Provider, MD   furosemide (LASIX) 40 MG tablet Take 40 mg by mouth daily   Yes Historical Provider, MD   metoprolol tartrate (LOPRESSOR) 50 MG tablet Take 50 mg by mouth at bedtime   Yes Historical Provider, MD   metoprolol tartrate (LOPRESSOR) 50 MG tablet Take 75 mg by mouth every morning   Yes Historical Provider, MD   potassium chloride (KLOR-CON M) 10 MEQ extended release tablet Take 10 mEq by mouth daily   Yes Historical Provider, MD   pravastatin (PRAVACHOL) 40 MG tablet Take 40 mg by mouth at bedtime   Yes Historical Provider, MD   rivaroxaban (XARELTO) 20 MG TABS tablet Take 20 mg by mouth Daily with supper     Historical Provider, MD   Multiple Vitamin (MULTIVITAMIN ADULT PO) Take 1 tablet by mouth daily     Historical Provider, MD   meclizine (ANTIVERT) 25 MG tablet Take 25 mg by mouth daily as needed for Dizziness     Historical Provider, MD   amLODIPine (NORVASC) 2.5 MG tablet Take 1 tablet by mouth daily 22   Tom Mail, DO   pantoprazole (PROTONIX) 40 MG tablet TAKE 1 TABLET BY MOUTH  DAILY 12/15/21   Yeyo Barboza MD   ferrous sulfate (IRON 325) 325 (65 Fe) MG tablet Take 1 tablet by mouth daily (with breakfast) 20   Yeyo Barboza MD   MAGNESIUM-OXIDE 400 (240 Mg) MG tablet Take 400 mg by mouth daily  3/18/20   Historical Provider, MD   Multiple Vitamins-Minerals (PRESERVISION AREDS 2) CAPS Take 1 capsule by mouth 2 times daily     Historical Provider, MD   Cholecalciferol (VITAMIN D) 2000 UNITS CAPS capsule Take 2,000 Units by mouth daily     Historical Provider, MD       Allergies  Patient has no known allergies.     Social Hx  Social History     Socioeconomic History    Marital status:      Spouse name: Maryann Paulson Number of children: 2    Years of education: 15     Highest education level: Associate degree: academic program   Occupational History    Not on file   Tobacco Use    Smoking status: Former Smoker     Packs/day: 0.50     Years: 3.00     Pack years: 1.50     Quit date: 1973     Years since quittin.4    Smokeless tobacco: Never Used    Tobacco comment: Quit smoking in 's   Vaping Use    Vaping Use: Never used   Substance and Sexual Activity    Alcohol use: No    Drug use: No    Sexual activity: Not on file   Other Topics Concern    Not on file   Social History Narrative    1 cup coffee daily; occ pop     Social Determinants of Health     Financial Resource Strain: Low Risk     Difficulty of Paying Living Expenses: Not hard at all   Food Insecurity: No Food Insecurity    Worried About Running Out of Food in the Last Year: Never true    Ran Out of Food in the Last Year: Never true   Transportation Needs: No Transportation Needs    Lack of Transportation (Medical): No    Lack of Transportation (Non-Medical): No   Physical Activity: Inactive    Days of Exercise per Week: 0 days    Minutes of Exercise per Session: 0 min   Stress: No Stress Concern Present    Feeling of Stress : Not at all   Social Connections: Moderately Isolated    Frequency of Communication with Friends and Family: Three times a week    Frequency of Social Gatherings with Friends and Family: Once a week    Attends Gnosticism Services: Never    Active Member of Clubs or Organizations: No    Attends Club or Organization Meetings: Never    Marital Status:    Intimate Partner Violence:     Fear of Current or Ex-Partner: Not on file    Emotionally Abused: Not on file    Physically Abused: Not on file    Sexually Abused: Not on file   Housing Stability:     Unable to Pay for Housing in the Last Year: Not on file    Number of [place] in the Last Year: Not on file    Unstable Housing in the Last Year: Not on file       Review of Systems  All bolded are positive; please see HPI  General:  Fever, chills, diaphoresis, fatigue, malaise, night sweats, weight loss  Psychological:  Anxiety, disorientation, hallucinations. ENT:  Epistaxis, headaches, vertigo, visual changes. Cardiovascular:  Chest pain, irregular heartbeats, palpitations, paroxysmal nocturnal dyspnea. Respiratory:  Shortness of breath, coughing, sputum production, hemoptysis, wheezing, orthopnea.   Gastrointestinal:  Nausea, vomiting, diarrhea, heartburn, constipation, abdominal pain, hematemesis, hematochezia, melena, acholic stools  Genito-Urinary:  Dysuria, urgency, frequency, hematuria  Musculoskeletal:  Joint pain, joint stiffness, joint swelling, muscle pain  Neurology:  Headache, focal neurological deficits, weakness, numbness, paresthesia  Derm:  Rashes, ulcers, excoriations, bruising  Extremities:  Decreased ROM, peripheral edema, mottling    Physical Examination  Vitals:  /79   Pulse 60   Temp 97.8 °F (36.6 °C)   Resp 17   Ht 5' 7\" (1.702 m)   Wt 165 lb (74.8 kg)   SpO2 94%   BMI 25.84 kg/m²   General Appearance:  awake, alert, and oriented to person, place, time, and purpose; appears stated age and cooperative; no apparent distress no labored breathing  HEENT:  NCAT; PERRL; conjunctiva pink, sclera clear  Neck:  no adenopathy, bruit, JVD, tenderness, masses, or nodules; supple, symmetrical, trachea midline, thyroid not enlarged  Lung:  clear to auscultation bilaterally; no use of accessory muscles; no rhonchi, rales, or crackles  Heart:  regular rate and regular rhythm without murmur, rub, or gallop  Abdomen:  soft, nontender, nondistended; normoactive bowel sounds; no organomegaly  Extremities:  extremities normal, atraumatic, no cyanosis or edema  Musculokeletal:  no joint swelling, no muscle tenderness. ROM normal in all joints of extremities.    Neurologic:  mental status A&Ox3, thought content appropriate; CN II-XII grossly intact; sensation intact, motor strength 5/5 globally; no slurred speech    Laboratory Data  Recent Results (from the past 24 hour(s))   CBC with Auto Differential    Collection Time: 06/27/22  5:06 AM   Result Value Ref Range    WBC 7.8 4.5 - 11.5 E9/L    RBC 3.81 3.80 - 5.80 E12/L    Hemoglobin 11.2 (L) 12.5 - 16.5 g/dL    Hematocrit 32.9 (L) 37.0 - 54.0 %    MCV 86.4 80.0 - 99.9 fL    MCH 29.4 26.0 - 35.0 pg    MCHC 34.0 32.0 - 34.5 %    RDW 15.1 (H) 11.5 - 15.0 fL    Platelets 163 663 - 899 E9/L    MPV 10.4 7.0 - 12.0 fL    Neutrophils % 70.2 43.0 - 80.0 %    Immature Granulocytes % 0.3 0.0 - 5.0 %    Lymphocytes % 16.9 (L) 20.0 - 42.0 %    Monocytes % 10.0 2.0 - 12.0 %    Eosinophils % 2.2 0.0 - 6.0 %    Basophils % 0.4 0.0 - 2.0 %    Neutrophils Absolute 5.51 1.80 - 7.30 E9/L    Immature Granulocytes # 0.02 E9/L    Lymphocytes Absolute 1.32 (L) 1.50 - 4.00 E9/L    Monocytes Absolute 0.78 0.10 - 0.95 E9/L    Eosinophils Absolute 0.17 0.05 - 0.50 E9/L    Basophils Absolute 0.03 0.00 - 0.20 E9/L   EKG 12 Lead    Collection Time: 06/27/22  5:07 AM   Result Value Ref Range    Ventricular Rate 71 BPM    Atrial Rate 72 BPM    P-R Interval 356 ms    QRS Duration 192 ms    Q-T Interval 506 ms    QTc Calculation (Bazett) 549 ms    R Axis -72 degrees    T Axis 75 degrees   Troponin    Collection Time: 06/27/22  6:46 AM   Result Value Ref Range    Troponin, High Sensitivity 18 (H) 0 - 11 ng/L   Comprehensive Metabolic Panel w/ Reflex to MG    Collection Time: 06/27/22  6:46 AM   Result Value Ref Range    Sodium 133 132 - 146 mmol/L    Potassium reflex Magnesium 4.6 3.5 - 5.0 mmol/L    Chloride 93 (L) 98 - 107 mmol/L    CO2 24 22 - 29 mmol/L    Anion Gap 16 7 - 16 mmol/L    Glucose 98 74 - 99 mg/dL    BUN 21 6 - 23 mg/dL    CREATININE 1.2 0.7 - 1.2 mg/dL    GFR Non-African American 58 >=60 mL/min/1.73    GFR African American >60     Calcium 8.7 8.6 - 10.2 mg/dL    Total Protein 6.6 6.4 - 8.3 g/dL    Albumin 4.0 3.5 - 5.2 g/dL    Total Bilirubin 0.4 0.0 - 1.2 mg/dL    Alkaline Phosphatase 132 (H) 40 - 129 U/L    ALT 15 0 - 40 U/L    AST 26 0 - 39 U/L   Brain Natriuretic Peptide    Collection Time: 06/27/22  6:46 AM   Result Value Ref Range    Pro- (H) 0 - 450 pg/mL   Troponin    Collection Time: 06/27/22  9:59 AM   Result Value Ref Range    Troponin, High Sensitivity 18 (H) 0 - 11 ng/L       Imaging  XR CHEST PORTABLE    Result Date: 6/27/2022  EXAMINATION: ONE XRAY VIEW OF THE CHEST 6/27/2022 5:53 am COMPARISON: 15 Suha 2022 HISTORY: ORDERING SYSTEM PROVIDED HISTORY: sob TECHNOLOGIST PROVIDED HISTORY: Reason for exam:->sob What reading provider will be dictating this exam?->CRC FINDINGS: Unchanged left-sided AICD and postoperative changes at the sternum.   Stable cardiomediastinal silhouette with normal pulmonary vascularity. Stable likely pleural thickening bilaterally. Pleural calcification on the left suggest prior asbestos exposure. Stable likely pleural thickening. Pleural calcification on the left suggests possible prior asbestos exposure. XR CHEST PORTABLE    Result Date: 6/15/2022  EXAMINATION: ONE XRAY VIEW OF THE CHEST 6/15/2022 4:00 pm COMPARISON: None. HISTORY: ORDERING SYSTEM PROVIDED HISTORY: aicd discharge TECHNOLOGIST PROVIDED HISTORY: Reason for exam:->aicd discharge What reading provider will be dictating this exam?->CRC FINDINGS: The lungs are without acute focal process. There is no effusion or pneumothorax. Cardiomegaly. Left-sided transvenous pacer device, prosthetic heart valve and sternotomy wires noted. .  The osseous structures are without acute process. No acute process. Cardiomegaly. CTA CHEST W CONTRAST    Result Date: 6/27/2022  EXAMINATION: CTA OF THE CHEST 6/27/2022 9:16 am TECHNIQUE: CTA of the chest was performed after the administration of intravenous contrast.  Multiplanar reformatted images are provided for review. MIP images are provided for review. Automated exposure control, iterative reconstruction, and/or weight based adjustment of the mA/kV was utilized to reduce the radiation dose to as low as reasonably achievable. COMPARISON: None. HISTORY: ORDERING SYSTEM PROVIDED HISTORY: Shortness of breath pain with deep breathing lower back TECHNOLOGIST PROVIDED HISTORY: Reason for exam:->Shortness of breath pain with deep breathing lower back Decision Support Exception - unselect if not a suspected or confirmed emergency medical condition->Emergency Medical Condition (MA) What reading provider will be dictating this exam?->CRC FINDINGS: Pulmonary Arteries: Pulmonary arteries are adequately opacified for evaluation. No evidence of intraluminal filling defect to suggest pulmonary embolism. Main pulmonary artery is normal in caliber.  Mediastinum: No evidence of mediastinal lymphadenopathy. The heart and pericardium demonstrate no acute abnormality. There is mild aneurysmal dilatation of the ascending thoracic aorta measuring 4.4 cm by 4.1 cm. The aortic arch and descending thoracic aorta are normal caliber. There is significant calcified plaque within the coronary arteries. The patient has undergone previous CABG. There is significant calcified plaque seen within the coronary arteries. . Lungs/pleura: There are no findings of pneumonia or failure. There are mild emphysematous changes. Within the right middle lobe there is a pleural-based slightly irregular soft tissue opacity measuring 1.8 cm by 1.1 cm. This mass is worrisome for underlying malignancy. Within the right lower lobe there is a pleural base soft tissue masslike opacity measuring 8 mm x 8 mm. The left lung is clear. Upper Abdomen: Limited images of the upper abdomen are unremarkable. Soft Tissues/Bones:  Age related degenerative changes of the visualized osseous structures without focal destructive lesion. 1. There is no thoracic aortic dissection 2. Mild aneurysmal dilatation of the ascending thoracic aorta measuring 4.4 x 4.1 cm 3. Pleural base soft tissue masslike density within the right middle lobe measuring 1.8 cm x 1.1 cm. While this finding could represent focal pneumonia underlying malignancy cannot be completely excluded. Dedicated follow-up CT scan is recommended in 2-3 months. PET CT imaging can also be considered. 4. Smaller 8 mm x 8 mm soft tissue pleural based focus within the right lower lobe laterally. RECOMMENDATIONS: Follow-up CT imaging or PET-CT scan is recommended. CTA ABDOMEN PELVIS W CONTRAST    Result Date: 6/27/2022  EXAMINATION: CTA OF THE ABDOMEN AND PELVIS WITH CONTRAST 6/27/2022 9:16 am: TECHNIQUE: CTA of the abdomen and pelvis was performed with the administration of intravenous contrast. Multiplanar reformatted images are provided for review.  3D/MIP images are provided for review. Automated exposure control, iterative reconstruction, and/or weight based adjustment of the mA/kV was utilized to reduce the radiation dose to as low as reasonably achievable. COMPARISON: None. HISTORY: ORDERING SYSTEM PROVIDED HISTORY: Lower back pain history of aneurysm shortness of breath TECHNOLOGIST PROVIDED HISTORY: Reason for exam:->Lower back pain history of aneurysm shortness of breath Decision Support Exception - unselect if not a suspected or confirmed emergency medical condition->Emergency Medical Condition (MA) What reading provider will be dictating this exam?->CRC FINDINGS: CTA ABDOMEN: Atherosclerotic aneurysmal infrarenal abdominal aorta measuring up to 3.7 cm in maximum transaxial diameter. Mild intramural thrombus formation with peripheral calcific disease. Visceral branch vessels reveal patent celiac artery with a proximal splenic artery aneurysm 2.5 cm series 303, image 122. SMA patent renal arteries demonstrate patent right renal artery with left renal artery demonstrating moderate to severe stenosis. SAMIRA patent CTA PELVIS: Mildly atherosclerotic nonaneurysmal patent iliofemoral vessels without focal severe stenosis, aneurysm or occlusion Nonvascular: Lower Chest: Lung bases are clear. Organs: Liver without focal lesion. Gallbladder unremarkable. Pancreas and spleen unremarkable. Adrenals without nodule. Kidneys without suspicious renal lesion and no hydronephrosis. GI/Bowel: No focal thickening or disproportion dilatation of bowel. No inflammatory findings. Moderate colonic stool burden. Pelvis: No suspicious pelvic lesion or bulky pelvic adenopathy/free fluid. Peritoneum/Retroperitoneum: No bulky retroperitoneal adenopathy. No suspicious peritoneal or mesenteric process Bones/Soft Tissues: No acute osseous or soft tissue findings.      No evidence for acute vascular abnormality specifically no dissection with aneurysmal infrarenal abdominal aorta measuring up to 3.7 cm in maximum transaxial diameter. Recommendations for follow-up at this site as detail below. 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 RECOMMENDATIONS: 3.7 cm infrarenal abdominal aortic aneurysm. Recommend follow-up every 2 years. Reference: J Am Nilda Radiol 3174;98:[id number]. Assessment and Plan  Patient is a 82 y.o. male who presented with shortness of breath. The active problem list is as follows: · Shortness of breath  · Pleural based masslike denities- within the right middle lobe measuring 1.8 cm x 1.1 cm and right lower lobe [id number].  While this finding could represent focal pneumonia underlying malignancy cannot be completely excluded.  Dedicated follow-up CT scan is recommended in 2-3 months. Pulm consulted. · Mild aneurysmal dilatation of the ascending thoracic aorta- measuring 4.4 x   4.1 cm   · Infrarenal abdominal aorta aneurysm- measuring up to 3.7 cm. Recommend follow-up every 2 years. · Coronary artery disease- s/p CABG 2003. Redo CABG in 2007 (radial artery graft to D2 and OM2). Patent grafts in 2017. · Ischemic cardiomyopathy  · Aortic stenosis s/p TAVR  · Hypertension  · Hyperlipidemia  · History GI bleeding  · Parosysmal atrial fibrillation   · History of episodes of nonsustained polymorphic VT as well as sustained polymorphic VT- triggering an ICD discharge this year. Amiodarone increased to 200 mg 3 times daily  · Sudden onset of weakness and fatigue-most likely related to constant right ventricular pacing. ICD reprogrammed to decrease RV pacing      · Routine labs in the morning. · DVT prophylaxis. · Please see orders for further management and care.         Fiona Boucher DO    1:15 PM  6/27/2022

## 2022-06-27 NOTE — CONSULTS
[address] and [address] Electrophysiology  Consultation Report  PATIENT: Pierce Hamilton RECORD NUMBER: [id number]  DATE OF SERVICE:  6/27/2022  ATTENDING ELECTROPHYSIOLOGIST: Bossman Rosales MD  PRIMARY ELECTROPHYSIOLOGIST: Lady Sparks  REFERRING PHYSICIAN:  Stanislaw Traylor MD  CHIEF COMPLAINT: Palpitations, dizziness    HPI:   who presents to the hospital complaining of palpitations and tremulousness for the past several days. The patient has an extensive cardiac history including coronary bypass surgery in 2003 and 2007 as well as percutaneous coronary interventions following his bypass surgery. 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 Josiane Melchor. Followed in my office since 2014  Post TAVR procedure for aortic stenosis in 2016  Paroxysmal atrial fibrillation for which the patient was on dofetilide therapy for many years. Dofetilide was discontinued during his office visit in June when the patient presented with multiple episodes of nonsustained polymorphic VT and an ICD discharge for sustained polymorphic VT. the patient was placed on amiodarone therapy at 200 mg twice a day. He presented with monomorphic VT 2 weeks later triggered by RV pacing causing a \" short- long\" sequence. His dose of amiodarone was increased to 200 mg 3 times a day. The patient now presents with symptoms of discomfort in his chest, mainly \"tremors\" in his chest and shortness of breath. No exertional chest discomfort.   No ICD discharges syncope or near syncope        Patient Active Problem List    Diagnosis Date Noted    Chronic combined systolic and diastolic CHF (congestive heart failure) ([facility] [address] 10/08/2015     Priority: High    ICD (implantable cardioverter-defibrillator) discharge      Priority: Medium    Abdominal aortic aneurysm (AAA) 3.0 cm to 5.5 cm in diameter in male [facility]) 04/27/2022     Priority: Medium    Symptomatic anemia 03/12/2022  GI bleed 2022    Acute on chronic combined systolic and diastolic CHF (congestive heart failure) ([facility] [address] 2022    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) 2022    Gastroesophageal reflux disease without esophagitis 2022    Coronary artery disease involving coronary bypass graft 2022    Pre-syncope 2021    SOB (shortness of breath) 10/29/2020    Lung nodule     Acute on chronic diastolic (congestive) heart failure (HCC)     Pure hypercholesterolemia     Stage 3b chronic kidney disease (HCC)     Transient cerebral ischemia     Aortic valve disease     Other insomnia 2020    Ischemic heart disease     ICD (implantable cardioverter-defibrillator), dual, in situ     Ventricular tachycardia ([facility] [address]     GIB (gastrointestinal bleeding) 2020    Hyperlipidemia LDL goal <100 2019    S/P TAVR (transcatheter aortic valve replacement)     Chronic atrial fibrillation     Coronary artery disease involving native coronary artery of native heart without angina pectoris     Essential hypertension     Vitamin D deficiency 2011       Past Medical History:   Diagnosis Date    A-fib [facility])     Arrhythmia     Carotid artery stenosis     CHF (congestive heart failure) ([facility] [address]     Heart valve problem     Hyperlipidemia     Hypertension     ICD (implantable cardiac defibrillator) in place     [employer] [place]  [id number] generator changed 16  Dr Saeid Tai    MI (mitral incompetence)     MI (myocardial infarction) ([facility] [address]        No family history on file.     Social History     Tobacco Use    Smoking status: Former Smoker     Packs/day: 0.50     Years: 3.00     Pack years: 1.50     Quit date: 1973     Years since quittin.4    Smokeless tobacco: Never Used    Tobacco comment: Quit smoking in 1970's   Substance Use Topics    Alcohol use: No       No current facility-administered medications for this encounter. Current Outpatient Medications   Medication Sig Dispense Refill    amiodarone (CORDARONE) 200 MG tablet Take 1 tablet by mouth 3 times daily After 1 month reduce the dose to 1 tab daily 90 tablet 1    amiodarone (CORDARONE) 200 MG tablet Take 1 tablet by mouth daily 90 tablet 3    furosemide (LASIX) 40 MG tablet Starting 5/18/22 take 40mg furosemide at 8am and at 2pm for the next three days then on 5/21/22 resume taking 40mg every morning  8am. (Patient taking differently: Take 40 mg by mouth daily ) 90 tablet 3    rivaroxaban (XARELTO) 20 MG TABS tablet Take 20 mg by mouth Daily with supper       Multiple Vitamin (MULTIVITAMIN ADULT PO) Take by mouth daily       meclizine (ANTIVERT) 25 MG tablet Take 25 mg by mouth daily as needed       amLODIPine (NORVASC) 2.5 MG tablet Take 1 tablet by mouth daily 90 tablet 3    metoprolol tartrate (LOPRESSOR) 50 MG tablet TAKE 1 AND 1/2 TABLETS BY  MOUTH IN THE MORNING AND 1  TABLET BY MOUTH AT BEDTIME 225 tablet 3    pravastatin (PRAVACHOL) 40 MG tablet TAKE 1 TABLET BY MOUTH  DAILY 90 tablet 3    potassium chloride (KLOR-CON M) 10 MEQ extended release tablet TAKE 1 TABLET BY MOUTH  DAILY 90 tablet 3    pantoprazole (PROTONIX) 40 MG tablet TAKE 1 TABLET BY MOUTH  DAILY 90 tablet 3    ferrous sulfate (IRON 325) 325 (65 Fe) MG tablet Take 1 tablet by mouth daily (with breakfast) 90 tablet 1    MAGNESIUM-OXIDE 400 (241.3 Mg) MG TABS tablet TK 1 T PO ONCE D      Multiple Vitamins-Minerals (PRESERVISION AREDS 2 PO) Take 2 tablets by mouth daily       Cholecalciferol (VITAMIN D) 2000 UNITS CAPS capsule Take 1 capsule by mouth daily           No Known Allergies    ROS:   Constitutional: Negative for fever, activity change and appetite change, but he does complain of generalized weakness  HENT: Negative for epistaxis. Eyes: Negative for diploplia, blurred vision. Respiratory: Negative for cough, chest tightness, shortness of breath and wheezing. Cardiovascular: pertinent positives in HPI  Gastrointestinal: Negative for abdominal pain and blood in stool. All other review of systems are negative     PHYSICAL EXAM:   Vitals:    06/27/22 0458 06/27/22 0600 06/27/22 0730   BP: (!) 144/77 134/79    Pulse: 71 70 70   Resp: 18 26 20   Temp: 97.8 °F (36.6 °C)     SpO2: 94% 94% 94%   Weight: 165 lb (74.8 kg)     Height: 5' 7\" (1.702 m)        Constitutional: Well-developed, no acute distress  Eyes: conjunctivae normal, no xanthelasma   Ears, Nose, Throat: oral mucosa moist, no cyanosis   CV: No JVD or leg edema, laterally displaced PMI, normal S1 and S2 with left sternal border and apex, grade 3/6 systolic ejection murmur at the aortic area and the left sternal border  Lungs: clear to auscultation bilaterally, normal respiratory effort without used of accessory muscles  Abdomen: soft, non-tender, bowel sounds present, no masses or hepatomegaly   Musculoskeletal: no digital clubbing, no edema   Skin: warm, no rashes     I have personally reviewed the laboratory, cardiac diagnostic and radiographic testing as outlined below:    Data:    Recent Labs     06/27/22  0506   WBC 7.8   HGB 11.2*   HCT 32.9*        Recent Labs     06/27/22  0646      K 4.6   CL 93*   CO2 24   BUN 21   CREATININE 1.2   CALCIUM 8.7      Lab Results   Component Value Date    MG 2.1 06/15/2022     No results for input(s): TSH in the last 72 hours. No results for input(s): INR in the last 72 hours. CXR:   Impression   Stable likely pleural thickening.  Pleural calcification on the left suggests   possible prior asbestos exposure. Telemetry: AV pacing    EKG: AV paced rhythm    CT chest  Impression   1. There is no thoracic aortic dissection   2. 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  Pleural base soft tissue masslike density within the right middle lobe   measuring 1.8 cm x 1.1 cm.  While this finding could represent focal pneumonia underlying malignancy cannot be completely excluded.  Dedicated   follow-up CT scan is recommended in 2-3 months.  PET CT imaging can also be   considered. 4. Smaller 8 mm x 8 mm soft tissue pleural based focus within the right lower   lobe laterally.       RECOMMENDATIONS:   Follow-up CT imaging or PET-CT scan is recommended. Echocardiogram:     Conclusions      Summary   Normal left ventricular systolic function. Ejection fraction is visually estimated at 60%. Mildly dilated right ventricle with normal right ventricular function. There is doppler evidence of stage II diastolic dysfunction. Moderately dilated left atrium by volume index. Mild mitral regurgitation. 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 No significant paravalvular aortic regurgitation. AV peak velocity 2.1 m/s. AV mean gradient 9 mmHg. AV area 1.5 cm2. Mild tricuspid regurgitation. PASP is estimated at 44 mmHg. Signature      ----------------------------------------------------------------   Electronically signed by Nitin Aparicio MD(Interpreting   physician) on 03/02/2022 12:04 PM   ----------------------------------------------------------------    Device Evaluation    Make/model Medtronic Evera XT DR  Mode   DDDR 70-1 20-----changed to   P waves   1.6    mV     Impedance     399    ohms   Pacing threshold 0.75 V@   0.4 msec  R waves     2.6 MV     Impedance     342    ohms   Pacing threshold 1.0 [email or web address]     msec   Pacing percentage  A     98.5%          V    95%  High voltage impedance    38 ohms  Battery longevity    6-months  Arrhythmias  None.       Evaluation and reprogramming included   Overall device function is normal  All  device programmable settings were evaluated per above, along with iterative adjustments (capture thresholds) to assess and select the most appropriate final programming for consistent delivery off the appropriate therapy and to verify function of the device. I have independently reviewed all of the ECGs and rhythm strips per above     Assessment/Plan:     1. Ischemic cardiomyopathy-s/p CABG x2 and PCI procedures thereafter  No recent complaints of chest discomfort    2. Aortic stenosis s/p TAVR    3. Recurrent episodes of polymorphic VT this year  Nonsustained and sustained triggering ICD shocks  Dofetilide switched to amiodarone 3 weeks ago    4. ICD shocks related to monomorphic VT on amiodarone 200 mg twice a day   Amiodarone increased to 200 mg 3 times daily    5. Sudden onset of weakness and fatigue--most likely related to constant right ventricular pacing    6. Hypertension, longstanding    7. Dual-chamber ICD in situ--appropriate function except for constant RV pacing related to amiodarone use and program lower rate of 70 bpm    8. HFpEF-- compensated on current medical therapy    9. Right lung nodule-CT scan from this admission compared to his study from 2020. Size of the nodule has increased. Consider pulmonary evaluation and PET scan. Recommendations    1. ICD reprogrammed to decrease RV pacing  2. Continue amiodarone for ventricular tachycardia  3. Consider pulmonary consultation for evaluation of the right lung nodule  4. Will continue all other cardiac meds    All of the above was discussed with the patient and the family reviewing the above stated recommendations. And a total of >50% of that time involved face-to-face time providing counseling and or coordination of care with the other providers, reviewing records/tests, counseling/education of the patient, ordering medications/tests/procedures, coordinating care, and documenting clinical information in the EHR. Thank you for allowing me to participate in your patient's care. Please call me if there are any questions or concerns.       Derrek Caballero MD  Cardiac Electrophysiology  [facility]) Physicians  [facility]: [place] [facility]  8:58 AM  6/27/2022

## 2022-06-28 VITALS
WEIGHT: 167.25 LBS | DIASTOLIC BLOOD PRESSURE: 59 MMHG | SYSTOLIC BLOOD PRESSURE: 150 MMHG | TEMPERATURE: 98.2 F | BODY MASS INDEX: 26.25 KG/M2 | HEIGHT: 67 IN | HEART RATE: 58 BPM | RESPIRATION RATE: 16 BRPM | OXYGEN SATURATION: 95 %

## 2022-06-28 PROBLEM — R07.9 CHEST PAIN: Status: RESOLVED | Noted: 2022-06-27 | Resolved: 2022-06-28

## 2022-06-28 LAB
ALBUMIN SERPL-MCNC: 4 G/DL (ref 3.5–5.2)
ALP BLD-CCNC: 138 U/L (ref 40–129)
ALT SERPL-CCNC: 13 U/L (ref 0–40)
ANION GAP SERPL CALCULATED.3IONS-SCNC: 13 MMOL/L (ref 7–16)
AST SERPL-CCNC: 19 U/L (ref 0–39)
BILIRUB SERPL-MCNC: 0.6 MG/DL (ref 0–1.2)
BUN BLDV-MCNC: 18 MG/DL (ref 6–23)
CALCIUM SERPL-MCNC: 8.7 MG/DL (ref 8.6–10.2)
CHLORIDE BLD-SCNC: 100 MMOL/L (ref 98–107)
CHOLESTEROL, TOTAL: 142 MG/DL (ref 0–199)
CO2: 25 MMOL/L (ref 22–29)
CREAT SERPL-MCNC: 1.2 MG/DL (ref 0.7–1.2)
EKG ATRIAL RATE: 60 BPM
EKG P-R INTERVAL: 356 MS
EKG Q-T INTERVAL: 496 MS
EKG QRS DURATION: 134 MS
EKG QTC CALCULATION (BAZETT): 496 MS
EKG R AXIS: -67 DEGREES
EKG T AXIS: -7 DEGREES
EKG VENTRICULAR RATE: 60 BPM
GFR AFRICAN AMERICAN: >60
GFR NON-AFRICAN AMERICAN: 58 ML/MIN/1.73
GLUCOSE BLD-MCNC: 97 MG/DL (ref 74–99)
HBA1C MFR BLD: 5.5 % (ref 4–5.6)
HCT VFR BLD CALC: 37.9 % (ref 37–54)
HDLC SERPL-MCNC: 39 MG/DL
HEMOGLOBIN: 12.5 G/DL (ref 12.5–16.5)
LDL CHOLESTEROL CALCULATED: 65 MG/DL (ref 0–99)
MAGNESIUM: 2.4 MG/DL (ref 1.6–2.6)
MCH RBC QN AUTO: 28.7 PG (ref 26–35)
MCHC RBC AUTO-ENTMCNC: 33 % (ref 32–34.5)
MCV RBC AUTO: 87.1 FL (ref 80–99.9)
PDW BLD-RTO: 15.4 FL (ref 11.5–15)
PHOSPHORUS: 3.7 MG/DL (ref 2.5–4.5)
PLATELET # BLD: 234 E9/L (ref 130–450)
PMV BLD AUTO: 10.3 FL (ref 7–12)
POTASSIUM SERPL-SCNC: 4.5 MMOL/L (ref 3.5–5)
RBC # BLD: 4.35 E12/L (ref 3.8–5.8)
SODIUM BLD-SCNC: 138 MMOL/L (ref 132–146)
TOTAL PROTEIN: 6.4 G/DL (ref 6.4–8.3)
TRIGL SERPL-MCNC: 191 MG/DL (ref 0–149)
TSH SERPL DL<=0.05 MIU/L-ACNC: 1.7 UIU/ML (ref 0.27–4.2)
VLDLC SERPL CALC-MCNC: 38 MG/DL
WBC # BLD: 7.8 E9/L (ref 4.5–11.5)

## 2022-06-28 PROCEDURE — 84443 ASSAY THYROID STIM HORMONE: CPT

## 2022-06-28 PROCEDURE — 80061 LIPID PANEL: CPT

## 2022-06-28 PROCEDURE — 83735 ASSAY OF MAGNESIUM: CPT

## 2022-06-28 PROCEDURE — 2580000003 HC RX 258: Performed by: INTERNAL MEDICINE

## 2022-06-28 PROCEDURE — G0378 HOSPITAL OBSERVATION PER HR: HCPCS

## 2022-06-28 PROCEDURE — 83036 HEMOGLOBIN GLYCOSYLATED A1C: CPT

## 2022-06-28 PROCEDURE — 84100 ASSAY OF PHOSPHORUS: CPT

## 2022-06-28 PROCEDURE — 85027 COMPLETE CBC AUTOMATED: CPT

## 2022-06-28 PROCEDURE — 80053 COMPREHEN METABOLIC PANEL: CPT

## 2022-06-28 PROCEDURE — 6370000000 HC RX 637 (ALT 250 FOR IP): Performed by: INTERNAL MEDICINE

## 2022-06-28 PROCEDURE — 99214 OFFICE O/P EST MOD 30 MIN: CPT | Performed by: INTERNAL MEDICINE

## 2022-06-28 PROCEDURE — 36415 COLL VENOUS BLD VENIPUNCTURE: CPT

## 2022-06-28 PROCEDURE — 93005 ELECTROCARDIOGRAM TRACING: CPT | Performed by: INTERNAL MEDICINE

## 2022-06-28 RX ADMIN — AMIODARONE HYDROCHLORIDE 200 MG: 200 TABLET ORAL at 08:35

## 2022-06-28 RX ADMIN — METOPROLOL TARTRATE 75 MG: 50 TABLET, FILM COATED ORAL at 08:35

## 2022-06-28 RX ADMIN — FUROSEMIDE 40 MG: 20 TABLET ORAL at 08:35

## 2022-06-28 RX ADMIN — AMLODIPINE BESYLATE 2.5 MG: 2.5 TABLET ORAL at 08:34

## 2022-06-28 RX ADMIN — POTASSIUM CHLORIDE 10 MEQ: 750 TABLET, EXTENDED RELEASE ORAL at 08:35

## 2022-06-28 RX ADMIN — Medication 1 CAPSULE: at 08:34

## 2022-06-28 RX ADMIN — FERROUS SULFATE TAB 325 MG (65 MG ELEMENTAL FE) 325 MG: 325 (65 FE) TAB at 08:35

## 2022-06-28 RX ADMIN — Medication 400 MG: at 08:34

## 2022-06-28 RX ADMIN — Medication 2000 UNITS: at 08:35

## 2022-06-28 RX ADMIN — Medication 10 ML: at 08:38

## 2022-06-28 RX ADMIN — PANTOPRAZOLE SODIUM 40 MG: 40 TABLET, DELAYED RELEASE ORAL at 08:35

## 2022-06-28 ASSESSMENT — PAIN SCALES - GENERAL: PAINLEVEL_OUTOF10: 0

## 2022-06-28 NOTE — DISCHARGE INSTR - DIET

## 2022-06-28 NOTE — PLAN OF CARE
Patient's chart updated to reflect:      . - HF care plan, HF education points and HF discharge instructions.  -Orders: 2 gram sodium diet, daily weights, I/O.  -PCP and/or Cardiologist appointment to be scheduled within 7 days of hospital discharge.  -History of HF compensated on current medical therapy , not primary admission Dx. Patient admitted for treatment of IMPRESSION  1. Shortness of breath    2.  Low back pain without sciatica, unspecified back pain laterality, unspecified chronicity        Chaparro Mann RN RN, BSN  Heart Failure Navigator

## 2022-06-28 NOTE — PROGRESS NOTES
Electrophysiology progress note         Date:  6/28/2022  Patient: Zachary Mcwilliams  Admission:  6/27/2022  5:01 AM  Admit DX: Shortness of breath [R06.02]  Chest pain [R07.9]  Low back pain without sciatica, unspecified back pain laterality, unspecified chronicity [M54.50]  Age:  82 y.o., 1935     LOS: 0 days     Reason for evaluation:   Near syncope, palpitations, dual-chamber ICD in situ      SUBJECTIVE:    The patient was seen and examined. Notes and labs reviewed. There were no complications over night. Patient's cardiac review of systems: negative. The patient is generally feeling better after his device reprogramming yesterday eliminating RV pacing. He has spoken to pulmonology today. OBJECTIVE:    Telemetry: Atrial paced rhythm  BP (!) 150/59   Pulse 58   Temp 98.2 °F (36.8 °C) (Temporal)   Resp 16   Ht 5' 7\" (1.702 m)   Wt 167 lb 4 oz (75.9 kg)   SpO2 95%   BMI 26.20 kg/m²     Intake/Output Summary (Last 24 hours) at 6/28/2022 0950  Last data filed at 6/27/2022 1510  Gross per 24 hour   Intake --   Output 200 ml   Net -200 ml       EXAM:   CONSTITUTIONAL:  awake, alert, cooperative, no apparent distress, and appears stated age. HEENT: Normal jugular venous pulsations, no carotid bruits. Head is atraumatic, normocephalic. Eyes and oral mucosa are normal.  LUNGS: Good respiratory effort. No for increased work of breathing. On auscultation: clear to auscultation bilaterally  CARDIOVASCULAR:  Normal apical impulse, regular rate and rhythm, normal S1 and S2, no S3 . Grade 3/6 systolic ejection murmur at the aortic area and the left sternal border  ABDOMEN: Soft, nontender, nondistended. Bowel sounds present. No masses or tenderness. SKIN: Warm and dry. EXTREMITIES: No lower extremity edema. Motor movement grossly intact. No cyanosis or clubbing.     Current Inpatient Medications:   sodium chloride flush  5-40 mL IntraVENous 2 times per day    amLODIPine  2.5 mg Oral Daily    vitamin D results for input(s): APTT in the last 72 hours. CARDIAC ENZYMES:No results for input(s): CKTOTAL, CKMB, CKMBINDEX, TROPONINI in the last 72 hours. FASTING LIPID PANEL:  Lab Results   Component Value Date    HDL 39 06/28/2022    [address] 65 06/28/2022    TRIG 191 06/28/2022     LIVER PROFILE:  Recent Labs     06/27/22  0646 06/28/22  0629   AST 26 19   ALT 15 13   LABALBU 4.0 4.0       ASSESSMENT:    Patient Active Problem List   Diagnosis    Chronic combined systolic and diastolic CHF (congestive heart failure) ([facility])      S/P TAVR (transcatheter aortic valve replacement)      Coronary artery disease involving native coronary artery of native heart without angina pectoris      Essential hypertension      Hyperlipidemia LDL goal <100      ICD (implantable cardioverter-defibrillator), dual, in situ      Abdominal aortic aneurysm (AAA) 3.0 cm to 5.5 cm in diameter in [facility])     Paroxysmal atrial fibrillation. Patient was on dofetilide therapy and has been switched to amiodarone approximately 3 weeks ago    Ventricular tachycardia. Polymorphic VT noted on dofetilide therapy  Monomorphic VT following a short long sequence on amiodarone. ICD reprogrammed to DDDR mode, instead of MVP R mode which triggered the episode of sustained monomorphic VT leading to the ICD discharge    Increase in RV pacing percentage to 95 in the DDDR mode at a lower rate of 70 triggered symptoms of fatigue and palpitations. Device reprogrammed to a lower rate of 60 with marked improvement in clinical symptoms    PLAN:    Okay for discharge home on amiodarone 200 mg 3 times a day for the rest of the week  Reduce amiodarone to 200 twice daily thereafter for 2 weeks  Follow-up in the office in  July as scheduled    All of the above discussed with the patient and explained to him in detail. He understands and is agreeable to proceed. Please see orders. Discussed with patient and nursing.     Liliana Calderon MD, [facility]

## 2022-06-28 NOTE — CARE COORDINATION
6/28 Care Coordination:Pt presenting to the ED for SOB. He was recently admitted several weeks ago for his defibrillator going off. In as OBS, EP consult. Pulm consulted. Plan at discharge is home where patient resides independently with wife. No hx- ZULEYMA or HHC. Pt up and about in his room. No needs at this time. CM/SW will continue to follow for discharge planning.    Candice HOWELL,RN-CV-BC  [phone]

## 2022-06-28 NOTE — DISCHARGE SUMMARY
Hospital Medicine Discharge Summary    Patient ID: Zachary Mcwilliams      Patient's PCP: Iain Nath MD    Admit Date: 6/27/2022     Discharge Date: 6/28/2022    Admitting Physician: Wilfrido Hernandez DO     Discharge Physician: Milan Lewis DO     Discharge Diagnoses: Active Hospital Problems    Diagnosis Date Noted    Near syncope [R55] 09/02/2021    Paroxysmal atrial fibrillation (HCC) [I48.0] 03/13/2018    High risk medications (not anticoagulants) long-term use [Z79.899] 05/30/2017       The patient was seen and examined on day of discharge and this discharge summary is in conjunction with any daily progress note from day of discharge. Hospital Course:     Zachary Mcwilliams is a 82 y.o. male who presents to [facility] ER complaining of shortness of breath.     Zachary Mcwilliams has a past medical history that includes paroxysmal atrial fibrillation, CAD, AAA, hyperlipidemia, aortic stenosis s/pTAVR, hypertension.      Patti Osborne presented to the ER with complaint of shortness of breath that began a few hours ago. He woke up to go to the bathroom and started having shortness of breath. He reports pain with deep breathing and pain to his lower back. He states that since Friday he has felt off after taking his amiodarone pills. He was recently admitted several weeks ago after his defibrillator was going off. EP saw patient in the ER and recommended admission for cardiac work-up, per ER physician. Evaluated by EP. Adjusted RV pacing percentage. Feel settings were likely contributing to shortness of breath. Patient feels back to baseline and much better. EP recommending amiodarone 200 TID for the rest of the week and taper. Follow up with EP. EP and Cardiology both consulted. Do not feel the patient needs any further ischemic work up at this time.      CTA of the chest showed mild aneurysmal dilatation of the ascending thoracic aorta.   Pleural soft based tissue masslike density within the right middle lobe measuring 1.8 cm x 1.1 cm. Pneumonia versus malignancy. Smaller 8 x 8 mm soft mass pleural-based focus within the right lower lobe laterally. Patient evaluated by Pulmonology-they recommend repeat CT scan. Patient afebrile with no leukocytosis. No cough or symptoms of PNA. Recommend following up with PCP and pulmonology for further management and evaluation. Patient remained on RA.      CTA abdomen pelvis showed no dissection but aneurysmal infrarenal abdominal aorta measuring up to 3.7 cm. Recommend outpatient follow up with vascular surgery. Exam:     BP (!) 150/59   Pulse 58   Temp 98.2 °F (36.8 °C) (Temporal)   Resp 16   Ht 5' 7\" (1.702 m)   Wt 167 lb 4 oz (75.9 kg)   SpO2 95%   BMI 26.20 kg/m²     General appearance: No apparent distress, appears stated age and cooperative. HEENT: Pupils equal, round, and reactive to light. Conjunctivae/corneas clear. Neck: Supple, with full range of motion. No jugular venous distention. Trachea midline. Respiratory:  Normal respiratory effort. Clear to auscultation, bilaterally without Rales/Wheezes/Rhonchi. Cardiovascular: Regular rate and rhythm with normal S1/S2 without murmurs, rubs or gallops. Abdomen: Soft, non-tender, non-distended with normal bowel sounds. Musculoskeletal: No clubbing, cyanosis or edema bilaterally. Full range of motion without deformity. Skin: Skin color, texture, turgor normal.  No rashes or lesions. Neurologic:  Neurovascularly intact without any focal sensory/motor deficits. Cranial nerves: II-XII intact, grossly non-focal.  Psychiatric: Alert and oriented, thought content appropriate, normal insight      Consults:     IP CONSULT TO INTERNAL MEDICINE  IP CONSULT TO CARDIOLOGY  IP CONSULT TO PULMONOLOGY    Significant Diagnostic Studies:         CTA of the chest showed mild aneurysmal dilatation of the ascending thoracic aorta.   Pleural soft based tissue masslike density within the right middle lobe measuring 1.8 cm x 1.1 cm. Pneumonia versus malignancy. Smaller 8 x 8 mm soft mass pleural-based focus within the right lower lobe laterally.     CTA abdomen pelvis showed no dissection but aneurysmal infrarenal abdominal aorta measuring up to 3.7 cm. Disposition:  Home     Discharge Instructions/Follow-up:   Pulmonology, PCP, Cardiology, EP     Code Status:  Prior    Activity: activity as tolerated    Diet: As tolerated     Labs:  For convenience and continuity at follow-up the following most recent labs are provided:      CBC:    Lab Results   Component Value Date    WBC 7.8 06/28/2022    HGB 12.5 06/28/2022    HCT 37.9 06/28/2022     06/28/2022       Renal:    Lab Results   Component Value Date     06/28/2022    K 4.5 06/28/2022    K 4.6 06/27/2022     06/28/2022    CO2 25 06/28/2022    BUN 18 06/28/2022    CREATININE 1.2 06/28/2022    CALCIUM 8.7 06/28/2022    PHOS 3.7 06/28/2022       Discharge Medications:     Discharge Medication List as of 6/28/2022  1:26 PM           Details   amiodarone (CORDARONE) 200 MG tablet Take 200 mg by mouth 3 times dailyHistorical Med      furosemide (LASIX) 40 MG tablet Take 40 mg by mouth dailyHistorical Med      !! metoprolol tartrate (LOPRESSOR) 50 MG tablet Take 50 mg by mouth at bedtimeHistorical Med      !! metoprolol tartrate (LOPRESSOR) 50 MG tablet Take 75 mg by mouth every morningHistorical Med      potassium chloride (KLOR-CON M) 10 MEQ extended release tablet Take 10 mEq by mouth dailyHistorical Med      pravastatin (PRAVACHOL) 40 MG tablet Take 40 mg by mouth at bedtimeHistorical Med      rivaroxaban (XARELTO) 20 MG TABS tablet Take 20 mg by mouth Daily with supper Historical Med      Multiple Vitamin (MULTIVITAMIN ADULT PO) Take 1 tablet by mouth daily Historical Med      meclizine (ANTIVERT) 25 MG tablet Take 25 mg by mouth daily as needed for Dizziness Historical Med      amLODIPine (NORVASC) 2.5 MG tablet Take 1 tablet by mouth daily, Disp-90 tablet, R-3Normal      pantoprazole (PROTONIX) 40 MG tablet TAKE 1 TABLET BY MOUTH  DAILY, Disp-90 tablet, R-3Requesting 1 year supplyNormal      ferrous sulfate (IRON 325) 325 (65 Fe) MG tablet Take 1 tablet by mouth daily (with breakfast), Disp-90 tablet, R-1Normal      MAGNESIUM-OXIDE 400 (240 Mg) MG tablet Take 400 mg by mouth daily , DAWHistorical Med      Multiple Vitamins-Minerals (PRESERVISION AREDS 2) CAPS Take 1 capsule by mouth 2 times daily Historical Med      Cholecalciferol (VITAMIN D) 2000 UNITS CAPS capsule Take 2,000 Units by mouth daily Historical Med       !! - Potential duplicate medications found. Please discuss with provider. Time Spent on discharge is 35 minutes in the examination, evaluation, counseling and review of medications and discharge plan.       Signed:    Jack Keyes DO   6/28/2022

## 2022-06-29 ENCOUNTER — CARE COORDINATION (OUTPATIENT)
Dept: CASE MANAGEMENT | Age: 87
End: 2022-06-29

## 2022-06-29 NOTE — CARE COORDINATION
Stephani 45 Transitions Initial Follow Up Call    Call within 2 business days of discharge: Yes    Patient: Davonte Mary Patient : 1935   MRN: [id number]  Reason for Admission: SOB   Discharge Date: 22 RARS: Readmission Risk Score: 17 ( )      Last Discharge [facility]       Complaint Diagnosis Description Type Department Provider    22 Shortness of Breath; Back Pain Shortness of breath . .. ED to Hosp-Admission (Discharged) (ADMITTED) SEYZ 8S CDU Zachary Sumners, DO; [place] Reasons. .. Spoke with: [address]: Talat Renee    Non-face-to-face services provided:  Scheduled appointment with PCP-would like to keep previously scheduled PCP appt on 22  Scheduled appointment with Specialist-is scheduled with Dr. Yari Schneider on 2022  Obtained and reviewed discharge summary and/or continuity of care documents    Care Transitions 24 Hour Call    Do you have a copy of your discharge instructions?: Yes  Do you have all of your prescriptions and are they filled?: Yes  Have you been contacted by a [id number] [address] Pharmacist?: No  Have you scheduled your follow up appointment?: Yes  How are you going to get to your appointment?: Car - family or friend to transport  Patient DME: Quad cane, Straight cane, Wheelchair, Shower chair, Walker, Commode  Patient Home Equipment: Nebulizer  Do you have support at home?: Partner/Spouse/SO  Do you feel like you have everything you need to keep you well at home?: Yes  Are you an active caregiver in your home?: No  Care Transitions Interventions  No Identified Needs     Spoke with Allean Epley for initial observation status care transition call post hospital discharge. He reports that he is feeling \"better, a little shaky, but better\" today. He went on to explain that his legs feel shaky when walking today, however, he is using his cane and he feels stable with his cane. He has help from family if needed.  He denies any SOB, chest discomfort, or edema today.  Med review completed, 1111F entered. He is scheduled with Dr. Miriam Menezes 7/7/22, his son or daughter in law will drive him. Desirae Dyson denies any needs, questions, or concerns at this time.      Follow Up  Future Appointments   Date Time Provider Cezar Gamez   7/7/2022  2:30 PM Nisha Davila MD [facility]   7/14/2022 10:30 AM MD PRISCILLA Khan [facility]   8/16/2022 10:00 [facility]   9/19/2022  9:20 AM SCHEDULE, Milla Lemus [address] [facility]   12/8/2022  2:00 PM SCHEDULE, Rossy Jaeger [address] [facility]       Avery Reynolds RN

## 2022-07-05 RX ORDER — MECLIZINE HYDROCHLORIDE 25 MG/1
25 TABLET ORAL DAILY PRN
Qty: 90 TABLET | Refills: 0 | Status: SHIPPED | OUTPATIENT
Start: 2022-07-05

## 2022-07-06 ENCOUNTER — CARE COORDINATION (OUTPATIENT)
Dept: CASE MANAGEMENT | Age: 87
End: 2022-07-06

## 2022-07-06 NOTE — CARE COORDINATION
Stephani 45 Transitions Follow Up Call    2022    Patient: Jose Mortensen  Patient : 1935   MRN: [id number]  Reason for Admission: SOB   Discharge Date: 22 RARS: Readmission Risk Score: 17 ( )    Spoke with: Velia Mt wife     Care Transitions Subsequent and Final Call    Subsequent and Final Calls  Do you have any ongoing symptoms?: No  Have your medications changed?: No  Do you have any questions related to your medications?: No  Do you currently have any active services?: No  Do you have any needs or concerns that I can assist you with?: No  Identified Barriers: None  Care Transitions Interventions  No Identified Needs  Other Interventions:       Spoke with Niles's wife Elizabeth Gauthier for follow up care transition call. She reports that he is presently napping. Elizabeth Gauthier reports that Saeid Ceballos seems to be doing better but is still weak and tired. He continues to get around with his cane and takes his time. Elizabeth Gauthier stated he started to have what seems to be panic attacks so she made an appointment with Dr. Phoebe Galindo. Elizabeth Gauthier denies any needs, questions, or concerns at this time.      Follow Up  Future Appointments   Date Time Provider Cezar Gamez   2022  2:30 PM Malini Osborn MD [facility]   2022 10:30 AM MD PRISCILLA Chapman [facility]   2022 10:00 AM Y[facility] ROOM 1 [facility]   2022  9:20 AM SCHEDULE, Milla Lemus [address] [facility]   2022  2:00 PM SCHEDULE, Rossy Jaeger [address] [facility]       Angel Byrne RN

## 2022-07-07 ENCOUNTER — OFFICE VISIT (OUTPATIENT)
Dept: NON INVASIVE DIAGNOSTICS | Age: 87
End: 2022-07-07
Payer: MEDICARE

## 2022-07-07 VITALS
DIASTOLIC BLOOD PRESSURE: 62 MMHG | SYSTOLIC BLOOD PRESSURE: 132 MMHG | BODY MASS INDEX: 25.52 KG/M2 | OXYGEN SATURATION: 95 % | HEIGHT: 68 IN | HEART RATE: 74 BPM | WEIGHT: 168.4 LBS | RESPIRATION RATE: 18 BRPM

## 2022-07-07 DIAGNOSIS — I48.20 CHRONIC ATRIAL FIBRILLATION (HCC): Primary | ICD-10-CM

## 2022-07-07 PROCEDURE — 1123F ACP DISCUSS/DSCN MKR DOCD: CPT | Performed by: INTERNAL MEDICINE

## 2022-07-07 PROCEDURE — 93000 ELECTROCARDIOGRAM COMPLETE: CPT | Performed by: INTERNAL MEDICINE

## 2022-07-07 PROCEDURE — 99214 OFFICE O/P EST MOD 30 MIN: CPT | Performed by: INTERNAL MEDICINE

## 2022-07-07 NOTE — PROGRESS NOTES
Cardiac Electrophysiology Outpatient Progress Note    Dearl Castleman  1935  Date of Service: 7/7/2022  Referring Provider/PCP: Martha Maddox MD  Chief Complaint:   Fatigue and dizziness        Patient Active Problem List    Diagnosis Date Noted    Chronic combined systolic and diastolic CHF (congestive heart failure) ([facility] [address] 10/08/2015     Priority: High    ICD (implantable cardioverter-defibrillator) discharge      Priority: Medium    Abdominal aortic aneurysm (AAA) 3.0 cm to 5.5 cm in diameter in male [facility]) 04/27/2022     Priority: Medium    Acute on chronic combined systolic and diastolic CHF (congestive heart failure) ([facility] [address] 03/01/2022    Combined congestive systolic and diastolic heart failure ([facility] [address] 03/01/2022    Gastroesophageal reflux disease without esophagitis 03/01/2022    Coronary artery disease involving coronary bypass graft 03/01/2022    Acute on chronic diastolic (congestive) heart failure ([address]     Pure hypercholesterolemia     Stage 3b chronic kidney disease ([address]     ICD (implantable cardioverter-defibrillator), dual, in situ     Ventricular tachycardia ([address]     Paroxysmal atrial fibrillation ([address] 03/13/2018    High risk medications (not anticoagulants) long-term use 05/30/2017    S/P TAVR (transcatheter aortic valve replacement)     Chronic atrial fibrillation     Coronary artery disease involving native coronary artery of native heart without angina pectoris     Essential hypertension     Vitamin D deficiency 05/06/2011       Current Outpatient Medications   Medication Sig Dispense Refill    meclizine (ANTIVERT) 25 MG tablet Take 1 tablet by mouth daily as needed for Dizziness 90 tablet 0    amiodarone (CORDARONE) 200 MG tablet Take 200 mg by mouth 2 times daily      furosemide (LASIX) 40 MG tablet Take 40 mg by mouth daily      metoprolol tartrate (LOPRESSOR) 50 MG tablet Take 50 mg by mouth at bedtime      metoprolol tartrate (LOPRESSOR) 50 MG tablet Take 75 mg by mouth every morning      potassium chloride (KLOR-CON M) 10 MEQ extended release tablet Take 10 mEq by mouth daily      pravastatin (PRAVACHOL) 40 MG tablet Take 40 mg by mouth at bedtime      rivaroxaban (XARELTO) 20 MG TABS tablet Take 20 mg by mouth Daily with supper       Multiple Vitamin (MULTIVITAMIN ADULT PO) Take 1 tablet by mouth daily       amLODIPine (NORVASC) 2.5 MG tablet Take 1 tablet by mouth daily 90 tablet 3    pantoprazole (PROTONIX) 40 MG tablet TAKE 1 TABLET BY MOUTH  DAILY 90 tablet 3    ferrous sulfate (IRON 325) 325 (65 Fe) MG tablet Take 1 tablet by mouth daily (with breakfast) 90 tablet 1    MAGNESIUM-OXIDE 400 (240 Mg) MG tablet Take 400 mg by mouth daily       Multiple Vitamins-Minerals (PRESERVISION AREDS 2) CAPS Take 1 capsule by mouth 2 times daily       Cholecalciferol (VITAMIN D) 2000 UNITS CAPS capsule Take 2,000 Units by mouth daily        No current facility-administered medications for this visit. No Known Allergies    SUBJECTIVE: Lou Nolasco presents to the office today for follow up of amiodarone therapy. The patient has a longstanding history of multiple medical issues including coronary artery disease, aortic stenosis status, s/p TAVR procedure as well as recurrent ventricular tachycardia and ICD in situ. The patient also has  paroxysmal atrial fibrillation and was on dofetilide therapy for the past several years. Multiple episodes of polymorphic VT were recorded by his ICD earlier this year and I discontinued dofetilide and placed the patient on amiodarone. He subsequently had episodes of monomorphic VT related to MVP pacing and the patient's device is now programmed in the DDDR mode at a lower rate of 60 bpm.  He still complains of symptoms of dizziness especially when he is staring at the TV for a long time he says. The episodes are usually short-lived and do not lead to syncope.   He denies any chest pain or worsening shortness of breath at rest or on exertion. He has been able to undertake his daily physical activities without difficulty      ROS:   Constitutional: Negative for fever, activity change and appetite change. HENT: Negative for epistaxis, difficulty swallowing. Eyes: Negative for blurred vision or double vision. Respiratory: Negative for cough, chest tightness, shortness of breath and wheezing. Cardiovascular: Negative for chest pain, palpitations and leg swelling. Gastrointestinal: Negative for abdominal pain, heartburn, or blood in stool. Genitourinary: Negative for hematuria, burning or frequency. Musculoskeletal: Negative for myalgias, stiffness, or swelling. Skin: Negative for rash, pain, or lumps. Neurological: Negative for syncope, seizures, or headaches. Psychiatric/Behavioral: Negative for confusion and agitation. The patient is not nervous/anxious. PHYSICAL EXAM:  Vitals:    07/07/22 1434   BP: 132/62   Pulse: 74   Resp: 18   SpO2: 95%   Weight: 168 lb 6.4 oz (76.4 kg)   Height: 5' 8\" (1.727 m)     Constitutional: Oriented to person, place, and time. Well-developed and cooperative. Head: Normocephalic and atraumatic. Eyes: Conjunctivae are normal. Pupils are equal, round, and reactive to light. Neck: No hepatojugular reflux and no JVD present. Carotid bruit is not present. Cardiovascular: Laterally displaced PMI, normal S1 and S2 at the left sternal border and the apex, grade 3/6 systolic ejection murmur at the aortic area and left sternal border  Pulmonary/Chest: Effort normal and breath sounds normal. No respiratory distress. Abdominal: Soft. Normal appearance and bowel sounds are normal.    Musculoskeletal: Normal range of motion of all extremities, no muscle weakness. Neurological: Alert and oriented to person, place, and time. Gait normal.   Skin: Skin is warm and dry. No bruising, no ecchymosis and no rash noted. Extremity: No clubbing or cyanosis. No edema.   Psychiatric: Normal mood and affect. Thought content normal.     Pertinent Labs:  CBC:   WBC   Date Value Ref Range Status   06/28/2022 7.8 4.5 - 11.5 E9/L Final   06/27/2022 7.8 4.5 - 11.5 E9/L Final   06/15/2022 8.8 4.5 - 11.5 E9/L Final     Hemoglobin   Date Value Ref Range Status   06/28/2022 12.5 12.5 - 16.5 g/dL Final   06/27/2022 11.2 (L) 12.5 - 16.5 g/dL Final   06/15/2022 12.8 12.5 - 16.5 g/dL Final     Hematocrit   Date Value Ref Range Status   06/28/2022 37.9 37.0 - 54.0 % Final   06/27/2022 32.9 (L) 37.0 - 54.0 % Final   06/15/2022 38.7 37.0 - 54.0 % Final     Platelets   Date Value Ref Range Status   06/28/2022 234 130 - 450 E9/L Final   06/27/2022 210 130 - 450 E9/L Final   06/15/2022 208 130 - 450 E9/L Final     BMP:   Lab Results   Component Value Date/Time     06/28/2022 06:29 AM    K 4.5 06/28/2022 06:29 AM    K 4.6 06/27/2022 06:46 AM     06/28/2022 06:29 AM    CO2 25 06/28/2022 06:29 AM    BUN 18 06/28/2022 06:29 AM    CREATININE 1.2 06/28/2022 06:29 AM    GLUCOSE 97 06/28/2022 06:29 AM    CALCIUM 8.7 06/28/2022 06:29 AM      ABGs: No results found for: PHART, PO2ART, ETJ8OCI  INR:   Lab Results   Component Value Date    INR 1.5 06/15/2022    INR 1.3 03/14/2022    INR 1.3 03/13/2022     BNP:   Lab Results   Component Value Date    BNP 47 08/27/2013     TSH:   Lab Results   Component Value Date    TSH 1.700 06/28/2022      Cardiac Injury Profile:    Total CK   Date Value Ref Range Status   06/28/2017 94 20 - 200 U/L Final     CK-MB   Date Value Ref Range Status   06/28/2017 3.8 0.0 - 7.7 ng/mL Final     Troponin   Date Value Ref Range Status   10/29/2020 <0.01 0.00 - 0.03 ng/mL Final     Comment:     TROPONIN T BLOOD LEVELS:         0.03 ng/mL     Upper Reference Limit  0.04 - 0.09 ng/mL     Possible myocardial injury      >= 0.10 ng/mL     Myocardial injury       Lipid Profile:   Lab Results   Component Value Date/Time    TRIG 191 06/28/2022 06:29 AM    HDL 39 06/28/2022 06:29 AM    LDLCALC 65 06/28/2022 06:29

## 2022-07-13 ENCOUNTER — CARE COORDINATION (OUTPATIENT)
Dept: CASE MANAGEMENT | Age: 87
End: 2022-07-13

## 2022-07-13 RX ORDER — AMIODARONE HYDROCHLORIDE 200 MG/1
200 TABLET ORAL DAILY
COMMUNITY
Start: 2022-07-16

## 2022-07-13 NOTE — CARE COORDINATION
Stephani 45 Transitions Follow Up Call    2022    Patient: Henry Farias  Patient : 1935   MRN: [id number]  Reason for Admission: SOB  Discharge Date: 22 RARS: Readmission Risk Score: 17 ( )    Spoke with: Ivory Greene, wife, and then [facility] Subsequent and Final Call    Subsequent and Final Calls  Do you have any ongoing symptoms?: No  Have your medications changed?: Yes  Patient Reports: will decrease Amiodarone to daily per Dr. Vanessa Turner starting 22  Do you have any questions related to your medications?: No  Do you currently have any active services?: No  Do you have any needs or concerns that I can assist you with?: No  Identified Barriers: None  Care Transitions Interventions  No Identified Needs  Other Interventions:       Spoke with Niles's wife Ivory Greene for follow up care transition call. She stated Janny Galen is presently laying down but seems to be feeling better this week. CTN then spoke with Janny Darnell. He confirmed that he is feeling better and a little bit stronger. He stated that he has dizzy spells on occasion and taking meclizine provides relief. He stated that his appointment with Dr. Vanessa Turner last week was good and he will see her again in three months and will schedule to have his defibrillator replaced after that appt. He is to take Amiodarone 200mg BID until this Friday and will start Amiodarone 200mg daily on Saturday, 22 (updated med rec). Janny Darnell will see Dr. Mel Asif tomorrow. He denies any needs, questions, or concerns at this time.      Follow Up  Future Appointments   Date Time Provider Cezar Gamez   2022 10:30 AM MD PRISCILLA [facility]   2022 10:00 AM YAJAIRA [facility] ROOM 1 Y[facility]JAIRA [facility]   2022  9:20 AM SCHEDULE, BRANDON CHAUHAN [facility] by [facility]   10/12/2022  1:15 PM [facility]   2022  2:00 PM SCHEDULE, [address] E[address] [facility]       Luiz Green RN

## 2022-07-14 ENCOUNTER — OFFICE VISIT (OUTPATIENT)
Dept: PRIMARY CARE CLINIC | Age: 87
End: 2022-07-14
Payer: MEDICARE

## 2022-07-14 VITALS
DIASTOLIC BLOOD PRESSURE: 60 MMHG | HEIGHT: 68 IN | TEMPERATURE: 97.7 F | WEIGHT: 169 LBS | BODY MASS INDEX: 25.61 KG/M2 | HEART RATE: 62 BPM | SYSTOLIC BLOOD PRESSURE: 138 MMHG | OXYGEN SATURATION: 96 %

## 2022-07-14 DIAGNOSIS — I25.10 CORONARY ARTERY DISEASE INVOLVING NATIVE CORONARY ARTERY OF NATIVE HEART WITHOUT ANGINA PECTORIS: ICD-10-CM

## 2022-07-14 DIAGNOSIS — I50.42 CHRONIC COMBINED SYSTOLIC AND DIASTOLIC CHF (CONGESTIVE HEART FAILURE) (HCC): ICD-10-CM

## 2022-07-14 DIAGNOSIS — Z95.810 ICD (IMPLANTABLE CARDIOVERTER-DEFIBRILLATOR), DUAL, IN SITU: ICD-10-CM

## 2022-07-14 DIAGNOSIS — D50.0 BLOOD LOSS ANEMIA: ICD-10-CM

## 2022-07-14 DIAGNOSIS — G47.00 INSOMNIA, UNSPECIFIED TYPE: Primary | ICD-10-CM

## 2022-07-14 DIAGNOSIS — H81.10 BENIGN PAROXYSMAL POSITIONAL VERTIGO, UNSPECIFIED LATERALITY: ICD-10-CM

## 2022-07-14 DIAGNOSIS — I48.0 PAROXYSMAL ATRIAL FIBRILLATION (HCC): ICD-10-CM

## 2022-07-14 DIAGNOSIS — R91.1 LUNG NODULE: ICD-10-CM

## 2022-07-14 DIAGNOSIS — I25.5 ISCHEMIC CARDIOMYOPATHY: ICD-10-CM

## 2022-07-14 DIAGNOSIS — I10 ESSENTIAL HYPERTENSION: ICD-10-CM

## 2022-07-14 DIAGNOSIS — F41.0 PANIC ATTACK: ICD-10-CM

## 2022-07-14 DIAGNOSIS — I71.40 ABDOMINAL AORTIC ANEURYSM (AAA) 3.0 CM TO 5.5 CM IN DIAMETER IN MALE: ICD-10-CM

## 2022-07-14 PROCEDURE — 99214 OFFICE O/P EST MOD 30 MIN: CPT | Performed by: FAMILY MEDICINE

## 2022-07-14 PROCEDURE — G8417 CALC BMI ABV UP PARAM F/U: HCPCS | Performed by: FAMILY MEDICINE

## 2022-07-14 PROCEDURE — 1036F TOBACCO NON-USER: CPT | Performed by: FAMILY MEDICINE

## 2022-07-14 PROCEDURE — G8427 DOCREV CUR MEDS BY ELIG CLIN: HCPCS | Performed by: FAMILY MEDICINE

## 2022-07-14 PROCEDURE — 1123F ACP DISCUSS/DSCN MKR DOCD: CPT | Performed by: FAMILY MEDICINE

## 2022-07-14 RX ORDER — HYDROXYZINE HYDROCHLORIDE 25 MG/1
25 TABLET, FILM COATED ORAL EVERY 8 HOURS PRN
Qty: 30 TABLET | Refills: 1 | Status: SHIPPED
Start: 2022-07-14 | End: 2022-08-04

## 2022-07-14 ASSESSMENT — ENCOUNTER SYMPTOMS
RHINORRHEA: 0
SHORTNESS OF BREATH: 0
ABDOMINAL PAIN: 0
NAUSEA: 0
SORE THROAT: 0
WHEEZING: 0
CONSTIPATION: 0
DIARRHEA: 0
VOMITING: 0

## 2022-07-14 NOTE — PROGRESS NOTES
2022     Chief Complaint   Patient presents with    Follow-up     pt states that he has been feeling good but on the way over he has been dizzy     HPI  Mary Daniel (:  1935) is a 82 y.o. male, here for evaluation of the following medical concerns:    Patient is a 82-year-old male with a past medical history of CHF, atrial fibrillation, nonsustained ventricular tachycardia, hyperlipidemia, hypertension, history of MI, insomnia, remote history of gastrointestinal hemorrhage with melena, ICD placement, and vitamin D deficiency who presents today for a routine follow-up.     Patient was recently admitted to the hospital with shortness of breath. Prior to this he was admitted for his defibrillator going off. Patient was advised by consulting EP to be admitted for cardiac work-up. It was felt that the patient's settings on his defibrillator were causing his shortness of breath. Patient was discharged on amiodarone 200 mg 3 times a day with a taper over time. Patient did have a CT scan that showed a possible 1.8 cm x 1.1 cm mass in the patient's right middle lobe of his lung. CKD stage IIIa.    Occasional cough w/ cold foods. On PPI and allergy medication. Not always present.      Lower back issues: No sig back or knee pain. No swelling. Numbness in his right thigh to foot. Hx of bypass surgery. Was sent to PT in the past.      History of nonsustained ventricular tachycardia: On Amiodarone and, Lasix, and metoprolol. Patient follows both with cardiology and electrophysiology for this issue. ICD in place.     History of atrial fibrillation: Patient is rate/rhythm controlled on amiodarone, metoprolol and anticoagulated on   Xarelto.     CHF: Patient is to be followed with cardiology and CHF clinic. No recent LE swelling. (Chronic rales on exam).  Patient is currently on Lasix 40 every day.     Hyperlipidemia: Currently on statin. Following with cardiology.  Tolerating statin well without any side effects.     Hypertension:  BP <140/90. Tolerating his blood pressure medications well. No side effects.     History of MI: Patient's 1st heart attack was in 2003. Has had multiple stents and bypass surgeries.  Patient is following with cardiology.     Insomnia/Panic attacks: Some issues with awaking at night.  Plans to see psychiatry.     Lung Nodule: Has been discussed in the past in regards to the risk of lung cancer which could result in death. Patient would still like to continue to hold off on bx.       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    CKD III/II: Stable.     Vit D Def: On vitamin D supplementation.      Hx of GI bleed: Not on ASA due to Xarelto.        AAA: CT scan in ER recently showed infrarenal abdominal aorta measuring 3.7 cm. Patient has been advised to follow-up with vascular surgery.     Health maintenance: Patient is due for shingles immunization. Review of Systems   Constitutional: Positive for fatigue. Negative for chills and fever. HENT: Negative for congestion, rhinorrhea and sore throat. Respiratory: Negative for shortness of breath and wheezing. Cardiovascular: Negative for chest pain and leg swelling. Gastrointestinal: Negative for abdominal pain, constipation, diarrhea, nausea and vomiting. Skin: Negative for rash. Neurological: Positive for light-headedness. Negative for headaches. Psychiatric/Behavioral: Positive for sleep disturbance. Past Medical History:   Diagnosis Date    A-fib [facility])     Arrhythmia     Carotid artery stenosis     CHF (congestive heart failure) (HCC)     Heart valve problem     Hyperlipidemia     Hypertension     ICD (implantable cardiac defibrillator) in place 2007    [employer] [facility] [id number] generator changed 7/28/16  Dr Enoch Knox    MI (mitral incompetence) 2003    MI (myocardial infarction) ([facility] [address] 2003       Prior to Visit Medications    Medication Sig Taking?  Authorizing Provider   hydrOXYzine HCl (ATARAX) 25 MG tablet Take 1 tablet by mouth every 8 hours as needed for Anxiety Yes Alice Rendon MD   amiodarone (CORDARONE) 200 MG tablet Take 200 mg by mouth daily Daily beginning 22 per Dr. Baker Cos Yes Historical Provider, MD   meclizine (ANTIVERT) 25 MG tablet Take 1 tablet by mouth daily as needed for Dizziness Yes Alice Rendon MD   furosemide (LASIX) 40 MG tablet Take 40 mg by mouth daily Yes Historical Provider, MD   metoprolol tartrate (LOPRESSOR) 50 MG tablet Take 50 mg by mouth at bedtime Yes Historical Provider, MD   metoprolol tartrate (LOPRESSOR) 50 MG tablet Take 75 mg by mouth every morning Yes Historical Provider, MD   potassium chloride (KLOR-CON M) 10 MEQ extended release tablet Take 10 mEq by mouth daily Yes Historical Provider, MD   pravastatin (PRAVACHOL) 40 MG tablet Take 40 mg by mouth at bedtime Yes Historical Provider, MD   rivaroxaban (XARELTO) 20 MG TABS tablet Take 20 mg by mouth Daily with supper  Yes Historical Provider, MD   Multiple Vitamin (MULTIVITAMIN ADULT PO) Take 1 tablet by mouth daily  Yes Historical Provider, MD   amLODIPine (NORVASC) 2.5 MG tablet Take 1 tablet by mouth daily Yes Fran Carrillo DO   pantoprazole (PROTONIX) 40 MG tablet TAKE 1 TABLET BY MOUTH  DAILY Yes Alice Rendon MD   ferrous sulfate (IRON 325) 325 (65 Fe) MG tablet Take 1 tablet by mouth daily (with breakfast) Yes Alice Rendon MD   MAGNESIUM-OXIDE 400 (240 Mg) MG tablet Take 400 mg by mouth daily  Yes Historical Provider, MD   Multiple Vitamins-Minerals (PRESERVISION AREDS 2) CAPS Take 1 capsule by mouth 2 times daily  Yes Historical Provider, MD   Cholecalciferol (VITAMIN D) 2000 UNITS CAPS capsule Take 2,000 Units by mouth daily  Yes Historical Provider, MD        No Known Allergies    Social History     Tobacco Use    Smoking status: Former Smoker     Packs/day: 0.50     Years: 3.00     Pack years: 1.50     Quit date: 1973     Years since quittin.4    Smokeless tobacco: Never Used    Tobacco comment: Quit smoking in 1970's   Substance Use Topics    Alcohol use: No           Vitals:    07/14/22 1026   BP: 138/60   Pulse: 62   Temp: 97.7 °F (36.5 °C)   SpO2: 96%   Weight: 169 lb (76.7 kg)   Height: 5' 8\" (1.727 m)     Estimated body mass index is 25.7 kg/m² as calculated from the following:    Height as of this encounter: 5' 8\" (1.727 m). Weight as of this encounter: 169 lb (76.7 kg). Physical Exam  Constitutional:       Appearance: He is well-developed. HENT:      Head: Normocephalic. Eyes:      Extraocular Movements: Extraocular movements intact. Conjunctiva/sclera: Conjunctivae normal.   Cardiovascular:      Rate and Rhythm: Normal rate and regular rhythm. Heart sounds: Normal heart sounds. No murmur heard. Pulmonary:      Effort: Pulmonary effort is normal.      Breath sounds: Rales present. No wheezing. Abdominal:      General: Bowel sounds are normal.      Palpations: Abdomen is soft. Tenderness: There is no abdominal tenderness. Musculoskeletal:      Right lower leg: No edema. Left lower leg: No edema. Neurological:      General: No focal deficit present. Mental Status: He is alert. Comments: Cranial nerves grossly intact   Psychiatric:         Mood and Affect: Mood normal.         Judgment: Judgment normal.         ASSESSMENT/PLAN:  Maurice Dumont was seen today for follow-up. Diagnoses and all orders for this visit:    Insomnia, unspecified type  -     hydrOXYzine HCl (ATARAX) 25 MG tablet; Take 1 tablet by mouth every 8 hours as needed for Anxiety  Panic attack  -     hydrOXYzine HCl (ATARAX) 25 MG tablet; Take 1 tablet by mouth every 8 hours as needed for Anxiety  Patient to follow-up with psychiatry. Patient is somewhat limited in regards to his medications that he can take for insomnia given his comorbidities. We will perform a trial of the above medication. Patient is to start with 12.5 mg as needed for anxiety/panic attacks and insomnia. 9:03 AM EDT

## 2022-07-20 ENCOUNTER — CARE COORDINATION (OUTPATIENT)
Dept: CASE MANAGEMENT | Age: 87
End: 2022-07-20

## 2022-07-20 NOTE — CARE COORDINATION
[facility] Transitions Follow Up Call    2022    Patient: Arely Galeas  Patient : 1935   MRN: [id number]  Reason for Admission: SOB  Discharge Date: 22 RARS: Readmission Risk Score: 17 ( )         Spoke with the pt for a sub care transition call. Pt recently admitted on 22 with sob which was thought to be likely related to his device settings. Per EP, ICD reprogrammed to decrease RV pacing. Pt states that he is feeling \"well. \" Pt denies any further sob. Pt denies any CP/chest discomfort or palpitations. Pt denies and ICD discharges. Pt does admit to intermittent dizzy spells, but states that he is prescribed Antivert for this which he states does help. Pt has completed his HFU appts with EP and with his pcp. Pt is now tapered down to Amiodarone 200 mg daily and states that he is tolerating well. Pt states that his pcp did prescribe him Atarax at his last OV for anxiety and sleep. He reports that he does take this every night and feels that he is sleeping better now. Pt will have a f/u again with EP and his pcp in 3 months. Pt did not voice any needs/concerns today. Pt was agreeable to a final outreach next week. Care Transitions Follow Up Call    Needs to be reviewed by the provider   Additional needs identified to be addressed with provider: No  none             Method of communication with provider : none      Care Transition Nurse contacted the patient by telephone to follow up after admission on 22. Addressed changes since last contact: medications-Amiodarone is 200 mg daily now (since 22)    CTN reviewed  medical action plan and red flags with patient and discussed any barriers to care and/or understanding of plan of care after discharge. Discussed appropriate site of care based on symptoms and resources available to patient including: PCP  Specialist  When to call 911. The patient agrees to contact the PCP office for questions related to their healthcare. Patients top risk factors for readmission: medical condition-Afib, CHF, CKD, hld, vtach, ICD/pacer, AAA, hx CABG  medication management  multiple health system providers  polypharmacy  utilization of services  Interventions to address risk factors: Scheduled appointment with PCP-Keep scheduled f/u with pcp in Oct and Scheduled appointment with Specialist-Keep scheduled f/u with EP in Oct. F/u  with cardiology as advised. CTN provided contact information for future needs. Plan for follow-up call in 7-10 days based on severity of symptoms and risk factors. Plan for next call: symptom management-Any worsening in condition? Care Transitions Subsequent and Final Call    Subsequent and Final Calls  Do you have any ongoing symptoms?: No  Have your medications changed?: Yes  Patient Reports: Amiodarone 200 mg daily (decreased on 7/16/22)  Do you have any questions related to your medications?: No  Do you currently have any active services?: No  Do you have any needs or concerns that I can assist you with?: No  Identified Barriers: None  Care Transitions Interventions  Other Interventions:              Follow Up  Future Appointments   Date Time Provider Cezar Gamez   8/16/2022 10:00 AM [facility] CHF ROOM 1 [facility]   9/19/2022  9:20 AM SCHEDULE, BRANDON CHAUHAN [facility]   10/12/2022  1:15 PM Sagrario Man MD [facility]   10/13/2022 11:30 AM MD PRISCILLA [facility]   12/8/2022  2:00 PM SCHEDULE, DARLINGYX BRADLEY DEVICE BRADLEY [facility]       Savita Horta RN

## 2022-07-28 ENCOUNTER — CARE COORDINATION (OUTPATIENT)
Dept: CASE MANAGEMENT | Age: 87
End: 2022-07-28

## 2022-07-28 NOTE — CARE COORDINATION
[facility] Transitions Follow Up Call    2022    Patient: Jaleel Mays  Patient : 1935   MRN: [id number]  Reason for Admission: SOB  Discharge Date: 22 RARS: Readmission Risk Score: 17 ( )    Spoke with: [facility] Transitions Subsequent and Final Call    Subsequent and Final Calls  Do you have any ongoing symptoms?: No  Have your medications changed?: No  Do you have any questions related to your medications?: No  Do you currently have any active services?: No  Do you have any needs or concerns that I can assist you with?: No  Identified Barriers: None  Care Transitions Interventions  No Identified Needs  Other Interventions:         Spoke with Korin Loyola for final care transition call. He reports that he is \"ok, I have good days and bad days. \" He denies any SOB, CP, chest tightness, or dizziness today. He stated the good days and bad days are in reference to his depression. He stated he started seeing a psychiatrist and will be going back again in a couple of weeks. He reports having about as many bad days as good days and is hopeful that going to the psychiatrist will help him have more good days than bad. Korin Loyola denies any needs, questions, or concerns at this time for CTN and is agreeable to this being the final outreach.       Follow Up  Future Appointments   Date Time Provider Cezar Gamez   2022 10:00 AM [facility] ROOM 1 [facility]   2022  9:20 AM SCHEDULE, BRANDON Singleton [facility]   10/12/2022  1:15 PM Alycia Reynolds MD [facility]   10/13/2022 11:30 AM MD PRISCILLA Mao [facility]   2022  2:00 PM SCHEDULE, [address] [facility]       Oanh Fofana RN

## 2022-08-04 ENCOUNTER — HOSPITAL ENCOUNTER (OUTPATIENT)
Dept: OTHER | Age: 87
Setting detail: THERAPIES SERIES
Discharge: HOME | End: 2022-08-04
Payer: MEDICARE

## 2022-08-04 VITALS
RESPIRATION RATE: 16 BRPM | BODY MASS INDEX: 25.85 KG/M2 | WEIGHT: 170 LBS | HEART RATE: 60 BPM | SYSTOLIC BLOOD PRESSURE: 151 MMHG | OXYGEN SATURATION: 96 % | DIASTOLIC BLOOD PRESSURE: 67 MMHG

## 2022-08-04 LAB
ANION GAP SERPL CALCULATED.3IONS-SCNC: 10 MMOL/L (ref 7–16)
BUN BLDV-MCNC: 20 MG/DL (ref 6–23)
CALCIUM SERPL-MCNC: 8.8 MG/DL (ref 8.6–10.2)
CHLORIDE BLD-SCNC: 92 MMOL/L (ref 98–107)
CO2: 29 MMOL/L (ref 22–29)
CREAT SERPL-MCNC: 1.4 MG/DL (ref 0.7–1.2)
GFR AFRICAN AMERICAN: 58
GFR NON-AFRICAN AMERICAN: 48 ML/MIN/1.73
GLUCOSE BLD-MCNC: 110 MG/DL (ref 74–99)
POTASSIUM SERPL-SCNC: 4.7 MMOL/L (ref 3.5–5)
PRO-BNP: 715 PG/ML (ref 0–450)
SODIUM BLD-SCNC: 131 MMOL/L (ref 132–146)

## 2022-08-04 PROCEDURE — 96374 THER/PROPH/DIAG INJ IV PUSH: CPT

## 2022-08-04 PROCEDURE — 36415 COLL VENOUS BLD VENIPUNCTURE: CPT

## 2022-08-04 PROCEDURE — 6360000002 HC RX W HCPCS

## 2022-08-04 PROCEDURE — 2580000003 HC RX 258

## 2022-08-04 PROCEDURE — 80048 BASIC METABOLIC PNL TOTAL CA: CPT

## 2022-08-04 PROCEDURE — 83880 ASSAY OF NATRIURETIC PEPTIDE: CPT

## 2022-08-04 PROCEDURE — 99214 OFFICE O/P EST MOD 30 MIN: CPT

## 2022-08-04 RX ORDER — FUROSEMIDE 10 MG/ML
40 INJECTION INTRAMUSCULAR; INTRAVENOUS ONCE
Status: DISCONTINUED | OUTPATIENT
Start: 2022-08-04 | End: 2022-08-05 | Stop reason: HOSPADM

## 2022-08-04 RX ORDER — MIRTAZAPINE 30 MG/1
30 TABLET, FILM COATED ORAL NIGHTLY
COMMUNITY
End: 2022-09-06

## 2022-08-04 RX ORDER — SODIUM CHLORIDE 0.9 % (FLUSH) 0.9 %
10 SYRINGE (ML) INJECTION ONCE
Status: DISCONTINUED | OUTPATIENT
Start: 2022-08-04 | End: 2022-08-05 | Stop reason: HOSPADM

## 2022-08-04 RX ORDER — SODIUM CHLORIDE 0.9 % (FLUSH) 0.9 %
SYRINGE (ML) INJECTION
Status: COMPLETED
Start: 2022-08-04 | End: 2022-08-04

## 2022-08-04 RX ORDER — FUROSEMIDE 10 MG/ML
INJECTION INTRAMUSCULAR; INTRAVENOUS
Status: COMPLETED
Start: 2022-08-04 | End: 2022-08-04

## 2022-08-04 RX ADMIN — SODIUM CHLORIDE, PRESERVATIVE FREE 10 ML: 5 INJECTION INTRAVENOUS at 14:31

## 2022-08-04 RX ADMIN — FUROSEMIDE 40 MG: 10 INJECTION, SOLUTION INTRAMUSCULAR; INTRAVENOUS at 14:31

## 2022-08-04 NOTE — PROGRESS NOTES
Congestive Heart Failure [address]   CHF Clinic [facility]  [address]   1935          Referring Provider: LAWRENCE Malik  Primary Care Physician: Dr. Nelson Barkley  Cardiologist: Dr. Andreina Baker  Nephrologist:         History of Present Illness:     Elizabeth Stephen is a 82 y.o. male with a history of HFpEF, most recent EF 60% on 3/2/2022. Patient Story:    He does   have dyspnea with exertion, shortness of breath, or decline in overall functional capacity. He does have orthopnea, PND, nocturnal cough or hemoptysis. He does have abdominal distention or bloating, early satiety, anorexia/change in appetite. He does have a good urinary response to  oral diuretic. He does not  have  lower extremity edema. He denies lightheadedness, dizziness. He denies palpitations, syncope or near syncope. He does not complain of chest pain, pressure, discomfort. No Known Allergies        Prior to Visit Medications    Medication Sig Taking?  Authorizing Provider   mirtazapine (REMERON) 30 MG tablet Take 30 mg by mouth nightly Yes Historical Provider, MD   amiodarone (CORDARONE) 200 MG tablet Take 200 mg by mouth daily Daily beginning 7/16/22 per Dr. Vianca Ngo Provider, MD   meclizine (ANTIVERT) 25 MG tablet Take 1 tablet by mouth daily as needed for Dizziness  Kristina Mcdowell MD   furosemide (LASIX) 40 MG tablet Take 40 mg by mouth daily  Historical Provider, MD   metoprolol tartrate (LOPRESSOR) 50 MG tablet Take 50 mg by mouth at bedtime  Historical Provider, MD   metoprolol tartrate (LOPRESSOR) 50 MG tablet Take 75 mg by mouth every morning  Historical Provider, MD   potassium chloride (KLOR-CON M) 10 MEQ extended release tablet Take 10 mEq by mouth daily  Historical Provider, MD   pravastatin (PRAVACHOL) 40 MG tablet Take 40 mg by mouth at bedtime  Historical Provider, MD   rivaroxaban (XARELTO) 20 MG TABS tablet Take 20 mg by mouth Daily with supper   Historical Provider, MD   Multiple Vitamin (MULTIVITAMIN ADULT PO) Take 1 tablet by mouth daily   Historical Provider, MD   amLODIPine (NORVASC) 2.5 MG tablet Take 1 tablet by mouth daily  Patient taking differently: Take 2.5 mg by mouth nightly  David Coe DO   pantoprazole (PROTONIX) 40 MG tablet TAKE 1 TABLET BY MOUTH  DAILY  Keyon Smith MD   ferrous sulfate (IRON 325) 325 (65 Fe) MG tablet Take 1 tablet by mouth daily (with breakfast)  Keyon Smith MD   MAGNESIUM-OXIDE 400 (240 Mg) MG tablet Take 400 mg by mouth daily   Historical Provider, MD   Multiple Vitamins-Minerals (PRESERVISION AREDS 2) CAPS Take 1 capsule by mouth 2 times daily   Historical Provider, MD   Cholecalciferol (VITAMIN D) 2000 UNITS CAPS capsule Take 2,000 Units by mouth daily   Historical Provider, MD           Guideline directed medical:  ARNI/ACE I/ARB: No  Beta blocker:   Yes  Aldosterone antagonist:  No        Physical Examination:     BP (!) 151/67   Pulse 60   Resp 16   Wt 170 lb (77.1 kg)   SpO2 96%   BMI 25.85 kg/m²     Assessment  Charting Type: Shift assessment (clinic)    Neurological  Level of Consciousness: Alert (0)              Respiratory  Respiratory Quality/Effort: Dyspnea with exertion  Chest Assessment: Chest expansion symmetrical    Breath Sounds  Right Upper Lobe: Clear  Right Middle Lobe: Fine Crackles  Right Lower Lobe: Fine Crackles  Left Upper Lobe: Clear  Left Lower Lobe: Clear         Cardiac  Cardiac Rhythm: Atrial paced, Ventricular paced    Rhythm Interpretation  Heart Rate: 60         Gastrointestinal  Abdominal (WDL): Exceptions to WDL  Abdomen Inspection: Distended, Soft               Peripheral Vascular  Peripheral Vascular (WDL): Within Defined Limits  Edema: Right lower extremity, Left lower extremity  RLE Edema: None  LLE Edema: None                   Genitourinary  Genitourinary (WDL): Within Defined Limits    Psychosocial  Psychosocial (WDL): Within Defined Limits    Pain Assessment  Pain Assessment: None - Denies Pain                   Heart Rate: 60                     LAB DATA:    Last 3 BMP      Sodium (mmol/L)   Date Value   06/28/2022 138   06/27/2022 133   06/15/2022 136     Potassium (mmol/L)   Date Value   06/28/2022 4.5   06/15/2022 4.2   06/15/2022 4.4     Potassium reflex Magnesium (mmol/L)   Date Value   06/27/2022 4.6   03/12/2022 4.3   09/21/2021 4.5     Chloride (mmol/L)   Date Value   06/28/2022 100   06/27/2022 93 (L)   06/15/2022 93 (L)     CO2 (mmol/L)   Date Value   06/28/2022 25   06/27/2022 24   06/15/2022 27     BUN (mg/dL)   Date Value   06/28/2022 18   06/27/2022 21   06/15/2022 22     Glucose (mg/dL)   Date Value   06/28/2022 97   06/27/2022 98   06/15/2022 112 (H)     Calcium (mg/dL)   Date Value   06/28/2022 8.7   06/27/2022 8.7   06/15/2022 8.8       Last 3 BNP       Pro-BNP (pg/mL)   Date Value   06/27/2022 628 (H)   06/15/2022 609 (H)   05/26/2022 477 (H)          CBC: No results for input(s): WBC, HGB, PLT in the last 72 hours. BMP:    No results for input(s): NA, K, CL, CO2, BUN, CREATININE, GLUCOSE in the last 72 hours. Hepatic: No results for input(s): AST, ALT, ALB, BILITOT, ALKPHOS in the last 72 hours. Troponin: No results for input(s): TROPONINI in the last 72 hours. BNP: No results for input(s): BNP in the last 72 hours. Lipids: No results for input(s): CHOL, HDL in the last 72 hours. Invalid input(s): LDLCALCU  INR: No results for input(s): INR in the last 72 hours. WEIGHTS:    Wt Readings from Last 3 Encounters:   08/04/22 170 lb (77.1 kg)   07/14/22 169 lb (76.7 kg)   07/07/22 168 lb 6.4 oz (76.4 kg)         TELEMETRY:  Cardiac Regularity: Regular  Cardiac Rhythm/Interpretation: A paced/Vpaced        ASSESSMENT:  Nely Harvey  Patient called for STAT appointment for weight gain of 5lbs on home scale, weight unchanged at CHF Clinic today. He is SOB with activity and does recover with rest. Lungs remain with

## 2022-08-08 ENCOUNTER — TELEPHONE (OUTPATIENT)
Dept: ADMINISTRATIVE | Age: 87
End: 2022-08-08

## 2022-08-15 ENCOUNTER — HOSPITAL ENCOUNTER (OUTPATIENT)
Age: 87
Discharge: HOME | End: 2022-08-17
Payer: MEDICARE

## 2022-08-15 ENCOUNTER — HOSPITAL ENCOUNTER (OUTPATIENT)
Age: 87
Discharge: HOME | End: 2022-08-15
Payer: MEDICARE

## 2022-08-15 ENCOUNTER — OFFICE VISIT (OUTPATIENT)
Dept: CARDIOLOGY CLINIC | Age: 87
End: 2022-08-15
Payer: MEDICARE

## 2022-08-15 ENCOUNTER — HOSPITAL ENCOUNTER (OUTPATIENT)
Dept: GENERAL RADIOLOGY | Age: 87
Discharge: HOME | End: 2022-08-17
Payer: MEDICARE

## 2022-08-15 VITALS
WEIGHT: 170.5 LBS | RESPIRATION RATE: 18 BRPM | HEIGHT: 68 IN | BODY MASS INDEX: 25.84 KG/M2 | HEART RATE: 61 BPM | DIASTOLIC BLOOD PRESSURE: 72 MMHG | SYSTOLIC BLOOD PRESSURE: 134 MMHG

## 2022-08-15 DIAGNOSIS — R06.09 DOE (DYSPNEA ON EXERTION): ICD-10-CM

## 2022-08-15 DIAGNOSIS — I25.10 CORONARY ARTERY DISEASE INVOLVING NATIVE CORONARY ARTERY OF NATIVE HEART WITHOUT ANGINA PECTORIS: Primary | ICD-10-CM

## 2022-08-15 LAB
ALBUMIN SERPL-MCNC: 4.6 G/DL (ref 3.5–5.2)
ALP BLD-CCNC: 164 U/L (ref 40–129)
ALT SERPL-CCNC: 25 U/L (ref 0–40)
ANION GAP SERPL CALCULATED.3IONS-SCNC: 12 MMOL/L (ref 7–16)
AST SERPL-CCNC: 31 U/L (ref 0–39)
BASOPHILS ABSOLUTE: 0.04 E9/L (ref 0–0.2)
BASOPHILS RELATIVE PERCENT: 0.5 % (ref 0–2)
BILIRUB SERPL-MCNC: 0.5 MG/DL (ref 0–1.2)
BILIRUBIN URINE: NEGATIVE
BLOOD, URINE: NEGATIVE
BUN BLDV-MCNC: 25 MG/DL (ref 6–23)
CALCIUM SERPL-MCNC: 9.4 MG/DL (ref 8.6–10.2)
CHLORIDE BLD-SCNC: 94 MMOL/L (ref 98–107)
CLARITY: CLEAR
CO2: 28 MMOL/L (ref 22–29)
COLOR: YELLOW
CREAT SERPL-MCNC: 1.2 MG/DL (ref 0.7–1.2)
EOSINOPHILS ABSOLUTE: 0.11 E9/L (ref 0.05–0.5)
EOSINOPHILS RELATIVE PERCENT: 1.3 % (ref 0–6)
GFR AFRICAN AMERICAN: >60
GFR NON-AFRICAN AMERICAN: 58 ML/MIN/1.73
GLUCOSE BLD-MCNC: 96 MG/DL (ref 74–99)
GLUCOSE URINE: NEGATIVE MG/DL
HCT VFR BLD CALC: 40.1 % (ref 37–54)
HEMOGLOBIN: 13.2 G/DL (ref 12.5–16.5)
IMMATURE GRANULOCYTES #: 0.03 E9/L
IMMATURE GRANULOCYTES %: 0.4 % (ref 0–5)
KETONES, URINE: NEGATIVE MG/DL
LEUKOCYTE ESTERASE, URINE: NEGATIVE
LYMPHOCYTES ABSOLUTE: 1.01 E9/L (ref 1.5–4)
LYMPHOCYTES RELATIVE PERCENT: 11.8 % (ref 20–42)
MCH RBC QN AUTO: 30.3 PG (ref 26–35)
MCHC RBC AUTO-ENTMCNC: 32.9 % (ref 32–34.5)
MCV RBC AUTO: 92.2 FL (ref 80–99.9)
MONOCYTES ABSOLUTE: 0.76 E9/L (ref 0.1–0.95)
MONOCYTES RELATIVE PERCENT: 8.9 % (ref 2–12)
NEUTROPHILS ABSOLUTE: 6.61 E9/L (ref 1.8–7.3)
NEUTROPHILS RELATIVE PERCENT: 77.1 % (ref 43–80)
NITRITE, URINE: NEGATIVE
PDW BLD-RTO: 15 FL (ref 11.5–15)
PH UA: 6.5 (ref 5–9)
PLATELET # BLD: 272 E9/L (ref 130–450)
PMV BLD AUTO: 10.1 FL (ref 7–12)
POTASSIUM SERPL-SCNC: 4.8 MMOL/L (ref 3.5–5)
PRO-BNP: 493 PG/ML (ref 0–450)
PROTEIN UA: NEGATIVE MG/DL
RBC # BLD: 4.35 E12/L (ref 3.8–5.8)
SODIUM BLD-SCNC: 134 MMOL/L (ref 132–146)
SPECIFIC GRAVITY UA: 1.01 (ref 1–1.03)
TOTAL PROTEIN: 7.3 G/DL (ref 6.4–8.3)
UROBILINOGEN, URINE: 0.2 E.U./DL
WBC # BLD: 8.6 E9/L (ref 4.5–11.5)

## 2022-08-15 PROCEDURE — 85025 COMPLETE CBC W/AUTO DIFF WBC: CPT

## 2022-08-15 PROCEDURE — 99214 OFFICE O/P EST MOD 30 MIN: CPT | Performed by: INTERNAL MEDICINE

## 2022-08-15 PROCEDURE — 83880 ASSAY OF NATRIURETIC PEPTIDE: CPT

## 2022-08-15 PROCEDURE — 1036F TOBACCO NON-USER: CPT | Performed by: INTERNAL MEDICINE

## 2022-08-15 PROCEDURE — 93000 ELECTROCARDIOGRAM COMPLETE: CPT | Performed by: INTERNAL MEDICINE

## 2022-08-15 PROCEDURE — G8427 DOCREV CUR MEDS BY ELIG CLIN: HCPCS | Performed by: INTERNAL MEDICINE

## 2022-08-15 PROCEDURE — 80053 COMPREHEN METABOLIC PANEL: CPT

## 2022-08-15 PROCEDURE — 71046 X-RAY EXAM CHEST 2 VIEWS: CPT

## 2022-08-15 PROCEDURE — 81003 URINALYSIS AUTO W/O SCOPE: CPT

## 2022-08-15 PROCEDURE — G8417 CALC BMI ABV UP PARAM F/U: HCPCS | Performed by: INTERNAL MEDICINE

## 2022-08-15 PROCEDURE — 1123F ACP DISCUSS/DSCN MKR DOCD: CPT | Performed by: INTERNAL MEDICINE

## 2022-08-15 PROCEDURE — 36415 COLL VENOUS BLD VENIPUNCTURE: CPT

## 2022-08-15 NOTE — PROGRESS NOTES
CHIEF COMPLAINT: TIA/CAD-CABG/PAF/ICD/GIB/Anemia    HISTORY OF PRESENT ILLNESS: Patient is a 82 y.o. male seen at the request of Jhonatan Fleming MD.      Notes increased TORRES. No CP. In sinus. PMH:   MI, 2003. Cardiac catheterization: 85% ostial, proximal and mid LAD narrowings. LMC 70% stenosis. D1 occluded. D2 50% stenosis. OM1 80% ostial stenosis. Mid CX occluded. Dominant RCA severe disease. LVEF 40-45%. CABG, 05/23/2003, [place], [place] with LIMA-LAD, SVG-RI, SVG-OM. Hyperlipidemia. Mild carotid plaque on US, 2003. Echo, 07/2006. Mild LVE, normal EF, Stage II diastolic dysfunction, moderate AS, mild MR, mild TR. Right leg vein stripping, 1970's. No history diabetes mellitus, stroke or COPD. Nonsmoker for many years. VT causing cardiac arrest after stress test, 03/15/2007. He was successfully cardioverted. Cardiac catheterization, 03/16/2007 with normal LVEF, severe native three vessel coronary disease. SVG-RI, SVG-OM both occluded. LIMA-LAD patent. Re-do CABG, [facility], 03/2007 with radial artery graft to D2 and OM2. Elective PCI with CONNOR native OM2 and native LAD, 03/2007 following CABG. This was done because of inadequate conduits. ICD placement, [facility], 03/2007. ICD lead recall, early 2008, but he was followed electively because his device was functioning normally. Presentation [facility], 03/29/2008 with multiple ICD inappropriate shocks. Transfer [facility] where ICD and lead were replaced. He reports cardiac catheterization that revealed patent LIMA-LAD and patent stents in native coronary arteries. Atypical chest pain and anxiety with admission [facility], 05/2008. Troponin minimally elevated. Beta blocker dose [facility] with lightheadedness, orthostatic hypotension, drop in hemoglobin to 10.5 from 14.9 over two months with an increase in BUN from 16 to 68 over the same time. Dark heme positive stools noted. EKG NSR, incomplete RBBB.    Echo, 06/15/2009 with normal LVEF, moderate AS, peak gradient 38 mmHg, BLOSSOM 1.1 cm², moderate MR, LAE. Transtelephonic ICD check, 01/11/2010. No ventricular tachyarrhythmias. Two AF episodes, the longest for 14 hours. Echo, 06/16/2010 with LVE, borderline LVH, normal systolic and diastolic function, normal LA, ICD electrode right heart. Trileaflet AV with moderate to severe AS, mean/peak gradient 20/31 mmHg. BLOSSOM 1.0 cm². MAC with mild MR, normal RVSP. No drug allergies. Family history negative for premature vascular disease. PAF with DCCV, [facility], 12/2010. Hip replacement surgery, 2010 or 2011 [facility] Dr. Mitch Ceron. [facility], 06/05/2012. Moderate sized fixed basal inferior defect, probably artifact. [facility], 10/20/2011. 1+ AR, moderate AS, mild MR, mild TR, normal LVEF. Echo, 01/31/2013. LV not dilated. Mild concentric LVH. Septal paradox. EF 50-55%. BLOSSOM 1.2 cm² consistent with moderate stenosis. Peak/mean gradient 38/21. Stage II diastolic dysfunction. [address] admission, 08/28/2013 with dizziness, Hb 7.7, WBC 19.0. CXR negative except cardiomegaly. EKG NSR, leftward axis, RBBB. BMP negative except for elevation in BUN to 55 with Cr 1.3. EGD, 08/28/2013. Large pyloric channel ulcer with clot treated with PRBCs and fresh frozen plasma. Hb 8.7 on day of discharge and stable. Pradaxa was temporarily held. CT abdomen, 09/08/2014. 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 CT chest, 09/17/2014. Consolidation and infiltrate at the left lung base, stable when compared to previous exams with less pleural thickening and calcified plaque in the left pleural cavity without any recent change. Chronic obstructive airways disease.  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 ICD programmed to DDDR mode by Dr. Olivia Jacobsen, 03/26/2015. Device function normal.  Echo 10/16/2015. EF 39%. Stage II diastolic dysfunction. Aortic stenosis with peak/mean gradients of 48/24 mmHg. Estimated valve area 1.0 cm². ICD generator change for battery depletion, 07/28/2016, Dr. Eri Simpson. Olivia Jacobsen. Echo, 02/03/2017. Normal LV size with mild LVH. Normal regional wall motion and overall systolic function. Diastole could not be assessed, but was presumed to be impaired. The RV was dilated with impaired global systolic function. The LA was enlarged. Mild MAC with mild mitral insufficiency. Moderate tricuspid insufficiency. Aortic valve gradients are peak 47 mmHg with a mean of 27. Dimensionless ratio 0.21. Valve area calculated 0.8 cm². 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 Echocardiogram, [facility], 4/19/2018, EF 60%. Low normal RV function. Stage 2 diastolic dysfunction. Mild-to-moderate MR. S/P TAVR with a mean gradient of 10 mmHg. RVSP 31 mg.     S/P Cardioversion by Dr. Oriana Serrano, 05/30/2017. Hospital evaluation, 09/07/2018, for 2 appropriate ICD discharges for polymorphic ventricular tachycardia, which occurred after yard work and moving heavy furniture.        Past Medical History:   Diagnosis Date    A-fib [facility])     Arrhythmia     Carotid artery stenosis     CHF (congestive heart failure) ([facility])     Heart valve problem     Hyperlipidemia     Hypertension     ICD (implantable cardiac defibrillator) in place 2007    Aly Mcintosh DR #[id number] generator changed 7/28/16  Dr Olivia Jacobsen    MI (mitral incompetence) 2003    MI (myocardial infarction) [facility]) 2003       Patient Active Problem List   Diagnosis    Chronic combined systolic and diastolic CHF (congestive heart failure) ([facility])    S/P TAVR (transcatheter aortic valve replacement)    Chronic atrial fibrillation    Coronary artery disease involving native coronary artery of native heart without angina pectoris    Essential hypertension (NORVASC) 2.5 MG tablet Take 1 tablet by mouth daily (Patient taking differently: Take 2.5 mg by mouth nightly) 90 tablet 3    pantoprazole (PROTONIX) 40 MG tablet TAKE 1 TABLET BY MOUTH  DAILY 90 tablet 3    ferrous sulfate (IRON 325) 325 (65 Fe) MG tablet Take 1 tablet by mouth daily (with breakfast) 90 tablet 1    MAGNESIUM-OXIDE 400 (240 Mg) MG tablet Take 400 mg by mouth daily       Multiple Vitamins-Minerals (PRESERVISION AREDS 2) CAPS Take 1 capsule by mouth 2 times daily       Cholecalciferol (VITAMIN D) 2000 UNITS CAPS capsule Take 2,000 Units by mouth daily        No current facility-administered medications for this visit. Social History     Socioeconomic History    Marital status:      Spouse name: Darby Espinoza    Number of children: 2    Years of education: 12     Highest education level: Associate degree: academic program   Occupational History    Not on file   Tobacco Use    Smoking status: Former     Packs/day: 0.50     Years: 3.00     Pack years: 1.50     Types: Cigarettes     Quit date: 1973     Years since quittin.5    Smokeless tobacco: Never    Tobacco comments:     Quit smoking in    Vaping Use    Vaping Use: Never used   Substance and Sexual Activity    Alcohol use: No    Drug use: No    Sexual activity: Not on file   Other Topics Concern    Not on file   Social History Narrative    1 cup coffee daily; occ pop     Social Determinants of Health     Financial Resource Strain: Low Risk     Difficulty of Paying Living Expenses: Not hard at all   Food Insecurity: No Food Insecurity    Worried About Running Out of Food in the Last Year: Never true    Ran Out of Food in the Last Year: Never true   Transportation Needs: No Transportation Needs    Lack of Transportation (Medical): No    Lack of Transportation (Non-Medical):  No   Physical Activity: Inactive    Days of Exercise per Week: 0 days    Minutes of Exercise per Session: 0 min   Stress: No Stress Concern Present Feeling of Stress : Not at all   Social Connections: Moderately Isolated    Frequency of Communication with Friends and Family: Three times a week    Frequency of Social Gatherings with Friends and Family: Once a week    Attends Yarsanism Services: Never    Active Member of Clubs or Organizations: No    Attends Club or Organization Meetings: Never    Marital Status:    Intimate Partner Violence: Not on file   Housing Stability: Not on file       No family history on file. Review of Systems:  Heart: as above   Lungs: as above   Eyes: denies changes in vision or discharge. Ears: denies changes in hearing or pain. Nose: denies epistaxis or masses   Throat: denies sore throat or trouble swallowing. Neuro: denies numbness, tingling, tremors. Skin: denies rashes or itching. : denies hematuria, dysuria   GI: denies vomiting, diarrhea   Psych: denies mood changed, anxiety, depression. All other systems negative. Physical Exam   /72   Pulse 61   Resp 18   Ht 5' 8\" (1.727 m)   Wt 170 lb 8 oz (77.3 kg)   BMI 25.92 kg/m²   Constitutional: Oriented to person, place, and time. Well-developed and well-nourished. No distress. Head: Normocephalic and atraumatic. Eyes: EOM are normal. Pupils are equal, round, and reactive to light. Neck: Normal range of motion. Neck supple. No hepatojugular reflux and no JVD present. Carotid bruit is not present. No tracheal deviation present. No thyromegaly present. Cardiovascular: Normal rate, regular rhythm, normal heart sounds and intact distal pulses. Exam reveals no gallop and no friction rub. No murmur heard. Pulmonary/Chest: Effort normal and breath sounds normal. No respiratory distress. No wheezes. No rales. No tenderness. Abdominal: Soft. Bowel sounds are normal. No distension and no mass. No tenderness. No rebound and no guarding. Musculoskeletal: Normal range of motion. No edema and no tenderness.    Lymphadenopathy:   No cervical adenopathy. No groin adenopathy. Neurological: Alert and oriented to person, place, and time. Skin: Skin is warm and dry. No rash noted. Not diaphoretic. No erythema. Psychiatric: Normal mood and affect. Behavior is normal.     EKG:  normal sinus rhythm, A paced, V sensed. Echo Summary 8/16/19:   Ejection fraction is visually estimated at 60%. The inferior basal wall is hypokinetic. No regional wall motion abnormalities seen. Normal right ventricle structure and function. There is doppler evidence of stage II diastolic dysfunction. Left atrial volume index of 39 ml per meters squared BSA. Hx of TAVR with 26 mm S3 3/29/17. The aortic valve area is 1.6 cm2 with a maximum gradient of 15 mmHg and a mean gradient of 7 mmHg. Mild aortic regurgitation is noted. Mild mitral regurgitation is present. Mild tricuspid regurgitation. Agitated saline injected for shunt evaluation. No evidence of patent foramen ovale. No evidence of atrial septal defect. Echo Summary 10/30/2020:   LVEF is 65%. Left ventricle is normal in size . Mild asymmetric septal hypertrophy. No regional wall motion abnormalities seen. Normal left ventricular ejection fraction. The left atrium is severely dilated. Mild mitral annular calcification. Mild mitral regurgitation is present. Normally functioning bioprosthetic valve in aortic position. Physiologic and/or trace tricuspid regurgitation. Echo Summary 3/2/2022:   Normal left ventricular systolic function. Ejection fraction is visually estimated at 60%. Mildly dilated right ventricle with normal right ventricular function. There is doppler evidence of stage II diastolic dysfunction. Moderately dilated left atrium by volume index. Mild mitral regurgitation. 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 No significant paravalvular aortic regurgitation. AV peak velocity 2.1 m/s. AV mean gradient 9 mmHg.    AV area 1.5 cm2.   Mild tricuspid regurgitation. PASP is estimated at 44 mmHg. ASSESSMENT AND PLAN:  Patient Active Problem List   Diagnosis    Chronic combined systolic and diastolic CHF (congestive heart failure) ([facility])    S/P TAVR (transcatheter aortic valve replacement)    Chronic atrial fibrillation    Coronary artery disease involving native coronary artery of native heart without angina pectoris    Essential hypertension    High risk medications (not anticoagulants) long-term use    Paroxysmal atrial fibrillation (HCC)    Hyperlipidemia LDL goal <100    GIB (gastrointestinal bleeding)    ICD (implantable cardioverter-defibrillator), dual, in situ    Ventricular tachycardia (HCC)    Ischemic heart disease    Vitamin D deficiency    Other insomnia    SOB (shortness of breath)    Lung nodule    Acute on chronic diastolic (congestive) heart failure ([address]    Pure hypercholesterolemia    Stage 3b chronic kidney disease ([facility]    Transient cerebral ischemia    Aortic valve disease    Near syncope    Acute on chronic combined systolic and diastolic CHF (congestive heart failure) (HCC)    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)    Gastroesophageal reflux disease without esophagitis    Coronary artery disease involving coronary bypass graft    Symptomatic anemia    GI bleed    Abdominal aortic aneurysm (AAA) 3.0 cm to 5.5 cm in diameter in male [facility])    ICD (implantable cardioverter-defibrillator) discharge     1. ICD: Per EP. 2. PAF: In sinus. Post DCCV 5/12/2022. Xarelto/tikosyn. 3. CAD-CABG: Stable symptoms. BB/statin. Not on ASA to this point due to Xarelto. 4. TAVR: Stable echo 3/2/2022.     5. TIA: Echo no PFO. Xarelto/statin. 6. Anemia: Follow labs. 7. GIB: Follows with GI.     8. TORRES: Labs. 9. Dysuria: UA.    Belle Cuellar D.O.   Cardiologist  Cardiology, [address] [facility]

## 2022-08-16 ENCOUNTER — HOSPITAL ENCOUNTER (OUTPATIENT)
Dept: OTHER | Age: 87
Setting detail: THERAPIES SERIES
Discharge: HOME | End: 2022-08-16
Payer: MEDICARE

## 2022-08-16 ENCOUNTER — TELEPHONE (OUTPATIENT)
Dept: CARDIOLOGY CLINIC | Age: 87
End: 2022-08-16

## 2022-08-16 NOTE — TELEPHONE ENCOUNTER
----- Message from Fran Carrillo DO sent at 8/16/2022  1:16 PM EDT -----  Please notify patient that their lab results are okay.

## 2022-08-23 ENCOUNTER — HOSPITAL ENCOUNTER (EMERGENCY)
Age: 87
Discharge: HOME | End: 2022-08-23
Attending: EMERGENCY MEDICINE
Payer: MEDICARE

## 2022-08-23 ENCOUNTER — APPOINTMENT (OUTPATIENT)
Dept: GENERAL RADIOLOGY | Age: 87
End: 2022-08-23
Payer: MEDICARE

## 2022-08-23 VITALS
HEART RATE: 61 BPM | SYSTOLIC BLOOD PRESSURE: 153 MMHG | DIASTOLIC BLOOD PRESSURE: 78 MMHG | RESPIRATION RATE: 18 BRPM | OXYGEN SATURATION: 95 % | TEMPERATURE: 98.2 F | WEIGHT: 168 LBS | BODY MASS INDEX: 25.46 KG/M2 | HEIGHT: 68 IN

## 2022-08-23 DIAGNOSIS — R07.9 CHEST PAIN, UNSPECIFIED TYPE: Primary | ICD-10-CM

## 2022-08-23 LAB
ALBUMIN SERPL-MCNC: 3.1 G/DL (ref 3.5–5.2)
ALP BLD-CCNC: 108 U/L (ref 40–129)
ALT SERPL-CCNC: 16 U/L (ref 0–40)
ANION GAP SERPL CALCULATED.3IONS-SCNC: 8 MMOL/L (ref 7–16)
AST SERPL-CCNC: 26 U/L (ref 0–39)
BASOPHILS ABSOLUTE: 0.04 E9/L (ref 0–0.2)
BASOPHILS RELATIVE PERCENT: 0.5 % (ref 0–2)
BILIRUB SERPL-MCNC: 0.3 MG/DL (ref 0–1.2)
BUN BLDV-MCNC: 19 MG/DL (ref 6–23)
CALCIUM SERPL-MCNC: 6.6 MG/DL (ref 8.6–10.2)
CHLORIDE BLD-SCNC: 113 MMOL/L (ref 98–107)
CO2: 23 MMOL/L (ref 22–29)
CREAT SERPL-MCNC: 0.9 MG/DL (ref 0.7–1.2)
EKG ATRIAL RATE: 36 BPM
EKG P-R INTERVAL: 354 MS
EKG Q-T INTERVAL: 478 MS
EKG QRS DURATION: 132 MS
EKG QTC CALCULATION (BAZETT): 478 MS
EKG R AXIS: -66 DEGREES
EKG T AXIS: 1 DEGREES
EKG VENTRICULAR RATE: 60 BPM
EOSINOPHILS ABSOLUTE: 0.15 E9/L (ref 0.05–0.5)
EOSINOPHILS RELATIVE PERCENT: 1.9 % (ref 0–6)
GFR AFRICAN AMERICAN: >60
GFR NON-AFRICAN AMERICAN: >60 ML/MIN/1.73
GLUCOSE BLD-MCNC: 75 MG/DL (ref 74–99)
HCT VFR BLD CALC: 34.5 % (ref 37–54)
HEMOGLOBIN: 11.6 G/DL (ref 12.5–16.5)
IMMATURE GRANULOCYTES #: 0.02 E9/L
IMMATURE GRANULOCYTES %: 0.3 % (ref 0–5)
LYMPHOCYTES ABSOLUTE: 1.37 E9/L (ref 1.5–4)
LYMPHOCYTES RELATIVE PERCENT: 17.2 % (ref 20–42)
MCH RBC QN AUTO: 30.4 PG (ref 26–35)
MCHC RBC AUTO-ENTMCNC: 33.6 % (ref 32–34.5)
MCV RBC AUTO: 90.6 FL (ref 80–99.9)
MONOCYTES ABSOLUTE: 0.67 E9/L (ref 0.1–0.95)
MONOCYTES RELATIVE PERCENT: 8.4 % (ref 2–12)
NEUTROPHILS ABSOLUTE: 5.71 E9/L (ref 1.8–7.3)
NEUTROPHILS RELATIVE PERCENT: 71.7 % (ref 43–80)
PDW BLD-RTO: 14.7 FL (ref 11.5–15)
PLATELET # BLD: 243 E9/L (ref 130–450)
PMV BLD AUTO: 10.2 FL (ref 7–12)
POTASSIUM REFLEX MAGNESIUM: 3.8 MMOL/L (ref 3.5–5)
RBC # BLD: 3.81 E12/L (ref 3.8–5.8)
SODIUM BLD-SCNC: 144 MMOL/L (ref 132–146)
TOTAL PROTEIN: 4.8 G/DL (ref 6.4–8.3)
TROPONIN, HIGH SENSITIVITY: 18 NG/L (ref 0–11)
TROPONIN, HIGH SENSITIVITY: 21 NG/L (ref 0–11)
TROPONIN, HIGH SENSITIVITY: 22 NG/L (ref 0–11)
WBC # BLD: 8 E9/L (ref 4.5–11.5)

## 2022-08-23 PROCEDURE — 80053 COMPREHEN METABOLIC PANEL: CPT

## 2022-08-23 PROCEDURE — 93005 ELECTROCARDIOGRAM TRACING: CPT | Performed by: STUDENT IN AN ORGANIZED HEALTH CARE EDUCATION/TRAINING PROGRAM

## 2022-08-23 PROCEDURE — 84484 ASSAY OF TROPONIN QUANT: CPT

## 2022-08-23 PROCEDURE — 99285 EMERGENCY DEPT VISIT HI MDM: CPT

## 2022-08-23 PROCEDURE — 71045 X-RAY EXAM CHEST 1 VIEW: CPT

## 2022-08-23 PROCEDURE — 85025 COMPLETE CBC W/AUTO DIFF WBC: CPT

## 2022-08-23 ASSESSMENT — ENCOUNTER SYMPTOMS
EYE DISCHARGE: 0
EYE REDNESS: 0
ABDOMINAL PAIN: 0
WHEEZING: 0
DIARRHEA: 0
NAUSEA: 0
SINUS PRESSURE: 0
COUGH: 0
SORE THROAT: 0
EYE PAIN: 0
SHORTNESS OF BREATH: 0
BACK PAIN: 0
VOMITING: 0

## 2022-08-23 ASSESSMENT — PAIN DESCRIPTION - LOCATION: LOCATION: CHEST

## 2022-08-23 ASSESSMENT — PAIN DESCRIPTION - FREQUENCY: FREQUENCY: CONTINUOUS

## 2022-08-23 ASSESSMENT — PAIN DESCRIPTION - DESCRIPTORS: DESCRIPTORS: TIGHTNESS

## 2022-08-23 ASSESSMENT — PAIN DESCRIPTION - PAIN TYPE: TYPE: ACUTE PAIN

## 2022-08-23 ASSESSMENT — PAIN - FUNCTIONAL ASSESSMENT: PAIN_FUNCTIONAL_ASSESSMENT: 0-10

## 2022-08-23 ASSESSMENT — PAIN SCALES - GENERAL: PAINLEVEL_OUTOF10: 4

## 2022-08-23 NOTE — ED PROVIDER NOTES
Department of Emergency Medicine   ED  Provider Note  Admit Date/RoomTime: 8/23/2022  8:42 AM  ED Room: DONA/DONA          History of Present Illness:  8/23/22, Time: 8:57 AM EDT  Chief Complaint   Patient presents with    Chest Pain     Diffuse tightness across chest.  Noticed it this morning when he was \"just sitting there\". EMS gave Aspirin 324mg and 1 Nitro SL            Maria Eugenia Rodriguez is a 82 y.o. male presenting to the ED for chest pain, beginning last night. The complaint has been intermittent, moderate in severity, and worsened by nothing. Patient states that the pain feels as like pressure in the center of his chest.  It does not radiate anywhere. He states that he has had this previously was related to his anxiety. Patient states that he recently started taking Lexapro. He does endorse some anxiety. He otherwise denies any other accompanying symptoms including but not limited to fevers, shortness of breath, nausea, vomiting, abdominal pain. Review of Systems   Constitutional:  Negative for chills and fever. HENT:  Negative for ear pain, sinus pressure and sore throat. Eyes:  Negative for pain, discharge and redness. Respiratory:  Negative for cough, shortness of breath and wheezing. Cardiovascular:  Positive for chest pain. Gastrointestinal:  Negative for abdominal pain, diarrhea, nausea and vomiting. Genitourinary:  Negative for dysuria and frequency. Musculoskeletal:  Negative for arthralgias and back pain. Skin:  Negative for rash and wound. Neurological:  Negative for weakness and headaches. Hematological:  Negative for adenopathy. Psychiatric/Behavioral:  The patient is nervous/anxious.     All other systems reviewed and are negative.       --------------------------------------------- PAST HISTORY ---------------------------------------------  Past Medical History:  has a past medical history of A-fib [facility]), Arrhythmia, Carotid artery stenosis, [facility]est[facility] Heart valve problem, Hyperlipidemia, Hypertension, ICD (implantable cardiac defibrillator) in place, MI (mitral incompetence), and MI (myocardial infarction) ([facility] 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 2008); Cardiac defibrillator placement (07/28/2016); Cardiac catheterization (Right, 03/03/2017); other surgical history (03/29/2017); Upper gastrointestinal endoscopy (N/A, 09/03/2019); Upper gastrointestinal endoscopy (N/A, 02/18/2020); Colonoscopy (N/A, 02/24/2020); Cardioversion (10/29/2020); Cardiac surgery (N/A, 09/04/2021); Upper gastrointestinal endoscopy (N/A, 03/14/2022); Upper gastrointestinal endoscopy (N/A, 03/16/2022); Colonoscopy (N/A, 05/04/2022); and Cardioversion (05/12/2022). Social History:  reports that he quit smoking about 49 years ago. His smoking use included cigarettes. He has a 1.50 pack-year smoking history. He has never used smokeless tobacco. He reports that he does not drink alcohol and does not use drugs. Family History: family history is not on file. . Unless otherwise noted, family history is non contributory    The patients home medications have been reviewed. Allergies: Patient has no known allergies. ---------------------------------------------------PHYSICAL EXAM--------------------------------------    Physical Exam  Vitals and nursing note reviewed. Constitutional:       Appearance: He is well-developed. HENT:      Head: Normocephalic and atraumatic. Eyes:      Conjunctiva/sclera: Conjunctivae normal.   Cardiovascular:      Rate and Rhythm: Normal rate and regular rhythm. Pulses: Normal pulses. Heart sounds: Normal heart sounds.  No murmur heard.  Pulmonary:      Effort: Pulmonary effort is normal. No respiratory distress. Breath sounds: Normal breath sounds. No wheezing or rales. Abdominal:      General: Bowel sounds are normal.      Palpations: Abdomen is soft. Tenderness: There is no abdominal tenderness. There is no guarding or rebound. Musculoskeletal:         General: No tenderness or deformity. Cervical back: Normal range of motion and neck supple. Right lower leg: No edema. Left lower leg: No edema. Skin:     General: Skin is warm and dry. Neurological:      Mental Status: He is alert and oriented to person, place, and time. Cranial Nerves: No cranial nerve deficit. Coordination: Coordination normal.          -------------------------------------------------- RESULTS -------------------------------------------------  I have personally reviewed all laboratory and imaging results for this patient. Results are listed below.      LABS: (Lab results interpreted by me)  Results for orders placed or performed during the hospital encounter of 08/23/22   CBC with Auto Differential   Result Value Ref Range    WBC 8.0 4.5 - 11.5 E9/L    RBC 3.81 3.80 - 5.80 E12/L    Hemoglobin 11.6 (L) 12.5 - 16.5 g/dL    Hematocrit 34.5 (L) 37.0 - 54.0 %    MCV 90.6 80.0 - 99.9 fL    MCH 30.4 26.0 - 35.0 pg    MCHC 33.6 32.0 - 34.5 %    RDW 14.7 11.5 - 15.0 fL    Platelets 905 479 - 211 E9/L    MPV 10.2 7.0 - 12.0 fL    Neutrophils % 71.7 43.0 - 80.0 %    Immature Granulocytes % 0.3 0.0 - 5.0 %    Lymphocytes % 17.2 (L) 20.0 - 42.0 %    Monocytes % 8.4 2.0 - 12.0 %    Eosinophils % 1.9 0.0 - 6.0 %    Basophils % 0.5 0.0 - 2.0 %    Neutrophils Absolute 5.71 1.80 - 7.30 E9/L    Immature Granulocytes # 0.02 E9/L    Lymphocytes Absolute 1.37 (L) 1.50 - 4.00 E9/L    Monocytes Absolute 0.67 0.10 - 0.95 E9/L    Eosinophils Absolute 0.15 0.05 - 0.50 E9/L    Basophils Absolute 0.04 0.00 - 0.20 E9/L   Comprehensive Metabolic Panel w/ Reflex to MG   Result Value Ref Range    Sodium 144 132 - 146 mmol/L    Potassium reflex Magnesium 3.8 3.5 - 5.0 mmol/L    Chloride 113 (H) 98 - 107 mmol/L    CO2 23 22 - 29 mmol/L    Anion Gap 8 7 - 16 mmol/L    Glucose 75 74 - 99 mg/dL    BUN 19 6 - 23 mg/dL    Creatinine 0.9 0.7 - 1.2 mg/dL    GFR Non-African American >60 >=60 mL/min/1.73    GFR African American >60     Calcium 6.6 (L) 8.6 - 10.2 mg/dL    Total Protein 4.8 (L) 6.4 - 8.3 g/dL    Albumin 3.1 (L) 3.5 - 5.2 g/dL    Total Bilirubin 0.3 0.0 - 1.2 mg/dL    Alkaline Phosphatase 108 40 - 129 U/L    ALT 16 0 - 40 U/L    AST 26 0 - 39 U/L   Troponin   Result Value Ref Range    Troponin, High Sensitivity 18 (H) 0 - 11 ng/L   Troponin   Result Value Ref Range    Troponin, High Sensitivity 22 (H) 0 - 11 ng/L   Troponin   Result Value Ref Range    Troponin, High Sensitivity 21 (H) 0 - 11 ng/L   EKG 12 Lead   Result Value Ref Range    Ventricular Rate 60 BPM    Atrial Rate 36 BPM    P-R Interval 354 ms    QRS Duration 132 ms    Q-T Interval 478 ms    QTc Calculation (Bazett) 478 ms    R Axis -66 degrees    T Axis 1 degrees   ,       RADIOLOGY:  Interpreted by Radiologist unless otherwise specified  XR CHEST PORTABLE   Final Result   Stable pleural based right lung nodule and left-sided calcified pleural   plaques suggesting prior asbestos exposure.                          ------------------------- NURSING NOTES AND VITALS REVIEWED ---------------------------   The nursing notes within the ED encounter and vital signs as below have been reviewed by myself  BP (!) 153/78   Pulse 61   Temp 98.2 °F (36.8 °C) (Oral)   Resp 18   Ht 5' 8\" (1.727 m)   Wt 168 lb (76.2 kg)   SpO2 95%   BMI 25.54 kg/m²     Oxygen Saturation Interpretation: Normal      The patients available past medical records and past encounters were reviewed.         ------------------------------ ED COURSE/MEDICAL DECISION MAKING----------------------  Medications - No data to display Medical Decision Making:     Patient presents with chest pain. EKG did not meet STEMI criteria. Troponins with non significant delta. Low concern for cardiac etiology. Patient is on xarelto. Low concern for PE. CBC showed baseline anemia. CMP largely unremarkable CXR without acute cardiopulmonary processes. Pacemaker was interrogated and did not show any significant events. Patient's vitals are stable. Patient will be discharged with instructions to follow with his PCP and cardiologist for further evaluation and care. ED Course as of 08/23/22 1616   Tue Aug 23, 2022   0913 EKG shows a paced rhythm at a ventricular rate of 60 bpm.  There is prolonged AV conduction. Does not meet STEMI criteria. Comparable to previous EKG on 7/7/2022. As interpreted by myself the ED physician. [BB]   [phone] with Russ Marte who states the patient did have an episode of VT on 08/10 and was appropriately paced out of it. Otherwise without any incidents. [BB]      ED Course User Index  [BB] Wiley Oconnor DO        Re-Evaluations:  Patient was reevaluted and continued to be stable. This patient's ED course included: a personal history and physicial examination, multiple bedside re-evaluations, IV medications, and cardiac monitoring    This patient has remained hemodynamically stable during their ED course. Consultations:  None      Counseling: The emergency provider has spoken with the patient and discussed todays results, in addition to providing specific details for the plan of care and counseling regarding the diagnosis and prognosis. Questions are answered at this time and they are agreeable with the plan.       --------------------------------- IMPRESSION AND DISPOSITION ---------------------------------    IMPRESSION  1.  Chest pain, unspecified type        DISPOSITION  Disposition: Discharge to home  Patient condition is stable    Patient was seen and evaluated by both myself and Krystal, MD.      NOTE: This report was transcribed using voice recognition software.  Every effort was made to ensure accuracy; however, inadvertent computerized transcription errors may be present           Benito Quach DO  Resident  08/23/22 [id number]

## 2022-08-23 NOTE — ED NOTES
Pt placed on cardiac monitor, BP cuff, and continuous pulse ox.       Rich Model, CHADD  08/23/22 [id number]

## 2022-08-23 NOTE — ED NOTES
Discharge instructions reviewed with pt and pt's daughter. Both verbalized understanding. Pt discharged home in stable condition.       Clarence Cook RN  08/23/22 [id number]

## 2022-08-23 NOTE — ED PROVIDER NOTES
HPI, all other systems reviewed and are negative.    --------------------------------------------- PAST HISTORY ---------------------------------------------  Past Medical History:  has a past medical history of A-fib ([facility] Arrhythmia, Carotid artery stenosis, CHF (congestive heart failure) ([facility] Heart valve problem, Hyperlipidemia, Hypertension, ICD (implantable cardiac defibrillator) in place, MI (mitral incompetence), and MI (myocardial infarction) ([facility] 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 2008); Cardiac defibrillator placement (07/28/2016); Cardiac catheterization (Right, 03/03/2017); other surgical history (03/29/2017); Upper gastrointestinal endoscopy (N/A, 09/03/2019); Upper gastrointestinal endoscopy (N/A, 02/18/2020); Colonoscopy (N/A, 02/24/2020); Cardioversion (10/29/2020); Cardiac surgery (N/A, 09/04/2021); Upper gastrointestinal endoscopy (N/A, 03/14/2022); Upper gastrointestinal endoscopy (N/A, 03/16/2022); Colonoscopy (N/A, 05/04/2022); and Cardioversion (05/12/2022). Social History:  reports that he quit smoking about 49 years ago. His smoking use included cigarettes. He has a 1.50 pack-year smoking history. He has never used smokeless tobacco. He reports that he does not drink alcohol and does not use drugs. Family History: family history is not on file. Unless otherwise noted, family history is non contributory    The patients home medications have been reviewed. Allergies: Patient has no known allergies.     EKG Interpretation  Interpreted by emergency department physician, Dr. Caitlyn Oviedo     8/23/22  Time: 4196    Rhythm: paced  Rate: paced  Axis: left  Conduction: nonspecific interventricular conduction block  ST Segments: no acute change  T Waves: no acute change    Clinical Impression: paced rhythm, no acute ischemic changes  Comparison to Old EKG  stable from prior      Physical Exam:  Constitutional/General: Alert and oriented x3  Head: Normocephalic and atraumatic  Eyes: PERRL, EOMI, sclera non icteric  ENT: Oropharynx clear, handling secretions  Neck: Supple, full ROM, no stridor, no meningeal signs  Respiratory: Lungs clear to auscultation bilaterally, no wheezes, rales, or rhonchi. Not in respiratory distress  Cardiovascular:  Regular rate. Regular rhythm. No murmurs, no gallops, no rubs. 2+ distal pulses. Equal extremity pulses. GI:  Abdomen Soft, Non tender, Non distended. No rebound, guarding, or rigidity. No pulsatile masses. Musculoskeletal: Moves all extremities x 4. Warm and well perfused,  no clubbing, no cyanosis, no edema. Palpable peripheral pulses  Integument: skin warm and dry. No rashes. Neurologic: GCS 15, no focal deficits  Psychiatric: Normal Affect      I directly supervised any procedures performed by the resident and was present for the procedure including all critical portions of the procedure      The cardiac monitor revealed sinus ryhtm with a heart rate in the 60s as interpreted by me. The cardiac monitor was ordered secondary to the patient's chest pain and to monitor the patient for dysrhythmia. CPT [id number]      I, Dr. Zoe Gutierrez, am the primary provider of record    My Medical Decision Making:         Non exertional chest pain  EKG without ST elevation  Trop with non significant delta  No exertional symptoms, says this does not feel like chest pain he has had before (prior CAGB and CABG redo, prior stents) says these symptoms are different. Heart score is 3 (age and history for his points)  All sx resolved he would like to go home and follow up as outpatient        1.  Chest pain, unspecified type           Nichole Joaquin MD  08/23/22 2033

## 2022-09-02 ENCOUNTER — TELEPHONE (OUTPATIENT)
Dept: CARDIOLOGY CLINIC | Age: 87
End: 2022-09-02

## 2022-09-02 NOTE — TELEPHONE ENCOUNTER
Patient denies any chest pain, shortness of breath or palpitations since last visit in 8/15/22 . Patient is able to complete all activities of daily living, including; climbing one flight of stairs and walking one block without cardiac symptoms. Please advise.      Clearance Request scanned into chart

## 2022-09-06 ENCOUNTER — HOSPITAL ENCOUNTER (OUTPATIENT)
Dept: OTHER | Age: 87
Setting detail: THERAPIES SERIES
Discharge: HOME | End: 2022-09-06
Payer: MEDICARE

## 2022-09-06 VITALS
DIASTOLIC BLOOD PRESSURE: 68 MMHG | WEIGHT: 175 LBS | HEART RATE: 60 BPM | SYSTOLIC BLOOD PRESSURE: 149 MMHG | BODY MASS INDEX: 26.61 KG/M2 | RESPIRATION RATE: 19 BRPM | OXYGEN SATURATION: 95 %

## 2022-09-06 LAB
ANION GAP SERPL CALCULATED.3IONS-SCNC: 6 MMOL/L (ref 7–16)
BUN BLDV-MCNC: 24 MG/DL (ref 6–23)
CALCIUM SERPL-MCNC: 8.8 MG/DL (ref 8.6–10.2)
CHLORIDE BLD-SCNC: 101 MMOL/L (ref 98–107)
CO2: 30 MMOL/L (ref 22–29)
CREAT SERPL-MCNC: 1.2 MG/DL (ref 0.7–1.2)
GFR AFRICAN AMERICAN: >60
GFR NON-AFRICAN AMERICAN: 58 ML/MIN/1.73
GLUCOSE BLD-MCNC: 95 MG/DL (ref 74–99)
POTASSIUM SERPL-SCNC: 4.6 MMOL/L (ref 3.5–5)
PRO-BNP: 692 PG/ML (ref 0–450)
SODIUM BLD-SCNC: 137 MMOL/L (ref 132–146)

## 2022-09-06 PROCEDURE — 83880 ASSAY OF NATRIURETIC PEPTIDE: CPT

## 2022-09-06 PROCEDURE — 96374 THER/PROPH/DIAG INJ IV PUSH: CPT

## 2022-09-06 PROCEDURE — 80048 BASIC METABOLIC PNL TOTAL CA: CPT

## 2022-09-06 PROCEDURE — 99214 OFFICE O/P EST MOD 30 MIN: CPT

## 2022-09-06 PROCEDURE — 36415 COLL VENOUS BLD VENIPUNCTURE: CPT

## 2022-09-06 PROCEDURE — 6360000002 HC RX W HCPCS

## 2022-09-06 PROCEDURE — 2580000003 HC RX 258

## 2022-09-06 RX ORDER — ALPRAZOLAM 0.25 MG/1
0.25 TABLET ORAL 3 TIMES DAILY PRN
COMMUNITY

## 2022-09-06 RX ORDER — SODIUM CHLORIDE 0.9 % (FLUSH) 0.9 %
SYRINGE (ML) INJECTION
Status: COMPLETED
Start: 2022-09-06 | End: 2022-09-06

## 2022-09-06 RX ORDER — FUROSEMIDE 10 MG/ML
INJECTION INTRAMUSCULAR; INTRAVENOUS
Status: COMPLETED
Start: 2022-09-06 | End: 2022-09-06

## 2022-09-06 RX ORDER — SODIUM CHLORIDE 0.9 % (FLUSH) 0.9 %
10 SYRINGE (ML) INJECTION PRN
Status: DISCONTINUED | OUTPATIENT
Start: 2022-09-06 | End: 2022-09-07 | Stop reason: HOSPADM

## 2022-09-06 RX ORDER — FUROSEMIDE 10 MG/ML
40 INJECTION INTRAMUSCULAR; INTRAVENOUS ONCE
Status: COMPLETED | OUTPATIENT
Start: 2022-09-06 | End: 2022-09-06

## 2022-09-06 RX ADMIN — FUROSEMIDE 40 MG: 10 INJECTION, SOLUTION INTRAMUSCULAR; INTRAVENOUS at 10:25

## 2022-09-06 RX ADMIN — Medication 10 ML: at 10:25

## 2022-09-06 RX ADMIN — FUROSEMIDE 40 MG: 10 INJECTION INTRAMUSCULAR; INTRAVENOUS at 10:25

## 2022-09-06 RX ADMIN — SODIUM CHLORIDE, PRESERVATIVE FREE 10 ML: 5 INJECTION INTRAVENOUS at 10:25

## 2022-09-06 NOTE — PROGRESS NOTES
by mouth Daily with supper   Historical Provider, MD   Multiple Vitamin (MULTIVITAMIN ADULT PO) Take 1 tablet by mouth daily   Historical Provider, MD   amLODIPine (NORVASC) 2.5 MG tablet Take 1 tablet by mouth daily  Patient taking differently: Take 2.5 mg by mouth nightly  Lori Templeton DO   pantoprazole (PROTONIX) 40 MG tablet TAKE 1 TABLET BY MOUTH  DAILY  Tuan Avalos MD   ferrous sulfate (IRON 325) 325 (65 Fe) MG tablet Take 1 tablet by mouth daily (with breakfast)  Tuan Avalos MD   MAGNESIUM-OXIDE 400 (240 Mg) MG tablet Take 400 mg by mouth daily   Historical Provider, MD   Multiple Vitamins-Minerals (PRESERVISION AREDS 2) CAPS Take 1 capsule by mouth 2 times daily   Historical Provider, MD   Cholecalciferol (VITAMIN D) 2000 UNITS CAPS capsule Take 2,000 Units by mouth daily   Historical Provider, MD           Guideline directed medical:  ARNI/ACE I/ARB: No  Beta blocker: Yes  Aldosterone antagonist:  No        Physical Examination:     BP (!) 149/68   Pulse 60   Resp 19   Wt 175 lb (79.4 kg)   SpO2 95%   BMI 26.61 kg/m²          Neurological  Level of Consciousness: Alert (0)              Respiratory  Respiratory Pattern: Regular  Respiratory Depth: Normal  L Breath Sounds: Fine Crackles  R Breath Sounds: Fine Crackles              Cardiac  Cardiac Regularity: Regular  Heart Sounds: S1, S2    Rhythm Interpretation  Heart Rate: 60         Gastrointestinal  Abdominal (WDL): Within Defined Limits  RUQ Bowel Sounds: Active  LUQ Bowel Sounds: Active  RLQ Bowel Sounds: Active  LLQ Bowel Sounds: Active          Bowel Sounds  RUQ Bowel Sounds: Active  LUQ Bowel Sounds: Active  RLQ Bowel Sounds: Active  LLQ Bowel Sounds:  Active    Peripheral Vascular  Edema: Left lower extremity, Right lower extremity  RLE Edema: Pitting, +1  LLE Edema: Pitting, +1                                                 Heart Rate: 60                     LAB DATA:    Last 3 BMP      Sodium (mmol/L)   Date Value   09/06/2022

## 2022-09-06 NOTE — TELEPHONE ENCOUNTER
Note was faxed to [facility] dental [facility] at [phone]

## 2022-09-14 ENCOUNTER — HOSPITAL ENCOUNTER (OUTPATIENT)
Dept: OTHER | Age: 87
Setting detail: THERAPIES SERIES
Discharge: HOME | End: 2022-09-14
Payer: MEDICARE

## 2022-09-14 VITALS
WEIGHT: 172 LBS | OXYGEN SATURATION: 94 % | DIASTOLIC BLOOD PRESSURE: 65 MMHG | RESPIRATION RATE: 16 BRPM | SYSTOLIC BLOOD PRESSURE: 148 MMHG | HEART RATE: 59 BPM | BODY MASS INDEX: 26.15 KG/M2

## 2022-09-14 LAB
ANION GAP SERPL CALCULATED.3IONS-SCNC: 7 MMOL/L (ref 7–16)
BUN BLDV-MCNC: 25 MG/DL (ref 6–23)
CALCIUM SERPL-MCNC: 9.1 MG/DL (ref 8.6–10.2)
CHLORIDE BLD-SCNC: 101 MMOL/L (ref 98–107)
CO2: 31 MMOL/L (ref 22–29)
CREAT SERPL-MCNC: 1.3 MG/DL (ref 0.7–1.2)
GFR AFRICAN AMERICAN: >60
GFR NON-AFRICAN AMERICAN: 52 ML/MIN/1.73
GLUCOSE BLD-MCNC: 103 MG/DL (ref 74–99)
POTASSIUM SERPL-SCNC: 4.4 MMOL/L (ref 3.5–5)
PRO-BNP: 542 PG/ML (ref 0–450)
SODIUM BLD-SCNC: 139 MMOL/L (ref 132–146)

## 2022-09-14 PROCEDURE — 99214 OFFICE O/P EST MOD 30 MIN: CPT

## 2022-09-14 PROCEDURE — 36415 COLL VENOUS BLD VENIPUNCTURE: CPT

## 2022-09-14 PROCEDURE — 80048 BASIC METABOLIC PNL TOTAL CA: CPT

## 2022-09-14 PROCEDURE — 83880 ASSAY OF NATRIURETIC PEPTIDE: CPT

## 2022-09-14 NOTE — PLAN OF CARE
Problem: Chronic Conditions and Co-morbidities  Goal: Patient's chronic conditions and co-morbidity symptoms are monitored and maintained or improved  9/14/2022 1104 by Rose Gonzalez RN  Outcome: Progressing  9/14/2022 1026 by Lucy Kamara RN  Outcome: Progressing   Continue plan of care
Problem: Chronic Conditions and Co-morbidities  Goal: Patient's chronic conditions and co-morbidity symptoms are monitored and maintained or improved  Outcome: Progressing   CHF-continue plan of care
Retired

## 2022-09-14 NOTE — PROGRESS NOTES
Congestive Heart Failure [address]   [facility]  [address]   1935          Referring Provider: GINETTE Messer-CNP  Primary Care Physician: Dr. Briana Mcgraw  Cardiologist: Dr. Brigitte Hernandez  Nephrologist:         History of Present Illness:     Denisa Osborne is a 82 y.o. male with a history of HFpEF, most recent EF 60% on 3/2/2022. Patient Story:    He does not have dyspnea with exertion, shortness of breath, or decline in overall functional capacity. He does not have orthopnea, PND, nocturnal cough or hemoptysis. He does not have abdominal distention or bloating, early satiety, anorexia/change in appetite. He does have a good urinary response to  oral diuretic. He does  have  trace lower extremity edema. He has  lightheadedness,Hx of Vertigo dizziness. He denies palpitations, syncope or near syncope. He does not complain of chest pain, pressure, discomfort. No Known Allergies        Prior to Visit Medications    Medication Sig Taking? Authorizing Provider   ALPRAZolam (XANAX) 0.25 MG tablet Take 0.25 mg by mouth 3 times daily as needed for Sleep.   Historical Provider, MD   amiodarone (CORDARONE) 200 MG tablet Take 200 mg by mouth daily Daily beginning 7/16/22 per Dr. Lam Sic Provider, MD   meclizine (ANTIVERT) 25 MG tablet Take 1 tablet by mouth daily as needed for Dizziness  Brenda Fisher MD   furosemide (LASIX) 40 MG tablet Take 40 mg by mouth daily  Historical Provider, MD   metoprolol tartrate (LOPRESSOR) 50 MG tablet Take 50 mg by mouth at bedtime  Historical Provider, MD   Metoprolol Tartrate 75 MG TABS Take 75 mg by mouth every morning  Historical Provider, MD   potassium chloride (KLOR-CON M) 10 MEQ extended release tablet Take 10 mEq by mouth daily  Historical Provider, MD   pravastatin (PRAVACHOL) 40 MG tablet Take 40 mg by mouth at bedtime  Historical Provider, MD   rivaroxaban (Elaine Pilsierra) 20 MG TABS tablet Take 20 mg by mouth Daily with supper   Historical Provider, MD   Multiple Vitamin (MULTIVITAMIN ADULT PO) Take 1 tablet by mouth Daily with lunch  Historical Provider, MD   amLODIPine (NORVASC) 2.5 MG tablet Take 1 tablet by mouth daily  Patient taking differently: Take 2.5 mg by mouth nightly  Brittaney Grijalva DO   pantoprazole (PROTONIX) 40 MG tablet TAKE 1 TABLET BY MOUTH  DAILY  Patient taking differently: Take 40 mg by mouth Daily with lunch  Duane Uribe MD   ferrous sulfate (IRON 325) 325 (65 Fe) MG tablet Take 1 tablet by mouth daily (with breakfast)  Patient taking differently: Take 325 mg by mouth Daily with lunch  Duane Uribe MD   MAGNESIUM-OXIDE 400 (240 Mg) MG tablet Take 400 mg by mouth Daily with supper  Historical Provider, MD   Multiple Vitamins-Minerals (PRESERVISION AREDS 2) CAPS Take 1 capsule by mouth 2 times daily   Historical Provider, MD   Cholecalciferol (VITAMIN D) 2000 UNITS CAPS capsule Take 2,000 Units by mouth Daily with lunch  Historical Provider, MD           Guideline directed medical:  ARNI/ACE I/ARB: No  Beta blocker:   Yes  Aldosterone antagonist:  No        Physical Examination:     BP (!) 148/65   Pulse 59   Resp 16   Wt 172 lb (78 kg)   SpO2 94%   BMI 26.15 kg/m²     Assessment  Charting Type: Shift assessment (chf clinic)    Neurological  Level of Consciousness: Alert (0)              Respiratory  Respiratory Quality/Effort: Unlabored  Chest Assessment: Chest expansion symmetrical    Breath Sounds  Right Upper Lobe: Clear  Right Middle Lobe: Fine Crackles  Right Lower Lobe: Fine Crackles  Left Upper Lobe: Clear  Left Lower Lobe: Clear         Cardiac  Cardiac Rhythm: AV paced    Rhythm Interpretation  Heart Rate: 59         Gastrointestinal  Abdominal (WDL): Within Defined Limits  Abdomen Inspection: Soft               Peripheral Vascular  Peripheral Vascular (WDL): Within Defined Limits  Edema: Right lower extremity, Left lower extremity  RLE Edema: Trace  LLE Edema: Trace                   Genitourinary  Genitourinary (WDL): Within Defined Limits    Psychosocial  Psychosocial (WDL): Within Defined Limits    Pain Assessment  Pain Assessment: None - Denies Pain                   Heart Rate: 59                     LAB DATA:    Last 3 BMP      Sodium (mmol/L)   Date Value   09/06/2022 137   08/23/2022 144   08/15/2022 134     Potassium (mmol/L)   Date Value   09/06/2022 4.6   08/15/2022 4.8   08/04/2022 4.7     Potassium reflex Magnesium (mmol/L)   Date Value   08/23/2022 3.8   06/27/2022 4.6   03/12/2022 4.3     Chloride (mmol/L)   Date Value   09/06/2022 101   08/23/2022 113 (H)   08/15/2022 94 (L)     CO2 (mmol/L)   Date Value   09/06/2022 30 (H)   08/23/2022 23   08/15/2022 28     BUN (mg/dL)   Date Value   09/06/2022 24 (H)   08/23/2022 19   08/15/2022 25 (H)     Glucose (mg/dL)   Date Value   09/06/2022 95   08/23/2022 75   08/15/2022 96     Calcium (mg/dL)   Date Value   09/06/2022 8.8   08/23/2022 6.6 (L)   08/15/2022 9.4       Last 3 BNP       Pro-BNP (pg/mL)   Date Value   09/06/2022 692 (H)   08/15/2022 493 (H)   08/04/2022 715 (H)          CBC: No results for input(s): WBC, HGB, PLT in the last 72 hours. BMP:    No results for input(s): NA, K, CL, CO2, BUN, CREATININE, GLUCOSE in the last 72 hours. Hepatic: No results for input(s): AST, ALT, ALB, BILITOT, ALKPHOS in the last 72 hours. Troponin: No results for input(s): TROPONINI in the last 72 hours. BNP: No results for input(s): BNP in the last 72 hours. Lipids: No results for input(s): CHOL, HDL in the last 72 hours. Invalid input(s): LDLCALCU  INR: No results for input(s): INR in the last 72 hours.         WEIGHTS:    Wt Readings from Last 3 Encounters:   09/14/22 172 lb (78 kg)   09/06/22 175 lb (79.4 kg)   08/23/22 168 lb (76.2 kg)         TELEMETRY:  Cardiac Regularity: Regular  Cardiac Rhythm/Interpretation: A paced/Vpaced        ASSESSMENT:  Luciano Runner weight is down 3lbs since last CHF clinic visit  Lungs remain with crackles unchanged as charted Dr Beatriz Prater did address crackles in a note from 5-26-22. He denies SOB, subhash lower leg edema less has trace edema subhash lower legs. Patient has been hydrating with 64 fld ounces, and states he has a good response from diuretic. Interventions completed this visit:  IV diuretics given- No  Lab work obtained yes, proBNP, BMP   Reviewed currently prescribed medications with patient, educated on importance of compliance and answered any questions regarding their medication  Educated on signs and symptoms of HF  Educated on low sodium diet    PLAN:  Scheduled to follow up in CHF clinic on   Future Appointments   Date Time Provider Cezar Gamez   9/19/2022  9:20 AM SCHEDULE, [address]   9/28/2022 12:45 PM Klaudia oPst DO [address] [facility]   10/5/2022  9:30 AM [facility] ROOM 1 [facility]   10/12/2022  1:15 PM MD Afshan Avelar Ma   10/13/2022 11:30 AM MD PRISCILLA Woods [facility]     Given clinic phone number [phone] and aware of signs and symptoms to call with any HF change in symptoms.

## 2022-09-22 ENCOUNTER — TELEPHONE (OUTPATIENT)
Dept: NON INVASIVE DIAGNOSTICS | Age: 87
End: 2022-09-22

## 2022-09-22 NOTE — TELEPHONE ENCOUNTER
Message left on answering machine to please call our office back. Dr Anshul Quinones would like Mr Natalie Mabry to come in on Monday 09/26/2022 to our [facility] office around 3pm for a device check. Friday Sept 23rd 11:00am~Spoke to Mr Natalie Mabry, he states he has been feeling well. He has been following with the CHF clinic also. Per Dr Anshul Quinones patient is to keep his appointment with her on 10/12/2022, no need for him to come in on Monday. Informed him to call us with any questions or concerns.

## 2022-09-28 ENCOUNTER — OFFICE VISIT (OUTPATIENT)
Dept: CARDIOLOGY CLINIC | Age: 87
End: 2022-09-28
Payer: MEDICARE

## 2022-09-28 VITALS
RESPIRATION RATE: 12 BRPM | HEART RATE: 60 BPM | WEIGHT: 171 LBS | DIASTOLIC BLOOD PRESSURE: 80 MMHG | SYSTOLIC BLOOD PRESSURE: 148 MMHG | BODY MASS INDEX: 25.91 KG/M2 | HEIGHT: 68 IN

## 2022-09-28 DIAGNOSIS — I50.42 CHRONIC COMBINED SYSTOLIC AND DIASTOLIC CHF (CONGESTIVE HEART FAILURE) (HCC): Primary | ICD-10-CM

## 2022-09-28 PROCEDURE — G8417 CALC BMI ABV UP PARAM F/U: HCPCS | Performed by: INTERNAL MEDICINE

## 2022-09-28 PROCEDURE — G8427 DOCREV CUR MEDS BY ELIG CLIN: HCPCS | Performed by: INTERNAL MEDICINE

## 2022-09-28 PROCEDURE — 1036F TOBACCO NON-USER: CPT | Performed by: INTERNAL MEDICINE

## 2022-09-28 PROCEDURE — 99214 OFFICE O/P EST MOD 30 MIN: CPT | Performed by: INTERNAL MEDICINE

## 2022-09-28 PROCEDURE — 1123F ACP DISCUSS/DSCN MKR DOCD: CPT | Performed by: INTERNAL MEDICINE

## 2022-09-28 PROCEDURE — 93000 ELECTROCARDIOGRAM COMPLETE: CPT | Performed by: INTERNAL MEDICINE

## 2022-09-28 NOTE — PROGRESS NOTES
CHIEF COMPLAINT: TIA/CAD-CABG/PAF/ICD/GIB/Anemia    HISTORY OF PRESENT ILLNESS: Patient is a 82 y.o. male seen at the request of Spring Gillis MD.      Baseline TORRES. No CP. In sinus. PMH:   MI, 2003. Cardiac catheterization: 85% ostial, proximal and mid LAD narrowings. LMC 70% stenosis. D1 occluded. D2 50% stenosis. OM1 80% ostial stenosis. Mid CX occluded. Dominant RCA severe disease. LVEF 40-45%. CABG, 05/23/2003, [place], [place] with LIMA-LAD, SVG-RI, SVG-OM. Hyperlipidemia. Mild carotid plaque on US, 2003. Echo, 07/2006. Mild LVE, normal EF, Stage II diastolic dysfunction, moderate AS, mild MR, mild TR. Right leg vein stripping, 1970's. No history diabetes mellitus, stroke or COPD. Nonsmoker for many years. VT causing cardiac arrest after stress test, 03/15/2007. He was successfully cardioverted. Cardiac catheterization, 03/16/2007 with normal LVEF, severe native three vessel coronary disease. SVG-RI, SVG-OM both occluded. LIMA-LAD patent. Re-do CABG, [facility], 03/2007 with radial artery graft to D2 and OM2. Elective PCI with CONNOR native OM2 and native LAD, 03/2007 following CABG. This was done because of inadequate conduits. ICD placement, [facility], 03/2007. ICD lead recall, early 2008, but he was followed electively because his device was functioning normally. Presentation [facility], 03/29/2008 with multiple ICD inappropriate shocks. Transfer [facility] where ICD and lead were replaced. He reports cardiac catheterization that revealed patent LIMA-LAD and patent stents in native coronary arteries. Atypical chest pain and anxiety with admission [facility] 3, 05/2008. Troponin minimally elevated. Beta blocker dose increased. [facility] admission, 06/13/2009 with lightheadedness, orthostatic hypotension, drop in hemoglobin to 10.5 from 14.9 over two months with an increase in BUN from 16 to 68 over the same time. Dark heme positive stools noted. EKG NSR, incomplete RBBB.    Echo, 06/15/2009 with normal LVEF, moderate AS, peak gradient 38 mmHg, BLOSSOM 1.1 cm², moderate MR, LAE. Transtelephonic ICD check, 01/11/2010. No ventricular tachyarrhythmias. Two AF episodes, the longest for 14 hours. Echo, 06/16/2010 with LVE, borderline LVH, normal systolic and diastolic function, normal LA, ICD electrode right heart. Trileaflet AV with moderate to severe AS, mean/peak gradient 20/31 mmHg. BLOSSOM 1.0 cm². MAC with mild MR, normal RVSP. No drug allergies. Family history negative for premature vascular disease. PAF with DCCV, [facility], 12/2010. Hip replacement surgery, 2010 or 2011 [facility] Dr. Lisandra Gray. MPS [facility], 06/05/2012. Moderate sized fixed basal inferior defect, probably artifact. Echo [facility], 10/20/2011. 1+ AR, moderate AS, mild MR, mild TR, normal LVEF. Echo, 01/31/2013. LV not dilated. Mild concentric LVH. Septal paradox. EF 50-55%. BLOSSOM 1.2 cm² consistent with moderate stenosis. Peak/mean gradient 38/21. Stage II diastolic dysfunction. Health system admission, 08/28/2013 with dizziness, Hb 7.7, WBC 19.0. CXR negative except cardiomegaly. EKG NSR, leftward axis, RBBB. BMP negative except for elevation in BUN to 55 with Cr 1.3. EGD, 08/28/2013. Large pyloric channel ulcer with clot treated with PRBCs and fresh frozen plasma. Hb 8.7 on day of discharge and stable. Pradaxa was temporarily held. CT abdomen, 09/08/2014. 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 CT chest, 09/17/2014. Consolidation and infiltrate at the left lung base, stable when compared to previous exams with less pleural thickening and calcified plaque in the left pleural cavity without any recent change. Chronic obstructive airways disease.  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 ICD programmed to DDDR mode by Dr. Opal Matthews, 03/26/2015. Device function normal.  Echo 10/16/2015. EF 39%. Stage II diastolic dysfunction. Aortic stenosis with peak/mean gradients of 48/24 mmHg. Estimated valve area 1.0 cm². ICD generator change for battery depletion, 07/28/2016, Dr. Fran Luevano. Opal Matthews. Echo, 02/03/2017. Normal LV size with mild LVH. Normal regional wall motion and overall systolic function. Diastole could not be assessed, but was presumed to be impaired. The RV was dilated with impaired global systolic function. The LA was enlarged. Mild MAC with mild mitral insufficiency. Moderate tricuspid insufficiency. Aortic valve gradients are peak 47 mmHg with a mean of 27. Dimensionless ratio 0.21. Valve area calculated 0.8 cm². 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 Echocardiogram, [facility], 4/19/2018, EF 60%. Low normal RV function. Stage 2 diastolic dysfunction. Mild-to-moderate MR. S/P TAVR with a mean gradient of 10 mmHg. RVSP 31 mg.     S/P Cardioversion by Dr. Nany Bell, 05/30/2017. Hospital evaluation, 09/07/2018, for 2 appropriate ICD discharges for polymorphic ventricular tachycardia, which occurred after yard work and moving heavy furniture.        Past Medical History:   Diagnosis Date    A-fib [facility])     Arrhythmia     Carotid artery stenosis     CHF (congestive heart failure) ([facility])     Heart valve problem     Hyperlipidemia     Hypertension     ICD (implantable cardiac defibrillator) in place 2007    Aly Mcintosh DR TV#[id number] generator changed 7/28/16  Dr Opal Matthews    MI (mitral incompetence) 2003    MI (myocardial infarction) [facility]) 2003       Patient Active Problem List   Diagnosis    Chronic combined systolic and diastolic CHF (congestive heart failure) ([facility])    S/P TAVR (transcatheter aortic valve replacement)    Chronic atrial fibrillation    Coronary artery disease involving native coronary artery of native heart without angina pectoris    Essential hypertension High risk medications (not anticoagulants) long-term use    Paroxysmal atrial fibrillation (HCC)    Hyperlipidemia LDL goal <100    GIB (gastrointestinal bleeding)    ICD (implantable cardioverter-defibrillator), dual, in situ    Ventricular tachycardia (HCC)    Ischemic heart disease    Vitamin D deficiency    Other insomnia    SOB (shortness of breath)    Lung nodule    Acute on chronic diastolic (congestive) heart failure (HCC)    Pure hypercholesterolemia    Stage 3b chronic kidney disease (HCC)    Transient cerebral ischemia    Aortic valve disease    Near syncope    Acute on chronic combined systolic and diastolic CHF (congestive heart failure) (HCC)    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)    Gastroesophageal reflux disease without esophagitis    Coronary artery disease involving coronary bypass graft    Symptomatic anemia    GI bleed    Abdominal aortic aneurysm (AAA) 3.0 cm to 5.5 cm in diameter in male [facility])    ICD (implantable cardioverter-defibrillator) discharge       No Known Allergies    Current Outpatient Medications   Medication Sig Dispense Refill    ALPRAZolam (XANAX) 0.25 MG tablet Take 0.25 mg by mouth 3 times daily as needed for Sleep.      amiodarone (CORDARONE) 200 MG tablet Take 200 mg by mouth daily Daily beginning 7/16/22 per Dr. Anshul Quinones      meclizine (ANTIVERT) 25 MG tablet Take 1 tablet by mouth daily as needed for Dizziness 90 tablet 0    furosemide (LASIX) 40 MG tablet Take 40 mg by mouth daily      metoprolol tartrate (LOPRESSOR) 50 MG tablet Take 50 mg by mouth at bedtime      Metoprolol Tartrate 75 MG TABS Take 75 mg by mouth every morning      potassium chloride (KLOR-CON M) 10 MEQ extended release tablet Take 10 mEq by mouth daily      pravastatin (PRAVACHOL) 40 MG tablet Take 40 mg by mouth at bedtime      rivaroxaban (XARELTO) 20 MG TABS tablet Take 20 mg by mouth Daily with supper       Multiple Vitamin (MULTIVITAMIN ADULT PO) Take 1 tablet by mouth Daily with lunch      amLODIPine (NORVASC) 2.5 MG tablet Take 1 tablet by mouth daily (Patient taking differently: Take 2.5 mg by mouth nightly) 90 tablet 3    pantoprazole (PROTONIX) 40 MG tablet TAKE 1 TABLET BY MOUTH  DAILY (Patient taking differently: Take 40 mg by mouth Daily with lunch) 90 tablet 3    ferrous sulfate (IRON 325) 325 (65 Fe) MG tablet Take 1 tablet by mouth daily (with breakfast) (Patient taking differently: Take 325 mg by mouth Daily with lunch) 90 tablet 1    MAGNESIUM-OXIDE 400 (240 Mg) MG tablet Take 400 mg by mouth Daily with supper      Multiple Vitamins-Minerals (PRESERVISION AREDS 2) CAPS Take 1 capsule by mouth 2 times daily       Cholecalciferol (VITAMIN D) 2000 UNITS CAPS capsule Take 2,000 Units by mouth Daily with lunch       No current facility-administered medications for this visit. Social History     Socioeconomic History    Marital status:      Spouse name: Melissa Curry    Number of children: 2    Years of education: 12     Highest education level: Associate degree: academic program   Occupational History    Not on file   Tobacco Use    Smoking status: Former     Packs/day: 0.50     Years: 3.00     Pack years: 1.50     Types: Cigarettes     Quit date: 1973     Years since quittin.6    Smokeless tobacco: Never    Tobacco comments:     Quit smoking in s   Vaping Use    Vaping Use: Never used   Substance and Sexual Activity    Alcohol use: No    Drug use: No    Sexual activity: Not on file   Other Topics Concern    Not on file   Social History Narrative    1 cup coffee daily; occ pop     Social Determinants of Health     Financial Resource Strain: Not on file   Food Insecurity: Not on file   Transportation Needs: No Transportation Needs    Lack of Transportation (Medical): No    Lack of Transportation (Non-Medical):  No   Physical Activity: Inactive    Days of Exercise per Week: 0 days    Minutes of Exercise per Session: 0 min   Stress: No Stress Concern Present    Feeling of Stress : Not at all   Social Connections: Moderately Isolated    Frequency of Communication with Friends and Family: Three times a week    Frequency of Social Gatherings with Friends and Family: Once a week    Attends Rastafarian Services: Never    Active Member of Clubs or Organizations: No    Attends Club or Organization Meetings: Never    Marital Status:    Intimate Partner Violence: Not on file   Housing Stability: Not on file       No family history on file. Review of Systems:  Heart: as above   Lungs: as above   Eyes: denies changes in vision or discharge. Ears: denies changes in hearing or pain. Nose: denies epistaxis or masses   Throat: denies sore throat or trouble swallowing. Neuro: denies numbness, tingling, tremors. Skin: denies rashes or itching. : denies hematuria, dysuria   GI: denies vomiting, diarrhea   Psych: denies mood changed, anxiety, depression. All other systems negative. Physical Exam   BP (!) 148/80   Pulse 60   Resp 12   Ht 5' 8\" (1.727 m)   Wt 171 lb (77.6 kg)   BMI 26.00 kg/m²   Constitutional: Oriented to person, place, and time. Well-developed and well-nourished. No distress. Head: Normocephalic and atraumatic. Eyes: EOM are normal. Pupils are equal, round, and reactive to light. Neck: Normal range of motion. Neck supple. No hepatojugular reflux and no JVD present. Carotid bruit is not present. No tracheal deviation present. No thyromegaly present. Cardiovascular: Normal rate, regular rhythm, normal heart sounds and intact distal pulses. Exam reveals no gallop and no friction rub. No murmur heard. Pulmonary/Chest: Effort normal and breath sounds normal. No respiratory distress. No wheezes. No rales. No tenderness. Abdominal: Soft. Bowel sounds are normal. No distension and no mass. No tenderness. No rebound and no guarding. Musculoskeletal: Normal range of motion. No edema and no tenderness.    Lymphadenopathy:   No cervical adenopathy. No groin adenopathy. Neurological: Alert and oriented to person, place, and time. Skin: Skin is warm and dry. No rash noted. Not diaphoretic. No erythema. Psychiatric: Normal mood and affect. Behavior is normal.     EKG:  normal sinus rhythm, A paced, V sensed. Echo Summary 8/16/19:   Ejection fraction is visually estimated at 60%. The inferior basal wall is hypokinetic. No regional wall motion abnormalities seen. Normal right ventricle structure and function. There is doppler evidence of stage II diastolic dysfunction. Left atrial volume index of 39 ml per meters squared BSA. Hx of TAVR with 26 mm S3 3/29/17. The aortic valve area is 1.6 cm2 with a maximum gradient of 15 mmHg and a mean gradient of 7 mmHg. Mild aortic regurgitation is noted. Mild mitral regurgitation is present. Mild tricuspid regurgitation. Agitated saline injected for shunt evaluation. No evidence of patent foramen ovale. No evidence of atrial septal defect. Echo Summary 10/30/2020:   LVEF is 65%. Left ventricle is normal in size . Mild asymmetric septal hypertrophy. No regional wall motion abnormalities seen. Normal left ventricular ejection fraction. The left atrium is severely dilated. Mild mitral annular calcification. Mild mitral regurgitation is present. Normally functioning bioprosthetic valve in aortic position. Physiologic and/or trace tricuspid regurgitation. Echo Summary 3/2/2022:   Normal left ventricular systolic function. Ejection fraction is visually estimated at 60%. Mildly dilated right ventricle with normal right ventricular function. There is doppler evidence of stage II diastolic dysfunction. Moderately dilated left atrium by volume index. Mild mitral regurgitation. 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 No significant paravalvular aortic regurgitation. AV peak velocity 2.1 m/s. AV mean gradient 9 mmHg.    AV area 1.5 cm2. Mild tricuspid regurgitation. PASP is estimated at 44 mmHg. ASSESSMENT AND PLAN:  Patient Active Problem List   Diagnosis    Chronic combined systolic and diastolic CHF (congestive heart failure) ([facility])    S/P TAVR (transcatheter aortic valve replacement)    Chronic atrial fibrillation    Coronary artery disease involving native coronary artery of native heart without angina pectoris    Essential hypertension    High risk medications (not anticoagulants) long-term use    Paroxysmal atrial fibrillation (HCC)    Hyperlipidemia LDL goal <100    GIB (gastrointestinal bleeding)    ICD (implantable cardioverter-defibrillator), dual, in situ    Ventricular tachycardia (HCC)    Ischemic heart disease    Vitamin D deficiency    Other insomnia    SOB (shortness of breath)    Lung nodule    Acute on chronic diastolic (congestive) heart failure ([facility]    Pure hypercholesterolemia    Stage 3b chronic kidney disease ([facility] [address]    Transient cerebral ischemia    Aortic valve disease    Near syncope    Acute on chronic combined systolic and diastolic CHF (congestive heart failure) (HCC)    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)    Gastroesophageal reflux disease without esophagitis    Coronary artery disease involving coronary bypass graft    Symptomatic anemia    GI bleed    Abdominal aortic aneurysm (AAA) 3.0 cm to 5.5 cm in diameter in male [facility])    ICD (implantable cardioverter-defibrillator) discharge     1. ICD: Per EP. 2. PAF: In sinus. Post DCCV 5/12/2022. Xarelto/tikosyn. 3. CAD-CABG: Stable symptoms. BB/statin. Not on ASA to this point due to Xarelto. 4. TAVR: Stable echo 3/2/2022.     5. TIA: Echo no PFO. Xarelto/statin. 6. Anemia: Follow labs. 7. GIB: Follows with GI.     8. TORRES: Stable. Chino Menendez D.O.   Cardiologist  Cardiology, [facility]

## 2022-09-28 NOTE — PROGRESS NOTES
Patient presented today and was given  4 bottles of Xarelto 20 mg The medication is being monitored by Dr. Caio Sanchez.    Sample drug name: Xarelto 20 mg  Sample drug lot #: [id number]  Sample drug expiration date: 10/24  Sample drug lot #: [id number]  Sample expiration date:03/25  How many sample boxes and/or bottles given: 4 bottle (28 tablets)

## 2022-10-05 ENCOUNTER — HOSPITAL ENCOUNTER (OUTPATIENT)
Dept: OTHER | Age: 87
Setting detail: THERAPIES SERIES
Discharge: HOME | End: 2022-10-05
Payer: MEDICARE

## 2022-10-05 VITALS
BODY MASS INDEX: 26.27 KG/M2 | WEIGHT: 172.8 LBS | OXYGEN SATURATION: 94 % | RESPIRATION RATE: 16 BRPM | SYSTOLIC BLOOD PRESSURE: 138 MMHG | DIASTOLIC BLOOD PRESSURE: 54 MMHG | HEART RATE: 60 BPM

## 2022-10-05 DIAGNOSIS — R06.09 DOE (DYSPNEA ON EXERTION): ICD-10-CM

## 2022-10-05 LAB
ANION GAP SERPL CALCULATED.3IONS-SCNC: 10 MMOL/L (ref 7–16)
BUN BLDV-MCNC: 19 MG/DL (ref 6–23)
CALCIUM SERPL-MCNC: 9.2 MG/DL (ref 8.6–10.2)
CHLORIDE BLD-SCNC: 97 MMOL/L (ref 98–107)
CO2: 29 MMOL/L (ref 22–29)
CREAT SERPL-MCNC: 1.3 MG/DL (ref 0.7–1.2)
GFR AFRICAN AMERICAN: >60
GFR NON-AFRICAN AMERICAN: 52 ML/MIN/1.73
GLUCOSE BLD-MCNC: 126 MG/DL (ref 74–99)
POTASSIUM SERPL-SCNC: 4.1 MMOL/L (ref 3.5–5)
PRO-BNP: 564 PG/ML (ref 0–450)
SODIUM BLD-SCNC: 136 MMOL/L (ref 132–146)
TSH SERPL DL<=0.05 MIU/L-ACNC: 2 UIU/ML (ref 0.27–4.2)

## 2022-10-05 PROCEDURE — 84443 ASSAY THYROID STIM HORMONE: CPT

## 2022-10-05 PROCEDURE — 36415 COLL VENOUS BLD VENIPUNCTURE: CPT

## 2022-10-05 PROCEDURE — 80048 BASIC METABOLIC PNL TOTAL CA: CPT

## 2022-10-05 PROCEDURE — 83880 ASSAY OF NATRIURETIC PEPTIDE: CPT

## 2022-10-05 PROCEDURE — 99214 OFFICE O/P EST MOD 30 MIN: CPT

## 2022-10-05 NOTE — DISCHARGE INSTRUCTIONS
Learning About Self-Care for Heart Failure  What is self-care for heart failure? Heart failure usually gets worse over time. But there are many things you can do to feel better, avoid the hospital, and live longer. Self-care means managing your health by doing certain things every day, like weighing yourself. It's about knowing which symptoms to watch for so you can avoid getting worse. When you practice good self-care, you know when it's time to call your doctor and when your heart failure has turned into an emergency. The list below can help. Top 5 self-care tips for every day   Take your medicines as prescribed. This gives them the best chance of helping you. Weigh yourself every day. This helps you watch for signs that you're getting worse. Weight gain may be a sign that your body is holding on to too much fluid. Weigh yourself at the same time each day, using the same scale, on a hard, flat surface. The best time is in the morning after you go to the bathroom and before you eat or drink anything. Keep a daily record of your symptoms. Checking your symptoms helps you see what symptoms are normal for you and if they change or get worse. Limit sodium. This helps keep fluid from building up and may help you feel better. Your doctor can tell you how much sodium is right for you. An example is less than 3,000 milligrams (mg) a day. Try limiting the salt you eat at home, and by watching for \"hidden\" sodium when you eat out or shop for food. Try to exercise throughout the week. Exercise makes your heart stronger and can help you avoid symptoms. Walking is a great way to get exercise. If your doctor says it's safe, start out with some short walks. Then slowly build up to longer ones. Some people with heart failure also may need to limit how much fluid they drink each day. Ask your doctor if this is true for you and, if it is, how much fluid is safe for you to drink each day. When should you act?   Try to become familiar with signs that mean your heart failure is getting worse. If you need help, talk with your doctor about making a personal plan. Here are some things to watch for as you practice your daily self-care. Call your doctor if:  You have sudden weight gain, such as more than 2 to 3 pounds in a day or 5 pounds in a week. (Your doctor may suggest a different range of weight gain.)  You have new or worse swelling in your feet, ankles, or legs. Your breathing gets worse. Activities that did not make you short of breath before are hard for you now. Your breathing when you lie down is worse than usual, or you wake up at night needing to catch your breath. Be sure to make and go to all of your follow-up appointments. And it's always a good idea to call your doctor anytime you have a sudden change in symptoms. When is it an emergency? Sometimes the symptoms get worse very quickly. This is called sudden heart failure. It causes fluid to build up in your lungs. Sudden heart failure is an emergency. If you have any of these symptoms, you need care right away. Call 911 if:   You have severe shortness of breath. You have an irregular or fast heartbeat. You cough up foamy, pink mucus. What else can you do to stay healthy? There are other things you can do to take care of your body and your heart. These things will help you feel better. And they will also reduce your risk of heart attack and stroke. Try to stay at a healthy weight. Eat a healthy diet with lots of fresh fruit, vegetables, and whole grains. If you smoke, quit. Limit the amount of alcohol you drink. Keep high blood pressure and diabetes under control. If you need help, talk with your doctor. If your doctor has not set you up with a cardiac rehabilitation (rehab) program, talk to him or her about whether that is right for you. Cardiac rehab includes exercise, help with diet and lifestyle changes, and emotional support.   Also let your doctor know if:  You're having trouble sleeping. Sleep is important to your well-being. It also helps your heart work the way it's supposed to. Your doctor can help you decide if you need treatment for sleep problems. You're feeling sad or hopeless much of the time, or you are worried and anxious. Heart failure can be hard on your emotions. Treatment with counseling and medicine can help. And when you feel better, you're more likely to take care of yourself. You think you may have a problem with alcohol or drug use. You and your doctor can decide if you have a problem and what type of treatment might help you. Follow-up care is a key part of your treatment and safety. Be sure to make and go to all appointments, and call your doctor if you are having problems. It's also a good idea to know your test results and keep a list of the medicines you take. Where can you learn more? Go to [email or web address] org and sign in to your [employer] account. Enter D641 in the [place] box to learn more about \"Learning About Self-Care for Heart Failure. \"     If you do not have an account, please click on the \"Sign Up Now\" link. Current as of: January 10, 2022               Content Version: 13.4  © 2006-2022 Healthwise, Incorporated. Care instructions adapted under license by [facility] ([facility]). If you have questions about a medical condition or this instruction, always ask your healthcare professional. [place] [address] any warranty or liability for your use of this information.

## 2022-10-05 NOTE — PROGRESS NOTES
Congestive Heart Failure [address]   CHF Clinic [facility]  [address]   1935          Referring Provider: LWARENCE Childress  Primary Care Physician: Dr. Romy Mcdaniels  Cardiologist: Dr. James Spain  Nephrologist:         History of Present Illness:     Ochoa Kwan is a 82 y.o. male with a history of HFpEF, most recent EF 60% on 3/2/2022. Patient Story:    He does not have dyspnea with exertion, shortness of breath, or decline in overall functional capacity. He does not have orthopnea, PND, nocturnal cough or hemoptysis. He does not have abdominal distention or bloating, early satiety, anorexia/change in appetite. He does have a good urinary response to  oral diuretic. He does  have  trace lower extremity edema. He has  lightheadedness,Hx of Vertigo dizziness. He denies palpitations, syncope or near syncope. He does not complain of chest pain, pressure, discomfort. No Known Allergies        Prior to Visit Medications    Medication Sig Taking? Authorizing Provider   ALPRAZolam (XANAX) 0.25 MG tablet Take 0.25 mg by mouth 3 times daily as needed for Sleep.   Historical Provider, MD   amiodarone (CORDARONE) 200 MG tablet Take 200 mg by mouth daily Daily beginning 7/16/22 per Dr. Hess Gamma Provider, MD   meclizine (ANTIVERT) 25 MG tablet Take 1 tablet by mouth daily as needed for Dizziness  Sourav Estrella MD   furosemide (LASIX) 40 MG tablet Take 40 mg by mouth daily  Historical Provider, MD   metoprolol tartrate (LOPRESSOR) 50 MG tablet Take 50 mg by mouth at bedtime  Historical Provider, MD   Metoprolol Tartrate 75 MG TABS Take 75 mg by mouth every morning  Historical Provider, MD   potassium chloride (KLOR-CON M) 10 MEQ extended release tablet Take 10 mEq by mouth daily  Historical Provider, MD   pravastatin (PRAVACHOL) 40 MG tablet Take 40 mg by mouth at bedtime  Historical Provider, MD   rivaroxaban ([place]) 20 MG TABS tablet Take 20 mg by mouth Daily with supper   Historical Provider, MD   Multiple Vitamin (MULTIVITAMIN ADULT PO) Take 1 tablet by mouth Daily with lunch  Historical Provider, MD   amLODIPine (NORVASC) 2.5 MG tablet Take 1 tablet by mouth daily  Patient taking differently: Take 2.5 mg by mouth nightly  Nereida Mccauley DO   pantoprazole (PROTONIX) 40 MG tablet TAKE 1 TABLET BY MOUTH  DAILY  Patient taking differently: Take 40 mg by mouth Daily with lunch  Segundo Goodwin MD   ferrous sulfate (IRON 325) 325 (65 Fe) MG tablet Take 1 tablet by mouth daily (with breakfast)  Patient taking differently: Take 325 mg by mouth Daily with lunch  Segundo Goodwin MD   MAGNESIUM-OXIDE 400 (240 Mg) MG tablet Take 400 mg by mouth Daily with supper  Historical Provider, MD   Multiple Vitamins-Minerals (PRESERVISION AREDS 2) CAPS Take 1 capsule by mouth 2 times daily   Historical Provider, MD   Cholecalciferol (VITAMIN D) 2000 UNITS CAPS capsule Take 2,000 Units by mouth Daily with lunch  Historical Provider, MD           Guideline directed medical:  ARNI/ACE I/ARB: No  Beta blocker: Yes  Aldosterone antagonist:  No        Physical Examination:     BP (!) 138/54   Pulse 60   Resp 16   Wt 172 lb 12.8 oz (78.4 kg)   SpO2 94%   BMI 26.27 kg/m²     Assessment  Charting Type: Shift assessment                   Respiratory  Respiratory Pattern: Regular  Respiratory Depth: Normal  Respiratory Quality/Effort: Unlabored  Chest Assessment: Chest expansion symmetrical  L Breath Sounds: Crackles  R Breath Sounds: Crackles    Breath Sounds  Right Upper Lobe: Clear  Right Middle Lobe: Clear  Right Lower Lobe: Crackles  Left Upper Lobe: Clear  Left Lower Lobe: Crackles         Cardiac  Cardiac Regularity: Regular  Cardiac Rhythm: AV paced  Implanted Cardiac Device (Pacemaker, ICD, PA Sensor): Yes    Rhythm Interpretation  Heart Rate: 60         Gastrointestinal  Abdominal (WDL): Within Defined Limits  RUQ Bowel Sounds:  Active  LUQ Bowel Sounds: Active  RLQ Bowel Sounds: Active  LLQ Bowel Sounds: Active          Bowel Sounds  RUQ Bowel Sounds: Active  LUQ Bowel Sounds: Active  RLQ Bowel Sounds: Active  LLQ Bowel Sounds: Active    Peripheral Vascular  Peripheral Vascular (WDL): Within Defined Limits  Edema: Right lower extremity, Left lower extremity  RLE Edema: Trace  LLE Edema: Trace                   Genitourinary  Genitourinary (WDL): Within Defined Limits    Psychosocial  Psychosocial (WDL): Within Defined Limits    Pain Assessment  Pain Assessment: None - Denies Pain                   Heart Rate: 60                     LAB DATA:    Last 3 BMP      Sodium (mmol/L)   Date Value   10/05/2022 136   09/14/2022 139   09/06/2022 137     Potassium (mmol/L)   Date Value   10/05/2022 4.1   09/14/2022 4.4   09/06/2022 4.6     Potassium reflex Magnesium (mmol/L)   Date Value   08/23/2022 3.8   06/27/2022 4.6   03/12/2022 4.3     Chloride (mmol/L)   Date Value   10/05/2022 97 (L)   09/14/2022 101   09/06/2022 101     CO2 (mmol/L)   Date Value   10/05/2022 29   09/14/2022 31 (H)   09/06/2022 30 (H)     BUN (mg/dL)   Date Value   10/05/2022 19   09/14/2022 25 (H)   09/06/2022 24 (H)     Glucose (mg/dL)   Date Value   10/05/2022 126 (H)   09/14/2022 103 (H)   09/06/2022 95     Calcium (mg/dL)   Date Value   10/05/2022 9.2   09/14/2022 9.1   09/06/2022 8.8       Last 3 BNP       Pro-BNP (pg/mL)   Date Value   10/05/2022 564 (H)   09/14/2022 542 (H)   09/06/2022 692 (H)          CBC: No results for input(s): WBC, HGB, PLT in the last 72 hours. BMP:    Recent Labs     10/05/22  0948      K 4.1   CL 97*   CO2 29   BUN 19   CREATININE 1.3*   GLUCOSE 126*         Hepatic: No results for input(s): AST, ALT, ALB, BILITOT, ALKPHOS in the last 72 hours. Troponin: No results for input(s): TROPONINI in the last 72 hours. BNP: No results for input(s): BNP in the last 72 hours. Lipids: No results for input(s): CHOL, HDL in the last 72 hours.     Invalid input(s): LDLCALCU  INR: No results for input(s): INR in the last 72 hours. WEIGHTS:    Wt Readings from Last 3 Encounters:   10/05/22 172 lb 12.8 oz (78.4 kg)   09/28/22 171 lb (77.6 kg)   09/14/22 172 lb (78 kg)         TELEMETRY:  Cardiac Regularity: Regular  Cardiac Rhythm/Interpretation: A paced/Vpaced        ASSESSMENT:  Kris Frederick weight is stable from last CHF clinic visit 9/28/22. Lungs remain with crackles unchanged as charted Dr. Roseann Denis did address crackles in a note from 5-26-22. He denies SOB, BLE lower leg trace edema. Patient has been hydrating with 64 fld ounces, and states he has a good response from diuretic. Thyroid level for Dr. Hailey Merrill. Interventions completed this visit:  IV diuretics given- No  Lab work obtained yes, proBNP, BMP, TSH  Reviewed currently prescribed medications with patient, educated on importance of compliance and answered any questions regarding their medication  Educated on signs and symptoms of HF  Educated on low sodium diet    PLAN:  Scheduled to follow up in CHF clinic on   Future Appointments   Date Time Provider Cezar Gamez   10/12/2022  1:15 PM MD Barby Gibson   10/13/2022 11:30 AM MD PRISCILLA Haro [facility]   11/2/2022 10:15 AM [facility] CHF ROOM 2 [facility] CHF [address]     Given clinic phone number [phone] and aware of signs and symptoms to call with any HF change in symptoms.

## 2022-10-12 ENCOUNTER — OFFICE VISIT (OUTPATIENT)
Dept: NON INVASIVE DIAGNOSTICS | Age: 87
End: 2022-10-12
Payer: MEDICARE

## 2022-10-12 VITALS
HEART RATE: 61 BPM | DIASTOLIC BLOOD PRESSURE: 60 MMHG | RESPIRATION RATE: 16 BRPM | HEIGHT: 68 IN | SYSTOLIC BLOOD PRESSURE: 116 MMHG | WEIGHT: 174 LBS | BODY MASS INDEX: 26.37 KG/M2

## 2022-10-12 DIAGNOSIS — I48.20 CHRONIC ATRIAL FIBRILLATION (HCC): Primary | ICD-10-CM

## 2022-10-12 PROCEDURE — 99214 OFFICE O/P EST MOD 30 MIN: CPT | Performed by: INTERNAL MEDICINE

## 2022-10-12 PROCEDURE — 93000 ELECTROCARDIOGRAM COMPLETE: CPT | Performed by: INTERNAL MEDICINE

## 2022-10-12 PROCEDURE — G8484 FLU IMMUNIZE NO ADMIN: HCPCS | Performed by: INTERNAL MEDICINE

## 2022-10-12 PROCEDURE — G8417 CALC BMI ABV UP PARAM F/U: HCPCS | Performed by: INTERNAL MEDICINE

## 2022-10-12 PROCEDURE — 3078F DIAST BP <80 MM HG: CPT | Performed by: INTERNAL MEDICINE

## 2022-10-12 PROCEDURE — 1123F ACP DISCUSS/DSCN MKR DOCD: CPT | Performed by: INTERNAL MEDICINE

## 2022-10-12 PROCEDURE — 3074F SYST BP LT 130 MM HG: CPT | Performed by: INTERNAL MEDICINE

## 2022-10-12 PROCEDURE — G8427 DOCREV CUR MEDS BY ELIG CLIN: HCPCS | Performed by: INTERNAL MEDICINE

## 2022-10-12 PROCEDURE — 1036F TOBACCO NON-USER: CPT | Performed by: INTERNAL MEDICINE

## 2022-10-12 NOTE — PROGRESS NOTES
Cardiac Electrophysiology Outpatient Progress Note    Kriss Sutherland  1935  Date of Service: 12/14/2022  Referring Provider/PCP: Bartolome Oviedo MD  Chief Complaint:   Chief Complaint   Patient presents with    Atrial Fibrillation     3 month          Patient Active Problem List    Diagnosis Date Noted    Chronic combined systolic and diastolic CHF (congestive heart failure) ([facility] [address] 10/08/2015     Priority: High    ICD (implantable cardioverter-defibrillator) discharge      Priority: Medium    Abdominal aortic aneurysm (AAA) 3.0 cm to 5.5 cm in diameter in male 04/27/2022     Priority: Medium    Symptomatic anemia 03/12/2022    GI bleed 03/12/2022    Acute on chronic combined systolic and diastolic CHF (congestive heart failure) ([address] 03/01/2022    Combined congestive systolic and diastolic heart failure ([facility] [address] 03/01/2022    Gastroesophageal reflux disease without esophagitis 03/01/2022    Coronary artery disease involving coronary bypass graft 03/01/2022    Near syncope 09/02/2021    SOB (shortness of breath) 10/29/2020    Lung nodule     Acute on chronic diastolic (congestive) heart failure ([address]     Pure hypercholesterolemia     Stage 3b chronic kidney disease ([address]     Transient cerebral ischemia     Aortic valve disease     Other insomnia 05/07/2020    Ischemic heart disease     ICD (implantable cardioverter-defibrillator), dual, in situ     Ventricular tachycardia     GIB (gastrointestinal bleeding) 02/17/2020    Hyperlipidemia LDL goal <100 09/02/2019    Paroxysmal atrial fibrillation ([address] 03/13/2018    High risk medications (not anticoagulants) long-term use 05/30/2017    S/P TAVR (transcatheter aortic valve replacement)     Chronic atrial fibrillation     Coronary artery disease involving native coronary artery of native heart without angina pectoris     Essential hypertension     Vitamin D deficiency 05/06/2011     Cardiovascular history    1.  History of coronary disease with coronary artery bypass surgery in 2003 with a repeat bypass surgery thereafter in 2007.   2. Status post percutaneous coronary interventions thereafter. 3. Dual-chamber implantable cardioverter-defibrillator implantation following the percutaneous coronary intervention procedures. The patient has been followed in my office for his device since 2014. His device was originally placed by Dr. Natalee Mei in [place]. 4. Implantable cardioverter-defibrillator generator change most recently in 2016.  5. Paroxysmal atrial fibrillation requiring antiarrhythmic drug therapy. The patient has been on dofetilide therapy now since 2017. 6. Aortic stenosis. The patient is now post transcatheter aortic valve replacement procedure in 2016.     Current Outpatient Medications   Medication Sig Dispense Refill    ALPRAZolam (XANAX) 0.25 MG tablet Take 0.25 mg by mouth 3 times daily as needed for Sleep.      amiodarone (CORDARONE) 200 MG tablet Take 200 mg by mouth daily Daily beginning 7/16/22 per Dr. Jordyn Mayorga      meclizine (ANTIVERT) 25 MG tablet Take 1 tablet by mouth daily as needed for Dizziness 90 tablet 0    furosemide (LASIX) 40 MG tablet Take 40 mg by mouth See Admin Instructions Alternates 40 mg and 20 mg      metoprolol tartrate (LOPRESSOR) 50 MG tablet Take 50 mg by mouth at bedtime      Metoprolol Tartrate 75 MG TABS Take 75 mg by mouth every morning      pravastatin (PRAVACHOL) 40 MG tablet Take 40 mg by mouth at bedtime      rivaroxaban (XARELTO) 20 MG TABS tablet Take 20 mg by mouth Daily with supper       Multiple Vitamin (MULTIVITAMIN ADULT PO) Take 1 tablet by mouth Daily with lunch      amLODIPine (NORVASC) 2.5 MG tablet Take 1 tablet by mouth daily (Patient taking differently: Take 2.5 mg by mouth nightly) 90 tablet 3    pantoprazole (PROTONIX) 40 MG tablet TAKE 1 TABLET BY MOUTH  DAILY (Patient taking differently: Take 40 mg by mouth Daily with lunch) 90 tablet 3    ferrous sulfate (IRON 325) 325 (65 Fe) MG tablet Take 1 tablet by mouth daily (with breakfast) (Patient taking differently: Take 325 mg by mouth Daily with lunch) 90 tablet 1    MAGNESIUM-OXIDE 400 (240 Mg) MG tablet Take 400 mg by mouth Daily with supper      Multiple Vitamins-Minerals (PRESERVISION AREDS 2) CAPS Take 1 capsule by mouth 2 times daily       Cholecalciferol (VITAMIN D) 2000 UNITS CAPS capsule Take 2,000 Units by mouth Daily with lunch      potassium chloride 20 MEQ/15ML (10%) oral solution Take 7.5 mLs by mouth daily 450 mL 2     No current facility-administered medications for this visit. No Known Allergies    SUBJECTIVE: Jimmie Rutherford is an 44-year-old patient with coronary artery disease, paroxysmal atrial fibrillation and aortic stenosis, s/p TAVR, ventricular tachycardia and ICD placement in [place] followed in my office since 2014 presents for follow-up today. He was on dofetilide therapy for many years to maintain sinus rhythm. However due to recurrent episodes of A. fib I switched him to amiodarone therapy this year. He denies any new cardiac symptoms at the present time. No chest pain or shortness of breath. No symptoms of paroxysmal nocturnal dyspnea, orthopnea or leg edema. No syncope or near syncope and no recent ICD discharges. ROS:   Constitutional: Negative for fever, activity change and appetite change. HENT: Negative for epistaxis, difficulty swallowing. Eyes: Negative for blurred vision or double vision. Respiratory: Negative for cough, chest tightness, shortness of breath and wheezing. Cardiovascular: Negative for chest pain, palpitations and leg swelling. Gastrointestinal: Negative for abdominal pain, heartburn, or blood in stool. Genitourinary: Negative for hematuria, burning or frequency. Musculoskeletal: Negative for myalgias, stiffness, or swelling. Skin: Negative for rash, pain, or lumps. Neurological: Negative for syncope, seizures, or headaches.    Psychiatric/Behavioral: Negative for confusion and agitation. The patient is not nervous/anxious. PHYSICAL EXAM:  Vitals:    10/12/22 1257   BP: 116/60   Pulse: 61   Resp: 16   Weight: 174 lb (78.9 kg)   Height: 5' 8\" (1.727 m)   Standing /54  Constitutional: Oriented to person, place, and time. Well-developed and cooperative. Head: Normocephalic and atraumatic. Eyes: Conjunctivae are normal. Pupils are equal, round, and reactive to light. Neck: No hepatojugular reflux and no JVD present. Cardiovascular: Normally placed PMI, normal S1 and S2 with left sternal border and the apex, grade 3/6 systolic ejection murmur at the aortic area and left sternal border  Pulmonary/Chest: Effort normal and breath sounds normal. No respiratory distress. Abdominal: Soft. Normal appearance and nondistended  Musculoskeletal: Normal range of motion of all extremities, no muscle weakness. Neurological: Alert and oriented to person, place, and time. Gait normal.   Skin: Skin is warm and dry. No bruising, no ecchymosis and no rash noted. Extremity: No clubbing or cyanosis. No edema. Psychiatric: Normal mood and affect.  Thought content normal.     Pertinent Labs:  CBC:   WBC   Date Value Ref Range Status   10/13/2022 7.0 4.5 - 11.5 E9/L Final   08/23/2022 8.0 4.5 - 11.5 E9/L Final   08/15/2022 8.6 4.5 - 11.5 E9/L Final     Hemoglobin   Date Value Ref Range Status   10/13/2022 12.0 (L) 12.5 - 16.5 g/dL Final   08/23/2022 11.6 (L) 12.5 - 16.5 g/dL Final   08/15/2022 13.2 12.5 - 16.5 g/dL Final     Hematocrit   Date Value Ref Range Status   10/13/2022 38.6 37.0 - 54.0 % Final   08/23/2022 34.5 (L) 37.0 - 54.0 % Final   08/15/2022 40.1 37.0 - 54.0 % Final     Platelets   Date Value Ref Range Status   10/13/2022 232 130 - 450 E9/L Final   08/23/2022 243 130 - 450 E9/L Final   08/15/2022 272 130 - 450 E9/L Final     BMP:   Lab Results   Component Value Date/Time     11/04/2022 09:53 AM    K 4.2 11/04/2022 09:53 AM    K 3.8 08/23/2022 08:56 AM    CL 98 11/04/2022 09:53 AM    CO2 29 11/04/2022 09:53 AM    BUN 24 11/04/2022 09:53 AM    CREATININE 1.3 11/04/2022 09:53 AM    GLUCOSE 148 11/04/2022 09:53 AM    CALCIUM 9.2 11/04/2022 09:53 AM      ABGs: No results found for: PHART, PO2ART, ITY3CGX  INR:   Lab Results   Component Value Date    INR 1.5 06/15/2022    INR 1.3 03/14/2022    INR 1.3 03/13/2022     BNP:   Lab Results   Component Value Date    BNP 47 08/27/2013     TSH:   Lab Results   Component Value Date    TSH 2.320 10/13/2022      Cardiac Injury Profile: Total CK   Date Value Ref Range Status   06/28/2017 94 20 - 200 U/L Final     CK-MB   Date Value Ref Range Status   06/28/2017 3.8 0.0 - 7.7 ng/mL Final     Troponin   Date Value Ref Range Status   10/29/2020 <0.01 0.00 - 0.03 ng/mL Final     Comment:     TROPONIN T BLOOD LEVELS:         0.03 ng/mL     Upper Reference Limit  0.04 - 0.09 ng/mL     Possible myocardial injury      >= 0.10 ng/mL     Myocardial injury       Lipid Profile:   Lab Results   Component Value Date/Time    TRIG 191 06/28/2022 06:29 AM    HDL 39 06/28/2022 06:29 AM    LDLCALC 65 06/28/2022 06:29 AM    CHOL 142 06/28/2022 06:29 AM      Hemoglobin A1C:   Lab Results   Component Value Date    LABA1C 5.5 06/28/2022         Pertinent Cardiac Testing:       ECG 12/14/2022: Atrial paced rhythm,RV pacing(fusion)    Echocardiography March 2022    Conclusions      Summary   Normal left ventricular systolic function. Ejection fraction is visually estimated at 60%. Mildly dilated right ventricle with normal right ventricular function. There is doppler evidence of stage II diastolic dysfunction. Moderately dilated left atrium by volume index. Mild mitral regurgitation. 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 No significant paravalvular aortic regurgitation. AV peak velocity 2.1 m/s. AV mean gradient 9 mmHg. AV area 1.5 cm2. Mild tricuspid regurgitation. PASP is estimated at 44 mmHg.       Signature ----------------------------------------------------------------   Electronically signed by Chris Palafox MD(Interpreting   physician) on 03/02/2022 12:04 PM    Device Evaluation: [employer]   Make/model Evera XT-DOI 7/28/2016  Mode DDDR (dropped from 60 today due to reduce RV pacing)  P waves  1.0     mV     Impedance    342     Ohms   Pacing threshold  0.75  V@ 0.4    msec  R waves  3.0     MV     Impedance    304     Ohms   Pacing threshold   0.75 V@   0.4  msec   Pacing percentage  A     93.6%        V  89.6%  High voltage impedance  35 ohms  Battery Voltage--2.86 V ,longevity 9 mos  Arrhythmias--No AF or VT  Hemodynamic measurement,Optivol-Unremarkable at present      Evaluation and reprogramming included   Overall device function is normal  All  device programmable settings were evaluated per above, along with iterative adjustments (capture thresholds) to assess and select the most appropriate final programming for consistent delivery off the appropriate therapy and to verify function of the device. I have independently reviewed all of the ECGs and rhythm strips per above    I have personally reviewed the laboratory, cardiac diagnostic and radiographic testing as outlined above:      Impression:    1. Coronary disease, s/p coronary artery bypass surgery 2003 and 2007     2. Paroxysmal atrial fibrillation--patient has been dofetilide therapy until recently  Started on amiodarone in May 2022     3. Aortic stenosis s/p TAVR in 2016     4. Chronic kidney disease--stage IIIa     5. Ventricular tachycardia with ICD discharges this year . Polymorphic and monomorphic ventricular tachycardia triggering ICD shocks     6. Dual-chamber ICD in situ  Appropriate function with episodes of VT/AF on amiodarone  RV pacing on device interrogation is pseudofusion    Recommendations:    1. Reduce the dose of lasix to 40 mg alternating with 20 mg  2.   Liver and thyroid function tests every 6 months, chest x-ray annually  3. ICD reprogrammed to decrease RV pacing percentage  4. ICD remote check in January with a ICD office check in 3 months after that    All of the above was discussed with the patient ,>50% of the time involved face-to-face time providing counseling and or coordination of care with the other providers, preparation for the clinic visit, reviewing records/tests, counseling/education of the patient, ordering medications/tests/procedures, coordinating care, and documenting clinical information in the EHR. Thank you for allowing me to participate in your patient's care.     Abdulaziz Plaza MD, [facility]  Cardiac Electrophysiology  [facility] ([facility]) Physicians  [facility] [place]: [place] [facility]

## 2022-10-13 ENCOUNTER — OFFICE VISIT (OUTPATIENT)
Dept: PRIMARY CARE CLINIC | Age: 87
End: 2022-10-13
Payer: MEDICARE

## 2022-10-13 VITALS
DIASTOLIC BLOOD PRESSURE: 66 MMHG | HEIGHT: 68 IN | SYSTOLIC BLOOD PRESSURE: 134 MMHG | RESPIRATION RATE: 18 BRPM | OXYGEN SATURATION: 97 % | WEIGHT: 174 LBS | BODY MASS INDEX: 26.37 KG/M2 | HEART RATE: 55 BPM | TEMPERATURE: 97.5 F

## 2022-10-13 DIAGNOSIS — R91.1 LUNG NODULE: ICD-10-CM

## 2022-10-13 DIAGNOSIS — E55.9 VITAMIN D DEFICIENCY: ICD-10-CM

## 2022-10-13 DIAGNOSIS — I48.0 PAROXYSMAL ATRIAL FIBRILLATION (HCC): ICD-10-CM

## 2022-10-13 DIAGNOSIS — M51.36 DDD (DEGENERATIVE DISC DISEASE), LUMBAR: ICD-10-CM

## 2022-10-13 DIAGNOSIS — I48.20 CHRONIC ATRIAL FIBRILLATION (HCC): ICD-10-CM

## 2022-10-13 DIAGNOSIS — I25.10 CORONARY ARTERY DISEASE INVOLVING NATIVE CORONARY ARTERY OF NATIVE HEART WITHOUT ANGINA PECTORIS: ICD-10-CM

## 2022-10-13 DIAGNOSIS — Z00.00 MEDICARE ANNUAL WELLNESS VISIT, SUBSEQUENT: Primary | ICD-10-CM

## 2022-10-13 DIAGNOSIS — N18.32 STAGE 3B CHRONIC KIDNEY DISEASE (HCC): ICD-10-CM

## 2022-10-13 DIAGNOSIS — I10 ESSENTIAL HYPERTENSION: ICD-10-CM

## 2022-10-13 DIAGNOSIS — D50.0 BLOOD LOSS ANEMIA: ICD-10-CM

## 2022-10-13 DIAGNOSIS — G47.00 INSOMNIA, UNSPECIFIED TYPE: ICD-10-CM

## 2022-10-13 DIAGNOSIS — Z95.810 ICD (IMPLANTABLE CARDIOVERTER-DEFIBRILLATOR), DUAL, IN SITU: ICD-10-CM

## 2022-10-13 DIAGNOSIS — I71.40 ABDOMINAL AORTIC ANEURYSM (AAA) 3.0 CM TO 5.5 CM IN DIAMETER IN MALE: ICD-10-CM

## 2022-10-13 DIAGNOSIS — H81.10 BENIGN PAROXYSMAL POSITIONAL VERTIGO, UNSPECIFIED LATERALITY: ICD-10-CM

## 2022-10-13 LAB
ALBUMIN SERPL-MCNC: 4.5 G/DL (ref 3.5–5.2)
ALP BLD-CCNC: 150 U/L (ref 40–129)
ALT SERPL-CCNC: 20 U/L (ref 0–40)
ANION GAP SERPL CALCULATED.3IONS-SCNC: 10 MMOL/L (ref 7–16)
AST SERPL-CCNC: 31 U/L (ref 0–39)
BASOPHILS ABSOLUTE: 0.04 E9/L (ref 0–0.2)
BASOPHILS RELATIVE PERCENT: 0.6 % (ref 0–2)
BILIRUB SERPL-MCNC: 0.3 MG/DL (ref 0–1.2)
BUN BLDV-MCNC: 21 MG/DL (ref 6–23)
CALCIUM SERPL-MCNC: 9.3 MG/DL (ref 8.6–10.2)
CHLORIDE BLD-SCNC: 99 MMOL/L (ref 98–107)
CO2: 29 MMOL/L (ref 22–29)
CREAT SERPL-MCNC: 1.3 MG/DL (ref 0.7–1.2)
EOSINOPHILS ABSOLUTE: 0.17 E9/L (ref 0.05–0.5)
EOSINOPHILS RELATIVE PERCENT: 2.4 % (ref 0–6)
GFR AFRICAN AMERICAN: >60
GFR NON-AFRICAN AMERICAN: 52 ML/MIN/1.73
GLUCOSE BLD-MCNC: 83 MG/DL (ref 74–99)
HCT VFR BLD CALC: 38.6 % (ref 37–54)
HEMOGLOBIN: 12 G/DL (ref 12.5–16.5)
IMMATURE GRANULOCYTES #: 0.03 E9/L
IMMATURE GRANULOCYTES %: 0.4 % (ref 0–5)
LYMPHOCYTES ABSOLUTE: 1.29 E9/L (ref 1.5–4)
LYMPHOCYTES RELATIVE PERCENT: 18.4 % (ref 20–42)
MCH RBC QN AUTO: 30.8 PG (ref 26–35)
MCHC RBC AUTO-ENTMCNC: 31.1 % (ref 32–34.5)
MCV RBC AUTO: 99 FL (ref 80–99.9)
MONOCYTES ABSOLUTE: 0.83 E9/L (ref 0.1–0.95)
MONOCYTES RELATIVE PERCENT: 11.8 % (ref 2–12)
NEUTROPHILS ABSOLUTE: 4.65 E9/L (ref 1.8–7.3)
NEUTROPHILS RELATIVE PERCENT: 66.4 % (ref 43–80)
PDW BLD-RTO: 14.3 FL (ref 11.5–15)
PLATELET # BLD: 232 E9/L (ref 130–450)
PMV BLD AUTO: 11.4 FL (ref 7–12)
POTASSIUM SERPL-SCNC: 4.3 MMOL/L (ref 3.5–5)
RBC # BLD: 3.9 E12/L (ref 3.8–5.8)
SODIUM BLD-SCNC: 138 MMOL/L (ref 132–146)
TOTAL PROTEIN: 7 G/DL (ref 6.4–8.3)
TSH SERPL DL<=0.05 MIU/L-ACNC: 2.32 UIU/ML (ref 0.27–4.2)
WBC # BLD: 7 E9/L (ref 4.5–11.5)

## 2022-10-13 PROCEDURE — G0439 PPPS, SUBSEQ VISIT: HCPCS | Performed by: FAMILY MEDICINE

## 2022-10-13 PROCEDURE — G8484 FLU IMMUNIZE NO ADMIN: HCPCS | Performed by: FAMILY MEDICINE

## 2022-10-13 PROCEDURE — 1123F ACP DISCUSS/DSCN MKR DOCD: CPT | Performed by: FAMILY MEDICINE

## 2022-10-13 SDOH — ECONOMIC STABILITY: FOOD INSECURITY: WITHIN THE PAST 12 MONTHS, YOU WORRIED THAT YOUR FOOD WOULD RUN OUT BEFORE YOU GOT MONEY TO BUY MORE.: NEVER TRUE

## 2022-10-13 SDOH — ECONOMIC STABILITY: FOOD INSECURITY: WITHIN THE PAST 12 MONTHS, THE FOOD YOU BOUGHT JUST DIDN'T LAST AND YOU DIDN'T HAVE MONEY TO GET MORE.: NEVER TRUE

## 2022-10-13 ASSESSMENT — PATIENT HEALTH QUESTIONNAIRE - PHQ9
2. FEELING DOWN, DEPRESSED OR HOPELESS: 1
SUM OF ALL RESPONSES TO PHQ QUESTIONS 1-9: 1
1. LITTLE INTEREST OR PLEASURE IN DOING THINGS: 0
SUM OF ALL RESPONSES TO PHQ QUESTIONS 1-9: 1
SUM OF ALL RESPONSES TO PHQ QUESTIONS 1-9: 1
SUM OF ALL RESPONSES TO PHQ9 QUESTIONS 1 & 2: 1
SUM OF ALL RESPONSES TO PHQ QUESTIONS 1-9: 1

## 2022-10-13 ASSESSMENT — LIFESTYLE VARIABLES
HOW MANY STANDARD DRINKS CONTAINING ALCOHOL DO YOU HAVE ON A TYPICAL DAY: PATIENT DOES NOT DRINK
HOW OFTEN DO YOU HAVE A DRINK CONTAINING ALCOHOL: NEVER

## 2022-10-13 ASSESSMENT — SOCIAL DETERMINANTS OF HEALTH (SDOH): HOW HARD IS IT FOR YOU TO PAY FOR THE VERY BASICS LIKE FOOD, HOUSING, MEDICAL CARE, AND HEATING?: NOT HARD AT ALL

## 2022-10-13 NOTE — PROGRESS NOTES
Medicare Annual Wellness Visit    Ochoa Kwan is here for Medicare AWV    Assessment & Plan   Medicare annual wellness visit, subsequent  Age-appropriate recommendations reviewed and advised. Patient will update shingles vaccine, flu shot and his COVID booster in the near future. Blood loss anemia  -     CBC with Auto Differential; Future    Coronary artery disease involving native coronary artery of native heart without angina pectoris  ICD (implantable cardioverter-defibrillator), dual, in situ  Patient following with cardiology and electrophysiology. Patient is on appropriate therapy. Recent changes include decreasing the patient's amiodarone to 200 mg daily and decreasing his Lasix to 40 mg alternating with 20 mg every other day. Patient's ICD recently reprogrammed. Abdominal aortic aneurysm (AAA) 3.0 cm to 5.5 cm in diameter in male  Repeat ultrasound or CT scan in July 2023. Essential hypertension  Patient's blood pressure is stable and well controlled below his goal of 140/90. Ideally would like to keep the patient's blood pressure above 120/60. If patient's blood pressure dips below such we will decrease/discontinue his amlodipine. Paroxysmal atrial fibrillation (HCC)  Patient appears to be in sinus rhythm today. Patient is rate and rhythm control. Anticoagulated. Benign paroxysmal positional vertigo, unspecified laterality  PRN meclizine. Lung nodule  Declines to follow this up in any additional way. Stage 3b chronic kidney disease ([address]  Kidney function has been stable. Insomnia, unspecified type  Patient on Ativan as needed. DDD (degenerative disc disease), lumbar  Stable. No recent issues. Vitamin D deficiency  On vitamin D replacement.       Recommendations for Preventive Services Due: see orders and patient instructions/AVS.  Recommended screening schedule for the next 5-10 years is provided to the patient in written form: see Patient Instructions/AVS.     Return in 4 months (on 2/13/2023) for Medicare Annual Wellness Visit in 1 year, Routine Follow-up of Chronic Conditions. Subjective   The following acute and/or chronic problems were also addressed today:    Patient is a 21-year-old male with a past medical history of CHF, atrial fibrillation, nonsustained ventricular tachycardia, hyperlipidemia, hypertension, history of MI, insomnia, remote history of gastrointestinal hemorrhage with melena, ICD placement, and vitamin D deficiency who presents today for a routine follow-up. Patient was recently admitted to the hospital with shortness of breath. Prior to this he was admitted for his defibrillator going off. Patient was advised by consulting EP to be admitted for cardiac work-up. It was felt that the patient's settings on his defibrillator were causing his shortness of breath. Patient was discharged on amiodarone 200 mg 3 times a day with a taper over time. Patient is now only on amiodarone 200 mg daily. Patient did have a CT scan that showed a possible 1.8 cm x 1.1 cm mass in the patient's right middle lobe of his lung. CKD stage IIIa. Occasional cough w/ cold foods. On PPI and allergy medication. Not always present. Lower back issues: No sig back or knee pain. No swelling. Numbness in his right thigh to foot. Hx of bypass surgery. Was sent to PT in the past.      History of nonsustained ventricular tachycardia: On Amiodarone and, Lasix, and metoprolol. Patient follows both with cardiology and electrophysiology for this issue. ICD in place. History of atrial fibrillation: Patient is rate/rhythm controlled on amiodarone, metoprolol and anticoagulated on   Xarelto. CHF: Patient is to be followed with cardiology and CHF clinic. No recent LE swelling. (Chronic rales on exam). Patient is currently on Lasix 40mg trending every other day with 20 mg of Lasix.   Electrophysiology is to reach out to cardiology/CHF clinic to discuss further usage of Lasix. Hyperlipidemia: Currently on statin. Following with cardiology. Tolerating statin well without any side effects. Hypertension:  BP <140/90. Tolerating his blood pressure medications well. No side effects. Goal is to keep patient's blood pressure above 120/60. If blood pressure is less than this we will consider discontinuing his amlodipine. History of MI: Patient's 1st heart attack was in 2003. Has had multiple stents and bypass surgeries. Patient is following with cardiology. Insomnia/Panic attacks: Some issues with awaking at night. Lung Nodule: Has been discussed in the past in regards to the risk of lung cancer which could result in death. Patient would still like to continue to hold off on bx. 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 Vit D Def: On vitamin D supplementation. Hx of GI bleed: Not on ASA due to Xarelto. For updated CBC. AAA: CT scan in ER recently showed infrarenal abdominal aorta measuring 3.7 cm. She will be due for repeat ultrasound or CT scan in 1 year. Not driving. Health maintenance: Patient is due for shingles immunization. For COVID booster. Due for influenza vaccine. Patient's complete Health Risk Assessment and screening values have been reviewed and are found in Flowsheets. The following problems were reviewed today and where indicated follow up appointments were made and/or referrals ordered. Positive Risk Factor Screenings with Interventions:    Fall Risk:  Do you feel unsteady or are you worried about falling? : (!) yes  2 or more falls in past year?: no  Fall with injury in past year?: no   Fall Risk Interventions:    Home safety tips provided  Patient declines any further evaluation/treatment for this issue    Cognitive:   Words recalled: 2 Words Recalled  Total Score Interpretation: Abnormal Mini-Cog  Did the patient refuse to take the cognition test?: No (unable to complete due to macular degeneration)  Cognitive Impairment Interventions:  Patient declines any further evaluation/treatment for cognitive impairment            Health Habits/Nutrition:  Physical Activity: Inactive    Days of Exercise per Week: 0 days    Minutes of Exercise per Session: 0 min     Have you lost any weight without trying in the past 3 months?: No  Body mass index: (!) 26.45  Have you seen the dentist within the past year?: Yes  Health Habits/Nutrition Interventions:  Lifestyle Modifications. Hearing/Vision:  Do you or your family notice any trouble with your hearing that hasn't been managed with hearing aids?: No  Do you have difficulty driving, watching TV, or doing any of your daily activities because of your eyesight?: (!) Yes  Have you had an eye exam within the past year?: Yes  No results found. Hearing/Vision Interventions:  Vision concerns:  patient encouraged to make appointment with his/her eye specialist            Objective   Vitals:    10/13/22 1119   BP: 134/66   Pulse: 55   Resp: 18   Temp: 97.5 °F (36.4 °C)   TempSrc: Temporal   SpO2: 97%   Weight: 174 lb (78.9 kg)   Height: 5' 8\" (1.727 m)      Body mass index is 26.46 kg/m². General Appearance: alert and oriented to person, place and time, well developed and well- nourished, in no acute distress  Skin: warm and dry, no rash or erythema  Head: normocephalic and atraumatic  Eyes: pupils equal, round, and reactive to light, extraocular eye movements intact, conjunctivae normal  ENT: tympanic membrane, external ear and ear canal normal bilaterally, nose without deformity, nasal mucosa and turbinates normal without polyps  Neck: supple and non-tender without mass, no thyromegaly or thyroid nodules, no cervical lymphadenopathy  Pulmonary/Chest: clear to auscultation bilaterally- no wheezes, rhonchi, normal air movement, no respiratory distress. Rales R>L. Cardiovascular: normal rate, regular rhythm, normal S1 and S2, no murmurs, rubs, clicks, or gallops, distal pulses intact.   Abdomen: soft, non-tender, non-distended, normal bowel sounds, no masses or organomegaly  Extremities: no cyanosis, clubbing. Mild lower extremity edema. Musculoskeletal: Age-appropriate range of motion, no joint swelling, deformity or tenderness  Neurologic: reflexes normal and symmetric, no cranial nerve deficit, gait, coordination and speech normal for age      No Known Allergies  Prior to Visit Medications    Medication Sig Taking? Authorizing Provider   ALPRAZolam (XANAX) 0.25 MG tablet Take 0.25 mg by mouth 3 times daily as needed for Sleep.  Yes Historical Provider, MD   amiodarone (CORDARONE) 200 MG tablet Take 200 mg by mouth daily Daily beginning 7/16/22 per Dr. Cristiane Foy Yes Historical Provider, MD   meclizine (ANTIVERT) 25 MG tablet Take 1 tablet by mouth daily as needed for Dizziness Yes Miranda Evans MD   furosemide (LASIX) 40 MG tablet Take 40 mg by mouth See Admin Instructions Alternates 40 mg and 20 mg Yes Historical Provider, MD   metoprolol tartrate (LOPRESSOR) 50 MG tablet Take 50 mg by mouth at bedtime Yes Historical Provider, MD   Metoprolol Tartrate 75 MG TABS Take 75 mg by mouth every morning Yes Historical Provider, MD   potassium chloride (KLOR-CON M) 10 MEQ extended release tablet Take 10 mEq by mouth daily Yes Historical Provider, MD   pravastatin (PRAVACHOL) 40 MG tablet Take 40 mg by mouth at bedtime Yes Historical Provider, MD   rivaroxaban (XARELTO) 20 MG TABS tablet Take 20 mg by mouth Daily with supper  Yes Historical Provider, MD   Multiple Vitamin (MULTIVITAMIN ADULT PO) Take 1 tablet by mouth Daily with lunch Yes Historical Provider, MD   amLODIPine (NORVASC) 2.5 MG tablet Take 1 tablet by mouth daily  Patient taking differently: Take 2.5 mg by mouth nightly Yes Vidhi Jhaveri DO   pantoprazole (PROTONIX) 40 MG tablet TAKE 1 TABLET BY MOUTH  DAILY  Patient taking differently: Take 40 mg by mouth Daily with lunch Yes Miranda Evans MD   ferrous sulfate (IRON 325) 325 (65 Fe) MG tablet Take 1 tablet by mouth daily (with breakfast)  Patient taking differently: Take 325 mg by mouth Daily with lunch Yes Matt Marcelino MD   MAGNESIUM-OXIDE 400 (240 Mg) MG tablet Take 400 mg by mouth Daily with supper Yes Historical Provider, MD   Multiple Vitamins-Minerals (PRESERVISION AREDS 2) CAPS Take 1 capsule by mouth 2 times daily  Yes Historical Provider, MD   Cholecalciferol (VITAMIN D) 2000 UNITS CAPS capsule Take 2,000 Units by mouth Daily with lunch Yes Historical Provider, MD       CareTeam (Including outside providers/suppliers regularly involved in providing care):   Patient Care Team:  Matt Marcelino MD as PCP - General (Family Medicine)  Matt Marcelino MD as PCP - REHABILITATION [facility] Empaneled Provider  Angelia Price MD as Surgeon (Orthopedic Surgery)  Lori Sweeney DO as Consulting Physician (Cardiology)  Xavier Dominguez MD as Consulting Physician (Electrophysiology)     Reviewed and updated this visit:  Tobacco  Allergies  Meds  Problems  Med Hx  Surg Hx  Soc Hx  Fam Hx

## 2022-10-13 NOTE — PATIENT INSTRUCTIONS
Personalized Preventive Plan for [facility] - 10/13/2022  Medicare offers a range of preventive health benefits. Some of the tests and screenings are paid in full while other may be subject to a deductible, co-insurance, and/or copay. Some of these benefits include a comprehensive review of your medical history including lifestyle, illnesses that may run in your family, and various assessments and screenings as appropriate. After reviewing your medical record and screening and assessments performed today your provider may have ordered immunizations, labs, imaging, and/or referrals for you. A list of these orders (if applicable) as well as your Preventive Care list are included within your After Visit Summary for your review. Other Preventive Recommendations:    A preventive eye exam performed by an eye specialist is recommended every 1-2 years to screen for glaucoma; cataracts, macular degeneration, and other eye disorders. A preventive dental visit is recommended every 6 months. Try to get at least 150 minutes of exercise per week or 10,000 steps per day on a pedometer . Order or download the FREE \"Exercise & Physical Activity: Your Everyday Guide\" from [employer]. Call 0-389.615.6515 or search [employer] online. You need 1648-4838 mg of calcium and 7343-5229 IU of vitamin D per day. It is possible to meet your calcium requirement with diet alone, but a vitamin D supplement is usually necessary to meet this goal.  When exposed to the sun, use a sunscreen that protects against both UVA and UVB radiation with an SPF of 30 or greater. Reapply every 2 to 3 hours or after sweating, drying off with a towel, or swimming. Always wear a seat belt when traveling in a car. Always wear a helmet when riding a bicycle or motorcycle.

## 2022-10-20 ENCOUNTER — TELEPHONE (OUTPATIENT)
Dept: PRIMARY CARE CLINIC | Age: 87
End: 2022-10-20

## 2022-10-26 ENCOUNTER — OFFICE VISIT (OUTPATIENT)
Dept: PRIMARY CARE CLINIC | Age: 87
End: 2022-10-26
Payer: MEDICARE

## 2022-10-26 VITALS
RESPIRATION RATE: 20 BRPM | TEMPERATURE: 97.6 F | HEIGHT: 68 IN | SYSTOLIC BLOOD PRESSURE: 138 MMHG | BODY MASS INDEX: 26.37 KG/M2 | OXYGEN SATURATION: 94 % | HEART RATE: 68 BPM | DIASTOLIC BLOOD PRESSURE: 64 MMHG | WEIGHT: 174 LBS

## 2022-10-26 DIAGNOSIS — R05.1 ACUTE COUGH: Primary | ICD-10-CM

## 2022-10-26 DIAGNOSIS — J01.00 ACUTE NON-RECURRENT MAXILLARY SINUSITIS: ICD-10-CM

## 2022-10-26 LAB
Lab: NORMAL
PERFORMING INSTRUMENT: NORMAL
QC PASS/FAIL: NORMAL
SARS-COV-2, POC: NORMAL

## 2022-10-26 PROCEDURE — 3078F DIAST BP <80 MM HG: CPT | Performed by: FAMILY MEDICINE

## 2022-10-26 PROCEDURE — 99213 OFFICE O/P EST LOW 20 MIN: CPT | Performed by: FAMILY MEDICINE

## 2022-10-26 PROCEDURE — G8484 FLU IMMUNIZE NO ADMIN: HCPCS | Performed by: FAMILY MEDICINE

## 2022-10-26 PROCEDURE — 1123F ACP DISCUSS/DSCN MKR DOCD: CPT | Performed by: FAMILY MEDICINE

## 2022-10-26 PROCEDURE — 87426 SARSCOV CORONAVIRUS AG IA: CPT | Performed by: FAMILY MEDICINE

## 2022-10-26 PROCEDURE — G8417 CALC BMI ABV UP PARAM F/U: HCPCS | Performed by: FAMILY MEDICINE

## 2022-10-26 PROCEDURE — 3074F SYST BP LT 130 MM HG: CPT | Performed by: FAMILY MEDICINE

## 2022-10-26 PROCEDURE — G8427 DOCREV CUR MEDS BY ELIG CLIN: HCPCS | Performed by: FAMILY MEDICINE

## 2022-10-26 PROCEDURE — 1036F TOBACCO NON-USER: CPT | Performed by: FAMILY MEDICINE

## 2022-10-26 RX ORDER — BENZONATATE 100 MG/1
100 CAPSULE ORAL 3 TIMES DAILY PRN
Qty: 21 CAPSULE | Refills: 0 | Status: SHIPPED | OUTPATIENT
Start: 2022-10-26 | End: 2022-11-02

## 2022-10-26 RX ORDER — DOXYCYCLINE HYCLATE 100 MG
100 TABLET ORAL 2 TIMES DAILY
Qty: 14 TABLET | Refills: 0 | Status: SHIPPED | OUTPATIENT
Start: 2022-10-26 | End: 2022-11-02

## 2022-10-26 NOTE — PROGRESS NOTES
10/26/22  Lacretia Plater : 1935 Sex: male  Age: 82 y.o. Chief Complaint   Patient presents with    URI     Cough         HPI: : URI  The patient states that they have had rhinorrhea, cough, min congestion for the last 10 days. Cough is productive with sputum. The patient also reports nasal congestion and mild sore throat. Positive for nasal discharge. The patient has had general malaise. No fever or chills but has felt warm at times. Patient denies chest discomfort. No dyspnea. No vomiting or diarrhea. The patient presents for evaluation. ROS:  Const: Positives and pertinent negatives as per HPI. All others reviewed and are negative.         Current Outpatient Medications:     doxycycline hyclate (VIBRA-TABS) 100 MG tablet, Take 1 tablet by mouth 2 times daily for 7 days Pharmacist:  May substitute to Monohydrate prn or capsules prn, Disp: 14 tablet, Rfl: 0    benzonatate (TESSALON PERLES) 100 MG capsule, Take 1 capsule by mouth 3 times daily as needed for Cough, Disp: 21 capsule, Rfl: 0    ALPRAZolam (XANAX) 0.25 MG tablet, Take 0.25 mg by mouth 3 times daily as needed for Sleep., Disp: , Rfl:     amiodarone (CORDARONE) 200 MG tablet, Take 200 mg by mouth daily Daily beginning 22 per Dr. Evonne Petty, Disp: , Rfl:     meclizine (ANTIVERT) 25 MG tablet, Take 1 tablet by mouth daily as needed for Dizziness, Disp: 90 tablet, Rfl: 0    furosemide (LASIX) 40 MG tablet, Take 40 mg by mouth See Admin Instructions Alternates 40 mg and 20 mg, Disp: , Rfl:     metoprolol tartrate (LOPRESSOR) 50 MG tablet, Take 50 mg by mouth at bedtime, Disp: , Rfl:     Metoprolol Tartrate 75 MG TABS, Take 75 mg by mouth every morning, Disp: , Rfl:     potassium chloride (KLOR-CON M) 10 MEQ extended release tablet, Take 10 mEq by mouth daily, Disp: , Rfl:     pravastatin (PRAVACHOL) 40 MG tablet, Take 40 mg by mouth at bedtime, Disp: , Rfl:     rivaroxaban (XARELTO) 20 MG TABS tablet, Take 20 mg by mouth Daily with supper , Disp: , Rfl:     Multiple Vitamin (MULTIVITAMIN ADULT PO), Take 1 tablet by mouth Daily with lunch, Disp: , Rfl:     amLODIPine (NORVASC) 2.5 MG tablet, Take 1 tablet by mouth daily (Patient taking differently: Take 2.5 mg by mouth nightly), Disp: 90 tablet, Rfl: 3    pantoprazole (PROTONIX) 40 MG tablet, TAKE 1 TABLET BY MOUTH  DAILY (Patient taking differently: Take 40 mg by mouth Daily with lunch), Disp: 90 tablet, Rfl: 3    ferrous sulfate (IRON 325) 325 (65 Fe) MG tablet, Take 1 tablet by mouth daily (with breakfast) (Patient taking differently: Take 325 mg by mouth Daily with lunch), Disp: 90 tablet, Rfl: 1    MAGNESIUM-OXIDE 400 (240 Mg) MG tablet, Take 400 mg by mouth Daily with supper, Disp: , Rfl:     Multiple Vitamins-Minerals (PRESERVISION AREDS 2) CAPS, Take 1 capsule by mouth 2 times daily , Disp: , Rfl:     Cholecalciferol (VITAMIN D) 2000 UNITS CAPS capsule, Take 2,000 Units by mouth Daily with lunch, Disp: , Rfl:   No Known Allergies    Past Medical History:   Diagnosis Date    A-fib ([facility]     Arrhythmia     Carotid artery stenosis     CHF (congestive heart failure) ([facility])     Heart valve problem     Hyperlipidemia     Hypertension     ICD (implantable cardiac defibrillator) in place 2007    [facility] [place]  XP#[id number] generator changed 7/28/16  Dr Clarisa Tomlinson    MI (mitral incompetence) 2003    MI (myocardial infarction) ([facility] 2003     Past Surgical History:   Procedure Laterality Date    CARDIAC CATHETERIZATION Right 03/03/2017    Dr. Tawnya Jackson  2007. 2008 2007 78 shocks replaced 2008 [employer]     CARDIAC DEFIBRILLATOR PLACEMENT  07/28/2016    [employer] Dual ICD gen change    CARDIAC SURGERY N/A 09/04/2021    cardioversion performed by Omar Dunne MD at [address]  10/29/2020    Successful    (Dr. Clarisa Tomlinson)    CARDIOVERSION  05/12/2022    Successful   Dr Clarisa Tomlinson    COLONOSCOPY N/A 02/24/2020    COLONOSCOPY DIAGNOSTIC performed by Roque Danielson MD at [facility] [address] N/A 2022    COLONOSCOPY WITH BIOPSY performed by Lukasz Frank MD at [address] GRAFT  2003    DIAGNOSTIC CARDIAC CATH LAB PROCEDURE      DIAGNOSTIC CARDIAC CATH LAB PROCEDURE  2008    stent    JOINT REPLACEMENT  2011    r hip surgery Dr Tanja Campbell  2017    Dr. Guido Holter and Dr. William Sutherland- TAVR Josie 26mm valve    UPPER GASTROINTESTINAL ENDOSCOPY N/A 2019    EGD ESOPHAGOGASTRODUODENOSCOPY performed by Elizabeth Stuart MD at [address] [facility] [address] [facility] [address] 2020    EGD ESOPHAGOGASTRODUODENOSCOPY performed by Roque Danielson MD at [address] [facility] [address] 2022    EGD CONTROL HEMORRHAGE performed by Lukasz Frank MD at [address] [place] [address] N/A 2022    EGD ESOPHAGOGASTRODUODENOSCOPY performed by Lukasz Frank MD at [address] [facility]       No family history on file.   Social History     Socioeconomic History    Marital status:      Spouse name: Ann Rogel    Number of children: 2    Years of education: 12     Highest education level: Associate degree: academic program   Occupational History    Not on file   Tobacco Use    Smoking status: Former     Packs/day: 0.50     Years: 3.00     Pack years: 1.50     Types: Cigarettes     Quit date: 1973     Years since quittin.7    Smokeless tobacco: Never    Tobacco comments:     Quit smoking in    Vaping Use    Vaping Use: Never used   Substance and Sexual Activity    Alcohol use: No    Drug use: No    Sexual activity: Not on file   Other Topics Concern    Not on file   Social History Narrative    1 cup coffee daily; occ pop     Social Determinants of Health     Financial Resource Strain: Low Risk     Difficulty of Paying Living Expenses: Not hard at all   Food Insecurity: No Food Insecurity    Worried About Running Out of Food in the Last Year: Never true    [address] in the Last Year: Never true   Transportation Needs: No Transportation Needs    Lack of Transportation (Medical): No    Lack of Transportation (Non-Medical): No   Physical Activity: Inactive    Days of Exercise per Week: 0 days    Minutes of Exercise per Session: 0 min   Stress: No Stress Concern Present    Feeling of Stress : Not at all   Social Connections: Moderately Isolated    Frequency of Communication with Friends and Family: Three times a week    Frequency of Social Gatherings with Friends and Family: Once a week    Attends Congregational Services: Never    Active Member of Clubs or Organizations: No    Attends Club or Organization Meetings: Never    Marital Status:    Intimate Partner Violence: Not on file   Housing Stability: Not on file         Vitals:    10/26/22 1331   BP: 138/64   Pulse: 68   Resp: 20   Temp: 97.6 °F (36.4 °C)   TempSrc: Temporal   SpO2: 94%   Weight: 174 lb (78.9 kg)   Height: 5' 8\" (1.727 m)       Exam:  Physical Exam  Constitutional:       Appearance: He is well-developed. HENT:      Head: Normocephalic. Eyes:      Extraocular Movements: Extraocular movements intact. Conjunctiva/sclera: Conjunctivae normal.   Cardiovascular:      Rate and Rhythm: Normal rate and regular rhythm. Heart sounds: Normal heart sounds. No murmur heard. Pulmonary:      Effort: Pulmonary effort is normal.      Breath sounds: Normal breath sounds. No wheezing. Comments: Possible rales right lower base. Abdominal:      General: Bowel sounds are normal.      Palpations: Abdomen is soft. Tenderness: There is no abdominal tenderness. Musculoskeletal:      Right lower leg: No edema. Left lower leg: No edema. Neurological:      General: No focal deficit present. Mental Status: He is alert.       Comments: Cranial nerves grossly intact   Psychiatric:         Mood and Affect: Mood normal.         Judgment: Judgment normal.       Assessment and Plan:  Ronald Moore was seen today for uri. Diagnoses and all orders for this visit:    Acute cough  -     POCT COVID-19, Antigen  -     doxycycline hyclate (VIBRA-TABS) 100 MG tablet; Take 1 tablet by mouth 2 times daily for 7 days Pharmacist:  May substitute to Monohydrate prn or capsules prn  -     benzonatate (TESSALON PERLES) 100 MG capsule; Take 1 capsule by mouth 3 times daily as needed for Cough  -     XR CHEST (2 VW); Future    Acute non-recurrent maxillary sinusitis  -     doxycycline hyclate (VIBRA-TABS) 100 MG tablet; Take 1 tablet by mouth 2 times daily for 7 days Pharmacist:  May substitute to Monohydrate prn or capsules prn  -     benzonatate (TESSALON PERLES) 100 MG capsule; Take 1 capsule by mouth 3 times daily as needed for Cough  Negative COVID-19 test.  Check x-ray given rales on exam.  Antibiotic as noted above. Patient also start second-generation antihistamine and Flonase. Return if symptoms worsen or fail to improve. The above treatment regimen was discussed at length and all questions in regards to such were answered. Antibiotics were reviewed including common side effects and rare side effects including but not limited to C. diff infection. Patient is to proceed to the emergency department with any worsening symptoms. If no improvement of symptoms within 48 hours or any worsening symptoms patient to proceed back to our walk-in clinic.     Seen By:   Anny Schaeffer MD

## 2022-11-02 ENCOUNTER — HOSPITAL ENCOUNTER (OUTPATIENT)
Dept: OTHER | Age: 87
Setting detail: THERAPIES SERIES
Discharge: HOME | End: 2022-11-02
Payer: MEDICARE

## 2022-11-04 ENCOUNTER — HOSPITAL ENCOUNTER (OUTPATIENT)
Dept: OTHER | Age: 87
Setting detail: THERAPIES SERIES
Discharge: HOME | End: 2022-11-04
Payer: MEDICARE

## 2022-11-04 VITALS
SYSTOLIC BLOOD PRESSURE: 128 MMHG | HEART RATE: 60 BPM | RESPIRATION RATE: 18 BRPM | WEIGHT: 174 LBS | BODY MASS INDEX: 26.46 KG/M2 | OXYGEN SATURATION: 93 % | DIASTOLIC BLOOD PRESSURE: 60 MMHG

## 2022-11-04 LAB
ANION GAP SERPL CALCULATED.3IONS-SCNC: 11 MMOL/L (ref 7–16)
BUN BLDV-MCNC: 24 MG/DL (ref 6–23)
CALCIUM SERPL-MCNC: 9.2 MG/DL (ref 8.6–10.2)
CHLORIDE BLD-SCNC: 98 MMOL/L (ref 98–107)
CO2: 29 MMOL/L (ref 22–29)
CREAT SERPL-MCNC: 1.3 MG/DL (ref 0.7–1.2)
GFR SERPL CREATININE-BSD FRML MDRD: 54 ML/MIN/1.73
GLUCOSE BLD-MCNC: 148 MG/DL (ref 74–99)
POTASSIUM SERPL-SCNC: 4.2 MMOL/L (ref 3.5–5)
PRO-BNP: 487 PG/ML (ref 0–450)
SODIUM BLD-SCNC: 138 MMOL/L (ref 132–146)

## 2022-11-04 PROCEDURE — 80048 BASIC METABOLIC PNL TOTAL CA: CPT

## 2022-11-04 PROCEDURE — 99214 OFFICE O/P EST MOD 30 MIN: CPT

## 2022-11-04 PROCEDURE — 36415 COLL VENOUS BLD VENIPUNCTURE: CPT

## 2022-11-04 PROCEDURE — 83880 ASSAY OF NATRIURETIC PEPTIDE: CPT

## 2022-11-04 NOTE — PROGRESS NOTES
Congestive Heart Failure [address]   [facility] [place]  [address]   1935          Referring Provider: GINETTE Desai-CNP  Primary Care Physician: Dr. Yuliana Pool  Cardiologist: Dr. Edgar Ott  Nephrologist:         History of Present Illness:     Waqas Enriquez is a 82 y.o. male with a history of HFpEF, most recent EF 60% on 3/2/2022. Patient Story:    He does not have dyspnea with exertion, shortness of breath, or decline in overall functional capacity. He does not have orthopnea, PND, nocturnal cough or hemoptysis. He does not have abdominal distention or bloating, early satiety, anorexia/change in appetite. He does have a good urinary response to  oral diuretic. He does not have lower extremity edema. He ocassionally gets lightheadedness,Hx of Vertigo dizziness. He denies palpitations, syncope or near syncope. He does not complain of chest pain, pressure, discomfort. No Known Allergies        Prior to Visit Medications    Medication Sig Taking? Authorizing Provider   ALPRAZolam (XANAX) 0.25 MG tablet Take 0.25 mg by mouth 3 times daily as needed for Sleep.   Historical Provider, MD   amiodarone (CORDARONE) 200 MG tablet Take 200 mg by mouth daily Daily beginning 7/16/22 per Dr. Zena Kim Provider, MD   meclizine (ANTIVERT) 25 MG tablet Take 1 tablet by mouth daily as needed for Dizziness  Eli Son, MD   furosemide (LASIX) 40 MG tablet Take 40 mg by mouth See Admin Instructions Alternates 40 mg and 20 mg  Historical Provider, MD   metoprolol tartrate (LOPRESSOR) 50 MG tablet Take 50 mg by mouth at bedtime  Historical Provider, MD   Metoprolol Tartrate 75 MG TABS Take 75 mg by mouth every morning  Historical Provider, MD   potassium chloride (KLOR-CON M) 10 MEQ extended release tablet Take 10 mEq by mouth daily  Historical Provider, MD   pravastatin (PRAVACHOL) 40 MG tablet Take 40 mg by mouth at bedtime Historical Provider, MD   rivaroxaban (XARELTO) 20 MG TABS tablet Take 20 mg by mouth Daily with supper   Historical Provider, MD   Multiple Vitamin (MULTIVITAMIN ADULT PO) Take 1 tablet by mouth Daily with lunch  Historical Provider, MD   amLODIPine (NORVASC) 2.5 MG tablet Take 1 tablet by mouth daily  Patient taking differently: Take 2.5 mg by mouth nightly  Denton Feng DO   pantoprazole (PROTONIX) 40 MG tablet TAKE 1 TABLET BY MOUTH  DAILY  Patient taking differently: Take 40 mg by mouth Daily with lunch  Deny Morrison MD   ferrous sulfate (IRON 325) 325 (65 Fe) MG tablet Take 1 tablet by mouth daily (with breakfast)  Patient taking differently: Take 325 mg by mouth Daily with lunch  Deny Morrison MD   MAGNESIUM-OXIDE 400 (240 Mg) MG tablet Take 400 mg by mouth Daily with supper  Historical Provider, MD   Multiple Vitamins-Minerals (PRESERVISION AREDS 2) CAPS Take 1 capsule by mouth 2 times daily   Historical Provider, MD   Cholecalciferol (VITAMIN D) 2000 UNITS CAPS capsule Take 2,000 Units by mouth Daily with lunch  Historical Provider, MD           Physical Examination:     /60   Pulse 60   Resp 18   Wt 174 lb (78.9 kg)   SpO2 93%   BMI 26.46 kg/m²     Assessment  Charting Type: Shift assessment                   Respiratory  Respiratory Pattern: Regular  Respiratory Depth: Normal  Respiratory Quality/Effort: Unlabored  Chest Assessment: Chest expansion symmetrical  L Breath Sounds: Clear, Fine Crackles  R Breath Sounds: Clear, Fine Crackles    Breath Sounds  Right Upper Lobe: Clear  Right Middle Lobe: Clear  Right Lower Lobe: Fine Crackles  Left Upper Lobe: Clear  Left Lower Lobe: Fine Crackles         Cardiac  Cardiac Regularity: Regular  Cardiac Rhythm: AV paced    Rhythm Interpretation  Heart Rate: 60         Gastrointestinal  Abdominal (WDL): Within Defined Limits  RUQ Bowel Sounds: Active  LUQ Bowel Sounds: Active  RLQ Bowel Sounds: Active  LLQ Bowel Sounds:  Active          Bowel Sounds  RUQ Bowel Sounds: Active  LUQ Bowel Sounds: Active  RLQ Bowel Sounds: Active  LLQ Bowel Sounds: Active    Peripheral Vascular  Peripheral Vascular (WDL): Within Defined Limits  Edema: None                   Genitourinary  Genitourinary (WDL): Within Defined Limits    Psychosocial  Psychosocial (WDL): Within Defined Limits                        Heart Rate: 60                     LAB DATA:    Last 3 BMP      Sodium (mmol/L)   Date Value   11/04/2022 138   10/13/2022 138   10/05/2022 136     Potassium (mmol/L)   Date Value   11/04/2022 4.2   10/13/2022 4.3   10/05/2022 4.1     Potassium reflex Magnesium (mmol/L)   Date Value   08/23/2022 3.8   06/27/2022 4.6   03/12/2022 4.3     Chloride (mmol/L)   Date Value   11/04/2022 98   10/13/2022 99   10/05/2022 97 (L)     CO2 (mmol/L)   Date Value   11/04/2022 29   10/13/2022 29   10/05/2022 29     BUN (mg/dL)   Date Value   11/04/2022 24 (H)   10/13/2022 21   10/05/2022 19     Glucose (mg/dL)   Date Value   11/04/2022 148 (H)   10/13/2022 83   10/05/2022 126 (H)     Calcium (mg/dL)   Date Value   11/04/2022 9.2   10/13/2022 9.3   10/05/2022 9.2       Last 3 BNP       Pro-BNP (pg/mL)   Date Value   11/04/2022 487 (H)   10/05/2022 564 (H)   09/14/2022 542 (H)          CBC: No results for input(s): WBC, HGB, PLT in the last 72 hours. BMP:    Recent Labs     11/04/22  0953      K 4.2   CL 98   CO2 29   BUN 24*   CREATININE 1.3*   GLUCOSE 148*           Hepatic: No results for input(s): AST, ALT, ALB, BILITOT, ALKPHOS in the last 72 hours. Troponin: No results for input(s): TROPONINI in the last 72 hours. BNP: No results for input(s): BNP in the last 72 hours. Lipids: No results for input(s): CHOL, HDL in the last 72 hours. Invalid input(s): LDLCALCU  INR: No results for input(s): INR in the last 72 hours.         WEIGHTS:    Wt Readings from Last 3 Encounters:   11/04/22 174 lb (78.9 kg)   10/26/22 174 lb (78.9 kg)   10/13/22 174 lb (78.9 kg) TELEMETRY:  Cardiac Regularity: Regular  Cardiac Rhythm/Interpretation: AV paced        ASSESSMENT:  Kelly Smith is euvolemic with stable weight. His diuretic is alternating 40 mg and 20 mg and he says he is responding well. Interventions completed this visit:  IV diuretics given- No  Lab work obtained yes, proBNP, BMP  Reviewed currently prescribed medications with patient, educated on importance of compliance and answered any questions regarding their medication  Educated on signs and symptoms of HF  Educated on low sodium diet    PLAN:  Scheduled to follow up in CHF clinic on   Future Appointments   Date Time Provider Cezar Gamez   1/23/2023 10:50 AM SCHEDULE, [address]   2/16/2023  1:30 PM MD PRISCILLA [facility]   3/1/2023 12:00 PM YAJAIRA CHF ROOM 2 YAJAIRA [facility] [address]     Given clinic phone number [phone] and aware of signs and symptoms to call with any HF change in symptoms.

## 2022-11-15 ENCOUNTER — TELEPHONE (OUTPATIENT)
Dept: PRIMARY CARE CLINIC | Age: 87
End: 2022-11-15

## 2022-11-15 NOTE — TELEPHONE ENCOUNTER
----- Message from Aries [place] sent at 11/15/2022  9:18 AM EST -----  Subject: Medication Problem    Medication: MAGNESIUM-OXIDE 400 (240 Mg) MG tablet  Dosage: 240mg  Ordering Provider: Kim Sotomayor    Question/Problem: Patient called stating that he has started recently   having trouble swallowing all his medications he is on. I confirmed not   issues with food or anything else, also confirmed no trouble breathing. He   states only issues when taking his meds. Also he does take liquids with   them and also tried taking with pudding no help. He wanted to know if any   suggestions from doctor.        Pharmacy: [facility] [address] [facility]    ---------------------------------------------------------------------------  --------------  Meeta Hidalgo INFO  [phone]; OK to leave message on voicemail  ---------------------------------------------------------------------------  --------------    SCRIPT ANSWERS  Relationship to Patient: Self

## 2022-11-16 DIAGNOSIS — E87.6 HYPOKALEMIA: ICD-10-CM

## 2022-11-16 DIAGNOSIS — R13.10 DYSPHAGIA, UNSPECIFIED TYPE: Primary | ICD-10-CM

## 2022-11-16 RX ORDER — POTASSIUM CHLORIDE 20MEQ/15ML
10 LIQUID (ML) ORAL DAILY
Qty: 450 ML | Refills: 2 | Status: SHIPPED | OUTPATIENT
Start: 2022-11-16

## 2022-11-16 NOTE — TELEPHONE ENCOUNTER
Patient notified, agreeable to liquid or powdered potassium, whichever you are okay with.   Uses [email or web address]

## 2022-11-16 NOTE — TELEPHONE ENCOUNTER
Patient notified. States that he has not been taking his potassium or atorvastatin due to the pills being fairly large. The atorvastatin he states is not scored or anything making it difficult to break in half.wants to know if there are any of his meds he can break in half to help him swallow better. States for now smaller pills are okay.

## 2022-11-18 DIAGNOSIS — R13.10 DYSPHAGIA, UNSPECIFIED TYPE: ICD-10-CM

## 2022-11-18 DIAGNOSIS — E87.6 HYPOKALEMIA: ICD-10-CM

## 2022-11-21 RX ORDER — POTASSIUM CHLORIDE 20MEQ/15ML
LIQUID (ML) ORAL
Qty: 675 ML | OUTPATIENT
Start: 2022-11-21

## 2022-11-29 ENCOUNTER — HOSPITAL ENCOUNTER (OUTPATIENT)
Dept: GENERAL RADIOLOGY | Age: 87
Discharge: HOME | End: 2022-12-01
Payer: MEDICARE

## 2022-11-29 DIAGNOSIS — R13.10 DYSPHAGIA, UNSPECIFIED TYPE: ICD-10-CM

## 2022-11-29 PROCEDURE — 74220 X-RAY XM ESOPHAGUS 1CNTRST: CPT

## 2022-11-29 PROCEDURE — 6370000000 HC RX 637 (ALT 250 FOR IP): Performed by: FAMILY MEDICINE

## 2022-11-29 PROCEDURE — A4641 RADIOPHARM DX AGENT NOC: HCPCS | Performed by: FAMILY MEDICINE

## 2022-11-29 PROCEDURE — 2500000003 HC RX 250 WO HCPCS: Performed by: FAMILY MEDICINE

## 2022-11-29 PROCEDURE — 6360000004 HC RX CONTRAST MEDICATION: Performed by: FAMILY MEDICINE

## 2022-11-29 RX ADMIN — BARIUM SULFATE 140 ML: 980 POWDER, FOR SUSPENSION ORAL at 09:35

## 2022-11-29 RX ADMIN — BARIUM SULFATE 1 TABLET: 700 TABLET ORAL at 09:34

## 2022-11-29 RX ADMIN — ANTACID/ANTIFLATULENT 1 EACH: 380; 550; 10; 10 GRANULE, EFFERVESCENT ORAL at 09:35

## 2022-11-29 RX ADMIN — BARIUM SULFATE 176 G: 960 POWDER, FOR SUSPENSION ORAL at 09:34

## 2022-12-08 NOTE — PROGRESS NOTES
Pt Pt has Hx CAD,CABG,Afib,CKD,ICD,follows with Dr Dell Alaniz and Dr Chandana Oh. Denies any recent cardiac  complaints/symptoms. Pt denies any recent shocks.

## 2022-12-08 NOTE — PROGRESS NOTES
[facility] PRE-ADMISSION TESTING INSTRUCTIONS      ARRIVAL INSTRUCTIONS:  [x] Parking the day of Surgery is located in the Main Entrance lot. Upon entering the main door make an immediate right to the surgery reception desk. [x] Bring photo ID and insurance card    [] Bring in a copy of Living will or Durable Power of  papers. [x] Please be sure to arrange for responsible adult to provide transportation to and from the hospital    [x] Please arrange for responsible adult to be with you for the 24 hour period post procedure due to having anesthesia    [x] If you awake am of surgery not feeling well or have temperature >100 please call [phone]    GENERAL INSTRUCTIONS:    [x] Nothing by mouth after midnight, including gum, candy, mints or water    [x] You may brush your teeth, but do not swallow any water    [x] Take medications as instructed with 1-2 oz of water    [x] Stop herbal supplements and vitamins 5 days prior to procedure    [x] Follow preop dosing of blood thinners per physician instructions    [] Take 1/2 dose of evening insulin, but no insulin after midnight    [] No oral diabetic medications after midnight    [] If diabetic and have low blood sugar or feel symptomatic, take 1-2oz apple juice only    [] Bring inhalers day of surgery    [] Bring C-PAP/ Bi-Pap day of surgery    [] Bring urine specimen day of surgery    [x] Shower or bath with soap, lather and rinse well, AM of Surgery, no lotion, powders or creams to surgical site    [] Follow bowel prep as instructed per surgeon    [x] No tobacco products within 24 hours of surgery     [x] No alcohol or illegal drug use within 24 hours of surgery.     [x] Jewelry, body piercing's, eyeglasses, contact lenses and dentures are not permitted into surgery (bring cases)      [x] Please do not wear any nail polish, make up or hair products on the day of surgery    [x] You can expect a call the business day prior to procedure to notify you if your arrival time changes    [x] If you receive a survey after surgery we would greatly appreciate your comments    [x] Please notify surgeon if you develop any illness between now and time of surgery (cold, cough, sore throat, fever, nausea, vomiting) or any signs of infections  including skin, wounds, and dental.    []  The Outpatient Pharmacy is available to fill your prescription here on your day of surgery, ask your preop nurse for details

## 2022-12-09 ENCOUNTER — PREP FOR PROCEDURE (OUTPATIENT)
Dept: GASTROENTEROLOGY | Age: 87
End: 2022-12-09

## 2022-12-09 RX ORDER — SODIUM CHLORIDE 0.9 % (FLUSH) 0.9 %
5-40 SYRINGE (ML) INJECTION PRN
Status: CANCELLED | OUTPATIENT
Start: 2022-12-09

## 2022-12-09 RX ORDER — 0.9 % SODIUM CHLORIDE 0.9 %
50 INTRAVENOUS SOLUTION INTRAVENOUS ONCE
Status: CANCELLED | OUTPATIENT
Start: 2022-12-09 | End: 2022-12-09

## 2022-12-09 RX ORDER — SODIUM CHLORIDE 9 MG/ML
25 INJECTION, SOLUTION INTRAVENOUS PRN
Status: CANCELLED | OUTPATIENT
Start: 2022-12-09

## 2022-12-09 RX ORDER — SODIUM CHLORIDE 0.9 % (FLUSH) 0.9 %
5-40 SYRINGE (ML) INJECTION EVERY 12 HOURS SCHEDULED
Status: CANCELLED | OUTPATIENT
Start: 2022-12-09

## 2022-12-09 NOTE — H&P
Gastroenterology      Pre-operative History and Physical      HISTORY OF PRESENT ILLNESS:   Mar Walters is seenfor a follow-up visit  Patient has followed up with PCP since being discharged from the hospital. Blood was found to be present in the stools, per occult testing completed x3. States his labs were normal per last labs. patient states his PCP wants to retest blood & stool next week. Patient and wife educated that this isn't necessary or needed at this time. Patient states he is feeling well, no complaints at this time. Patient will be scheduled for a colon at this time, will need a  to and from the facility the day of the procedure, all questions answered. Prior EGD findings reviewed with the patient and wife, all questions answered. Patient denies melena, hematochezia, hematemesis    HISTORY:   Past Medical History:   Diagnosis Date    A-fib [facility])     follows with Dr Allison Shirley 9/28/22    AAA (abdominal aortic aneurysm)     infrarenal 3.7cm 6/27/22    Arrhythmia     Carotid artery stenosis     CHF (congestive heart failure) ([facility] [address]     CKD (chronic kidney disease), stage III ([facility] [address]     Heart valve problem     Hyperlipidemia     Hypertension     ICD (implantable cardiac defibrillator) in place 2007    [email or web address] [place] [address] BACON#[id number] generator changed 7/28/16  Dr Grazyna Conrad    MI (mitral incompetence) 2003    MI (myocardial infarction) ([facility] [address] 2003       PERTINENT FAMILY HISTORY:  No family history on file.     MEDICATIONS:    Current Outpatient Medications:     potassium chloride 20 MEQ/15ML (10%) oral solution, Take 7.5 mLs by mouth daily, Disp: 450 mL, Rfl: 2    ALPRAZolam (XANAX) 0.25 MG tablet, Take 0.25 mg by mouth 3 times daily as needed for Sleep., Disp: , Rfl:     amiodarone (CORDARONE) 200 MG tablet, Take 200 mg by mouth daily Daily beginning 7/16/22 per Dr. Grazyna Conrad, Disp: , Rfl:     meclizine (ANTIVERT) 25 MG tablet, Take 1 tablet by mouth daily as needed for Dizziness, Disp: 90 tablet, Rfl: 0    furosemide (LASIX) 40 MG tablet, Take 40 mg by mouth See Admin Instructions Alternates 40 mg and 20 mg, Disp: , Rfl:     metoprolol tartrate (LOPRESSOR) 50 MG tablet, Take 50 mg by mouth at bedtime, Disp: , Rfl:     Metoprolol Tartrate 75 MG TABS, Take 75 mg by mouth every morning, Disp: , Rfl:     pravastatin (PRAVACHOL) 40 MG tablet, Take 40 mg by mouth at bedtime, Disp: , Rfl:     rivaroxaban (XARELTO) 20 MG TABS tablet, Take 20 mg by mouth Daily with supper , Disp: , Rfl:     Multiple Vitamin (MULTIVITAMIN ADULT PO), Take 1 tablet by mouth Daily with lunch, Disp: , Rfl:     amLODIPine (NORVASC) 2.5 MG tablet, Take 1 tablet by mouth daily (Patient taking differently: Take 2.5 mg by mouth nightly), Disp: 90 tablet, Rfl: 3    pantoprazole (PROTONIX) 40 MG tablet, TAKE 1 TABLET BY MOUTH  DAILY (Patient taking differently: Take 40 mg by mouth Daily with lunch), Disp: 90 tablet, Rfl: 3    ferrous sulfate (IRON 325) 325 (65 Fe) MG tablet, Take 1 tablet by mouth daily (with breakfast) (Patient taking differently: Take 325 mg by mouth Daily with lunch), Disp: 90 tablet, Rfl: 1    MAGNESIUM-OXIDE 400 (240 Mg) MG tablet, Take 400 mg by mouth Daily with supper, Disp: , Rfl:     Multiple Vitamins-Minerals (PRESERVISION AREDS 2) CAPS, Take 1 capsule by mouth 2 times daily , Disp: , Rfl:     Cholecalciferol (VITAMIN D) 2000 UNITS CAPS capsule, Take 2,000 Units by mouth Daily with lunch, Disp: , Rfl:     ALLERGIES:  Patient has no known allergies. PHYSICAL EXAM/GENERAL APPEARANCE:     Constitutional:  Appearance: well-developed, appropriate grooming, in no acute distress. .  Communication: conversation appropriate. Skin:  Inspection: no rashes, ulcers, icterus or other lesions. Eyes:  Conjunctivae/lids: lids normal,anicteric sclerae, moist conjunctivae. Pupils/irises: PERRLA. ENMT:  External: normal external inspection of the nose and ears, atraumautic. Hearing: within normal limits.   Oropharynx: normal tongue, hard and soft palate; posterior pharynx without erythema, exudate or lesions. Neck:  Neck: normal motion and central trachea. Thyroid: normal size, consistency and position; no masses or tenderness. Respiratory:  Effort: normal respiratory effort and no intercostal retractions. Auscultation: normal breath sounds, no rubs, wheezes or rhonchi. Chest:  Inspection: symetrical without visualized masses. Cardiovascular:  Palpation: normal size,PMI is palpable in the 5th intercostal space, left midclavicular line,normal rythym. Auscultation: normal, S1 and S2,no gallops,no rubs or murmurs. Gastrointestinal/Abdomen:  Abdomen: normal consistency, no tenderness or masses,normal bowel sounds. Liver/Spleen: normal,normal size,not palpable. Extremities:  RLE: no cyanosis, clubbing, or edema. Normal peripheral pulses. Allena Closs LLE: no cyanosis, clubbing or edema. Normal peripheral pulses. Allena Closs Psychiatric:  Orientation: oriented to time, space and person.   OTHER SIGNIFICANT FINDINGS: None    IMPRESSION/INITIAL DIAGNOSIS:   Dysphagia, mild GERD, and moderate tertiary esophageal contractions  Small Hiatal hernia        Electronically signed by Luisa Sorensen MD on 12/9/2022 at 9:37 AM

## 2022-12-12 ENCOUNTER — HOSPITAL ENCOUNTER (OUTPATIENT)
Age: 87
Setting detail: OUTPATIENT SURGERY
Discharge: HOME | End: 2022-12-12
Attending: INTERNAL MEDICINE | Admitting: INTERNAL MEDICINE
Payer: MEDICARE

## 2022-12-12 ENCOUNTER — ANESTHESIA (OUTPATIENT)
Dept: ENDOSCOPY | Age: 87
End: 2022-12-12
Payer: MEDICARE

## 2022-12-12 ENCOUNTER — ANESTHESIA EVENT (OUTPATIENT)
Dept: ENDOSCOPY | Age: 87
End: 2022-12-12
Payer: MEDICARE

## 2022-12-12 VITALS
OXYGEN SATURATION: 93 % | HEIGHT: 68 IN | WEIGHT: 171 LBS | RESPIRATION RATE: 18 BRPM | SYSTOLIC BLOOD PRESSURE: 130 MMHG | DIASTOLIC BLOOD PRESSURE: 78 MMHG | BODY MASS INDEX: 25.91 KG/M2 | HEART RATE: 58 BPM | TEMPERATURE: 97.6 F

## 2022-12-12 DIAGNOSIS — R13.10 DYSPHAGIA, UNSPECIFIED TYPE: ICD-10-CM

## 2022-12-12 PROCEDURE — 3700000000 HC ANESTHESIA ATTENDED CARE: Performed by: INTERNAL MEDICINE

## 2022-12-12 PROCEDURE — 2500000003 HC RX 250 WO HCPCS: Performed by: NURSE ANESTHETIST, CERTIFIED REGISTERED

## 2022-12-12 PROCEDURE — 2580000003 HC RX 258: Performed by: NURSE ANESTHETIST, CERTIFIED REGISTERED

## 2022-12-12 PROCEDURE — 87081 CULTURE SCREEN ONLY: CPT

## 2022-12-12 PROCEDURE — 7100000010 HC PHASE II RECOVERY - FIRST 15 MIN: Performed by: INTERNAL MEDICINE

## 2022-12-12 PROCEDURE — 88305 TISSUE EXAM BY PATHOLOGIST: CPT

## 2022-12-12 PROCEDURE — 3700000001 HC ADD 15 MINUTES (ANESTHESIA): Performed by: INTERNAL MEDICINE

## 2022-12-12 PROCEDURE — 2709999900 HC NON-CHARGEABLE SUPPLY: Performed by: INTERNAL MEDICINE

## 2022-12-12 PROCEDURE — 7100000011 HC PHASE II RECOVERY - ADDTL 15 MIN: Performed by: INTERNAL MEDICINE

## 2022-12-12 PROCEDURE — 3609017700 HC EGD DILATION GASTRIC/DUODENAL STRICTURE: Performed by: INTERNAL MEDICINE

## 2022-12-12 PROCEDURE — 6360000002 HC RX W HCPCS: Performed by: NURSE ANESTHETIST, CERTIFIED REGISTERED

## 2022-12-12 RX ORDER — SODIUM CHLORIDE 0.9 % (FLUSH) 0.9 %
5-40 SYRINGE (ML) INJECTION EVERY 12 HOURS SCHEDULED
Status: DISCONTINUED | OUTPATIENT
Start: 2022-12-12 | End: 2022-12-12 | Stop reason: HOSPADM

## 2022-12-12 RX ORDER — SODIUM CHLORIDE 0.9 % (FLUSH) 0.9 %
5-40 SYRINGE (ML) INJECTION PRN
Status: DISCONTINUED | OUTPATIENT
Start: 2022-12-12 | End: 2022-12-12 | Stop reason: HOSPADM

## 2022-12-12 RX ORDER — 0.9 % SODIUM CHLORIDE 0.9 %
50 INTRAVENOUS SOLUTION INTRAVENOUS ONCE
Status: DISCONTINUED | OUTPATIENT
Start: 2022-12-12 | End: 2022-12-12 | Stop reason: HOSPADM

## 2022-12-12 RX ORDER — SODIUM CHLORIDE 9 MG/ML
INJECTION, SOLUTION INTRAVENOUS CONTINUOUS PRN
Status: DISCONTINUED | OUTPATIENT
Start: 2022-12-12 | End: 2022-12-12 | Stop reason: SDUPTHER

## 2022-12-12 RX ORDER — PROPOFOL 10 MG/ML
INJECTION, EMULSION INTRAVENOUS PRN
Status: DISCONTINUED | OUTPATIENT
Start: 2022-12-12 | End: 2022-12-12 | Stop reason: SDUPTHER

## 2022-12-12 RX ORDER — SODIUM CHLORIDE 9 MG/ML
25 INJECTION, SOLUTION INTRAVENOUS PRN
Status: DISCONTINUED | OUTPATIENT
Start: 2022-12-12 | End: 2022-12-12 | Stop reason: HOSPADM

## 2022-12-12 RX ORDER — LIDOCAINE HYDROCHLORIDE 20 MG/ML
INJECTION, SOLUTION EPIDURAL; INFILTRATION; INTRACAUDAL; PERINEURAL PRN
Status: DISCONTINUED | OUTPATIENT
Start: 2022-12-12 | End: 2022-12-12 | Stop reason: SDUPTHER

## 2022-12-12 RX ADMIN — SODIUM CHLORIDE: 9 INJECTION, SOLUTION INTRAVENOUS at 15:04

## 2022-12-12 RX ADMIN — LIDOCAINE HYDROCHLORIDE 40 MG: 20 INJECTION, SOLUTION EPIDURAL; INFILTRATION; INTRACAUDAL; PERINEURAL at 15:10

## 2022-12-12 RX ADMIN — PROPOFOL 80 MG: 10 INJECTION, EMULSION INTRAVENOUS at 15:08

## 2022-12-12 ASSESSMENT — PAIN - FUNCTIONAL ASSESSMENT: PAIN_FUNCTIONAL_ASSESSMENT: 0-10

## 2022-12-12 ASSESSMENT — LIFESTYLE VARIABLES: SMOKING_STATUS: 0

## 2022-12-12 ASSESSMENT — ENCOUNTER SYMPTOMS: SHORTNESS OF BREATH: 0

## 2022-12-12 ASSESSMENT — PAIN SCALES - GENERAL: PAINLEVEL_OUTOF10: 0

## 2022-12-12 NOTE — ANESTHESIA PRE PROCEDURE
Department of Anesthesiology  Preprocedure Note       Name:  Jake Mitchell   Age:  82 y.o.  :  1935                                          MRN:  [id number]         Date:  2022      Surgeon: Melisa Bar):  Cheyenne Anderson MD    Procedure: Procedure(s):  EGD ESOPHAGOGASTRODUODENOSCOPY    Medications prior to admission:   Prior to Admission medications    Medication Sig Start Date End Date Taking? Authorizing Provider   potassium chloride 20 MEQ/15ML (10%) oral solution Take 7.5 mLs by mouth daily 22   Jackie Summers MD   ALPRAZolam Earlchantelle Sicard) 0.25 MG tablet Take 0.25 mg by mouth 3 times daily as needed for Sleep.     Historical Provider, MD   amiodarone (CORDARONE) 200 MG tablet Take 200 mg by mouth daily Daily beginning 22 per Dr. Leena Costello 22   Historical Provider, MD   meclizine (ANTIVERT) 25 MG tablet Take 1 tablet by mouth daily as needed for Dizziness 22   Jackie Summers MD   furosemide (LASIX) 40 MG tablet Take 40 mg by mouth See Admin Instructions Alternates 40 mg and 20 mg    Historical Provider, MD   metoprolol tartrate (LOPRESSOR) 50 MG tablet Take 50 mg by mouth at bedtime    Historical Provider, MD   Metoprolol Tartrate 75 MG TABS Take 75 mg by mouth every morning    Historical Provider, MD   pravastatin (PRAVACHOL) 40 MG tablet Take 40 mg by mouth at bedtime    Historical Provider, MD   rivaroxaban (XARELTO) 20 MG TABS tablet Take 20 mg by mouth Daily with supper     Historical Provider, MD   Multiple Vitamin (MULTIVITAMIN ADULT PO) Take 1 tablet by mouth Daily with lunch    Historical Provider, MD   amLODIPine (NORVASC) 2.5 MG tablet Take 1 tablet by mouth daily  Patient taking differently: Take 2.5 mg by mouth nightly 22   Loree Olivo DO   pantoprazole (PROTONIX) 40 MG tablet TAKE 1 TABLET BY MOUTH  DAILY  Patient taking differently: Take 40 mg by mouth Daily with lunch 12/15/21   Jackie Summers MD   ferrous sulfate (IRON 325) 325 (65 Fe) MG tablet Take 1 tablet by mouth daily (with breakfast)  Patient taking differently: Take 325 mg by mouth Daily with lunch 11/5/20   Teo Price MD   MAGNESIUM-OXIDE 400 (240 Mg) MG tablet Take 400 mg by mouth Daily with supper 3/18/20   Historical Provider, MD   Multiple Vitamins-Minerals (PRESERVISION AREDS 2) CAPS Take 1 capsule by mouth 2 times daily     Historical Provider, MD   Cholecalciferol (VITAMIN D) 2000 UNITS CAPS capsule Take 2,000 Units by mouth Daily with lunch    Historical Provider, MD       Current medications:    Current Facility-Administered Medications   Medication Dose Route Frequency Provider Last Rate Last Admin    0.9 % sodium chloride bolus  50 mL IntraVENous Once Aye Ortiz MD        sodium chloride flush 0.9 % injection 5-40 mL  5-40 mL IntraVENous 2 times per day Aye Ortiz MD        sodium chloride flush 0.9 % injection 5-40 mL  5-40 mL IntraVENous PRN Aye Ortiz MD        0.9 % sodium chloride infusion  25 mL IntraVENous PRN Aye Ortiz MD        ceFAZolin (ANCEF) 1,000 mg in sterile water 10 mL IV syringe  1,000 mg IntraVENous Once Aye Ortiz MD           Allergies:  No Known Allergies    Problem List:    Patient Active Problem List   Diagnosis Code    Chronic combined systolic and diastolic CHF (congestive heart failure) (HCC) I50.42    S/P TAVR (transcatheter aortic valve replacement) Z95.2    Chronic atrial fibrillation I48.20    Coronary artery disease involving native coronary artery of native heart without angina pectoris I25.10    Essential hypertension I10    High risk medications (not anticoagulants) long-term use Z79.899    Paroxysmal atrial fibrillation (HCC) I48.0    Hyperlipidemia LDL goal <100 E78.5    GIB (gastrointestinal bleeding) K92.2    ICD (implantable cardioverter-defibrillator), dual, in situ Z95.810    Ventricular tachycardia I47.20    Ischemic heart disease I25.9    Vitamin D deficiency E55.9    Other insomnia G47.09    SOB (shortness of breath) R06.02    Lung nodule R91.1    Acute on chronic diastolic (congestive) heart failure (HCC) I50.33    Pure hypercholesterolemia E78.00    Stage 3b chronic kidney disease (HCC) N18.32    Transient cerebral ischemia G45.9    Aortic valve disease I35.9    Near syncope R55    Acute on chronic combined systolic and diastolic CHF (congestive heart failure) (HCC) I50.43    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) I50.40    Gastroesophageal reflux disease without esophagitis K21.9    Coronary artery disease involving coronary bypass graft I25.810    Symptomatic anemia D64.9    GI bleed K92.2    Abdominal aortic aneurysm (AAA) 3.0 cm to 5.5 cm in diameter in male I67.38    ICD (implantable cardioverter-defibrillator) discharge Z45.02       Past Medical History:        Diagnosis Date    A-fib [facility])     follows with Dr Lela Maddox 9/28/22    AAA (abdominal aortic aneurysm)     infrarenal 3.7cm 6/27/22    Arrhythmia     Carotid artery stenosis     CHF (congestive heart failure) (formerly [facility])     CKD (chronic [facility] disease), [facility] III [facility] [address]     Heart valve problem     Hyperlipidemia     Hypertension     ICD (implantable cardiac defibrillator) in place 2007    Aly Astatula DR MERINO#[id number] generator changed 7/28/16  Dr Lashonda Conner    MI (mitral incompetence) 2003    MI (myocardial infarction) ([facility] [address] 2003       Past Surgical History:        Procedure Laterality Date    CARDIAC CATHETERIZATION Right 03/03/2017    Dr. Erwin Schulz  2007. 2008 2007 78 shocks replaced 2008 [employer]     CARDIAC DEFIBRILLATOR PLACEMENT  07/28/2016    [employer] Dual ICD gen change    CARDIAC SURGERY N/A 09/04/2021    cardioversion performed by Naresh Mancia MD at [address] [place] [address]  10/29/2020    Successful    (Dr. Lashonda Conner)    CARDIOVERSION  05/12/2022    Successful   Dr Reji Zamudio COLONOSCOPY N/A 02/24/2020    COLONOSCOPY DIAGNOSTIC performed by Maggie Dong MD at [address] COLONOSCOPY N/A 2022    COLONOSCOPY WITH BIOPSY performed by Trung Marie MD at [address] [place] [address] Dr SIMPSON  2003    DIAGNOSTIC CARDIAC CATH LAB PROCEDURE      DIAGNOSTIC CARDIAC CATH LAB PROCEDURE  2008    stent    JOINT REPLACEMENT      r hip surgery Dr Judy Singer  2017    Dr. Cisco Neves and Dr. Deb Olmedo- VALE Josie 26mm valve    UPPER GASTROINTESTINAL ENDOSCOPY N/A 2019    EGD ESOPHAGOGASTRODUODENOSCOPY performed by Sudeep Crawford MD at [address] [facility] 2020    EGD ESOPHAGOGASTRODUODENOSCOPY performed by Maggie Dong MD at [address] [facility] 2022    EGD CONTROL HEMORRHAGE performed by Trung Marie MD at [address] [place] [address] N/A 2022    EGD ESOPHAGOGASTRODUODENOSCOPY performed by Trung Marie MD at [address]         Social History:    Social History     Tobacco Use    Smoking status: Former     Packs/day: 0.50     Years: 3.00     Pack years: 1.50     Types: Cigarettes     Quit date: 1973     Years since quittin.8    Smokeless tobacco: Never    Tobacco comments:     Quit smoking in    Substance Use Topics    Alcohol use:  No                                Counseling given: Not Answered  Tobacco comments: Quit smoking in       Vital Signs (Current):   Vitals:    22 1237 22 1309 22 1315   BP:  (!) 197/87 (!) 180/84   Pulse:  57    Resp:  20    Temp:  97.1 °F (36.2 °C)    TempSrc:  Temporal    SpO2:  96%    Weight: 171 lb (77.6 kg) 171 lb (77.6 kg)    Height: 5' 8\" (1.727 m) 5' 8\" (1.727 m)                                               BP Readings from Last 3 Encounters:   22 (!) 180/84   22 128/60   10/26/22 138/64       NPO Status: Time of last liquid consumption: 2100                        Time of last solid consumption: 2100                        Date of last liquid consumption: 12/11/22                        Date of last solid food consumption: 12/11/22    BMI:   Wt Readings from Last 3 Encounters:   12/12/22 171 lb (77.6 kg)   11/04/22 174 lb (78.9 kg)   10/26/22 174 lb (78.9 kg)     Body mass index is 26 kg/m². CBC:   Lab Results   Component Value Date/Time    WBC 7.0 10/13/2022 11:57 AM    RBC 3.90 10/13/2022 11:57 AM    HGB 12.0 10/13/2022 11:57 AM    HCT 38.6 10/13/2022 11:57 AM    MCV 99.0 10/13/2022 11:57 AM    RDW 14.3 10/13/2022 11:57 AM     10/13/2022 11:57 AM       CMP:   Lab Results   Component Value Date/Time     11/04/2022 09:53 AM    K 4.2 11/04/2022 09:53 AM    K 3.8 08/23/2022 08:56 AM    CL 98 11/04/2022 09:53 AM    CO2 29 11/04/2022 09:53 AM    BUN 24 11/04/2022 09:53 AM    CREATININE 1.3 11/04/2022 09:53 AM    GFRAA >60 10/13/2022 11:57 AM    LABGLOM 54 11/04/2022 09:53 AM    GLUCOSE 148 11/04/2022 09:53 AM    PROT 7.0 10/13/2022 11:57 AM    CALCIUM 9.2 11/04/2022 09:53 AM    BILITOT 0.3 10/13/2022 11:57 AM    ALKPHOS 150 10/13/2022 11:57 AM    AST 31 10/13/2022 11:57 AM    ALT 20 10/13/2022 11:57 AM       POC Tests: No results for input(s): POCGLU, POCNA, POCK, POCCL, POCBUN, POCHEMO, POCHCT in the last 72 hours.     Coags:   Lab Results   Component Value Date/Time    PROTIME 17.0 06/15/2022 03:30 PM    INR 1.5 06/15/2022 03:30 PM    APTT 38.4 06/15/2022 03:30 PM       HCG (If Applicable): No results found for: PREGTESTUR, PREGSERUM, HCG, HCGQUANT     ABGs: No results found for: PHART, PO2ART, YFQ1VKA, YDN5IGS, BEART, C9SIXDVG     Type & Screen (If Applicable):  No results found for: LABABO, LABRH    Drug/Infectious Status (If Applicable):  No results found for: HIV, HEPCAB    COVID-19 Screening (If Applicable):   Lab Results   Component Value Date/Time    COVID19 Not-Detected 10/26/2022 01:31 PM    COVID19 Not Detected 10/29/2020 03:35 AM           Anesthesia Evaluation  Patient summary reviewed and Nursing notes reviewed no history of anesthetic complications:   Airway: Mallampati: III  TM distance: >3 FB   Neck ROM: full  Mouth opening: > = 3 FB   Dental:          Pulmonary:Negative Pulmonary ROS breath sounds clear to auscultation      (-) shortness of breath and not a current smoker                           Cardiovascular:    (+) hypertension:, valvular problems/murmurs (TAVR):, pacemaker: AICD and pacemaker, past MI:, CAD:, CABG/stent:, dysrhythmias: atrial fibrillation, hyperlipidemia        Rhythm: irregular  Rate: normal           Beta Blocker:  Dose within 24 Hrs         Neuro/Psych:   (+) neuromuscular disease:,              ROS comment: neuropathy GI/Hepatic/Renal:   (+) GERD: well controlled,          ROS comment: dysphagia. Endo/Other:    (+) blood dyscrasia: anticoagulation therapy, arthritis:., .                 Abdominal:             Vascular: Other Findings:           Anesthesia Plan      MAC     ASA 3       Induction: intravenous. Anesthetic plan and risks discussed with patient, spouse and child/children.                         Miri Olsen MD   12/12/2022

## 2022-12-12 NOTE — BRIEF OP NOTE
Brief Postoperative Note    Sandra Chen  YOB: 1935  [id number]    Procedure: EGD with biopsy    Anesthesia: [facility]    Surgeon:  Adilene Salinas MD    Findings:       Esophagus:  GERD. ? BARRETTE ESOPHAGUS BXED. DYSPHAGIA DILATED WITH #52 FR.  CROWDER      Stomach:  Gastritis bx done to rule out H. Pylori      Duodenum:  Normal    Bx. done to rule out sprue       Complications: None      Estimated blood loss: Evelia Espinosa MD

## 2022-12-13 NOTE — ANESTHESIA POSTPROCEDURE EVALUATION
Department of Anesthesiology  Postprocedure Note    Patient: Mehreen Redd  MRN: [id number]  YOB: 1935  Date of evaluation: 12/13/2022      Procedure Summary     Date: 12/12/22 Room / Location: [facility] / [facility]    Anesthesia Start: 1504 Anesthesia Stop: 4447    Procedure: EGD ESOPHAGOGASTRODUODENOSCOPY DILATATION Diagnosis:       Dysphagia, unspecified type      (Dysphagia, unspecified type [R13.10])    Surgeons: Nany Forbes MD Responsible Provider: Marlene So MD    Anesthesia Type: MAC ASA Status: 3          Anesthesia Type: No value filed.     Alfred Phase I:      Alfred Phase II: Alfred Score: 10      Anesthesia Post Evaluation    Patient location during evaluation: PACU  Patient participation: complete - patient participated  Level of consciousness: awake  Airway patency: patent  Nausea & Vomiting: no nausea and no vomiting  Complications: no  Cardiovascular status: hemodynamically stable  Respiratory status: acceptable  Hydration status: stable

## 2022-12-13 NOTE — H&P
Gastroenterology      Pre-operative History and Physical      HISTORY OF PRESENT ILLNESS:   Jarrett Waite is seenfor a follow-up visit  Patient has followed up with PCP since being discharged from the hospital. Blood was found to be present in the stools, per occult testing completed x3. States his labs were normal per last labs. patient states his PCP wants to retest blood & stool next week. Patient and wife educated that this isn't necessary or needed at this time. Patient states he is feeling well, no complaints at this time. Patient will be scheduled for a colon at this time, will need a  to and from the facility the day of the procedure, all questions answered. Prior EGD findings reviewed with the patient and wife, all questions answered. Patient denies melena, hematochezia, hematemesis    HISTORY:   Past Medical History:   Diagnosis Date    A-fib [facility])     follows with Dr Yudy Severino 9/28/22    AAA (abdominal aortic aneurysm)     infrarenal 3.7cm 6/27/22    Arrhythmia     Carotid artery stenosis     CHF (congestive heart failure) ([facility] [address]     CKD (chronic kidney disease), stage III ([facility] [address]     Heart valve problem     Hyperlipidemia     Hypertension     ICD (implantable cardiac defibrillator) in place 2007    [employer] [place] DR MORRISON#[id number] generator changed 7/28/16  Dr Evonne Petty    MI (mitral incompetence) 2003    MI (myocardial infarction) ([facility] [address] 2003       PERTINENT FAMILY HISTORY:  History reviewed. No pertinent family history.     MEDICATIONS:    Current Outpatient Medications:     potassium chloride 20 MEQ/15ML (10%) oral solution, Take 7.5 mLs by mouth daily, Disp: 450 mL, Rfl: 2    ALPRAZolam (XANAX) 0.25 MG tablet, Take 0.25 mg by mouth 3 times daily as needed for Sleep., Disp: , Rfl:     amiodarone (CORDARONE) 200 MG tablet, Take 200 mg by mouth daily Daily beginning 7/16/22 per Dr. Evonne Petty, Disp: , Rfl:     meclizine (ANTIVERT) 25 MG tablet, Take 1 tablet by mouth daily as needed for Dizziness, limits. Oropharynx: normal tongue, hard and soft palate; posterior pharynx without erythema, exudate or lesions. Neck:  Neck: normal motion and central trachea. Thyroid: normal size, consistency and position; no masses or tenderness. Respiratory:  Effort: normal respiratory effort and no intercostal retractions. Auscultation: normal breath sounds, no rubs, wheezes or rhonchi. Chest:  Inspection: symetrical without visualized masses. Cardiovascular:  Palpation: normal size,PMI is palpable in the 5th intercostal space, left midclavicular line,normal rythym. Auscultation: normal, S1 and S2,no gallops,no rubs or murmurs. Gastrointestinal/Abdomen:  Abdomen: normal consistency, no tenderness or masses,normal bowel sounds. Liver/Spleen: normal,normal size,not palpable. Extremities:  RLE: no cyanosis, clubbing, or edema. Normal peripheral pulses. Kailash Medin LLE: no cyanosis, clubbing or edema. Normal peripheral pulses. Kailash Medin Psychiatric:  Orientation: oriented to time, space and person.   OTHER SIGNIFICANT FINDINGS: None    IMPRESSION/INITIAL DIAGNOSIS:   Dysphagia, mild GERD, and moderate tertiary esophageal contractions  Small Hiatal hernia        Electronically signed by Trey Ferrer MD on 12/13/2022 at 7:40 AM

## 2022-12-13 NOTE — OP NOTE
[address] [address]                                OPERATIVE REPORT    PATIENT NAME: Danica Ruiz                    :        1935  MED REC NO:   [id number]                            ROOM:  ACCOUNT NO:   [de-identified]                           ADMIT DATE: 2022  PROVIDER:     Chele Sam MD    DATE OF PROCEDURE:  2022    PROCEDURES PERFORMED:  Upper endoscopy with biopsy and Díaz  dilatation. PREOPERATIVE DIAGNOSES:  Evaluation for dysphagia and esophageal  contractions. POSTOPERATIVE DIAGNOSES  Esophageal spasm. Area in the esophagus  suspicious of Brothers's esophagus. Biopsies were done. No suspicious  lesions seen. Dysphagia was empirically dilated with 52-[place]. Stomach showed gastritis. Biopsied to rule out H. pylori infection. Duodenum biopsied to rule out sprue. ESTIMATED BLOOD LOSS:  N/A    SURGEON:  Chele Sam M.D. ANESTHESIA:  Local monitored anesthesia care. NOTE:  Prior to the procedure an informed consent was obtained from the  patient after explaining the benefits as well as the risks,  alternatives, and complications of the procedure to the patient, who  understood and agreed. DESCRIPTION OF PROCEDURE:  With the patient in the left lateral  decubitus position, the Olympus GIF-100 forward-viewing videoscope was  introduced into the esophagus, the evaluation of which showed diffuse  spasm and questionable Brothers's esophagus at the distal end and no  hiatal hernia was seen. Biopsies were done. No other suspicious  lesions seen and the GE junction was evaluated thoroughly from the  esophageal and gastric sides and appeared unremarkable. The scope was then advanced through the gastroesophageal junction into  the gastric body, along the greater curvature. Evaluation of the body  of the stomach showed gastritis.   Biopsies were taken from this area to  rule out Helicobacter pylori infection. The scope was then advanced through the pylorus into the duodenal bulb  and second portion of the duodenum, both of which appeared to be  unremarkable. Duodenum biopsied to rule out sprue. The scope was then  retrieved and retroflexed in the prepyloric antrum, with thorough  evaluation of the cardiac and fundal portions of the stomach, which  appeared to be within normal limits. The scope was then straightened, the area deflated, and the procedure  was terminated by withdrawing the scope and conducting a second look on  the way out, which was essentially the same. The patient tolerated the procedure well. A #52-Khmer Kennth García was introduced with dilatation carried out of the  esophagus without difficulty. The patient tolerated the procedure well.         Derick Hernandez MD    D: 12/12/2022 15:33:28       T: 12/12/2022 15:36:56     [email or web address]  Job#: [id number]     Doc#: [id number]    CC:  Dr. Adis Brady MD

## 2022-12-14 LAB — CLOTEST: NORMAL

## 2022-12-27 RX ORDER — AMLODIPINE BESYLATE 2.5 MG/1
2.5 TABLET ORAL DAILY
Qty: 90 TABLET | Refills: 3 | Status: SHIPPED | OUTPATIENT
Start: 2022-12-27

## 2023-01-02 DIAGNOSIS — K92.1 GASTROINTESTINAL HEMORRHAGE WITH MELENA: ICD-10-CM

## 2023-01-03 RX ORDER — POTASSIUM CHLORIDE 750 MG/1
TABLET, EXTENDED RELEASE ORAL
Qty: 90 TABLET | Refills: 3 | OUTPATIENT
Start: 2023-01-03

## 2023-01-03 RX ORDER — PANTOPRAZOLE SODIUM 40 MG/1
40 TABLET, DELAYED RELEASE ORAL DAILY
Qty: 90 TABLET | Refills: 3 | OUTPATIENT
Start: 2023-01-03

## 2023-01-06 DIAGNOSIS — Z79.899 ON AMIODARONE THERAPY: Primary | ICD-10-CM

## 2023-01-18 ENCOUNTER — TELEPHONE (OUTPATIENT)
Dept: CARDIOLOGY CLINIC | Age: 88
End: 2023-01-18

## 2023-01-18 NOTE — TELEPHONE ENCOUNTER
Have him take the lasix as 40 mg daily for a week. OV when able. ED if worsens. Santos Solitario D.O.   Cardiologist  Cardiology, [facility]

## 2023-01-19 ENCOUNTER — HOSPITAL ENCOUNTER (OUTPATIENT)
Dept: OTHER | Age: 88
Setting detail: THERAPIES SERIES
Discharge: HOME | End: 2023-01-19

## 2023-01-23 ENCOUNTER — NURSE ONLY (OUTPATIENT)
Dept: NON INVASIVE DIAGNOSTICS | Age: 88
End: 2023-01-23

## 2023-01-25 ENCOUNTER — OFFICE VISIT (OUTPATIENT)
Dept: CARDIOLOGY CLINIC | Age: 88
End: 2023-01-25
Payer: MEDICARE

## 2023-01-25 VITALS
HEART RATE: 56 BPM | SYSTOLIC BLOOD PRESSURE: 124 MMHG | DIASTOLIC BLOOD PRESSURE: 60 MMHG | WEIGHT: 176.7 LBS | RESPIRATION RATE: 18 BRPM | HEIGHT: 68 IN | BODY MASS INDEX: 26.78 KG/M2

## 2023-01-25 DIAGNOSIS — I50.21 ACUTE SYSTOLIC HEART FAILURE (HCC): ICD-10-CM

## 2023-01-25 DIAGNOSIS — R06.02 SOB (SHORTNESS OF BREATH): Primary | ICD-10-CM

## 2023-01-25 PROCEDURE — 93000 ELECTROCARDIOGRAM COMPLETE: CPT | Performed by: INTERNAL MEDICINE

## 2023-01-25 PROCEDURE — 3078F DIAST BP <80 MM HG: CPT | Performed by: CLINICAL NURSE SPECIALIST

## 2023-01-25 PROCEDURE — 3074F SYST BP LT 130 MM HG: CPT | Performed by: CLINICAL NURSE SPECIALIST

## 2023-01-25 PROCEDURE — 1036F TOBACCO NON-USER: CPT | Performed by: CLINICAL NURSE SPECIALIST

## 2023-01-25 PROCEDURE — G8484 FLU IMMUNIZE NO ADMIN: HCPCS | Performed by: CLINICAL NURSE SPECIALIST

## 2023-01-25 PROCEDURE — G8417 CALC BMI ABV UP PARAM F/U: HCPCS | Performed by: CLINICAL NURSE SPECIALIST

## 2023-01-25 PROCEDURE — 99213 OFFICE O/P EST LOW 20 MIN: CPT | Performed by: CLINICAL NURSE SPECIALIST

## 2023-01-25 PROCEDURE — G8427 DOCREV CUR MEDS BY ELIG CLIN: HCPCS | Performed by: CLINICAL NURSE SPECIALIST

## 2023-01-25 PROCEDURE — 1123F ACP DISCUSS/DSCN MKR DOCD: CPT | Performed by: CLINICAL NURSE SPECIALIST

## 2023-01-25 NOTE — PATIENT INSTRUCTIONS
- Weigh yourself daily    -Stay Hydrated, 64 oz     -Diet should sodium restricted to 2 grams    -Again watch your daily weight trends and if you gain water weight please follow below instructions.    -If you gain 3-5 pounds in 2-3 days OR notice that you are retaining fluid in anyway just like you did before then take an extra dose of your water pill (furosemide/Lasix) every day until you lose the weight or feel better.     -If you notice that you have taken more than 2 extra doses in 1 week then please call and let us know. -If at any time you feel that you are retaining fluid, your medications are not working, or you feel ill in anyway, then please call us for either same day appointment or the next day, and for instructions. Our goal is to keep you out of the emergency room and the hospital and we have ways to do it. You just need to call us in a timely manner.     -If you become sick for other reasons, and notice that you are not urinating as much, the urine is very dark, you have significant diarrhea or vomiting, then please DO NOT take your water pill and CALL US immediately.

## 2023-01-26 ENCOUNTER — HOSPITAL ENCOUNTER (OUTPATIENT)
Dept: OTHER | Age: 88
Setting detail: THERAPIES SERIES
Discharge: HOME | End: 2023-01-26
Payer: MEDICARE

## 2023-01-26 VITALS
HEART RATE: 56 BPM | OXYGEN SATURATION: 93 % | DIASTOLIC BLOOD PRESSURE: 58 MMHG | WEIGHT: 176.5 LBS | RESPIRATION RATE: 18 BRPM | BODY MASS INDEX: 26.84 KG/M2 | SYSTOLIC BLOOD PRESSURE: 114 MMHG

## 2023-01-26 LAB
ANION GAP SERPL CALCULATED.3IONS-SCNC: 8 MMOL/L (ref 7–16)
BUN BLDV-MCNC: 26 MG/DL (ref 6–23)
CALCIUM SERPL-MCNC: 8.8 MG/DL (ref 8.6–10.2)
CHLORIDE BLD-SCNC: 97 MMOL/L (ref 98–107)
CO2: 30 MMOL/L (ref 22–29)
CREAT SERPL-MCNC: 1.5 MG/DL (ref 0.7–1.2)
GFR SERPL CREATININE-BSD FRML MDRD: 45 ML/MIN/1.73
GLUCOSE BLD-MCNC: 160 MG/DL (ref 74–99)
POTASSIUM SERPL-SCNC: 4.4 MMOL/L (ref 3.5–5)
PRO-BNP: 2198 PG/ML (ref 0–450)
SODIUM BLD-SCNC: 135 MMOL/L (ref 132–146)

## 2023-01-26 PROCEDURE — 80048 BASIC METABOLIC PNL TOTAL CA: CPT

## 2023-01-26 PROCEDURE — 96374 THER/PROPH/DIAG INJ IV PUSH: CPT

## 2023-01-26 PROCEDURE — 6360000002 HC RX W HCPCS: Performed by: INTERNAL MEDICINE

## 2023-01-26 PROCEDURE — 36415 COLL VENOUS BLD VENIPUNCTURE: CPT

## 2023-01-26 PROCEDURE — 99214 OFFICE O/P EST MOD 30 MIN: CPT

## 2023-01-26 PROCEDURE — 2580000003 HC RX 258

## 2023-01-26 PROCEDURE — 83880 ASSAY OF NATRIURETIC PEPTIDE: CPT

## 2023-01-26 RX ORDER — SODIUM CHLORIDE 0.9 % (FLUSH) 0.9 %
SYRINGE (ML) INJECTION
Status: COMPLETED
Start: 2023-01-26 | End: 2023-01-26

## 2023-01-26 RX ORDER — FUROSEMIDE 10 MG/ML
40 INJECTION INTRAMUSCULAR; INTRAVENOUS ONCE
Status: COMPLETED | OUTPATIENT
Start: 2023-01-26 | End: 2023-01-26

## 2023-01-26 RX ORDER — SODIUM CHLORIDE 0.9 % (FLUSH) 0.9 %
5-40 SYRINGE (ML) INJECTION 2 TIMES DAILY
Status: DISCONTINUED | OUTPATIENT
Start: 2023-01-26 | End: 2023-01-27 | Stop reason: HOSPADM

## 2023-01-26 RX ORDER — FUROSEMIDE 10 MG/ML
INJECTION INTRAMUSCULAR; INTRAVENOUS
Status: DISPENSED
Start: 2023-01-26 | End: 2023-01-26

## 2023-01-26 RX ADMIN — FUROSEMIDE 40 MG: 10 INJECTION, SOLUTION INTRAMUSCULAR; INTRAVENOUS at 12:15

## 2023-01-26 RX ADMIN — SODIUM CHLORIDE, PRESERVATIVE FREE 10 ML: 5 INJECTION INTRAVENOUS at 12:15

## 2023-01-26 NOTE — PROGRESS NOTES
Congestive Heart Failure [address]   [facility] [place]  [address]   1935          Referring Provider: Klaudia Evans, APRN-NEHEMIAS  Primary Care Physician: Dr. Kay Helm  Cardiologist: Dr. Ariadne Calix  Nephrologist:         History of Present Illness:     Francesco Martinez is a 82 y.o. male with a history of HFpEF, most recent EF 60% on 3/2/2022. Patient Story:    He does not have dyspnea with exertion, shortness of breath, or decline in overall functional capacity. He does not have orthopnea, PND, nocturnal cough or hemoptysis. He does not have abdominal distention or bloating, early satiety, anorexia/change in appetite. He does have a good urinary response to  oral diuretic. He does not have lower extremity edema. He ocassionally gets lightheadedness,Hx of Vertigo dizziness. He denies palpitations, syncope or near syncope. He does not complain of chest pain, pressure, discomfort. No Known Allergies        Prior to Visit Medications    Medication Sig Taking? Authorizing Provider   amLODIPine (NORVASC) 2.5 MG tablet TAKE 1 TABLET BY MOUTH  DAILY  Drea Mireles DO   potassium chloride 20 MEQ/15ML (10%) oral solution Take 7.5 mLs by mouth daily  Tuan Avalos MD   ALPRAZolam Quentin Roller) 0.25 MG tablet Take 0.25 mg by mouth 3 times daily as needed for Sleep.   Historical Provider, MD   amiodarone (CORDARONE) 200 MG tablet Take 200 mg by mouth daily Daily beginning 7/16/22 per Dr. Letty Salter Provider, MD   meclizine (ANTIVERT) 25 MG tablet Take 1 tablet by mouth daily as needed for Dizziness  Tuan Avalos MD   furosemide (LASIX) 40 MG tablet Take 40 mg by mouth See Admin Instructions Alternates 40 mg and 20 mg  Historical Provider, MD   METOPROLOL TARTRATE PO Take by mouth 2 times daily Indications: taking 75 mg in am and 50 mg at night  Historical Provider, MD   pravastatin (PRAVACHOL) 40 MG tablet Take 40 mg by mouth at bedtime  Historical Provider, MD   rivaroxaban (XARELTO) 20 MG TABS tablet Take 20 mg by mouth Daily with supper   Historical Provider, MD   Multiple Vitamin (MULTIVITAMIN ADULT PO) Take 1 tablet by mouth Daily with lunch  Historical Provider, MD   pantoprazole (PROTONIX) 40 MG tablet TAKE 1 TABLET BY MOUTH  DAILY  Patient taking differently: Take 40 mg by mouth Daily with lunch  Brenda Fisher MD   ferrous sulfate (IRON 325) 325 (65 Fe) MG tablet Take 1 tablet by mouth daily (with breakfast)  Patient taking differently: Take 325 mg by mouth Daily with lunch  Brenda Fisher MD   MAGNESIUM-OXIDE 400 (240 Mg) MG tablet Take 400 mg by mouth Daily with supper  Historical Provider, MD   Multiple Vitamins-Minerals (PRESERVISION AREDS 2) CAPS Take 1 capsule by mouth 2 times daily   Historical Provider, MD   Cholecalciferol (VITAMIN D) 2000 UNITS CAPS capsule Take 2,000 Units by mouth Daily with lunch  Historical Provider, MD           Physical Examination:     BP (!) 114/58   Pulse 56   Resp 18   Wt 176 lb 8 oz (80.1 kg)   SpO2 93%   BMI 26.84 kg/m²     Assessment  Charting Type: Shift assessment                   Respiratory  Respiratory Pattern: Regular  Respiratory Depth: Normal  Respiratory Quality/Effort: Unlabored  Chest Assessment: Chest expansion symmetrical  L Breath Sounds: Clear, Fine Crackles  R Breath Sounds: Clear, Fine Crackles    Breath Sounds  Right Upper Lobe: Clear  Right Middle Lobe: Clear  Right Lower Lobe: Fine Crackles  Left Upper Lobe: Clear  Left Lower Lobe: Fine Crackles         Cardiac  Cardiac Regularity: Regular  Cardiac Rhythm: AV paced    Rhythm Interpretation  Heart Rate: 56         Gastrointestinal  Abdominal (WDL): Within Defined Limits               Peripheral Vascular  Peripheral Vascular (WDL): Exceptions to WDL  RLE Edema: Trace  LLE Edema: Trace                   Genitourinary  Genitourinary (WDL): Within Defined Limits    Psychosocial  Psychosocial (WDL): Within Defined Limits    Pain Assessment  Pain Assessment: None - Denies Pain                   Heart Rate: 56                     LAB DATA:    Last 3 BMP      Sodium (mmol/L)   Date Value   01/26/2023 135   11/04/2022 138   10/13/2022 138     Potassium (mmol/L)   Date Value   01/26/2023 4.4   11/04/2022 4.2   10/13/2022 4.3     Potassium reflex Magnesium (mmol/L)   Date Value   08/23/2022 3.8   06/27/2022 4.6   03/12/2022 4.3     Chloride (mmol/L)   Date Value   01/26/2023 97 (L)   11/04/2022 98   10/13/2022 99     CO2 (mmol/L)   Date Value   01/26/2023 30 (H)   11/04/2022 29   10/13/2022 29     BUN (mg/dL)   Date Value   01/26/2023 26 (H)   11/04/2022 24 (H)   10/13/2022 21     Glucose (mg/dL)   Date Value   01/26/2023 160 (H)   11/04/2022 148 (H)   10/13/2022 83     Calcium (mg/dL)   Date Value   01/26/2023 8.8   11/04/2022 9.2   10/13/2022 9.3       Last 3 BNP       Pro-BNP (pg/mL)   Date Value   01/26/2023 2,198 (H)   11/04/2022 487 (H)   10/05/2022 564 (H)          CBC: No results for input(s): WBC, HGB, PLT in the last 72 hours. BMP:    Recent Labs     01/26/23  0915      K 4.4   CL 97*   CO2 30*   BUN 26*   CREATININE 1.5*   GLUCOSE 160*             Hepatic: No results for input(s): AST, ALT, ALB, BILITOT, ALKPHOS in the last 72 hours. Troponin: No results for input(s): TROPONINI in the last 72 hours. BNP: No results for input(s): BNP in the last 72 hours. Lipids: No results for input(s): CHOL, HDL in the last 72 hours. Invalid input(s): LDLCALCU  INR: No results for input(s): INR in the last 72 hours. WEIGHTS:    Wt Readings from Last 3 Encounters:   01/26/23 176 lb 8 oz (80.1 kg)   01/25/23 176 lb 11.2 oz (80.2 kg)   12/12/22 171 lb (77.6 kg)         TELEMETRY:  Cardiac Regularity: Regular  Cardiac Rhythm/Interpretation: AV paced        ASSESSMENT:  Magali Soriano is euvolemic with stable weight. His diuretic is alternating 40 mg and 20 mg and he says he is responding well. He is hydrating. Abd.  Is soft and non-tender, trace BLE edema, Fine crackles in base of lungs is his baseline. Interventions completed this visit:  IV diuretics given- No  Lab work obtained yes, proBNP, BMP  Reviewed currently prescribed medications with patient, educated on importance of compliance and answered any questions regarding their medication  Educated on signs and symptoms of HF  Educated on low sodium diet    PLAN:  Scheduled to follow up in CHF clinic on   Future Appointments   Date Time Provider Cezar Gamez   2/16/2023  1:30 PM MD PRISCILLA Nguyen [facility]   3/1/2023 12:00 PM [facility] ROOM 2 [facility]   3/9/2023  8:15 AM Romana Roland DO [address]     Given clinic phone number [phone] and aware of signs and symptoms to call with any HF change in symptoms.

## 2023-01-30 NOTE — PROGRESS NOTES
FU lipid patient. Patient started on praluent 2 weeks ago and sent with sample. Patient was able to inject sample without problem. Patient has not yet picked up rx so is unable to assess affordability at this time. CMP normal, lipid panel was not drawn. Patient going out of town for travel, will FU in 2 months to assess affordability, tolerance, and new lipid panel.  Went over discharge instructions with patient and family. Both verbalized understanding of instructions. Doctor spoke with patient before discharge.

## 2023-02-01 ENCOUNTER — HOSPITAL ENCOUNTER (OUTPATIENT)
Dept: OTHER | Age: 88
Setting detail: THERAPIES SERIES
Discharge: HOME | End: 2023-02-01
Payer: MEDICARE

## 2023-02-01 VITALS
HEART RATE: 61 BPM | OXYGEN SATURATION: 91 % | WEIGHT: 180 LBS | RESPIRATION RATE: 18 BRPM | DIASTOLIC BLOOD PRESSURE: 73 MMHG | SYSTOLIC BLOOD PRESSURE: 122 MMHG | BODY MASS INDEX: 27.37 KG/M2

## 2023-02-01 DIAGNOSIS — E87.6 HYPOKALEMIA: ICD-10-CM

## 2023-02-01 DIAGNOSIS — R13.10 DYSPHAGIA, UNSPECIFIED TYPE: ICD-10-CM

## 2023-02-01 LAB
ANION GAP SERPL CALCULATED.3IONS-SCNC: 9 MMOL/L (ref 7–16)
BUN BLDV-MCNC: 22 MG/DL (ref 6–23)
CALCIUM SERPL-MCNC: 8.7 MG/DL (ref 8.6–10.2)
CHLORIDE BLD-SCNC: 93 MMOL/L (ref 98–107)
CO2: 30 MMOL/L (ref 22–29)
CREAT SERPL-MCNC: 1.4 MG/DL (ref 0.7–1.2)
GFR SERPL CREATININE-BSD FRML MDRD: 49 ML/MIN/1.73
GLUCOSE BLD-MCNC: 105 MG/DL (ref 74–99)
POTASSIUM SERPL-SCNC: 4.4 MMOL/L (ref 3.5–5)
PRO-BNP: 3218 PG/ML (ref 0–450)
SODIUM BLD-SCNC: 132 MMOL/L (ref 132–146)

## 2023-02-01 PROCEDURE — 6360000002 HC RX W HCPCS

## 2023-02-01 PROCEDURE — 83880 ASSAY OF NATRIURETIC PEPTIDE: CPT

## 2023-02-01 PROCEDURE — 99214 OFFICE O/P EST MOD 30 MIN: CPT

## 2023-02-01 PROCEDURE — 96374 THER/PROPH/DIAG INJ IV PUSH: CPT

## 2023-02-01 PROCEDURE — 80048 BASIC METABOLIC PNL TOTAL CA: CPT

## 2023-02-01 PROCEDURE — 36415 COLL VENOUS BLD VENIPUNCTURE: CPT

## 2023-02-01 PROCEDURE — 2580000003 HC RX 258

## 2023-02-01 RX ORDER — POTASSIUM CHLORIDE 20MEQ/15ML
20 LIQUID (ML) ORAL DAILY
Qty: 450 ML | Refills: 2 | Status: ON HOLD | OUTPATIENT
Start: 2023-02-01

## 2023-02-01 RX ORDER — FUROSEMIDE 40 MG/1
40 TABLET ORAL DAILY
Qty: 90 TABLET | Refills: 3 | Status: ON HOLD | OUTPATIENT
Start: 2023-02-01

## 2023-02-01 RX ORDER — FUROSEMIDE 10 MG/ML
40 INJECTION INTRAMUSCULAR; INTRAVENOUS ONCE
Status: DISCONTINUED | OUTPATIENT
Start: 2023-02-01 | End: 2023-02-02 | Stop reason: HOSPADM

## 2023-02-01 RX ORDER — SODIUM CHLORIDE 0.9 % (FLUSH) 0.9 %
5-40 SYRINGE (ML) INJECTION 2 TIMES DAILY
Status: DISCONTINUED | OUTPATIENT
Start: 2023-02-01 | End: 2023-02-02 | Stop reason: HOSPADM

## 2023-02-01 RX ORDER — FUROSEMIDE 10 MG/ML
INJECTION INTRAMUSCULAR; INTRAVENOUS
Status: COMPLETED
Start: 2023-02-01 | End: 2023-02-01

## 2023-02-01 RX ORDER — SODIUM CHLORIDE 0.9 % (FLUSH) 0.9 %
SYRINGE (ML) INJECTION
Status: COMPLETED
Start: 2023-02-01 | End: 2023-02-01

## 2023-02-01 RX ADMIN — FUROSEMIDE 40 MG: 10 INJECTION, SOLUTION INTRAMUSCULAR; INTRAVENOUS at 10:57

## 2023-02-01 RX ADMIN — SODIUM CHLORIDE, PRESERVATIVE FREE 10 ML: 5 INJECTION INTRAVENOUS at 10:57

## 2023-02-01 NOTE — PROGRESS NOTES
Congestive Heart Failure Clinic   [facility]   [facility] [place]  [phone]        Niles Field   1935          Referring Provider: GINETTE Ward-CNP  Primary Care Physician: Dr. Fowler  Cardiologist: Dr. Oli Jorgensen  Nephrologist:         History of Present Illness:     Niles Field is a 87 y.o. male with a history of HFpEF, most recent EF 60% on 3/2/2022.     Patient Story:    He does not have dyspnea with exertion, shortness of breath, or decline in overall functional capacity. He does not have orthopnea, PND, nocturnal cough or hemoptysis. He does not have abdominal distention or bloating, early satiety, anorexia/change in appetite. He does have a good urinary response to  oral diuretic. He does not have lower extremity edema. He ocassionally gets lightheadedness,Hx of Vertigo dizziness. He denies palpitations, syncope or near syncope. He does not complain of chest pain, pressure, discomfort.         No Known Allergies        Prior to Visit Medications    Medication Sig Taking? Authorizing Provider   amLODIPine (NORVASC) 2.5 MG tablet TAKE 1 TABLET BY MOUTH  DAILY  Oli Jorgensen DO   potassium chloride 20 MEQ/15ML (10%) oral solution Take 7.5 mLs by mouth daily  Jae Fowler MD   ALPRAZolam (XANAX) 0.25 MG tablet Take 0.25 mg by mouth 3 times daily as needed for Sleep.  Historical Provider, MD   amiodarone (CORDARONE) 200 MG tablet Take 200 mg by mouth daily Daily beginning 7/16/22 per Dr. Mercado  Historical Provider, MD   meclizine (ANTIVERT) 25 MG tablet Take 1 tablet by mouth daily as needed for Dizziness  Jae Fowler MD   furosemide (LASIX) 40 MG tablet Take 40 mg by mouth See Admin Instructions Alternates 40 mg and 20 mg  Historical Provider, MD   METOPROLOL TARTRATE PO Take by mouth 2 times daily Indications: taking 75 mg in am and 50 mg at night  Historical Provider, MD   pravastatin (PRAVACHOL) 40 MG tablet Take 40 mg by mouth at  bedtime  Historical Provider, MD   rivaroxaban (XARELTO) 20 MG TABS tablet Take 20 mg by mouth Daily with supper   Historical Provider, MD   Multiple Vitamin (MULTIVITAMIN ADULT PO) Take 1 tablet by mouth Daily with lunch  Historical Provider, MD   pantoprazole (PROTONIX) 40 MG tablet TAKE 1 TABLET BY MOUTH  DAILY  Patient taking differently: Take 40 mg by mouth Daily with lunch  Anny Schaeffer MD   ferrous sulfate (IRON 325) 325 (65 Fe) MG tablet Take 1 tablet by mouth daily (with breakfast)  Patient taking differently: Take 325 mg by mouth Daily with lunch  Anny Schaeffer MD   MAGNESIUM-OXIDE 400 (240 Mg) MG tablet Take 400 mg by mouth Daily with supper  Historical Provider, MD   Multiple Vitamins-Minerals (PRESERVISION AREDS 2) CAPS Take 1 capsule by mouth 2 times daily   Historical Provider, MD   Cholecalciferol (VITAMIN D) 2000 UNITS CAPS capsule Take 2,000 Units by mouth Daily with lunch  Historical Provider, MD           Physical Examination:     /73   Pulse 61   Resp 18   Wt 180 lb (81.6 kg)   SpO2 91%   BMI 27.37 kg/m²     Assessment  Charting Type: Shift assessment                   Respiratory  Respiratory Pattern: Regular  Respiratory Depth: Normal  Respiratory Quality/Effort: Unlabored  Chest Assessment: Chest expansion symmetrical  L Breath Sounds: Clear, Fine Crackles  R Breath Sounds: Clear, Fine Crackles    Breath Sounds  Right Upper Lobe: Clear  Right Middle Lobe: Clear  Right Lower Lobe: Fine Crackles  Left Upper Lobe: Clear  Left Lower Lobe: Fine Crackles         Cardiac  Cardiac Regularity: Regular  Cardiac Rhythm: AV paced    Rhythm Interpretation  Heart Rate: 61         Gastrointestinal  Abdominal (WDL): Within Defined Limits               Peripheral Vascular  Peripheral Vascular (WDL): Exceptions to WDL  RLE Edema: +2, Pitting  LLE Edema: +2, Pitting                   Genitourinary  Genitourinary (WDL): Within Defined Limits    Psychosocial  Psychosocial (WDL): Within Defined Limits                        Heart Rate: 61                     LAB DATA:    Last 3 BMP      Sodium (mmol/L)   Date Value   02/01/2023 132   01/26/2023 135   11/04/2022 138     Potassium (mmol/L)   Date Value   02/01/2023 4.4   01/26/2023 4.4   11/04/2022 4.2     Potassium reflex Magnesium (mmol/L)   Date Value   08/23/2022 3.8   06/27/2022 4.6   03/12/2022 4.3     Chloride (mmol/L)   Date Value   02/01/2023 93 (L)   01/26/2023 97 (L)   11/04/2022 98     CO2 (mmol/L)   Date Value   02/01/2023 30 (H)   01/26/2023 30 (H)   11/04/2022 29     BUN (mg/dL)   Date Value   02/01/2023 22   01/26/2023 26 (H)   11/04/2022 24 (H)     Glucose (mg/dL)   Date Value   02/01/2023 105 (H)   01/26/2023 160 (H)   11/04/2022 148 (H)     Calcium (mg/dL)   Date Value   02/01/2023 8.7   01/26/2023 8.8   11/04/2022 9.2       Last 3 BNP       Pro-BNP (pg/mL)   Date Value   02/01/2023 3,218 (H)   01/26/2023 2,198 (H)   11/04/2022 487 (H)          CBC: No results for input(s): WBC, HGB, PLT in the last 72 hours. BMP:    Recent Labs     02/01/23  1109      K 4.4   CL 93*   CO2 30*   BUN 22   CREATININE 1.4*   GLUCOSE 105*             Hepatic: No results for input(s): AST, ALT, ALB, BILITOT, ALKPHOS in the last 72 hours. Troponin: No results for input(s): TROPONINI in the last 72 hours. BNP: No results for input(s): BNP in the last 72 hours. Lipids: No results for input(s): CHOL, HDL in the last 72 hours. Invalid input(s): LDLCALCU  INR: No results for input(s): INR in the last 72 hours. WEIGHTS:    Wt Readings from Last 3 Encounters:   02/01/23 180 lb (81.6 kg)   01/26/23 176 lb 8 oz (80.1 kg)   01/25/23 176 lb 11.2 oz (80.2 kg)         TELEMETRY:  Cardiac Regularity: Regular  Cardiac Rhythm/Interpretation: AV paced        ASSESSMENT:  Sandra Chen is hypervolumic with a two pound weight gain. His diuretic is alternating 40 mg and 20 mg and states he took 40 mg of lasix the past 3 days d/t weight gain.  He admits to eating pizza and a [employer] whopper over the weekend.  He is hydrating.  Abd. Is soft and non-tender, BLE + 2 pitting edema, Fine crackles in base of lungs is his baseline.        Interventions completed this visit:  IV diuretics given- yes 40 mg iv lasix d/t weight gain and fine crackles.  Lab work obtained yes, proBNP, BMP  Reviewed currently prescribed medications with patient, educated on importance of compliance and answered any questions regarding their medication  Educated on signs and symptoms of HF  Educated on low sodium diet    PLAN:  Scheduled to follow up in CHF clinic on   Future Appointments   Date Time Provider Department Center   2/8/2023  9:30 AM [facility] ROOM 1 [place]   2/16/2023  1:30 PM MD PRISCILLA Lal [facility]   3/1/2023 12:00 PM [facility] ROOM 2 [place]   3/9/2023  8:15 AM DO Yosvany Ortiz Card [facility]     Given clinic phone number [phone] and aware of signs and symptoms to call with any HF change in symptoms.

## 2023-02-01 NOTE — RESULT ENCOUNTER NOTE
Labs and CHF Clinic note reviewed  Spoke with Marcio Sow RN in CHF clinic     Instructed to increased Lasix to 40 mg daily   Increase potassium to 20 meq daily   Return to CHF clinic as scheduled in 1 week  Reminded him of a low sodium diet

## 2023-02-01 NOTE — PROGRESS NOTES
Called and spoke with daughter in law Parul Esquivel [phone] to make her aware to have patient take 40 mg of Lasix daily and increase his K-Dur  20 meq daily and follow up in the clinic on 2/8/23 per Lucien Menchaca CNp's order.

## 2023-02-04 ENCOUNTER — HOSPITAL ENCOUNTER (INPATIENT)
Age: 88
LOS: 5 days | Discharge: HOME | End: 2023-02-09
Attending: EMERGENCY MEDICINE | Admitting: INTERNAL MEDICINE
Payer: MEDICARE

## 2023-02-04 ENCOUNTER — APPOINTMENT (OUTPATIENT)
Dept: GENERAL RADIOLOGY | Age: 88
End: 2023-02-04
Payer: MEDICARE

## 2023-02-04 DIAGNOSIS — J96.01 ACUTE RESPIRATORY FAILURE WITH HYPOXIA (HCC): ICD-10-CM

## 2023-02-04 DIAGNOSIS — I50.9 ACUTE CONGESTIVE HEART FAILURE, UNSPECIFIED HEART FAILURE TYPE (HCC): Primary | ICD-10-CM

## 2023-02-04 LAB
ADENOVIRUS BY PCR: NOT DETECTED
BASOPHILS ABSOLUTE: 0.03 E9/L (ref 0–0.2)
BASOPHILS RELATIVE PERCENT: 0.4 % (ref 0–2)
BORDETELLA PARAPERTUSSIS BY PCR: NOT DETECTED
BORDETELLA PERTUSSIS BY PCR: NOT DETECTED
CHLAMYDOPHILIA PNEUMONIAE BY PCR: NOT DETECTED
CORONAVIRUS 229E BY PCR: NOT DETECTED
CORONAVIRUS HKU1 BY PCR: NOT DETECTED
CORONAVIRUS NL63 BY PCR: NOT DETECTED
CORONAVIRUS OC43 BY PCR: NOT DETECTED
EOSINOPHILS ABSOLUTE: 0.1 E9/L (ref 0.05–0.5)
EOSINOPHILS RELATIVE PERCENT: 1.3 % (ref 0–6)
HCT VFR BLD CALC: 35 % (ref 37–54)
HEMOGLOBIN: 11.4 G/DL (ref 12.5–16.5)
HUMAN METAPNEUMOVIRUS BY PCR: NOT DETECTED
HUMAN RHINOVIRUS/ENTEROVIRUS BY PCR: NOT DETECTED
IMMATURE GRANULOCYTES #: 0.03 E9/L
IMMATURE GRANULOCYTES %: 0.4 % (ref 0–5)
INFLUENZA A BY PCR: NOT DETECTED
INFLUENZA B BY PCR: NOT DETECTED
LYMPHOCYTES ABSOLUTE: 0.76 E9/L (ref 1.5–4)
LYMPHOCYTES RELATIVE PERCENT: 9.8 % (ref 20–42)
MCH RBC QN AUTO: 30.4 PG (ref 26–35)
MCHC RBC AUTO-ENTMCNC: 32.6 % (ref 32–34.5)
MCV RBC AUTO: 93.3 FL (ref 80–99.9)
MONOCYTES ABSOLUTE: 0.87 E9/L (ref 0.1–0.95)
MONOCYTES RELATIVE PERCENT: 11.2 % (ref 2–12)
MYCOPLASMA PNEUMONIAE BY PCR: NOT DETECTED
NEUTROPHILS ABSOLUTE: 5.95 E9/L (ref 1.8–7.3)
NEUTROPHILS RELATIVE PERCENT: 76.9 % (ref 43–80)
PARAINFLUENZA VIRUS 1 BY PCR: NOT DETECTED
PARAINFLUENZA VIRUS 2 BY PCR: NOT DETECTED
PARAINFLUENZA VIRUS 3 BY PCR: NOT DETECTED
PARAINFLUENZA VIRUS 4 BY PCR: NOT DETECTED
PDW BLD-RTO: 15 FL (ref 11.5–15)
PLATELET # BLD: 265 E9/L (ref 130–450)
PMV BLD AUTO: 10.3 FL (ref 7–12)
PRO-BNP: 3633 PG/ML (ref 0–450)
RBC # BLD: 3.75 E12/L (ref 3.8–5.8)
RESPIRATORY SYNCYTIAL VIRUS BY PCR: NOT DETECTED
SARS-COV-2, PCR: NOT DETECTED
TROPONIN, HIGH SENSITIVITY: 21 NG/L (ref 0–11)
WBC # BLD: 7.7 E9/L (ref 4.5–11.5)

## 2023-02-04 PROCEDURE — 93005 ELECTROCARDIOGRAM TRACING: CPT | Performed by: EMERGENCY MEDICINE

## 2023-02-04 PROCEDURE — 6370000000 HC RX 637 (ALT 250 FOR IP): Performed by: INTERNAL MEDICINE

## 2023-02-04 PROCEDURE — 84484 ASSAY OF TROPONIN QUANT: CPT

## 2023-02-04 PROCEDURE — 85025 COMPLETE CBC W/AUTO DIFF WBC: CPT

## 2023-02-04 PROCEDURE — 0202U NFCT DS 22 TRGT SARS-COV-2: CPT

## 2023-02-04 PROCEDURE — 99285 EMERGENCY DEPT VISIT HI MDM: CPT

## 2023-02-04 PROCEDURE — 6360000002 HC RX W HCPCS: Performed by: EMERGENCY MEDICINE

## 2023-02-04 PROCEDURE — 99223 1ST HOSP IP/OBS HIGH 75: CPT | Performed by: INTERNAL MEDICINE

## 2023-02-04 PROCEDURE — 2060000000 HC ICU INTERMEDIATE R&B

## 2023-02-04 PROCEDURE — 71045 X-RAY EXAM CHEST 1 VIEW: CPT

## 2023-02-04 PROCEDURE — 83880 ASSAY OF NATRIURETIC PEPTIDE: CPT

## 2023-02-04 PROCEDURE — 96374 THER/PROPH/DIAG INJ IV PUSH: CPT

## 2023-02-04 RX ORDER — PRAVASTATIN SODIUM 20 MG
40 TABLET ORAL NIGHTLY
Status: DISCONTINUED | OUTPATIENT
Start: 2023-02-04 | End: 2023-02-09 | Stop reason: HOSPADM

## 2023-02-04 RX ORDER — AMIODARONE HYDROCHLORIDE 200 MG/1
200 TABLET ORAL DAILY
Status: DISCONTINUED | OUTPATIENT
Start: 2023-02-04 | End: 2023-02-09

## 2023-02-04 RX ORDER — ALPRAZOLAM 0.25 MG/1
0.25 TABLET ORAL 3 TIMES DAILY PRN
Status: DISCONTINUED | OUTPATIENT
Start: 2023-02-04 | End: 2023-02-09 | Stop reason: HOSPADM

## 2023-02-04 RX ORDER — FUROSEMIDE 10 MG/ML
40 INJECTION INTRAMUSCULAR; INTRAVENOUS ONCE
Status: COMPLETED | OUTPATIENT
Start: 2023-02-04 | End: 2023-02-04

## 2023-02-04 RX ORDER — ACETAMINOPHEN 650 MG/1
650 SUPPOSITORY RECTAL EVERY 6 HOURS PRN
Status: DISCONTINUED | OUTPATIENT
Start: 2023-02-04 | End: 2023-02-09 | Stop reason: HOSPADM

## 2023-02-04 RX ORDER — SODIUM CHLORIDE 0.9 % (FLUSH) 0.9 %
5-40 SYRINGE (ML) INJECTION PRN
Status: DISCONTINUED | OUTPATIENT
Start: 2023-02-04 | End: 2023-02-09 | Stop reason: HOSPADM

## 2023-02-04 RX ORDER — METOPROLOL TARTRATE 50 MG/1
50 TABLET, FILM COATED ORAL NIGHTLY
Status: DISCONTINUED | OUTPATIENT
Start: 2023-02-04 | End: 2023-02-09 | Stop reason: HOSPADM

## 2023-02-04 RX ORDER — PANTOPRAZOLE SODIUM 40 MG/1
40 TABLET, DELAYED RELEASE ORAL DAILY
Status: DISCONTINUED | OUTPATIENT
Start: 2023-02-04 | End: 2023-02-09 | Stop reason: HOSPADM

## 2023-02-04 RX ORDER — SODIUM CHLORIDE 0.9 % (FLUSH) 0.9 %
5-40 SYRINGE (ML) INJECTION EVERY 12 HOURS SCHEDULED
Status: DISCONTINUED | OUTPATIENT
Start: 2023-02-04 | End: 2023-02-09 | Stop reason: HOSPADM

## 2023-02-04 RX ORDER — ACETAMINOPHEN 325 MG/1
650 TABLET ORAL EVERY 6 HOURS PRN
Status: DISCONTINUED | OUTPATIENT
Start: 2023-02-04 | End: 2023-02-09 | Stop reason: HOSPADM

## 2023-02-04 RX ORDER — AMLODIPINE BESYLATE 2.5 MG/1
2.5 TABLET ORAL DAILY
Status: DISCONTINUED | OUTPATIENT
Start: 2023-02-04 | End: 2023-02-09 | Stop reason: HOSPADM

## 2023-02-04 RX ORDER — MECLIZINE HCL 12.5 MG/1
25 TABLET ORAL DAILY PRN
Status: DISCONTINUED | OUTPATIENT
Start: 2023-02-04 | End: 2023-02-09 | Stop reason: HOSPADM

## 2023-02-04 RX ORDER — POLYETHYLENE GLYCOL 3350 17 G/17G
17 POWDER, FOR SOLUTION ORAL DAILY PRN
Status: DISCONTINUED | OUTPATIENT
Start: 2023-02-04 | End: 2023-02-09 | Stop reason: HOSPADM

## 2023-02-04 RX ORDER — SODIUM CHLORIDE 9 MG/ML
INJECTION, SOLUTION INTRAVENOUS PRN
Status: DISCONTINUED | OUTPATIENT
Start: 2023-02-04 | End: 2023-02-09 | Stop reason: HOSPADM

## 2023-02-04 RX ORDER — LANOLIN ALCOHOL/MO/W.PET/CERES
400 CREAM (GRAM) TOPICAL
Status: DISCONTINUED | OUTPATIENT
Start: 2023-02-04 | End: 2023-02-09 | Stop reason: HOSPADM

## 2023-02-04 RX ORDER — ONDANSETRON 4 MG/1
4 TABLET, ORALLY DISINTEGRATING ORAL EVERY 8 HOURS PRN
Status: DISCONTINUED | OUTPATIENT
Start: 2023-02-04 | End: 2023-02-04

## 2023-02-04 RX ORDER — ONDANSETRON 2 MG/ML
4 INJECTION INTRAMUSCULAR; INTRAVENOUS EVERY 6 HOURS PRN
Status: DISCONTINUED | OUTPATIENT
Start: 2023-02-04 | End: 2023-02-04

## 2023-02-04 RX ORDER — FERROUS SULFATE 325(65) MG
325 TABLET ORAL
Status: DISCONTINUED | OUTPATIENT
Start: 2023-02-04 | End: 2023-02-09 | Stop reason: HOSPADM

## 2023-02-04 RX ADMIN — FUROSEMIDE 40 MG: 10 INJECTION, SOLUTION INTRAVENOUS at 17:53

## 2023-02-04 RX ADMIN — AMLODIPINE BESYLATE 2.5 MG: 2.5 TABLET ORAL at 20:58

## 2023-02-04 RX ADMIN — Medication 400 MG: at 20:58

## 2023-02-04 RX ADMIN — METOPROLOL TARTRATE 50 MG: 50 TABLET, FILM COATED ORAL at 20:58

## 2023-02-04 RX ADMIN — RIVAROXABAN 20 MG: 20 TABLET, FILM COATED ORAL at 20:57

## 2023-02-04 RX ADMIN — ALPRAZOLAM 0.25 MG: 0.25 TABLET ORAL at 21:03

## 2023-02-04 RX ADMIN — PRAVASTATIN SODIUM 40 MG: 20 TABLET ORAL at 20:57

## 2023-02-04 ASSESSMENT — LIFESTYLE VARIABLES
HOW OFTEN DO YOU HAVE A DRINK CONTAINING ALCOHOL: NEVER
HOW MANY STANDARD DRINKS CONTAINING ALCOHOL DO YOU HAVE ON A TYPICAL DAY: PATIENT DOES NOT DRINK

## 2023-02-04 NOTE — ED TRIAGE NOTES
Department of Emergency Medicine  FIRST PROVIDER TRIAGE NOTE             Independent MLP           2/4/23  3:03 PM EST    Date of Encounter: 2/4/23   MRN: [id number]      HPI: Demi Castellon is a 82 y.o. male who presents to the ED for No chief complaint on file. Patient does report that he has a history of CHF for which he attends the CHF clinic and has been utilizing Lasix. He reports that his symptoms have been present for approximately 1 week and has associated symptoms of a cough with mucus. ROS: Negative for cp, abd pain, back pain, fever, vomiting, diarrhea, urinary complaints, rash, headache, or dizziness. PE: Gen Appearance/Constitutional: alert  CV: regular rate  Pulm: CTA bilat     Initial Plan of Care: All treatment areas with department are currently occupied. Plan to order/Initiate the following while awaiting opening in ED: labs, EKG, imaging studies, and COVID-19 testing.   Initiate Treatment-Testing, Proceed toTreatment Area When Bed Available for ED Attending/MLP to Continue Care    Electronically signed by JOSEPH Allred   DD: 2/4/23

## 2023-02-04 NOTE — ED NOTES
Patient SpO2 86% on room air, patient placed on 2 L nasal cannula; SpO2 increased to 97%.      Nu Quiros RN  02/04/23 [id number]

## 2023-02-04 NOTE — H&P
[facility] History and Physical    --------------------------------------------------------------------------------------  Assessment / Plan  Acute on chronic HFpEF  Progressively worsening TORRES, on Lasix 40 mg p.o. daily, recently increased, recently also got IV diuretics. Given 40 mg IV in the ER. Patient will be admitted with consult to cardiology, probably just needs a few days of IV diuresis to kind of play catch up before likely being discharged on a slightly more aggressive diuretic regimen. Continue K and Mg supplements. Watch intake, output, and daily weights. Last echo was from March 2022, not sure if he really warrants a repeat at this time, will defer to cardiology. X-ray showed bilateral airspace disease, questionable pneumonia, patient does have a cough but no fevers or leukocytosis and I do not feel pneumonia is an active issue. A respiratory viral panel is being checked. We will continue to monitor respiratory status closely. History of CAD status post MI, ICD placement, A-fib on Xarelto, hyperlipidemia, hypertension, CKD 3    Please see orders for further plan of care  Code status  full  DVT prophylaxis Xarelto  Disposition  Anticipate home  --------------------------------------------------------------------------------------    Admission Date  2/4/2023  4:01 PM  Chief Complaint Torres    Subjective  History of Present Illness  Deann Hope is an 44-year-old male with past medical history pertinent for diastolic heart failure status post ICD placement, A-fib on Xarelto, CAD status post MI, hyperlipidemia, hypertension, CKD 3 who presented to the emergency department after experiencing worsening exertional dyspnea over the past 1 to 2 weeks. Patient follows with both Dr. Ary Wiseman for cardiology and the CHF clinic; on 1/25 patient was seen by Dr. Ary Wiseman having some shortness of breath at that time, patient was referred back to CHF clinic with changes in his diuretic regimen. He was seen back at the CHF clinic on 2/1 where he was given IV Lasix. Despite these measures, the patient's symptoms progressed. He denies significant lower extremity swelling though wears compression stockings. Says he gets a little dizzy on walking but has a history of vertigo and denies chest pressure. He does report that he has a cough that is productive of relatively small amounts of yellow sputum but otherwise has no active signs or symptoms of infection. Work-up here showed some slight hypoxia, dropping to 86% on room air, back up into the high 90s when 2 L nasal cannula was applied. Imaging showed bilateral airspace abnormalities, CHF versus pneumonia, though based on history and physical do not feel pneumonia is an active issue. BNP was checked and found to be about 3600. Otherwise hemoglobin was 11.4 which appears to be about the patient's baseline. He was given 40 mg of Lasix IV in the emergency department and is already feeling better at the time of my visit. Patient's son, also Reza Sebastian, was present throughout my visit.     Review of Systems - 12-point review of systems has been reviewed and is otherwise negative except as listed in the HPI    Past Medical History:   Diagnosis Date    A-fib [facility])     follows with Dr Omer Desir 9/28/22    AAA (abdominal aortic aneurysm)     infrarenal 3.7cm 6/27/22    Arrhythmia     Carotid artery stenosis     CHF (congestive heart failure) ([address]     CKD (chronic kidney disease), stage III ([address]     Heart valve problem     Hyperlipidemia     Hypertension     ICD (implantable cardiac defibrillator) in place 2007    Aly Arnaldo DR KT#[id number] generator changed 7/28/16  Dr Cathryn Penaloza    MI (mitral incompetence) 2003    MI (myocardial infarction) ([address] 2003     Past Surgical History:   Procedure Laterality Date    CARDIAC CATHETERIZATION Right 03/03/2017    Dr. Bartolo Paige  2007. 2008 2007 78 shocks replaced 2008 [employer]     CARDIAC DEFIBRILLATOR PLACEMENT  07/28/2016    [employer] Dual ICD gen change    CARDIAC SURGERY N/A 09/04/2021    cardioversion performed by Gaurang Anand MD at [address]  10/29/2020    Successful    (Dr. Luis Ingram)    CARDIOVERSION  05/12/2022    Successful   Dr Luis Ingram    COLONOSCOPY N/A 02/24/2020    COLONOSCOPY DIAGNOSTIC performed by Jenni London MD at [address] N/A 05/04/2022    COLONOSCOPY WITH BIOPSY performed by Colleen Stuart MD at [address] [facility] [address] GRAFT  05/23/2003 03/16/2007    DIAGNOSTIC CARDIAC CATH LAB PROCEDURE  2007    DIAGNOSTIC CARDIAC CATH LAB PROCEDURE  03/29/2008    stent    JOINT REPLACEMENT  2011    r hip surgery Dr Sakina Matos  03/29/2017    Dr. Gary Watts and Dr. Omar Galeana- TAVR Josie 26mm valve    UPPER GASTROINTESTINAL ENDOSCOPY N/A 09/03/2019    EGD ESOPHAGOGASTRODUODENOSCOPY performed by Rebecca Klinefelter, MD at [address] [place] [address],[facility] 02/18/2020    EGD ESOPHAGOGASTRODUODENOSCOPY performed by Jenni London MD at [address] [place] [address],[facility] 03/14/2022    EGD CONTROL HEMORRHAGE performed by Colleen Stuart MD at [address] [place] [address],[facility] 03/16/2022    EGD ESOPHAGOGASTRODUODENOSCOPY performed by Colleen Stuart MD at [address] [place] [address],[facility] 12/12/2022    EGD ESOPHAGOGASTRODUODENOSCOPY DILATATION performed by Colleen Stuart MD at [address] [facility]  1970's     Prior to Admission medications    Medication Sig Start Date End Date Taking?  Authorizing Provider   furosemide (LASIX) 40 MG tablet Take 1 tablet by mouth daily Alternates 40 mg and 20 mg 2/1/23   GINETTE Pitts CNP   potassium chloride 20 MEQ/15ML (10%) oral solution Take 15 mLs by mouth daily 2/1/23   GINETTE Pitts CNP   amLODIPine (NORVASC) 2.5 MG tablet TAKE 1 TABLET BY MOUTH  DAILY 12/27/22 Tammy Wade, DO   ALPRAZolam Rutcarmen Gordon) 0.25 MG tablet Take 0.25 mg by mouth 3 times daily as needed for Sleep. Historical Provider, MD   amiodarone (CORDARONE) 200 MG tablet Take 200 mg by mouth daily Daily beginning 7/16/22 per Dr. Juan Santana 7/16/22   Historical Provider, MD   meclizine (ANTIVERT) 25 MG tablet Take 1 tablet by mouth daily as needed for Dizziness 7/5/22   Matteo Bocanegra MD   METOPROLOL TARTRATE PO Take by mouth 2 times daily Indications: taking 75 mg in am and 50 mg at night    Historical Provider, MD   pravastatin (PRAVACHOL) 40 MG tablet Take 40 mg by mouth at bedtime    Historical Provider, MD   rivaroxaban (XARELTO) 20 MG TABS tablet Take 20 mg by mouth Daily with supper     Historical Provider, MD   Multiple Vitamin (MULTIVITAMIN ADULT PO) Take 1 tablet by mouth Daily with lunch    Historical Provider, MD   pantoprazole (PROTONIX) 40 MG tablet TAKE 1 TABLET BY MOUTH  DAILY  Patient taking differently: Take 40 mg by mouth Daily with lunch 12/15/21   Matteo Bocanegra MD   ferrous sulfate (IRON 325) 325 (65 Fe) MG tablet Take 1 tablet by mouth daily (with breakfast)  Patient taking differently: Take 325 mg by mouth Daily with lunch 11/5/20   Matteo Bocanegra MD   MAGNESIUM-OXIDE 400 (240 Mg) MG tablet Take 400 mg by mouth Daily with supper 3/18/20   Historical Provider, MD   Multiple Vitamins-Minerals (PRESERVISION AREDS 2) CAPS Take 1 capsule by mouth 2 times daily     Historical Provider, MD   Cholecalciferol (VITAMIN D) 2000 UNITS CAPS capsule Take 2,000 Units by mouth Daily with lunch    Historical Provider, MD     Allergies  Patient has no known allergies. Social History   reports that he quit smoking about 50 years ago. His smoking use included cigarettes. He has a 1.50 pack-year smoking history. He has never used smokeless tobacco. He reports that he does not drink alcohol and does not use drugs. Family History  family history is not on file.     Objective  Physical Exam Vitals: /74   Pulse 57   Temp 97.2 °F (36.2 °C)   Resp 19   Ht 5' 8\" (1.727 m)   Wt 180 lb (81.6 kg)   SpO2 96%   BMI 27.37 kg/m²   General: well-developed, well-nourished, no acute distress, cooperative  Skin: generally warm, dry, and intact, with normal color  HEENT: normocephalic, atraumatic, no gross abnormalities, +2LNC  Respiratory: clear to auscultation bilaterally without respiratory distress  Cardiovascular: regular rate and rhythm without murmur / rub / gallop  Abdominal: soft, nontender, nondistended, normoactive bowel sounds  Extremities: no obvious edema or deformity  Neurologic: awake, alert, no gross deficits  Psychiatric: normal affect, cooperative    Echo 3/1/22  Normal left ventricular systolic function. Ejection fraction is visually estimated at 60%. Mildly dilated right ventricle with normal right ventricular function. There is doppler evidence of stage II diastolic dysfunction. Moderately dilated left atrium by volume index. Mild mitral regurgitation. 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 No significant paravalvular aortic regurgitation. AV peak velocity 2.1 m/s. AV mean gradient 9 mmHg. AV area 1.5 cm2. Mild tricuspid regurgitation. PASP is estimated at 44 mmHg.     *Available labs, imaging studies, microbiologic studies, cardiac studies have been reviewed    Electronically signed by Ivory Lesch, DO on 2/4/2023 at 6:10 PM

## 2023-02-04 NOTE — ED PROVIDER NOTES
HPI:  2/4/23,   Time: 4:11 PM XIN Montero is a 82 y.o. male presenting to the ED for presents with worsening shortness of breath. Patient said he was in see his clinic on the first had increase in his diuretics. States he is short of breath with exertion. Better with sits. Patient has a history of atrial fibrillation currently on Xarelto. History of coronary artery bypass graft 2007, beginning 3 days ago. The complaint has been persistent, severe in severity, and worsened by walking. Patient states he is better when he sits. Patient reports productive cough but no fevers chills. No history of venous thrombus embolism. She was found to be hypoxic 86% SaO2 triage. He was placed on 2 L nasal cannula. Review of Systems:   Pertinent positives and negatives are stated within HPI, all other systems reviewed and are negative.    --------------------------------------------- PAST HISTORY ---------------------------------------------  Past Medical History:  has a past medical history of A-fib ([facility] AAA (abdominal aortic aneurysm), Arrhythmia, Carotid artery stenosis, CHF (congestive heart failure) ([facility] CKD (chronic kidney disease), stage III ([facility] Heart valve problem, Hyperlipidemia, Hypertension, ICD (implantable cardiac defibrillator) in place, MI (mitral incompetence), and MI (myocardial infarction) ([facility] 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 2008); Cardiac defibrillator placement (07/28/2016); Cardiac catheterization (Right, 03/03/2017); other surgical history (03/29/2017); Upper gastrointestinal endoscopy (N/A, 09/03/2019); Upper gastrointestinal endoscopy (N/A, 02/18/2020);  Colonoscopy (N/A, 02/24/2020); Cardioversion (10/29/2020); Cardiac surgery (N/A, 09/04/2021); Upper gastrointestinal endoscopy (N/A, 03/14/2022); Upper gastrointestinal endoscopy (N/A, 03/16/2022); Colonoscopy (N/A, 05/04/2022); Cardioversion (05/12/2022); and Upper gastrointestinal endoscopy (N/A, 12/12/2022). Social History:  reports that he quit smoking about 50 years ago. His smoking use included cigarettes. He has a 1.50 pack-year smoking history. He has never used smokeless tobacco. He reports that he does not drink alcohol and does not use drugs. Family History: family history is not on file. The patients home medications have been reviewed. Allergies: Patient has no known allergies. ---------------------------------------------------PHYSICAL EXAM--------------------------------------    Constitutional/General: Alert and oriented x3, mild distress  Head: Normocephalic and atraumatic  Eyes: PERRL, EOMI, conjunctive normal, sclera non icteric  Mouth: Oropharynx clear, handling secretions, no trismus, no asymmetry of the posterior oropharynx or uvular edema  Neck: Supple, full ROM, non tender to palpation in the midline, no stridor, no crepitus, no meningeal signs  Respiratory: Lung sounds demonstrate rales bilaterally in the bases. Patient's tachypneic has conversational dyspnea. Cardiovascular:  Regular rate. Regular rhythm. No murmurs, gallops, or rubs. 2+ distal pulses  Chest: No chest wall tenderness  GI:  Abdomen Soft, Non tender, Non distended. +BS. No organomegaly, no palpable masses,  No rebound, guarding, or rigidity. Musculoskeletal: Moves all extremities x 4. Warm and well perfused, no clubbing, cyanosis, 1+ pitting edema. Capillary refill <3 seconds  Integument: skin warm and dry. No rashes.    Lymphatic: no lymphadenopathy noted  Neurologic: GCS 15, no focal deficits, symmetric strength 5/5 in the upper and lower extremities bilaterally  Psychiatric: Normal Affect    -------------------------------------------------- RESULTS -------------------------------------------------  I have personally reviewed all laboratory and imaging results for this patient. Results are listed below. LABS:  Results for orders placed or performed during the hospital encounter of 02/04/23   CBC with Auto Differential   Result Value Ref Range    WBC 7.7 4.5 - 11.5 E9/L    RBC 3.75 (L) 3.80 - 5.80 E12/L    Hemoglobin 11.4 (L) 12.5 - 16.5 g/dL    Hematocrit 35.0 (L) 37.0 - 54.0 %    MCV 93.3 80.0 - 99.9 fL    MCH 30.4 26.0 - 35.0 pg    MCHC 32.6 32.0 - 34.5 %    RDW 15.0 11.5 - 15.0 fL    Platelets 124 871 - 656 E9/L    MPV 10.3 7.0 - 12.0 fL    Neutrophils % 76.9 43.0 - 80.0 %    Immature Granulocytes % 0.4 0.0 - 5.0 %    Lymphocytes % 9.8 (L) 20.0 - 42.0 %    Monocytes % 11.2 2.0 - 12.0 %    Eosinophils % 1.3 0.0 - 6.0 %    Basophils % 0.4 0.0 - 2.0 %    Neutrophils Absolute 5.95 1.80 - 7.30 E9/L    Immature Granulocytes # 0.03 E9/L    Lymphocytes Absolute 0.76 (L) 1.50 - 4.00 E9/L    Monocytes Absolute 0.87 0.10 - 0.95 E9/L    Eosinophils Absolute 0.10 0.05 - 0.50 E9/L    Basophils Absolute 0.03 0.00 - 0.20 E9/L   Brain Natriuretic Peptide   Result Value Ref Range    Pro-BNP 3,633 (H) 0 - 450 pg/mL   Troponin   Result Value Ref Range    Troponin, High Sensitivity 21 (H) 0 - 11 ng/L   EKG 12 Lead   Result Value Ref Range    Ventricular Rate 56 BPM    Atrial Rate 53 BPM    QRS Duration 198 ms    Q-T Interval 560 ms    QTc Calculation (Bazett) 540 ms    R Axis -107 degrees    T Axis 90 degrees       RADIOLOGY:  Interpreted by Radiologist.  XR CHEST PORTABLE   Final Result   1. Multifocal bilateral airspace disease. The appearance and distribution   would favor that of pneumonia. CHF cannot be excluded   2. Small right pleural effusion             EKG:  This EKG is signed and interpreted by the EP.     Time: 1628  Rate: 56  Rhythm: paced  Interpretation: paced rhythm  Comparison: stable as compared to patient's most recent EKG      ------------------------- NURSING NOTES AND VITALS REVIEWED ---------------------------   The nursing notes within the ED encounter and vital signs as below have been reviewed by myself. /74   Pulse 57   Temp 97.2 °F (36.2 °C)   Resp 19   Ht 5' 8\" (1.727 m)   Wt 180 lb (81.6 kg)   SpO2 96%   BMI 27.37 kg/m²   Oxygen Saturation Interpretation: Normal    The patients available past medical records and past encounters were reviewed. ------------------------------ ED COURSE/MEDICAL DECISION MAKING----------------------  Medications   furosemide (LASIX) injection 40 mg (40 mg IntraVENous Given 23)             Medical Decision Makin-year-old presents with a worsening shortness of breath he also has conversational dyspnea worse with mild exertion better with rest.  No fevers or chills. Recently had increase in his Lasix at the CHF clinic on . Frontal diagnosis concern for acute exacerbation congestive heart failure, viral pneumonia, acute coronary syndrome, pulmonary embolism however less likely patient is currently on anticoagulation for A. fib. History From: Patient and son. CC/HPI Summary, DDx, ED Course, Reassessment, Tests Considered, Patient expectation:   Patient presents with shortness of breath hypoxic in triage 86% on room air. Placed on 2 L nasal cannula he is currently 96% resting comfortably. Labs were obtained CBC troponin proBNP. WBC was normal hemoglobin was 11 g/dL. proBNP is greater than 3600. Initial troponin 21. Chest x-ray demonstrates bilateral infiltrates. Concern for pneumonia less likely however with a history of congestive failure. Social Determinants affecting Dx or Tx: Lives with son. Chronic Conditions: Coronary artery disease status post CABG history of congestive heart failure.     Records Reviewed: Echo: Mar 2022   Left ventricle size is normal.   Concentric left ventricular hypertrophy. Normal left ventricular systolic function. Ejection fraction is visually estimated at 60%. There is doppler evidence of stage II diastolic dysfunction. I am the Primary Clinician of Record. DISPOSITION: Patient was given 40 IV Lasix. Placed on supplemental oxygen. Discussed results with patient and son. Recommend admission to the hospital.    ED COURSE:  ED Course as of 02/04/23 1805   Sat Feb 04, 2023   1740 Discussed admission with Dr. Viviana Mckinney from [address] [place] [address]. [JN]      ED Course User Index  [JN] Francheska Goodson,        This patient's ED course included: a personal history and physicial examination, re-evaluation prior to disposition, multiple bedside re-evaluations, IV medications, cardiac monitoring, continuous pulse oximetry, and complex medical decision making and emergency management    This patient has remained hemodynamically stable, remained unchanged, and been closely monitored during their ED course. Re-Evaluations:             Re-evaluation. Patients symptoms show no change    Re-examination  2/4/23   4:11 PM EST    Consultations:             Hospitalist     Critical Care:   CRITICAL CARE:   35 MINUTES. Please note that the withdrawal or failure to initiate urgent interventions for this patient would likely result in a life threatening deterioration or permanent disability. Accordingly this patient received the above mentioned time, excluding separately billable procedures. Counseling: The emergency provider has spoken with the patient and family member patient and son and discussed todays results, in addition to providing specific details for the plan of care and counseling regarding the diagnosis and prognosis. Questions are answered at this time and they are agreeable with the plan.       --------------------------------- IMPRESSION AND DISPOSITION ---------------------------------    IMPRESSION  1.  Acute congestive heart failure, unspecified heart failure type ([facility]    2. Acute respiratory failure with hypoxia (HCC)        DISPOSITION  Disposition: Admit to telemetry  Patient condition is serious    NOTE: This report was transcribed using voice recognition software.  Every effort was made to ensure accuracy; however, inadvertent computerized transcription errors may be present       Alphonso Leonardo DO  02/04/23 6826

## 2023-02-05 PROBLEM — I50.9 ACUTE CONGESTIVE HEART FAILURE (HCC): Status: ACTIVE | Noted: 2022-03-01

## 2023-02-05 LAB
ANION GAP SERPL CALCULATED.3IONS-SCNC: 6 MMOL/L (ref 7–16)
ANION GAP SERPL CALCULATED.3IONS-SCNC: 9 MMOL/L (ref 7–16)
BUN BLDV-MCNC: 15 MG/DL (ref 6–23)
BUN BLDV-MCNC: 17 MG/DL (ref 6–23)
CALCIUM SERPL-MCNC: 8.3 MG/DL (ref 8.6–10.2)
CALCIUM SERPL-MCNC: 8.6 MG/DL (ref 8.6–10.2)
CHLORIDE BLD-SCNC: 94 MMOL/L (ref 98–107)
CHLORIDE BLD-SCNC: 97 MMOL/L (ref 98–107)
CO2: 33 MMOL/L (ref 22–29)
CO2: 34 MMOL/L (ref 22–29)
CREAT SERPL-MCNC: 1.3 MG/DL (ref 0.7–1.2)
CREAT SERPL-MCNC: 1.4 MG/DL (ref 0.7–1.2)
EKG ATRIAL RATE: 53 BPM
EKG Q-T INTERVAL: 560 MS
EKG QRS DURATION: 198 MS
EKG QTC CALCULATION (BAZETT): 540 MS
EKG R AXIS: -107 DEGREES
EKG T AXIS: 90 DEGREES
EKG VENTRICULAR RATE: 56 BPM
GFR SERPL CREATININE-BSD FRML MDRD: 49 ML/MIN/1.73
GFR SERPL CREATININE-BSD FRML MDRD: 54 ML/MIN/1.73
GLUCOSE BLD-MCNC: 101 MG/DL (ref 74–99)
GLUCOSE BLD-MCNC: 89 MG/DL (ref 74–99)
HCT VFR BLD CALC: 32.6 % (ref 37–54)
HEMOGLOBIN: 10.7 G/DL (ref 12.5–16.5)
MCH RBC QN AUTO: 30.7 PG (ref 26–35)
MCHC RBC AUTO-ENTMCNC: 32.8 % (ref 32–34.5)
MCV RBC AUTO: 93.7 FL (ref 80–99.9)
PDW BLD-RTO: 15 FL (ref 11.5–15)
PLATELET # BLD: 237 E9/L (ref 130–450)
PMV BLD AUTO: 10.1 FL (ref 7–12)
POTASSIUM SERPL-SCNC: 4.4 MMOL/L (ref 3.5–5)
POTASSIUM SERPL-SCNC: 4.5 MMOL/L (ref 3.5–5)
PROCALCITONIN: 0.05 NG/ML (ref 0–0.08)
RBC # BLD: 3.48 E12/L (ref 3.8–5.8)
SODIUM BLD-SCNC: 136 MMOL/L (ref 132–146)
SODIUM BLD-SCNC: 137 MMOL/L (ref 132–146)
WBC # BLD: 7.1 E9/L (ref 4.5–11.5)

## 2023-02-05 PROCEDURE — 85027 COMPLETE CBC AUTOMATED: CPT

## 2023-02-05 PROCEDURE — 6370000000 HC RX 637 (ALT 250 FOR IP): Performed by: INTERNAL MEDICINE

## 2023-02-05 PROCEDURE — 99233 SBSQ HOSP IP/OBS HIGH 50: CPT | Performed by: INTERNAL MEDICINE

## 2023-02-05 PROCEDURE — 84145 PROCALCITONIN (PCT): CPT

## 2023-02-05 PROCEDURE — 36415 COLL VENOUS BLD VENIPUNCTURE: CPT

## 2023-02-05 PROCEDURE — APPSS180 APP SPLIT SHARED TIME > 60 MINUTES: Performed by: NURSE PRACTITIONER

## 2023-02-05 PROCEDURE — 99222 1ST HOSP IP/OBS MODERATE 55: CPT | Performed by: INTERNAL MEDICINE

## 2023-02-05 PROCEDURE — 80048 BASIC METABOLIC PNL TOTAL CA: CPT

## 2023-02-05 PROCEDURE — 2580000003 HC RX 258: Performed by: INTERNAL MEDICINE

## 2023-02-05 PROCEDURE — 2700000000 HC OXYGEN THERAPY PER DAY

## 2023-02-05 PROCEDURE — 2060000000 HC ICU INTERMEDIATE R&B

## 2023-02-05 PROCEDURE — 93010 ELECTROCARDIOGRAM REPORT: CPT | Performed by: INTERNAL MEDICINE

## 2023-02-05 PROCEDURE — 2500000003 HC RX 250 WO HCPCS: Performed by: NURSE PRACTITIONER

## 2023-02-05 RX ORDER — BUMETANIDE 0.25 MG/ML
2 INJECTION, SOLUTION INTRAMUSCULAR; INTRAVENOUS ONCE
Status: COMPLETED | OUTPATIENT
Start: 2023-02-05 | End: 2023-02-05

## 2023-02-05 RX ADMIN — SODIUM CHLORIDE, PRESERVATIVE FREE 10 ML: 5 INJECTION INTRAVENOUS at 09:18

## 2023-02-05 RX ADMIN — FERROUS SULFATE TAB 325 MG (65 MG ELEMENTAL FE) 325 MG: 325 (65 FE) TAB at 16:44

## 2023-02-05 RX ADMIN — BUMETANIDE 2 MG: 0.25 INJECTION INTRAMUSCULAR; INTRAVENOUS at 09:25

## 2023-02-05 RX ADMIN — AMLODIPINE BESYLATE 2.5 MG: 2.5 TABLET ORAL at 09:18

## 2023-02-05 RX ADMIN — Medication 400 MG: at 16:44

## 2023-02-05 RX ADMIN — ALPRAZOLAM 0.25 MG: 0.25 TABLET ORAL at 20:12

## 2023-02-05 RX ADMIN — RIVAROXABAN 20 MG: 20 TABLET, FILM COATED ORAL at 16:44

## 2023-02-05 RX ADMIN — PANTOPRAZOLE SODIUM 40 MG: 40 TABLET, DELAYED RELEASE ORAL at 09:18

## 2023-02-05 RX ADMIN — POTASSIUM BICARBONATE 20 MEQ: 782 TABLET, EFFERVESCENT ORAL at 09:18

## 2023-02-05 RX ADMIN — PRAVASTATIN SODIUM 40 MG: 20 TABLET ORAL at 20:12

## 2023-02-05 RX ADMIN — POTASSIUM BICARBONATE 20 MEQ: 782 TABLET, EFFERVESCENT ORAL at 20:11

## 2023-02-05 RX ADMIN — METOPROLOL TARTRATE 50 MG: 50 TABLET, FILM COATED ORAL at 20:11

## 2023-02-05 RX ADMIN — AMIODARONE HYDROCHLORIDE 200 MG: 200 TABLET ORAL at 09:18

## 2023-02-05 RX ADMIN — SODIUM CHLORIDE, PRESERVATIVE FREE 10 ML: 5 INJECTION INTRAVENOUS at 09:25

## 2023-02-05 RX ADMIN — METOPROLOL TARTRATE 75 MG: 50 TABLET, FILM COATED ORAL at 09:18

## 2023-02-05 ASSESSMENT — PAIN SCALES - GENERAL: PAINLEVEL_OUTOF10: 0

## 2023-02-05 NOTE — ED NOTES
ED to Inpatient Handoff Report    Notified [facility] that electronic handoff available and patient ready for transport to room 620. Safety Risks: Risk of falls    Patient in Restraints: no    Constant Observer or Patient : no    Telemetry Monitoring Ordered :Yes           Order to transfer to unit without monitor:YES    Last MEWS: 2 Time completed: 1902    Vitals:    02/04/23 1626 02/04/23 1726 02/04/23 1826 02/04/23 1902   BP: 134/65 138/74 (!) 149/61 (!) 147/66   Pulse: 68 57 56 60   Resp: 23 19 18 22   Temp:    98.2 °F (36.8 °C)   TempSrc:    Oral   SpO2: 96% 96% 96% 96%   Weight:       Height:           Opportunity for questions and clarification was provided.        Nany Juan RN  02/04/23 1905

## 2023-02-05 NOTE — PROGRESS NOTES
[facility] Progress Note    --------------------------------------------------------------------------------------  Assessment / Plan  Acute on chronic HFpEF  Progressively worsening TORRES, on Lasix 40 mg p.o. daily, recently increased, recently also got IV diuretics. Given 40 mg IV in the ER and another dose of Bumex per cardiology. Suspect due to dietary indiscretion. Potassium within normal limits today on his supplement, also on his magnesium supplement. X-ray on admission showed bilateral airspace disease and questioned pneumonia, though patient remains without fevers, leukocytosis, or productive cough. Can check a procalcitonin to help us see if we should suspect this, otherwise would just monitor his respiratory / volume status. History of CAD status post MI, ICD placement, A-fib on Xarelto, hyperlipidemia, hypertension, CKD 3     Please see orders for further plan of care  Code status                 full  DVT prophylaxis         Xarelto  Disposition                  Anticipate home  --------------------------------------------------------------------------------------    Admission Date  2/4/2023  4:01 PM  Chief Complaint Torres, improving    Subjective  History of Present Illness  Patient was seen sitting up at the side of his bed late this morning having lunch. His daughter was present and updated to the best of my ability. She reports that she feels that this whole thing is mostly due to the fact has become more noncompliant with his diet over the past 3 to 4 weeks. This is likely the case. Regardless, diuretics appear effective, patient is urinating without issue, and reports less dyspnea, both at rest and with exertion. Nursing reports he did desaturate to 80% with ambulation to the bathroom without oxygen, though was asymptomatic with this.   Cardiologist reportedly began to see the patient later, will await their recommendations regarding plan of care, otherwise patient does seem to generally be doing well. Case was discussed with nursing staff.     Review of Systems - 12-point review of systems has been reviewed and is otherwise negative except as listed in the HPI    Objective  Physical Exam  Vitals: /62   Pulse 59   Temp 97.4 °F (36.3 °C) (Oral)   Resp 19   Ht 5' 8\" (1.727 m)   Wt 153 lb 3.2 oz (69.5 kg)   SpO2 96%   BMI 23.29 kg/m²   General: well-developed, well-nourished, no acute distress, cooperative  Skin: generally warm, dry, and intact, with normal color  HEENT: normocephalic, atraumatic, no gross abnormalities, +NCO2  Respiratory: clear to auscultation bilaterally without respiratory distress  Cardiovascular: regular rate and rhythm without murmur / rub / gallop  Abdominal: soft, nontender, nondistended, normoactive bowel sounds  Extremities: no obvious edema or deformity  Neurologic: awake, alert, no gross deficits  Psychiatric: normal affect, cooperative    Electronically signed by Joseph Akins DO on 2/5/2023 at 12:36 PM

## 2023-02-05 NOTE — CONSULTS
Inpatient Cardiology Consultation      Reason for Consult:  HFpEF    Consulting Physician: Dr Michaela Gonzalez    Requesting Physician:  Dr Andre Fernandez    Date of Consultation: 2/5/2023    HISTORY OF PRESENT ILLNESS: 81 yo  male known to Dr Tyler Pineda last seen by CHF 2/1/2023 (Lasix increased to 40 mg and potassium increased to 20 mEq daily. Received 40 mg IV Lasix in clinic 2/1/2023 (dietary indiscretions and weight gain)    PMH: Chronic HFpEF, CAD s/p CABG in 2003 with redo CABG in 2007  s/p CONNOR to native OM2 and native LAD post CABG (due to inadequate conduits), s/p ICD in 3/2007 with hx inappropriate shocks in 2008 (had lead replaced @ that time) s./p generator change 7/2016 with appropriate shocks (PMVT) in 9/2018,  s/p TAVR 3/29/2017 , PAF s/p DCCV 12/2010, 5/30/2017, and 10/29/2020, HTN, HLD, CKD, anemia, and dysphasia s/p esophageal dilatation 12/2022. \"My breathing got worse, I was wheezing, needing to stop frequently and catch my breath. \" Denies CP or dizziness. Complains of abdominal bloating and early satiety. No LE swelling. Sleeps in bed with one pillow. Denies orthopnea/PND. Does not wear O2 @ home. Has been following @ eye doctors and getting \"Shots in R eye, for blood in my eye. \" Vision has declined significantly over last 6-7 months in R eye. Spoke with RN this am and she stated his RA saturation was 80% with ambulation. [facility] ED 2/4/2023 BP  131/58 HR 50-60's V-paced, afebrile, and O2 saturation 86% on RA--> 2 liters NC    Na 137, K+ 4.4, Bun/Cr 15/1.3, p-BNP 3633, troponin 21, WBC 7.1, Hgb 11.4-->10.7, Plt 237. Respiratory panel including COVID NEGATIVE. Lasix 40 mg IV in ED    Amiodarone 200 mg, Norvasc 2.5 mg QD, Lopressor 75 mg Qam,  Lopressor 50 mg Qhs, Pravachol 40 mg QD, Xarelto 20 mg QD,.     Currently /62 HR 50-60's V-paced, remains afebrile, and O2 saturation 96% on RA      Please note: past medical records were reviewed per electronic medical record (EMR) - see detailed reports under Past Medical/ Surgical History. Past Medical History:    CAD  MI, 2003. Cardiac catheterization: 85% ostial, proximal and mid LAD narrowings. LMC 70% stenosis. D1 occluded. D2 50% stenosis. OM1 80% ostial stenosis. Mid CX occluded. Dominant RCA severe disease. LVEF 40-45%. CABG, 05/23/2003, [place], [place] with LIMA-LAD, SVG-RI, SVG-OM. Cardiac catheterization, 03/16/2007 with normal LVEF, severe native three vessel coronary disease. SVG-RI, SVG-OM both occluded. LIMA-LAD patent. Re-do CABG, [facility], 03/2007 with radial artery graft to D2 and OM2. Elective PCI with CONNOR native OM2 and native LAD, 03/2007 following CABG. This was done because of inadequate conduits. Atypical chest pain and anxiety with admission Feng 3, 05/2008. Troponin minimally elevated. Beta blocker dose increased. [facility], 06/05/2012. Moderate sized fixed basal inferior defect, probably artifact. [facility] 2017 (Rhae Jyoti): Left main: 0% stenosis LAD: 100 % stenosis Circumflex: 100 % Stenosis AFTER OM1 RCA: Dominant. ANEURYSMAL  DILATATION IN MID PORTION. LONG 75 % stenosis IN MID AND DISTAL VESSEL. LIMA - LAD: PATENT RADIAL - D2: PATENT  RADIAL - OM2: PATENT  Chronic HFpEF  ACC stage C / NYHA Class III  3/12/2022 p-  4/2022 p-  8/2022 p-  9/6/2022 p-  9/14/2022 p-  10/5/2022 p-  11/4/2022 p-  Follows @ [facility]  1/26/2023 p-BNP 2198  2/1/2032 Lasix increased to 40 mg QD and Potassium 20 mEq daily  2/1/2023 p-BNP 3218  VHD  Echo, 07/2006. Mild LVE, normal EF, Stage II diastolic dysfunction, moderate AS, mild MR, mild TR. Echo, 06/15/2009 with normal LVEF, moderate AS, peak gradient 38 mmHg, BLOSSOM 1.1 cm², moderate MR, LAE. Echo, 06/16/2010 with LVE, borderline LVH, normal systolic and diastolic function, normal LA, ICD electrode right heart. Trileaflet AV with moderate to severe AS, mean/peak gradient 20/31 mmHg. BLOSSOM 1.0 cm². MAC with mild MR, normal RVSP.   Echo [facility], 10/20/2011. 1+ AR, moderate AS, mild MR, mild TR, normal LVEF. Echo, 01/31/2013. LV not dilated. Mild concentric LVH. Septal paradox. EF 50-55%. BLOSSOM 1.2 cm² consistent with moderate stenosis. Peak/mean gradient 38/21. Stage II diastolic dysfunction. Echo 10/16/2015. EF 39%. Stage II diastolic dysfunction. Aortic stenosis with peak/mean gradients of 48/24 mmHg. Estimated valve area 1.0 cm². Echo, 02/03/2017. Normal LV size with mild LVH. Normal regional wall motion and overall systolic function. Diastole could not be assessed, but was presumed to be impaired. The RV was dilated with impaired global systolic function. The LA was enlarged. Mild MAC with mild mitral insufficiency. Moderate tricuspid insufficiency. Aortic valve gradients are peak 47 mmHg with a mean of 27. Dimensionless ratio 0.21. Valve area calculated 0.8 cm². S/P TAVR, 03/29/2017 Dr Roselia Oh (Josie 26 mm)  Echocardiogram, [facility], 4/19/2018, EF 60%. Low normal RV function. Stage 2 diastolic dysfunction. Mild-to-moderate MR. S/P TAVR with a mean gradient of 10 mmHg. RVSP 31 mg.     TTE (Chaim Smith) 10/30/2020:  Left ventricle is normal in size  Mild asymmetric septal hypertrophy. EF 65%  No regional wall motion abnormalities seen. Normal left ventricular ejection fraction. The left atrium is severely dilated. Mild mitral annular calcification. Mild mitral regurgitation is present. Normally functioning bioprosthetic valve in aortic position. Physiologic and/or trace tricuspid regurgitation. PMVT/ Medtronic ICD  VT causing cardiac arrest after stress test, 03/15/2007. He was successfully cardioverted. ICD placement, [facility] [place], 03/2007. ICD lead recall, early 2008, but he was followed electively because his device was functioning normally. Presentation Feng 3, 03/29/2008 with multiple ICD inappropriate shocks. Transfer [facility] [place] where ICD and lead were replaced.  He reports cardiac catheterization that revealed patent LIMA-LAD and patent stents in native coronary arteries. Transtelephonic ICD check, 01/11/2010. No ventricular tachyarrhythmias. Two AF episodes, the longest for 14 hours. ICD programmed to DDDR mode by Dr. Kourtney Farah, 03/26/2015. Device function normal.  ICD generator change for battery depletion, 07/28/2016, Dr. Soraya Zamora. Kourtney Farah. Hospital evaluation, 09/07/2018, for 2 appropriate ICD discharges for polymorphic ventricular tachycardia, which occurred after yard work and moving heavy furniture. PAF diagnosed in 2010  PAF with DCCV, [facility], 12/2010. [address] with Xarelto  S/P Cardioversion by Dr. Florinda Apley, 05/30/2017.   10/2020 hospitalized for HFpEF and Afib RVR -- dccv 10/29/2020   5/12/2022  joules-->SR  9/6/2022 Tikosyn stopped and Amiodarone started ( recurrent episodes of PMVT and AF). Hypertension  Hyperlipidemia. On pravastatin (unable to tolerate Crestor/Lipitor)  CKD stage 3b baseline SCr 1.3  3/2022 SCr 1.2  5/2022 SCr 1.1  9/14/2022 SCr 1.3  11/4/2022 SCr 1.3  1/26/2023 SCr 1.5  2/1/2023 Scr 1.4  Remote hx of tobacco use  Mild carotid plaque on US, 2003. [facility] 112 admission, 08/28/2013 with dizziness, Hb 7.7, WBC 19.0. CXR negative except cardiomegaly. EKG NSR, leftward axis, RBBB. BMP negative except for elevation in BUN to 55 with Cr 1.3. PUD  Feng 3 admission, 06/13/2009 with lightheadedness, orthostatic hypotension, drop in hemoglobin to 10.5 from 14.9 over two months with an increase in BUN from 16 to 68 over the same time. Dark heme positive stools noted. EKG NSR, incomplete RBBB. EGD, 08/28/2013. Large pyloric channel ulcer with clot treated with PRBCs and fresh frozen plasma. Hb 8.7 on day of discharge and stable. Pradaxa was temporarily held. 3/13/2022 NM GI Bleeding Scan: No evidence of active GI bleeding during acquisition  3/14/2022 EGD (Severe anemia): bleeding AV malformation at the fundus of the stomach and APC was used as well as clips.   3/16/2022 EGD--> second look no evidence of bleeding  5/2022 Colonoscopy-->Large internal hemorrhoids. Polypectomy. AAA/Splenic aneurysm  CT abdomen, 09/08/2014. 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 CT chest, 09/17/2014. Consolidation and infiltrate at the left lung base, stable when compared to previous exams with less pleural thickening and calcified plaque in the left pleural cavity without any recent change. Chronic obstructive airways disease. 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   6/27/2022 CTA Abdomen/Pelvis: Proximal splenic artery aneurysm measuring 2.5 cm. 3.7 cm infrarenal AAA  6/27/2022 CTA Chest W: Mild aneurysmal dilatation of the ascending thoracic aorta measuring 4.4 x 4.1 cm. COPD  Pulmonary nodules RUL/RML  Dysphagia and esophageal spasm s/p esophageal dilatation 12/2022  Chronic anemia  Hx BPV  Right leg vein stripping, 1970's. R ZBIGNIEW 2010 or 2011 [facility] Dr. Zita Miller. 6/28/2022 HgbA1c 5.5  10/13/2022 TSH 2.320  Macular degeneration  Denies having COVID during pandemic  Covid Vaccine Pfizer x 5      Medications Prior to admit:  Prior to Admission medications    Medication Sig Start Date End Date Taking? Authorizing Provider   furosemide (LASIX) 40 MG tablet Take 1 tablet by mouth daily Alternates 40 mg and 20 mg 2/1/23   Kelly Bosch, APRN - CNP   potassium chloride 20 MEQ/15ML (10%) oral solution Take 15 mLs by mouth daily 2/1/23   Kelly Wilmington, APRN - CNP   amLODIPine (NORVASC) 2.5 MG tablet TAKE 1 TABLET BY MOUTH  DAILY  Patient taking differently: Take 2.5 mg by mouth at bedtime 12/27/22   Ethel Lehman DO   ALPRAZolam Segundo Piyush) 0.25 MG tablet Take 0.25 mg by mouth 3 times daily as needed for Sleep.     Historical Provider, MD   amiodarone (CORDARONE) 200 MG tablet Take 200 mg by mouth daily Daily beginning 7/16/22 per Dr. Seferino Braswell 7/16/22   Historical Provider, MD   meclizine (ANTIVERT) 25 MG tablet Take 1 tablet by mouth daily as needed for Dizziness 7/5/22   María Martinez MD   METOPROLOL TARTRATE PO Take by mouth 2 times daily Indications: taking 75 mg in am and 50 mg at night    Historical Provider, MD   pravastatin (PRAVACHOL) 40 MG tablet Take 40 mg by mouth at bedtime    Historical Provider, MD   rivaroxaban (XARELTO) 20 MG TABS tablet Take 20 mg by mouth Daily with supper     Historical Provider, MD   Multiple Vitamin (MULTIVITAMIN ADULT PO) Take 1 tablet by mouth Daily with lunch  Patient not taking: Reported on 2/4/2023    Historical Provider, MD   pantoprazole (PROTONIX) 40 MG tablet TAKE 1 TABLET BY MOUTH  DAILY  Patient taking differently: Take 40 mg by mouth Daily with lunch 12/15/21   María Martinez MD   ferrous sulfate (IRON 325) 325 (65 Fe) MG tablet Take 1 tablet by mouth daily (with breakfast)  Patient taking differently: Take 325 mg by mouth Daily with lunch 11/5/20   María Martinez MD   MAGNESIUM-OXIDE 400 (240 Mg) MG tablet Take 400 mg by mouth Daily with supper 3/18/20   Historical Provider, MD   Multiple Vitamins-Minerals (PRESERVISION AREDS 2) CAPS Take 1 capsule by mouth 2 times daily   Patient not taking: Reported on 2/4/2023    Historical Provider, MD   Cholecalciferol (VITAMIN D) 2000 UNITS CAPS capsule Take 2,000 Units by mouth Daily with lunch    Historical Provider, MD       Current Medications:    Current Facility-Administered Medications: rivaroxaban (XARELTO) tablet 20 mg, 20 mg, Oral, Dinner  pravastatin (PRAVACHOL) tablet 40 mg, 40 mg, Oral, Nightly  potassium bicarb-citric acid (EFFER-K) effervescent tablet 20 mEq, 20 mEq, Oral, BID  metoprolol tartrate (LOPRESSOR) tablet 75 mg, 75 mg, Oral, QAM  metoprolol tartrate (LOPRESSOR) tablet 50 mg, 50 mg, Oral, Nightly  pantoprazole (PROTONIX) tablet 40 mg, 40 mg, Oral, Daily  meclizine (ANTIVERT) tablet 25 mg, 25 mg, Oral, Daily PRN  magnesium oxide (MAG-OX) tablet 400 mg, 400 mg, Oral, Dinner  ferrous sulfate (IRON 325) tablet 325 mg, 325 mg, Oral, Lunch  amLODIPine (NORVASC) tablet 2.5 mg, 2.5 mg, Oral, Daily  amiodarone (CORDARONE) tablet 200 mg, 200 mg, Oral, Daily  ALPRAZolam (XANAX) tablet 0.25 mg, 0.25 mg, Oral, TID PRN  sodium chloride flush 0.9 % injection 5-40 mL, 5-40 mL, IntraVENous, 2 times per day  sodium chloride flush 0.9 % injection 5-40 mL, 5-40 mL, IntraVENous, PRN  0.9 % sodium chloride infusion, , IntraVENous, PRN  polyethylene glycol (GLYCOLAX) packet 17 g, 17 g, Oral, Daily PRN  acetaminophen (TYLENOL) tablet 650 mg, 650 mg, Oral, Q6H PRN **OR** acetaminophen (TYLENOL) suppository 650 mg, 650 mg, Rectal, Q6H PRN    Allergies:  NKDA per patient report    Social History:    1.5 pack years quit in 4771  Denies ETOH/Illicit Drugs  Activity:Lives with wife in one story home basement. Laundry in basement (wife does laundry). Not using assistive devices. Mostly sedentary. Does not drive (wife drives) 2/2 macular degeneration    Family History: Non contributory secondary to age    REVIEW OF SYSTEMS:   See HPI    PHYSICAL EXAM:   /65   Pulse 62   Temp 98.4 °F (36.9 °C) (Oral)   Resp 21   Ht 5' 8\" (1.727 m)   Wt 153 lb 3.2 oz (69.5 kg)   SpO2 96%   BMI 23.29 kg/m²   CONST:  Well developed, well nourished elderly  male who appears of stated age. Awake, alert and cooperative. No apparent distress. HEENT:   Head- Normocephalic, atraumatic   Eyes- Conjunctivae pink, anicteric  Throat- Oral mucosa pink and moist  Neck-  No stridor, trachea midline, no jugular venous distention. No carotid bruit. CHEST: Chest symmetrical and non-tender to palpation. No accessory muscle use or intercostal retractions  RESPIRATORY: Lung sounds - crackles in bases, on 2 liters NC   CARDIOVASCULAR:     Heart Ausculation- Regular rate and rhythm, no murmur heard.    PV: No lower extremity edema. No varicosities. Pedal pulses palpable, no clubbing or cyanosis. BLE compression hose on. ABDOMEN: Soft, non-tender to light palpation. Bowel sounds present. No palpable masses; no abdominal bruit  MS: Good muscle strength and tone. No atrophy or abnormal movements. : Deferred  SKIN: Warm and dry no statis dermatitis or ulcers   NEURO / PSYCH: Oriented to person, place and time. Speech clear and appropriate. Follows all commands. Pleasant affect     DATA:    ECG 2/4/2023:  V-paced rate 56 with PVC's  Tele strips: V-paced with PVC's    Diagnostic:    CXR 2/4/2023: Multifocal bilateral airspace disease. The appearance and distribution   would favor that of pneumonia. CHF cannot be excluded.  Small right pleural effusion     Labs:   CBC:   Recent Labs     02/04/23  1621 02/05/23  0355   WBC 7.7 7.1   HGB 11.4* 10.7*   HCT 35.0* 32.6*    237     BMP:   Recent Labs     02/05/23  0355      K 4.4   CO2 34*   BUN 15   CREATININE 1.3*   LABGLOM 54   CALCIUM 8.3*     TFT:   Lab Results   Component Value Date    TSH 2.320 10/13/2022    A1CKXKM 107.80 12/08/2016    J3UTTZP 8.2 12/08/2016    T4FREE 1.30 10/29/2020      HgA1c:   Lab Results   Component Value Date/Time    LABA1C 5.5 06/28/2022 06:29 AM    LABA1C 5.5 09/03/2021 08:25 AM    LABA1C 5.5 03/24/2017 08:45 AM     proBNP:   Lab Results   Component Value Date/Time    PROBNP 3,633 02/04/2023 04:21 PM    PROBNP 3,218 02/01/2023 11:09 AM    PROBNP 2,198 01/26/2023 09:15 AM    PROBNP 487 11/04/2022 09:53 AM    PROBNP 564 10/05/2022 09:48 AM     TROPONIN:  Lab Results   Component Value Date/Time    TROPHS 21 02/04/2023 04:21 PM    TROPHS 21 08/23/2022 11:28 AM    TROPHS 22 08/23/2022 10:11 AM    TROPHS 18 08/23/2022 08:56 AM    TROPHS 18 06/27/2022 09:59 AM     CK:  Lab Results   Component Value Date/Time    CKTOTAL 94 06/28/2017 10:00 PM    CKTOTAL 102 03/04/2017 11:53 AM    CKTOTAL 80 03/04/2017 07:42 AM     FASTING LIPID PANEL:  Lab Results   Component Value Date/Time    CHOL 142 06/28/2022 06:29 AM    HDL 39 06/28/2022 06:29 AM    LDLCALC 65 06/28/2022 06:29 AM    TRIG 191 06/28/2022 06:29 AM     COVID19:   Recent Labs     02/04/23  1621   COVID19 Not Detected   A&P per Dr Radha Sales    Electronically signed by [place]. Read YOANDY Rodriguez on 2/5/2023 at 6:57 AM        I independently interviewed and examined the patient. I have reviewed the above documentation completed by the TITO. Please see my additional contributions to the HPI, physical exam, and assessment / medical decision making. I contributed more than 51% of the pt care. Briefly, 79-year-old with complex medical history with history of coronary artery disease status post CABG in 2003 with LIMA to LAD, SVG to ramus intermedius, SVG to OM. He also has heart failure with preserved ejection fraction, valvular heart disease with TAVR in March 2017, history of polymorphic ventricular tachycardia status post Medtronic ICD paroxysmal atrial fibrillation previously on Tikosyn but now on amiodarone and Xarelto for anticoagulation. He also has hypertension, hyperlipidemia, chronic kidney disease, prior history of tobacco abuse, peptic ulcer disease and AAA/splenic aneurysm who is hospitalized for acute on chronic heart failure with preserved ejection fraction and cardiology consulted. Review of Systems:  Cardiac: As per HPI  General: No fever, chills  Pulmonary: As per HPI  GI: No nausea, vomiting  Musculoskeletal: ROBLERO x 4, no focal motor deficits  Skin: Intact, no rashes  Neuro/Psych: No headache or seizures    Physical Exam:  /62   Pulse 59   Temp 97.4 °F (36.3 °C) (Oral)   Resp 19   Ht 5' 8\" (1.727 m)   Wt 153 lb 3.2 oz (69.5 kg)   SpO2 96%   BMI 23.29 kg/m²   Appearance: Awake, alert, no acute respiratory distress  Skin: Intact, no rash  Head: Normocephalic, atraumatic  ENMT: MMM, no rhinorrhea  Neck: Supple, no carotid bruits  Lungs: Clear to auscultation bilaterally. No wheezes, rales, or rhonchi. Cardiac: Regular rate and rhythm, +S1S2, no murmurs apparent  Abdomen: Soft, +bowel sounds  Extremities: Moves all extremities x 4, no lower extremity edema  Neurologic: No focal motor deficits apparent, normal mood and affect      TTE 3/2022   Normal left ventricular systolic function. Ejection fraction is visually estimated at 60%. Mildly dilated right ventricle with normal right ventricular function. There is doppler evidence of stage II diastolic dysfunction. Moderately dilated left atrium by volume index. Mild mitral regurgitation. 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 No significant paravalvular aortic regurgitation. AV peak velocity 2.1 m/s. AV mean gradient 9 mmHg. AV area 1.5 cm2. Mild tricuspid regurgitation. PASP is estimated at 44 mmHg. Assessment/Plan:     Acute on chronic heart failure with preserved ejection fraction  Last echo was March 2022 with an EF of 60%. Continue diuresis and monitor BUN/creatinine closely and keep potassium at 4 magnesium at 2. Follow low-salt diet, daily weight and strict in and out monitoring. Maintain good blood pressure. Etiology of acute on chronic decompensated heart failure due to noncompliance with diet restriction  Please repeat BMP since patient was given 2 mg of IV Bumex this morning. Patient is normally on Lasix at home. If creatinine is stable on repeat BMP this evening initiate 40 mg IV Lasix twice a day and monitor closely  Limited echocardiogram to guide therapy. If echo shows low EF then discontinue Norvasc and change metoprolol titrate to metoprolol succinate and then add afterload agents if possible at that time    Coronary artery disease  CABG, 05/23/2003 in [place], [place] with LIMA-LAD, SVG-RI, SVG-OM.    Status post drug-eluting stent to 80 OM 2 and LAD in 2007  Continue beta-blocker, statin initiate aspirin if there is no contraindication     Valvular heart disease  S/P TAVR, 03/29/2017 Dr Angelica Joya (Josie 26 mm)  Last echocardiogram in March 2022 revealed normal functioning TAVR valve    History of polymorphic ventricular tachycardia status post ICD  He follows with EP  Monitor closely and keep potassium at 4 magnesium at 2. Paroxysmal atrial fibrillation  Previously was on Tikosyn but now on amiodarone  On Xarelto for anticoagulation  Monitor on telemetry closely  Obtain TSH if none has been done.       Hypertension  Per primary service    Hyperlipidemia  On pravastatin    Chronic kidney disease  Monitor BUN/creatinine closely while being diuresed    Peripheral vascular disease with documented carotic artery disease      AAA/splenic aneurysm  Continue on beta-blocker and maintain good blood pressure control    COPD  Primary service      Dysphagia status post prior esophageal dilation in December 2022    Chronic anemia        Tenzin Spain MD  [facility] [place] ([facility]) Cardiology

## 2023-02-05 NOTE — PROGRESS NOTES
Pulse OX was 96% on room air at rest.  Ambulated patient on room air. Oxygen saturation was 82% on room air while ambulating  Oxygen applied.    Recovery pulse ox 95% on 2L oxygen while ambulating

## 2023-02-06 ENCOUNTER — APPOINTMENT (OUTPATIENT)
Dept: GENERAL RADIOLOGY | Age: 88
End: 2023-02-06
Payer: MEDICARE

## 2023-02-06 PROBLEM — Z95.810 ICD (IMPLANTABLE CARDIOVERTER-DEFIBRILLATOR) IN PLACE: Status: ACTIVE | Noted: 2023-02-06

## 2023-02-06 PROBLEM — I50.33 ACUTE ON CHRONIC DIASTOLIC CONGESTIVE HEART FAILURE (HCC): Status: ACTIVE | Noted: 2023-02-06

## 2023-02-06 PROBLEM — I48.0 PAF (PAROXYSMAL ATRIAL FIBRILLATION) (HCC): Status: ACTIVE | Noted: 2023-02-06

## 2023-02-06 LAB
ANION GAP SERPL CALCULATED.3IONS-SCNC: 11 MMOL/L (ref 7–16)
BUN BLDV-MCNC: 15 MG/DL (ref 6–23)
CALCIUM SERPL-MCNC: 8.4 MG/DL (ref 8.6–10.2)
CHLORIDE BLD-SCNC: 93 MMOL/L (ref 98–107)
CO2: 32 MMOL/L (ref 22–29)
CREAT SERPL-MCNC: 1.2 MG/DL (ref 0.7–1.2)
GFR SERPL CREATININE-BSD FRML MDRD: 59 ML/MIN/1.73
GLUCOSE BLD-MCNC: 100 MG/DL (ref 74–99)
HCT VFR BLD CALC: 33.6 % (ref 37–54)
HEMOGLOBIN: 10.9 G/DL (ref 12.5–16.5)
LV EF: 58 %
LVEF MODALITY: NORMAL
MCH RBC QN AUTO: 30.4 PG (ref 26–35)
MCHC RBC AUTO-ENTMCNC: 32.4 % (ref 32–34.5)
MCV RBC AUTO: 93.9 FL (ref 80–99.9)
PDW BLD-RTO: 14.7 FL (ref 11.5–15)
PLATELET # BLD: 272 E9/L (ref 130–450)
PMV BLD AUTO: 10.3 FL (ref 7–12)
POTASSIUM SERPL-SCNC: 4.3 MMOL/L (ref 3.5–5)
PRO-BNP: 2280 PG/ML (ref 0–450)
RBC # BLD: 3.58 E12/L (ref 3.8–5.8)
SODIUM BLD-SCNC: 136 MMOL/L (ref 132–146)
WBC # BLD: 7.4 E9/L (ref 4.5–11.5)

## 2023-02-06 PROCEDURE — 6370000000 HC RX 637 (ALT 250 FOR IP): Performed by: INTERNAL MEDICINE

## 2023-02-06 PROCEDURE — 93308 TTE F-UP OR LMTD: CPT

## 2023-02-06 PROCEDURE — 71045 X-RAY EXAM CHEST 1 VIEW: CPT

## 2023-02-06 PROCEDURE — 80048 BASIC METABOLIC PNL TOTAL CA: CPT

## 2023-02-06 PROCEDURE — 2580000003 HC RX 258: Performed by: INTERNAL MEDICINE

## 2023-02-06 PROCEDURE — 99233 SBSQ HOSP IP/OBS HIGH 50: CPT | Performed by: INTERNAL MEDICINE

## 2023-02-06 PROCEDURE — 85027 COMPLETE CBC AUTOMATED: CPT

## 2023-02-06 PROCEDURE — 2500000003 HC RX 250 WO HCPCS: Performed by: INTERNAL MEDICINE

## 2023-02-06 PROCEDURE — 36415 COLL VENOUS BLD VENIPUNCTURE: CPT

## 2023-02-06 PROCEDURE — 2060000000 HC ICU INTERMEDIATE R&B

## 2023-02-06 PROCEDURE — 83880 ASSAY OF NATRIURETIC PEPTIDE: CPT

## 2023-02-06 PROCEDURE — 2700000000 HC OXYGEN THERAPY PER DAY

## 2023-02-06 RX ORDER — BUMETANIDE 0.25 MG/ML
2 INJECTION, SOLUTION INTRAMUSCULAR; INTRAVENOUS DAILY
Status: DISCONTINUED | OUTPATIENT
Start: 2023-02-06 | End: 2023-02-07

## 2023-02-06 RX ADMIN — RIVAROXABAN 15 MG: 15 TABLET, FILM COATED ORAL at 18:10

## 2023-02-06 RX ADMIN — SODIUM CHLORIDE, PRESERVATIVE FREE 10 ML: 5 INJECTION INTRAVENOUS at 20:17

## 2023-02-06 RX ADMIN — METOPROLOL TARTRATE 75 MG: 50 TABLET, FILM COATED ORAL at 10:05

## 2023-02-06 RX ADMIN — POTASSIUM BICARBONATE 20 MEQ: 782 TABLET, EFFERVESCENT ORAL at 20:17

## 2023-02-06 RX ADMIN — PRAVASTATIN SODIUM 40 MG: 20 TABLET ORAL at 20:17

## 2023-02-06 RX ADMIN — FERROUS SULFATE TAB 325 MG (65 MG ELEMENTAL FE) 325 MG: 325 (65 FE) TAB at 12:23

## 2023-02-06 RX ADMIN — Medication 400 MG: at 18:10

## 2023-02-06 RX ADMIN — POTASSIUM BICARBONATE 20 MEQ: 782 TABLET, EFFERVESCENT ORAL at 10:10

## 2023-02-06 RX ADMIN — METOPROLOL TARTRATE 50 MG: 50 TABLET, FILM COATED ORAL at 20:17

## 2023-02-06 RX ADMIN — AMIODARONE HYDROCHLORIDE 200 MG: 200 TABLET ORAL at 10:05

## 2023-02-06 RX ADMIN — POLYETHYLENE GLYCOL 3350 17 G: 17 POWDER, FOR SOLUTION ORAL at 10:05

## 2023-02-06 RX ADMIN — AMLODIPINE BESYLATE 2.5 MG: 2.5 TABLET ORAL at 10:06

## 2023-02-06 RX ADMIN — PANTOPRAZOLE SODIUM 40 MG: 40 TABLET, DELAYED RELEASE ORAL at 10:06

## 2023-02-06 RX ADMIN — ALPRAZOLAM 0.25 MG: 0.25 TABLET ORAL at 21:40

## 2023-02-06 RX ADMIN — BUMETANIDE 2 MG: 0.25 INJECTION INTRAMUSCULAR; INTRAVENOUS at 13:58

## 2023-02-06 ASSESSMENT — PAIN SCALES - GENERAL
PAINLEVEL_OUTOF10: 0

## 2023-02-06 NOTE — CARE COORDINATION
Social work / Discharge Planning:         Social work met with patient for initial assessment. He lives with his wife and has a walker and cane but usually ambulates independently. Patient used home care in the past but he cannot recall which agency. No history of ZULEYMA. Patient is currently using oxygen which he does not have at home. Will need pulse ox testing when discharge is anticipated. Patient was using his incentive spirometer when social work arrived in his room.    Electronically signed by Brien Severe, LSW on 2/6/2023 at 12:54 PM

## 2023-02-06 NOTE — PROGRESS NOTES
[facility] Progress Note    --------------------------------------------------------------------------------------  Assessment / Plan  Acute on chronic HFpEF  Getting intermittent diuresis here, per cardiology likely transitioning to scheduled Lasix 40 mg IV twice daily based on renal function which has been improving since admission (creatinine now 1.2). Volume status somewhat difficult to track as the trend and weights appears an accurate and intake and output appears a bit incomplete. BNP was checked and has gone from 3600 to 2300. Echo being repeated. We will check another chest x-ray given the patient's persistent symptoms. Still with some mild sputum production though no leukocytosis or fever and viral panel was negative. History of CAD status post MI, ICD placement, A-fib on Xarelto, hyperlipidemia, hypertension, CKD 3     Please see orders for further plan of care  Code status                 full  DVT prophylaxis         Xarelto  Disposition                  Anticipate home  --------------------------------------------------------------------------------------    Admission Date  2/4/2023  4:01 PM  Chief Complaint Cooper, improving    Subjective  History of Present Illness  Patient seen at bedside this morning, back on oxygen-he was off for some time overnight but apparently desaturated and had it placed back on. Says he got a bit dizzy this morning when he got up out of bed. He denies being any more short of breath today, has no chest pain, nausea, chest pressure, or other new issues. No family was present during my exam and the case was discussed with nursing staff on the unit this morning.     Review of Systems - 12-point review of systems has been reviewed and is otherwise negative except as listed in the HPI    Objective  Physical Exam  Vitals: /63   Pulse 59   Temp 98.2 °F (36.8 °C) (Oral)   Resp 18   Ht 5' 8\" (1.727 m)   Wt 176 lb 1.6 oz (79.9 kg)   SpO2 96% BMI 26.78 kg/m²   General: well-developed, well-nourished, no acute distress, cooperative  Skin: generally warm, dry, and intact, with normal color  HEENT: normocephalic, atraumatic, no gross abnormalities, +NCO2  Respiratory: clear to auscultation bilaterally without respiratory distress  Cardiovascular: regular rate and rhythm without murmur / rub / gallop  Abdominal: soft, nontender, nondistended, normoactive bowel sounds  Extremities: no obvious edema or deformity  Neurologic: awake, alert, no gross deficits  Psychiatric: normal affect, cooperative    Electronically signed by Flora Vargas DO on 2/6/2023 at 12:32 PM

## 2023-02-06 NOTE — DISCHARGE INSTRUCTIONS
HEART FAILURE  / CONGESTIVE HEART FAILURE  DISCHARGE INSTRUCTIONS:  GUIDELINES TO FOLLOW AT HOME    Self- Managed Care:     MEDICATIONS:  Take your medication as directed. If you are experiencing any side effects, inform your doctor, Do not stop taking any of your medications without letting your doctor know. Check with your doctor before taking any over-the-counter medications / herbal / or dietary supplements. They may interfere with your other medications. Do not take ibuprofen (Advil or Motrin) and naproxen (Aleve) without talking to your doctor first. They could make your heart failure worse. WEIGHT MONITORING:   Weigh yourself everyday (with the same scale) around the same time of the day and write it down. (you can chart them on a calendar or keep track of them on paper. Notify your doctor of a weight gain of 3 pounds or more in 1 day   OR a total of 5 pounds or more in 1 week    Take your weight record to your doctor visits  Also, the same goes if you loose more than 3# in one day, let your heart doctor know. DIET:   Cardiac heart healthy diet- Low saturated / low trans fat, no added salt, caffeine restricted, Low sodium diet-   No more than 2,000mg (2 grams) of salt / sodium per day (which equals to a little less than  a teaspoon of salt)  If your doctor wants you on a fluid restriction. ..it is usually recommended a fluid limit of 2,000cc -  Fluid restriction- 2,000 ml (milliliters) = 64 ounces = you can have 8 glasses of fluid per day (each glass 8 ounces)    Follow a low salt diet - avoid using salt at the table, avoid / limit use of canned soups, processed / packaged foods, salted snacks, olives and pickles. Do not use a salt substitute without checking with your doctor, they may contain a high amount of potassioum. (Mrs. Layla Pérez is safe to use).     Limit the use of alcohol       CALL YOUR DOCTOR THE FIRST DAY YOU NOTICE ANY OF THESE   SYMPTOMS:  You have a weight gain of 3 pounds or more in 1 day         OR 5 pounds or more in one week  More shortness of breath  More swelling of your stomach, legs, ankles or feet  Feeling more tired, No energy  Dry hacky cough  Dizziness  More chest pain / discomfort       (CALL 911 IF ANY OF THE FOLLOWING OCCURS  Chest pain (not relieved with nitroglycerine, if you have been prescribed this medication)  Severe shortness of breath  Faint / Pass out  Confusion / cannot think clearly  If symptoms get worse           SMOKING - TOBACCO USE:  * IF YOU SMOKE - STOP! Kick the habit. [address] Program is offered at [facility] [address] and [address] [employer]. Call [phone] extension [phone] for more information. ACTIVITY:   (Ask your doctor when you will be able to return to work and before starting any exercise program.  Do not drive unless unless your doctor has given you permission to do so). Start light exercise. Even if you can only do a small amount, exercise will help you get stronger, have more energy, help manage your weight and decrease  stress. Walking is an easy way to get exercise. Start out slowly and  increase the amount you walk as tolerated  If you become short of breath, dizzy or have chest pain; stop, sit down, and rest.  If you feel \"wiped out\" the day after you exercise, walk at a slower pace or for a shorter distance. You can gradually increase the pace or amount of time. (Do not exercise right after a meal or in extreme temperatures, such as above 85 degrees, if the air is really humid, or wind chill is less than 20 degrees)                                             ADDITIONAL INFORMATION:  Avoid getting sick from colds and the flu. Stay away from friends or family that you know may have a contagious illness  Get plenty of rest   Get a flu shot each year. Get a pneumococcal vaccine shot.  If you have had one before, ask your doctor whether you need another dose. My Goal for Self-management of Heart Failure Includes 5 steps :    1. Notice a change in symptoms ( weight gain, short of breath, leg swelling, decreased activity level, bloating. ...)    2. Evaluate the change: (use the Heart Failure Zones )     3. Decide to take action: decide what your options are, such as: (call your doctor for an extra visit, take a prescribed medication, such as your water pill if your doctor has given you directions to do so, [address])    4. Come up with a strategy:  (now you call the doctor for advice / appointment. This is where you take action!!! Do not wait, catch the symptom early and treat it before it worsens. 5. Evaluate the response: The next day, check your Heart Failure Zones: are you in the GREEN ZONE (safe zone)? Worsening symptoms of YELLOW ZONE? Or have you moved to the RED ZONE and need to call 911 or go to the Emergency Room for evaluation? Call your doctor's office to update them on your symptoms of heart failure.

## 2023-02-06 NOTE — CONSULTS
CHF NURSE NAVIGATOR CONSULT NOTE:    Patient currently admitted with diagnosis of Diastolic heart failure. Patient was awake and alert, sitting in the chair during the consultation. His daughter was also in the room. He is feeling better today. He is scheduled to follow up in the CHF clinic. We discussed what to look for when he goes home and when to call the doctor or come to the hospital. He expressed understanding. Scheduling with the CHF clinic Yes. Future Appointments   Date Time Provider Cezar Vera   2/8/2023  9:30 AM [facility] ROOM 1 [facility]   2/16/2023  1:30 PM MD PRISCILLA Bah [facility]   3/1/2023 12:00 PM [facility] ROOM 2 [facility]   3/9/2023  8:15 AM DO Jacky Gallegos [facility]       Barriers to Care:  Contributing risk factors for Heart Failure are identified as none. The patient is ordered:  Diet: ADULT DIET; Regular; Low Fat/Low Chol/High Fiber/LUZ; Low Sodium (2 gm)   Sodium controlled diet Yes  Fluid restriction daily ordered (fluid restriction recommended if patient is hyponatremic and/or diuretic is initiated or increased) No  FR:   Daily Weights: Patient Vitals for the past 96 hrs (Last 3 readings):   Weight   02/06/23 0504 176 lb 1.6 oz (79.9 kg)   02/05/23 0047 153 lb 3.2 oz (69.5 kg)   02/04/23 1502 180 lb (81.6 kg)     I/O:   Intake/Output Summary (Last 24 hours) at 2/6/2023 1335  Last data filed at 2/5/2023 2306  Gross per 24 hour   Intake 1320 ml   Output 1700 ml   Net -380 ml              We reviewed the introduction to Heart Failure, the HF zones, signs and symptoms to report on day 1 of onset, medications, medication compliance, the importance of obtaining daily weights, following a low sodium diet, reading food labels for the sodium content, keeping physician appointments, and smoking cessation.  We discussed writing / tracking daily weights on a calendar / log because a 5 pound gain in 1 week can sneak up if you are not tracking it.          I advised patient they can reduce the risk for Heart Failure exacerbations by modifying / controlling the risk factors. We discussed self-managed care which includes the following:  to take medications as prescribed, report any intolerable side effects of medications to the cardiologist / doctor, do not just stop taking the medication; follow a cardiac heart healthy / low sodium diet; weigh yourself daily, exercise regularly- per doctor recommendation and not to smoke or use an excess amount of alcohol. We discussed calling the cardiologist / doctor with a weight gain of 3 pounds in one day or a total of 5 pounds or more in one week. Also, if you should have a significant weight loss of 3# or more in one day to call the doctor, they may need to decrease or hold the diuretic dose. On days you feels nauseated and not eating / drinking, having emesis or diarrhea,  informed to call the cardiologist  / doctor, they may need to decrease or hold diuretic to avoid dehydration. I stressed the importance of informing their cardiologist the first day of onset of any of the signs and symptoms in the \"Yellow Zone\" of the HF Zones. Patient verbalizes understanding. Greater than 30 minutes was spent educating patient. The Heart Failure Booklet given to the patient with additional patient education addressing:  What is Heart Failure? Things You Can Do to Live Well with HF  How to Take Your Medications  How to Eat Less Salt  [place] its Salt?   Exercising Well with Heart Failure  Signs and symptoms of HF to report  Weight Yourself Each Day  Home Self Management- activity, weight tracking, taking medications as prescribed, meals /diet planning (sodium and fluid restriction), how to read food labels, keeping physician follow ups, smoking cessation, follow the Heart Failure Zones  The Heart Failure zones  Every Dose Every Day      Instructed  to call 911 if you have any of the following symptoms: Struggling to breathe unrelieved with rest,     Having chest pain     Have confusion or cant think clearly          Tiffanie Lantigua, RN BSN, RN  Heart Failure Navigator        CONGESTIVE HEART FAILURE (CHF) AHA GUIDELINES  (Must be completed for Primary Diagnosis CHF or History of CHF)    Discharge Plan:  I placed the Heart Failure Home Instructions in patient's discharge instructions. Per Heart Failure GWTG, the patient should have a follow-up appointment made within 7 days of discharge.     New Diagnosis No    ECHO Results most recent:  Lab Results   Component Value Date    LVEF 60 2022    LVEFMODE Echo 2018                                        Social History     Tobacco Use   Smoking Status Former    Packs/day: 0.50    Years: 3.00    Pack years: 1.50    Types: Cigarettes    Quit date: 1973    Years since quittin.0   Smokeless Tobacco Never   Tobacco Comments    Quit smoking in 's        Immunization History   Administered Date(s) Administered    COVID-19, PFIZER Bivalent BOOSTER, DO NOT Dilute, (age 12y+), IM, 30 mcg/0.3 mL 2022    COVID-19, PFIZER GRAY top, DO NOT Dilute, (age 15 y+), IM, 30 mcg/0.3 mL 2022    COVID-19, PFIZER PURPLE top, DILUTE for use, (age 15 y+), 30mcg/0.3mL 2021, 2021, 2021    Influenza A (V5w6-51),all Formulations 2016    Influenza Vaccine, unspecified formulation 2013, 2014, 10/10/2016    Influenza Whole 10/19/2007    Influenza, FLUAD, (age 72 y+), Adjuvanted, 0.5mL 2020    Influenza, High Dose (Fluzone 65 yrs and older) 2015, 2016, 2017, 2018, 2020    Influenza, Triv, inactivated, subunit, adjuvanted, IM (Fluad 65 yrs and older) 2019    Pneumococcal Conjugate 13-valent ([id number]) 2018    Pneumococcal Polysaccharide ([id number]) 2020    Tdap (Boostrix, Adacel) 2014          Angiotensin-Converting-Enzyme (ACE) inhibitor ordered:  [] Yes  [x] No (specify contraindication):    [] Contraindicated  [] Hypotensive patient who was at immediate risk of cardiogenic shock  [] Hospitalized patient who experienced marked azotemia  [] Other Contraindications  [] Not Eligible  [] Not Tolerant  [] Patient Reason  [] System Reason  [x] Other Reason    Angiotensin II receptor blockers (ARB) ordered:  [] Yes  [x] No (specify contraindication):    [] Contraindicated  [] Hypotensive patient who was at immediate risk of cardiogenic shock  [] Hospitalized patient who experienced marked azotemia  [x] Other Contraindications    ARNI - Angiotensin Receptor Neprilysin Inhibitor ordered:  [] Yes  [x] No (specify contraindication):    [] ACE inhibitor use within the prior 36 hours  [] Allergy  [] Hyperkalemia  [] Hypotension  [] Renal dysfunction defined as creatinine > 2.5 mg/dL in men or > 2.0 mg/dL in women  [] Other Contraindications  [] Not Eligible  [] Not Tolerant  [] Patient Reason  []System Reason  [x]Other Reason      Beta Blocker ordered:    [] Yes  [x] No (specify contraindication):    [] Contraindicated  [] Asthma  [] Fluid Overload  [] Low Blood Pressure  [] Patient recently treated with an intravenous positive inotropic agent  [x] Other Contraindications  [] Not Eligible  [] Not Tolerant  [] Patient Reason  [] System Reason    SGLT2 Inhibitor ordered:  [] Yes  [x] No (specify contraindication):    [] Contraindicated  [] Patient currently on dialysis  [] Ketoacidosis  [] Known hypersensitivity to the medication  [] Type I diabetes (not approved for use in patients with Type I diabetes due to increased risk of ketoacidosis)  [] Other Contraindications  [] Not Eligible  [] Not Tolerant  [] Patient Reason  [] System Reason  [x] Other Reason    Aldosterone Antagonist ordered:  [] Yes  [x] No (specify contraindication):    [] Contraindicated  [] Allergy due to aldosterone receptor antagonist  [] Hyperkalemia  [] Renal dysfunction defined as creatinine >2.5 mg/dL in men or >2.0 mg/dL in women.   [] Other contraindications  [] Not Eligible  [] Not Tolerant  [] Patient Reason  [] System Reason  [x] Other Reason

## 2023-02-06 NOTE — PROGRESS NOTES
INPATIENT CARDIOLOGY FOLLOW-UP    Name: Ricky Gambino    Age: 82 y.o. Date of Admission: 2/4/2023  4:01 PM    Date of Service: 2/6/2023    Primary Cardiologist: Josseline Early DO, Jacobo JOE    Chief Complaint: Follow-up for CHF    Interim History:  Denies chest pain. Breathing improved. I's and O's appear inaccurate.     Review of Systems:   Negative except as described above    Problem List:  Patient Active Problem List   Diagnosis    Chronic combined systolic and diastolic CHF (congestive heart failure) (HCC)    S/P TAVR (transcatheter aortic valve replacement)    Chronic atrial fibrillation    Coronary artery disease involving native coronary artery of native heart without angina pectoris    Essential hypertension    High risk medications (not anticoagulants) long-term use    Paroxysmal atrial fibrillation (HCC)    Hyperlipidemia LDL goal <100    GIB (gastrointestinal bleeding)    ICD (implantable cardioverter-defibrillator), dual, in situ    Ventricular tachycardia    Ischemic heart disease    Vitamin D deficiency    Other insomnia    SOB (shortness of breath)    Lung nodule    Acute on chronic heart failure with preserved ejection fraction (HFpEF) ([address]    Pure hypercholesterolemia    Stage 3b chronic kidney disease ([address]    Transient cerebral ischemia    Aortic valve disease    Near syncope    Acute on chronic combined systolic and diastolic CHF (congestive heart failure) (HCC)    Acute congestive heart failure (HCC)    Gastroesophageal reflux disease without esophagitis    Coronary artery disease involving coronary bypass graft    Symptomatic anemia    GI bleed    Abdominal aortic aneurysm (AAA) 3.0 cm to 5.5 cm in diameter in male    ICD (implantable cardioverter-defibrillator) discharge       Current Medications:    Current Facility-Administered Medications:     perflutren lipid microspheres (DEFINITY) injection 1.5 mL, 1.5 mL, IntraVENous, ONCE PRN, Donald Oneill MD    sodium chloride (OCEAN, BABY AYR) 0.65 % nasal spray 2 spray, 2 spray, Each Nostril, PRN, Benoit R Lockso, DO    rivaroxaban (XARELTO) tablet 20 mg, 20 mg, Oral, Dinner, [employer], DO, 20 mg at 02/05/23 1644    pravastatin (PRAVACHOL) tablet 40 mg, 40 mg, Oral, Nightly, Benoit R Lockso, DO, 40 mg at 02/05/23 2012    potassium bicarb-citric acid (EFFER-K) effervescent tablet 20 mEq, 20 mEq, Oral, BID, Benoit R Lockso, DO, 20 mEq at 02/05/23 2011    metoprolol tartrate (LOPRESSOR) tablet 75 mg, 75 mg, Oral, QAM, Benoit R Lockso, DO, 75 mg at 02/05/23 0205    metoprolol tartrate (LOPRESSOR) tablet 50 mg, 50 mg, Oral, Nightly, Benoit R Lockso, DO, 50 mg at 02/05/23 2011    pantoprazole (PROTONIX) tablet 40 mg, 40 mg, Oral, Daily, Benoit R Lockso, DO, 40 mg at 02/05/23 7206    meclizine (ANTIVERT) tablet 25 mg, 25 mg, Oral, Daily PRN, Benoit R Lockso, DO    magnesium oxide (MAG-OX) tablet 400 mg, 400 mg, Oral, Dinner, [employer], DO, 400 mg at 02/05/23 1644    ferrous sulfate (IRON 325) tablet 325 mg, 325 mg, Oral, Lunch, Benoit R Lockso, DO, 325 mg at 02/05/23 1644    amLODIPine (NORVASC) tablet 2.5 mg, 2.5 mg, Oral, Daily, Benoit R Lockso, DO, 2.5 mg at 02/05/23 6774    amiodarone (CORDARONE) tablet 200 mg, 200 mg, Oral, Daily, Benoit R Lockso, DO, 200 mg at 02/05/23 2103    ALPRAZolam Ian Ally) tablet 0.25 mg, 0.25 mg, Oral, TID PRN, Jeffrey Bash Lockso, DO, 0.25 mg at 02/05/23 2012    sodium chloride flush 0.9 % injection 5-40 mL, 5-40 mL, IntraVENous, 2 times per day, Benoit R Lockso, DO, 10 mL at 02/05/23 0918    sodium chloride flush 0.9 % injection 5-40 mL, 5-40 mL, IntraVENous, PRN, Benoit R Lockso, DO, 10 mL at 02/05/23 0925    0.9 % sodium chloride infusion, , IntraVENous, PRN, Benoit R Lockso, DO    polyethylene glycol (GLYCOLAX) packet 17 g, 17 g, Oral, Daily PRN, Benoit R Lockso, DO    acetaminophen (TYLENOL) tablet 650 mg, 650 mg, Oral, Q6H PRN **OR** acetaminophen (TYLENOL) suppository 650 mg, 650 mg, Rectal, Q6H PRN, Emelyn Castellano,     Physical Exam:  /67   Pulse 65   Temp 97.8 °F (36.6 °C) (Oral)   Resp 18   Ht 5' 8\" (1.727 m)   Wt 176 lb 1.6 oz (79.9 kg)   SpO2 91%   BMI 26.78 kg/m²   Wt Readings from Last 3 Encounters:   02/06/23 176 lb 1.6 oz (79.9 kg)   02/01/23 180 lb (81.6 kg)   01/26/23 176 lb 8 oz (80.1 kg)     Appearance: Awake, alert, no acute respiratory distress  Skin: Intact, no rash  Head: Normocephalic, atraumatic  Eyes: EOMI, no conjunctival erythema  ENMT: No pharyngeal erythema, MMM, no rhinorrhea  Neck: Supple, no elevated JVP, no carotid bruits  Lungs: Scattered rales  Cardiac: PMI nondisplaced, Regular rhythm with a normal rate, S1 & S2 normal, soft systolic murmur left sternal border. Left chest ICD  Abdomen: Soft, nontender, +bowel sounds  Extremities: Moves all extremities x 4, trace lower extremity edema  Neurologic: No focal motor deficits apparent, normal mood and affect  Peripheral Pulses: Intact posterior tibial pulses bilaterally    Intake/Output:    Intake/Output Summary (Last 24 hours) at 2/6/2023 0857  Last data filed at 2/5/2023 2306  Gross per 24 hour   Intake 1920 ml   Output 1700 ml   Net 220 ml     No intake/output data recorded. Laboratory Tests:  Recent Labs     02/05/23  0355 02/05/23  1418 02/06/23  0409    136 136   K 4.4 4.5 4.3   CL 97* 94* 93*   CO2 34* 33* 32*   BUN 15 17 15   CREATININE 1.3* 1.4* 1.2   GLUCOSE 89 101* 100*   CALCIUM 8.3* 8.6 8.4*     Lab Results   Component Value Date/Time    MG 2.4 06/28/2022 06:29 AM     No results for input(s): ALKPHOS, ALT, AST, PROT, BILITOT, BILIDIR, LABALBU in the last 72 hours.   Recent Labs     02/04/23  1621 02/05/23  0355 02/06/23  0409   WBC 7.7 7.1 7.4   RBC 3.75* 3.48* 3.58*   HGB 11.4* 10.7* 10.9*   HCT 35.0* 32.6* 33.6*   MCV 93.3 93.7 93.9   MCH 30.4 30.7 30.4   MCHC 32.6 32.8 32.4   RDW 15.0 15.0 14.7    237 272   MPV 10.3 10.1 10.3     Lab Results   Component Value Date    CKTOTAL 94 2017    CKMB 3.8 2017    TROPONINI <0.01 10/29/2020    TROPONINI <0.01 10/29/2020    TROPONINI <0.01 10/28/2020     Lab Results   Component Value Date    INR 1.5 06/15/2022    INR 1.3 2022    INR 1.3 2022    PROTIME 17.0 (H) 06/15/2022    PROTIME 13.9 (H) 2022    PROTIME 14.0 (H) 2022     Lab Results   Component Value Date    TSH 2.320 10/13/2022     Lab Results   Component Value Date    LABA1C 5.5 2022     No results found for: EAG  Lab Results   Component Value Date    CHOL 142 2022    CHOL 176 2022    CHOL 140 2021     Lab Results   Component Value Date    TRIG 191 (H) 2022    TRIG 430 (H) 2022    TRIG 230 (H) 2021     Lab Results   Component Value Date    HDL 39 2022    HDL 34 2022    HDL 38 2021     Lab Results   Component Value Date    LDLCALC 65 2022    LDLCALC - (AA) 2022    LDLCALC 56 2021     Lab Results   Component Value Date    LABVLDL 38 2022    LABVLDL - (AA) 2022    LABVLDL 46 2021     Lab Results   Component Value Date    CHOLHDLRATIO 4 2021     Recent Labs     23  1621 23  0409   PROBNP 3,633* 2,280*       Cardiac Tests:    EK2023: Ventricular paced 56 bpm    Telemetry: V paced 50s    Chest X-ray:   23    Impression   1. Multifocal bilateral airspace disease. The appearance and distribution   would favor that of pneumonia. CHF cannot be excluded   2. Small right pleural effusion     Echocardiogram:   TTE 3/2/22 [place]   Summary   Normal left ventricular systolic function. Ejection fraction is visually estimated at 60%. Mildly dilated right ventricle with normal right ventricular function. There is doppler evidence of stage II diastolic dysfunction. Moderately dilated left atrium by volume index. Mild mitral regurgitation. 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    No significant paravalvular aortic regurgitation. AV peak velocity 2.1 m/s. AV mean gradient 9 mmHg. AV area 1.5 cm2. Mild tricuspid regurgitation. PASP is estimated at 44 mmHg. Stress test:      Cardiac catheterization:     Device interrogation 1/2023  Medtronic dual-chamber ICD implanted 7/2016  DDDR LRL 55 bpm  5 months battery life remaining  Atrial pacing 84%  Ventricular pacing 79%  AT/AF burden 1.3%  22 AT/AF episodes  0 VT/VF, no shocks or device therapies      ----------------------------------------------------------------------------------------------------------------------------------------------------------------  IMPRESSION:  Acute on chronic HFpEF.  proBNP 9090-3950. I's and O's? Net +200 mL  CAD CABG 2003/redo 2007/PCI to native OM 2 and LAD 2007. [facility] 2017 patent LIMA-LAD, RA-D2, RA-OM 2  History of VT arrest 2007 s/p ICD; PGR 2016 Medtronic. Recurrent VT  PAF, rivaroxaban. [address] 2010, 2017, 2020, 5/2022. Previously dofetilide now amiodarone  Severe AS s/p TAVR 2017 ZANE 26 mm  RBBB/LAFB  TAA 4.4 cm by CT 6/2022  PAD/splenic artery aneurysm  CKD creatinine 1.3-1.2. Creatinine clearance 44 mL/min  Anemia Hgb 10.9  Hypertension  COPD/pulmonary nodules  PUD/esophageal spasm and dilation 12/2022    RECOMMENDATIONS:    Repeat dose of IV bumetanide 2 mg today  Strict I's and O's  States furosemide was not working to well for him at home, will transition to oral bumetanide  Given creatinine clearance less than 50 mL/min, decrease rivaroxaban to 15 mg daily  Limited echo done, will review  Continue amiodarone, metoprolol at current doses and other current cardiac medications  Aggressive risk factor modification  Further care per primary service and consultants    Sony Gonsalez MD, [address] [place] [address] Cardiology    NOTE: This report was transcribed using voice recognition software. Every effort was made to ensure accuracy; however, inadvertent computerized transcription errors may be present.

## 2023-02-06 NOTE — PLAN OF CARE
Patient's chart updated to reflect:      . - HF care plan, HF education points and HF discharge instructions.  -Orders: 2 gram sodium diet, daily weights, I/O.  -PCP and cardiology follow up appointments to be scheduled within 7 days of hospital discharge. -CHF education session will be provided to the patient prior to hospital discharge.     Autumn Johnson RN BSN  Heart Failure Navigator

## 2023-02-06 NOTE — PROGRESS NOTES
Physician Progress Note      PATIENTRugabriel Lind  CSN #:                  [id number]  :                       1935  ADMIT DATE:       2023 4:01 PM  [address] [place] [place] DATE:  RESPONDING  PROVIDER #:        Emily Rangel DO          QUERY TEXT:    Pt admitted with acute on chronic CHF. Pt noted to have respiratory distress   with hypoxia. If possible, please document in the progress notes and discharge   summary if you are evaluating and/or treating any of the following: The medical record reflects the following:  Risk Factors: acute CHF  Clinical Indicators: RR 16-23, Pulse ox 86% room air, and per ER note \". ..mild   distress. Luciana Ross Lung sounds demonstrate rales bilaterally in the bases. Patient's   tachypneic has conversational dyspnea. Luciana Ross \"  Treatment: oxygen therapy    Thank you,  Swetha HOWELL, RN, CDIS  Clinical Documentation Improvement  [email or web address]  Options provided:  -- Acute respiratory failure with hypoxia  -- Hypoxia only  -- Other - I will add my own diagnosis  -- Disagree - Not applicable / Not valid  -- Disagree - Clinically unable to determine / Unknown  -- Refer to Clinical Documentation Reviewer    PROVIDER RESPONSE TEXT:    This patient has hypoxia only.     Query created by: Kike Mills on 2023 10:54 AM      Electronically signed by:  Flora Vargas DO 2023 12:32 PM

## 2023-02-07 ENCOUNTER — APPOINTMENT (OUTPATIENT)
Dept: CT IMAGING | Age: 88
End: 2023-02-07
Payer: MEDICARE

## 2023-02-07 LAB
ANION GAP SERPL CALCULATED.3IONS-SCNC: 7 MMOL/L (ref 7–16)
BUN BLDV-MCNC: 16 MG/DL (ref 6–23)
CALCIUM SERPL-MCNC: 8.4 MG/DL (ref 8.6–10.2)
CHLORIDE BLD-SCNC: 92 MMOL/L (ref 98–107)
CO2: 34 MMOL/L (ref 22–29)
CREAT SERPL-MCNC: 1.2 MG/DL (ref 0.7–1.2)
GFR SERPL CREATININE-BSD FRML MDRD: 59 ML/MIN/1.73
GLUCOSE BLD-MCNC: 93 MG/DL (ref 74–99)
HCT VFR BLD CALC: 33.9 % (ref 37–54)
HEMOGLOBIN: 11 G/DL (ref 12.5–16.5)
MAGNESIUM: 2.2 MG/DL (ref 1.6–2.6)
MCH RBC QN AUTO: 30.5 PG (ref 26–35)
MCHC RBC AUTO-ENTMCNC: 32.4 % (ref 32–34.5)
MCV RBC AUTO: 93.9 FL (ref 80–99.9)
PDW BLD-RTO: 14.6 FL (ref 11.5–15)
PLATELET # BLD: 273 E9/L (ref 130–450)
PMV BLD AUTO: 9.8 FL (ref 7–12)
POTASSIUM SERPL-SCNC: 4.4 MMOL/L (ref 3.5–5)
RBC # BLD: 3.61 E12/L (ref 3.8–5.8)
SODIUM BLD-SCNC: 133 MMOL/L (ref 132–146)
WBC # BLD: 7.2 E9/L (ref 4.5–11.5)

## 2023-02-07 PROCEDURE — 2500000003 HC RX 250 WO HCPCS: Performed by: INTERNAL MEDICINE

## 2023-02-07 PROCEDURE — 2060000000 HC ICU INTERMEDIATE R&B

## 2023-02-07 PROCEDURE — 99233 SBSQ HOSP IP/OBS HIGH 50: CPT | Performed by: INTERNAL MEDICINE

## 2023-02-07 PROCEDURE — 93005 ELECTROCARDIOGRAM TRACING: CPT | Performed by: INTERNAL MEDICINE

## 2023-02-07 PROCEDURE — 80048 BASIC METABOLIC PNL TOTAL CA: CPT

## 2023-02-07 PROCEDURE — 83735 ASSAY OF MAGNESIUM: CPT

## 2023-02-07 PROCEDURE — 2700000000 HC OXYGEN THERAPY PER DAY

## 2023-02-07 PROCEDURE — 2580000003 HC RX 258: Performed by: INTERNAL MEDICINE

## 2023-02-07 PROCEDURE — 6370000000 HC RX 637 (ALT 250 FOR IP): Performed by: INTERNAL MEDICINE

## 2023-02-07 PROCEDURE — 71250 CT THORAX DX C-: CPT

## 2023-02-07 PROCEDURE — 36415 COLL VENOUS BLD VENIPUNCTURE: CPT

## 2023-02-07 PROCEDURE — 85027 COMPLETE CBC AUTOMATED: CPT

## 2023-02-07 RX ORDER — BUMETANIDE 0.25 MG/ML
2 INJECTION, SOLUTION INTRAMUSCULAR; INTRAVENOUS 2 TIMES DAILY
Status: COMPLETED | OUTPATIENT
Start: 2023-02-07 | End: 2023-02-08

## 2023-02-07 RX ADMIN — SODIUM CHLORIDE, PRESERVATIVE FREE 10 ML: 5 INJECTION INTRAVENOUS at 08:47

## 2023-02-07 RX ADMIN — BUMETANIDE 2 MG: 0.25 INJECTION INTRAMUSCULAR; INTRAVENOUS at 08:46

## 2023-02-07 RX ADMIN — METOPROLOL TARTRATE 50 MG: 50 TABLET, FILM COATED ORAL at 20:16

## 2023-02-07 RX ADMIN — BUMETANIDE 2 MG: 0.25 INJECTION INTRAMUSCULAR; INTRAVENOUS at 17:59

## 2023-02-07 RX ADMIN — FERROUS SULFATE TAB 325 MG (65 MG ELEMENTAL FE) 325 MG: 325 (65 FE) TAB at 11:00

## 2023-02-07 RX ADMIN — AMIODARONE HYDROCHLORIDE 200 MG: 200 TABLET ORAL at 08:45

## 2023-02-07 RX ADMIN — SODIUM CHLORIDE, PRESERVATIVE FREE 10 ML: 5 INJECTION INTRAVENOUS at 17:59

## 2023-02-07 RX ADMIN — PANTOPRAZOLE SODIUM 40 MG: 40 TABLET, DELAYED RELEASE ORAL at 08:46

## 2023-02-07 RX ADMIN — SODIUM CHLORIDE, PRESERVATIVE FREE 10 ML: 5 INJECTION INTRAVENOUS at 20:17

## 2023-02-07 RX ADMIN — METOPROLOL TARTRATE 75 MG: 50 TABLET, FILM COATED ORAL at 08:46

## 2023-02-07 RX ADMIN — PRAVASTATIN SODIUM 40 MG: 20 TABLET ORAL at 20:16

## 2023-02-07 RX ADMIN — ALPRAZOLAM 0.25 MG: 0.25 TABLET ORAL at 20:16

## 2023-02-07 RX ADMIN — AMLODIPINE BESYLATE 2.5 MG: 2.5 TABLET ORAL at 08:45

## 2023-02-07 RX ADMIN — RIVAROXABAN 15 MG: 15 TABLET, FILM COATED ORAL at 17:58

## 2023-02-07 RX ADMIN — Medication 400 MG: at 17:58

## 2023-02-07 RX ADMIN — POTASSIUM BICARBONATE 20 MEQ: 782 TABLET, EFFERVESCENT ORAL at 20:16

## 2023-02-07 RX ADMIN — POTASSIUM BICARBONATE 20 MEQ: 782 TABLET, EFFERVESCENT ORAL at 08:45

## 2023-02-07 ASSESSMENT — PAIN SCALES - GENERAL
PAINLEVEL_OUTOF10: 0

## 2023-02-07 NOTE — PROGRESS NOTES
[facility] Progress Note    --------------------------------------------------------------------------------------  Assessment / Plan  Acute on chronic HFpEF  Now on Bumex 2 mg IV twice daily per cardiology, as furosemide did not seem to be producing an adequate response. Fluid status is improving, though chest x-ray yesterday demonstrated what looks like a new pleural effusion. I would like to check a CT scan to get a better idea of the size of it and involve pulmonology if necessary for further recommendations. May improve with just diuresis, but if this is the reason for the patient's persistent hypoxia and shortness of breath, feel he may benefit by having it addressed. Otherwise, repeat echo has been interpreted, copied and pasted below in the note. History of CAD status post MI, ICD placement, A-fib on Xarelto, hyperlipidemia, hypertension, CKD 3     Please see orders for further plan of care  Code status                 full  DVT prophylaxis         Xarelto  Disposition                  Anticipate home  --------------------------------------------------------------------------------------    Admission Date  2/4/2023  4:01 PM  Chief Complaint Cooper, improving    Subjective  History of Present Illness  Patient seen at bedside this morning, says he is doing so-so today. Reports not sleeping terribly well overnight, reports that he was off oxygen in the evening and through some of the night but desaturated into the low 80s and is now back on 3 L nasal cannula. Checked a chest x-ray yesterday for persistent shortness of breath, it showed development of a right-sided effusion as well as cardiomegaly and mild volume overload. No real peripheral edema today. No new issues were identified. No family is present. Case was discussed with nursing staff on the unit this morning.     Review of Systems - 12-point review of systems has been reviewed and is otherwise negative except as listed in the [facility]    Objective  Physical Exam  Vitals: /62   Pulse 51   Temp 97.9 °F (36.6 °C) (Oral)   Resp 20   Ht 5' 8\" (1.727 m)   Wt 175 lb 12.8 oz (79.7 kg)   SpO2 98%   BMI 26.73 kg/m²   General: well-developed, well-nourished, no acute distress, cooperative  Skin: generally warm, dry, and intact, with normal color  HEENT: normocephalic, atraumatic, no gross abnormalities, +NCO2  Respiratory: clear to auscultation bilaterally without respiratory distress  Cardiovascular: regular rate and rhythm without murmur / rub / gallop  Abdominal: soft, nontender, nondistended, normoactive bowel sounds  Extremities: no obvious edema or deformity  Neurologic: awake, alert, no gross deficits  Psychiatric: normal affect, cooperative    Echo 2/6/23   Normal left ventricular size and systolic function. Ejection fraction is visually estimated at 55-60%. Indeterminate diastolic function. Basal inferior wall hypokinetic. Mild left ventricular concentric hypertrophy noted. Abnormal LV septal motion consistent with paced rhythm. Right ventricle global systolic function is mildly reduced. Mild to moderate mitral regurgitation is present. 26 mm ZANE S3 TAVR valve implanted 2017. Mild-to-moderate paravalvular leak. Mild tricuspid regurgitation. Echo 3/1/22   Normal left ventricular systolic function. Ejection fraction is visually estimated at 60%. Mildly dilated right ventricle with normal right ventricular function. There is doppler evidence of stage II diastolic dysfunction. Moderately dilated left atrium by volume index. Mild mitral regurgitation. 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 No significant paravalvular aortic regurgitation. AV peak velocity 2.1 m/s. AV mean gradient 9 mmHg. AV area 1.5 cm2. Mild tricuspid regurgitation. PASP is estimated at 44 mmHg.     Electronically signed by Herbie Raymond DO on 2/7/2023 at 12:45 PM

## 2023-02-07 NOTE — CARE COORDINATION
Social work / Discharge Planning:       Discharge plan is home. Patient does not have home oxygen and will need pulse ox testing when discharge is anticipated. Per IDR, IV Bumex was increased today.    Electronically signed by HUSAM Bellamy on 2/7/2023 at 9:46 AM

## 2023-02-07 NOTE — PROGRESS NOTES
INPATIENT CARDIOLOGY FOLLOW-UP    Name: Zeyad Jenkins    Age: 82 y.o. Date of Admission: 2/4/2023  4:01 PM    Date of Service: 2/7/2023    Primary Cardiologist: Mike Tracey DO, Leone Schiff MD [facility]    Chief Complaint: Follow-up for CHF    Interim History:  Still some shortness of breath with exertion. Has been walking in the halls. Denies chest pain. 1.3 L urine output past 24-hour net negative only 910 mL.     Review of Systems:   Negative except as described above    Problem List:  Patient Active Problem List   Diagnosis    Chronic combined systolic and diastolic CHF (congestive heart failure) (HCC)    S/P TAVR (transcatheter aortic valve replacement)    Chronic atrial fibrillation    Coronary artery disease involving native coronary artery of native heart without angina pectoris    Essential hypertension    High risk medications (not anticoagulants) long-term use    Paroxysmal atrial fibrillation (HCC)    Hyperlipidemia LDL goal <100    GIB (gastrointestinal bleeding)    ICD (implantable cardioverter-defibrillator), dual, in situ    Ventricular tachycardia    Ischemic heart disease    Vitamin D deficiency    Other insomnia    SOB (shortness of breath)    Lung nodule    Acute on chronic heart failure with preserved ejection fraction (HFpEF) ([address]    Pure hypercholesterolemia    Stage 3b chronic kidney disease ([address]    Transient cerebral ischemia    Aortic valve disease    Near syncope    Acute on chronic combined systolic and diastolic CHF (congestive heart failure) (HCC)    Acute congestive heart failure (HCC)    Gastroesophageal reflux disease without esophagitis    Coronary artery disease involving coronary bypass graft    Symptomatic anemia    GI bleed    Abdominal aortic aneurysm (AAA) 3.0 cm to 5.5 cm in diameter in male    ICD (implantable cardioverter-defibrillator) discharge    ICD (implantable cardioverter-defibrillator) in place    PAF (paroxysmal atrial fibrillation) ([address]    Acute on chronic diastolic congestive heart failure (HCC)       Current Medications:    Current Facility-Administered Medications:     bumetanide (BUMEX) injection 2 mg, 2 mg, IntraVENous, BID, Meir Albrecht MD, 2 mg at 02/07/23 0846    rivaroxaban (XARELTO) tablet 15 mg, 15 mg, Oral, Rahul Parisi MD, 15 mg at 02/06/23 1810    perflutren lipid microspheres (DEFINITY) injection 1.5 mL, 1.5 mL, IntraVENous, ONCE PRN, Donald Oneill MD    sodium chloride (OCEAN, BABY AYR) 0.65 % nasal spray 2 spray, 2 spray, Each Nostril, PRN, Benoit R Lockso, DO    pravastatin (PRAVACHOL) tablet 40 mg, 40 mg, Oral, Nightly, Benoit R Lockso, DO, 40 mg at 02/06/23 2017    potassium bicarb-citric acid (EFFER-K) effervescent tablet 20 mEq, 20 mEq, Oral, BID, Benoit R Lockso, DO, 20 mEq at 02/07/23 0845    metoprolol tartrate (LOPRESSOR) tablet 75 mg, 75 mg, Oral, QAM, Benoit R Lockso, DO, 75 mg at 02/07/23 0846    metoprolol tartrate (LOPRESSOR) tablet 50 mg, 50 mg, Oral, Nightly, Benoit R Lockso, DO, 50 mg at 02/06/23 2017    pantoprazole (PROTONIX) tablet 40 mg, 40 mg, Oral, Daily, Benoit R Lockso, DO, 40 mg at 02/07/23 0846    meclizine (ANTIVERT) tablet 25 mg, 25 mg, Oral, Daily PRN, Benoit R Lockso, DO    magnesium oxide (MAG-OX) tablet 400 mg, 400 mg, Oral, Dinner, [employer], DO, 400 mg at 02/06/23 1810    ferrous sulfate (IRON 325) tablet 325 mg, 325 mg, Oral, Lunch, Benoit R Lockso, DO, 325 mg at 02/06/23 1223    amLODIPine (NORVASC) tablet 2.5 mg, 2.5 mg, Oral, Daily, Benoit R Lockso, DO, 2.5 mg at 02/07/23 0845    amiodarone (CORDARONE) tablet 200 mg, 200 mg, Oral, Daily, Benoit R Lockso, DO, 200 mg at 02/07/23 0845    ALPRAZolam (XANAX) tablet 0.25 mg, 0.25 mg, Oral, TID PRN, Hina Judd Lockso, DO, 0.25 mg at 02/06/23 2140    sodium chloride flush 0.9 % injection 5-40 mL, 5-40 mL, IntraVENous, 2 times per day, Benoit MOYA Lockso, DO, 10 mL at 02/07/23 0847    sodium chloride flush 0.9 % injection 5-40 mL, 5-40 mL, IntraVENous, PRN, Benoit R Lockso, DO, 10 mL at 02/05/23 0925    0.9 % sodium chloride infusion, , IntraVENous, PRN, Benoit R Lockso, DO    polyethylene glycol (GLYCOLAX) packet 17 g, 17 g, Oral, Daily PRN, Benoit R Lockso, DO, 17 g at 02/06/23 1005    acetaminophen (TYLENOL) tablet 650 mg, 650 mg, Oral, Q6H PRN **OR** acetaminophen (TYLENOL) suppository 650 mg, 650 mg, Rectal, Q6H PRN, Cyndy Eagle Lockso, DO    Physical Exam:  /68   Pulse 55   Temp 97.9 °F (36.6 °C) (Oral)   Resp 20   Ht 5' 8\" (1.727 m)   Wt 175 lb 14.4 oz (79.8 kg)   SpO2 95%   BMI 26.75 kg/m²   Wt Readings from Last 3 Encounters:   02/07/23 175 lb 14.4 oz (79.8 kg)   02/01/23 180 lb (81.6 kg)   01/26/23 176 lb 8 oz (80.1 kg)     Appearance: Awake, alert, no acute respiratory distress  Skin: Intact, no rash  Head: Normocephalic, atraumatic  Eyes: EOMI, no conjunctival erythema  ENMT: No pharyngeal erythema, MMM, no rhinorrhea  Neck: Supple, no elevated JVP, no carotid bruits  Lungs: Scattered rales  Cardiac: PMI nondisplaced, somewhat irregular rhythm with a normal rate, S1 & S2 normal, soft systolic murmur left sternal border. Left chest ICD  Abdomen: Soft, nontender, +bowel sounds  Extremities: Moves all extremities x 4, trace lower extremity edema  Neurologic: No focal motor deficits apparent, normal mood and affect  Peripheral Pulses: Intact posterior tibial pulses bilaterally    Intake/Output:    Intake/Output Summary (Last 24 hours) at 2/7/2023 0901  Last data filed at 2/6/2023 2216  Gross per 24 hour   Intake 120 ml   Output 1250 ml   Net -1130 ml     No intake/output data recorded.     Laboratory Tests:  Recent Labs     02/05/23  1418 02/06/23  0409 02/07/23  0432    136 133   K 4.5 4.3 4.4   CL 94* 93* 92*   CO2 33* 32* 34*   BUN 17 15 16   CREATININE 1.4* 1.2 1.2   GLUCOSE 101* 100* 93   CALCIUM 8.6 8.4* 8.4*     Lab Results   Component Value Date/Time    MG 2.2 02/07/2023 04:32 AM     No results for input(s): ALKPHOS, ALT, AST, PROT, BILITOT, BILIDIR, LABALBU in the last 72 hours. Recent Labs     23  0355 23  0409 23  0432   WBC 7.1 7.4 7.2   RBC 3.48* 3.58* 3.61*   HGB 10.7* 10.9* 11.0*   HCT 32.6* 33.6* 33.9*   MCV 93.7 93.9 93.9   MCH 30.7 30.4 30.5   MCHC 32.8 32.4 32.4   RDW 15.0 14.7 14.6    272 273   MPV 10.1 10.3 9.8     Lab Results   Component Value Date    CKTOTAL 94 2017    CKMB 3.8 2017    TROPONINI <0.01 10/29/2020    TROPONINI <0.01 10/29/2020    TROPONINI <0.01 10/28/2020     Lab Results   Component Value Date    INR 1.5 06/15/2022    INR 1.3 2022    INR 1.3 2022    PROTIME 17.0 (H) 06/15/2022    PROTIME 13.9 (H) 2022    PROTIME 14.0 (H) 2022     Lab Results   Component Value Date    TSH 2.320 10/13/2022     Lab Results   Component Value Date    LABA1C 5.5 2022     No results found for: EAG  Lab Results   Component Value Date    CHOL 142 2022    CHOL 176 2022    CHOL 140 2021     Lab Results   Component Value Date    TRIG 191 (H) 2022    TRIG 430 (H) 2022    TRIG 230 (H) 2021     Lab Results   Component Value Date    HDL 39 2022    HDL 34 2022    HDL 38 2021     Lab Results   Component Value Date    LDLCALC 65 2022    LDLCALC - (AA) 2022    LDLCALC 56 2021     Lab Results   Component Value Date    LABVLDL 38 2022    LABVLDL - (AA) 2022    LABVLDL 46 2021     Lab Results   Component Value Date    CHOLHDLRATIO 4 2021     Recent Labs     23  1621 23  0409   PROBNP 3,633* 2,280*       Cardiac Tests:    EK2023: Ventricular paced 56 bpm    Telemetry: intermittent V paced 50-60s    Chest X-ray:   23    Impression   1. Multifocal bilateral airspace disease. The appearance and distribution   would favor that of pneumonia. CHF cannot be excluded   2.  Small right pleural effusion     Echocardiogram:   Choudhary TTE 23 Summary   Limited echo ordered. Normal left ventricular size and systolic function. Ejection fraction is visually estimated at 55-60%. Indeterminate diastolic function. Basal inferior wall hypokinetic. Mild left ventricular concentric hypertrophy noted. Abnormal LV septal motion consistent with paced rhythm. Right ventricle global systolic function is mildly reduced. Mild to moderate mitral regurgitation is present. 26 mm ZANE S3 TAVR valve implanted 2017. Mild-to-moderate paravalvular leak. Mild tricuspid regurgitation. TTE 3/2/22 [place]   Summary   Normal left ventricular systolic function. Ejection fraction is visually estimated at 60%. Mildly dilated right ventricle with normal right ventricular function. There is doppler evidence of stage II diastolic dysfunction. Moderately dilated left atrium by volume index. Mild mitral regurgitation. 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 No significant paravalvular aortic regurgitation. AV peak velocity 2.1 m/s. AV mean gradient 9 mmHg. AV area 1.5 cm2. Mild tricuspid regurgitation. PASP is estimated at 44 mmHg. Stress test:      Cardiac catheterization:     Device interrogation 1/2023  Medtronic dual-chamber ICD implanted 7/2016  DDDR LRL 55 bpm  5 months battery life remaining  Atrial pacing 84%  Ventricular pacing 79%  AT/AF burden 1.3%  22 AT/AF episodes  0 VT/VF, no shocks or device therapies      ----------------------------------------------------------------------------------------------------------------------------------------------------------------  IMPRESSION:  Acute on chronic HFpEF.  proBNP [id number]. I's and O's? Net -910 mL  CAD CABG 2003/redo 2007/PCI to native OM 2 and LAD 2007. [facility] 2017 patent LIMA-LAD, RA-D2, RA-OM 2  History of VT arrest 2007 s/p ICD; PGR 2016 Medtronic. Recurrent VT  PAF, rivaroxaban. [address] 2010, 2017, 2020, 5/2022.   Previously dofetilide now amiodarone  Severe AS s/p TAVR 2017 ZANE 26 mm with mild to moderate paravalvular leak  Mild to moderate MR, mild TR  RBBB/LAFB  TAA 4.4 cm by CT 6/2022  PAD/splenic artery aneurysm  CKD creatinine 1.4-1.2. Creatinine clearance 44 mL/min  Anemia Hgb 11  Hypertension  COPD/pulmonary nodules  PUD/esophageal spasm and dilation 12/2022    RECOMMENDATIONS:  Improving, still symptomatic. Echo reviewed, normal LV function, TAVR valve with mild to moderate paravalvular leak. Increase bumetanide to 2 mg IV twice daily  Strict I's and O's  States furosemide was not working to well for him at home, will transition to oral bumetanide  Given creatinine clearance less than 50 mL/min, decrease rivaroxaban to 15 mg daily  Continue amiodarone, metoprolol at current doses and other current cardiac medications  Repeat twelve-lead EKG  Wean oxygen as able  Aggressive risk factor modification  Further care per primary service and consultants    Howard Nevarez MD, [facility]    NOTE: This report was transcribed using voice recognition software. Every effort was made to ensure accuracy; however, inadvertent computerized transcription errors may be present.

## 2023-02-07 NOTE — PLAN OF CARE
Problem: Discharge Planning  Goal: Discharge to home or other facility with appropriate resources  Outcome: Progressing  Flowsheets  Taken 2/7/2023 0800 by Wandy Garcia RN  Discharge to home or other facility with appropriate resources:   Identify barriers to discharge with patient and caregiver   Arrange for needed discharge resources and transportation as appropriate   Identify discharge learning needs (meds, wound care, etc)   Arrange for interpreters to assist at discharge as needed   Refer to discharge planning if patient needs post-hospital services based on physician order or complex needs related to functional status, cognitive ability or social support system  Taken 2/6/2023 2215 by Manuela Baldwin RN  Discharge to home or other facility with appropriate resources: Identify barriers to discharge with patient and caregiver     Problem: Safety - Adult  Goal: Free from fall injury  Outcome: Progressing     Problem: ABCDS Injury Assessment  Goal: Absence of physical injury  Outcome: Progressing     Problem: Pain  Goal: Verbalizes/displays adequate comfort level or baseline comfort level  Outcome: Progressing  Flowsheets  Taken 2/7/2023 0700 by Wandy Garcia RN  Verbalizes/displays adequate comfort level or baseline comfort level:   Encourage patient to monitor pain and request assistance   Assess pain using appropriate pain scale   Administer analgesics based on type and severity of pain and evaluate response   Implement non-pharmacological measures as appropriate and evaluate response   Consider cultural and social influences on pain and pain management   Notify Licensed Independent Practitioner if interventions unsuccessful or patient reports new pain  Taken 2/7/2023 0400 by Manuela Baldwin RN  Verbalizes/displays adequate comfort level or baseline comfort level: Assess pain using appropriate pain scale     Problem: Chronic Conditions and Co-morbidities  Goal: Patient's chronic conditions and co-morbidity symptoms are monitored and maintained or improved  Outcome: Progressing  Flowsheets  Taken 2/7/2023 0800 by Carmelina Hopson RN  Care Plan - Patient's Chronic Conditions and Co-Morbidity Symptoms are Monitored and Maintained or Improved:   Monitor and assess patient's chronic conditions and comorbid symptoms for stability, deterioration, or improvement   Collaborate with multidisciplinary team to address chronic and comorbid conditions and prevent exacerbation or deterioration   Update acute care plan with appropriate goals if chronic or comorbid symptoms are exacerbated and prevent overall improvement and discharge  Taken 2/6/2023 2215 by Moustapha Hill [address] - Patient's Chronic Conditions and Co-Morbidity Symptoms are Monitored and Maintained or Improved: Monitor and assess patient's chronic conditions and comorbid symptoms for stability, deterioration, or improvement

## 2023-02-08 ENCOUNTER — HOSPITAL ENCOUNTER (OUTPATIENT)
Dept: OTHER | Age: 88
Setting detail: THERAPIES SERIES
Discharge: HOME | End: 2023-02-08
Payer: MEDICARE

## 2023-02-08 LAB
ANION GAP SERPL CALCULATED.3IONS-SCNC: 8 MMOL/L (ref 7–16)
BUN BLDV-MCNC: 18 MG/DL (ref 6–23)
CALCIUM SERPL-MCNC: 8.3 MG/DL (ref 8.6–10.2)
CHLORIDE BLD-SCNC: 91 MMOL/L (ref 98–107)
CO2: 35 MMOL/L (ref 22–29)
CREAT SERPL-MCNC: 1.3 MG/DL (ref 0.7–1.2)
EKG ATRIAL RATE: 50 BPM
EKG Q-T INTERVAL: 498 MS
EKG QRS DURATION: 142 MS
EKG QTC CALCULATION (BAZETT): 493 MS
EKG R AXIS: -92 DEGREES
EKG T AXIS: -15 DEGREES
EKG VENTRICULAR RATE: 59 BPM
GFR SERPL CREATININE-BSD FRML MDRD: 54 ML/MIN/1.73
GLUCOSE BLD-MCNC: 101 MG/DL (ref 74–99)
HCT VFR BLD CALC: 32.4 % (ref 37–54)
HEMOGLOBIN: 10.6 G/DL (ref 12.5–16.5)
MCH RBC QN AUTO: 29.8 PG (ref 26–35)
MCHC RBC AUTO-ENTMCNC: 32.7 % (ref 32–34.5)
MCV RBC AUTO: 91 FL (ref 80–99.9)
PDW BLD-RTO: 14.6 FL (ref 11.5–15)
PLATELET # BLD: 286 E9/L (ref 130–450)
PMV BLD AUTO: 9.8 FL (ref 7–12)
POTASSIUM SERPL-SCNC: 4 MMOL/L (ref 3.5–5)
RBC # BLD: 3.56 E12/L (ref 3.8–5.8)
SODIUM BLD-SCNC: 134 MMOL/L (ref 132–146)
WBC # BLD: 7.6 E9/L (ref 4.5–11.5)

## 2023-02-08 PROCEDURE — 6370000000 HC RX 637 (ALT 250 FOR IP): Performed by: INTERNAL MEDICINE

## 2023-02-08 PROCEDURE — 99233 SBSQ HOSP IP/OBS HIGH 50: CPT | Performed by: INTERNAL MEDICINE

## 2023-02-08 PROCEDURE — 2500000003 HC RX 250 WO HCPCS: Performed by: INTERNAL MEDICINE

## 2023-02-08 PROCEDURE — 2580000003 HC RX 258: Performed by: INTERNAL MEDICINE

## 2023-02-08 PROCEDURE — 2060000000 HC ICU INTERMEDIATE R&B

## 2023-02-08 PROCEDURE — 36415 COLL VENOUS BLD VENIPUNCTURE: CPT

## 2023-02-08 PROCEDURE — 93010 ELECTROCARDIOGRAM REPORT: CPT | Performed by: INTERNAL MEDICINE

## 2023-02-08 PROCEDURE — 85027 COMPLETE CBC AUTOMATED: CPT

## 2023-02-08 PROCEDURE — 2700000000 HC OXYGEN THERAPY PER DAY

## 2023-02-08 PROCEDURE — 80048 BASIC METABOLIC PNL TOTAL CA: CPT

## 2023-02-08 RX ORDER — BUMETANIDE 1 MG/1
2 TABLET ORAL 2 TIMES DAILY
Status: DISCONTINUED | OUTPATIENT
Start: 2023-02-09 | End: 2023-02-09 | Stop reason: HOSPADM

## 2023-02-08 RX ADMIN — AMLODIPINE BESYLATE 2.5 MG: 2.5 TABLET ORAL at 09:14

## 2023-02-08 RX ADMIN — RIVAROXABAN 15 MG: 15 TABLET, FILM COATED ORAL at 20:29

## 2023-02-08 RX ADMIN — BUMETANIDE 2 MG: 0.25 INJECTION INTRAMUSCULAR; INTRAVENOUS at 18:09

## 2023-02-08 RX ADMIN — PANTOPRAZOLE SODIUM 40 MG: 40 TABLET, DELAYED RELEASE ORAL at 09:15

## 2023-02-08 RX ADMIN — POTASSIUM BICARBONATE 20 MEQ: 782 TABLET, EFFERVESCENT ORAL at 20:28

## 2023-02-08 RX ADMIN — Medication 400 MG: at 18:10

## 2023-02-08 RX ADMIN — METOPROLOL TARTRATE 75 MG: 50 TABLET, FILM COATED ORAL at 09:15

## 2023-02-08 RX ADMIN — SODIUM CHLORIDE, PRESERVATIVE FREE 10 ML: 5 INJECTION INTRAVENOUS at 20:29

## 2023-02-08 RX ADMIN — AMIODARONE HYDROCHLORIDE 200 MG: 200 TABLET ORAL at 09:15

## 2023-02-08 RX ADMIN — ALPRAZOLAM 0.25 MG: 0.25 TABLET ORAL at 20:28

## 2023-02-08 RX ADMIN — POTASSIUM BICARBONATE 20 MEQ: 782 TABLET, EFFERVESCENT ORAL at 09:15

## 2023-02-08 RX ADMIN — SODIUM CHLORIDE, PRESERVATIVE FREE 10 ML: 5 INJECTION INTRAVENOUS at 09:16

## 2023-02-08 RX ADMIN — BUMETANIDE 2 MG: 0.25 INJECTION INTRAMUSCULAR; INTRAVENOUS at 09:15

## 2023-02-08 RX ADMIN — METOPROLOL TARTRATE 50 MG: 50 TABLET, FILM COATED ORAL at 20:28

## 2023-02-08 RX ADMIN — PRAVASTATIN SODIUM 40 MG: 20 TABLET ORAL at 20:41

## 2023-02-08 RX ADMIN — FERROUS SULFATE TAB 325 MG (65 MG ELEMENTAL FE) 325 MG: 325 (65 FE) TAB at 18:09

## 2023-02-08 ASSESSMENT — PAIN SCALES - GENERAL: PAINLEVEL_OUTOF10: 0

## 2023-02-08 NOTE — CARE COORDINATION
Social work / Discharge Planning:          Discharge plan is home with wife. Patient continues to require oxygen which he does not have at home. He remains on IV Bumex at this time. Will need pulse ox testing when discharge is anticipated.    Electronically signed by HUSAM Anderson on 2/8/2023 at 10:18 AM

## 2023-02-08 NOTE — PROGRESS NOTES
INPATIENT CARDIOLOGY FOLLOW-UP    Name: Tommy Tim    Age: 82 y.o. Date of Admission: 2/4/2023  4:01 PM    Date of Service: 2/8/2023    Primary Cardiologist: Jose Jalloh DO, Sreekanth JOE    Chief Complaint: Follow-up for CHF    Interim History:  Still some shortness of breath with exertion but seems to be improving. Has been walking in the halls. Denies chest pain. Responding better to higher doses of bumetanide.     3.1 L urine output past 24-hour net negative 2.8 L    Review of Systems:   Negative except as described above    Problem List:  Patient Active Problem List   Diagnosis    Chronic combined systolic and diastolic CHF (congestive heart failure) (HCC)    S/P TAVR (transcatheter aortic valve replacement)    Chronic atrial fibrillation    Coronary artery disease involving native coronary artery of native heart without angina pectoris    Essential hypertension    High risk medications (not anticoagulants) long-term use    Paroxysmal atrial fibrillation (HCC)    Hyperlipidemia LDL goal <100    GIB (gastrointestinal bleeding)    ICD (implantable cardioverter-defibrillator), dual, in situ    Ventricular tachycardia    Ischemic heart disease    Vitamin D deficiency    Other insomnia    SOB (shortness of breath)    Lung nodule    Acute on chronic heart failure with preserved ejection fraction (HFpEF) ([address]    Pure hypercholesterolemia    Stage 3b chronic kidney disease ([address]    Transient cerebral ischemia    Aortic valve disease    Near syncope    Acute on chronic combined systolic and diastolic CHF (congestive heart failure) (HCC)    Acute congestive heart failure (HCC)    Gastroesophageal reflux disease without esophagitis    Coronary artery disease involving coronary bypass graft    Symptomatic anemia    GI bleed    Abdominal aortic aneurysm (AAA) 3.0 cm to 5.5 cm in diameter in male    ICD (implantable cardioverter-defibrillator) discharge    ICD (implantable cardioverter-defibrillator) in place    PAF (paroxysmal atrial fibrillation) ([facility])    Acute on chronic diastolic congestive heart failure ([facility])       Current Medications:    Current Facility-Administered Medications:     bumetanide (BUMEX) injection 2 mg, 2 mg, IntraVENous, BID, Surya Martínez MD, 2 mg at 02/08/23 0915    rivaroxaban (XARELTO) tablet 15 mg, 15 mg, Oral, Dinner, Surya Martínez MD, 15 mg at 02/07/23 1758    perflutren lipid microspheres (DEFINITY) injection 1.5 mL, 1.5 mL, IntraVENous, ONCE PRN, Donald Oneill MD    sodium chloride (OCEAN, BABY AYR) 0.65 % nasal spray 2 spray, 2 spray, Each Nostril, PRN, Benoit R Lockso, DO    pravastatin (PRAVACHOL) tablet 40 mg, 40 mg, Oral, Nightly, Benoit R Lockso, DO, 40 mg at 02/07/23 2016    potassium bicarb-citric acid (EFFER-K) effervescent tablet 20 mEq, 20 mEq, Oral, BID, Benoit R Lockso, DO, 20 mEq at 02/08/23 0915    metoprolol tartrate (LOPRESSOR) tablet 75 mg, 75 mg, Oral, QAM, Benoit R Lockso, DO, 75 mg at 02/08/23 0915    metoprolol tartrate (LOPRESSOR) tablet 50 mg, 50 mg, Oral, Nightly, Benoit R Lockso, DO, 50 mg at 02/07/23 2016    pantoprazole (PROTONIX) tablet 40 mg, 40 mg, Oral, Daily, Benoit R Lockso, DO, 40 mg at 02/08/23 0915    meclizine (ANTIVERT) tablet 25 mg, 25 mg, Oral, Daily PRN, Benoit R Lockso, DO    magnesium oxide (MAG-OX) tablet 400 mg, 400 mg, Oral, Dinner, [employer], DO, 400 mg at 02/07/23 1758    ferrous sulfate (IRON 325) tablet 325 mg, 325 mg, Oral, Lunch, Benoit R Lockso, DO, 325 mg at 02/07/23 1100    amLODIPine (NORVASC) tablet 2.5 mg, 2.5 mg, Oral, Daily, Benoit R Lockso, DO, 2.5 mg at 02/08/23 1554    amiodarone (CORDARONE) tablet 200 mg, 200 mg, Oral, Daily, Benoit R Lockso, DO, 200 mg at 02/08/23 0915    ALPRAZolam (XANAX) tablet 0.25 mg, 0.25 mg, Oral, TID PRN, Clinton Ginger Betancourtso, DO, 0.25 mg at 02/07/23 2016    sodium chloride flush 0.9 % injection 5-40 mL, 5-40 mL, IntraVENous, 2 times per day, Eliza Burt Rangel, DO, 10 mL at 02/08/23 0916    sodium chloride flush 0.9 % injection 5-40 mL, 5-40 mL, IntraVENous, PRN, Benoit Betancourtso, DO, 10 mL at 02/07/23 1759    0.9 % sodium chloride infusion, , IntraVENous, PRN, Benoit GRIFFIN Lockso, DO    polyethylene glycol (GLYCOLAX) packet 17 g, 17 g, Oral, Daily PRN, Benoit GRIFFIN Betancourtso, DO, 17 g at 02/06/23 1005    acetaminophen (TYLENOL) tablet 650 mg, 650 mg, Oral, Q6H PRN **OR** acetaminophen (TYLENOL) suppository 650 mg, 650 mg, Rectal, Q6H PRN, Carlos Clayton, DO    Physical Exam:  /66   Pulse 58   Temp 98.2 °F (36.8 °C) (Oral)   Resp 18   Ht 5' 8\" (1.727 m)   Wt 176 lb 1.6 oz (79.9 kg)   SpO2 96%   BMI 26.78 kg/m²   Wt Readings from Last 3 Encounters:   02/08/23 176 lb 1.6 oz (79.9 kg)   02/01/23 180 lb (81.6 kg)   01/26/23 176 lb 8 oz (80.1 kg)     Appearance: Awake, alert, no acute respiratory distress  Skin: Intact, no rash  Head: Normocephalic, atraumatic  Eyes: EOMI, no conjunctival erythema  ENMT: No pharyngeal erythema, MMM, no rhinorrhea  Neck: Supple, no elevated JVP, no carotid bruits  Lungs: Scattered rales  Cardiac: PMI nondisplaced, somewhat irregular rhythm with a normal rate, S1 & S2 normal, soft systolic murmur left sternal border. Left chest ICD  Abdomen: Soft, nontender, +bowel sounds  Extremities: Moves all extremities x 4, trace lower extremity edema  Neurologic: No focal motor deficits apparent, normal mood and affect  Peripheral Pulses: Intact posterior tibial pulses bilaterally    Intake/Output:    Intake/Output Summary (Last 24 hours) at 2/8/2023 1121  Last data filed at 2/8/2023 0420  Gross per 24 hour   Intake 120 ml   Output 2150 ml   Net -2030 ml     No intake/output data recorded.     Laboratory Tests:  Recent Labs     02/06/23  0409 02/07/23  0432 02/08/23  0415    133 134   K 4.3 4.4 4.0   CL 93* 92* 91*   CO2 32* 34* 35*   BUN 15 16 18   CREATININE 1.2 1.2 1.3*   GLUCOSE 100* 93 101*   CALCIUM 8.4* 8.4* 8.3* Lab Results   Component Value Date/Time    MG 2.2 2023 04:32 AM     No results for input(s): ALKPHOS, ALT, AST, PROT, BILITOT, BILIDIR, LABALBU in the last 72 hours. Recent Labs     23  0409 23  0432 23  0415   WBC 7.4 7.2 7.6   RBC 3.58* 3.61* 3.56*   HGB 10.9* 11.0* 10.6*   HCT 33.6* 33.9* 32.4*   MCV 93.9 93.9 91.0   MCH 30.4 30.5 29.8   MCHC 32.4 32.4 32.7   RDW 14.7 14.6 14.6    273 286   MPV 10.3 9.8 9.8     Lab Results   Component Value Date    CKTOTAL 94 2017    CKMB 3.8 2017    TROPONINI <0.01 10/29/2020    TROPONINI <0.01 10/29/2020    TROPONINI <0.01 10/28/2020     Lab Results   Component Value Date    INR 1.5 06/15/2022    INR 1.3 2022    INR 1.3 2022    PROTIME 17.0 (H) 06/15/2022    PROTIME 13.9 (H) 2022    PROTIME 14.0 (H) 2022     Lab Results   Component Value Date    TSH 2.320 10/13/2022     Lab Results   Component Value Date    LABA1C 5.5 2022     No results found for: EAG  Lab Results   Component Value Date    CHOL 142 2022    CHOL 176 2022    CHOL 140 2021     Lab Results   Component Value Date    TRIG 191 (H) 2022    TRIG 430 (H) 2022    TRIG 230 (H) 2021     Lab Results   Component Value Date    HDL 39 2022    HDL 34 2022    HDL 38 2021     Lab Results   Component Value Date    LDLCALC 65 2022    LDLCALC - (AA) 2022    LDLCALC 56 2021     Lab Results   Component Value Date    LABVLDL 38 2022    LABVLDL - (AA) 2022    LABVLDL 46 2021     Lab Results   Component Value Date    CHOLHDLRATIO 4 2021     Recent Labs     23  0409   PROBNP 2,280*       Cardiac Tests:    EK2023: Ventricular paced 56 bpm    2023: Intermittent ventricular pacing, appears to be an occasional atrial pacing spike    Telemetry: intermittent V paced 50-60s    Chest X-ray:   23    Impression   1. Multifocal bilateral airspace disease. The appearance and distribution   would favor that of pneumonia. CHF cannot be excluded   2. Small right pleural effusion     CT chest  Impression   Trace right pleural effusion, with small amount extending into the major and   minor fissures. No significant loculation. A right pleural nodule has progressively increased in size over multiple   exams (series 3, image 54). Recommend biopsy and/or PET-CT. There are scatter ground-glass opacities in the right lung, suggestive an   infectious/inflammatory process such as bronchiolitis or sequela of   aspiration. Mild mucous plugging in the bilateral lower lobes. 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 Echocardiogram:   [facility] TTE 2/6/23   Summary   Limited echo ordered. Normal left ventricular size and systolic function. Ejection fraction is visually estimated at 55-60%. Indeterminate diastolic function. Basal inferior wall hypokinetic. Mild left ventricular concentric hypertrophy noted. Abnormal LV septal motion consistent with paced rhythm. Right ventricle global systolic function is mildly reduced. Mild to moderate mitral regurgitation is present. 26 mm ZANE S3 TAVR valve implanted 2017. Mild-to-moderate paravalvular leak. Mild tricuspid regurgitation. TTE 3/2/22 [place]   Summary   Normal left ventricular systolic function. Ejection fraction is visually estimated at 60%. Mildly dilated right ventricle with normal right ventricular function. There is doppler evidence of stage II diastolic dysfunction. Moderately dilated left atrium by volume index. Mild mitral regurgitation. 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 No significant paravalvular aortic regurgitation. AV peak velocity 2.1 m/s. AV mean gradient 9 mmHg. AV area 1.5 cm2. Mild tricuspid regurgitation. PASP is estimated at 44 mmHg.     Stress test:      Cardiac catheterization: Device interrogation 1/2023  Medtronic dual-chamber ICD implanted 7/2016  DDDR LRL 55 bpm  5 months battery life remaining  Atrial pacing 84%  Ventricular pacing 79%  AT/AF burden 1.3%  22 AT/AF episodes  0 VT/VF, no shocks or device therapies      ----------------------------------------------------------------------------------------------------------------------------------------------------------------  IMPRESSION:  Acute on chronic HFpEF.  proBNP [id number]. Net -2.8 L  CAD CABG 2003/redo 2007/PCI to native OM 2 and LAD 2007. [facility] 2017 patent LIMA-LAD, RA-D2, RA-OM 2  History of VT arrest 2007 s/p ICD; PGR 2016 Medtronic. Recurrent VT  PAF, rivaroxaban. Wade[facility] Preethi [place] 2010, 2017, 2020, 5/2022. Previously dofetilide now amiodarone  Severe AS s/p TAVR 2017 ZANE 26 mm with mild to moderate paravalvular leak  Mild to moderate MR, mild TR  RBBB/LAFB  TAA 4.4 cm by CT 6/2022  PAD/splenic artery aneurysm  CKD creatinine 1.4-1.2. Creatinine clearance 44 mL/min  Anemia Hgb 10.6  Hypertension  COPD/pulmonary nodules -> current CT suggesting increased size of nodule and follow-up recommended  PUD/esophageal spasm and dilation 12/2022    RECOMMENDATIONS:  Improving. EKG suggested possible atrial fibrillation but not sure if that is the case.     Continue bumetanide to 2 mg IV twice daily and transition to oral bumetanide tomorrow  Interrogate ICD: If truly in AF would benefit from restoration of sinus  Strict I's and O's  Given creatinine clearance less than 50 mL/min, decrease rivaroxaban to 15 mg daily  Continue amiodarone, metoprolol at current doses and other current cardiac medications  Wean oxygen as able  Lung nodule noted on CT needs further follow-up, defer to primary/pulmonary  Aggressive risk factor modification  Further care per primary service and consultants  If no A-fib on device interrogation, can be discharged from cardiology standpoint later today or tomorrow with outpatient follow-up    Kiley Geronimo MD, [address] [facility]    NOTE: This report was transcribed using voice recognition software. Every effort was made to ensure accuracy; however, inadvertent computerized transcription errors may be present.

## 2023-02-08 NOTE — PROGRESS NOTES
[facility] Progress Note    --------------------------------------------------------------------------------------  Assessment / Plan  Acute on chronic HFpEF  Now on Bumex 2 mg IV twice daily per cardiology, as furosemide did not seem to be producing an adequate response. CXR 2/6 seem to demonstrate a new right pleural effusion, follow-up CT showed that there is only a trace effusion without loculation and I do not feel this requires intervention. It did note pulmonary nodules as below. Echocardiogram obtained, as below. Pulmonary nodules  CT scan 2/7 showed progressive increase in a right-sided nodule and is now at 12 x 18 mm. Would recommend PET/CT on an outpatient basis for further evaluation and subsequent follow-up as appropriate. History of CAD status post MI, ICD placement, A-fib on Xarelto, hyperlipidemia, hypertension, CKD 3     Please see orders for further plan of care  Code status                 full  DVT prophylaxis         Xarelto  Disposition                  Anticipate home  --------------------------------------------------------------------------------------    Admission Date  2/4/2023  4:01 PM  Chief Complaint Cooper, improving    Subjective  History of Present Illness  Patient seen in bed this morning, resting comfortably without current complaints. Shortness of breath is improving, he is ambulating more, but it is still present to some degree. There are also reports of him being back in A-fib. Cardiology note from this morning has been reviewed. He is now on scheduled twice daily IV Bumex, though plans are in place to transition to an oral regimen tomorrow. He is saturating 96% on 2 L, hopefully can get the oxygen weaned off today.     Review of Systems - 12-point review of systems has been reviewed and is otherwise negative except as listed in the HPI    Objective  Physical Exam  Vitals: /66   Pulse 58   Temp 98.2 °F (36.8 °C) (Oral)   Resp 18   Ht 5' 8\" (1.727 m)   Wt 176 lb 1.6 oz (79.9 kg)   SpO2 96%   BMI 26.78 kg/m²   General: well-developed, well-nourished, no acute distress, cooperative  Skin: generally warm, dry, and intact, with normal color  HEENT: normocephalic, atraumatic, no gross abnormalities, +NCO2  Respiratory: clear to auscultation bilaterally without respiratory distress  Cardiovascular: regular rate and rhythm without murmur / rub / gallop  Abdominal: soft, nontender, nondistended, normoactive bowel sounds  Extremities: no obvious edema or deformity  Neurologic: awake, alert, no gross deficits  Psychiatric: normal affect, cooperative    Echo 2/6/23   Normal left ventricular size and systolic function. Ejection fraction is visually estimated at 55-60%. Indeterminate diastolic function. Basal inferior wall hypokinetic. Mild left ventricular concentric hypertrophy noted. Abnormal LV septal motion consistent with paced rhythm. Right ventricle global systolic function is mildly reduced. Mild to moderate mitral regurgitation is present. 26 mm ZANE S3 TAVR valve implanted 2017. Mild-to-moderate paravalvular leak. Mild tricuspid regurgitation. Echo 3/1/22   Normal left ventricular systolic function. Ejection fraction is visually estimated at 60%. Mildly dilated right ventricle with normal right ventricular function. There is doppler evidence of stage II diastolic dysfunction. Moderately dilated left atrium by volume index. Mild mitral regurgitation. 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 No significant paravalvular aortic regurgitation. AV peak velocity 2.1 m/s. AV mean gradient 9 mmHg. AV area 1.5 cm2. Mild tricuspid regurgitation. PASP is estimated at 44 mmHg.     Electronically signed by Bill Luis DO on 2/8/2023 at 2:06 PM

## 2023-02-09 ENCOUNTER — APPOINTMENT (OUTPATIENT)
Dept: INPATIENT UNIT | Age: 88
End: 2023-02-09
Payer: MEDICARE

## 2023-02-09 ENCOUNTER — ANESTHESIA (OUTPATIENT)
Dept: INPATIENT UNIT | Age: 88
End: 2023-02-09
Payer: MEDICARE

## 2023-02-09 ENCOUNTER — ANESTHESIA EVENT (OUTPATIENT)
Dept: INPATIENT UNIT | Age: 88
End: 2023-02-09
Payer: MEDICARE

## 2023-02-09 VITALS
RESPIRATION RATE: 18 BRPM | OXYGEN SATURATION: 100 % | SYSTOLIC BLOOD PRESSURE: 141 MMHG | HEIGHT: 68 IN | BODY MASS INDEX: 26.19 KG/M2 | HEART RATE: 54 BPM | WEIGHT: 172.8 LBS | TEMPERATURE: 97.4 F | DIASTOLIC BLOOD PRESSURE: 59 MMHG

## 2023-02-09 LAB
ANION GAP SERPL CALCULATED.3IONS-SCNC: 8 MMOL/L (ref 7–16)
BUN BLDV-MCNC: 20 MG/DL (ref 6–23)
CALCIUM SERPL-MCNC: 8.2 MG/DL (ref 8.6–10.2)
CHLORIDE BLD-SCNC: 92 MMOL/L (ref 98–107)
CO2: 36 MMOL/L (ref 22–29)
CREAT SERPL-MCNC: 1.3 MG/DL (ref 0.7–1.2)
EKG ATRIAL RATE: 52 BPM
EKG Q-T INTERVAL: 514 MS
EKG QRS DURATION: 134 MS
EKG QTC CALCULATION (BAZETT): 491 MS
EKG R AXIS: -76 DEGREES
EKG T AXIS: 5 DEGREES
EKG VENTRICULAR RATE: 55 BPM
GFR SERPL CREATININE-BSD FRML MDRD: 54 ML/MIN/1.73
GLUCOSE BLD-MCNC: 99 MG/DL (ref 74–99)
HCT VFR BLD CALC: 32.6 % (ref 37–54)
HEMOGLOBIN: 10.8 G/DL (ref 12.5–16.5)
MAGNESIUM: 2 MG/DL (ref 1.6–2.6)
MCH RBC QN AUTO: 30.8 PG (ref 26–35)
MCHC RBC AUTO-ENTMCNC: 33.1 % (ref 32–34.5)
MCV RBC AUTO: 92.9 FL (ref 80–99.9)
PDW BLD-RTO: 14.5 FL (ref 11.5–15)
PLATELET # BLD: 288 E9/L (ref 130–450)
PMV BLD AUTO: 9.3 FL (ref 7–12)
POTASSIUM SERPL-SCNC: 3.9 MMOL/L (ref 3.5–5)
PRO-BNP: 1858 PG/ML (ref 0–450)
RBC # BLD: 3.51 E12/L (ref 3.8–5.8)
SODIUM BLD-SCNC: 136 MMOL/L (ref 132–146)
WBC # BLD: 7.2 E9/L (ref 4.5–11.5)

## 2023-02-09 PROCEDURE — 93010 ELECTROCARDIOGRAM REPORT: CPT | Performed by: INTERNAL MEDICINE

## 2023-02-09 PROCEDURE — 2700000000 HC OXYGEN THERAPY PER DAY

## 2023-02-09 PROCEDURE — 85027 COMPLETE CBC AUTOMATED: CPT

## 2023-02-09 PROCEDURE — 2580000003 HC RX 258: Performed by: INTERNAL MEDICINE

## 2023-02-09 PROCEDURE — 92960 CARDIOVERSION ELECTRIC EXT: CPT

## 2023-02-09 PROCEDURE — 6360000002 HC RX W HCPCS: Performed by: NURSE ANESTHETIST, CERTIFIED REGISTERED

## 2023-02-09 PROCEDURE — 93005 ELECTROCARDIOGRAM TRACING: CPT | Performed by: INTERNAL MEDICINE

## 2023-02-09 PROCEDURE — 6370000000 HC RX 637 (ALT 250 FOR IP): Performed by: INTERNAL MEDICINE

## 2023-02-09 PROCEDURE — 36415 COLL VENOUS BLD VENIPUNCTURE: CPT

## 2023-02-09 PROCEDURE — 99239 HOSP IP/OBS DSCHRG MGMT >30: CPT | Performed by: INTERNAL MEDICINE

## 2023-02-09 PROCEDURE — 80048 BASIC METABOLIC PNL TOTAL CA: CPT

## 2023-02-09 PROCEDURE — 83735 ASSAY OF MAGNESIUM: CPT

## 2023-02-09 PROCEDURE — 3700000000 HC ANESTHESIA ATTENDED CARE

## 2023-02-09 PROCEDURE — 7100000000 HC PACU RECOVERY - FIRST 15 MIN

## 2023-02-09 PROCEDURE — 6360000002 HC RX W HCPCS

## 2023-02-09 PROCEDURE — 99233 SBSQ HOSP IP/OBS HIGH 50: CPT | Performed by: INTERNAL MEDICINE

## 2023-02-09 PROCEDURE — 5A2204Z RESTORATION OF CARDIAC RHYTHM, SINGLE: ICD-10-PCS | Performed by: INTERNAL MEDICINE

## 2023-02-09 PROCEDURE — 7100000001 HC PACU RECOVERY - ADDTL 15 MIN

## 2023-02-09 PROCEDURE — 2580000003 HC RX 258: Performed by: NURSE ANESTHETIST, CERTIFIED REGISTERED

## 2023-02-09 PROCEDURE — 92960 CARDIOVERSION ELECTRIC EXT: CPT | Performed by: INTERNAL MEDICINE

## 2023-02-09 PROCEDURE — 83880 ASSAY OF NATRIURETIC PEPTIDE: CPT

## 2023-02-09 RX ORDER — BUMETANIDE 2 MG/1
2 TABLET ORAL 2 TIMES DAILY
Qty: 30 TABLET | Refills: 3 | Status: SHIPPED | OUTPATIENT
Start: 2023-02-09

## 2023-02-09 RX ORDER — AMIODARONE HYDROCHLORIDE 200 MG/1
200 TABLET ORAL ONCE
Status: COMPLETED | OUTPATIENT
Start: 2023-02-09 | End: 2023-02-09

## 2023-02-09 RX ORDER — AMIODARONE HYDROCHLORIDE 400 MG/1
400 TABLET ORAL DAILY
Qty: 30 TABLET | Refills: 0 | Status: SHIPPED | OUTPATIENT
Start: 2023-02-10

## 2023-02-09 RX ORDER — AMIODARONE HYDROCHLORIDE 200 MG/1
400 TABLET ORAL DAILY
Status: DISCONTINUED | OUTPATIENT
Start: 2023-02-10 | End: 2023-02-09 | Stop reason: HOSPADM

## 2023-02-09 RX ORDER — SODIUM CHLORIDE 9 MG/ML
INJECTION, SOLUTION INTRAVENOUS CONTINUOUS PRN
Status: DISCONTINUED | OUTPATIENT
Start: 2023-02-09 | End: 2023-02-09 | Stop reason: SDUPTHER

## 2023-02-09 RX ORDER — PROPOFOL 10 MG/ML
INJECTION, EMULSION INTRAVENOUS PRN
Status: DISCONTINUED | OUTPATIENT
Start: 2023-02-09 | End: 2023-02-09 | Stop reason: SDUPTHER

## 2023-02-09 RX ADMIN — SODIUM CHLORIDE, PRESERVATIVE FREE 10 ML: 5 INJECTION INTRAVENOUS at 08:41

## 2023-02-09 RX ADMIN — METOPROLOL TARTRATE 75 MG: 50 TABLET, FILM COATED ORAL at 09:19

## 2023-02-09 RX ADMIN — ALPRAZOLAM 0.25 MG: 0.25 TABLET ORAL at 08:40

## 2023-02-09 RX ADMIN — PROPOFOL 50 MG: 10 INJECTION, EMULSION INTRAVENOUS at 10:11

## 2023-02-09 RX ADMIN — AMLODIPINE BESYLATE 2.5 MG: 2.5 TABLET ORAL at 08:40

## 2023-02-09 RX ADMIN — POTASSIUM BICARBONATE 20 MEQ: 782 TABLET, EFFERVESCENT ORAL at 08:40

## 2023-02-09 RX ADMIN — SODIUM CHLORIDE: 9 INJECTION, SOLUTION INTRAVENOUS at 10:00

## 2023-02-09 RX ADMIN — PROPOFOL 50 MG: 10 INJECTION, EMULSION INTRAVENOUS at 10:09

## 2023-02-09 RX ADMIN — FERROUS SULFATE TAB 325 MG (65 MG ELEMENTAL FE) 325 MG: 325 (65 FE) TAB at 11:48

## 2023-02-09 RX ADMIN — PANTOPRAZOLE SODIUM 40 MG: 40 TABLET, DELAYED RELEASE ORAL at 08:40

## 2023-02-09 RX ADMIN — BUMETANIDE 2 MG: 1 TABLET ORAL at 11:48

## 2023-02-09 RX ADMIN — AMIODARONE HYDROCHLORIDE 200 MG: 200 TABLET ORAL at 11:48

## 2023-02-09 ASSESSMENT — PAIN SCALES - GENERAL
PAINLEVEL_OUTOF10: 0

## 2023-02-09 ASSESSMENT — LIFESTYLE VARIABLES: SMOKING_STATUS: 0

## 2023-02-09 ASSESSMENT — PAIN - FUNCTIONAL ASSESSMENT: PAIN_FUNCTIONAL_ASSESSMENT: 0-10

## 2023-02-09 ASSESSMENT — ENCOUNTER SYMPTOMS: SHORTNESS OF BREATH: 0

## 2023-02-09 NOTE — PLAN OF CARE
Problem: Discharge Planning  Goal: Discharge to home or other facility with appropriate resources  2/9/2023 1619 by Bipin Staton RN  Outcome: Completed  2/9/2023 1619 by Bipin Staton RN  Outcome: Adequate for Discharge  Flowsheets (Taken 2/9/2023 1523)  Discharge to home or other facility with appropriate resources: Identify barriers to discharge with patient and caregiver     Problem: Safety - Adult  Goal: Free from fall injury  2/9/2023 1619 by Bipin Staton RN  Outcome: Completed  2/9/2023 1619 by Bipin Staton RN  Outcome: Adequate for Discharge  Flowsheets (Taken 2/9/2023 8476)  Free From Fall Injury: Instruct family/caregiver on patient safety  2/9/2023 0232 by Kristin Fleming LPN  Outcome: Progressing     Problem: ABCDS Injury Assessment  Goal: Absence of physical injury  2/9/2023 1619 by Bipin Staton RN  Outcome: Completed  2/9/2023 1619 by Bipin Staton RN  Outcome: Adequate for Discharge  2/9/2023 0232 by Kristin Fleming LPN  Outcome: Progressing     Problem: Pain  Goal: Verbalizes/displays adequate comfort level or baseline comfort level  2/9/2023 1619 by Bipin Staton RN  Outcome: Completed  2/9/2023 1619 by Bipin Staton RN  Outcome: Adequate for Discharge  Flowsheets (Taken 2/9/2023 8592)  Verbalizes/displays adequate comfort level or baseline comfort level: Encourage patient to monitor pain and request assistance     Problem: Chronic Conditions and Co-morbidities  Goal: Patient's chronic conditions and co-morbidity symptoms are monitored and maintained or improved  2/9/2023 1619 by Bipin Staton RN  Outcome: Completed  2/9/2023 1619 by Bipin Staton RN  Outcome: Adequate for Discharge  Flowsheets (Taken 2/9/2023 2945)  Care Plan - Patient's Chronic Conditions and Co-Morbidity Symptoms are Monitored and Maintained or Improved: Monitor and assess patient's chronic conditions and comorbid symptoms for stability, deterioration, or improvement  2/9/2023 0232 by Kristin Fleming LPN  Outcome: Progressing  Flowsheets (Taken 2/8/2023 2019 by Beverley Blanco RN)  Care Plan - Patient's Chronic Conditions and Co-Morbidity Symptoms are Monitored and Maintained or Improved:   Monitor and assess patient's chronic conditions and comorbid symptoms for stability, deterioration, or improvement   Collaborate with multidisciplinary team to address chronic and comorbid conditions and prevent exacerbation or deterioration

## 2023-02-09 NOTE — PROGRESS NOTES
INPATIENT CARDIOLOGY FOLLOW-UP    Name: Tenzin Fox    Age: 82 y.o. Date of Admission: 2/4/2023  4:01 PM    Date of Service: 2/9/2023    Primary Cardiologist: Jayne Stewart DO, Evonnie Kraft MD [facility]    Chief Complaint: Follow-up for CHF    Interim History:  Still some shortness of breath with exertion but seems to be improving. Has been walking in the halls. Denies chest pain. Responding better to higher doses of bumetanide. Net -5.3 L. Device interrogation confirmed continuous A-fib since mid January, multiple failed ATP attempts.     Review of Systems:   Negative except as described above    Problem List:  Patient Active Problem List   Diagnosis    Chronic combined systolic and diastolic CHF (congestive heart failure) (HCC)    S/P TAVR (transcatheter aortic valve replacement)    Chronic atrial fibrillation    Coronary artery disease involving native coronary artery of native heart without angina pectoris    Essential hypertension    High risk medications (not anticoagulants) long-term use    Paroxysmal atrial fibrillation (HCC)    Hyperlipidemia LDL goal <100    GIB (gastrointestinal bleeding)    ICD (implantable cardioverter-defibrillator), dual, in situ    Ventricular tachycardia    Ischemic heart disease    Vitamin D deficiency    Other insomnia    SOB (shortness of breath)    Lung nodule    Acute on chronic heart failure with preserved ejection fraction (HFpEF) ([address]    Pure hypercholesterolemia    Stage 3b chronic kidney disease ([address]    Transient cerebral ischemia    Aortic valve disease    Near syncope    Acute on chronic combined systolic and diastolic CHF (congestive heart failure) (HCC)    Acute congestive heart failure (HCC)    Gastroesophageal reflux disease without esophagitis    Coronary artery disease involving coronary bypass graft    Symptomatic anemia    GI bleed    Abdominal aortic aneurysm (AAA) 3.0 cm to 5.5 cm in diameter in male    ICD (implantable cardioverter-defibrillator) discharge    ICD (implantable cardioverter-defibrillator) in place    PAF (paroxysmal atrial fibrillation) (HCC)    Acute on chronic diastolic congestive heart failure (HCC)       Current Medications:    Current Facility-Administered Medications:     [START ON 2/10/2023] amiodarone (CORDARONE) tablet 400 mg, 400 mg, Oral, Daily, Luis Carlos Atkinson MD    bumetanide [facility]) tablet 2 mg, 2 mg, Oral, BID, Luis Carlos Atkinson MD    rivaroxaban [facility]) tablet 15 mg, 15 mg, Oral, Dinner, Luis Carlos Atkinson MD, 15 mg at 02/08/23 2029    perflutren lipid microspheres (DEFINITY) injection 1.5 mL, 1.5 mL, IntraVENous, ONCE PRN, Donald Oneill MD    sodium chloride (OCEAN, BABY AYR) 0.65 % nasal spray 2 spray, 2 spray, Each Nostril, PRN, Benoit R Lockso, DO    pravastatin (PRAVACHOL) tablet 40 mg, 40 mg, Oral, Nightly, Benoit R Lockso, DO, 40 mg at 02/08/23 2041    potassium bicarb-citric acid (EFFER-K) effervescent tablet 20 mEq, 20 mEq, Oral, BID, Benoit R Lockso, DO, 20 mEq at 02/09/23 0840    metoprolol tartrate (LOPRESSOR) tablet 75 mg, 75 mg, Oral, QAM, Benoit R Lockso, DO, 75 mg at 02/09/23 0919    metoprolol tartrate (LOPRESSOR) tablet 50 mg, 50 mg, Oral, Nightly, Benoit R Lockso, DO, 50 mg at 02/08/23 2028    pantoprazole (PROTONIX) tablet 40 mg, 40 mg, Oral, Daily, Benoit R Lockso, DO, 40 mg at 02/09/23 0840    meclizine (ANTIVERT) tablet 25 mg, 25 mg, Oral, Daily PRN, Benoit R Lockso, DO    magnesium oxide (MAG-OX) tablet 400 mg, 400 mg, Oral, Dinner, [employer], DO, 400 mg at 02/08/23 1810    ferrous sulfate (IRON 325) tablet 325 mg, 325 mg, Oral, Lunch, Benoit R Lockso, DO, 325 mg at 02/08/23 1809    amLODIPine (NORVASC) tablet 2.5 mg, 2.5 mg, Oral, Daily, Benoit R Lockso, DO, 2.5 mg at 02/09/23 0840    ALPRAZolam (XANAX) tablet 0.25 mg, 0.25 mg, Oral, TID PRN, Christian Nguyen Lockso, DO, 0.25 mg at 02/09/23 0840    sodium chloride flush 0.9 % injection 5-40 mL, 5-40 mL, IntraVENous, 2 times per day, Benoit R Lockso, DO, 10 mL at 02/09/23 0841    sodium chloride flush 0.9 % injection 5-40 mL, 5-40 mL, IntraVENous, PRN, Benoit R Lockso, DO, 10 mL at 02/07/23 1759    0.9 % sodium chloride infusion, , IntraVENous, PRN, Benoit R Lockso, DO    polyethylene glycol (GLYCOLAX) packet 17 g, 17 g, Oral, Daily PRN, Benoit R Lockso, DO, 17 g at 02/06/23 1005    acetaminophen (TYLENOL) tablet 650 mg, 650 mg, Oral, Q6H PRN **OR** acetaminophen (TYLENOL) suppository 650 mg, 650 mg, Rectal, Q6H PRN, Erwin Mckay Lockso, DO    Physical Exam:  BP (!) 144/61   Pulse 58   Temp 97.9 °F (36.6 °C) (Temporal)   Resp 16   Ht 5' 8\" (1.727 m)   Wt 172 lb 12.8 oz (78.4 kg)   SpO2 98%   BMI 26.27 kg/m²   Wt Readings from Last 3 Encounters:   02/09/23 172 lb 12.8 oz (78.4 kg)   02/01/23 180 lb (81.6 kg)   01/26/23 176 lb 8 oz (80.1 kg)     Appearance: Awake, alert, no acute respiratory distress  Skin: Intact, no rash  Head: Normocephalic, atraumatic  Eyes: EOMI, no conjunctival erythema  ENMT: No pharyngeal erythema, MMM, no rhinorrhea  Neck: Supple, no elevated JVP, no carotid bruits  Lungs: Scattered rales  Cardiac: PMI nondisplaced, somewhat irregular rhythm with a normal rate, S1 & S2 normal, soft systolic murmur left sternal border. Left chest ICD  Abdomen: Soft, nontender, +bowel sounds  Extremities: Moves all extremities x 4, trace lower extremity edema  Neurologic: No focal motor deficits apparent, normal mood and affect  Peripheral Pulses: Intact posterior tibial pulses bilaterally    Intake/Output:    Intake/Output Summary (Last 24 hours) at 2/9/2023 1000  Last data filed at 2/9/2023 0605  Gross per 24 hour   Intake --   Output 2475 ml   Net -2475 ml     No intake/output data recorded.     Laboratory Tests:  Recent Labs     02/07/23  0432 02/08/23  0415 02/09/23  0322    134 136   K 4.4 4.0 3.9   CL 92* 91* 92*   CO2 34* 35* 36*   BUN 16 18 20   CREATININE 1.2 1.3* 1.3*   GLUCOSE 93 101* 99   CALCIUM 8.4* 8.3* 8.2*     Lab Results   Component Value Date/Time    MG 2.0 2023 03:22 AM     No results for input(s): ALKPHOS, ALT, AST, PROT, BILITOT, BILIDIR, LABALBU in the last 72 hours. Recent Labs     23  0432 23  0415 23  0322   WBC 7.2 7.6 7.2   RBC 3.61* 3.56* 3.51*   HGB 11.0* 10.6* 10.8*   HCT 33.9* 32.4* 32.6*   MCV 93.9 91.0 92.9   MCH 30.5 29.8 30.8   MCHC 32.4 32.7 33.1   RDW 14.6 14.6 14.5    286 288   MPV 9.8 9.8 9.3     Lab Results   Component Value Date    CKTOTAL 94 2017    CKMB 3.8 2017    TROPONINI <0.01 10/29/2020    TROPONINI <0.01 10/29/2020    TROPONINI <0.01 10/28/2020     Lab Results   Component Value Date    INR 1.5 06/15/2022    INR 1.3 2022    INR 1.3 2022    PROTIME 17.0 (H) 06/15/2022    PROTIME 13.9 (H) 2022    PROTIME 14.0 (H) 2022     Lab Results   Component Value Date    TSH 2.320 10/13/2022     Lab Results   Component Value Date    LABA1C 5.5 2022     No results found for: EAG  Lab Results   Component Value Date    CHOL 142 2022    CHOL 176 2022    CHOL 140 2021     Lab Results   Component Value Date    TRIG 191 (H) 2022    TRIG 430 (H) 2022    TRIG 230 (H) 2021     Lab Results   Component Value Date    HDL 39 2022    HDL 34 2022    HDL 38 2021     Lab Results   Component Value Date    LDLCALC 65 2022    LDLCALC - (AA) 2022    LDLCALC 56 2021     Lab Results   Component Value Date    LABVLDL 38 2022    LABVLDL - (AA) 2022    LABVLDL 46 2021     Lab Results   Component Value Date    CHOLHDLRATIO 4 2021     Recent Labs     23  0322   PROBNP 1,858*       Cardiac Tests:    EK2023: Ventricular paced 56 bpm    2023: Intermittent ventricular pacing, appears to be an occasional atrial pacing spike    Telemetry: intermittent V paced 50-60s    Chest X-ray:   23    Impression   1.  Multifocal bilateral airspace disease. The appearance and distribution   would favor that of pneumonia. CHF cannot be excluded   2. Small right pleural effusion     CT chest  Impression   Trace right pleural effusion, with small amount extending into the major and   minor fissures. No significant loculation. A right pleural nodule has progressively increased in size over multiple   exams (series 3, image 54). Recommend biopsy and/or PET-CT. There are scatter ground-glass opacities in the right lung, suggestive an   infectious/inflammatory process such as bronchiolitis or sequela of   aspiration. Mild mucous plugging in the bilateral lower lobes. 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 Echocardiogram:   [facility] TTE 2/6/23   Summary   Limited echo ordered. Normal left ventricular size and systolic function. Ejection fraction is visually estimated at 55-60%. Indeterminate diastolic function. Basal inferior wall hypokinetic. Mild left ventricular concentric hypertrophy noted. Abnormal LV septal motion consistent with paced rhythm. Right ventricle global systolic function is mildly reduced. Mild to moderate mitral regurgitation is present. 26 mm ZANE S3 TAVR valve implanted 2017. Mild-to-moderate paravalvular leak. Mild tricuspid regurgitation. TTE 3/2/22 [place]   Summary   Normal left ventricular systolic function. Ejection fraction is visually estimated at 60%. Mildly dilated right ventricle with normal right ventricular function. There is doppler evidence of stage II diastolic dysfunction. Moderately dilated left atrium by volume index. Mild mitral regurgitation. 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 No significant paravalvular aortic regurgitation. AV peak velocity 2.1 m/s. AV mean gradient 9 mmHg. AV area 1.5 cm2. Mild tricuspid regurgitation. PASP is estimated at 44 mmHg.     Stress test: Cardiac catheterization:     Device interrogation 1/2023  Medtronic dual-chamber ICD implanted 7/2016  DDDR LRL 55 bpm  5 months battery life remaining  Atrial pacing 84%  Ventricular pacing 79%  AT/AF burden 1.3%  22 AT/AF episodes  0 VT/VF, no shocks or device therapies      ----------------------------------------------------------------------------------------------------------------------------------------------------------------  IMPRESSION:  Acute on chronic HFpEF.  proBNP [id number]. Net - 5.3 L  CAD CABG 2003/redo 2007/PCI to native OM 2 and LAD 2007. [facility] 2017 patent LIMA-LAD, RA-D2, RA-OM 2  History of VT arrest 2007 s/p ICD; PGR 2016 Medtronic. Recurrent VT  Persistent AF since mid January. AC rivaroxaban. [address] 2010, 2017, 2020, 5/2022. Previously dofetilide now amiodarone  Severe AS s/p TAVR 2017 ZANE 26 mm with mild to moderate paravalvular leak  Mild to moderate MR, mild TR  RBBB/LAFB  TAA 4.4 cm by CT 6/2022  PAD/splenic artery aneurysm  CKD creatinine 1.4-1.2. Creatinine clearance 44 mL/min  Anemia Hgb 10.6  Hypertension  COPD/pulmonary nodules -> current CT suggesting increased size of nodule and follow-up recommended  PUD/esophageal spasm and dilation 12/2022    RECOMMENDATIONS:  Heart failure improving. Decompensation likely related to persistent AF for the past few weeks.     Continue bumetanide 2 mg p.o. twice daily  DC cardioversion today  Increase amiodarone to 400 mg daily for now until follows up with EP  Given creatinine clearance less than 50 mL/min, decrease rivaroxaban to 15 mg daily  Continue metoprolol at current doses and other current cardiac medications  Wean oxygen as able  Lung nodule noted on CT needs further follow-up, defer to primary/pulmonary  Aggressive risk factor modification  Further care per primary service and consultants  Okay for discharge later today after cardioversion if no issues    Risks and benefits of cardioversion explained to patient, including but not limited to risk of CVA, failure to restore sinus rhythm, recurrence of atrial or other arrhythmias, skins burns, and rarely death. They voiced understanding and agree to proceed. Amaury Blanc MD, [facility]    NOTE: This report was transcribed using voice recognition software. Every effort was made to ensure accuracy; however, inadvertent computerized transcription errors may be present.

## 2023-02-09 NOTE — ANESTHESIA PRE PROCEDURE
Department of Anesthesiology  Preprocedure Note       Name:  Kimberley Mason   Age:  82 y.o.  :  1935                                          MRN:  [id number]         Date:  2023      Surgeon: * No surgeons listed *    Procedure:     Medications prior to admission:   Prior to Admission medications    Medication Sig Start Date End Date Taking? Authorizing Provider   furosemide (LASIX) 40 MG tablet Take 1 tablet by mouth daily Alternates 40 mg and 20 mg 23   GINETTE Ochoa CNP   potassium chloride 20 MEQ/15ML (10%) oral solution Take 15 mLs by mouth daily 23   GINETTE Ochoa CNP   amLODIPine (NORVASC) 2.5 MG tablet TAKE 1 TABLET BY MOUTH  DAILY  Patient taking differently: Take 2.5 mg by mouth at bedtime 22   Andrew Montes DO   ALPRAZolam Devika Leny) 0.25 MG tablet Take 0.25 mg by mouth 3 times daily as needed for Sleep.     Historical Provider, MD   amiodarone (CORDARONE) 200 MG tablet Take 200 mg by mouth daily Daily beginning 22 per Dr. Juan Grimes 22   Historical Provider, MD   meclizine (ANTIVERT) 25 MG tablet Take 1 tablet by mouth daily as needed for Dizziness 22   Maya Rico MD   METOPROLOL TARTRATE PO Take by mouth 2 times daily Indications: taking 75 mg in am and 50 mg at night    Historical Provider, MD   pravastatin (PRAVACHOL) 40 MG tablet Take 40 mg by mouth at bedtime    Historical Provider, MD   rivaroxaban (XARELTO) 20 MG TABS tablet Take 20 mg by mouth Daily with supper     Historical Provider, MD   Multiple Vitamin (MULTIVITAMIN ADULT PO) Take 1 tablet by mouth Daily with lunch  Patient not taking: Reported on 2023    Historical Provider, MD   pantoprazole (PROTONIX) 40 MG tablet TAKE 1 TABLET BY MOUTH  DAILY  Patient taking differently: Take 40 mg by mouth Daily with lunch 12/15/21   Maya iRco MD   ferrous sulfate (IRON 325) 325 (65 Fe) MG tablet Take 1 tablet by mouth daily (with breakfast)  Patient taking differently: Take 325 mg by mouth Daily with lunch 11/5/20   Diana Moss MD   MAGNESIUM-OXIDE 400 (240 Mg) MG tablet Take 400 mg by mouth Daily with supper 3/18/20   Historical Provider, MD   Multiple Vitamins-Minerals (PRESERVISION AREDS 2) CAPS Take 1 capsule by mouth 2 times daily   Patient not taking: Reported on 2/4/2023    Historical Provider, MD   Cholecalciferol (VITAMIN D) 2000 UNITS CAPS capsule Take 2,000 Units by mouth Daily with lunch    Historical Provider, MD       Current medications:    Current Facility-Administered Medications   Medication Dose Route Frequency Provider Last Rate Last Admin    [START ON 2/10/2023] amiodarone (CORDARONE) tablet 400 mg  400 mg Oral Daily Kimmy Langely MD        [facility]) tablet 2 mg  2 mg Oral BID Kimmy Langley MD        rivaroxaban Artice Levers) tablet 15 mg  15 mg Oral Dinner Kimmy Langley MD   15 mg at 02/08/23 2029    perflutren lipid microspheres (DEFINITY) injection 1.5 mL  1.5 mL IntraVENous ONCE PRN Donald Oneill MD        sodium chloride (OCEAN, BABY AYR) 0.65 % nasal spray 2 spray  2 spray Each Nostril PRN Benoit R Lockso, DO        pravastatin (PRAVACHOL) tablet 40 mg  40 mg Oral Nightly Benoit R Lockso, DO   40 mg at 02/08/23 2041    potassium bicarb-citric acid (EFFER-K) effervescent tablet 20 mEq  20 mEq Oral BID Emily Selena Lockso, DO   20 mEq at 02/09/23 0840    metoprolol tartrate (LOPRESSOR) tablet 75 mg  75 mg Oral QAM Benoit R Lockso, DO   75 mg at 02/09/23 0919    metoprolol tartrate (LOPRESSOR) tablet 50 mg  50 mg Oral Nightly Benoit R Lockso, DO   50 mg at 02/08/23 2028    pantoprazole (PROTONIX) tablet 40 mg  40 mg Oral Daily Benoit R Lockso, DO   40 mg at 02/09/23 0840    meclizine (ANTIVERT) tablet 25 mg  25 mg Oral Daily PRN Emily Selena Lockso, DO        magnesium oxide (MAG-OX) tablet 400 mg  400 mg Oral Dinner Benoit R Lockso, DO   400 mg at 02/08/23 1810    ferrous sulfate (IRON 325) tablet 325 mg  325 mg Oral Lunch Tanya Guess Lockso, DO   325 mg at 02/08/23 1809    amLODIPine (NORVASC) tablet 2.5 mg  2.5 mg Oral Daily Benoit R Lockso, DO   2.5 mg at 02/09/23 0840    ALPRAZolam (XANAX) tablet 0.25 mg  0.25 mg Oral TID PRN Tanya Guess Lockso, DO   0.25 mg at 02/09/23 0840    sodium chloride flush 0.9 % injection 5-40 mL  5-40 mL IntraVENous 2 times per day Tejal Velazquez, DO   10 mL at 02/09/23 3212    sodium chloride flush 0.9 % injection 5-40 mL  5-40 mL IntraVENous PRN Tanya Guess Lockso, DO   10 mL at 02/07/23 1759    0.9 % sodium chloride infusion   IntraVENous PRN Benoit R Lockso, DO        polyethylene glycol (GLYCOLAX) packet 17 g  17 g Oral Daily PRN Tanya Guess Lockso, DO   17 g at 02/06/23 1005    acetaminophen (TYLENOL) tablet 650 mg  650 mg Oral Q6H PRN Tejal Velazquez, DO        Or    acetaminophen (TYLENOL) suppository 650 mg  650 mg Rectal Q6H PRN Benoit R Lockso, DO           Allergies:  No Known Allergies    Problem List:    Patient Active Problem List   Diagnosis Code    Chronic combined systolic and diastolic CHF (congestive heart failure) ([facility] I50.42    S/P TAVR (transcatheter aortic valve replacement) Z95.2    Chronic atrial fibrillation I48.20    Coronary artery disease involving native coronary artery of native heart without angina pectoris I25.10    Essential hypertension I10    High risk medications (not anticoagulants) long-term use Z79.899    Paroxysmal atrial fibrillation ([facility]) I48.0    Hyperlipidemia LDL goal <100 E78.5    GIB (gastrointestinal bleeding) K92.2    ICD (implantable cardioverter-defibrillator), dual, in situ Z95.810    Ventricular tachycardia I47.20    Ischemic heart disease I25.9    Vitamin D deficiency E55.9    Other insomnia G47.09    SOB (shortness of breath) R06.02    Lung nodule R91.1    Acute on chronic heart failure with preserved ejection fraction (HFpEF) ([facility]) I50.33    Pure hypercholesterolemia E78.00    Stage 3b chronic kidney disease ([facility]) N18.32  Transient cerebral ischemia G45.9    Aortic valve disease I35.9    Near syncope R55    Acute on chronic combined systolic and diastolic CHF (congestive heart failure) ([facility]) I50.43    Acute congestive heart failure (HCC) I50.9    Gastroesophageal reflux disease without esophagitis K21.9    Coronary artery disease involving coronary bypass graft I25.810    Symptomatic anemia D64.9    GI bleed K92.2    Abdominal aortic aneurysm (AAA) 3.0 cm to 5.5 cm in diameter in male I67.38    ICD (implantable cardioverter-defibrillator) discharge Z45.02    ICD (implantable cardioverter-defibrillator) in place Z95.810    PAF (paroxysmal atrial fibrillation) ([facility]) I48.0    Acute on chronic diastolic congestive heart failure ([facility]) I50.33       Past Medical History:        Diagnosis Date    A-fib [facility])     follows with Dr Sintia Rocha 9/28/22    AAA (abdominal aortic aneurysm)     infrarenal 3.7cm 6/27/22    Arrhythmia     Carotid artery stenosis     CHF (congestive heart failure) ([facility])     CKD (chronic kidney disease), stage III ([facility]     Heart valve problem     Hyperlipidemia     Hypertension     ICD (implantable cardiac defibrillator) in place 2007    [address] Pender DR WILKINS#[id number] generator changed 7/28/16  Dr Ariel Sharp    MI (mitral incompetence) 2003    MI (myocardial infarction) ([facility] 2003       Past Surgical History:        Procedure Laterality Date    CARDIAC CATHETERIZATION Right 03/03/2017    Dr. Jaffe Friday 2007. 2008 2007 78 shocks replaced 2008 [employer]     CARDIAC DEFIBRILLATOR PLACEMENT  07/28/2016    [employer] Dual ICD gen change    CARDIAC SURGERY N/A 09/04/2021    cardioversion performed by Karan Gonzales MD at [address] [place] [address]  10/29/2020    Successful    (Dr. Ariel Sharp)    CARDIOVERSION  05/12/2022    Successful   Dr Prachi Galan COLONOSCOPY N/A 02/24/2020    COLONOSCOPY DIAGNOSTIC performed by Hailee Hogue MD at [address] [place] [address] COLONOSCOPY N/A 2022    COLONOSCOPY WITH BIOPSY performed by Charbel Montoya MD at [address] [place] [address] Dr SIMPSON  2003    DIAGNOSTIC CARDIAC CATH LAB PROCEDURE      DIAGNOSTIC CARDIAC CATH LAB PROCEDURE  2008    stent    JOINT REPLACEMENT  2011    r hip surgery Dr Reed Witt  2017    Dr. Elder Anand and Dr. Ziggy Morfin- TAVR Josie 26mm valve    UPPER GASTROINTESTINAL ENDOSCOPY N/A 2019    EGD ESOPHAGOGASTRODUODENOSCOPY performed by Prabhjot Hwang MD at [address] [place] [address] Dr Jeronimo 2020    EGD ESOPHAGOGASTRODUODENOSCOPY performed by Jagdeep Chi MD at [address] [place] [address] Dr Jeronimo 2022    EGD CONTROL HEMORRHAGE performed by Charbel Montoya MD at [address] [place] [address] Dr Jeronimo N/A 2022    EGD ESOPHAGOGASTRODUODENOSCOPY performed by Charbel Montoya MD at [address] [place] [address] Dr Jeronimo N/KARINA 2022    EGD ESOPHAGOGASTRODUODENOSCOPY DILATATION performed by Charbel Montoya MD at [address]         Social History:    Social History     Tobacco Use    Smoking status: Former     Packs/day: 0.50     Years: 3.00     Pack years: 1.50     Types: Cigarettes     Quit date: 1973     Years since quittin.0    Smokeless tobacco: Never    Tobacco comments:     Quit smoking in    Substance Use Topics    Alcohol use:  No                                Counseling given: Not Answered  Tobacco comments: Quit smoking in       Vital Signs (Current):   Vitals:    23 0122 23 0713 23 0840 23 0958   BP:  125/69 125/69 (!) 144/61   Pulse:  55  58   Resp:  17  16   Temp:  36.6 °C (97.9 °F)  36.6 °C (97.9 °F)   TempSrc:  Oral  Temporal   SpO2:  95%  98%   Weight: 172 lb 12.8 oz (78.4 kg)      Height:                                                  BP Readings from Last 3 Encounters:   02/09/23 (!) 144/61   02/01/23 122/73   01/26/23 (!) 114/58       NPO Status: Time of last liquid consumption: 2315                        Time of last solid consumption: 2330                        Date of last liquid consumption: 02/08/23                        Date of last solid food consumption: 02/08/23    BMI:   Wt Readings from Last 3 Encounters:   02/09/23 172 lb 12.8 oz (78.4 kg)   02/01/23 180 lb (81.6 kg)   01/26/23 176 lb 8 oz (80.1 kg)     Body mass index is 26.27 kg/m². CBC:   Lab Results   Component Value Date/Time    WBC 7.2 02/09/2023 03:22 AM    RBC 3.51 02/09/2023 03:22 AM    HGB 10.8 02/09/2023 03:22 AM    HCT 32.6 02/09/2023 03:22 AM    MCV 92.9 02/09/2023 03:22 AM    RDW 14.5 02/09/2023 03:22 AM     02/09/2023 03:22 AM       CMP:   Lab Results   Component Value Date/Time     02/09/2023 03:22 AM    K 3.9 02/09/2023 03:22 AM    K 3.8 08/23/2022 08:56 AM    CL 92 02/09/2023 03:22 AM    CO2 36 02/09/2023 03:22 AM    BUN 20 02/09/2023 03:22 AM    CREATININE 1.3 02/09/2023 03:22 AM    GFRAA >60 10/13/2022 11:57 AM    LABGLOM 54 02/09/2023 03:22 AM    GLUCOSE 99 02/09/2023 03:22 AM    PROT 7.0 10/13/2022 11:57 AM    CALCIUM 8.2 02/09/2023 03:22 AM    BILITOT 0.3 10/13/2022 11:57 AM    ALKPHOS 150 10/13/2022 11:57 AM    AST 31 10/13/2022 11:57 AM    ALT 20 10/13/2022 11:57 AM       POC Tests: No results for input(s): POCGLU, POCNA, POCK, POCCL, POCBUN, POCHEMO, POCHCT in the last 72 hours.     Coags:   Lab Results   Component Value Date/Time    PROTIME 17.0 06/15/2022 03:30 PM    INR 1.5 06/15/2022 03:30 PM    APTT 38.4 06/15/2022 03:30 PM       HCG (If Applicable): No results found for: PREGTESTUR, PREGSERUM, HCG, HCGQUANT     ABGs: No results found for: PHART, PO2ART, VGW2VDJ, GZA8UJM, BEART, H0BIPLCA     Type & Screen (If Applicable):  No results found for: LABABO, LABRH    Drug/Infectious Status (If Applicable):  No results found for: HIV, HEPCAB    COVID-19 Screening (If Applicable):   Lab Results   Component Value Date/Time    COVID19 Not Detected 02/04/2023 04:21 PM           Anesthesia Evaluation  Patient summary reviewed and Nursing notes reviewed no history of anesthetic complications:   Airway: Mallampati: III  TM distance: >3 FB   Neck ROM: full  Mouth opening: > = 3 FB   Dental:          Pulmonary:Negative Pulmonary ROS breath sounds clear to auscultation      (-) shortness of breath and not a current smoker                           Cardiovascular:    (+) hypertension:, valvular problems/murmurs (TAVR):, pacemaker: AICD and pacemaker, past MI:, CAD:, CABG/stent:, dysrhythmias: atrial fibrillation, hyperlipidemia        Rhythm: irregular  Rate: normal           Beta Blocker:  Dose within 24 Hrs         Neuro/Psych:   (+) neuromuscular disease:,              ROS comment: neuropathy GI/Hepatic/Renal:   (+) GERD: well controlled,          ROS comment: dysphagia. Endo/Other:    (+) blood dyscrasia: anticoagulation therapy, arthritis:., .                 Abdominal:             Vascular: Other Findings:           Anesthesia Plan      MAC     ASA 3       Induction: intravenous. Anesthetic plan and risks discussed with patient, spouse and child/children.                         GINETTE Cunningham CRNA   2/9/2023        Note reviewed and agree with plan GINETTE Cunningham - LOGAN

## 2023-02-09 NOTE — DISCHARGE SUMMARY
[facility] Physician Discharge Summary     Addis Mcdonald, 82 y. o.male /  1935  / MRN [id number]    Admission date  2023  Admission diagnoses   CHF  Consults     IP CONSULT TO CARDIOLOGY  IP CONSULT TO HEART FAILURE NURSE/COORDINATOR  Procedures   See hospital course  Discharge date  2023  3:35 PM  Discharge diagnoses Same  Discharge condition  Stable    Discharge disposition Home  Activity level   As tolerated  Follow-up     No follow-up provider specified. Angela Mackay is an 24-year-old male with past medical history pertinent for diastolic heart failure status post ICD placement, A-fib on Xarelto, CAD status post MI, hyperlipidemia, hypertension, CKD 3 who presented to the emergency department after experiencing worsening exertional dyspnea over the past 1 to 2 weeks. Acute hypoxemic respiratory failure  Acute on chronic HFpEF  Now on Bumex 2 mg po twice daily   Still on 2L NC O2 will wean as tolerated  Continue monitor UOP, I/O and renal function     PAF  On xarelto 15 mg 2/ GFR  S/p DCCV today   Increased amiodarone to 400 mg daily   metoprolol tartrate bid 50,75mg      HTN  On amlodipine 2.5 mg daily and metoprolol as above     HLD  Continue pravastatin 40 mg daily     CAD s/p CABG /redo /PCI to native OM 2 and LAD . Continue guideline directed medical therapy patient is on beta-blocker and statin     S/p TAVR  S/p ICD placement  H/o of vt arrest     CKD stage IIIa  Stable Scr     Anemia of chronic disease and iron deficiency  Cont iron supplementation     Pulmonary nodules  CT scan  showed progressive increase in a right-sided nodule and is now at 12 x 18 mm. Would recommend PET/CT on an outpatient basis for further evaluation and subsequent follow-up as appropriate.     Discharge Exam  Vitals: BP (!) 141/59   Pulse 54   Temp 97.4 °F (36.3 °C) (Oral)   Resp 18   Ht 5' 8\" (1.727 m)   Wt 172 lb 12.8 oz (78.4 kg)   SpO2 100% BMI 26.27 kg/m²   General: well-developed, well-nourished, no acute distress, cooperative  Skin: warm, dry, and generally intact with normal color  HEENT: no gross abnormalities  Respiratory: clear to auscultation bilaterally without respiratory distress  Cardiovascular: regular rate and rhythm   Abdominal: soft, nontender, nondistended, positive bowel sounds  Extremities: no obvious edema or deformity  Neurologic: awake, alert, no gross deficits  Psychiatric: normal affect, cooperative       Medication List        START taking these medications      bumetanide 2 MG tablet  Commonly known as: BUMEX  Take 1 tablet by mouth in the morning and 1 tablet in the evening. CHANGE how you take these medications      * amiodarone 200 MG tablet  Commonly known as: CORDARONE  What changed: Another medication with the same name was added. Make sure you understand how and when to take each. * amiodarone 400 MG tablet  Commonly known as: PACERONE  Take 1 tablet by mouth daily  Start taking on: February 10, 2023  What changed: You were already taking a medication with the same name, and this prescription was added. Make sure you understand how and when to take each. amLODIPine 2.5 MG tablet  Commonly known as: NORVASC  TAKE 1 TABLET BY MOUTH  DAILY  What changed: when to take this     ferrous sulfate 325 (65 Fe) MG tablet  Commonly known as: IRON 325  Take 1 tablet by mouth daily (with breakfast)  What changed: when to take this     pantoprazole 40 MG tablet  Commonly known as: PROTONIX  TAKE 1 TABLET BY MOUTH  DAILY  What changed: when to take this     rivaroxaban 15 MG Tabs tablet  Commonly known as: XARELTO  Take 1 tablet by mouth Daily with supper  What changed:   medication strength  how much to take           * This list has 2 medication(s) that are the same as other medications prescribed for you. Read the directions carefully, and ask your doctor or other care provider to review them with you. CONTINUE taking these medications      ALPRAZolam 0.25 MG tablet  Commonly known as: XANAX     MAGnesium-Oxide 400 (240 Mg) MG tablet  Generic drug: magnesium oxide     meclizine 25 MG tablet  Commonly known as: ANTIVERT  Take 1 tablet by mouth daily as needed for Dizziness     METOPROLOL TARTRATE PO     potassium chloride 20 MEQ/15ML (10%) oral solution  Take 15 mLs by mouth daily     pravastatin 40 MG tablet  Commonly known as: PRAVACHOL     vitamin D 50 MCG (2000 UT) Caps capsule            STOP taking these medications      furosemide 40 MG tablet  Commonly known as: LASIX     MULTIVITAMIN ADULT PO     PreserVision AREDS 2 Caps               Where to Get Your Medications        These medications were sent to [address] [address]      Phone: [phone]   amiodarone 400 MG tablet  bumetanide 2 MG tablet  rivaroxaban 15 MG Tabs tablet       Note that more than 30 minutes was spent in preparing discharge papers, discussing discharge with patient, medication review, etc.    Electronically signed by John Deleon MD on 2/9/2023 at 3:35 PM

## 2023-02-09 NOTE — CARE COORDINATION
Social work / Discharge Planning:          Cardioversion completed this morning. Per IDR, will do pulse ox testing when patient returns to the room. He does not have home oxygen.    Electronically signed by HUSAM Arreaga on 2/9/2023 at 10:36 AM

## 2023-02-09 NOTE — PROGRESS NOTES
Perfect serve message sent CRNP for cardiology clearance S/P cardiversion for discharge later today.

## 2023-02-09 NOTE — PROGRESS NOTES
[facility] Progress Note    --------------------------------------------------------------------------------------  Assessment / Plan      Acute hypoxemic respiratory failure  Acute on chronic HFpEF  Now on Bumex 2 mg po twice daily   Still on 2L NC O2 will wean as tolerated  Continue monitor UOP, I/O and renal function    PAF  On xarelto 15 mg 2/2 GFR  Plan for DCCV today 2/9  On amiodarone 200 mg daily   metoprolol tartrate bid 50,75mg     HTN  On amlodipine 2.5 mg daily and metoprolol as above    HLD  Continue pravastatin 40 mg daily    CAD s/p CABG 2003/redo 2007/PCI to native OM 2 and LAD 2007. Continue guideline directed medical therapy patient is on beta-blocker and statin    S/p TAVR  S/p ICD placement  H/o of vt arrest    CKD stage IIIa  Stable Scr    Anemia of chronic disease and iron deficiency  Cont iron supplementation    Pulmonary nodules  CT scan 2/7 showed progressive increase in a right-sided nodule and is now at 12 x 18 mm. Would recommend PET/CT on an outpatient basis for further evaluation and subsequent follow-up as appropriate. Please see orders for further plan of care  Code status  Full Code  DVT prophylaxis xarelto  Disposition  Anticipate home with home health when ready  --------------------------------------------------------------------------------------    Admission Date  2/4/2023  4:01 PM  Chief Complaint sob  No chief complaint on file.     Subjective  History of Present Illness  Doing sandra this am  Seen sitting up in chair  No chest pain  Sob improved    Family was present during my visit  No new issues identified today  Case was discussed with patient and son    Review of Systems - 12-point review of systems has been reviewed and is otherwise negative except as listed in the HPI    Objective  Physical Exam  Vitals: /69   Pulse 55   Temp 97.9 °F (36.6 °C) (Oral)   Resp 17   Ht 5' 8\" (1.727 m)   Wt 172 lb 12.8 oz (78.4 kg)   SpO2 95%   BMI 26.27 kg/m²   General: well-developed, well-nourished, no acute distress, cooperative  Skin: generally warm, dry, and intact, with normal color  HEENT: normocephalic, atraumatic, no gross abnormalities  Respiratory: clear to auscultation bilaterally without respiratory distress  Cardiovascular: regular rate and rhythm without murmur / rub / gallop  Abdominal: soft, nontender, nondistended, normoactive bowel sounds  Extremities: no obvious edema or deformity  Neurologic: awake, alert, no gross deficits  Psychiatric: normal affect, cooperative    Electronically signed by Lisa Leonardo MD on 2/9/2023 at 8:17 AM

## 2023-02-09 NOTE — PLAN OF CARE
Problem: Discharge Planning  Goal: Discharge to home or other facility with appropriate resources  Recent Flowsheet Documentation  Taken 2/8/2023 2019 by Sol Dawkins RN  Discharge to home or other facility with appropriate resources:   Identify barriers to discharge with patient and caregiver   Arrange for needed discharge resources and transportation as appropriate     Problem: Safety - Adult  Goal: Free from fall injury  Outcome: Progressing     Problem: ABCDS Injury Assessment  Goal: Absence of physical injury  Outcome: Progressing     Problem: Chronic Conditions and Co-morbidities  Goal: Patient's chronic conditions and co-morbidity symptoms are monitored and maintained or improved  Outcome: Progressing  Flowsheets (Taken 2/8/2023 2019 by Sol Dawkins RN)  Care Plan - Patient's Chronic Conditions and Co-Morbidity Symptoms are Monitored and Maintained or Improved:   Monitor and assess patient's chronic conditions and comorbid symptoms for stability, deterioration, or improvement   Collaborate with multidisciplinary team to address chronic and comorbid conditions and prevent exacerbation or deterioration

## 2023-02-09 NOTE — PROGRESS NOTES
Ambulatory Pulse OX on room air    POX at rest off oxygen at rest 87-88%     Patient placed on O2 @ 2L NC recovery POX 93-93%.

## 2023-02-09 NOTE — PROCEDURES
PROCEDURE NOTE    Attending Cardiologist:  Jackquline Lanes MD  Primary Cardiologist: Hilton Iverson DO  Primary Electrophysiologist: Angela Jackson MD    Date of Service: 2/9/2023    Procedure: Direct Current Cardioversion    Indication: Persistent atrial fibrillation    Anticoagulant: Rivaroxaban    Antiarrhythmic drug(s): Metoprolol and Amiodarone    Description of procedure:  Patient presented in a fasting and well hydrated state. Informed consent obtained from patient. Risks, benefits and alternatives to DC cardioversion were explained to the patient in detail, and the patient acknowledged understanding. Cardiac rhythm, arterial oxygen saturation, and blood pressure were continuously monitored. Deep sedation with propofol administered by the Department of Anesthesiology. Patch placement: Anterior/posterior  Energy: 200 Joules, synchronized  Number of shocks: 1  Outcome: Sinus rhythm/ AV sequential pacing  Complications: None    Impression:  Successful direct current cardioversion of atrial fibrillation to normal sinus rhythm.     Jackquline Lanes, MD, [address] [facility]

## 2023-02-09 NOTE — ANESTHESIA PRE PROCEDURE
Department of Anesthesiology  Preprocedure Note       Name:  Tali Green   Age:  82 y.o.  :  1935                                          MRN:  [id number]         Date:  2023      Surgeon: * No surgeons listed *    Procedure: * No procedures listed *    Medications prior to admission:   Prior to Admission medications    Medication Sig Start Date End Date Taking? Authorizing Provider   furosemide (LASIX) 40 MG tablet Take 1 tablet by mouth daily Alternates 40 mg and 20 mg 23   GINETTE Beckford - CNP   potassium chloride 20 MEQ/15ML (10%) oral solution Take 15 mLs by mouth daily 23   GINETTE Beckford - CNP   amLODIPine (NORVASC) 2.5 MG tablet TAKE 1 TABLET BY MOUTH  DAILY  Patient taking differently: Take 2.5 mg by mouth at bedtime 22   Maurice Carty DO   ALPRAZolam Lear David) 0.25 MG tablet Take 0.25 mg by mouth 3 times daily as needed for Sleep.     Historical Provider, MD   amiodarone (CORDARONE) 200 MG tablet Take 200 mg by mouth daily Daily beginning 22 per Dr. Latesha Wallace 22   Historical Provider, MD   meclizine (ANTIVERT) 25 MG tablet Take 1 tablet by mouth daily as needed for Dizziness 22   My Valdivia MD   METOPROLOL TARTRATE PO Take by mouth 2 times daily Indications: taking 75 mg in am and 50 mg at night    Historical Provider, MD   pravastatin (PRAVACHOL) 40 MG tablet Take 40 mg by mouth at bedtime    Historical Provider, MD   rivaroxaban (XARELTO) 20 MG TABS tablet Take 20 mg by mouth Daily with supper     Historical Provider, MD   Multiple Vitamin (MULTIVITAMIN ADULT PO) Take 1 tablet by mouth Daily with lunch  Patient not taking: Reported on 2023    Historical Provider, MD   pantoprazole (PROTONIX) 40 MG tablet TAKE 1 TABLET BY MOUTH  DAILY  Patient taking differently: Take 40 mg by mouth Daily with lunch 12/15/21   My Valdivia MD   ferrous sulfate (IRON 325) 325 (65 Fe) MG tablet Take 1 tablet by mouth daily (with breakfast)  Patient taking differently: Take 325 mg by mouth Daily with lunch 11/5/20   Balbina Roman MD   MAGNESIUM-OXIDE 400 (240 Mg) MG tablet Take 400 mg by mouth Daily with supper 3/18/20   Historical Provider, MD   Multiple Vitamins-Minerals (PRESERVISION AREDS 2) CAPS Take 1 capsule by mouth 2 times daily   Patient not taking: Reported on 2/4/2023    Historical Provider, MD   Cholecalciferol (VITAMIN D) 2000 UNITS CAPS capsule Take 2,000 Units by mouth Daily with lunch    Historical Provider, MD       Current medications:    Current Facility-Administered Medications   Medication Dose Route Frequency Provider Last Rate Last Admin    bumetanide (BUMEX) tablet 2 mg  2 mg Oral BID Shante Walton MD        rivaroxaban Gaylord Siddharth) tablet 15 mg  15 mg Oral Dinner Shante Walton MD   15 mg at 02/08/23 2029    perflutren lipid microspheres (DEFINITY) injection 1.5 mL  1.5 mL IntraVENous ONCE PRN Donald Oneill MD        sodium chloride (OCEAN, BABY AYR) 0.65 % nasal spray 2 spray  2 spray Each Nostril PRN Benoit R Lockso, DO        pravastatin (PRAVACHOL) tablet 40 mg  40 mg Oral Nightly Benoit R Lockso, DO   40 mg at 02/08/23 2041    potassium bicarb-citric acid (EFFER-K) effervescent tablet 20 mEq  20 mEq Oral BID Fredick Poll Lockso, DO   20 mEq at 02/09/23 0840    metoprolol tartrate (LOPRESSOR) tablet 75 mg  75 mg Oral QAM Benoit R Lockso, DO   75 mg at 02/09/23 0919    metoprolol tartrate (LOPRESSOR) tablet 50 mg  50 mg Oral Nightly Benoit R Lockso, DO   50 mg at 02/08/23 2028    pantoprazole (PROTONIX) tablet 40 mg  40 mg Oral Daily Benoit R Lockso, DO   40 mg at 02/09/23 0840    meclizine (ANTIVERT) tablet 25 mg  25 mg Oral Daily PRN Fredick Poll Lockso, DO        magnesium oxide (MAG-OX) tablet 400 mg  400 mg Oral Dinner Benoit R Lockso, DO   400 mg at 02/08/23 1810    ferrous sulfate (IRON 325) tablet 325 mg  325 mg Oral Lunch Benoit R Lockso, DO   325 mg at 02/08/23 1809    amLODIPine (NORVASC) tablet 2.5 mg  2.5 mg Oral Daily Matias Sevin Lockso, DO   2.5 mg at 02/09/23 0840    amiodarone (CORDARONE) tablet 200 mg  200 mg Oral Daily Benoit R Lockso, DO   200 mg at 02/08/23 0915    ALPRAZolam (XANAX) tablet 0.25 mg  0.25 mg Oral TID PRN Matias Sevin Lockso, DO   0.25 mg at 02/09/23 0840    sodium chloride flush 0.9 % injection 5-40 mL  5-40 mL IntraVENous 2 times per day Jennifer Bar, DO   10 mL at 02/09/23 8153    sodium chloride flush 0.9 % injection 5-40 mL  5-40 mL IntraVENous PRN Matias Sevin Lockso, DO   10 mL at 02/07/23 1759    0.9 % sodium chloride infusion   IntraVENous PRN Benoit R Lockso, DO        polyethylene glycol (GLYCOLAX) packet 17 g  17 g Oral Daily PRN Matias Sevin Lockso, DO   17 g at 02/06/23 1005    acetaminophen (TYLENOL) tablet 650 mg  650 mg Oral Q6H PRN Jennifer Yosvany, DO        Or    acetaminophen (TYLENOL) suppository 650 mg  650 mg Rectal Q6H PRN Benoit R Lockso, DO           Allergies:  No Known Allergies    Problem List:    Patient Active Problem List   Diagnosis Code    Chronic combined systolic and diastolic CHF (congestive heart failure) ([facility]) I50.42    S/P TAVR (transcatheter aortic valve replacement) Z95.2    Chronic atrial fibrillation I48.20    Coronary artery disease involving native coronary artery of native heart without angina pectoris I25.10    Essential hypertension I10    High risk medications (not anticoagulants) long-term use Z79.899    Paroxysmal atrial fibrillation ([facility]) I48.0    Hyperlipidemia LDL goal <100 E78.5    GIB (gastrointestinal bleeding) K92.2    ICD (implantable cardioverter-defibrillator), dual, in situ Z95.810    Ventricular tachycardia I47.20    Ischemic heart disease I25.9    Vitamin D deficiency E55.9    Other insomnia G47.09    SOB (shortness of breath) R06.02    Lung nodule R91.1    Acute on chronic heart failure with preserved ejection fraction (HFpEF) ([facility]) I50.33    Pure hypercholesterolemia E78.00    Stage 3b chronic kidney disease ([facility] N18.32    Transient cerebral ischemia G45.9    Aortic valve disease I35.9    Near syncope R55    Acute on chronic combined systolic and diastolic CHF (congestive heart failure) ([facility]) I50.43    Acute congestive heart failure ([facility]) I50.9    Gastroesophageal reflux disease without esophagitis K21.9    Coronary artery disease involving coronary bypass graft I25.810    Symptomatic anemia D64.9    GI bleed K92.2    Abdominal aortic aneurysm (AAA) 3.0 cm to 5.5 cm in diameter in male I67.38    ICD (implantable cardioverter-defibrillator) discharge Z45.02    ICD (implantable cardioverter-defibrillator) in place Z95.810    PAF (paroxysmal atrial fibrillation) ([facility]) I48.0    Acute on chronic diastolic congestive heart failure ([facility]) I50.33       Past Medical History:        Diagnosis Date    A-fib [facility])     follows with Dr Hilton Iverson 9/28/22    AAA (abdominal aortic aneurysm)     infrarenal 3.7cm 6/27/22    Arrhythmia     Carotid artery stenosis     CHF (congestive heart failure) ([facility])     CKD [facility]chronic [facility] III [facility]     Heart valve problem     Hyperlipidemia     Hypertension     ICD (implantable cardiac defibrillator) in place 2007    [employer] GREGORY#[id number] generator changed 7/28/16  Dr Sandra Saleem    MI (mitral incompetence) 2003    MI (myocardial infarction) ([facility] 2003       Past Surgical History:        Procedure Laterality Date    CARDIAC CATHETERIZATION Right 03/03/2017    Dr. Gema Dumont  2007. 2008 2007 78 shocks replaced 2008 [employer]     CARDIAC DEFIBRILLATOR PLACEMENT  07/28/2016    [employer] Dual ICD gen change    CARDIAC SURGERY N/A 09/04/2021    cardioversion performed by Kayli Joaquin MD at [address] [place] [address]  10/29/2020    Successful    (Dr. Sandra Saleem)    CARDIOVERSION  05/12/2022    Successful   Dr Yadiel Salazar COLONOSCOPY N/A 02/24/2020    COLONOSCOPY DIAGNOSTIC performed by Jagdeep Chi MD at [address]  COLONOSCOPY N/A 2022    COLONOSCOPY WITH BIOPSY performed by Srinivasa Hall MD at [address] [place] [address] Dr SIMPSON  2003    DIAGNOSTIC CARDIAC CATH LAB PROCEDURE      DIAGNOSTIC CARDIAC CATH LAB PROCEDURE  2008    stent    JOINT REPLACEMENT  2011    r hip surgery Dr Eufemia De Los Santos  2017    Dr. Adis Weathers and Dr. Venkatesh Motley- TAVR Josie 26mm valve    UPPER GASTROINTESTINAL ENDOSCOPY N/A 2019    EGD ESOPHAGOGASTRODUODENOSCOPY performed by Edith Collado MD at [address] [place] [address] 2020    EGD ESOPHAGOGASTRODUODENOSCOPY performed by Viola Baez MD at [address] [place] [address] 2022    EGD CONTROL HEMORRHAGE performed by Srinivasa Hall MD at [address] [place] N/A 2022    EGD ESOPHAGOGASTRODUODENOSCOPY performed by Srinivasa Hall MD at [address] [place] N/A 2022    EGD ESOPHAGOGASTRODUODENOSCOPY DILATATION performed by Srinivasa Hall MD at [address]         Social History:    Social History     Tobacco Use    Smoking status: Former     Packs/day: 0.50     Years: 3.00     Pack years: 1.50     Types: Cigarettes     Quit date: 1973     Years since quittin.0    Smokeless tobacco: Never    Tobacco comments:     Quit smoking in    Substance Use Topics    Alcohol use:  No                                Counseling given: Not Answered  Tobacco comments: Quit smoking in       Vital Signs (Current):   Vitals:    23 0115 23 0122 23 0713 23 0840   BP: 133/67  125/69 125/69   Pulse: 55  55    Resp: 17  17    Temp: 36.7 °C (98.1 °F)  36.6 °C (97.9 °F)    TempSrc: Oral  Oral    SpO2: 96%  95%    Weight:  172 lb 12.8 oz (78.4 kg)     Height:                                                  BP Readings from Last 3 Encounters:   02/09/23 125/69   02/01/23 122/73   01/26/23 (!) 114/58       NPO Status: Time of last liquid consumption: 2315                        Time of last solid consumption: 2330                        Date of last liquid consumption: 02/08/23                        Date of last solid food consumption: 02/08/23    BMI:   Wt Readings from Last 3 Encounters:   02/09/23 172 lb 12.8 oz (78.4 kg)   02/01/23 180 lb (81.6 kg)   01/26/23 176 lb 8 oz (80.1 kg)     Body mass index is 26.27 kg/m². CBC:   Lab Results   Component Value Date/Time    WBC 7.2 02/09/2023 03:22 AM    RBC 3.51 02/09/2023 03:22 AM    HGB 10.8 02/09/2023 03:22 AM    HCT 32.6 02/09/2023 03:22 AM    MCV 92.9 02/09/2023 03:22 AM    RDW 14.5 02/09/2023 03:22 AM     02/09/2023 03:22 AM       CMP:   Lab Results   Component Value Date/Time     02/09/2023 03:22 AM    K 3.9 02/09/2023 03:22 AM    K 3.8 08/23/2022 08:56 AM    CL 92 02/09/2023 03:22 AM    CO2 36 02/09/2023 03:22 AM    BUN 20 02/09/2023 03:22 AM    CREATININE 1.3 02/09/2023 03:22 AM    GFRAA >60 10/13/2022 11:57 AM    LABGLOM 54 02/09/2023 03:22 AM    GLUCOSE 99 02/09/2023 03:22 AM    PROT 7.0 10/13/2022 11:57 AM    CALCIUM 8.2 02/09/2023 03:22 AM    BILITOT 0.3 10/13/2022 11:57 AM    ALKPHOS 150 10/13/2022 11:57 AM    AST 31 10/13/2022 11:57 AM    ALT 20 10/13/2022 11:57 AM       POC Tests: No results for input(s): POCGLU, POCNA, POCK, POCCL, POCBUN, POCHEMO, POCHCT in the last 72 hours.     Coags:   Lab Results   Component Value Date/Time    PROTIME 17.0 06/15/2022 03:30 PM    INR 1.5 06/15/2022 03:30 PM    APTT 38.4 06/15/2022 03:30 PM       HCG (If Applicable): No results found for: PREGTESTUR, PREGSERUM, HCG, HCGQUANT     ABGs: No results found for: PHART, PO2ART, BLU3PIL, TLX7CXB, BEART, C2PUTOWM     Type & Screen (If Applicable):  No results found for: LABABO, LABRH    Drug/Infectious Status (If Applicable):  No results found for: HIV, HEPCAB    COVID-19 Screening (If Applicable):   Lab Results   Component Value Date/Time    COVID19 Not Detected 02/04/2023 04:21 PM           Anesthesia Evaluation  Patient summary reviewed and Nursing notes reviewed no history of anesthetic complications:   Airway: Mallampati: IV  TM distance: >3 FB   Neck ROM: full  Mouth opening: > = 3 FB   Dental:    (+) poor dentition      Pulmonary: breath sounds clear to auscultation      (-) not a current smoker                           Cardiovascular:  Exercise tolerance: good (>4 METS),   (+) hypertension: moderate, pacemaker ( LACW DDDR ): pacemaker, AICD and dependent, past MI: > 6 months, CAD: obstructive, CABG/stent ( CABG x 2 w/ TAVR):, dysrhythmias: atrial fibrillation, CHF:, hyperlipidemia        Rhythm: irregular  Rate: abnormal           Beta Blocker:  Dose within 24 Hrs      ROS comment: Mild aneurysmal dilatation of the ascending thoracic aorta measuring 4.4 x 4.1cm         Neuro/Psych:   Negative Neuro/Psych ROS              GI/Hepatic/Renal:   (+) GERD:,           Endo/Other:    (+) blood dyscrasia: anticoagulation therapy:., .                 Abdominal:       Abdomen: soft. Vascular: negative vascular ROS. Other Findings:      Imaging:     TTE 2/6/23   Normal left ventricular systolic function. Ejection fraction is visually estimated at 60%. Mildly dilated right ventricle with normal right ventricular function. There is doppler evidence of stage II diastolic dysfunction. Moderately dilated left atrium by volume index. Mild mitral regurgitation. 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 No significant paravalvular aortic regurgitation. AV peak velocity 2.1 m/s. AV mean gradient 9 mmHg. AV area 1.5 cm2. Mild tricuspid regurgitation. PASP is estimated at 44 mmHg.      CXR 2/6/23  Cardiomegaly.  Signs for mild volume overload.       Developmental right-sided pleural effusion.       Cannot exclude discrete areas of focal pneumonitis in the mid lower outer   aspect of the right lung     CT CHEST WO CONTRAST 6/27/22  1. There is no thoracic aortic dissection   2. 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 Pleural base soft tissue masslike density within the right middle lobe   measuring 1.8 cm x 1.1 cm.  While this finding could represent focal   pneumonia underlying malignancy cannot be completely excluded.  Dedicated   follow-up CT scan is recommended in 2-3 months.  PET CT imaging can also be   considered. 4. Smaller 8 mm x 8 mm soft tissue pleural based focus within the right lower   lobe laterally.       RECOMMENDATIONS:   Follow-up CT imaging or PET-CT scan is recommended.                  Anesthesia Plan      MAC     ASA 4       Induction: intravenous. Anesthetic plan and risks discussed with patient. Use of blood products discussed with patient whom.                      Poly Best RN   2/9/2023

## 2023-02-09 NOTE — PROGRESS NOTES
[facility] Quality Flow/Interdisciplinary Rounds Progress Note        Quality Flow Rounds held on February 9, 2023    Disciplines Attending:  Bedside Nurse, , , and Nursing Unit Leadership    Ivy Carney was admitted on 2/4/2023  4:01 PM    Anticipated Discharge Date:       Disposition:    Michael Score:  Michael Scale Score: 22    Readmission Risk              Risk of Unplanned Readmission:  21           Discussed patient goal for the day, patient clinical progression, and barriers to discharge. The following Goal(s) of the Day/Commitment(s) have been identified:   Cardioversion completed today, pulse ox testing, and discharge planning home.       Joey Valenzuela RN  February 9, 2023

## 2023-02-09 NOTE — CARE COORDINATION
Social work / Discharge Planning:          Patient qualifies for oxygen for discharge. Social work met with patient. He declined the freedom of choice list stating that he has no preference for DME provider. Referral made to [address]. Patient will need to wait for delivery of portable oxygen for discharge.     Electronically signed by HUSAM Pradhan on 2/9/2023 at 1:44 PM

## 2023-02-09 NOTE — ANESTHESIA POSTPROCEDURE EVALUATION
Department of Anesthesiology  Postprocedure Note    Patient: Kimberley Mason  MRN: [id number]  YOB: 1935  Date of evaluation: 2/9/2023      Procedure Summary     Date: 02/09/23 Room / Location: [facility] Stage I    Anesthesia Start: 1007 Anesthesia Stop:     Procedure: CARDIOVERSION Diagnosis:     Scheduled Providers:  Responsible Provider: Shannon Mitchell MD    Anesthesia Type: MAC ASA Status: 3          Anesthesia Type: No value filed.     Alfred Phase I: Alfred Score: 9    Alfred Phase II:        Anesthesia Post Evaluation    Patient location during evaluation: PACU  Patient participation: complete - patient participated  Level of consciousness: awake and alert  Pain score: 0  Airway patency: patent  Nausea & Vomiting: no nausea and no vomiting  Complications: no  Cardiovascular status: blood pressure returned to baseline  Respiratory status: acceptable  Hydration status: euvolemic

## 2023-02-09 NOTE — ANESTHESIA PRE PROCEDURE
Department of Anesthesiology  Preprocedure Note       Name:  Dottie Metz   Age:  82 y.o.  :  1935                                          MRN:  [id number]         Date:  2023      Surgeon: * Surgery not found *    Procedure:     Medications prior to admission:   Prior to Admission medications    Medication Sig Start Date End Date Taking? Authorizing Provider   furosemide (LASIX) 40 MG tablet Take 1 tablet by mouth daily Alternates 40 mg and 20 mg 23   GINETTE Baker CNP   potassium chloride 20 MEQ/15ML (10%) oral solution Take 15 mLs by mouth daily 23   GINETTE Baker CNP   amLODIPine (NORVASC) 2.5 MG tablet TAKE 1 TABLET BY MOUTH  DAILY  Patient taking differently: Take 2.5 mg by mouth at bedtime 22   Anatoliy Bey,    ALPRAZolam Connor Medina) 0.25 MG tablet Take 0.25 mg by mouth 3 times daily as needed for Sleep.     Historical Provider, MD   amiodarone (CORDARONE) 200 MG tablet Take 200 mg by mouth daily Daily beginning 22 per Dr. Damion Alcazar 22   Historical Provider, MD   meclizine (ANTIVERT) 25 MG tablet Take 1 tablet by mouth daily as needed for Dizziness 22   Chan Chang MD   METOPROLOL TARTRATE PO Take by mouth 2 times daily Indications: taking 75 mg in am and 50 mg at night    Historical Provider, MD   pravastatin (PRAVACHOL) 40 MG tablet Take 40 mg by mouth at bedtime    Historical Provider, MD   rivaroxaban (XARELTO) 20 MG TABS tablet Take 20 mg by mouth Daily with supper     Historical Provider, MD   Multiple Vitamin (MULTIVITAMIN ADULT PO) Take 1 tablet by mouth Daily with lunch  Patient not taking: Reported on 2023    Historical Provider, MD   pantoprazole (PROTONIX) 40 MG tablet TAKE 1 TABLET BY MOUTH  DAILY  Patient taking differently: Take 40 mg by mouth Daily with lunch 12/15/21   Chan Chang MD   ferrous sulfate (IRON 325) 325 (65 Fe) MG tablet Take 1 tablet by mouth daily (with breakfast)  Patient taking differently: Take 325 mg by mouth Daily with lunch 11/5/20   Katherine Golden MD   MAGNESIUM-OXIDE 400 (240 Mg) MG tablet Take 400 mg by mouth Daily with supper 3/18/20   Historical Provider, MD   Multiple Vitamins-Minerals (PRESERVISION AREDS 2) CAPS Take 1 capsule by mouth 2 times daily   Patient not taking: Reported on 2/4/2023    Historical Provider, MD   Cholecalciferol (VITAMIN D) 2000 UNITS CAPS capsule Take 2,000 Units by mouth Daily with lunch    Historical Provider, MD       Current medications:    No current facility-administered medications for this visit. No current outpatient medications on file.      Facility-Administered Medications Ordered in Other Visits   Medication Dose Route Frequency Provider Last Rate Last Admin    bumetanide (BUMEX) tablet 2 mg  2 mg Oral BID Josefina Penaloza MD        rivaroxaban Marijean Walt) tablet 15 mg  15 mg Oral Dinner Josefina Penaloza MD   15 mg at 02/08/23 2029    perflutren lipid microspheres (DEFINITY) injection 1.5 mL  1.5 mL IntraVENous ONCE PRN Donald Oneill MD        sodium chloride (OCEAN, BABY AYR) 0.65 % nasal spray 2 spray  2 spray Each Nostril PRN Benoit R Lockso, DO        pravastatin (PRAVACHOL) tablet 40 mg  40 mg Oral Nightly Benoit R Lockso, DO   40 mg at 02/08/23 2041    potassium bicarb-citric acid (EFFER-K) effervescent tablet 20 mEq  20 mEq Oral BID [place] Given Lockso, DO   20 mEq at 02/09/23 0840    metoprolol tartrate (LOPRESSOR) tablet 75 mg  75 mg Oral QAM Benoit R Lockso, DO   75 mg at 02/08/23 0915    metoprolol tartrate (LOPRESSOR) tablet 50 mg  50 mg Oral Nightly Benoit R Lockso, DO   50 mg at 02/08/23 2028    pantoprazole (PROTONIX) tablet 40 mg  40 mg Oral Daily Benoit R Lockso, DO   40 mg at 02/09/23 0840    meclizine (ANTIVERT) tablet 25 mg  25 mg Oral Daily PRN [place] Given Lockso, DO        magnesium oxide (MAG-OX) tablet 400 mg  400 mg Oral Dinner Benoit R Lockso, DO   400 mg at 02/08/23 1810    ferrous sulfate (IRON 325) tablet 325 mg 325 mg Oral Lunch Benoit R Lockso, DO   325 mg at 02/08/23 1809    amLODIPine (NORVASC) tablet 2.5 mg  2.5 mg Oral Daily Benoit R Lockso, DO   2.5 mg at 02/09/23 0840    amiodarone (CORDARONE) tablet 200 mg  200 mg Oral Daily Benoit R Lockso, DO   200 mg at 02/08/23 0915    ALPRAZolam (XANAX) tablet 0.25 mg  0.25 mg Oral TID PRN [place] Lockso, DO   0.25 mg at 02/09/23 0840    sodium chloride flush 0.9 % injection 5-40 mL  5-40 mL IntraVENous 2 times per day Scot Montana, DO   10 mL at 02/09/23 6851    sodium chloride flush 0.9 % injection 5-40 mL  5-40 mL IntraVENous PRN [place] Lockso, DO   10 mL at 02/07/23 1759    0.9 % sodium chloride infusion   IntraVENous PRN Benoit R Lockso, DO        polyethylene glycol (GLYCOLAX) packet 17 g  17 g Oral Daily PRN [place] Lockso, DO   17 g at 02/06/23 1005    acetaminophen (TYLENOL) tablet 650 mg  650 mg Oral Q6H PRN Scot Montana, DO        Or    acetaminophen (TYLENOL) suppository 650 mg  650 mg Rectal Q6H PRN Benoit R Lockso, DO           Allergies:  No Known Allergies    Problem List:    Patient Active Problem List   Diagnosis Code    Chronic combined systolic and diastolic CHF (congestive heart failure) (HCC) I50.42    S/P TAVR (transcatheter aortic valve replacement) Z95.2    Chronic atrial fibrillation I48.20    Coronary artery disease involving native coronary artery of native heart without angina pectoris I25.10    Essential hypertension I10    High risk medications (not anticoagulants) long-term use Z79.899    Paroxysmal atrial fibrillation (HCC) I48.0    Hyperlipidemia LDL goal <100 E78.5    GIB (gastrointestinal bleeding) K92.2    ICD (implantable cardioverter-defibrillator), dual, in situ Z95.810    Ventricular tachycardia I47.20    Ischemic heart disease I25.9    Vitamin D deficiency E55.9    Other insomnia G47.09    SOB (shortness of breath) R06.02    Lung nodule R91.1    Acute on chronic heart failure with preserved ejection fraction (HFpEF) ([facility]) I50.33    Pure hypercholesterolemia E78.00    Stage 3b chronic kidney disease ([facility]) N18.32    Transient cerebral ischemia G45.9    Aortic valve disease I35.9    Near syncope R55    Acute on chronic combined systolic and diastolic CHF (congestive heart failure) ([facility]) I50.43    Acute congestive heart failure ([facility]) I50.9    Gastroesophageal reflux disease without esophagitis K21.9    Coronary artery disease involving coronary bypass graft I25.810    Symptomatic anemia D64.9    GI bleed K92.2    Abdominal aortic aneurysm (AAA) 3.0 cm to 5.5 cm in diameter in male I67.38    ICD (implantable cardioverter-defibrillator) discharge Z45.02    ICD (implantable cardioverter-defibrillator) in place Z95.810    PAF (paroxysmal atrial fibrillation) ([facility]) I48.0    Acute on chronic diastolic congestive heart failure ([facility]) I50.33       Past Medical History:        Diagnosis Date    -[facility])     follows with Dr Adenike Prieto 9/28/22    AAA (abdominal aortic aneurysm)     infrarenal 3.7cm 6/27/22    Arrhythmia     Carotid artery stenosis     CHF (congestive heart failure) ([facility])     CKD (chronic kidney disease), stage III ([address]     Heart valve problem     Hyperlipidemia     Hypertension     ICD (implantable cardiac defibrillator) in place 2007    [employer] [address] COR[place]#[id number] generator changed 7/28/16  Dr Robert Saldana    MI (mitral incompetence) 2003    MI (myocardial infarction) [facility] 2003       Past Surgical History:        Procedure Laterality Date    CARDIAC CATHETERIZATION Right 03/03/2017    Dr. Donovan Julien  2007. 2008 2007 78 shocks replaced 2008 [employer]     CARDIAC DEFIBRILLATOR PLACEMENT  07/28/2016    [employer] Dual ICD gen change    CARDIAC SURGERY N/A 09/04/2021    cardioversion performed by Salomón Baker MD at [address] [place] [address]  10/29/2020    Successful    (Dr. Robert Saldana)    CARDIOVERSION  05/12/2022 Successful   Dr Ritika Arana COLONOSCOPY N/A 2020    COLONOSCOPY DIAGNOSTIC performed by Don Hansen MD at [address] COLONOSCOPY N/A 2022    COLONOSCOPY WITH BIOPSY performed by Favian Clements MD at [address] [place] [address] Dr SIMPSON  2003    DIAGNOSTIC CARDIAC CATH LAB PROCEDURE      DIAGNOSTIC CARDIAC CATH LAB PROCEDURE  2008    stent    JOINT REPLACEMENT      r hip surgery Dr Jocelyne Blas  2017    Dr. Roselia Oh and Dr. Chloé Abrams- TAVR Josie 26mm valve    UPPER GASTROINTESTINAL ENDOSCOPY N/A 2019    EGD ESOPHAGOGASTRODUODENOSCOPY performed by Allegra Stanford MD at [place] [address] 2020    EGD ESOPHAGOGASTRODUODENOSCOPY performed by Don Hansen MD at [place] [address] 2022    EGD CONTROL HEMORRHAGE performed by Favian Clements MD at [place] [address] N/A 2022    EGD ESOPHAGOGASTRODUODENOSCOPY performed by Favian Clements MD at [place] [address] N/A 2022    EGD ESOPHAGOGASTRODUODENOSCOPY DILATATION performed by Favian Clements MD at [address]         Social History:    Social History     Tobacco Use    Smoking status: Former     Packs/day: 0.50     Years: 3.00     Pack years: 1.50     Types: Cigarettes     Quit date: 1973     Years since quittin.0    Smokeless tobacco: Never    Tobacco comments:     Quit smoking in    Substance Use Topics    Alcohol use: No                                Counseling given: Not Answered  Tobacco comments: Quit smoking in       Vital Signs (Current): There were no vitals filed for this visit.                                            BP Readings from Last 3 Encounters:   23 125/69   23 122/73   23 (!) 114/58       NPO Status: BMI:   Wt Readings from Last 3 Encounters:   02/09/23 172 lb 12.8 oz (78.4 kg)   02/01/23 180 lb (81.6 kg)   01/26/23 176 lb 8 oz (80.1 kg)     There is no height or weight on file to calculate BMI.    CBC:   Lab Results   Component Value Date/Time    WBC 7.2 02/09/2023 03:22 AM    RBC 3.51 02/09/2023 03:22 AM    HGB 10.8 02/09/2023 03:22 AM    HCT 32.6 02/09/2023 03:22 AM    MCV 92.9 02/09/2023 03:22 AM    RDW 14.5 02/09/2023 03:22 AM     02/09/2023 03:22 AM       CMP:   Lab Results   Component Value Date/Time     02/09/2023 03:22 AM    K 3.9 02/09/2023 03:22 AM    K 3.8 08/23/2022 08:56 AM    CL 92 02/09/2023 03:22 AM    CO2 36 02/09/2023 03:22 AM    BUN 20 02/09/2023 03:22 AM    CREATININE 1.3 02/09/2023 03:22 AM    GFRAA >60 10/13/2022 11:57 AM    LABGLOM 54 02/09/2023 03:22 AM    GLUCOSE 99 02/09/2023 03:22 AM    PROT 7.0 10/13/2022 11:57 AM    CALCIUM 8.2 02/09/2023 03:22 AM    BILITOT 0.3 10/13/2022 11:57 AM    ALKPHOS 150 10/13/2022 11:57 AM    AST 31 10/13/2022 11:57 AM    ALT 20 10/13/2022 11:57 AM       POC Tests: No results for input(s): POCGLU, POCNA, POCK, POCCL, POCBUN, POCHEMO, POCHCT in the last 72 hours.     Coags:   Lab Results   Component Value Date/Time    PROTIME 17.0 06/15/2022 03:30 PM    INR 1.5 06/15/2022 03:30 PM    APTT 38.4 06/15/2022 03:30 PM       HCG (If Applicable): No results found for: PREGTESTUR, PREGSERUM, HCG, HCGQUANT     ABGs: No results found for: PHART, PO2ART, MYU6ZHA, NTN9MBE, BEART, H2OVFEGI     Type & Screen (If Applicable):  No results found for: LABABO, LABRH    Drug/Infectious Status (If Applicable):  No results found for: HIV, HEPCAB    COVID-19 Screening (If Applicable):   Lab Results   Component Value Date/Time    COVID19 Not Detected 02/04/2023 04:21 PM           Anesthesia Evaluation  Patient summary reviewed and Nursing notes reviewed no history of anesthetic complications:   Airway: Mallampati: III  TM distance: >3 FB   Neck ROM: full  Mouth opening: > = 3 FB   Dental:          Pulmonary:Negative Pulmonary ROS breath sounds clear to auscultation      (-) shortness of breath and not a current smoker                           Cardiovascular:    (+) hypertension:, valvular problems/murmurs (TAVR):, pacemaker: AICD and pacemaker, past MI:, CAD:, CABG/stent:, dysrhythmias: atrial fibrillation, hyperlipidemia        Rhythm: irregular  Rate: normal           Beta Blocker:  Dose within 24 Hrs         Neuro/Psych:   (+) neuromuscular disease:,              ROS comment: neuropathy GI/Hepatic/Renal:   (+) GERD: well controlled,          ROS comment: dysphagia. Endo/Other:    (+) blood dyscrasia: anticoagulation therapy, arthritis:., .                 Abdominal:             Vascular: Other Findings:             Anesthesia Plan      MAC     ASA 3       Induction: intravenous. continuous noninvasive hemodynamic monitor    Anesthetic plan and risks discussed with patient. Plan discussed with CRNA.                     Layla Del Cid MD   2/9/2023

## 2023-02-10 ENCOUNTER — TELEPHONE (OUTPATIENT)
Dept: NON INVASIVE DIAGNOSTICS | Age: 88
End: 2023-02-10

## 2023-02-10 ENCOUNTER — CLINICAL DOCUMENTATION ONLY (OUTPATIENT)
Facility: CLINIC | Age: 88
End: 2023-02-10

## 2023-02-14 ENCOUNTER — CLINICAL DOCUMENTATION ONLY (OUTPATIENT)
Facility: CLINIC | Age: 88
End: 2023-02-14

## 2023-02-15 ENCOUNTER — TELEPHONE (OUTPATIENT)
Dept: CARDIOLOGY CLINIC | Age: 88
End: 2023-02-15

## 2023-02-15 NOTE — TELEPHONE ENCOUNTER
Wife states that patient has been having dizziness and heart rate is in the 50s, she said his oxygen level is low and he is seeing pcp tomorrow and EP on Friday, OV is scheduled on 3/9,please advise

## 2023-02-16 ENCOUNTER — OFFICE VISIT (OUTPATIENT)
Dept: PRIMARY CARE CLINIC | Age: 88
End: 2023-02-16

## 2023-02-16 VITALS
HEART RATE: 67 BPM | OXYGEN SATURATION: 94 % | WEIGHT: 169 LBS | HEIGHT: 68 IN | BODY MASS INDEX: 25.61 KG/M2 | TEMPERATURE: 97.4 F | DIASTOLIC BLOOD PRESSURE: 64 MMHG | RESPIRATION RATE: 18 BRPM | SYSTOLIC BLOOD PRESSURE: 166 MMHG

## 2023-02-16 DIAGNOSIS — N18.32 STAGE 3B CHRONIC KIDNEY DISEASE (HCC): ICD-10-CM

## 2023-02-16 DIAGNOSIS — R09.89 BILATERAL RALES: ICD-10-CM

## 2023-02-16 DIAGNOSIS — Z95.810 ICD (IMPLANTABLE CARDIOVERTER-DEFIBRILLATOR), DUAL, IN SITU: ICD-10-CM

## 2023-02-16 DIAGNOSIS — I10 ESSENTIAL HYPERTENSION: ICD-10-CM

## 2023-02-16 DIAGNOSIS — I48.0 PAROXYSMAL ATRIAL FIBRILLATION (HCC): ICD-10-CM

## 2023-02-16 DIAGNOSIS — Z09 HOSPITAL DISCHARGE FOLLOW-UP: Primary | ICD-10-CM

## 2023-02-16 DIAGNOSIS — I50.42 CHRONIC COMBINED SYSTOLIC AND DIASTOLIC CHF (CONGESTIVE HEART FAILURE) (HCC): ICD-10-CM

## 2023-02-16 LAB
ALBUMIN SERPL-MCNC: 4.5 G/DL (ref 3.5–5.2)
ALP BLD-CCNC: 152 U/L (ref 40–129)
ALT SERPL-CCNC: 26 U/L (ref 0–40)
ANION GAP SERPL CALCULATED.3IONS-SCNC: 13 MMOL/L (ref 7–16)
AST SERPL-CCNC: 42 U/L (ref 0–39)
BASOPHILS ABSOLUTE: 0.04 E9/L (ref 0–0.2)
BASOPHILS RELATIVE PERCENT: 0.5 % (ref 0–2)
BILIRUB SERPL-MCNC: 0.3 MG/DL (ref 0–1.2)
BUN BLDV-MCNC: 24 MG/DL (ref 6–23)
CALCIUM SERPL-MCNC: 9.2 MG/DL (ref 8.6–10.2)
CHLORIDE BLD-SCNC: 93 MMOL/L (ref 98–107)
CO2: 34 MMOL/L (ref 22–29)
CREAT SERPL-MCNC: 1.4 MG/DL (ref 0.7–1.2)
EOSINOPHILS ABSOLUTE: 0.08 E9/L (ref 0.05–0.5)
EOSINOPHILS RELATIVE PERCENT: 1.1 % (ref 0–6)
GFR SERPL CREATININE-BSD FRML MDRD: 49 ML/MIN/1.73
GLUCOSE BLD-MCNC: 120 MG/DL (ref 74–99)
HCT VFR BLD CALC: 39.8 % (ref 37–54)
HEMOGLOBIN: 12.8 G/DL (ref 12.5–16.5)
IMMATURE GRANULOCYTES #: 0.02 E9/L
IMMATURE GRANULOCYTES %: 0.3 % (ref 0–5)
LYMPHOCYTES ABSOLUTE: 1.11 E9/L (ref 1.5–4)
LYMPHOCYTES RELATIVE PERCENT: 15.1 % (ref 20–42)
MCH RBC QN AUTO: 30.4 PG (ref 26–35)
MCHC RBC AUTO-ENTMCNC: 32.2 % (ref 32–34.5)
MCV RBC AUTO: 94.5 FL (ref 80–99.9)
MONOCYTES ABSOLUTE: 0.63 E9/L (ref 0.1–0.95)
MONOCYTES RELATIVE PERCENT: 8.5 % (ref 2–12)
NEUTROPHILS ABSOLUTE: 5.49 E9/L (ref 1.8–7.3)
NEUTROPHILS RELATIVE PERCENT: 74.5 % (ref 43–80)
PDW BLD-RTO: 14 FL (ref 11.5–15)
PLATELET # BLD: 333 E9/L (ref 130–450)
PMV BLD AUTO: 10.9 FL (ref 7–12)
POTASSIUM SERPL-SCNC: 4.2 MMOL/L (ref 3.5–5)
PRO-BNP: 594 PG/ML (ref 0–450)
RBC # BLD: 4.21 E12/L (ref 3.8–5.8)
SODIUM BLD-SCNC: 140 MMOL/L (ref 132–146)
TOTAL PROTEIN: 7.4 G/DL (ref 6.4–8.3)
WBC # BLD: 7.4 E9/L (ref 4.5–11.5)

## 2023-02-16 ASSESSMENT — ENCOUNTER SYMPTOMS
SHORTNESS OF BREATH: 0
WHEEZING: 0
VOMITING: 0
NAUSEA: 0
ABDOMINAL PAIN: 0
RHINORRHEA: 0
CONSTIPATION: 0
SORE THROAT: 0
DIARRHEA: 0

## 2023-02-16 ASSESSMENT — PATIENT HEALTH QUESTIONNAIRE - PHQ9
SUM OF ALL RESPONSES TO PHQ QUESTIONS 1-9: 0
SUM OF ALL RESPONSES TO PHQ QUESTIONS 1-9: 0
2. FEELING DOWN, DEPRESSED OR HOPELESS: 0
SUM OF ALL RESPONSES TO PHQ QUESTIONS 1-9: 0
SUM OF ALL RESPONSES TO PHQ QUESTIONS 1-9: 0
SUM OF ALL RESPONSES TO PHQ9 QUESTIONS 1 & 2: 0
1. LITTLE INTEREST OR PLEASURE IN DOING THINGS: 0

## 2023-02-16 NOTE — PROGRESS NOTES
Post-Discharge Transitional Care Follow Up      Farhan Pandey   YOB: 1935    Date of Office Visit:  2/16/2023  Date of Hospital Admission: 2/4/23  Date of Hospital Discharge: 2/9/23  Readmission Risk Score (high >=14%. Medium >=10%):Readmission Risk Score: 15.3      Care management risk score Rising risk (score 2-5) and Complex Care (Scores >=6): No Risk Score On File     Non face to face  following discharge, date last encounter closed (first attempt may have been earlier): 02/10/2023     Call initiated 2 business days of discharge: Yes     Hospital discharge follow-up  -     [place] DISCHARGE MEDS RECONCILED W/ CURRENT OUTPATIENT MED LIST  Paroxysmal atrial fibrillation ([facility]  -     Ambulatory Referral to Care Management with Device (Remote Patient Monitoring)  Chronic combined systolic and diastolic CHF (congestive heart failure) (HCC)  -     CBC with Auto Differential; Future  -     Brain Natriuretic Peptide; Future  -     Comprehensive Metabolic Panel; Future  -     Ambulatory Referral to Care Management with Device (Remote Patient Monitoring)  -     XR CHEST (2 VW); Future  ICD (implantable cardioverter-defibrillator), dual, in situ  -     Ambulatory Referral to Care Management with Device (Remote Patient Monitoring)  Stage 3b chronic kidney disease ([facility]  -     Ambulatory Referral to Care Management with Device (Remote Patient Monitoring)  Essential hypertension  Bilateral rales    Lab work performed today. We will also check a chest x-ray as his rales do seem worse than previous. Patient will need to continue on 2 L of oxygen until he is able to titrate off of such. We will leave this up to cardiology at this time. Patient is oxygenating well on 2 L of oxygen. If patient's BNP is significantly elevated, there is significant findings of fluid on his chest x-ray, and his kidney function is at baseline we may decide to increase his Bumex for 2 to 3 days.   Afterwards patient will follow-up with CHF clinic and cardiology and electrophysiology. With any new or worsening symptoms patient is to proceed to the emergency department. Patient's blood pressure is slightly elevated today. However we will hold off on modifying any of his medications given modifications that were recently completed in the hospital and his reports that his blood pressure at home is usually near 019 systolically. Patient will need to check all of his medications when he gets home and contact me for refills of his medications if needed prior to his establishment appointment with a new primary care physician. Medical Decision Making: high complexity  Return in about 8 weeks (around 4/13/2023) for Establish with new PCP. On this date 2/16/2023 I have spent 34 minutes reviewing previous notes, test results and face to face with the patient discussing the diagnosis and importance of compliance with the treatment plan as well as documenting on the day of the visit. Subjective:   HPI    Inpatient course: Discharge summary reviewed- see chart. Patient was hospitalized for return of atrial fibrillation. Patient also had acute on chronic heart failure. Patient was admitted. He underwent and a cardioversion and had his medications modified. Since being discharged from the hospital patient has been persistently on 2 L of oxygen. Patient reports that when he takes his oxygen off his levels will drop below 92. Patient's oxygen level today is 94% on 2 L. Patient has planned follow-up with both cardiology and electrophysiology within the next several weeks. Patient has planned appointment with CHF clinic next week. Patient reports compliance with all of his current medications. Reports that his weight has been stable at home. Interval history/Current status: Overall patient reports that he is doing well. Does not have any significant shortness of breath. Denies any significant lower extremity swelling or edema. Some mild fatigue. Has been taking his medications as prescribed. No recent blood work since leaving the hospital.    Patient Active Problem List   Diagnosis    Chronic combined systolic and diastolic CHF (congestive heart failure) ([facility])    S/P TAVR (transcatheter aortic valve replacement)    Chronic atrial fibrillation    Coronary artery disease involving native coronary artery of native heart without angina pectoris    Essential hypertension    Persistent atrial fibrillation (HCC)    High risk medications (not anticoagulants) long-term use    Paroxysmal atrial fibrillation (HCC)    Hyperlipidemia LDL goal <100    GIB (gastrointestinal bleeding)    ICD (implantable cardioverter-defibrillator), dual, in situ    Ventricular tachycardia    Ischemic heart disease    Vitamin D deficiency    Other insomnia    SOB (shortness of breath)    Lung nodule    Acute on chronic heart failure with preserved ejection fraction (HFpEF) ([address]    Pure hypercholesterolemia    Stage 3b chronic kidney disease ([address]    Transient cerebral ischemia    Aortic valve disease    Near syncope    Acute on chronic combined systolic and diastolic CHF (congestive heart failure) (HCC)    Acute congestive heart failure (HCC)    Gastroesophageal reflux disease without esophagitis    Coronary artery disease involving coronary bypass graft    Symptomatic anemia    GI bleed    Abdominal aortic aneurysm (AAA) 3.0 cm to 5.5 cm in diameter in male    ICD (implantable cardioverter-defibrillator) discharge    ICD (implantable cardioverter-defibrillator) in place    PAF (paroxysmal atrial fibrillation) ([address]    Acute on chronic diastolic congestive heart failure ([address]       Medications listed as ordered at the time of discharge from hospital     Medication List            Accurate as of February 16, 2023  2:39 PM. If you have any questions, ask your nurse or doctor.                 CHANGE how you take these medications      amLODIPine 2.5 MG tablet  Commonly known as: NORVASC  TAKE 1 TABLET BY MOUTH  DAILY  What changed: when to take this     ferrous sulfate 325 (65 Fe) MG tablet  Commonly known as: IRON 325  Take 1 tablet by mouth daily (with breakfast)  What changed: when to take this     pantoprazole 40 MG tablet  Commonly known as: PROTONIX  TAKE 1 TABLET BY MOUTH  DAILY  What changed: when to take this            CONTINUE taking these medications      ALPRAZolam 0.25 MG tablet  Commonly known as: XANAX     * amiodarone 200 MG tablet  Commonly known as: CORDARONE     * amiodarone 400 MG tablet  Commonly known as: PACERONE  Take 1 tablet by mouth daily     bumetanide 2 MG tablet  Commonly known as: BUMEX  Take 1 tablet by mouth in the morning and 1 tablet in the evening. MAGnesium-Oxide 400 (240 Mg) MG tablet  Generic drug: magnesium oxide     meclizine 25 MG tablet  Commonly known as: ANTIVERT  Take 1 tablet by mouth daily as needed for Dizziness     METOPROLOL TARTRATE PO     potassium chloride 20 MEQ/15ML (10%) oral solution  Take 15 mLs by mouth daily     pravastatin 40 MG tablet  Commonly known as: PRAVACHOL     rivaroxaban 15 MG Tabs tablet  Commonly known as: XARELTO  Take 1 tablet by mouth Daily with supper     vitamin D 50 MCG (2000 UT) Caps capsule           * This list has 2 medication(s) that are the same as other medications prescribed for you. Read the directions carefully, and ask your doctor or other care provider to review them with you. Medications marked \"taking\" at this time  Outpatient Medications Marked as Taking for the 2/16/23 encounter (Office Visit) with Terrance Paulson MD   Medication Sig Dispense Refill    rivaroxaban ([place]) 15 MG TABS tablet Take 1 tablet by mouth Daily with supper 30 tablet 0    bumetanide (BUMEX) 2 MG tablet Take 1 tablet by mouth in the morning and 1 tablet in the evening.  30 tablet 3    amiodarone (PACERONE) 400 MG tablet Take 1 tablet by mouth daily 30 tablet 0    potassium chloride 20 MEQ/15ML (10%) oral solution Take 15 mLs by mouth daily 450 mL 2    amLODIPine (NORVASC) 2.5 MG tablet TAKE 1 TABLET BY MOUTH  DAILY (Patient taking differently: Take 2.5 mg by mouth at bedtime) 90 tablet 3    ALPRAZolam (XANAX) 0.25 MG tablet Take 0.25 mg by mouth 3 times daily as needed for Sleep.      amiodarone (CORDARONE) 200 MG tablet Take 200 mg by mouth daily Daily beginning 7/16/22 per Dr. Richie Tracy      meclizine (ANTIVERT) 25 MG tablet Take 1 tablet by mouth daily as needed for Dizziness 90 tablet 0    METOPROLOL TARTRATE PO Take 25 mg by mouth 2 times daily      pravastatin (PRAVACHOL) 40 MG tablet Take 40 mg by mouth at bedtime      pantoprazole (PROTONIX) 40 MG tablet TAKE 1 TABLET BY MOUTH  DAILY (Patient taking differently: Take 40 mg by mouth Daily with lunch) 90 tablet 3    ferrous sulfate (IRON 325) 325 (65 Fe) MG tablet Take 1 tablet by mouth daily (with breakfast) (Patient taking differently: Take 325 mg by mouth Daily with lunch) 90 tablet 1    MAGNESIUM-OXIDE 400 (240 Mg) MG tablet Take 400 mg by mouth Daily with supper      Cholecalciferol (VITAMIN D) 2000 UNITS CAPS capsule Take 2,000 Units by mouth Daily with lunch          Medications patient taking as of now reconciled against medications ordered at time of hospital discharge: Yes    Review of Systems   Constitutional:  Positive for fatigue. Negative for chills and fever. HENT:  Negative for congestion, rhinorrhea and sore throat. Respiratory:  Negative for shortness of breath and wheezing. Cardiovascular:  Negative for chest pain and leg swelling. Gastrointestinal:  Negative for abdominal pain, constipation, diarrhea, nausea and vomiting. Skin:  Negative for rash. Neurological:  Positive for light-headedness. Negative for headaches.      Objective:    BP (!) 166/64   Pulse 67   Temp 97.4 °F (36.3 °C) (Temporal)   Resp 18   Ht 5' 8\" (1.727 m)   Wt 169 lb (76.7 kg)   SpO2 94%   BMI 25.70 kg/m²   Physical Exam  Constitutional:       Appearance: He is well-developed. HENT:      Head: Normocephalic. Eyes:      Extraocular Movements: Extraocular movements intact. Conjunctiva/sclera: Conjunctivae normal.   Cardiovascular:      Rate and Rhythm: Normal rate and regular rhythm. Heart sounds: Murmur heard. Pulmonary:      Effort: Pulmonary effort is normal.      Breath sounds: Rales present. No wheezing. Abdominal:      General: Bowel sounds are normal.      Palpations: Abdomen is soft. Tenderness: There is no abdominal tenderness. Musculoskeletal:      Right lower leg: No edema. Left lower leg: No edema. Neurological:      General: No focal deficit present. Mental Status: He is alert. Comments: Cranial nerves grossly intact   Psychiatric:         Mood and Affect: Mood normal.         Judgment: Judgment normal.       An electronic signature was used to authenticate this note.   --Roger Hall MD

## 2023-02-20 ENCOUNTER — CARE COORDINATION (OUTPATIENT)
Dept: CARE COORDINATION | Age: 88
End: 2023-02-20

## 2023-02-20 DIAGNOSIS — I50.33 ACUTE ON CHRONIC DIASTOLIC CONGESTIVE HEART FAILURE (HCC): Primary | ICD-10-CM

## 2023-02-20 DIAGNOSIS — I10 ESSENTIAL HYPERTENSION: ICD-10-CM

## 2023-02-20 SDOH — HEALTH STABILITY: PHYSICAL HEALTH: ON AVERAGE, HOW MANY MINUTES DO YOU ENGAGE IN EXERCISE AT THIS LEVEL?: 0 MIN

## 2023-02-20 SDOH — HEALTH STABILITY: PHYSICAL HEALTH: ON AVERAGE, HOW MANY DAYS PER WEEK DO YOU ENGAGE IN MODERATE TO STRENUOUS EXERCISE (LIKE A BRISK WALK)?: 0 DAYS

## 2023-02-20 SDOH — ECONOMIC STABILITY: FOOD INSECURITY: WITHIN THE PAST 12 MONTHS, THE FOOD YOU BOUGHT JUST DIDN'T LAST AND YOU DIDN'T HAVE MONEY TO GET MORE.: NEVER TRUE

## 2023-02-20 SDOH — ECONOMIC STABILITY: FOOD INSECURITY: WITHIN THE PAST 12 MONTHS, YOU WORRIED THAT YOUR FOOD WOULD RUN OUT BEFORE YOU GOT MONEY TO BUY MORE.: NEVER TRUE

## 2023-02-20 ASSESSMENT — SOCIAL DETERMINANTS OF HEALTH (SDOH)
HOW OFTEN DO YOU GET TOGETHER WITH FRIENDS OR RELATIVES?: TWICE A WEEK
DO YOU BELONG TO ANY CLUBS OR ORGANIZATIONS SUCH AS CHURCH GROUPS UNIONS, FRATERNAL OR ATHLETIC GROUPS, OR SCHOOL GROUPS?: NO
HOW HARD IS IT FOR YOU TO PAY FOR THE VERY BASICS LIKE FOOD, HOUSING, MEDICAL CARE, AND HEATING?: SOMEWHAT HARD
HOW OFTEN DO YOU ATTENT MEETINGS OF THE CLUB OR ORGANIZATION YOU BELONG TO?: NEVER
IN A TYPICAL WEEK, HOW MANY TIMES DO YOU TALK ON THE PHONE WITH FAMILY, FRIENDS, OR NEIGHBORS?: MORE THAN THREE TIMES A WEEK
HOW OFTEN DO YOU ATTEND CHURCH OR RELIGIOUS SERVICES?: NEVER

## 2023-02-20 ASSESSMENT — LIFESTYLE VARIABLES: HOW OFTEN DO YOU HAVE A DRINK CONTAINING ALCOHOL: NEVER

## 2023-02-20 NOTE — CARE COORDINATION
-PCP referral.     Ambulatory Care Coordination Note  2023    Patient Current Location:  Home: Christine Hamilton  [address] [facility] [address]    ACM contacted the patient by telephone. Verified name and  with patient as identifiers. Provided introduction to self, and explanation of the ACM role. ACM: Minnie Tomlin RN    Challenges to be reviewed by the provider   Additional needs identified to be addressed with provider: Yes  none               Method of communication with provider: chart routing. Offered patient enrollment in the Remote Patient Monitoring (RPM) program for in-home monitoring: Yes, patient enrolled:     Remote Patient Monitoring Enrollment Note      Date/Time: 2023 12:10 PM    Offered patient enrollment in the [facility] Remote Patient Monitoring (RPM) program for in home monitoring for CHF and HTN. Patient accepted RPM services. Patient will be monitoring the following daily:  blood pressure reading, blood pressure heart rate, pulse ox reading, pulse ox heart rate, survey response, and weight    ACM reviewed the information below with patient:    Emergency Contact (name and contact number):   Denise Evangelista SAMIR # [phone]. [x] A member from the care coordination team will reach out to notify the patient once the RPM kit is ordered. [x] Once the kit is delivered, the [facility] team will contact the patient after UPS deliver to assist with set up. [x] Determined BP cuff size: regular (9.05\"-15.74\")      [x] Determined weight scale: regular (<330lbs)                                                [x] Hours of ACM monitoring - Monday-Friday [id number]                     All questions about RPM program answered at this time.       Enrollment  ACM contacted patient to complete Care Coordination enrollment CHF, HTN, AICD, A Fib, PAP (Xarelto) & explore community resources.       -Specialists noted: Dr. Harris Malin ([facility]), Dr Prerna Johnson ([facility] Cardio), [address] [facility], Dr. Rene Saldana (GI).  -Pt lives with his wife in a ranch style home with 2 steps to enter and laundry in basement.   -Pt is on oxygen 2L/nc continuous. Pt's oxygen company is [employer] in [facility].   -Pt has a BP monitor, pulse ox monitor and scale. Pt said he exercises with the incentive spirometer several times a day. -Pt has macular degeneration and his vision is limited. -Pt has 2 children, a daughter and son. Both local.   -Pt reports his sister lives in [place] [facility].   -Pt reports being independent with ADL's.   -Pt reports his wife does all the cooking, housekeeping, laundry, grocery shopping and managing finances.    -Pt reports he is active with [employer]. Pt receives a 10 day supply of low sodium meals. Pt reports he has a 7 day medication box for medication 3 times a day. Pt said his son fills the medication box and he and his wife dispense the medications. Pt is aware of all the medications he is on.   -Pt he has not driven for several years. Pt main transportation is his wife. Pt's daughter and son also assists with transportation.   -Pt reports sleeping well after taking a Xanax at bedtime nightly.   -Pt reports the only exercise he does is walking around his home. He does not exercise on regular bases. Pt said he is not familiar with You tube for chair exercises. Dizziness and low heart rate.   -Pt reports he was experiencing intermittent dizziness and heart rate in the 50's after being discharged from the hospital.   -Pt informed Cardiology. Pt said the Metoprolol was decreased to 25 mg 2x/day. -Pt reports dizziness is gone and his HR was 65 today.   -Pt reports taking Meclizine as needed with some relief. CHF/HTN:  -Pt has a BP monitor, scale, pulse ox. Today's /63, HR 65, pulse ox 94%, weight 165 lbs. Pt said his average weight is 165 to 170 lbs. -Pt reports he drinks 3 to 4 bottles of water daily.  Pt said he does not know if he is on a fluid restriction or how many glasses of water to drink daily. Pt plans to ask Cardiology at next visit.   -Pt reports he is on a low sodium diet. Pt said he does not add salt to food. Pt said his wife does all the cooking and his children bring meals to them often.   -Pt reports his wife and daughter read food labels for sodium. Discussed how salt is hidden in foods like: canned soup & vegetables, packaged products like rice and boxed mac & cheese, lunch meats, cheese, TV dinners, condiments, marcano, sausage. ....... Offered dietitian referral, Pt & wife declined.   -Pt reports light fatigue at times.   -Pt reports occasional productive cough with clear mucous.   -Pt reports no SOB, wheeze or ankle/feet edema.   -Educated CHF zone tool, will mail. Will mail additional education regarding HTN, monitoring sodium, reading labels and monitoring logs for BP, pulse ox, weight. Resources  -Pt reports being on Xarelto. Pt is interested in a better price if he qualifies. PAP referral made.   -Pt reports having the [employer] property tax. -Pt reports he does not qualify for utility assistance. .  -Pt reports he has a LW & POA. Declined ACP. -Pt reports his son is active on Pt's [employer]. PLAN:  -Continue Care Coordination  -Reinforce Zone Management Tools (CHF)  -RPM services initiated  -Pap referral > Xarelto. -F/u monitoring low sodium diet. -F/u on dizziness and heart rate  -Will mail additional education regarding CHF, HTN, Fall Precaution, monitoring sodium, reading labels and monitoring logs for BP, pulse ox, weight.     -Next anticipated outreach by [address] [place] [address] Team: one week.       Ambulatory Care Coordination Assessment    Care Coordination Protocol  Referral from Primary Care Provider: Yes  Week 1 - Initial Assessment     Do you have all of your prescriptions and are they filled?: Yes  Barriers to medication adherence: None  Are you able to afford your medications?: No  How often do you have trouble taking your medications the way you have been told to take them?: I always take them as prescribed. Do you have Home O2 Therapy?: Yes   Oxygen Regimen: Continuous Flow - Enter rate/FIO2: 2   Method of Delivery: Nasal Cannula   CPAP Use: None      Ability to seek help/take action for Emergent Urgent situations i.e. fire, crime, inclement weather or health crisis. : Independent  Ability to ambulate to restroom: Independent  Ability handle personal hygeine needs (bathing/dressing/grooming): Independent  Ability to manage Medications: Dependent  Ability to prepare Food Preparation: Dependent  Ability to maintain home (clean home, laundry): Dependent  Ability to drive and/or has transportation: Dependent  Ability to do shopping: Dependent  Ability to manage finances: Dependent  Is patient able to live independently?: Yes     Current Housing: Private Residence        Per the Fall Risk Screening, did the patient have 2 or more falls or 1 fall with injury in the past year?: No     Frequent urination at night?: Yes  Do you use rails/bars?: Yes  Do you have a non-slip tub mat?: Yes     Are you experiencing loss of meaning?: No  Are you experiencing loss of hope and peace?: No     Thinking about your patient's physical health needs, are there any symptoms or problems (risk indicators) you are unsure about that require further investigation?: Mild vague physical symptoms or problems; but do not impact on daily life or are not of concern to patient   Are the patients physical health problems impacting on their mental well-being?: Mild impact on mental well-being e.g. \"\"feeling fed-up\"\", \"\"reduced enjoyment\"\"   Are there any problems with your patients lifestyle behaviors (alcohol, drugs, diet, exercise) that are impacting on physical or mental well-being?: Some mild concern of potential negative impact on well-being   Do you have any other concerns about your patients mental well-being?  How would you rate their severity and impact on the patient?: Mild problems - don't interfere with function   How would you rate their home environment in terms of safety and stability (including domestic violence, insecure housing, neighbor harassment)?: Consistently safe, supportive, stable, no identified problems   How do daily activities impact on the patient's well-being? (include current or anticipated unemployment, work, caregiving, access to transportation or other): Some general dissatisfaction but no concern   How would you rate their social network (family, work, friends)?: Good participation with social networks   How would you rate their financial resources (including ability to afford all required medical care)?: Financially secure, some resource challenges   How wells does the patient now understand their health and well-being (symptoms, signs or risk factors) and what they need to do to manage their health?: Reasonable to good understanding and already engages in managing health or is willing to undertake better management   How well do you think your patient can engage in healthcare discussions? (Barriers include language, deafness, aphasia, alcohol or drug problems, learning difficulties, concentration): Clear and open communication, no identified barriers   Do other services need to be involved to help this patient?: Other care/services not in place and required   Are current services involved with this patient well-coordinated? (Include coordination with other services you are now recommendation): All required care/services in place and well-coordinated   Suggested Interventions and Community Resources  Fall Risk Prevention: Completed Other Services or Interventions: Will mail education for HTN, monitoring sodium, reading labels and monitoring logs for BP, weight, pulse ox.   Medication Assistance Program: Completed   Pharmacist: Declined   Registered Dietician: Declined   Social Work: Declined   Other Services: Completed   Zone Management Tools: Completed         Set up/Review  an Education Plan, Set up/Review Goals              Future Appointments   Date Time Provider Cezar Gamez   2/21/2023 10:15 AM YAJAIRA CHF ROOM 2 [facility]F [facility]   3/9/2023  8:15 AM Jb Bolton  [facility], [address] [facility]   3/17/2023 12:15 PM MD Vidhi Cam[facility]   4/14/2023  2:30 PM DO [facility]CILLA [facility]      and   Congestive Heart Failure Assessment    Are you currently restricting fluids?: No Restriction  Do you understand a low sodium diet?: Yes  Do you understand how to read food labels?: Yes  How many restaurant meals do you eat per week?: 1-2  Do you salt your food before tasting it?: No     No patient-reported symptoms      Symptoms:  None: Yes      Symptom course: stable  Patient-reported weight (lb): 165  Weight trend: stable  Salt intake watch compared to last visit: stable

## 2023-02-20 NOTE — LETTER
2/20/2023    Matheus Horn  [id number]      Dear Matheus Horn,    I enjoyed speaking with you and wanted to send some additional information. Thania Salazar MD and I will work together to ensure your needs are met and help you achieve your health goals. We are committed to walk with you on this journey and look forward to working with you. Please feel free to contact me with any questions or concerns. I am available by phone at [phone]. Please mail education regarding CHF, HTN, Fall Precautions, monitoring sodium, reading food labels and monitoring logs for BP, weight and pulse ox.           In good health,       Namrata Lizarraga RN

## 2023-02-20 NOTE — CARE COORDINATION
-ACM spoke to patient to offer enrollment into Care Coordination program & RPM services.  -Introduced self, reason for call, and explanation of program.   -Patient accepted. -Plan: ACM will reach out to patient at an agreed upon date and time for Care Coordination enrollment.

## 2023-02-20 NOTE — CARE COORDINATION
Remote Patient Kit Ordering Note      Date/Time:  2/20/2023 1:22 PM      [x] CCSS confirmed patient shipping address  [x] Patient will receive package over the next 2-4 business days. Someone 21 years or older must be present to sign for UPS delivery. [x] Patient to contact virtual installation-specific phone number listed in the patient instructions. [x] If the patient does not contact HRS within 24 hours, an Central Desktop0 Ambassador [facility] will call the patient directly: If the patient does not answer, HRS will follow up with the clinical team notifying them about the unsuccessful attempt to contact the patient. HRS will make three call attempts to the patient. [x] LPN will contact patient once equipment is active to welcome them to the program.                                                         [x] Hours of RPM monitoring - Monday-Friday [phone]                     All questions answered at this time. ACM made aware the RPM kit has been ordered. CCSS notified patient of RPM equipment order.

## 2023-02-20 NOTE — PROGRESS NOTES
2/20/23 3:42 PM       Remote Patient Monitoring Treatment Plan    Received request from ACLYNNE/ABDI Holguin RN to order remote patient monitoring for in home monitoring of CHF and HTN and order completed. Patient will be monitoring blood pressure   pulse ox   weight. Patient will engage in Remote Patient Monitoring each day to develop the skills necessary for self management. RPM Care Team Responsibilities:   Alerts will be reviewed daily and addressed within 2-4 hours during operational hours (Monday -Friday 9 am-4 pm)  Alert response and intervention documented in patient medical record  Alert response escalated to PCP per protocol and documented in patient medical record  Patient monitored over approximately  days  Discharge from program based on self-management readiness    See care coordination encounters for additional details.       Ileana Browne DNP, FNP-C, Remote Patient Monitoring NP, () [phone]

## 2023-02-21 ENCOUNTER — HOSPITAL ENCOUNTER (OUTPATIENT)
Dept: OTHER | Age: 88
Setting detail: THERAPIES SERIES
Discharge: HOME | End: 2023-02-21
Payer: MEDICARE

## 2023-02-21 VITALS
SYSTOLIC BLOOD PRESSURE: 144 MMHG | OXYGEN SATURATION: 97 % | DIASTOLIC BLOOD PRESSURE: 63 MMHG | BODY MASS INDEX: 25.54 KG/M2 | HEART RATE: 78 BPM | RESPIRATION RATE: 16 BRPM | WEIGHT: 168 LBS

## 2023-02-21 LAB
ANION GAP SERPL CALCULATED.3IONS-SCNC: 8 MMOL/L (ref 7–16)
BUN BLDV-MCNC: 29 MG/DL (ref 6–23)
CALCIUM SERPL-MCNC: 8.9 MG/DL (ref 8.6–10.2)
CHLORIDE BLD-SCNC: 94 MMOL/L (ref 98–107)
CO2: 38 MMOL/L (ref 22–29)
CREAT SERPL-MCNC: 1.4 MG/DL (ref 0.7–1.2)
GFR SERPL CREATININE-BSD FRML MDRD: 49 ML/MIN/1.73
GLUCOSE BLD-MCNC: 95 MG/DL (ref 74–99)
POTASSIUM SERPL-SCNC: 4 MMOL/L (ref 3.5–5)
PRO-BNP: 479 PG/ML (ref 0–450)
SODIUM BLD-SCNC: 140 MMOL/L (ref 132–146)

## 2023-02-21 PROCEDURE — 80048 BASIC METABOLIC PNL TOTAL CA: CPT

## 2023-02-21 PROCEDURE — 36415 COLL VENOUS BLD VENIPUNCTURE: CPT

## 2023-02-21 PROCEDURE — 99214 OFFICE O/P EST MOD 30 MIN: CPT

## 2023-02-21 PROCEDURE — 83880 ASSAY OF NATRIURETIC PEPTIDE: CPT

## 2023-02-21 NOTE — PROGRESS NOTES
Congestive Heart Failure [address]   CHF Clinic [facility]  [address]   1935          Referring Provider: LAWRENCE Vega  Primary Care Physician: Dr. Jan Rosario  Cardiologist: Dr. Stacy Stover  Nephrologist:         History of Present Illness:     Hayley Retana is a 82 y.o. male with a history of HFpEF, most recent EF 60% on 3/2/2022. Patient Story:    He does  have dyspnea with exertion, shortness of breath, or decline in overall functional capacity. He does not have orthopnea, PND, nocturnal cough or hemoptysis. He does not have abdominal distention or bloating, early satiety, anorexia/change in appetite. He does have a good urinary response to  oral diuretic. He does not have lower extremity edema. He ocassionally gets lightheadedness,Hx of Vertigo dizziness. He denies palpitations, syncope or near syncope. He does not complain of chest pain, pressure, discomfort. No Known Allergies        Prior to Visit Medications    Medication Sig Taking? Authorizing Provider   rivaroxaban (XARELTO) 15 MG TABS tablet Take 1 tablet by mouth Daily with supper  Martin Martinez MD   bumetanide (BUMEX) 2 MG tablet Take 1 tablet by mouth in the morning and 1 tablet in the evening. Emerald Howard MD   potassium chloride 20 MEQ/15ML (10%) oral solution Take 15 mLs by mouth daily  GINETTE Terry CNP   amLODIPine (NORVASC) 2.5 MG tablet TAKE 1 TABLET BY MOUTH  DAILY  Nereida Mccauley DO   ALPRAZolam (XANAX) 0.25 MG tablet Take 0.25 mg by mouth 3 times daily as needed for Sleep.   Historical Provider, MD   amiodarone (CORDARONE) 200 MG tablet Take 200 mg by mouth daily Daily beginning 7/16/22 per Dr. Gagandeep Sepulveda Provider, MD   meclizine (ANTIVERT) 25 MG tablet Take 1 tablet by mouth daily as needed for Theador MD Casandra   METOPROLOL TARTRATE PO Take 25 mg by mouth 2 times daily  Historical Provider, MD pravastatin (PRAVACHOL) 40 MG tablet Take 40 mg by mouth at bedtime  Historical Provider, MD   pantoprazole (PROTONIX) 40 MG tablet TAKE 1 TABLET BY MOUTH  DAILY  Alina Echeverria MD   ferrous sulfate (IRON 325) 325 (65 Fe) MG tablet Take 1 tablet by mouth daily (with breakfast)  Patient taking differently: Take 325 mg by mouth Daily with lunch  Alina Echeverria MD   MAGNESIUM-OXIDE 400 (240 Mg) MG tablet Take 400 mg by mouth Daily with supper  Historical Provider, MD   Cholecalciferol (VITAMIN D) 2000 UNITS CAPS capsule Take 2,000 Units by mouth Daily with lunch  Historical Provider, MD           Physical Examination:     BP (!) 144/63   Pulse 78   Resp 16   Wt 168 lb (76.2 kg)   SpO2 97%   BMI 25.54 kg/m²     Assessment  Charting Type: Shift assessment    Neurological  Level of Consciousness: Alert (0)              Respiratory  Respiratory Pattern: Regular  Respiratory Depth: Normal  Respiratory Quality/Effort: Dyspnea with exertion  L Breath Sounds: Clear, Fine Crackles  R Breath Sounds: Clear, Fine Crackles    Breath Sounds  Right Upper Lobe: Clear  Right Middle Lobe: Clear  Right Lower Lobe: Fine Crackles  Left Upper Lobe: Clear  Left Lower Lobe: Fine Crackles         Cardiac  Cardiac Rhythm: AV paced    Rhythm Interpretation  Heart Rate: 78         Gastrointestinal  Abdominal (WDL): Within Defined Limits  Abdomen Inspection: Soft  RUQ Bowel Sounds: Active  LUQ Bowel Sounds: Active  RLQ Bowel Sounds: Active  LLQ Bowel Sounds: Active          Bowel Sounds  RUQ Bowel Sounds: Active  LUQ Bowel Sounds: Active  RLQ Bowel Sounds: Active  LLQ Bowel Sounds:  Active    Peripheral Vascular  Peripheral Vascular (WDL): Within Defined Limits  Edema: None                        Psychosocial  Psychosocial (WDL): Within Defined Limits                        Heart Rate: 78                     LAB DATA:    Last 3 BMP      Sodium (mmol/L)   Date Value   02/21/2023 140   02/16/2023 140   02/09/2023 136     Potassium (mmol/L)   Date Value   02/21/2023 4.0   02/16/2023 4.2   02/09/2023 3.9     Potassium reflex Magnesium (mmol/L)   Date Value   08/23/2022 3.8   06/27/2022 4.6   03/12/2022 4.3     Chloride (mmol/L)   Date Value   02/21/2023 94 (L)   02/16/2023 93 (L)   02/09/2023 92 (L)     CO2 (mmol/L)   Date Value   02/21/2023 38 (H)   02/16/2023 34 (H)   02/09/2023 36 (H)     BUN (mg/dL)   Date Value   02/21/2023 29 (H)   02/16/2023 24 (H)   02/09/2023 20     Glucose (mg/dL)   Date Value   02/21/2023 95   02/16/2023 120 (H)   02/09/2023 99     Calcium (mg/dL)   Date Value   02/21/2023 8.9   02/16/2023 9.2   02/09/2023 8.2 (L)       Last 3 BNP       Pro-BNP (pg/mL)   Date Value   02/21/2023 479 (H)   02/16/2023 594 (H)   02/09/2023 1,858 (H)          CBC: No results for input(s): WBC, HGB, PLT in the last 72 hours. BMP:    Recent Labs     02/21/23  1116      K 4.0   CL 94*   CO2 38*   BUN 29*   CREATININE 1.4*   GLUCOSE 95               Hepatic: No results for input(s): AST, ALT, ALB, BILITOT, ALKPHOS in the last 72 hours. Troponin: No results for input(s): TROPONINI in the last 72 hours. BNP: No results for input(s): BNP in the last 72 hours. Lipids: No results for input(s): CHOL, HDL in the last 72 hours. Invalid input(s): LDLCALCU  INR: No results for input(s): INR in the last 72 hours. WEIGHTS:    Wt Readings from Last 3 Encounters:   02/21/23 168 lb (76.2 kg)   02/16/23 169 lb (76.7 kg)   02/09/23 172 lb 12.8 oz (78.4 kg)         TELEMETRY:  Cardiac Regularity: Regular  Cardiac Rhythm/Interpretation: AV paced        ASSESSMENT:  Huyen  has stable weight and no swelling in BLE. No JVD on assessment. Currently wearing 2 liters nasal cannula at all times since hospital discharge and tolerating well. He feels he has a great urinary response to PO diuretic change to bumetanide from furosemide.       Interventions completed this visit:  IV diuretics given- no  Lab work obtained yes, proBNP, BMP  Reviewed currently prescribed medications with patient, educated on importance of compliance and answered any questions regarding their medication  Educated on signs and symptoms of HF  Educated on low sodium diet    PLAN:  Scheduled to follow up in CHF clinic on   Future Appointments   Date Time Provider Cezar Gamez   3/9/2023  8:15 AM Ana Castrejon DO [address]   [facility]   3/17/2023 12:15 PM Rox Link MD [facility]   4/14/2023  2:30 PM DO PRISCILLA Good [facility]     Given clinic phone number [phone] and aware of signs and symptoms to call with any HF change in symptoms.

## 2023-02-27 ENCOUNTER — CARE COORDINATION (OUTPATIENT)
Dept: CARE COORDINATION | Age: 88
End: 2023-02-27

## 2023-02-27 NOTE — CARE COORDINATION
Remote Patient Monitoring Note      Date/Time:  2/27/2023 9:33 AM    CCSS reviewed patients reported daily Remote Patient Monitoring metrics. All reported metrics are within alert parameters. Plan/Follow Up:  Will continue to review, monitor and address alerts with follow up based on severity of symptoms and risk factors Current Patient Metrics ---- Blood Pressure: 132/64, 62bpm Pulseox: 93%, 69bpm Survey: - Weight: 167.0lbs Note Created at: 02/27/2023 09:33 AM ET ---- Time-Spent: 2 minutes 0 seconds

## 2023-02-28 ENCOUNTER — CARE COORDINATION (OUTPATIENT)
Dept: CASE MANAGEMENT | Age: 88
End: 2023-02-28

## 2023-02-28 ENCOUNTER — CARE COORDINATION (OUTPATIENT)
Dept: CARE COORDINATION | Age: 88
End: 2023-02-28

## 2023-02-28 NOTE — CARE COORDINATION
Ambulatory Care Coordination Note  2023    Patient Current Location:  Home: [address] [facility] [address]     ACM contacted the patient by telephone. Verified name and  with patient as identifiers. Provided introduction to self, and explanation of the ACM role. Challenges to be reviewed by the provider   Additional needs identified to be addressed with provider: No  none               Method of communication with provider: none. ACM: Lawrence Garcia RN  For assigned ACM, Rhea Jimenez RN      Offered patient enrollment in the Remote Patient Monitoring (RPM) program for in-home monitoring: Yes, patient already enrolled. ACM contacted patient (for assigned ACM, Rhea Jimenez) to follow up on his status. Patient states he was notified the application for his Xarelto has been sent to his cardiologist.     CHF  -See assessment  -Patient stated he was called from RPM team d/t a low pulse ox reading. He states his finger was cold on the initial reading, and when checked again, the reading was good at 98% on 2l/nc. Per RPM: B/P 138/55, HR: 63, weight 164.  -Patient verified he continues to monitor his salt intake  -Patient is aware of and plans to attend scheduled appointment with cardiologist, Dr. Lashanda Benson on 23.  -Patient states no increased edema in extremities and no abdominal distention. Patient is speaking in full sentences on this outreach.   -Patient feels this disease process is stable and he is in the green zone on the CHF zone tool. PLAN  -Inform assigned ACM to continue Care Coordination  -F/U for any updates on Xarelto from PAP  -Continue to monitor salt intake  -F/U on status of CHF and continue to educate/review CHF Zone tool.      Lab Results       None            Care Coordination Interventions    Referral from Primary Care Provider: Yes  Suggested Interventions and Community Resources  Fall Risk Prevention: Completed (Comment: 23 Will mail Fall Precautions.)  Medication Assistance Program: Completed (Comment: 2/20/23 Referral for Xarelto.)  Pharmacist: Declined  Registered Dietician: Declined  Social Work: Declined  Other Services: Completed (Comment: 2/20/23 RPM services initiated.)  Zone Management Tools: Completed (Comment: 2/20/23 Will mail CHF zone tool.)  Other Services or Interventions: Will mail education for HTN, monitoring sodium, reading labels and monitoring logs for BP, weight, pulse ox. Goals Addressed                   This Visit's Progress     Self Monitoring   On track     Daily Weights - I will weight myself as directed - Daily and write down weights  I will notify my provider of any increase in weight by 3 or more pounds in 2 days OR 5 or more pounds in a week. Blood Pressure - I will take my blood pressure as directed - Daily  I will notify my provider of any changes in blood pressure associated with symptoms of dizziness, falls, passing out, headache, confusion/change in mental status.   Other Self-Monitoring - I will monitor my pulse ox - Daily    None Recently Recorded    Barriers: overwhelmed by complexity of regimen and lack of education  Plan for overcoming my barriers: Care Coordination, RPM Team.   Confidence: 8/10  Anticipated Goal Completion Date: 6-                Future Appointments   Date Time Provider Cezar Gamez   3/9/2023  8:15 AM Karina Hardwick DO [address] [facility]   3/13/2023  9:45 AM Y[facility]IR[facility] ROOM 2 [facility]   [facility]17[facility]2023 12:15 [facility]   4/14/2023  2:30 PM DO PRISCILLA [facility]    and   Congestive Heart Failure Assessment    Are you currently restricting fluids?: No Restriction  Do you understand a low sodium diet?: Yes  Do you understand how to read food labels?: Yes  How many restaurant meals do you eat per week?: 1-2  Do you salt your food before tasting it?: No         Symptoms:  None: Yes      Symptom course: stable  Patient-reported weight (lb): 164  Weight trend: fluctuating minimally  Salt intake watch compared to last visit: stable

## 2023-02-28 NOTE — CARE COORDINATION
Remote Alert Monitoring Note      Date/Time:  2023 9:22 AM      RN  contacted patient by telephone regarding red alert received for pulse ox reading (91). Verified patients name and  as identifiers. Background: Pt enrolled in RPM for CHF and HTN  Refer to 911 immediately if:  Patient unresponsive or unable to provide history  Change in cognition or sudden confusion  Patient unable to respond in complete sentences  Intense chest pain/tightness  Any concern for any clinical emergency  Red Alert: Provider response time of 1 hr required for any red alert requiring intervention  Yellow Alert: Provider response time of 3hr required for any escalated yellow alert    O2 Triage  Are you having any Chest Pain? no   Are you having any Shortness of Breath? no   Swelling in your hands or feet? no     Are you having any other health concerns or issues? no       Clinical Interventions: Reviewed and followed up on alerts and treatments-denies any needs, was surprised at reading, will take reading again while on phone. Pt wears O2 at 2L 24/7. New reading O2 was 98. Plan/Follow Up: Will continue to review, monitor and address alerts with follow up based on severity of symptoms and risk factors.   Current Patient Metrics ---- Blood Pressure: 136/55, 63bpm Pulseox: 96%, 62bpm Survey: - Weight: 164.6lbs Note Created at: 2023 09:36 AM ET ---- Time-Spent: 12 minutes 0 seconds

## 2023-03-01 ENCOUNTER — CARE COORDINATION (OUTPATIENT)
Dept: CARE COORDINATION | Age: 88
End: 2023-03-01

## 2023-03-01 ENCOUNTER — HOSPITAL ENCOUNTER (OUTPATIENT)
Dept: OTHER | Age: 88
Discharge: HOME | End: 2023-03-01

## 2023-03-01 NOTE — CARE COORDINATION
Remote Patient Monitoring Welcome Note  Date/Time:  3/1/2023 3:19 PM  Patient Current Location: Home: [id number]  Verified patients name and  as identifiers. Completed and confirmed the following:   Emergency Contact: Baljit Miner (spouse) [phone] (verified HIPAA in chart)  [x] Patient received all RPM equipment (tablet, scale, blood pressure device and cuff, and pulse oximeter)  Cuff Size: Small  []  Regular   [x]  Large  []   Weight Scale: Regular   [x]  Bariatric  []               [x] Instructed patient keep box for use when returning equipment                                                          [x] Reviewed Patient Welcome Letter with patient                         [x] Reviewed expectations for patient and care team       [x] Instructed patient to keep scale on flat surface                                                         [x] Instructed patient to keep tablet plugged in at all times                         [x] Instructed how to contact IT support (number listed on welcome letter)  [x] Provided Remote Patient Monitoring care  information               All questions answered at this time.    Current Patient Metrics ---- Blood Pressure: 130/64, 65bpm Pulseox: 93%, 61bpm Survey: - Weight: 163.0lbs Note Created at: 2023 03:20 PM ET ---- Time-Spent: 9 minutes 0 seconds

## 2023-03-01 NOTE — CARE COORDINATION
Remote Patient Monitoring Note      Date/Time:  3/1/2023 10:03 AM    CCSS reviewed patients reported daily Remote Patient Monitoring metrics. All reported metrics are within alert parameters.     Plan/Follow Up: Will continue to review, monitor and address alerts with follow up based on severity of symptoms and risk factors Current Patient Metrics ---- Blood Pressure: 130/64, 65bpm Pulseox: 94%, 62bpm Survey: - Weight: 163.0lbs Note Created at: 03/01/2023 10:04 AM ET ---- Time-Spent: 2 minutes 0 seconds

## 2023-03-02 ENCOUNTER — CARE COORDINATION (OUTPATIENT)
Dept: CARE COORDINATION | Age: 88
End: 2023-03-02

## 2023-03-02 NOTE — CARE COORDINATION
Remote Patient Monitoring Note      Date/Time:  3/2/2023 10:22 AM    CCSS reviewed patients reported daily Remote Patient Monitoring metrics. All reported metrics are within alert parameters. Plan/Follow Up:  Will continue to review, monitor and address alerts with follow up based on severity of symptoms and risk factors  Current Patient Metrics ---- Blood Pressure: 119/53, 60bpm Pulseox: 94%, 62bpm Survey: - Weight: 162.4lbs Note Created at: 03/02/2023 10:23 AM ET ---- Time-Spent: 2 minutes 0 seconds

## 2023-03-03 ENCOUNTER — CARE COORDINATION (OUTPATIENT)
Dept: CARE COORDINATION | Age: 88
End: 2023-03-03

## 2023-03-03 NOTE — CARE COORDINATION
Remote Patient Monitoring Note      Date/Time:  3/3/2023 10:39 AM    CCSS reviewed patients reported daily Remote Patient Monitoring metrics. All reported metrics are within alert parameters. Plan/Follow Up:  Will continue to review, monitor and address alerts with follow up based on severity of symptoms and risk factorsCurrent Patient Metrics ---- Blood Pressure: 149/64, 67bpm Pulseox: 98%, 60bpm Survey: - Weight: 163.2lbs Note Created at: 03/03/2023 10:39 AM ET ---- Time-Spent: 2 minutes 0 seconds

## 2023-03-06 ENCOUNTER — CARE COORDINATION (OUTPATIENT)
Dept: CARE COORDINATION | Age: 88
End: 2023-03-06

## 2023-03-06 NOTE — CARE COORDINATION
Remote Patient Monitoring Note      Date/Time:  3/6/2023 9:31 AM    CCSS reviewed patients reported daily Remote Patient Monitoring metrics. All reported metrics are within alert parameters. Plan/Follow Up:  Will continue to review, monitor and address alerts with follow up based on severity of symptoms and risk factors Current Patient Metrics ---- Blood Pressure: 135/58, 59bpm Pulseox: 96%, 60bpm Survey: - Weight: 161.8lbs Note Created at: 03/06/2023 09:31 AM ET ---- Time-Spent: 2 minutes 0 seconds

## 2023-03-07 ENCOUNTER — CARE COORDINATION (OUTPATIENT)
Dept: CARE COORDINATION | Age: 88
End: 2023-03-07

## 2023-03-07 NOTE — CARE COORDINATION
Remote Patient Monitoring Note      Date/Time:  3/7/2023 8:53 AM    CCSS reviewed patients reported daily Remote Patient Monitoring metrics. All reported metrics are within alert parameters. Plan/Follow Up:  Will continue to review, monitor and address alerts with follow up based on severity of symptoms and risk factors Current Patient Metrics ---- Blood Pressure: 128/62, 62bpm Pulseox: 96%, 58bpm Survey: - Weight: 162.4lbs Note Created at: 03/07/2023 08:54 AM ET ---- Time-Spent: 2 minutes 0 seconds

## 2023-03-08 ENCOUNTER — CARE COORDINATION (OUTPATIENT)
Dept: CARE COORDINATION | Age: 88
End: 2023-03-08

## 2023-03-08 NOTE — CARE COORDINATION
Remote Patient Monitoring Note      Date/Time:  3/8/2023 8:16 AM    CCSS reviewed patients reported daily Remote Patient Monitoring metrics. All reported metrics are within alert parameters. Plan/Follow Up:  Will continue to review, monitor and address alerts with follow up based on severity of symptoms and risk factors Current Patient Metrics ---- Blood Pressure: 136/90, 59bpm Pulseox: 98%, 65bpm Survey: - Weight: 162.4lbs Note Created at: 03/08/2023 08:17 AM ET ---- Time-Spent: 2 minutes 0 seconds

## 2023-03-08 NOTE — CARE COORDINATION
Remote Patient Monitoring Note      Date/Time:  3/8/2023 11:49 AM    CCSS reviewed patients reported daily Remote Patient Monitoring metrics. All reported metrics are within alert parameters. Plan/Follow Up:  Will continue to review, monitor and address alerts with follow up based on severity of symptoms and risk factors Current Patient Metrics ---- Blood Pressure: 128/52, 60bpm Pulseox: 98%, 65bpm Survey: - Weight: 162.4lbs Note Created at: 03/08/2023 11:50 AM ET ---- Time-Spent: 2 minutes 0 seconds

## 2023-03-09 ENCOUNTER — CARE COORDINATION (OUTPATIENT)
Dept: CARE COORDINATION | Age: 88
End: 2023-03-09

## 2023-03-09 ENCOUNTER — OFFICE VISIT (OUTPATIENT)
Dept: CARDIOLOGY CLINIC | Age: 88
End: 2023-03-09

## 2023-03-09 ENCOUNTER — CLINICAL DOCUMENTATION ONLY (OUTPATIENT)
Facility: CLINIC | Age: 88
End: 2023-03-09

## 2023-03-09 VITALS
HEIGHT: 68 IN | SYSTOLIC BLOOD PRESSURE: 152 MMHG | WEIGHT: 167.8 LBS | HEART RATE: 59 BPM | BODY MASS INDEX: 25.43 KG/M2 | DIASTOLIC BLOOD PRESSURE: 60 MMHG

## 2023-03-09 DIAGNOSIS — I25.10 CORONARY ARTERY DISEASE INVOLVING NATIVE CORONARY ARTERY OF NATIVE HEART WITHOUT ANGINA PECTORIS: Primary | ICD-10-CM

## 2023-03-09 NOTE — PROGRESS NOTES
CHIEF COMPLAINT: TIA/CAD-CABG/PAF/ICD/GIB/Anemia    HISTORY OF PRESENT ILLNESS: Patient is a 82 y.o. male seen at the request of Martha Maddox MD.      Baseline TORRES. No CP. In sinus. PMH:   MI, 2003. Cardiac catheterization: 85% ostial, proximal and mid LAD narrowings. LMC 70% stenosis. D1 occluded. D2 50% stenosis. OM1 80% ostial stenosis. Mid CX occluded. Dominant RCA severe disease. LVEF 40-45%. CABG, 05/23/2003, [place], [place] with LIMA-LAD, SVG-RI, SVG-OM. Hyperlipidemia. Mild carotid plaque on US, 2003. Echo, 07/2006. Mild LVE, normal EF, Stage II diastolic dysfunction, moderate AS, mild MR, mild TR. Right leg vein stripping, 1970's. No history diabetes mellitus, stroke or COPD. Nonsmoker for many years. VT causing cardiac arrest after stress test, 03/15/2007. He was successfully cardioverted. Cardiac catheterization, 03/16/2007 with normal LVEF, severe native three vessel coronary disease. SVG-RI, SVG-OM both occluded. LIMA-LAD patent. Re-do CABG, [facility], 03/2007 with radial artery graft to D2 and OM2. Elective PCI with CONNOR native OM2 and native LAD, 03/2007 following CABG. This was done because of inadequate conduits. ICD placement, [facility], 03/2007. ICD lead recall, early 2008, but he was followed electively because his device was functioning normally. [facility] with multiple ICD inappropriate shocks. Transfer [facility] where ICD and lead were replaced. He reports cardiac catheterization that revealed patent LIMA-LAD and patent stents in native coronary arteries. Atypical chest pain and anxiety with admission [facility], 05/2008. Troponin minimally elevated. Beta blocker dose increased. [facility], 06/[facility]/2009 with lightheadedness, orthostatic hypotension, drop in hemoglobin to 10.5 from 14.9 over two months with an increase in BUN from 16 to 68 over the same time. Dark heme positive stools noted. EKG NSR, incomplete RBBB.    Echo, 06/15/2009 with normal LVEF, moderate AS, peak gradient 38 mmHg, BLOSSOM 1.1 cm², moderate MR, LAE. Transtelephonic ICD check, 01/11/2010. No ventricular tachyarrhythmias. Two AF episodes, the longest for 14 hours. Echo, 06/16/2010 with LVE, borderline LVH, normal systolic and diastolic function, normal LA, ICD electrode right heart. Trileaflet AV with moderate to severe AS, mean/peak gradient 20/31 mmHg. BLOSSOM 1.0 cm². MAC with mild MR, normal RVSP. No drug allergies. Family history negative for premature vascular disease. PAF with DCCV, [facility], 12/2010. Hip replacement surgery, 2010 or 2011 [facility] Dr. Clark Delgado. [facility], 06/05/2012. Moderate sized fixed basal inferior defect, probably artifact. [facility]. 1+ AR, moderate AS, mild MR, mild TR, normal LVEF. Echo, 01/31/2013. LV not dilated. Mild concentric LVH. Septal paradox. EF 50-55%. BLOSSOM 1.2 cm² consistent with moderate stenosis. Peak/mean gradient 38/21. Stage II diastolic dysfunction. [facility] admission, 08/28/2013 with dizziness, Hb 7.7, WBC 19.0. CXR negative except cardiomegaly. EKG NSR, leftward axis, RBBB. BMP negative except for elevation in BUN to 55 with Cr 1.3. EGD, 08/28/2013. Large pyloric channel ulcer with clot treated with PRBCs and fresh frozen plasma. Hb 8.7 on day of discharge and stable. Pradaxa was temporarily held. CT abdomen, 09/08/2014. 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 CT chest, 09/17/2014. Consolidation and infiltrate at the left lung base, stable when compared to previous exams with less pleural thickening and calcified plaque in the left pleural cavity without any recent change. Chronic obstructive airways disease.  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 ICD programmed to DDDR mode by Dr. Olivia Jacobsen, 03/26/2015. Device function normal.  Echo 10/16/2015. EF 39%. Stage II diastolic dysfunction. Aortic stenosis with peak/mean gradients of 48/24 mmHg. Estimated valve area 1.0 cm². ICD generator change for battery depletion, 07/28/2016, Dr. Eri Simpson. Olivia Jacobsen. Echo, 02/03/2017. Normal LV size with mild LVH. Normal regional wall motion and overall systolic function. Diastole could not be assessed, but was presumed to be impaired. The RV was dilated with impaired global systolic function. The LA was enlarged. Mild MAC with mild mitral insufficiency. Moderate tricuspid insufficiency. Aortic valve gradients are peak 47 mmHg with a mean of 27. Dimensionless ratio 0.21. Valve area calculated 0.8 cm². 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 Echocardiogram, [facility], 4/19/2018, EF 60%. Low normal RV function. Stage 2 diastolic dysfunction. Mild-to-moderate MR. S/P TAVR with a mean gradient of 10 mmHg. RVSP 31 mg.     S/P Cardioversion by Dr. Oriana Serrano, 05/30/2017. Hospital evaluation, 09/07/2018, for 2 appropriate ICD discharges for polymorphic ventricular tachycardia, which occurred after yard work and moving heavy furniture.        Past Medical History:   Diagnosis Date    A-fib [facility])     follows with Dr Dhamresh Lawrence 9/28/22    AAA (abdominal aortic aneurysm)     infrarenal 3.7cm 6/27/22    Arrhythmia     Carotid artery stenosis     CHF (congestive heart failure) ([address]     CKD (chronic kidney disease), stage III ([address]     Heart valve problem     Hyperlipidemia     Hypertension     ICD (implantable cardiac defibrillator) in place 2007    [employer] [place] DR DUTTA#[id number] generator changed 7/28/16  Dr Olivia Jacobsen    MI (mitral incompetence) 2003    MI (myocardial infarction) [facility]) 2003       Patient Active Problem List   Diagnosis    Chronic combined systolic and diastolic CHF (congestive heart failure) ([facility])    S/P TAVR (transcatheter aortic valve replacement) Chronic atrial fibrillation    Coronary artery disease involving native coronary artery of native heart without angina pectoris    Essential hypertension    Persistent atrial fibrillation (HCC)    High risk medications (not anticoagulants) long-term use    Paroxysmal atrial fibrillation (HCC)    Hyperlipidemia LDL goal <100    GIB (gastrointestinal bleeding)    ICD (implantable cardioverter-defibrillator), dual, in situ    Ventricular tachycardia    Ischemic heart disease    Vitamin D deficiency    Other insomnia    SOB (shortness of breath)    Lung nodule    Acute on chronic heart failure with preserved ejection fraction (HFpEF) ([address]    Pure hypercholesterolemia    Stage 3b chronic kidney disease ([address]    Transient cerebral ischemia    Aortic valve disease    Near syncope    Acute on chronic combined systolic and diastolic CHF (congestive heart failure) (HCC)    Acute congestive heart failure (HCC)    Gastroesophageal reflux disease without esophagitis    Coronary artery disease involving coronary bypass graft    Symptomatic anemia    GI bleed    Abdominal aortic aneurysm (AAA) 3.0 cm to 5.5 cm in diameter in male    ICD (implantable cardioverter-defibrillator) discharge    ICD (implantable cardioverter-defibrillator) in place    PAF (paroxysmal atrial fibrillation) (HCC)    Acute on chronic diastolic congestive heart failure (HCC)       No Known Allergies    Current Outpatient Medications   Medication Sig Dispense Refill    OXYGEN Inhale into the lungs      rivaroxaban (XARELTO) 15 MG TABS tablet Take 1 tablet by mouth Daily with supper 30 tablet 0    bumetanide (BUMEX) 2 MG tablet Take 1 tablet by mouth in the morning and 1 tablet in the evening.  30 tablet 3    potassium chloride 20 MEQ/15ML (10%) oral solution Take 15 mLs by mouth daily 450 mL 2    amLODIPine (NORVASC) 2.5 MG tablet TAKE 1 TABLET BY MOUTH  DAILY 90 tablet 3    ALPRAZolam (XANAX) 0.25 MG tablet Take 0.25 mg by mouth 3 times daily as needed for Sleep.      amiodarone (CORDARONE) 200 MG tablet Take 200 mg by mouth daily Daily beginning 22 per Dr. Anshul Quinones      meclizine (ANTIVERT) 25 MG tablet Take 1 tablet by mouth daily as needed for Dizziness 90 tablet 0    METOPROLOL TARTRATE PO Take 25 mg by mouth 2 times daily      pravastatin (PRAVACHOL) 40 MG tablet Take 40 mg by mouth at bedtime      pantoprazole (PROTONIX) 40 MG tablet TAKE 1 TABLET BY MOUTH  DAILY 90 tablet 3    ferrous sulfate (IRON 325) 325 (65 Fe) MG tablet Take 1 tablet by mouth daily (with breakfast) (Patient taking differently: Take 325 mg by mouth Daily with lunch) 90 tablet 1    MAGNESIUM-OXIDE 400 (240 Mg) MG tablet Take 400 mg by mouth Daily with supper      Cholecalciferol (VITAMIN D) 2000 UNITS CAPS capsule Take 2,000 Units by mouth Daily with lunch       No current facility-administered medications for this visit.        Social History     Socioeconomic History    Marital status:      Spouse name: Cristofer Hankins    Number of children: 2    Years of education: 12     Highest education level: Associate degree: academic program   Occupational History    Not on file   Tobacco Use    Smoking status: Former     Packs/day: 0.50     Years: 3.00     Pack years: 1.50     Types: Cigarettes     Quit date: 1973     Years since quittin.1    Smokeless tobacco: Never    Tobacco comments:     Quit smoking in    Vaping Use    Vaping Use: Never used   Substance and Sexual Activity    Alcohol use: No    Drug use: No    Sexual activity: Not on file   Other Topics Concern    Not on file   Social History Narrative    1 cup coffee daily; occ pop     Social Determinants of Health     Financial Resource Strain: Medium Risk    Difficulty of Paying Living Expenses: Somewhat hard   Food Insecurity: No Food Insecurity    Worried About Running Out of Food in the Last Year: Never true    Ran Out of Food in the Last Year: Never true   Transportation Needs: No Transportation Needs    Lack of Transportation (Medical): No    Lack of Transportation (Non-Medical): No   Physical Activity: Inactive    Days of Exercise per Week: 0 days    Minutes of Exercise per Session: 0 min   Stress: No Stress Concern Present    Feeling of Stress : Not at all   Social Connections: Moderately Isolated    Frequency of Communication with Friends and Family: More than three times a week    Frequency of Social Gatherings with Friends and Family: Twice a week    Attends [employer] Services: Never    Active Member of Clubs or Organizations: No    Attends Club or Organization Meetings: Never    Marital Status:    Intimate Partner Violence: Not on file   Housing Stability: Not on file       No family history on file. Review of Systems:  Heart: as above   Lungs: as above   Eyes: denies changes in vision or discharge. Ears: denies changes in hearing or pain. Nose: denies epistaxis or masses   Throat: denies sore throat or trouble swallowing. Neuro: denies numbness, tingling, tremors. Skin: denies rashes or itching. : denies hematuria, dysuria   GI: denies vomiting, diarrhea   Psych: denies mood changed, anxiety, depression. All other systems negative. Physical Exam   BP (!) 152/60   Pulse 59   Ht 5' 8\" (1.727 m)   Wt 167 lb 12.8 oz (76.1 kg)   BMI 25.51 kg/m²   Constitutional: Oriented to person, place, and time. Well-developed and well-nourished. No distress. Head: Normocephalic and atraumatic. Eyes: EOM are normal. Pupils are equal, round, and reactive to light. Neck: Normal range of motion. Neck supple. No hepatojugular reflux and no JVD present. Carotid bruit is not present. No tracheal deviation present. No thyromegaly present. Cardiovascular: Normal rate, regular rhythm, normal heart sounds and intact distal pulses. Exam reveals no gallop and no friction rub. No murmur heard. Pulmonary/Chest: Effort normal and breath sounds normal. No respiratory distress. No wheezes. No rales.  No tenderness. Abdominal: Soft. Bowel sounds are normal. No distension and no mass. No tenderness. No rebound and no guarding. Musculoskeletal: Normal range of motion. No edema and no tenderness. Lymphadenopathy:   No cervical adenopathy. No groin adenopathy. Neurological: Alert and oriented to person, place, and time. Skin: Skin is warm and dry. No rash noted. Not diaphoretic. No erythema. Psychiatric: Normal mood and affect. Behavior is normal.     EKG:  normal sinus rhythm, A paced, V sensed. Echo Summary 8/16/19:   Ejection fraction is visually estimated at 60%. The inferior basal wall is hypokinetic. No regional wall motion abnormalities seen. Normal right ventricle structure and function. There is doppler evidence of stage II diastolic dysfunction. Left atrial volume index of 39 ml per meters squared BSA. Hx of TAVR with 26 mm S3 3/29/17. The aortic valve area is 1.6 cm2 with a maximum gradient of 15 mmHg and a mean gradient of 7 mmHg. Mild aortic regurgitation is noted. Mild mitral regurgitation is present. Mild tricuspid regurgitation. Agitated saline injected for shunt evaluation. No evidence of patent foramen ovale. No evidence of atrial septal defect. Echo Summary 10/30/2020:   LVEF is 65%. Left ventricle is normal in size . Mild asymmetric septal hypertrophy. No regional wall motion abnormalities seen. Normal left ventricular ejection fraction. The left atrium is severely dilated. Mild mitral annular calcification. Mild mitral regurgitation is present. Normally functioning bioprosthetic valve in aortic position. Physiologic and/or trace tricuspid regurgitation. Echo Summary 3/2/2022:   Normal left ventricular systolic function. Ejection fraction is visually estimated at 60%. Mildly dilated right ventricle with normal right ventricular function. There is doppler evidence of stage II diastolic dysfunction.    Moderately dilated left atrium by volume index. Mild mitral regurgitation. 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 No significant paravalvular aortic regurgitation. AV peak velocity 2.1 m/s. AV mean gradient 9 mmHg. AV area 1.5 cm2. Mild tricuspid regurgitation. PASP is estimated at 44 mmHg. ASSESSMENT AND PLAN:  Patient Active Problem List   Diagnosis    Chronic combined systolic and diastolic CHF (congestive heart failure) ([facility])    S/P TAVR (transcatheter aortic valve replacement)    Chronic atrial fibrillation    Coronary artery disease involving native coronary artery of native heart without angina pectoris    Essential hypertension    Persistent atrial fibrillation (HCC)    High risk medications (not anticoagulants) long-term use    Paroxysmal atrial fibrillation ([facility])    Hyperlipidemia LDL goal <100    GIB (gastrointestinal bleeding)    ICD (implantable cardioverter-defibrillator), dual, in situ    Ventricular tachycardia    Ischemic heart disease    Vitamin D deficiency    Other insomnia    SOB (shortness of breath)    Lung nodule    Acute on chronic heart failure with preserved ejection fraction (HFpEF) ([address]    Pure hypercholesterolemia    Stage 3b chronic kidney disease ([address]    Transient cerebral ischemia    Aortic valve disease    Near syncope    Acute on chronic combined systolic and diastolic CHF (congestive heart failure) ([facility])    Acute congestive heart failure ([facility])    Gastroesophageal reflux disease without esophagitis    Coronary artery disease involving coronary bypass graft    Symptomatic anemia    GI bleed    Abdominal aortic aneurysm (AAA) 3.0 cm to 5.5 cm in diameter in male    ICD (implantable cardioverter-defibrillator) discharge    ICD (implantable cardioverter-defibrillator) in place    PAF (paroxysmal atrial fibrillation) ([address]    Acute on chronic diastolic congestive heart failure ([address]     1. ICD: Per EP. 2. PAF: In sinus. Post DCCV 5/12/2022. Xarelto/amio.     3. CAD-CABG: Stable symptoms. BB/statin. Not on ASA to this point due to Xarelto. 4. TAVR: Stable echo 3/2/2022.     5. TIA: Echo no PFO. Xarelto/statin. 6. Anemia: Follow labs. 7. GIB: Follows with GI.     8. TORRES: Stable. Carlito Ramirez D.O.   Cardiologist  Cardiology, [facility]

## 2023-03-09 NOTE — CARE COORDINATION
Remote Patient Monitoring Note      Date/Time:  3/9/2023 8:53 AM    CCSS reviewed patients reported daily Remote Patient Monitoring metrics. All reported metrics are within alert parameters. Plan/Follow Up:  Will continue to review, monitor and address alerts with follow up based on severity of symptoms and risk factors Current Patient Metrics ---- Blood Pressure: 145/60, 57bpm Pulseox: 93%, 58bpm Survey: - Weight: 163.0lbs Note Created at: 03/09/2023 08:53 AM ET ---- Time-Spent: 2 minutes 0 seconds

## 2023-03-09 NOTE — CARE COORDINATION
CCSS reviewed patients reported daily Remote Patient Monitoring metrics. All reported metrics are within alert parameters. Plan/Follow Up:  Will continue to review, monitor and address alerts with follow up based on severity of symptoms and risk factors      Current Patient Metrics ---- Blood Pressure: 145/60, 57bpm Pulseox: 93%, 58bpm Survey: - Weight: 163.0lbs Note Created at: 03/09/2023 10:59 AM CT ---- Time-Spent: 2 minutes 0 seconds    Quan Motta   Cell: [phone]

## 2023-03-10 ENCOUNTER — CARE COORDINATION (OUTPATIENT)
Dept: CARE COORDINATION | Age: 88
End: 2023-03-10

## 2023-03-10 NOTE — CARE COORDINATION
Remote Patient Monitoring Note      Date/Time:  3/10/2023 9:08 AM    CCSS reviewed patients reported daily Remote Patient Monitoring metrics. All reported metrics are within alert parameters. Plan/Follow Up: Will continue to review, monitor and address alerts with follow up based on severity of symptoms and risk factors Date/Time:  3/10/2023 9:08 AM    CCSS reviewed patients reported daily Remote Patient Monitoring metrics. All reported metrics are within alert parameters. Plan/Follow Up:  Will continue to review, monitor and address alerts with follow up based on severity of symptoms and risk factors

## 2023-03-13 ENCOUNTER — HOSPITAL ENCOUNTER (OUTPATIENT)
Dept: OTHER | Age: 88
Setting detail: THERAPIES SERIES
Discharge: HOME | End: 2023-03-13
Payer: MEDICARE

## 2023-03-13 ENCOUNTER — CARE COORDINATION (OUTPATIENT)
Dept: CARE COORDINATION | Age: 88
End: 2023-03-13

## 2023-03-13 VITALS
WEIGHT: 170.25 LBS | OXYGEN SATURATION: 98 % | HEART RATE: 55 BPM | RESPIRATION RATE: 18 BRPM | DIASTOLIC BLOOD PRESSURE: 65 MMHG | BODY MASS INDEX: 25.89 KG/M2 | SYSTOLIC BLOOD PRESSURE: 137 MMHG

## 2023-03-13 LAB
ANION GAP SERPL CALCULATED.3IONS-SCNC: 9 MMOL/L (ref 7–16)
BUN BLDV-MCNC: 24 MG/DL (ref 6–23)
CALCIUM SERPL-MCNC: 9.1 MG/DL (ref 8.6–10.2)
CHLORIDE BLD-SCNC: 96 MMOL/L (ref 98–107)
CO2: 33 MMOL/L (ref 22–29)
CREAT SERPL-MCNC: 1.4 MG/DL (ref 0.7–1.2)
GFR SERPL CREATININE-BSD FRML MDRD: 49 ML/MIN/1.73
GLUCOSE BLD-MCNC: 92 MG/DL (ref 74–99)
POTASSIUM SERPL-SCNC: 4.3 MMOL/L (ref 3.5–5)
PRO-BNP: 703 PG/ML (ref 0–450)
SODIUM BLD-SCNC: 138 MMOL/L (ref 132–146)

## 2023-03-13 PROCEDURE — 36415 COLL VENOUS BLD VENIPUNCTURE: CPT

## 2023-03-13 PROCEDURE — 80048 BASIC METABOLIC PNL TOTAL CA: CPT

## 2023-03-13 PROCEDURE — 99214 OFFICE O/P EST MOD 30 MIN: CPT

## 2023-03-13 PROCEDURE — 83880 ASSAY OF NATRIURETIC PEPTIDE: CPT

## 2023-03-13 RX ORDER — AMIODARONE HYDROCHLORIDE 200 MG/1
TABLET ORAL
Qty: 90 TABLET | Refills: 1 | Status: SHIPPED | OUTPATIENT
Start: 2023-03-13

## 2023-03-13 NOTE — PROGRESS NOTES
Congestive Heart Failure [address]   [facility] [place]  [address]   1935          Referring Provider: LAWRENCE Dickson  Primary Care Physician: Dr. Roseann Denis  Cardiologist: Dr. Hailey Merrill  Nephrologist:         History of Present Illness:     Kris Frederick is a 82 y.o. male with a history of HFpEF, most recent EF 60% on 3/2/2022. Patient Story:    He does  have dyspnea with exertion, shortness of breath, or decline in overall functional capacity. He does not have orthopnea, PND, nocturnal cough or hemoptysis. He does not have abdominal distention or bloating, early satiety, anorexia/change in appetite. He does have a good urinary response to  oral diuretic. He does not have lower extremity edema. He ocassionally gets lightheadedness,Hx of Vertigo dizziness. He denies palpitations, syncope or near syncope. He does not complain of chest pain, pressure, discomfort. No Known Allergies        Prior to Visit Medications    Medication Sig Taking? Authorizing Provider   rivaroxaban (XARELTO) 15 MG TABS tablet Take 1 tablet by mouth Daily with supper  Kenzie Grijalva DO   OXYGEN Inhale into the lungs  Historical Provider, MD   bumetanide (BUMEX) 2 MG tablet Take 1 tablet by mouth in the morning and 1 tablet in the evening. Patient taking differently: Take 2 mg by mouth daily as needed Take in am and if needed take in pm  Carisa Gonzalez MD   potassium chloride 20 MEQ/15ML (10%) oral solution Take 15 mLs by mouth daily  GINETTE Foy CNP   amLODIPine (NORVASC) 2.5 MG tablet TAKE 1 TABLET BY MOUTH  DAILY  Kenzie Grijalva DO   ALPRAZolam (XANAX) 0.25 MG tablet Take 0.25 mg by mouth 3 times daily as needed for Sleep.   Historical Provider, MD   amiodarone (CORDARONE) 200 MG tablet Take 200 mg by mouth daily Daily beginning 7/16/22 per Dr. Anneliese Cantu Provider, MD   meclizine (ANTIVERT) 25 MG tablet Take 1 tablet by mouth daily as needed for Dizziness  Anny Martins MD   METOPROLOL TARTRATE PO Take 25 mg by mouth 2 times daily  Historical Provider, MD   pravastatin (PRAVACHOL) 40 MG tablet Take 40 mg by mouth at bedtime  Historical Provider, MD   pantoprazole (PROTONIX) 40 MG tablet TAKE 1 TABLET BY MOUTH  DAILY  Anny Martins MD   ferrous sulfate (IRON 325) 325 (65 Fe) MG tablet Take 1 tablet by mouth daily (with breakfast)  Patient taking differently: Take 325 mg by mouth Daily with lunch  Anny Martins MD   MAGNESIUM-OXIDE 400 (240 Mg) MG tablet Take 400 mg by mouth Daily with supper  Historical Provider, MD   Cholecalciferol (VITAMIN D) 2000 UNITS CAPS capsule Take 2,000 Units by mouth Daily with lunch  Historical Provider, MD           Physical Examination:     /65   Pulse 55   Resp 18   Wt 170 lb 4 oz (77.2 kg)   SpO2 98%   BMI 25.89 kg/m²     Assessment  Charting Type: Shift assessment    Neurological  Level of Consciousness: Alert (0)              Respiratory  Respiratory Pattern: Regular  Respiratory Depth: Normal  Respiratory Quality/Effort: Dyspnea with exertion  Chest Assessment: Chest expansion symmetrical  L Breath Sounds: Clear, Fine Crackles  R Breath Sounds: Clear, Fine Crackles  Level of Activity/Mobility: Ambulatory    Breath Sounds  Right Upper Lobe: Clear  Right Middle Lobe: Clear  Right Lower Lobe: Fine Crackles  Left Upper Lobe: Clear  Left Lower Lobe: Fine Crackles         Cardiac  Cardiac Regularity: Regular  Cardiac Rhythm: AV paced  Implanted Cardiac Device (Pacemaker, ICD, PA Sensor): Yes    Rhythm Interpretation  Heart Rate: 55         Gastrointestinal  Abdominal (WDL): Within Defined Limits  Abdomen Inspection: Soft  RUQ Bowel Sounds: Active  LUQ Bowel Sounds: Active  RLQ Bowel Sounds: Active  LLQ Bowel Sounds: Active          Bowel Sounds  RUQ Bowel Sounds: Active  LUQ Bowel Sounds: Active  RLQ Bowel Sounds: Active  LLQ Bowel Sounds:  Active    Peripheral Vascular  Peripheral Vascular (WDL): Within Defined Limits  Edema: None  RLE Edema: None  LLE Edema: Trace, Pitting                   Genitourinary  Genitourinary (WDL): Within Defined Limits    Psychosocial  Psychosocial (WDL): Within Defined Limits    Pain Assessment  Pain Assessment: None - Denies Pain                   Heart Rate: 55                     LAB DATA:    Last 3 BMP      Sodium (mmol/L)   Date Value   03/13/2023 138   02/21/2023 140   02/16/2023 140     Potassium (mmol/L)   Date Value   03/13/2023 4.3   02/21/2023 4.0   02/16/2023 4.2     Potassium reflex Magnesium (mmol/L)   Date Value   08/23/2022 3.8   06/27/2022 4.6   03/12/2022 4.3     Chloride (mmol/L)   Date Value   03/13/2023 96 (L)   02/21/2023 94 (L)   02/16/2023 93 (L)     CO2 (mmol/L)   Date Value   03/13/2023 33 (H)   02/21/2023 38 (H)   02/16/2023 34 (H)     BUN (mg/dL)   Date Value   03/13/2023 24 (H)   02/21/2023 29 (H)   02/16/2023 24 (H)     Glucose (mg/dL)   Date Value   03/13/2023 92   02/21/2023 95   02/16/2023 120 (H)     Calcium (mg/dL)   Date Value   03/13/2023 9.1   02/21/2023 8.9   02/16/2023 9.2       Last 3 BNP       Pro-BNP (pg/mL)   Date Value   03/13/2023 703 (H)   02/21/2023 479 (H)   02/16/2023 594 (H)          CBC: No results for input(s): WBC, HGB, PLT in the last 72 hours. BMP:    Recent Labs     03/13/23  1025      K 4.3   CL 96*   CO2 33*   BUN 24*   CREATININE 1.4*   GLUCOSE 92                 Hepatic: No results for input(s): AST, ALT, ALB, BILITOT, ALKPHOS in the last 72 hours. Troponin: No results for input(s): TROPONINI in the last 72 hours. BNP: No results for input(s): BNP in the last 72 hours. Lipids: No results for input(s): CHOL, HDL in the last 72 hours. Invalid input(s): LDLCALCU  INR: No results for input(s): INR in the last 72 hours.         WEIGHTS:    Wt Readings from Last 3 Encounters:   03/13/23 170 lb 4 oz (77.2 kg)   03/09/23 167 lb 12.8 oz (76.1 kg)   02/21/23 168 lb (76.2 kg) TELEMETRY:  Cardiac Regularity: Regular  Cardiac Rhythm/Interpretation: AV paced        ASSESSMENT:  Meghan Moscoso has weight  gain of 2lbs. Trace edema noted in LLE. No JVD on assessment. Currently wearing 2 liters nasal cannula at all times since hospital discharge and tolerating well. He is weighing daily at home. Interventions completed this visit:  IV diuretics given- no  Lab work obtained yes, proBNP, BMP  Reviewed currently prescribed medications with patient, educated on importance of compliance and answered any questions regarding their medication  Educated on signs and symptoms of HF  Educated on low sodium diet    PLAN:  Scheduled to follow up in CHF clinic on   Future Appointments   Date Time Provider Cezar Gamez   3/17/2023 12:15 PM Dannielle Dillard MD [address]   [facility]   4/14/2023  2:30 PM DO PRISCILLA [facility]     Given clinic phone number [phone] and aware of signs and symptoms to call with any HF change in symptoms.

## 2023-03-13 NOTE — CARE COORDINATION
Remote Patient Monitoring Note      Date/Time:  3/13/2023 8:24 AM    CCSS reviewed patients reported daily Remote Patient Monitoring metrics. All reported metrics are within alert parameters. Plan/Follow Up:  Will continue to review, monitor and address alerts with follow up based on severity of symptoms and risk factors   Current Patient Metrics ---- Blood Pressure: 124/61, 58bpm Pulseox: 94%, 65bpm Survey: - Weight: 166.4lbs Note Created at: 03/13/2023 08:24 AM ET ---- Time-Spent: 2 minutes 0 seconds

## 2023-03-14 ENCOUNTER — CARE COORDINATION (OUTPATIENT)
Dept: CARE COORDINATION | Age: 88
End: 2023-03-14

## 2023-03-14 NOTE — CARE COORDINATION
Remote Patient Monitoring Note      Date/Time:  3/14/2023 8:27 AM    LPN reviewed patients reported daily Remote Patient Monitoring metrics. All reported metrics are within alert parameters. Plan/Follow Up:  Will continue to review, monitor and address alerts with follow up based on severity of symptoms and risk factors    -- Current Patient Metrics ---- Blood Pressure: 140/70, 59bpm Pulseox: 96%, 56bpm Survey: - Weight: 165.8lbs Note Created at: 03/14/2023 08:27 AM ET ---- Time-Spent: 3 minutes 0 seconds    [facility] [address]/ [facility]/ Remote Patient Monitoring  [phone]

## 2023-03-15 ENCOUNTER — CARE COORDINATION (OUTPATIENT)
Dept: CARE COORDINATION | Age: 88
End: 2023-03-15

## 2023-03-15 NOTE — CARE COORDINATION
Remote Patient Monitoring Note      Date/Time:  3/15/2023 9:00 AM    CCSS reviewed patients reported daily Remote Patient Monitoring metrics. All reported metrics are within alert parameters. Plan/Follow Up:  Will continue to review, monitor and address alerts with follow up based on severity of symptoms and risk factors  Current Patient Metrics ---- Blood Pressure: 124/60, 57bpm Pulseox: 97%, 59bpm Survey: - Weight: 164.8lbs Note Created at: 03/15/2023 09:00 AM ET ---- Time-Spent: 2 minutes 0 seconds

## 2023-03-16 RX ORDER — BUMETANIDE 2 MG/1
2 TABLET ORAL 2 TIMES DAILY
Qty: 180 TABLET | Refills: 1 | Status: SHIPPED | OUTPATIENT
Start: 2023-03-16

## 2023-03-17 ENCOUNTER — TELEPHONE (OUTPATIENT)
Dept: NON INVASIVE DIAGNOSTICS | Age: 88
End: 2023-03-17

## 2023-03-17 ENCOUNTER — OFFICE VISIT (OUTPATIENT)
Dept: NON INVASIVE DIAGNOSTICS | Age: 88
End: 2023-03-17

## 2023-03-17 VITALS
HEART RATE: 59 BPM | SYSTOLIC BLOOD PRESSURE: 132 MMHG | HEIGHT: 68 IN | BODY MASS INDEX: 25.49 KG/M2 | WEIGHT: 168.2 LBS | DIASTOLIC BLOOD PRESSURE: 60 MMHG | RESPIRATION RATE: 18 BRPM

## 2023-03-17 DIAGNOSIS — R13.10 DYSPHAGIA, UNSPECIFIED TYPE: ICD-10-CM

## 2023-03-17 DIAGNOSIS — E87.6 HYPOKALEMIA: ICD-10-CM

## 2023-03-17 DIAGNOSIS — I25.10 CAD IN NATIVE ARTERY: Primary | ICD-10-CM

## 2023-03-17 RX ORDER — POTASSIUM CHLORIDE 20MEQ/15ML
LIQUID (ML) ORAL
Qty: 946 ML | Refills: 4 | Status: SHIPPED | OUTPATIENT
Start: 2023-03-17

## 2023-03-17 NOTE — TELEPHONE ENCOUNTER
Prior auth  Medicare & [employer]    DUAL ICD Gen change   [employer]   April 13, 2032 @ 8:30  Dr Samayoa Beverage    CPT - 37772  ICD - Z45.02      I spoke to Felice Garibay and reminded them of their upcoming EP procedure with Dr. Joce Medina. They know to arrive at [facility] at 6:30 on 4/13/2023. I instructed them to remain NPO after midnight and to hold his Xarelto 2 days prior to procedure. Felice Garibay verbalized understanding.

## 2023-03-17 NOTE — PROGRESS NOTES
Cardiac Electrophysiology Outpatient Progress Note    Mehreen Redd  1935  Date of Service: 3/17/2023  Referring Provider/PCP: Hema Russell MD  Chief Complaint:   Chief Complaint   Patient presents with    Coronary Artery Disease     Ov- HFU.  Patient          Patient Active Problem List    Diagnosis Date Noted    Chronic combined systolic and diastolic CHF (congestive heart failure) ([facility] 10/08/2015     Priority: High    ICD (implantable cardioverter-defibrillator) in place 02/06/2023     Priority: Medium    PAF (paroxysmal atrial fibrillation) ([address] 02/06/2023     Priority: Medium    Acute on chronic diastolic congestive heart failure ([address] 02/06/2023     Priority: Medium    ICD (implantable cardioverter-defibrillator) discharge      Priority: Medium    Abdominal aortic aneurysm (AAA) 3.0 cm to 5.5 cm in diameter in male 04/27/2022     Priority: Medium    Symptomatic anemia 03/12/2022    GI bleed 03/12/2022    Acute on chronic combined systolic and diastolic CHF (congestive heart failure) ([facility] [address] [address] 03/01/2022    Acute congestive heart failure ([facility] [address] 03/01/2022    Gastroesophageal reflux disease without esophagitis 03/01/2022    Coronary artery disease involving coronary bypass graft 03/01/2022    Near syncope 09/02/2021    SOB (shortness of breath) 10/29/2020    Lung nodule     Acute on chronic heart failure with preserved ejection fraction (HFpEF) ([address]     Pure hypercholesterolemia     Stage 3b chronic kidney disease ([address]     Transient cerebral ischemia     Aortic valve disease     Other insomnia 05/07/2020    Ischemic heart disease     ICD (implantable cardioverter-defibrillator), dual, in situ     Ventricular tachycardia     GIB (gastrointestinal bleeding) 02/17/2020    Hyperlipidemia LDL goal <100 09/02/2019    Paroxysmal atrial fibrillation ([address] 03/13/2018    Persistent atrial fibrillation ([address] 05/30/2017    High risk medications (not anticoagulants) long-term use 05/30/2017    S/P TAVR (transcatheter aortic valve replacement)     Chronic atrial fibrillation     Coronary artery disease involving native coronary artery of native heart without angina pectoris     Essential hypertension     Vitamin D deficiency 05/06/2011     PMH    1. History of coronary artery disease with 1st coronary artery bypass surgery in 2003. 2.Cardiac workup in 2007 revealed only the left internal mammary artery to be patent. Saphenous vein graft to OM and ramus intermedius were occluded. 3.The patient had ventricle tachycardia causing a cardiac arrest during stress testing also at that time. 4.The patient underwent redo coronary artery bypass graft at the radial artery graft to D2 and OM2. 5.History of elective percutaneous coronary intervention with drug-eluting stents in OM2 and native left anterior descending thereafter following bypass surgery. 6.Status post dual-chamber implantable cardioverter-defibrillator implantation at that time. 7.Implantable cardioverter-defibrillator lead fracture thereafter in 2008 resulting in multiple implantable cardioverter-defibrillator discharges. 8.The patient currently has a Medtronic Virtuoso implantable cardioverter-defibrillator with a 6947 right ventricular lead. 9.Subsequent cardiac workup over the years has also shown the presence of aortic stenosis, which has been followed by his cardiologist.  Most recently echocardiography has shown aortic valve area of 1.2 cm2. Left ventricular function in fact is normal.  10.History of gastrointestinal bleeding. 11. Generator change out due to battery depletion. Replaced with a Viaa XT  SR# [id number] on 7-28-16. Current Outpatient Medications   Medication Sig Dispense Refill    bumetanide (BUMEX) 2 MG tablet Take 1 tablet by mouth in the morning and 1 tablet in the evening.  (Patient taking differently: Take 2 mg by mouth daily) 180 tablet 1    amiodarone (CORDARONE) 200 MG tablet TAKE 1 TABLET BY MOUTH  DAILY 90 tablet 1    rivaroxaban (XARELTO) 15 MG TABS tablet Take 1 tablet by mouth Daily with supper 90 tablet 3    OXYGEN Inhale into the lungs      potassium chloride 20 MEQ/15ML (10%) oral solution Take 15 mLs by mouth daily (Patient taking differently: Take by mouth daily) 450 mL 2    amLODIPine (NORVASC) 2.5 MG tablet TAKE 1 TABLET BY MOUTH  DAILY 90 tablet 3    ALPRAZolam (XANAX) 0.25 MG tablet Take 0.25 mg by mouth 3 times daily as needed for Sleep.      meclizine (ANTIVERT) 25 MG tablet Take 1 tablet by mouth daily as needed for Dizziness 90 tablet 0    METOPROLOL TARTRATE PO Take 25 mg by mouth 2 times daily      pravastatin (PRAVACHOL) 40 MG tablet Take 40 mg by mouth at bedtime      pantoprazole (PROTONIX) 40 MG tablet TAKE 1 TABLET BY MOUTH  DAILY 90 tablet 3    MAGNESIUM-OXIDE 400 (240 Mg) MG tablet Take 400 mg by mouth Daily with supper      Cholecalciferol (VITAMIN D) 2000 UNITS CAPS capsule Take 2,000 Units by mouth Daily with lunch       No current facility-administered medications for this visit. No Known Allergies    SUBJECTIVE: Kriss Sutherland  is a 82 y.o. patient with a history of coronary artery disease, CABG in 2003, sustained ventricular tachycardia, redo CABG, prior PCI, inappropriate ICD shocks secondary to lead fracture, aortic valve disease status post TAVR, GI bleed, persistent atrial fibrillation with prior dofetilide usage and DCCV on 05/13/2022 as well as most recently on February 9, 2023. The patient is currently maintained on amiodarone therapy. Most recently he was hospitalized with symptoms of heart failure in in February with persistent atrial fibrillation and symptoms of heart failure. He was seen by cardiology at [facility] and cardioverted. He has felt well since then with no new cardiac symptoms. ROS:   Constitutional: Negative for fever, activity change and appetite change. HENT: Negative for epistaxis, difficulty swallowing.    Eyes: Negative for blurred vision or double vision. Respiratory: Negative for cough, chest tightness, shortness of breath and wheezing. Cardiovascular: Negative for chest pain, palpitations and leg swelling. Gastrointestinal: Negative for abdominal pain, heartburn, or blood in stool. Genitourinary: Negative for hematuria, burning or frequency. Musculoskeletal: Negative for myalgias, stiffness, or swelling. Skin: Negative for rash, pain, or lumps. Neurological: Negative for syncope, seizures, or headaches. Psychiatric/Behavioral: Negative for confusion and agitation. The patient is not nervous/anxious. PHYSICAL EXAM:  Vitals:    03/17/23 1203   BP: 132/60   Pulse: 59   Resp: 18   Weight: 168 lb 3.2 oz (76.3 kg)   Height: 5' 8\" (1.727 m)     Constitutional: Oriented to person, place, and time. Well-developed and cooperative. Head: Normocephalic and atraumatic. Eyes: Conjunctivae are normal. Pupils are equal, round, and reactive to light. Neck: No hepatojugular reflux and no JVD present. Cardiovascular: Normally placed PMI, normal S1 and S2 with left sternal border and the apex, grade 3/6 systolic ejection murmur at the aortic area and left sternal border  Pulmonary/Chest: Effort normal and breath sounds normal. No respiratory distress. Abdominal: Soft. Normal appearance and nondistended  Musculoskeletal: Normal range of motion of all extremities, no muscle weakness. Neurological: Alert and oriented to person, place, and time. Gait normal.   Skin: Skin is warm and dry. No bruising, no ecchymosis and no rash noted. Extremity: No clubbing or cyanosis. No edema. Psychiatric: Normal mood and affect.  Thought content normal.     Pertinent Labs:  CBC:   WBC   Date Value Ref Range Status   02/16/2023 7.4 4.5 - 11.5 E9/L Final   02/09/2023 7.2 4.5 - 11.5 E9/L Final   02/08/2023 7.6 4.5 - 11.5 E9/L Final     Hemoglobin   Date Value Ref Range Status   02/16/2023 12.8 12.5 - 16.5 g/dL Final   02/09/2023 10.8 (L) 12.5 - 16.5 g/dL Final   02/08/2023 10.6 (L) 12.5 - 16.5 g/dL Final     Hematocrit   Date Value Ref Range Status   02/16/2023 39.8 37.0 - 54.0 % Final   02/09/2023 32.6 (L) 37.0 - 54.0 % Final   02/08/2023 32.4 (L) 37.0 - 54.0 % Final     Platelets   Date Value Ref Range Status   02/16/2023 333 130 - 450 E9/L Final   02/09/2023 288 130 - 450 E9/L Final   02/08/2023 286 130 - 450 E9/L Final     BMP:   Lab Results   Component Value Date/Time     03/13/2023 10:25 AM    K 4.3 03/13/2023 10:25 AM    K 3.8 08/23/2022 08:56 AM    CL 96 03/13/2023 10:25 AM    CO2 33 03/13/2023 10:25 AM    BUN 24 03/13/2023 10:25 AM    CREATININE 1.4 03/13/2023 10:25 AM    GLUCOSE 92 03/13/2023 10:25 AM    CALCIUM 9.1 03/13/2023 10:25 AM      ABGs: No results found for: PHART, PO2ART, ABK3UZE  INR:   Lab Results   Component Value Date    INR 1.5 06/15/2022    INR 1.3 03/14/2022    INR 1.3 03/13/2022     BNP:   Lab Results   Component Value Date    BNP 47 08/27/2013     TSH:   Lab Results   Component Value Date    TSH 2.320 10/13/2022      Cardiac Injury Profile: Total CK   Date Value Ref Range Status   06/28/2017 94 20 - 200 U/L Final     CK-MB   Date Value Ref Range Status   06/28/2017 3.8 0.0 - 7.7 ng/mL Final     Troponin   Date Value Ref Range Status   10/29/2020 <0.01 0.00 - 0.03 ng/mL Final     Comment:     TROPONIN T BLOOD LEVELS:         0.03 ng/mL     Upper Reference Limit  0.04 - 0.09 ng/mL     Possible myocardial injury      >= 0.10 ng/mL     Myocardial injury       Lipid Profile:   Lab Results   Component Value Date/Time    TRIG 191 06/28/2022 06:29 AM    HDL 39 06/28/2022 06:29 AM    LDLCALC 65 06/28/2022 06:29 AM    CHOL 142 06/28/2022 06:29 AM      Hemoglobin A1C:   Lab Results   Component Value Date    LABA1C 5.5 06/28/2022         Pertinent Cardiac Testing:       ECG 3/17/2023: NSR,RBBB    ECHOCARDIOGRAM--3/22    Conclusions      Summary   Normal left ventricular systolic function.    Ejection fraction is visually estimated at 60%. Mildly dilated right ventricle with normal right ventricular function. There is doppler evidence of stage II diastolic dysfunction. Moderately dilated left atrium by volume index. Mild mitral regurgitation. 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 No significant paravalvular aortic regurgitation. AV peak velocity 2.1 m/s. AV mean gradient 9 mmHg. AV area 1.5 cm2. Mild tricuspid regurgitation. PASP is estimated at 44 mmHg. Signature      ----------------------------------------------------------------   Electronically signed by Jason Graves MD(Interpreting   physician) on 03/02/2022 12:04 PM    Device Evaluation    Make/model Medtronic Evera XT  Mode DDDR 55 120  P waves   1.3    mV     Impedance    361     ohms   Pacing threshold 0.5 [email or web address] msec  R waves   4.1    MV     Impedance    342     ohms   Pacing threshold   1.0 [email or web address] msec   Pacing percentage  A      49.2%         V 49.9%  High voltage impedance    36 ohms  Battery longevity--3 months --<1 month to 7 months  Arrhythmias--persistent atrial fibrillation in end of January and the patient was cardioverted February 9 at [facility]    Evaluation and reprogramming included   Overall device function is normal  All  device programmable settings were evaluated per above, along with iterative adjustments (capture thresholds) to assess and select the most appropriate final programming for consistent delivery off the appropriate therapy and to verify function of the device. I have independently reviewed all of the ECGs and rhythm strips per above    I have personally reviewed the laboratory, cardiac diagnostic and radiographic testing as outlined above:    Impression:     1. Dual-chamber ICD in situ--appropriate function  However device generator is close to NOMI    2.   Coronary artery disease--s/p CABG in 2003 with a LIMA graft placed to the LAD, saphenous vein graft to RI and saphenous vein graft to OM. Redo bypass in 2007 with radial artery graft to D2 and OM2. Subsequently he also underwent PCI of the native OM 2 and native LAD following the repeat bypass due to inadequate conduits. left heart catheterization in 2017 revealed  Left main: 0% stenosis LAD: 100 % stenosis Circumflex: 100 % Stenosis AFTER OM1 RCA: Dominant. ANEURYSMAL  DILATATION IN MID PORTION. LONG 75 % stenosis IN MID AND DISTAL VESSEL. LIMA - LAD: PATENT RADIAL - D2: PATENT  RADIAL - OM2: PATENT    3.  HFpEF--currently compensated on Bumex, toprol,amlodipine    4. Valvular heart disease--aortic stenosis  S/P TAVR, 03/29/2017 Dr Rusty Aguero (Josie 26 mm)    5. Ventricular tachycardia--cardiac arrest after stress test in 2007 followed by ICD implant at [place] in 2007. Multiple ICD discharges for lead malfunction in 2008  ICD generator change by me in 2016    6. Paroxysmal atrial fibrillation--on amiodarone therapy at the present time. On systemic anticoagulation with Xarelto    7. CKD stage IIIb    8. Peptic ulcer disease  A. Feng 3 admission, 06/13/2009 with lightheadedness, orthostatic hypotension, drop in hemoglobin to 10.5 from 14.9 over two months with an increase in BUN from 16 to 68 over the same time. Dark heme positive stools noted. EKG NSR, incomplete RBBB. B. EGD, 08/28/2013. Large pyloric channel ulcer with clot treated with PRBCs and fresh frozen plasma. Hb 8.7 on day of discharge and stable. Pradaxa was temporarily held. C. 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 E  3/16/2022 EGD--> second look no evidence of bleeding  F  5/2022 Colonoscopy-->Large internal hemorrhoids. Polypectomy. 9.  Abdominal aortic aneurysm    10. COPD--remote tobacco use    Recommendations:    1. ICD gen change after 4/12/23.   Procedure discussed with patient in detail. Risks, benefits, and alternatives of ICD generator replacement were discussed in detail today. These risks include but are not limited to bleeding,infection, lead damage or discovery of a non-functional lead which would require lead revision and risks of blood clot, pneumothorax, hemothorax, cardiac perforation and tamponade, lead dislodgement, contrast induced nephropathy leading to short or even long term dialysis, vascular injury requiring emergent surgical repair, stroke and even death. The patient understands these risks and agrees to proceed today. 2.  Continued amiodarone for control of episodes of atrial fibrillation  Liver and thyroid function tests every 6 months    3. Continued use of Bumex and Toprol as at present  All of the above was discussed with the patient and his family,>50% of the time involved face-to-face time providing counseling and or coordination of care with the other providers, preparation for the clinic visit, reviewing records/tests, counseling/education of the patient, ordering medications/tests/procedures, coordinating care, and documenting clinical information in the EHR. Thank you for allowing me to participate in your patient's care.     Angel Land MD, [address] [facility]  Cardiac Electrophysiology  [facility] Physicians  [facility] [place]: [facility] Electrophysiology

## 2023-03-19 DIAGNOSIS — Z45.02 AICD DISCHARGE: ICD-10-CM

## 2023-03-19 DIAGNOSIS — I25.5 ISCHEMIC CARDIOMYOPATHY: Chronic | ICD-10-CM

## 2023-03-20 ENCOUNTER — CARE COORDINATION (OUTPATIENT)
Dept: CARE COORDINATION | Age: 88
End: 2023-03-20

## 2023-03-20 RX ORDER — METOPROLOL TARTRATE 50 MG/1
TABLET, FILM COATED ORAL
Qty: 225 TABLET | Refills: 3 | Status: SHIPPED | OUTPATIENT
Start: 2023-03-20

## 2023-03-20 RX ORDER — PRAVASTATIN SODIUM 40 MG
TABLET ORAL
Qty: 90 TABLET | Refills: 3 | Status: SHIPPED | OUTPATIENT
Start: 2023-03-20

## 2023-03-20 NOTE — CARE COORDINATION
Remote Alert Monitoring Note      Date/Time:  3/20/2023 8:45 AM  Patient Current Location: [address]: [address] [place] [address]    -- Current Patient Metrics ---- Blood Pressure: 146/66, 58bpm Pulseox: 96%, 61bpm Survey: - Weight: 165.2lbs    LPN contacted patient by telephone regarding red alert received for pulse ox reading (85%). Verified patients name and  as identifiers. Background:  High Blood-Pressure, CHF    Refer to 911 immediately if:  Patient unresponsive or unable to provide history  Change in cognition or sudden confusion  Patient unable to respond in complete sentences  Intense chest pain/tightness  Any concern for any clinical emergency  Red Alert: Provider response time of 1 hr required for any red alert requiring intervention  Yellow Alert: Provider response time of 3hr required for any escalated yellow alert    O2 Triage  Are you having any Chest Pain? no   Are you having any Shortness of Breath? no   Swelling in your hands or feet? no     Are you having any other health concerns or issues? no       Clinical Interventions: Reviewed and followed up on alerts and treatments-.  Pt is asymptomatic and in no apparent distress, speaking in full sentences. Patient states he had just \"rolled out of bed\" and put the pulse ox on his finger. He states he is feeling fine and denies any SOB. He was able to recheck while on the phone with a reading of 96%. No further action needed at this time. Will continue to monitor. Plan/Follow Up: Will continue to review, monitor and address alerts with follow up based on severity of symptoms and risk factors.       [facility], PRISCILLA  [facility]/ CTN/ Remote Patient Monitoring  [phone]

## 2023-03-21 ENCOUNTER — CARE COORDINATION (OUTPATIENT)
Dept: CARE COORDINATION | Age: 88
End: 2023-03-21

## 2023-03-21 NOTE — CARE COORDINATION
for sodium.   -Pt reports no fatigue and he feels good. -Pt reports rare productive cough with clear mucous.   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 Pt said he wears compression stockings daily. No SOB, wheeze or ankle/feet edema.   -Reviewed CHF zone tool. Resources  -PAP investigating Xarelto. PAP reports Pt will need quite a bit of out of pocket cost of medications before getting approval. PAP will follow up with Pt in June 2023 to see where Pt's out of pocket costs are at.   -Pt reports he received a call from [place] at [facility] and [employer] regarding transportation. Pt said he was given three phones numbers. Pt wants to know if [employer] is part of [employer] and Pt is asking about the transportation. Explained [facility] Barrios is not part of [employer] and Pt would have to call [facility] for transportation information. Gave Pt the main phone number [phone]. Pt said he will call. PLAN:  -Continue Care Coordination  -Reinforce Zone Management Tools (CHF)  -RPM continues  -PAP for Xarelto > PAP to check out of pocket costs in June. -F/u monitoring low sodium diet. -F/u on dizziness and heart rate  -F/u transportation [facility]  -4/10/23 CHF clinic appt   -EP Cardio > EP ICD generator change 4/13/23  -New PCP appr 4-    -Next anticipated outreach by [address]  Team: one week. Lab Results       None            Care Coordination Interventions    Referral from Primary Care Provider: Yes  Suggested Interventions and Community Resources  Fall Risk Prevention: Completed (Comment: 2/20/23 Will mail Fall Precautions.)  Medication Assistance Program: Completed (Comment: 2/20/23 Referral for Xarelto.)  Pharmacist: Declined  Registered Dietician: Declined  Social Work: Declined  Other Services: Completed (Comment: 2/20/23 RPM services initiated.)  Zone Management Tools: Completed (Comment: 2/20/23 Will mail CHF zone tool.)  Other Services or Interventions:  Will mail education for HTN, monitoring sodium,

## 2023-03-22 ENCOUNTER — CARE COORDINATION (OUTPATIENT)
Dept: CARE COORDINATION | Age: 88
End: 2023-03-22

## 2023-03-22 NOTE — CARE COORDINATION
Remote Alert Monitoring Note      Date/Time:  3/22/2023 8:36 AM  Patient Current Location: [address]: [address] [facility] [address]    - Current Patient Metrics ---- Blood Pressure: 161/69, 62bpm Pulseox: 96%, 59bpm Survey: - Weight: 165.4lbs     LPN contacted patient by telephone regarding red alert received for blood pressure reading (128/102). Verified patients name and  as identifiers. Background: High Blood-Pressure, CHF    Refer to 911 immediately if:  Patient unresponsive or unable to provide history  Change in cognition or sudden confusion  Patient unable to respond in complete sentences  Intense chest pain/tightness  Any concern for any clinical emergency  Red Alert: Provider response time of 1 hr required for any red alert requiring intervention  Yellow Alert: Provider response time of 3hr required for any escalated yellow alert    BP Triage  Are you having any Chest Pain? no   Are you having any Shortness of Breath? no   Do you have a headache or have any vision changes? no   Are you having any numbness or tingling? no   Are you having any other health concerns or issues? no       Clinical Interventions: Reviewed and followed up on alerts and treatments-.  Pt is asymptomatic and in no apparent distress, speaking in full sentences. Patient states he is feeling fine and was able to recheck while on the phone with a reading of 161/69. No further action required, will continue to monitor. Plan/Follow Up: Will continue to review, monitor and address alerts with follow up based on severity of symptoms and risk factors.       JERRY Moody [address] [place] [address]/ CTN/ Remote Patient Monitoring  [phone]

## 2023-03-30 ENCOUNTER — CARE COORDINATION (OUTPATIENT)
Dept: CARE COORDINATION | Age: 88
End: 2023-03-30

## 2023-03-30 NOTE — CARE COORDINATION
Ambulatory Care Coordination Note  3/30/2023    Patient Current Location:  Home: [address]  [address] [facility] [address]     ACM contacted the patient by telephone. Verified name and  with patient as identifiers. Provided introduction to self, and explanation of the ACM role. Challenges to be reviewed by the provider   Additional needs identified to be addressed with provider: No  none               Method of communication with provider: chart routing. ACM: Nayely Herrmann RN    Offered patient enrollment in the Remote Patient Monitoring (RPM) program for in-home monitoring: Yes, patient already enrolled. ACM contacted patient to follow up on his status regarding RPM, CHF, HTN for Care Coordination.  -Pt has macular degeneration and his vision is limited.   -Pt is on oxygen 2L/nc continuous. Pt's oxygen company is [employer] in [address] continues. Pt receives a 10 day supply of low sodium meals. Pt said she received a supply today.   -Pt reports complete medication compliance.   -Pt reports he walks outside in the drive weather permitting.   -Pt reports he would like to continue with RPM. Pt does not want to transition to [facility] at this time. Will inform [facility]. CHF/HTN:  -RPM continues. -Pt wears O2 2L/nc. Today's RPM monitoring: /63, HR 58; pulse ox 98%, HR 57; weight 165 lbs. Pt said his average weight is 165 to 170 lbs. -Pt reports he intermittently takes the oxygen was off when he is sitting. Pt said he just took the oxygen off talking on the phone now. Pt able to speak in full sentences.    -Pt continues to keep hydrated with water daily.   -Low sodium diet continues. Pt said Mom's meals are low sodium.   -Pt reports rare productive cough with clear mucous. No SOB or ankle/feet edema.   -Pt reports neuropathy pain in his feet is unchanged. Compression stockings on daily.   -Reviewed CHF zone tool.      Resources  -Pt reports he will wait for PAP phone call in

## 2023-04-07 DIAGNOSIS — K92.1 GASTROINTESTINAL HEMORRHAGE WITH MELENA: ICD-10-CM

## 2023-04-07 RX ORDER — PANTOPRAZOLE SODIUM 40 MG/1
40 TABLET, DELAYED RELEASE ORAL DAILY
Qty: 90 TABLET | Refills: 0 | Status: SHIPPED | OUTPATIENT
Start: 2023-04-07

## 2023-04-07 NOTE — TELEPHONE ENCOUNTER
Name of Medication(s) Requested:  pantoprazole    Pharmacy Requested:   [facility]    Medication(s) pended? [x] Yes  [] No    Last Appointment:  Visit date not found    Future appts:  Future Appointments   Date Time Provider Cezar Gamez   4/10/2023 10:00 AM YAJAIRA [facility] ROOM 1 YAJAIRA [facility]   4/13/2023  8:30 AM [facility] EP LAB 1 SEYZ CATH [facility]   4/21/2023  3:30 [facility]   4/27/2023  1:15 PM SCHEDULE, MHYX YTOWN DEVICE [facility] ELECTR [facility]          Does patient need call back?   [] Yes  [x] No

## 2023-04-21 ENCOUNTER — OFFICE VISIT (OUTPATIENT)
Dept: PRIMARY CARE CLINIC | Age: 88
End: 2023-04-21

## 2023-04-21 ENCOUNTER — CARE COORDINATION (OUTPATIENT)
Dept: CARE COORDINATION | Age: 88
End: 2023-04-21

## 2023-04-21 VITALS
SYSTOLIC BLOOD PRESSURE: 132 MMHG | WEIGHT: 168 LBS | BODY MASS INDEX: 25.46 KG/M2 | DIASTOLIC BLOOD PRESSURE: 56 MMHG | HEART RATE: 84 BPM | OXYGEN SATURATION: 95 % | TEMPERATURE: 98.1 F | HEIGHT: 68 IN

## 2023-04-21 DIAGNOSIS — I48.19 PERSISTENT ATRIAL FIBRILLATION (HCC): ICD-10-CM

## 2023-04-21 DIAGNOSIS — I50.42 CHRONIC COMBINED SYSTOLIC AND DIASTOLIC CHF (CONGESTIVE HEART FAILURE) (HCC): Primary | ICD-10-CM

## 2023-04-21 DIAGNOSIS — N18.32 STAGE 3B CHRONIC KIDNEY DISEASE (HCC): ICD-10-CM

## 2023-04-21 DIAGNOSIS — I71.40 ABDOMINAL AORTIC ANEURYSM (AAA) 3.0 CM TO 5.5 CM IN DIAMETER IN MALE (HCC): ICD-10-CM

## 2023-04-21 DIAGNOSIS — I48.20 CHRONIC ATRIAL FIBRILLATION (HCC): ICD-10-CM

## 2023-04-21 PROBLEM — I50.33 ACUTE ON CHRONIC DIASTOLIC CONGESTIVE HEART FAILURE (HCC): Status: RESOLVED | Noted: 2023-02-06 | Resolved: 2023-04-21

## 2023-04-21 PROBLEM — I50.43 ACUTE ON CHRONIC COMBINED SYSTOLIC AND DIASTOLIC CHF (CONGESTIVE HEART FAILURE) (HCC): Status: RESOLVED | Noted: 2022-03-01 | Resolved: 2023-04-21

## 2023-04-21 PROBLEM — I48.0 PAF (PAROXYSMAL ATRIAL FIBRILLATION) (HCC): Status: RESOLVED | Noted: 2023-02-06 | Resolved: 2023-04-21

## 2023-04-21 PROBLEM — I48.0 PAROXYSMAL ATRIAL FIBRILLATION (HCC): Status: RESOLVED | Noted: 2018-03-13 | Resolved: 2023-04-21

## 2023-04-21 PROBLEM — I50.9 ACUTE CONGESTIVE HEART FAILURE (HCC): Status: RESOLVED | Noted: 2022-03-01 | Resolved: 2023-04-21

## 2023-04-21 SDOH — ECONOMIC STABILITY: INCOME INSECURITY: HOW HARD IS IT FOR YOU TO PAY FOR THE VERY BASICS LIKE FOOD, HOUSING, MEDICAL CARE, AND HEATING?: PATIENT DECLINED

## 2023-04-21 SDOH — ECONOMIC STABILITY: FOOD INSECURITY: WITHIN THE PAST 12 MONTHS, THE FOOD YOU BOUGHT JUST DIDN'T LAST AND YOU DIDN'T HAVE MONEY TO GET MORE.: PATIENT DECLINED

## 2023-04-21 SDOH — ECONOMIC STABILITY: HOUSING INSECURITY
IN THE LAST 12 MONTHS, WAS THERE A TIME WHEN YOU DID NOT HAVE A STEADY PLACE TO SLEEP OR SLEPT IN A SHELTER (INCLUDING NOW)?: PATIENT REFUSED

## 2023-04-21 SDOH — ECONOMIC STABILITY: FOOD INSECURITY: WITHIN THE PAST 12 MONTHS, YOU WORRIED THAT YOUR FOOD WOULD RUN OUT BEFORE YOU GOT MONEY TO BUY MORE.: PATIENT DECLINED

## 2023-04-21 NOTE — PROGRESS NOTES
bleeding)    ICD (implantable cardioverter-defibrillator), dual, in situ    Ischemic heart disease    Vitamin D deficiency    Other insomnia    SOB (shortness of breath)    Lung nodule    Pure hypercholesterolemia    Stage 3b chronic kidney disease (HCC)    Transient cerebral ischemia    Aortic valve disease    Near syncope    Gastroesophageal reflux disease without esophagitis    Coronary artery disease involving coronary bypass graft    Symptomatic anemia    GI bleed    Abdominal aortic aneurysm (AAA) 3.0 cm to 5.5 cm in diameter in male [facility])    ICD (implantable cardioverter-defibrillator) discharge    ICD (implantable cardioverter-defibrillator) in place     Past Medical History:   Diagnosis Date    A-fib [facility])     follows with Dr Kelly Mccollum 9/28/22    AAA (abdominal aortic aneurysm) ([address]     infrarenal 3.7cm 6/27/22    Acute congestive heart failure ([address] 3/1/2022    Acute on chronic combined systolic and diastolic CHF (congestive heart failure) ([address] 3/1/2022    Acute on chronic diastolic congestive heart failure ([address] 2/6/2023    Acute on chronic heart failure with preserved ejection fraction (HFpEF) ([facility])     Arrhythmia     Carotid artery stenosis     CHF (congestive heart failure) ([address]     CKD (chronic kidney disease), stage III ([address]     Heart valve problem     Hyperlipidemia     Hypertension     ICD (implantable cardiac defibrillator) in place 2007    [employer] [address] HOLLY#[id number] generator changed 7/28/16  Dr Haydee Shah    MI (mitral incompetence) 2003    MI (myocardial infarction) ([address] 2003    PAF (paroxysmal atrial fibrillation) ([address] 2/6/2023    Paroxysmal atrial fibrillation ([address] 3/13/2018    Ventricular tachycardia ([address]      Past Surgical History:   Procedure Laterality Date    CARDIAC CATHETERIZATION Right 03/03/2017    Dr. Hermelindo Penaloza  2007. 2008 2007 78 shocks replaced 2008 [employer]     CARDIAC DEFIBRILLATOR PLACEMENT  07/28/2016    [employer]

## 2023-04-21 NOTE — CARE COORDINATION
ACM attempted to reach patient to follow up for Care Coordination with no answer. No voicemail or answering machine message. Unable to leave message at this time.      -Plan:   If no return call, ACM will attempt outreach again at a later time.

## 2023-04-22 ASSESSMENT — ENCOUNTER SYMPTOMS
COUGH: 0
SORE THROAT: 0
DIARRHEA: 0
NAUSEA: 0
VOMITING: 0
ABDOMINAL PAIN: 0
BACK PAIN: 0
CONSTIPATION: 0
BLOOD IN STOOL: 0
PHOTOPHOBIA: 0
SHORTNESS OF BREATH: 0

## 2023-04-27 ENCOUNTER — NURSE ONLY (OUTPATIENT)
Dept: NON INVASIVE DIAGNOSTICS | Age: 88
End: 2023-04-27

## 2023-04-27 RX ORDER — FERROUS SULFATE 325(65) MG
325 TABLET ORAL
COMMUNITY

## 2023-05-02 ENCOUNTER — CARE COORDINATION (OUTPATIENT)
Dept: CARE COORDINATION | Age: 88
End: 2023-05-02

## 2023-05-02 VITALS
OXYGEN SATURATION: 94 % | BODY MASS INDEX: 25.18 KG/M2 | WEIGHT: 165.6 LBS | SYSTOLIC BLOOD PRESSURE: 134 MMHG | HEART RATE: 56 BPM | DIASTOLIC BLOOD PRESSURE: 61 MMHG

## 2023-05-02 NOTE — CARE COORDINATION
-ACM received a voice message from Pt requesting a return call. -ACM returned call. Pt said his vital signs are stable and he request RPM to stop. Pt said he wants to return the equipment, RPM informed. Ambulatory Care Coordination Note  2023    Patient Current Location:  Home: [address]  [address] [address]     ACM contacted the patient by telephone. Verified name and  with patient as identifiers. Provided introduction to self, and explanation of the ACM role. Challenges to be reviewed by the provider   Additional needs identified to be addressed with provider: No  none               Method of communication with provider: none. ACM: Gwen Pagan RN    Offered patient enrollment in the Remote Patient Monitoring (RPM) program for in-home monitoring: Yes, patient already enrolled. -Pt reports vital signs are stable, requests to stop RPM and have equipment returned. Will inform RPM Team.     ACM contacted patient to follow up on his status regarding CHF, HTN, ICD, PAP for Care Coordination.  -Pt has macular degeneration and his vision is limited.   -Pt is on oxygen 2L/nc continuous. Pt's oxygen company is [employer] in [address] continues. Pt receives a 10 day supply of low sodium meals.   -Pt reports complete medication compliance.   -Pt reports he walks outside in the driveway weather permitting. CHF/HTN:  -Pt called ACM and said his vital signs are stable with RPM and would like RPM stopped. ACM will inform RPM Team and schedule equipment .    -Pt has O2 2L/nc. Pt said he has not been wearing the oxygen during the day. Pt said he always wears the oxygen 2L/nc for sleep. Pt said his pulse ox 98% with oxygen 2L/nc on and pulse ox 93-94% on room air. Today's RPM monitoring: /56, HR 56; pulse ox 94%, HR 56; weight 165 lbs. -Low sodium diet continues. Low sodium Mom's meals continue.  -Pt reports no SOB, cough or ankle/feet edema.  Pt able to speak in full

## 2023-05-03 ENCOUNTER — HOSPITAL ENCOUNTER (OUTPATIENT)
Dept: OTHER | Age: 88
Setting detail: THERAPIES SERIES
Discharge: HOME | End: 2023-05-03
Payer: MEDICARE

## 2023-05-03 ENCOUNTER — CARE COORDINATION (OUTPATIENT)
Dept: CARE COORDINATION | Age: 88
End: 2023-05-03

## 2023-05-03 VITALS
DIASTOLIC BLOOD PRESSURE: 67 MMHG | SYSTOLIC BLOOD PRESSURE: 145 MMHG | RESPIRATION RATE: 16 BRPM | WEIGHT: 169 LBS | OXYGEN SATURATION: 95 % | HEART RATE: 58 BPM | BODY MASS INDEX: 25.7 KG/M2

## 2023-05-03 DIAGNOSIS — I50.42 CHRONIC COMBINED SYSTOLIC AND DIASTOLIC CHF (CONGESTIVE HEART FAILURE) (HCC): Primary | ICD-10-CM

## 2023-05-03 DIAGNOSIS — I10 ESSENTIAL HYPERTENSION: ICD-10-CM

## 2023-05-03 LAB
ANION GAP SERPL CALCULATED.3IONS-SCNC: 10 MMOL/L (ref 7–16)
BNP BLD-MCNC: 416 PG/ML (ref 0–450)
BUN SERPL-MCNC: 22 MG/DL (ref 6–23)
CALCIUM SERPL-MCNC: 8.9 MG/DL (ref 8.6–10.2)
CHLORIDE SERPL-SCNC: 94 MMOL/L (ref 98–107)
CO2 SERPL-SCNC: 33 MMOL/L (ref 22–29)
CREAT SERPL-MCNC: 1.3 MG/DL (ref 0.7–1.2)
GLUCOSE SERPL-MCNC: 79 MG/DL (ref 74–99)
POTASSIUM SERPL-SCNC: 4 MMOL/L (ref 3.5–5)
SODIUM SERPL-SCNC: 137 MMOL/L (ref 132–146)

## 2023-05-03 PROCEDURE — 99214 OFFICE O/P EST MOD 30 MIN: CPT

## 2023-05-03 PROCEDURE — 83880 ASSAY OF NATRIURETIC PEPTIDE: CPT

## 2023-05-03 PROCEDURE — 80048 BASIC METABOLIC PNL TOTAL CA: CPT

## 2023-05-03 PROCEDURE — 36415 COLL VENOUS BLD VENIPUNCTURE: CPT

## 2023-05-03 RX ORDER — VIT C/E/ZN/COPPR/LUTEIN/ZEAXAN 250MG-90MG
1 CAPSULE ORAL 2 TIMES DAILY
COMMUNITY

## 2023-05-03 NOTE — PROGRESS NOTES
Congestive Heart Failure [address]   CHF Clinic [place]  [address]   1935          Referring Provider: LAWRENCE Vaughn  Primary Care Physician: Dr. Francesca Marcelino  Cardiologist: Dr. Hilton Iverson  Nephrologist:         History of Present Illness:     Noah Banegas is a 82 y.o. male with a history of HFpEF, most recent EF 60% on 3/2/2022. Patient Story:    He does  not have dyspnea no  shortness of breath, or decline in overall functional capacity. He does not have orthopnea, PND, nocturnal cough or hemoptysis. He does not have abdominal distention or bloating, early satiety, anorexia/change in appetite. He does have a good urinary response to  oral diuretic. He does not have lower extremity edema. He denies lightheadedness,Hx of Vertigo dizziness. He denies palpitations, syncope or near syncope. He does not complain of chest pain, pressure, discomfort. No Known Allergies        Prior to Visit Medications    Medication Sig Taking? Authorizing Provider   Multiple Vitamins-Minerals (PRESERVISION AREDS 2) CHEW Take 1 tablet by mouth 2 times daily at 0800 and 1400 Yes Historical Provider, MD   ferrous sulfate (IRON 325) 325 (65 Fe) MG tablet Take 1 tablet by mouth daily (with breakfast)  Historical Provider, MD   pantoprazole (PROTONIX) 40 MG tablet Take 1 tablet by mouth daily  Geremias Koehler DO   pravastatin (PRAVACHOL) 40 MG tablet TAKE 1 TABLET BY MOUTH  DAILY  Patient taking differently: Take 1 tablet by mouth at bedtime  Luiz Flores MD   potassium chloride 20 MEQ/15ML (10%) oral solution DILUTE IN AT LEAST 1/2 GLASS  (4OZ) OF WATER OR JUICE AND TAKE 15 ML BY MOUTH DAILY  GINETTE Bradshaw CNP   bumetanide (BUMEX) 2 MG tablet Take 1 tablet by mouth in the morning and 1 tablet in the evening.   Patient taking differently: Take 1 tablet by mouth daily Takes second dose prn per DR Berta Abdul MD

## 2023-05-03 NOTE — PROGRESS NOTES
5/3/23 4:41 AM     Remote Patient Order Discontinued    Received request from Tyrone Ramirez RN  to discontinue order for remote patient monitoring of CHF and HTN and order completed.      Roel Giordano DNP, FNP-C, Remote Patient Monitoring NP, () [phone]

## 2023-05-03 NOTE — CARE COORDINATION
CCSS placed call to patient to arrange RPM kit  through [facility]. Reviewed with patient how to pack equipment in original packing. Verified patient's availability to schedule UPS  time. UPS  time requested. Anticipated  date range 2 to 4 business days.

## 2023-05-22 ENCOUNTER — NURSE ONLY (OUTPATIENT)
Dept: NON INVASIVE DIAGNOSTICS | Age: 88
End: 2023-05-22
Payer: MEDICARE

## 2023-05-22 ENCOUNTER — CARE COORDINATION (OUTPATIENT)
Dept: CARE COORDINATION | Age: 88
End: 2023-05-22

## 2023-05-22 DIAGNOSIS — Z45.02 ICD (IMPLANTABLE CARDIOVERTER-DEFIBRILLATOR) DISCHARGE: Primary | ICD-10-CM

## 2023-05-22 PROCEDURE — 93283 PRGRMG EVAL IMPLANTABLE DFB: CPT | Performed by: INTERNAL MEDICINE

## 2023-05-22 NOTE — CARE COORDINATION
Ambulatory Care Coordination Note  2023    Patient Current Location:  Home: [address]  [address] at the [facility] [address]     ACM contacted the patient by telephone. Verified name and  with patient as identifiers. Provided introduction to self, and explanation of the ACM role. Challenges to be reviewed by the provider   Additional needs identified to be addressed with provider: No  none               Method of communication with provider: none. ACM: Neville Pacheco RN    Offered patient enrollment in the Remote Patient Monitoring (RPM) program for in-home monitoring:  Pt graduated from [facility] 2023  . Heart Failure Education outreach Date/Time: 2023 11:09 AM    Ambulatory Care Manager (ACM) contacted the patient by telephone to perform Ambulatory Care Coordination. Verified name and  with patient as identifiers. Provided introduction to self, and explanation of the Ambulatory Care Manager's role. ACM reviewed that a Health Healthy tips for the Summer packet has been mailed to the them. ACM reviewed CHF zones, daily weights, the importance of low sodium diet, and healthy tips packet with the patient. Instructed patient to call their PCP if they have a weight gain of 3 lbs in 2 days or 5 lbs in a week. Patient reminded that there is a physician on call 24 hours a day / 7 days a week should the patient have questions or concerns. The patient verbalized understanding. -ACM discussed Graduation from [employer] with patient.  -Patient feels he is able to manage his health care needs without further assistance from [employer], and feels he can graduate from Care Coordination. -ACM educated patient that if he needs additional assistance in managing his health care in the future, he can call ACM to re-enroll in Care Coordination. ACM contacted patient to follow up on his status regarding CHF, HTN, ICD for Care Coordination.  -Pt has macular degeneration and his vision is limited.    -Pt

## 2023-06-06 ENCOUNTER — CARE COORDINATION (OUTPATIENT)
Dept: CARE COORDINATION | Age: 88
End: 2023-06-06

## 2023-06-06 NOTE — CARE COORDINATION
Patient called AC. -Pt reports Xarelto cost has gone up to $475 this past refill. Pt requesting PAP phone number. AC gave Pt JORDON, Dana's phone number ( # [phone]). -Pt said he will call PAP now.   -Pt reports he is doing well.

## 2023-06-07 ENCOUNTER — HOSPITAL ENCOUNTER (OUTPATIENT)
Dept: OTHER | Age: 88
Setting detail: THERAPIES SERIES
Discharge: HOME | End: 2023-06-07
Payer: MEDICARE

## 2023-06-07 ENCOUNTER — TELEPHONE (OUTPATIENT)
Dept: OTHER | Age: 88
End: 2023-06-07

## 2023-06-07 VITALS
OXYGEN SATURATION: 94 % | BODY MASS INDEX: 25.54 KG/M2 | RESPIRATION RATE: 16 BRPM | WEIGHT: 168 LBS | SYSTOLIC BLOOD PRESSURE: 164 MMHG | HEART RATE: 58 BPM | DIASTOLIC BLOOD PRESSURE: 60 MMHG

## 2023-06-07 LAB
ANION GAP SERPL CALCULATED.3IONS-SCNC: 11 MMOL/L (ref 7–16)
BNP BLD-MCNC: 845 PG/ML (ref 0–450)
BUN SERPL-MCNC: 18 MG/DL (ref 6–23)
CALCIUM SERPL-MCNC: 8.9 MG/DL (ref 8.6–10.2)
CHLORIDE SERPL-SCNC: 90 MMOL/L (ref 98–107)
CO2 SERPL-SCNC: 31 MMOL/L (ref 22–29)
CREAT SERPL-MCNC: 1.2 MG/DL (ref 0.7–1.2)
GLUCOSE SERPL-MCNC: 93 MG/DL (ref 74–99)
POTASSIUM SERPL-SCNC: 3.8 MMOL/L (ref 3.5–5)
SODIUM SERPL-SCNC: 132 MMOL/L (ref 132–146)

## 2023-06-07 PROCEDURE — 36415 COLL VENOUS BLD VENIPUNCTURE: CPT

## 2023-06-07 PROCEDURE — 80048 BASIC METABOLIC PNL TOTAL CA: CPT

## 2023-06-07 PROCEDURE — 83880 ASSAY OF NATRIURETIC PEPTIDE: CPT

## 2023-06-07 PROCEDURE — 99214 OFFICE O/P EST MOD 30 MIN: CPT

## 2023-06-07 RX ORDER — AMLODIPINE BESYLATE 5 MG/1
5 TABLET ORAL DAILY
Qty: 90 TABLET | Refills: 3 | Status: SHIPPED | OUTPATIENT
Start: 2023-06-07

## 2023-06-07 NOTE — PROGRESS NOTES
Congestive Heart Failure [address]   CHF Clinic [place]  [address]   1935    Referring Provider: LAWRENCE Loera  Primary Care Physician: Dr. Bryant Pierce  Cardiologist: Dr. Nery Mason  Nephrologist:       History of Present Illness:   Alex Razo is a 82 y.o. male with a history of HFpEF, most recent EF 60% on 3/2/2022. Patient Story:  He does not have dyspnea, shortness of breath at rest, or decline in overall functional capacity. He does not have orthopnea, PND, nocturnal cough or hemoptysis. He does not have abdominal distention or bloating, early satiety, anorexia/change in appetite. He does have a good urinary response to oral diuretic. He does not have lower extremity edema. He denies lightheadedness,dizziness. He denies palpitations, syncope or near syncope. He does not complain of chest pain, pressure, discomfort. No Known Allergies    Prior to Visit Medications    Medication Sig Taking? Authorizing Provider   amLODIPine (NORVASC) 5 MG tablet Take 1 tablet by mouth daily  GINETTE Jacobs CNP   Multiple Vitamins-Minerals (PRESERVISION AREDS 2) CHEW Take 1 tablet by mouth 2 times daily at 0800 and 1400  Historical Provider, MD   ferrous sulfate (IRON 325) 325 (65 Fe) MG tablet Take 1 tablet by mouth daily (with breakfast)  Historical Provider, MD   pantoprazole (PROTONIX) 40 MG tablet Take 1 tablet by mouth daily  Geremias Koehler DO   pravastatin (PRAVACHOL) 40 MG tablet TAKE 1 TABLET BY MOUTH  DAILY  Patient taking differently: Take 1 tablet by mouth at bedtime  Didi Lisa MD   potassium chloride 20 MEQ/15ML (10%) oral solution DILUTE IN AT LEAST 1/2 GLASS  (4OZ) OF WATER OR JUICE AND TAKE 15 ML BY MOUTH DAILY  GINETTE Jacobs CNP   bumetanide (BUMEX) 2 MG tablet Take 1 tablet by mouth in the morning and 1 tablet in the evening.   Patient taking differently: Take 1 tablet by mouth daily Takes

## 2023-06-07 NOTE — TELEPHONE ENCOUNTER
1:09 PM  Spoke with patient's daughter in law Emigdio Valencia re: medication adjustment made by Cornell BRAVO from today's CHF clinic visit. I have reviewed the provider's instructions with the patient, answering all questions to his satisfaction.

## 2023-06-07 NOTE — PLAN OF CARE
Problem: Chronic Conditions and Co-morbidities  Goal: Patient's chronic conditions and co-morbidity symptoms are monitored and maintained or improved  6/7/2023 1225 by Woo Harvey RN  Outcome: Progressing  6/7/2023 1106 by Lucy Castillo RN  Outcome: Progressing

## 2023-06-07 NOTE — RESULT ENCOUNTER NOTE
Labs and CHF Clinic note reviewed  Vitals:BP (!) 164/60   Pulse 58     Will have him increase norvasc to 5 mg daily   Follow up as scheduled
Vertigo

## 2023-08-04 RX ORDER — AMIODARONE HYDROCHLORIDE 200 MG/1
TABLET ORAL
Qty: 90 TABLET | Refills: 2 | Status: SHIPPED | OUTPATIENT
Start: 2023-08-04

## 2023-08-07 PROCEDURE — 93296 REM INTERROG EVL PM/IDS: CPT | Performed by: INTERNAL MEDICINE

## 2023-08-07 PROCEDURE — 93295 DEV INTERROG REMOTE 1/2/MLT: CPT | Performed by: INTERNAL MEDICINE

## 2023-08-09 ENCOUNTER — HOSPITAL ENCOUNTER (OUTPATIENT)
Dept: OTHER | Age: 88
Setting detail: THERAPIES SERIES
Discharge: HOME | End: 2023-08-09
Payer: MEDICARE

## 2023-08-09 VITALS
SYSTOLIC BLOOD PRESSURE: 130 MMHG | HEART RATE: 58 BPM | OXYGEN SATURATION: 95 % | RESPIRATION RATE: 16 BRPM | BODY MASS INDEX: 25.7 KG/M2 | WEIGHT: 169 LBS | DIASTOLIC BLOOD PRESSURE: 60 MMHG

## 2023-08-09 LAB
ANION GAP SERPL CALCULATED.3IONS-SCNC: 9 MMOL/L (ref 7–16)
BNP SERPL-MCNC: 2130 PG/ML (ref 0–450)
BUN SERPL-MCNC: 21 MG/DL (ref 6–23)
CALCIUM SERPL-MCNC: 8.6 MG/DL (ref 8.6–10.2)
CHLORIDE SERPL-SCNC: 91 MMOL/L (ref 98–107)
CO2 SERPL-SCNC: 34 MMOL/L (ref 22–29)
CREAT SERPL-MCNC: 1.5 MG/DL (ref 0.7–1.2)
GFR SERPL CREATININE-BSD FRML MDRD: 46 ML/MIN/1.73M2
GLUCOSE SERPL-MCNC: 99 MG/DL (ref 74–99)
POTASSIUM SERPL-SCNC: 3.7 MMOL/L (ref 3.5–5)
SODIUM SERPL-SCNC: 134 MMOL/L (ref 132–146)

## 2023-08-09 PROCEDURE — 83880 ASSAY OF NATRIURETIC PEPTIDE: CPT

## 2023-08-09 PROCEDURE — 80048 BASIC METABOLIC PNL TOTAL CA: CPT

## 2023-08-09 PROCEDURE — 99214 OFFICE O/P EST MOD 30 MIN: CPT

## 2023-08-09 PROCEDURE — 36415 COLL VENOUS BLD VENIPUNCTURE: CPT

## 2023-08-09 ASSESSMENT — PATIENT HEALTH QUESTIONNAIRE - PHQ9
1. LITTLE INTEREST OR PLEASURE IN DOING THINGS: NOT AT ALL
SUM OF ALL RESPONSES TO PHQ9 QUESTIONS 1 & 2: 0
2. FEELING DOWN, DEPRESSED OR HOPELESS: NOT AT ALL

## 2023-08-09 NOTE — PROGRESS NOTES
currently prescribed medications with patient, educated on importance of compliance and answered any questions regarding their medication  Educated on signs and symptoms of HF  Educated on low sodium diet    PLAN:  Scheduled to follow up in CHF clinic on   Future Appointments   Date Time Provider [address]   8/24/2023  2:45 PM [address], DO [address] [place] [facility]   10/10/2023 10:00 AM [facility] ROOM 3 [facility]   11/13/2023 11:30 AM SCHEDULE, [facility] phone number [phone] and aware of signs and symptoms to call with any HF change in symptoms.

## 2023-08-24 ENCOUNTER — OFFICE VISIT (OUTPATIENT)
Dept: PRIMARY CARE CLINIC | Age: 88
End: 2023-08-24

## 2023-08-24 VITALS
BODY MASS INDEX: 26.07 KG/M2 | OXYGEN SATURATION: 96 % | HEART RATE: 62 BPM | TEMPERATURE: 97.5 F | DIASTOLIC BLOOD PRESSURE: 62 MMHG | SYSTOLIC BLOOD PRESSURE: 136 MMHG | HEIGHT: 68 IN | WEIGHT: 172 LBS

## 2023-08-24 DIAGNOSIS — I48.19 PERSISTENT ATRIAL FIBRILLATION (HCC): Primary | ICD-10-CM

## 2023-08-24 DIAGNOSIS — R22.2 PLEURAL NODULE: ICD-10-CM

## 2023-08-24 DIAGNOSIS — I10 ESSENTIAL HYPERTENSION: ICD-10-CM

## 2023-08-24 DIAGNOSIS — I35.9 AORTIC VALVE DISEASE: ICD-10-CM

## 2023-08-24 DIAGNOSIS — I50.42 CHRONIC COMBINED SYSTOLIC AND DIASTOLIC CHF (CONGESTIVE HEART FAILURE) (HCC): ICD-10-CM

## 2023-08-24 DIAGNOSIS — N18.32 STAGE 3B CHRONIC KIDNEY DISEASE (HCC): ICD-10-CM

## 2023-08-24 DIAGNOSIS — M51.36 LUMBAR DEGENERATIVE DISC DISEASE: ICD-10-CM

## 2023-08-24 DIAGNOSIS — I48.20 CHRONIC ATRIAL FIBRILLATION (HCC): ICD-10-CM

## 2023-08-24 DIAGNOSIS — R91.8 OTHER NONSPECIFIC ABNORMAL FINDING OF LUNG FIELD: ICD-10-CM

## 2023-08-24 DIAGNOSIS — I71.40 ABDOMINAL AORTIC ANEURYSM (AAA) 3.0 CM TO 5.5 CM IN DIAMETER IN MALE (HCC): ICD-10-CM

## 2023-08-24 RX ORDER — POTASSIUM CHLORIDE 1.5 G/1.58G
20 POWDER, FOR SOLUTION ORAL DAILY
Qty: 90 EACH | Refills: 3 | Status: SHIPPED | OUTPATIENT
Start: 2023-08-24

## 2023-08-24 RX ORDER — AMLODIPINE BESYLATE 5 MG/1
5 TABLET ORAL DAILY
Qty: 90 TABLET | Refills: 3 | Status: SHIPPED | OUTPATIENT
Start: 2023-08-24

## 2023-08-25 PROBLEM — M51.369 LUMBAR DEGENERATIVE DISC DISEASE: Status: ACTIVE | Noted: 2023-08-25

## 2023-08-25 PROBLEM — M51.36 LUMBAR DEGENERATIVE DISC DISEASE: Status: ACTIVE | Noted: 2023-08-25

## 2023-08-25 ASSESSMENT — ENCOUNTER SYMPTOMS
BACK PAIN: 0
NAUSEA: 0
SORE THROAT: 0
VOMITING: 0
COUGH: 0
CONSTIPATION: 0
DIARRHEA: 0
ABDOMINAL PAIN: 0
SHORTNESS OF BREATH: 0
BLOOD IN STOOL: 0
PHOTOPHOBIA: 0

## 2023-08-25 NOTE — PROGRESS NOTES
normal.                An electronic signature was used to authenticate this note.     --Luis Koehler, DO

## 2023-08-28 ENCOUNTER — TELEPHONE (OUTPATIENT)
Dept: PRIMARY CARE CLINIC | Age: 88
End: 2023-08-28

## 2023-08-28 NOTE — TELEPHONE ENCOUNTER
Daughter calling again to thank you for calling to speak with her. Also to let you know the testing has been canceled at this time.

## 2023-08-28 NOTE — TELEPHONE ENCOUNTER
Daughter in law was at [facility] last week calling. They were contacted about PET scan scheduling and u/s. They are very upset this was not discussed at [facility] and blind sided. Patient and wife are very anxious about testing and the reasoning they are needing completed. Wanting to discuss this matter with you.        Daughter in law [facility] [phone]  Son Kishore Ray [phone]

## 2023-09-11 ENCOUNTER — TELEPHONE (OUTPATIENT)
Dept: CARDIAC CATH/INVASIVE PROCEDURES | Age: 88
End: 2023-09-11

## 2023-09-12 ENCOUNTER — HOSPITAL ENCOUNTER (OUTPATIENT)
Dept: CARDIAC CATH/INVASIVE PROCEDURES | Age: 88
Discharge: HOME | End: 2023-09-12
Payer: MEDICARE

## 2023-09-12 ENCOUNTER — ANESTHESIA EVENT (OUTPATIENT)
Dept: CARDIAC CATH/INVASIVE PROCEDURES | Age: 88
End: 2023-09-12

## 2023-09-12 ENCOUNTER — ANESTHESIA (OUTPATIENT)
Dept: CARDIAC CATH/INVASIVE PROCEDURES | Age: 88
End: 2023-09-12

## 2023-09-12 VITALS
HEART RATE: 56 BPM | WEIGHT: 167 LBS | DIASTOLIC BLOOD PRESSURE: 66 MMHG | BODY MASS INDEX: 26.84 KG/M2 | SYSTOLIC BLOOD PRESSURE: 157 MMHG | HEIGHT: 66 IN | OXYGEN SATURATION: 94 % | TEMPERATURE: 97 F | RESPIRATION RATE: 18 BRPM

## 2023-09-12 LAB
ALBUMIN SERPL-MCNC: 4.5 G/DL (ref 3.5–5.2)
ALP SERPL-CCNC: 162 U/L (ref 40–129)
ALT SERPL-CCNC: 18 U/L (ref 0–40)
ANION GAP SERPL CALCULATED.3IONS-SCNC: 9 MMOL/L (ref 7–16)
AST SERPL-CCNC: 52 U/L (ref 0–39)
BASOPHILS # BLD: 0.04 K/UL (ref 0–0.2)
BASOPHILS NFR BLD: 1 % (ref 0–2)
BILIRUB SERPL-MCNC: 0.6 MG/DL (ref 0–1.2)
BUN SERPL-MCNC: 19 MG/DL (ref 6–23)
CALCIUM SERPL-MCNC: 9.3 MG/DL (ref 8.6–10.2)
CHLORIDE SERPL-SCNC: 94 MMOL/L (ref 98–107)
CO2 SERPL-SCNC: 33 MMOL/L (ref 22–29)
CREAT SERPL-MCNC: 1.3 MG/DL (ref 0.7–1.2)
EOSINOPHIL # BLD: 0.12 K/UL (ref 0.05–0.5)
EOSINOPHILS RELATIVE PERCENT: 2 % (ref 0–6)
ERYTHROCYTE [DISTWIDTH] IN BLOOD BY AUTOMATED COUNT: 13.8 % (ref 11.5–15)
GFR SERPL CREATININE-BSD FRML MDRD: 53 ML/MIN/1.73M2
GLUCOSE SERPL-MCNC: 101 MG/DL (ref 74–99)
HCT VFR BLD AUTO: 37.7 % (ref 37–54)
HGB BLD-MCNC: 12.1 G/DL (ref 12.5–16.5)
IMM GRANULOCYTES # BLD AUTO: 0.03 K/UL (ref 0–0.58)
IMM GRANULOCYTES NFR BLD: 0 % (ref 0–5)
LYMPHOCYTES NFR BLD: 1.04 K/UL (ref 1.5–4)
LYMPHOCYTES RELATIVE PERCENT: 15 % (ref 20–42)
MAGNESIUM SERPL-MCNC: 2.2 MG/DL (ref 1.6–2.6)
MCH RBC QN AUTO: 29.6 PG (ref 26–35)
MCHC RBC AUTO-ENTMCNC: 32.1 G/DL (ref 32–34.5)
MCV RBC AUTO: 92.2 FL (ref 80–99.9)
MONOCYTES NFR BLD: 0.73 K/UL (ref 0.1–0.95)
MONOCYTES NFR BLD: 11 % (ref 2–12)
NEUTROPHILS NFR BLD: 71 % (ref 43–80)
NEUTS SEG NFR BLD: 4.84 K/UL (ref 1.8–7.3)
PLATELET # BLD AUTO: 236 K/UL (ref 130–450)
PMV BLD AUTO: 11.1 FL (ref 7–12)
POTASSIUM SERPL-SCNC: 5.6 MMOL/L (ref 3.5–5)
PROT SERPL-MCNC: 7.3 G/DL (ref 6.4–8.3)
RBC # BLD AUTO: 4.09 M/UL (ref 3.8–5.8)
SODIUM SERPL-SCNC: 136 MMOL/L (ref 132–146)
TSH SERPL DL<=0.05 MIU/L-ACNC: 2.52 UIU/ML (ref 0.27–4.2)
WBC OTHER # BLD: 6.8 K/UL (ref 4.5–11.5)

## 2023-09-12 PROCEDURE — 2709999900 HC NON-CHARGEABLE SUPPLY

## 2023-09-12 PROCEDURE — 92960 CARDIOVERSION ELECTRIC EXT: CPT | Performed by: INTERNAL MEDICINE

## 2023-09-12 PROCEDURE — 6360000002 HC RX W HCPCS: Performed by: NURSE ANESTHETIST, CERTIFIED REGISTERED

## 2023-09-12 PROCEDURE — 92960 CARDIOVERSION ELECTRIC EXT: CPT

## 2023-09-12 PROCEDURE — 80053 COMPREHEN METABOLIC PANEL: CPT

## 2023-09-12 PROCEDURE — 2580000003 HC RX 258: Performed by: INTERNAL MEDICINE

## 2023-09-12 PROCEDURE — 84443 ASSAY THYROID STIM HORMONE: CPT

## 2023-09-12 PROCEDURE — 85025 COMPLETE CBC W/AUTO DIFF WBC: CPT

## 2023-09-12 PROCEDURE — 3700000000 HC ANESTHESIA ATTENDED CARE: Performed by: ANESTHESIOLOGY

## 2023-09-12 PROCEDURE — 83735 ASSAY OF MAGNESIUM: CPT

## 2023-09-12 RX ORDER — SODIUM CHLORIDE 9 MG/ML
INJECTION, SOLUTION INTRAVENOUS ONCE
Status: COMPLETED | OUTPATIENT
Start: 2023-09-12 | End: 2023-09-12

## 2023-09-12 RX ORDER — PROPOFOL 10 MG/ML
INJECTION, EMULSION INTRAVENOUS PRN
Status: DISCONTINUED | OUTPATIENT
Start: 2023-09-12 | End: 2023-09-12 | Stop reason: SDUPTHER

## 2023-09-12 RX ADMIN — PROPOFOL 40 MG: 10 INJECTION, EMULSION INTRAVENOUS at 12:59

## 2023-09-12 RX ADMIN — SODIUM CHLORIDE: 9 INJECTION, SOLUTION INTRAVENOUS at 12:57

## 2023-09-12 ASSESSMENT — LIFESTYLE VARIABLES: SMOKING_STATUS: 0

## 2023-09-12 ASSESSMENT — ENCOUNTER SYMPTOMS: SHORTNESS OF BREATH: 1

## 2023-09-12 NOTE — OP NOTE
[facility]) Physicians- The Heart and [address] [place] [address] Electrophysiology  Procedure Report  PATIENT: [facility] RECORD NUMBER: [id number]  DATE OF PROCEDURE:  9/12/2023  ATTENDING ELECTROPHYSIOLOGIST:  Kana Mason MD      PROCECURE:    1. Direct Current Electrical Cardioversion    INDICATION:  Persistent atrial fibrillation    PROCEDURE PERFORMED By: Kana Mason MD    PROCEDURE TIME: 15 minutes    COMPICATIONS: None immediately apparent    ANESTHESIA: LMAC    DESCRIPTION OF PROCEDURE: The risks, benefits, and alternatives to the procedure were discussed with the patient, and informed consent was obtained. The patient was brought to the cardiovascular lab and sedated under the guidance of anesthesia. Once anesthesia was deemed adequate, a 40 J shock through his device was initially delivered but unsuccessful. A 200 J biphasic synchronous shock using anterior-posterior patches was then applied. The patient was then allowed to wake in the usual fashion. Comment: The patient was therapeutically anticoagulated for a minimum of 3 consecutive weeks prior to cardioversion. SUMMARY:  1. Successful cardioversion of persistent atrial fibrillation to sinus rhythm. RECOMMENDATIONS:  1. Discharge to home when fully awake and alert, if clinically stable. 2. Resume all pre-procedure medications, including anticoagulation.   3. Follow-up in the office as scheduled  Kana Mason MD  Cardiac Electrophysiology  [facility]) Physicians  The Heart and Vascular [place]: [facility] Electrophysiology  1:07 PM  9/12/2023

## 2023-09-12 NOTE — H&P
Cardiac Electrophysiology   History and physical  Ashlyn Huggins  1935  Date of Service: 9/12/2023  Referring Provider/PCP: Yesica Garcia DO  Chief Complaint:   Persistent atrial fibrillation        Patient Active Problem List    Diagnosis Date Noted    Chronic combined systolic and diastolic CHF (congestive heart failure) ([address]) 10/08/2015     Priority: High    ICD (implantable cardioverter-defibrillator) in place 02/06/2023     Priority: Medium    ICD (implantable cardioverter-defibrillator) discharge      Priority: Medium    Abdominal aortic aneurysm (AAA) 3.0 cm to 5.5 cm in diameter in male ([address]) 04/27/2022     Priority: Medium    Lumbar degenerative disc disease 08/25/2023    Symptomatic anemia 03/12/2022    GI bleed 03/12/2022    Gastroesophageal reflux disease without esophagitis 03/01/2022    Coronary artery disease involving coronary bypass graft 03/01/2022    Near syncope 09/02/2021    SOB (shortness of breath) 10/29/2020    Lung nodule     Pure hypercholesterolemia     Stage 3b chronic kidney disease ([address])     Transient cerebral ischemia     Aortic valve disease     Other insomnia 05/07/2020    Ischemic heart disease     ICD (implantable cardioverter-defibrillator), dual, in situ     GIB (gastrointestinal bleeding) 02/17/2020    Hyperlipidemia LDL goal <100 09/02/2019    Other persistent atrial fibrillation ([address]) 05/30/2017    High risk medications (not anticoagulants) long-term use 05/30/2017    S/P TAVR (transcatheter aortic valve replacement)     Coronary artery disease involving native coronary artery of native heart without angina pectoris     Essential hypertension     Vitamin D deficiency 05/06/2011     PMH    1. History of coronary artery disease with 1st coronary artery bypass surgery in 2003. 2.Cardiac workup in 2007 revealed only the left internal mammary artery to be patent. Saphenous vein graft to OM and ramus intermedius were occluded.   3.The patient had ventricle tachycardia

## 2023-09-12 NOTE — ANESTHESIA PRE PROCEDURE
Department of Anesthesiology  Preprocedure Note       Name:  Billy Gonzalez   Age:  82 y.o.  :  1935                                          MRN:  [id number]         Date:  2023      Surgeon: Yimi Landaverde MD    Procedure: dccv    Medications prior to admission:   Prior to Admission medications    Medication Sig Start Date End Date Taking? Authorizing Provider   potassium chloride (KLOR-CON) 20 MEQ packet Take 20 mEq by mouth daily 23   Geremias Koehler, DO   amLODIPine (NORVASC) 5 MG tablet Take 1 tablet by mouth daily 23   Geremias Koehler, DO   metoprolol tartrate (LOPRESSOR) 25 MG tablet Take 1 tablet by mouth 2 times daily 23   Geremias Koehler, DO   amiodarone (CORDARONE) 200 MG tablet TAKE 1 TABLET BY MOUTH DAILY 23   Carol Roland MD   pantoprazole (PROTONIX) 40 MG tablet TAKE 1 TABLET BY MOUTH DAILY 23   Geremias Koehler, DO   meclizine (ANTIVERT) 25 MG tablet Take 1 tablet by mouth daily as needed for Dizziness 23   Geremias Koehler, DO   Multiple Vitamins-Minerals (PRESERVISION AREDS 2) CHEW Take 1 tablet by mouth 2 times daily at 0800 and 1400    Historical Provider, MD   ferrous sulfate (IRON 325) 325 (65 Fe) MG tablet Take 1 tablet by mouth daily (with breakfast)    Historical Provider, MD   pravastatin (PRAVACHOL) 40 MG tablet TAKE 1 TABLET BY MOUTH  DAILY  Patient taking differently: Take 1 tablet by mouth at bedtime 3/20/23   Raegan Finnegan MD   bumetanide (BUMEX) 2 MG tablet Take 1 tablet by mouth in the morning and 1 tablet in the evening. Patient taking differently: Take 1 tablet by mouth daily Takes second dose prn per DR Jhonatan Breen 3/16/23   Raegan Finnegan MD   rivaroxaban Ida Arriola) 15 MG TABS tablet Take 1 tablet by mouth Daily with supper 3/10/23   Sana Noyola, DO   OXYGEN Inhale into the lungs 2L at nite    Historical Provider, MD   ALPRAZolam Benita Ponmarina) 0.25 MG tablet Take 1 tablet by mouth 3 times daily as needed for Sleep.     Historical Provider, MD

## 2023-09-12 NOTE — ANESTHESIA POSTPROCEDURE EVALUATION
Department of Anesthesiology  Postprocedure Note    Patient: Hilaria Torres  MRN: [id number]  YOB: 1935  Date of evaluation: 9/12/2023      Procedure Summary     Date: 09/12/23 Room / Location: [facility] CATH LAB    Anesthesia Start: 1257 Anesthesia Stop: 1303    Procedure: CARDIOVERSION WITH ANESTHESIA Diagnosis:       Abdominal aortic aneurysm, without rupture, unspecified      Other persistent atrial fibrillation    Scheduled Providers:  Responsible Provider: Srinath Friedman MD    Anesthesia Type: MAC ASA Status: 3          Anesthesia Type: No value filed.     Alfred Phase I:      Alfred Phase II:        Anesthesia Post Evaluation    Patient location during evaluation: PACU  Patient participation: complete - patient participated  Level of consciousness: awake  Pain score: 0  Airway patency: patent  Nausea & Vomiting: no nausea  Complications: no  Cardiovascular status: hemodynamically stable  Respiratory status: acceptable  Hydration status: stable

## 2023-09-12 NOTE — DISCHARGE INSTRUCTIONS
Discharge Date:  9/12/2023   Discharged To: home with support    Resume Activity:  Bathing:   Tub Yes   Shower Yes  Walking:Yes  Stairs: Yes  Driving: No TILL TOMORROW  Work: Yes  School: NA  Sexual Activity: Yes  Lifting: No    Special Instructions: CALL FOR ANY QUESTIONS    Diet: No added salt. Tobacco Cessation Counseling: Done.     Office Follow Up: Call for appointment [facility]  Office Number [phone]

## 2023-09-29 ENCOUNTER — TELEPHONE (OUTPATIENT)
Dept: PRIMARY CARE CLINIC | Age: 88
End: 2023-09-29

## 2023-09-29 NOTE — TELEPHONE ENCOUNTER
Patient is asking if you for advice if you think he should get the new RSV and new pneumonia vaccine.

## 2023-10-10 ENCOUNTER — HOSPITAL ENCOUNTER (OUTPATIENT)
Dept: OTHER | Age: 88
Setting detail: THERAPIES SERIES
Discharge: HOME | End: 2023-10-10
Payer: MEDICARE

## 2023-10-10 VITALS
DIASTOLIC BLOOD PRESSURE: 65 MMHG | HEART RATE: 60 BPM | OXYGEN SATURATION: 95 % | RESPIRATION RATE: 17 BRPM | SYSTOLIC BLOOD PRESSURE: 135 MMHG | BODY MASS INDEX: 27.12 KG/M2 | WEIGHT: 168 LBS

## 2023-10-10 LAB
ANION GAP SERPL CALCULATED.3IONS-SCNC: 9 MMOL/L (ref 7–16)
BNP SERPL-MCNC: 474 PG/ML (ref 0–450)
BUN SERPL-MCNC: 23 MG/DL (ref 6–23)
CALCIUM SERPL-MCNC: 9.1 MG/DL (ref 8.6–10.2)
CHLORIDE SERPL-SCNC: 96 MMOL/L (ref 98–107)
CO2 SERPL-SCNC: 33 MMOL/L (ref 22–29)
CREAT SERPL-MCNC: 1.3 MG/DL (ref 0.7–1.2)
GFR SERPL CREATININE-BSD FRML MDRD: 55 ML/MIN/1.73M2
GLUCOSE SERPL-MCNC: 91 MG/DL (ref 74–99)
POTASSIUM SERPL-SCNC: 4.5 MMOL/L (ref 3.5–5)
SODIUM SERPL-SCNC: 138 MMOL/L (ref 132–146)

## 2023-10-10 PROCEDURE — 36415 COLL VENOUS BLD VENIPUNCTURE: CPT

## 2023-10-10 PROCEDURE — 83880 ASSAY OF NATRIURETIC PEPTIDE: CPT

## 2023-10-10 PROCEDURE — 80048 BASIC METABOLIC PNL TOTAL CA: CPT

## 2023-10-10 PROCEDURE — 99214 OFFICE O/P EST MOD 30 MIN: CPT

## 2023-10-10 ASSESSMENT — PATIENT HEALTH QUESTIONNAIRE - PHQ9
2. FEELING DOWN, DEPRESSED OR HOPELESS: NOT AT ALL
SUM OF ALL RESPONSES TO PHQ9 QUESTIONS 1 & 2: 0
1. LITTLE INTEREST OR PLEASURE IN DOING THINGS: NOT AT ALL

## 2023-10-10 NOTE — PROGRESS NOTES
Congestive Heart Failure [address] [facility]   CHF Clinic [facility]  [address]   1935    Referring Provider: GINETTE Collins-CNP  Primary Care Physician: Dr. Shelby Lemons  Cardiologist: Dr. Deedee Hoover  Nephrologist:       History of Present Illness:   Rob Wilkinson is a 82 y.o. male with a history of HFpEF, most recent EF 60% on 3/2/2022. Patient Story:  He does intermittenly have dyspnea, shortness of breath recovers at rest, or decline in overall functional capacity. He does not have orthopnea, PND, nocturnal cough or hemoptysis. He does not have abdominal distention or bloating, early satiety, anorexia/change in appetite. He does have a good urinary response to oral diuretic. He does have bilateral lower extremity edema. He denies lightheadedness,dizziness. He denies palpitations, syncope or near syncope. He does not complain of chest pain, pressure, discomfort. No Known Allergies    Prior to Visit Medications    Medication Sig Taking?  Authorizing Provider   potassium chloride (KLOR-CON) 20 MEQ packet Take 20 mEq by mouth daily  Geremias Koehler,    amLODIPine (NORVASC) 5 MG tablet Take 1 tablet by mouth daily  Geremias Koehler DO   metoprolol tartrate (LOPRESSOR) 25 MG tablet Take 1 tablet by mouth 2 times daily  Geremias Koehler DO   amiodarone (CORDARONE) 200 MG tablet TAKE 1 TABLET BY MOUTH DAILY  Esau Bush MD   pantoprazole (PROTONIX) 40 MG tablet TAKE 1 TABLET BY MOUTH DAILY  Geremias Koehler DO   meclizine (ANTIVERT) 25 MG tablet Take 1 tablet by mouth daily as needed for Dizziness  Geremias Koehler,    Multiple Vitamins-Minerals (PRESERVISION AREDS 2) CHEW Take 1 tablet by mouth 2 times daily at 0800 and 1400  Xiomara Moreno MD   ferrous sulfate (IRON 325) 325 (65 Fe) MG tablet Take 1 tablet by mouth daily (with breakfast)  Xiomara Moreno MD   pravastatin (PRAVACHOL) 40 MG tablet TAKE 1 TABLET BY MOUTH

## 2023-11-06 PROCEDURE — 93296 REM INTERROG EVL PM/IDS: CPT | Performed by: INTERNAL MEDICINE

## 2023-11-06 PROCEDURE — 93295 DEV INTERROG REMOTE 1/2/MLT: CPT | Performed by: INTERNAL MEDICINE

## 2023-11-09 ENCOUNTER — HOSPITAL ENCOUNTER (OUTPATIENT)
Dept: OTHER | Age: 88
Setting detail: THERAPIES SERIES
Discharge: HOME | End: 2023-11-09
Payer: MEDICARE

## 2023-11-09 VITALS
OXYGEN SATURATION: 94 % | HEART RATE: 60 BPM | SYSTOLIC BLOOD PRESSURE: 138 MMHG | RESPIRATION RATE: 18 BRPM | DIASTOLIC BLOOD PRESSURE: 56 MMHG

## 2023-11-09 LAB
ANION GAP SERPL CALCULATED.3IONS-SCNC: 8 MMOL/L (ref 7–16)
BNP SERPL-MCNC: 562 PG/ML (ref 0–450)
BUN SERPL-MCNC: 21 MG/DL (ref 6–23)
CALCIUM SERPL-MCNC: 9 MG/DL (ref 8.6–10.2)
CHLORIDE SERPL-SCNC: 95 MMOL/L (ref 98–107)
CO2 SERPL-SCNC: 32 MMOL/L (ref 22–29)
CREAT SERPL-MCNC: 1.2 MG/DL (ref 0.7–1.2)
GFR SERPL CREATININE-BSD FRML MDRD: 58 ML/MIN/1.73M2
GLUCOSE SERPL-MCNC: 98 MG/DL (ref 74–99)
POTASSIUM SERPL-SCNC: 4.3 MMOL/L (ref 3.5–5)
SODIUM SERPL-SCNC: 135 MMOL/L (ref 132–146)

## 2023-11-09 PROCEDURE — 83880 ASSAY OF NATRIURETIC PEPTIDE: CPT

## 2023-11-09 PROCEDURE — 36415 COLL VENOUS BLD VENIPUNCTURE: CPT

## 2023-11-09 PROCEDURE — 80048 BASIC METABOLIC PNL TOTAL CA: CPT

## 2023-11-09 PROCEDURE — 99214 OFFICE O/P EST MOD 30 MIN: CPT

## 2023-11-21 ENCOUNTER — TELEPHONE (OUTPATIENT)
Dept: ADMINISTRATIVE | Age: 88
End: 2023-11-21

## 2023-11-21 ENCOUNTER — TELEPHONE (OUTPATIENT)
Dept: CARDIOLOGY CLINIC | Age: 88
End: 2023-11-21

## 2023-11-21 RX ORDER — AMOXICILLIN 500 MG/1
CAPSULE ORAL
Qty: 4 CAPSULE | Refills: 3 | Status: SHIPPED | OUTPATIENT
Start: 2023-11-21

## 2023-11-21 NOTE — TELEPHONE ENCOUNTER
Patient okay to proceed with dental procedure. He is indicated for premedication due to his valve replacement. Script for amoxil 2000 mg 30-60 min prior to procedure sent. Okay to hold Xarelto for 2 full days prior. Staci Knight D.O.   Cardiologist  Cardiology, [address] [facility]

## 2023-11-21 NOTE — TELEPHONE ENCOUNTER
Pt calling asking about when to stop his Xarelto prior a possible tooth extraction on: 12/15/23 at [facility] - they asked him to call Dr. Silvio Benson when and ok to stop Xarelto? [phone].

## 2023-11-27 DIAGNOSIS — F41.9 ANXIETY: Primary | ICD-10-CM

## 2023-11-27 RX ORDER — ALPRAZOLAM 0.25 MG/1
0.25 TABLET ORAL 3 TIMES DAILY PRN
Qty: 90 TABLET | Refills: 3 | Status: SHIPPED | OUTPATIENT
Start: 2023-11-27 | End: 2024-03-26

## 2023-11-27 NOTE — TELEPHONE ENCOUNTER
Patient asking if you can order Xanax for him, the lowest dose. Said he discussed this with you at his last office visit. Was getting medication from Dr. Slick Boucher, but stopped going to her due to having too many doctors appointments.

## 2023-11-30 ENCOUNTER — NURSE ONLY (OUTPATIENT)
Dept: NON INVASIVE DIAGNOSTICS | Age: 88
End: 2023-11-30

## 2023-11-30 DIAGNOSIS — I47.20 VENTRICULAR TACHYARRHYTHMIA (HCC): ICD-10-CM

## 2023-11-30 DIAGNOSIS — Z45.02 ICD (IMPLANTABLE CARDIOVERTER-DEFIBRILLATOR) DISCHARGE: Primary | ICD-10-CM

## 2023-11-30 DIAGNOSIS — I48.20 CHRONIC ATRIAL FIBRILLATION (HCC): ICD-10-CM

## 2023-12-13 ENCOUNTER — TELEPHONE (OUTPATIENT)
Dept: PRIMARY CARE CLINIC | Age: 88
End: 2023-12-13

## 2023-12-13 RX ORDER — BENZONATATE 100 MG/1
100 CAPSULE ORAL 3 TIMES DAILY PRN
Qty: 30 CAPSULE | Refills: 0 | Status: SHIPPED | OUTPATIENT
Start: 2023-12-13 | End: 2023-12-23

## 2023-12-13 RX ORDER — AMOXICILLIN 875 MG/1
875 TABLET, COATED ORAL 2 TIMES DAILY
Qty: 14 TABLET | Refills: 0 | Status: SHIPPED | OUTPATIENT
Start: 2023-12-13 | End: 2023-12-20

## 2023-12-13 NOTE — TELEPHONE ENCOUNTER
Pt calling in stating that he woke up with a sore throat and runny nose and wants to know if anything can be sent in for him to the pharmacy.

## 2023-12-15 ENCOUNTER — OFFICE VISIT (OUTPATIENT)
Dept: FAMILY MEDICINE CLINIC | Age: 88
End: 2023-12-15

## 2023-12-15 VITALS
HEIGHT: 66 IN | DIASTOLIC BLOOD PRESSURE: 60 MMHG | BODY MASS INDEX: 27.16 KG/M2 | WEIGHT: 169 LBS | HEART RATE: 72 BPM | OXYGEN SATURATION: 93 % | SYSTOLIC BLOOD PRESSURE: 128 MMHG | TEMPERATURE: 98.1 F

## 2023-12-15 DIAGNOSIS — R05.9 COUGH, UNSPECIFIED TYPE: Primary | ICD-10-CM

## 2023-12-15 RX ORDER — BUMETANIDE 2 MG/1
2 TABLET ORAL DAILY
Qty: 180 TABLET | Refills: 3 | Status: SHIPPED | OUTPATIENT
Start: 2023-12-15

## 2023-12-15 RX ORDER — CODEINE PHOSPHATE/GUAIFENESIN 10-100MG/5
5 LIQUID (ML) ORAL 4 TIMES DAILY PRN
Qty: 237 ML | Refills: 0 | Status: SHIPPED | OUTPATIENT
Start: 2023-12-15 | End: 2023-12-27

## 2023-12-15 RX ORDER — POTASSIUM CHLORIDE 20 MEQ/1
20 TABLET, EXTENDED RELEASE ORAL DAILY
Qty: 90 TABLET | Refills: 1 | Status: SHIPPED | OUTPATIENT
Start: 2023-12-15

## 2023-12-15 NOTE — PROGRESS NOTES
with supper, Disp: 90 tablet, Rfl: 3    OXYGEN, Inhale into the lungs 2L at nite, Disp: , Rfl:     MAGNESIUM-OXIDE 400 (240 Mg) MG tablet, Take 1 tablet by mouth Daily with supper, Disp: , Rfl:     Cholecalciferol (VITAMIN D) 2000 UNITS CAPS capsule, Take 2,000 Units by mouth Daily with lunch, Disp: , Rfl:    Patient Active Problem List   Diagnosis    Chronic combined systolic and diastolic CHF (congestive heart failure) (HCC)    S/P TAVR (transcatheter aortic valve replacement)    Coronary artery disease involving native coronary artery of native heart without angina pectoris    Essential hypertension    Other persistent atrial fibrillation (HCC)    High risk medications (not anticoagulants) long-term use    Hyperlipidemia LDL goal <100    GIB (gastrointestinal bleeding)    ICD (implantable cardioverter-defibrillator), dual, in situ    Ischemic heart disease    Vitamin D deficiency    Other insomnia    SOB (shortness of breath)    Lung nodule    Pure hypercholesterolemia    Stage 3b chronic kidney disease (HCC)    Transient cerebral ischemia    Aortic valve disease    Near syncope    Gastroesophageal reflux disease without esophagitis    Coronary artery disease involving coronary bypass graft    Symptomatic anemia    GI bleed    Abdominal aortic aneurysm (AAA) 3.0 cm to 5.5 cm in diameter in male [facility])    ICD (implantable cardioverter-defibrillator) discharge    ICD (implantable cardioverter-defibrillator) in place    Lumbar degenerative disc disease     Past Medical History:   Diagnosis Date    A-fib [facility])     follows with Dr Apolonia Milligan 9/28/22    AAA (abdominal aortic aneurysm) ([address])     infrarenal 3.7cm 6/27/22    Acute congestive heart failure ([address]) 03/01/2022    Acute on chronic combined systolic and diastolic CHF (congestive heart failure) ([address]) 03/01/2022    Acute on chronic diastolic congestive heart failure ([address]) 02/06/2023    Acute on chronic heart failure with preserved ejection fraction (HFpEF) ([address])

## 2024-01-16 ENCOUNTER — TELEPHONE (OUTPATIENT)
Dept: PRIMARY CARE CLINIC | Age: 89
End: 2024-01-16

## 2024-01-16 NOTE — TELEPHONE ENCOUNTER
Not sure most likely will need further workup.  Please also ask him why they cancelled his PET scan and ultrasound of the abdomen placed at the end of summer last year.

## 2024-01-16 NOTE — TELEPHONE ENCOUNTER
Patients wife called and left vm stating that pt is still having vertigo while on meclizine. She is asking if there is anything else that can be done. Please advise.

## 2024-01-17 RX ORDER — MECLIZINE HYDROCHLORIDE 25 MG/1
25 TABLET ORAL DAILY PRN
Qty: 90 TABLET | Refills: 0 | Status: SHIPPED | OUTPATIENT
Start: 2024-01-17

## 2024-01-19 ENCOUNTER — HOSPITAL ENCOUNTER (OUTPATIENT)
Dept: OTHER | Age: 89
Discharge: HOME | End: 2024-01-19

## 2024-01-24 ENCOUNTER — OFFICE VISIT (OUTPATIENT)
Dept: PRIMARY CARE CLINIC | Age: 89
End: 2024-01-24
Payer: MEDICARE

## 2024-01-24 VITALS
TEMPERATURE: 97.6 F | WEIGHT: 167 LBS | OXYGEN SATURATION: 96 % | DIASTOLIC BLOOD PRESSURE: 48 MMHG | HEART RATE: 66 BPM | BODY MASS INDEX: 26.84 KG/M2 | HEIGHT: 66 IN | SYSTOLIC BLOOD PRESSURE: 110 MMHG

## 2024-01-24 DIAGNOSIS — R42 DIZZINESS: ICD-10-CM

## 2024-01-24 DIAGNOSIS — I65.23 BILATERAL CAROTID ARTERY STENOSIS: Primary | ICD-10-CM

## 2024-01-24 DIAGNOSIS — H61.22 HEARING LOSS OF LEFT EAR DUE TO CERUMEN IMPACTION: ICD-10-CM

## 2024-01-24 PROCEDURE — 1036F TOBACCO NON-USER: CPT | Performed by: FAMILY MEDICINE

## 2024-01-24 PROCEDURE — 1123F ACP DISCUSS/DSCN MKR DOCD: CPT | Performed by: FAMILY MEDICINE

## 2024-01-24 PROCEDURE — G8427 DOCREV CUR MEDS BY ELIG CLIN: HCPCS | Performed by: FAMILY MEDICINE

## 2024-01-24 PROCEDURE — G8417 CALC BMI ABV UP PARAM F/U: HCPCS | Performed by: FAMILY MEDICINE

## 2024-01-24 PROCEDURE — 69209 REMOVE IMPACTED EAR WAX UNI: CPT | Performed by: FAMILY MEDICINE

## 2024-01-24 PROCEDURE — 99213 OFFICE O/P EST LOW 20 MIN: CPT | Performed by: FAMILY MEDICINE

## 2024-01-24 PROCEDURE — G8484 FLU IMMUNIZE NO ADMIN: HCPCS | Performed by: FAMILY MEDICINE

## 2024-01-24 ASSESSMENT — ENCOUNTER SYMPTOMS
COUGH: 0
SHORTNESS OF BREATH: 0
BLOOD IN STOOL: 0
BACK PAIN: 0
NAUSEA: 0
SORE THROAT: 0
CONSTIPATION: 0
PHOTOPHOBIA: 0
ABDOMINAL PAIN: 0
DIARRHEA: 0
VOMITING: 0

## 2024-01-24 NOTE — PROGRESS NOTES
Orthostatic BP obtained from patient.  Laying: /50; P62; %90  Sitting: /50; P62; %94  Standing: /48; P66; %96

## 2024-01-24 NOTE — PROGRESS NOTES
Niles Field (:  1935) is a 88 y.o. male,Established patient, here for evaluation of the following chief complaint(s):  Dizziness (Worse on Saturday. Having episode of dizziness )         ASSESSMENT/PLAN:  1. Bilateral carotid artery stenosis  -     Vascular duplex carotid bilateral; Future  2. Dizziness  -     Vascular duplex carotid bilateral; Future  3. Hearing loss of left ear due to cerumen impaction  Orthostatics borderline.  We will flush the left ear to make sure that this is not causing any confounding issues.  Carotid artery ultrasound to rule out worsening of stenosis since 2019 on his CTA.  Further evaluation will be based on results.    No follow-ups on file.         Subjective   SUBJECTIVE/OBJECTIVE:  HPI  Patient presents today for worsening episodes of dizziness.  Patient states that the dizziness is not room spinning in nature.  Does respond somewhat to meclizine.  Patient is also on Xanax which should help any vestibular issues.  As noted above patient did have carotid stenosis on his CTA from 2019 which has not been rechecked.  No other neurological symptoms at this time.    Review of Systems   Constitutional:  Positive for fatigue. Negative for chills and fever.   HENT:  Negative for congestion, hearing loss, nosebleeds and sore throat.    Eyes:  Negative for photophobia.   Respiratory:  Negative for cough and shortness of breath.    Cardiovascular:  Positive for leg swelling. Negative for chest pain and palpitations.   Gastrointestinal:  Negative for abdominal pain, blood in stool, constipation, diarrhea, nausea and vomiting.   Endocrine: Negative for polydipsia.   Genitourinary:  Negative for dysuria, frequency, hematuria and urgency.   Musculoskeletal:  Negative for back pain and myalgias.   Skin: Negative.    Neurological:  Positive for dizziness and numbness. Negative for tremors, weakness and headaches.   Hematological:  Does not bruise/bleed easily.   Psychiatric/Behavioral:

## 2024-02-12 ENCOUNTER — HOSPITAL ENCOUNTER (OUTPATIENT)
Dept: OTHER | Age: 89
Setting detail: THERAPIES SERIES
Discharge: HOME | End: 2024-02-12
Payer: MEDICARE

## 2024-02-12 VITALS
BODY MASS INDEX: 26.86 KG/M2 | OXYGEN SATURATION: 95 % | WEIGHT: 166.4 LBS | RESPIRATION RATE: 16 BRPM | DIASTOLIC BLOOD PRESSURE: 51 MMHG | HEART RATE: 60 BPM | SYSTOLIC BLOOD PRESSURE: 131 MMHG

## 2024-02-12 LAB
ANION GAP SERPL CALCULATED.3IONS-SCNC: 9 MMOL/L (ref 7–16)
BNP SERPL-MCNC: 588 PG/ML (ref 0–450)
BUN SERPL-MCNC: 31 MG/DL (ref 6–23)
CALCIUM SERPL-MCNC: 9 MG/DL (ref 8.6–10.2)
CHLORIDE SERPL-SCNC: 97 MMOL/L (ref 98–107)
CO2 SERPL-SCNC: 33 MMOL/L (ref 22–29)
CREAT SERPL-MCNC: 1.4 MG/DL (ref 0.7–1.2)
GFR SERPL CREATININE-BSD FRML MDRD: 51 ML/MIN/1.73M2
GLUCOSE SERPL-MCNC: 119 MG/DL (ref 74–99)
POTASSIUM SERPL-SCNC: 3.5 MMOL/L (ref 3.5–5)
SODIUM SERPL-SCNC: 139 MMOL/L (ref 132–146)

## 2024-02-12 PROCEDURE — 80048 BASIC METABOLIC PNL TOTAL CA: CPT

## 2024-02-12 PROCEDURE — 36415 COLL VENOUS BLD VENIPUNCTURE: CPT

## 2024-02-12 PROCEDURE — 99214 OFFICE O/P EST MOD 30 MIN: CPT

## 2024-02-12 PROCEDURE — 83880 ASSAY OF NATRIURETIC PEPTIDE: CPT

## 2024-02-12 NOTE — FLOWSHEET NOTE
02/12/24 1001   Over the past 2 weeks, how often have you been bothered by any of the following problems?   Little interest or pleasure in doing things Not at all   Feeling down, depressed, or hopeless Not at all   Patient Health Questionnaire-2 Score 0

## 2024-02-12 NOTE — PROGRESS NOTES
Geremias Koehler DO   ALPRAZolam (XANAX) 0.25 MG tablet Take 1 tablet by mouth 3 times daily as needed for Sleep for up to 120 days. Max Daily Amount: 0.75 mg  Geremias Koehler DO   amoxicillin (AMOXIL) 500 MG capsule Take 2000 mg by mouth 30 to 60 minutes before procedure.  Patient not taking: Reported on 2/12/2024  Oli Jorgensen DO   potassium chloride (KLOR-CON) 20 MEQ packet Take 20 mEq by mouth daily  Geremias Koehler DO   amLODIPine (NORVASC) 5 MG tablet Take 1 tablet by mouth daily  Geremias Koehler DO   metoprolol tartrate (LOPRESSOR) 25 MG tablet Take 1 tablet by mouth 2 times daily  Geremias Koehler DO   amiodarone (CORDARONE) 200 MG tablet TAKE 1 TABLET BY MOUTH DAILY  Mohini Mercado MD   pantoprazole (PROTONIX) 40 MG tablet TAKE 1 TABLET BY MOUTH DAILY  Geremias Koehler DO   Multiple Vitamins-Minerals (PRESERVISION AREDS 2) CHEW Take 1 tablet by mouth 2 times daily at 0800 and 1400  Xiomara Moreno MD   ferrous sulfate (IRON 325) 325 (65 Fe) MG tablet Take 1 tablet by mouth daily (with breakfast)  Xiomara Moreno MD   pravastatin (PRAVACHOL) 40 MG tablet TAKE 1 TABLET BY MOUTH  DAILY  Patient taking differently: Take 1 tablet by mouth at bedtime  Jae Fowler MD   rivaroxaban (XARELTO) 15 MG TABS tablet Take 1 tablet by mouth Daily with supper  Oli Jorgensen DO   OXYGEN Inhale into the lungs 2L at nite  ProviderXiomara MD   MAGNESIUM-OXIDE 400 (240 Mg) MG tablet Take 1 tablet by mouth Daily with supper  Xiomara Moreno MD   Cholecalciferol (VITAMIN D) 2000 UNITS CAPS capsule Take 2,000 Units by mouth Daily with lunch  ProviderXiomara MD            GUIDELINE DIRECTED MEDICAL THERAPY for HFpEF:  SGLT2i:  (2a indication)  No  Aldosterone antagonist: (2b indication)  No  ARNI/ACE I/ARB: (2b indication, with LVEF on lower end of spectrum)  No      HEART FAILURE FOCUSED PHYSICAL EXAMINATION:    Vitals:    02/12/24 1000   BP: (!) 131/51   Pulse: 60   Resp: 16   SpO2: 95%

## 2024-02-14 NOTE — CONSULTS
Gastroenterology Consult Note   GINETTE Cox NP-C with Lubna Colin M.D. Consult Note        Date of Service: 3/13/2022  Reason for Consult: GI bleed  Requesting Physician: Dr. Tali Singh:  Abnormal labs and dizziness    History Obtained From:  Patient, EMR     HISTORY OF PRESENT ILLNESS:       Wilian Dang is a 82 y.o. male with significant past medical history of A. Fib on xarelto, CHF, HLD, HTN, ICD and MI admitted via ED for abnormal labs and dizziness. Pt reports he had recent admission for CHF exacerbation and was started on Lasix. He reports he was at home and was doing well had a follow-up appointment with cardiology who had ordered blood work and was called to notify him of a low hemoglobin and sent to ER for evaluation. He reports he had intermittent dizziness for the past week. Denies chest pain, shortness of breath, nausea vomiting or heartburn. No complaints of abdominal pain. Tolerating diet. Reports bowel movements are normally daily no BM for 2 days which reports he takes oral iron stools are always melanotic. Reports he does have a history of an ulcer. Denies history of liver disease. No fever chills or unintentional weight loss. No over-the-counter use of NSAIDs or Tylenol. Currently on Xarelto. Colonoscopy 2/24/2020 with Dr. Day Aguilar. EGD 2/18/2020 with Dr. Donal Kuo gastritis and esophagitis no active bleed. EGD 9/3/2019 with Dr. Ana Maria Pettit gastritis, hiatal hernia, esophagitis. Colonoscopy 4/21/17 with Dr. Momin Round colonic polyps resected. EGD 4/20/2017 with Dr. Donal Kuo gastritis. 8/30/2013 EGD with Dr. Fidel Man channel ulcer. Admission labs H&H 7.0/23.7, potassium 5.1, BUN 37, creatinine 1.3. Consultation for  GI bleed. Currently, pt reports no BM x2 days. No abdominal pain, nausea vomiting, shortness of breath or epigastric pain. Labs today hemoglobin 6.7.     Past Medical History:        Diagnosis Date    A-fib [facility])  Arrhythmia     Carotid artery stenosis     [facility]estive [facility]) [facility]     Heart valve problem     Hyperlipidemia     Hypertension     ICD (implantable cardiac defibrillator) in place 2007    MI (mitral incompetence) 2003    MI (myocardial infarction) ([facility] 2003     Past Surgical History:        Procedure Laterality Date    CARDIAC CATHETERIZATION Right 03/03/2017    Dr. Bladimir Verdin  2007. 2008 2007 78 shocks replaced 2008 [employer]     CARDIAC DEFIBRILLATOR PLACEMENT  07/28/2016    [employer] Dual ICD gen change    CARDIAC SURGERY N/A 9/4/2021    cardioversion performed by Xi Doherty MD at [address] [place] [address]  10/29/2020    Successful    (Dr. Cleo Garcia)    COLONOSCOPY N/A 2/24/2020    COLONOSCOPY DIAGNOSTIC performed by Lisandra Steel MD at [facility]  05/23/2003 03/16/2007    DIAGNOSTIC CARDIAC CATH LAB PROCEDURE  2007    DIAGNOSTIC CARDIAC CATH LAB PROCEDURE  03/29/2008    stent    JOINT REPLACEMENT  2011    r hip surgery Dr Catina Beck  03/29/2017    Dr. Indio Rojas and Dr. William Liner- TAVR Josie 26mm valve    UPPER GASTROINTESTINAL ENDOSCOPY N/A 9/3/2019    EGD ESOPHAGOGASTRODUODENOSCOPY performed by Lorin Kamara MD at [address] [facility] N/A 2/18/2020    EGD ESOPHAGOGASTRODUODENOSCOPY performed by Lisandra Steel MD at [address]     Current Medications:    Current Facility-Administered Medications: 0.9 % sodium chloride infusion, , IntraVENous, PRN  amLODIPine (NORVASC) tablet 2.5 mg, 2.5 mg, Oral, Nightly  0.9 % sodium chloride infusion, , IntraVENous, PRN  vitamin D (CHOLECALCIFEROL) tablet 2,000 Units, 2,000 Units, Oral, Daily  dofetilide (TIKOSYN) capsule 250 mcg, 250 mcg, Oral, BID  ferrous sulfate (IRON 325) tablet 325 mg, 325 mg, Oral, Daily with breakfast  meclizine (ANTIVERT) tablet 25 mg, 25 mg, Oral, BID PRN  metoprolol tartrate (LOPRESSOR) tablet 75 mg, 75 mg, Oral, BID  sodium chloride flush 0.9 % injection 5-40 mL, 5-40 mL, IntraVENous, 2 times per day  sodium chloride flush 0.9 % injection 5-40 mL, 5-40 mL, IntraVENous, PRN  0.9 % sodium chloride infusion, 25 mL, IntraVENous, PRN  ondansetron (ZOFRAN-ODT) disintegrating tablet 4 mg, 4 mg, Oral, Q8H PRN **OR** ondansetron (ZOFRAN) injection 4 mg, 4 mg, IntraVENous, Q6H PRN  acetaminophen (TYLENOL) tablet 650 mg, 650 mg, Oral, Q6H PRN **OR** acetaminophen (TYLENOL) suppository 650 mg, 650 mg, Rectal, Q6H PRN  pantoprazole (PROTONIX) injection 40 mg, 40 mg, IntraVENous, Daily **AND** sodium chloride (PF) 0.9 % injection 10 mL, 10 mL, IntraVENous, Daily    Allergies:  Patient has no known allergies. Social History:    Tobacco:  Pt reports history quit 40 years ago  Alcohol:  Pt reports no history of current use  Illicit Drugs: Pt reports no history or current use    Family History:   Unknown family history    REVIEW OF SYSTEMS:    Aside from what was mentioned in the PMH and HPI, essentially unremarkable, all others negative. PHYSICAL EXAM:      Vitals:    /77   Pulse 69   Temp 98.1 °F (36.7 °C) (Oral)   Resp 18   Ht 5' 8\" (1.727 m)   Wt 171 lb (77.6 kg)   SpO2 95%   BMI 26.00 kg/m²       CONSTITUTIONAL:  awake, alert, cooperative, no apparent distress, and appears stated age  EYES:  pupils equal, round and reactive to light, sclera anicteric and conjunctiva normal  ENT:  normocephalic, oral pharynx with moist mucous membranes  NECK:  supple   HEMATOLOGIC/LYMPHATICS:  no cervical lymphadenopathy and no supraclavicular lymphadenopathy  LUNGS:  No increased work of breathing, good air exchange, clear to auscultation bilaterally.   CARDIOVASCULAR:  Normal apical impulse, regular rate and rhythm, no murmur noted; 2+ pulses; no edema  ABDOMEN:  normal bowel sounds, soft, non-distended, non-tender, no masses palpated, no hepatosplenomegally  MUSCULOSKELETAL:  full range of motion noted  motor strength is 5 out of 5 all extremities bilaterally  NEUROLOGIC:  Mental Status Exam:  Level of Alertness:   awake  Orientation:   person, place, time  Motor Exam:  Motor exam is symmetrical 5 out of 5 all extremities bilaterally  SKIN:  normal skin color, texture, turgor    DATA:    CBC with Differential:    Lab Results   Component Value Date    WBC 8.7 03/12/2022    RBC 1.94 03/12/2022    HGB 6.7 03/13/2022    HCT 21.3 03/13/2022     03/12/2022    .0 03/12/2022    MCH 31.4 03/12/2022    MCHC 31.4 03/12/2022    RDW 17.3 03/12/2022    SEGSPCT 63 10/18/2013    METASPCT 1 08/29/2013    LYMPHOPCT 17.6 03/12/2022    MONOPCT 9.3 03/12/2022    MYELOPCT 1 08/29/2013    EOSPCT 3.2 06/08/2021    BASOPCT 0.3 03/12/2022    MONOSABS 0.81 03/12/2022    LYMPHSABS 1.53 03/12/2022    EOSABS 0.13 03/12/2022    BASOSABS 0.03 03/12/2022     CMP:    Lab Results   Component Value Date     03/12/2022    K 4.3 03/12/2022    CL 98 03/12/2022    CO2 26 03/12/2022    BUN 39 03/12/2022    CREATININE 1.2 03/12/2022    GFRAA >60 03/12/2022    LABGLOM 58 03/12/2022    GLUCOSE 129 03/12/2022    PROT 5.7 03/12/2022    LABALBU 3.8 03/12/2022    CALCIUM 8.1 03/12/2022    BILITOT 0.2 03/12/2022    ALKPHOS 102 03/12/2022    AST 17 03/12/2022    ALT 10 03/12/2022     Hepatic Function Panel:    Lab Results   Component Value Date    ALKPHOS 102 03/12/2022    ALT 10 03/12/2022    AST 17 03/12/2022    PROT 5.7 03/12/2022    BILITOT 0.2 03/12/2022    BILIDIR <0.2 06/28/2017    IBILI see below 06/28/2017    LABALBU 3.8 03/12/2022     PT/INR:    Lab Results   Component Value Date    PROTIME 14.0 03/13/2022    INR 1.3 03/13/2022     PTT:    Lab Results   Component Value Date    APTT 30.6 03/13/2022   [APTT}  Last 3 Troponin:    Lab Results   Component Value Date    TROPONINI <0.01 10/29/2020    TROPONINI <0.01 10/29/2020    TROPONINI <0.01 10/28/2020     TSH:    Lab Results   Component Value Date    TSH 1.280 03/01/2022     VITAMIN B12: No results found for: Thom Collar:  No results found for: FOLATE  IRON:    Lab Results   Component Value Date    IRON 35 03/13/2022     Iron Saturation:    Lab Results   Component Value Date    LABIRON 11 03/13/2022     TIBC:    Lab Results   Component Value Date    TIBC 333 03/13/2022     FERRITIN:    Lab Results   Component Value Date    FERRITIN 180 12/17/2020     HIV:  No results found for: HIV  FRANCESCA:    Lab Results   Component Value Date    FRANCESCA NEGATIVE 10/18/2013     No components found for: CHLPL    Lab Results   Component Value Date    TRIG 430 (H) 03/02/2022    TRIG 230 (H) 09/03/2021    TRIG 339 06/08/2021       Lab Results   Component Value Date    HDL 34 03/02/2022    HDL 38 09/03/2021    HDL 37 06/08/2021       Lab Results   Component Value Date    LDLCALC - (AA) 03/02/2022    LDLCALC 56 09/03/2021    LDLCALC 49 06/08/2021       Lab Results   Component Value Date    LABVLDL - (AA) 03/02/2022    LABVLDL 46 09/03/2021    LABVLDL - (AA) 08/17/2020        Echo Complete    Result Date: 3/2/2022  Transthoracic Echocardiography Report (TTE)  Demographics   Patient Name        Kris Benavidez Gender                Male   Medical Record      [id number]        Room Number           0533  Number   Account #           [de-identified]       Procedure Date        03/02/2022   Corporate ID                        Ordering Physician   Accession Number    [id number]      Referring Physician   Sandeep Iverson   Date of Birth       1935      Sonographer           Mabel Hermosillo [facility] [[email or web address]]   Age                 82 year(s)      Interpreting          Agata Enciso MD                                      Physician                                       Any Other  Procedure Type of Study   TTE procedure:Echo Complete W/Doppler & Color Flow.   Procedure Date Date: 03/02/2022 Start: 07:31 AM Study Location: Echo Lab Technical Quality: Limited visualization due to poor acoustical window. Indications:Congestive heart failure. Patient Status: Routine Height: 68 inches Weight: 170 pounds BSA: 1.91 m^2 BMI: 25.85 kg/m^2 HR: 78 bpm BP: 113/58 mmHg  Findings   Left Ventricle  Left ventricle size is normal.  Concentric left ventricular hypertrophy. Normal left ventricular systolic function. Ejection fraction is visually estimated at 60%. There is doppler evidence of stage II diastolic dysfunction. Right Ventricle  Mildly dilated right ventricle with normal right ventricular function. Pacer/ICD wire visualized in the right ventricle. Left Atrium  Moderately dilated left atrium by volume index. Right Atrium  Dilated right atrium. Mitral Valve  Mild mitral regurgitation. MV mean gradient 4 mmHg. Tricuspid Valve  Mild tricuspid regurgitation. PASP is estimated at 44 mmHg. Aortic Valve  History of TAVR (ZANE 3) with 26 mm bioprosthetic valve. No significant paravalvular aortic regurgitation. AV peak velocity 2.1 m/s. AV mean gradient 9 mmHg. AV area 1.5 cm2. Pulmonic Valve  Mild pulmonic regurgitation. Pericardial Effusion  No evidence of a hemodynamically significant pericardial effusion. Aorta  Aortic root dimension within normal limits. Conclusions   Summary  Normal left ventricular systolic function. Ejection fraction is visually estimated at 60%. Mildly dilated right ventricle with normal right ventricular function. There is doppler evidence of stage II diastolic dysfunction. Moderately dilated left atrium by volume index. Mild mitral regurgitation. 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 No significant paravalvular aortic regurgitation. AV peak velocity 2.1 m/s. AV mean gradient 9 mmHg. AV area 1.5 cm2. Mild tricuspid regurgitation. PASP is estimated at 44 mmHg.    Signature   ----------------------------------------------------------------  Electronically signed by Suzi Mireles MD(Interpreting  physician) on 03/02/2022 12:04 PM  ----------------------------------------------------------------  M-Mode/2D Measurements & Calculations   LV Diastolic    LV Systolic Dimension: 3.3   AV Cusp Separation: 1.4 cmLA  Dimension: 4.7  cm                           Dimension: 4.3 cmAO Root  cm              LV Volume Diastolic: 327 ml  Dimension: 1.9 cm  LV FS:29.8 %    LV Volume Systolic: 82.3 ml  LV PW           LV EDV/LV EDV Index: 466  Diastolic: 1.6  BU/25 QG/W^3EF ESV/LV ESV  cm              Index: 43.8 ml/23ml/ m^2     RV Diastolic Dimension: 3.7  LV PW Systolic: EF Calculated: 34.5 %        cm  1.7 cm          LV Mass Index: 195 l/min*m^2  Septum                                       LA/Aorta: 9.81  Diastolic: 1.9                               Ascending Aorta: 2.2 cm  cm              LVOT: 2 cm                   LA volume/Index: 87 ml  Septum                                       /74BO/I^7  Systolic: 2 cm                               RA Area: 20.2 cm^2  CO: 4.75 l/min  CI: 2.49                                     IVC Expiration: 1.8 cm  l/m*m^2  LV Mass: 372.96  g  Doppler Measurements & Calculations   MV Peak E-Wave: 1.42 AV Peak Velocity: 2.12 LVOT Peak Velocity: 0.96 m/s  m/s                  m/s                    LVOT Mean Velocity: 0.64 m/s  MV Peak A-Wave: 0.96 AV Peak Gradient:      LVOT Peak Gradient: 3.7  m/s                  17.89 mmHg             mmHgLVOT Mean Gradient: 1.9  MV E/A Ratio: 1.48   AV Mean Velocity: 1.47 mmHg  MV Peak Gradient:    m/s                    Estimated RVSP: 44.4 mmHg  10.9 mmHg            AV Mean Gradient: 9.3  Estimated RAP:3 mmHg  MV Mean Gradient:    mmHg  4.3 mmHg             AV VTI: 41.4 cm  MV Mean Velocity:    AV Area                TR Velocity:3.22 m/s  0.95 m/s             (Continuity):1.47 cm^2 TR Gradient:41.34 mmHg  MV Deceleration      AV Acceleration Time:  PV Peak Velocity: 1.12 m/s  Time: 184.9 msec     69.2 msec              PV Peak Gradient: 4.98 mmHg  MV P1/2t: 67.6 msec  LVOT VTI: 19.4 [Fever] : FEVER [de-identified] : COUGH, CONGESTION 4 DAY HX  OF COUGH, CONGESTION,  S/P COVID  USING MUCINEX cm      PV Mean Velocity: 0.7 m/s  MVA by PHT:3.25 cm^2                        PV Mean Gradient: 2.3 mmHg  MV Area              Estimated PASP: 44.34  (continuity): 1.5    mmHg  cm^2  MV E' Septal  Velocity: 0.04 m/s  MV E' Lateral  Velocity: 7 m/s  [email or web address]    FL ESOPHAGRAM    Result Date: 2/21/2022  EXAMINATION: DOUBLE CONTRAST ESOPHAGRAM 2/21/2022 HISTORY: ORDERING SYSTEM PROVIDED HISTORY: Chronic cough TECHNOLOGIST PROVIDED HISTORY: Reason for exam:->cough with PO intake COMPARISON: None. TECHNIQUE: Fluoroscopic evaluation of the esophagus was performed. Multiple fluoroscopic images were obtained following the administration of thin and thick barium as well as a barium tablet. FLUOROSCOPY DOSE AND TYPE OR TIME AND EXPOSURES: Cine fluoroscopy Fluoroscopic time: 0.6 minutes Total dose: 29.33 mGy FINDINGS: The patient swallowed barium without difficulty. No barium aspiration. The esophagus shows normal caliber, motility, and mucosal appearance. No hiatal hernia or GE reflux. No esophageal ulceration or mass. Normal swallow of a 13 mm barium tablet which passes from the esophagus into the stomach. No suspicious findings. Satisfactory esophageal motility. Normal swallow of a 13 mm barium tablet. XR CHEST PORTABLE    Result Date: 3/12/2022  EXAMINATION: ONE XRAY VIEW OF THE CHEST 3/12/2022 1:57 pm COMPARISON: March 1, 2022 HISTORY: ORDERING SYSTEM PROVIDED HISTORY: dizziness TECHNOLOGIST PROVIDED HISTORY: Reason for exam:->dizziness FINDINGS: Left pacemaker is present. There are median sternotomy wires. Prominent size of cardiac silhouette. Evidence of aortic valvuloplasty. Chronic interstitial opacities are present in left lung base. No focal pneumonia or evidence of pleural effusion. No pneumothorax. 1. Chronic subsegmental atelectasis or scarring in left lung base.

## 2024-02-26 ENCOUNTER — OFFICE VISIT (OUTPATIENT)
Dept: PRIMARY CARE CLINIC | Age: 89
End: 2024-02-26
Payer: MEDICARE

## 2024-02-26 VITALS
DIASTOLIC BLOOD PRESSURE: 56 MMHG | SYSTOLIC BLOOD PRESSURE: 122 MMHG | HEART RATE: 61 BPM | BODY MASS INDEX: 27.29 KG/M2 | TEMPERATURE: 97.9 F | HEIGHT: 66 IN | WEIGHT: 169.8 LBS | OXYGEN SATURATION: 91 %

## 2024-02-26 DIAGNOSIS — I71.40 ABDOMINAL AORTIC ANEURYSM (AAA) 3.0 CM TO 5.5 CM IN DIAMETER IN MALE (HCC): ICD-10-CM

## 2024-02-26 DIAGNOSIS — I50.42 CHRONIC COMBINED SYSTOLIC AND DIASTOLIC CHF (CONGESTIVE HEART FAILURE) (HCC): ICD-10-CM

## 2024-02-26 DIAGNOSIS — N18.32 STAGE 3B CHRONIC KIDNEY DISEASE (HCC): ICD-10-CM

## 2024-02-26 DIAGNOSIS — R79.89 OTHER SPECIFIED ABNORMAL FINDINGS OF BLOOD CHEMISTRY: ICD-10-CM

## 2024-02-26 DIAGNOSIS — I48.20 CHRONIC ATRIAL FIBRILLATION (HCC): ICD-10-CM

## 2024-02-26 DIAGNOSIS — Z00.00 MEDICARE ANNUAL WELLNESS VISIT, SUBSEQUENT: Primary | ICD-10-CM

## 2024-02-26 PROCEDURE — 1123F ACP DISCUSS/DSCN MKR DOCD: CPT | Performed by: FAMILY MEDICINE

## 2024-02-26 PROCEDURE — G8484 FLU IMMUNIZE NO ADMIN: HCPCS | Performed by: FAMILY MEDICINE

## 2024-02-26 PROCEDURE — G0439 PPPS, SUBSEQ VISIT: HCPCS | Performed by: FAMILY MEDICINE

## 2024-02-26 ASSESSMENT — PATIENT HEALTH QUESTIONNAIRE - PHQ9
SUM OF ALL RESPONSES TO PHQ QUESTIONS 1-9: 0
SUM OF ALL RESPONSES TO PHQ QUESTIONS 1-9: 0
1. LITTLE INTEREST OR PLEASURE IN DOING THINGS: 0
SUM OF ALL RESPONSES TO PHQ QUESTIONS 1-9: 0
2. FEELING DOWN, DEPRESSED OR HOPELESS: 0
SUM OF ALL RESPONSES TO PHQ QUESTIONS 1-9: 0
SUM OF ALL RESPONSES TO PHQ9 QUESTIONS 1 & 2: 0

## 2024-02-26 NOTE — PROGRESS NOTES
rivaroxaban (XARELTO) 15 MG TABS tablet Take 1 tablet by mouth Daily with supper Yes Oli Jorgensen DO   MAGNESIUM-OXIDE 400 (240 Mg) MG tablet Take 1 tablet by mouth Daily with supper Yes Xiomara Moreno MD   Cholecalciferol (VITAMIN D) 2000 UNITS CAPS capsule Take 2,000 Units by mouth Daily with lunch Yes ProviderXiomara MD   potassium chloride (KLOR-CON) 20 MEQ packet Take 20 mEq by mouth daily  Patient not taking: Reported on 2/26/2024  Geremias Koehler DO   pravastatin (PRAVACHOL) 40 MG tablet TAKE 1 TABLET BY MOUTH  DAILY  Patient not taking: Reported on 2/26/2024  Jae Fowler MD   OXYGEN Inhale into the lungs 2L at nite  Patient not taking: Reported on 2/26/2024  Provider, Historical, MD       CareTeam (Including outside providers/suppliers regularly involved in providing care):   Patient Care Team:  Geremias Koehler DO as PCP - General (Family Medicine)  [facility]rem[facility]  Julia Cuevas MD as Surgeon (Orthopedic Surgery)  Oli Jorgensen DO as Consulting Physician (Cardiology)  Mohini Mercado MD as Consulting Physician (Electrophysiology)  Flakita Hernandez as Care Manager (Nephrology)     Reviewed and updated this visit:  Tobacco  Allergies  Meds  Problems  Med Hx  Surg Hx  Soc Hx  Fam Hx

## 2024-02-26 NOTE — PATIENT INSTRUCTIONS
review.    Other Preventive Recommendations:    A preventive eye exam performed by an eye specialist is recommended every 1-2 years to screen for glaucoma; cataracts, macular degeneration, and other eye disorders.  A preventive dental visit is recommended every 6 months.  Try to get at least 150 minutes of exercise per week or 10,000 steps per day on a pedometer .  Order or download the FREE \"Exercise & Physical Activity: Your Everyday Guide\" from [employer] [place] [employer]. Call 1-281.735.3776 or search [employer] [place] [employer] online.  You need 6297-6078 mg of calcium and 9864-2865 IU of vitamin D per day. It is possible to meet your calcium requirement with diet alone, but a vitamin D supplement is usually necessary to meet this goal.  When exposed to the sun, use a sunscreen that protects against both UVA and UVB radiation with an SPF of 30 or greater. Reapply every 2 to 3 hours or after sweating, drying off with a towel, or swimming.  Always wear a seat belt when traveling in a car. Always wear a helmet when riding a bicycle or motorcycle.

## 2024-03-18 PROCEDURE — 93295 DEV INTERROG REMOTE 1/2/MLT: CPT | Performed by: INTERNAL MEDICINE

## 2024-03-18 PROCEDURE — 93297 REM INTERROG DEV EVAL ICPMS: CPT | Performed by: INTERNAL MEDICINE

## 2024-03-18 PROCEDURE — 93296 REM INTERROG EVL PM/IDS: CPT | Performed by: INTERNAL MEDICINE

## 2024-04-06 ENCOUNTER — OFFICE VISIT (OUTPATIENT)
Dept: FAMILY MEDICINE CLINIC | Age: 89
End: 2024-04-06

## 2024-04-06 VITALS
DIASTOLIC BLOOD PRESSURE: 64 MMHG | SYSTOLIC BLOOD PRESSURE: 136 MMHG | OXYGEN SATURATION: 97 % | TEMPERATURE: 97.3 F | HEART RATE: 72 BPM

## 2024-04-06 DIAGNOSIS — J18.9 COMMUNITY ACQUIRED PNEUMONIA, UNSPECIFIED LATERALITY: Primary | ICD-10-CM

## 2024-04-06 DIAGNOSIS — J06.9 ACUTE UPPER RESPIRATORY INFECTION: ICD-10-CM

## 2024-04-06 LAB
INFLUENZA A ANTIBODY: NORMAL
INFLUENZA B ANTIBODY: NEGATIVE
Lab: NORMAL
PERFORMING INSTRUMENT: NORMAL
QC PASS/FAIL: NORMAL
SARS-COV-2, POC: NORMAL

## 2024-04-06 RX ORDER — AMOXICILLIN AND CLAVULANATE POTASSIUM 875; 125 MG/1; MG/1
1 TABLET, FILM COATED ORAL 2 TIMES DAILY
Qty: 14 TABLET | Refills: 0 | Status: SHIPPED | OUTPATIENT
Start: 2024-04-06 | End: 2024-04-13

## 2024-04-06 RX ORDER — AZITHROMYCIN 250 MG/1
TABLET, FILM COATED ORAL
Qty: 6 TABLET | Refills: 0 | Status: SHIPPED | OUTPATIENT
Start: 2024-04-06 | End: 2024-04-16

## 2024-04-06 NOTE — PROGRESS NOTES
Urgent Care      Patient:  Niles Field 88 y.o. male     Date of Service: 4/6/24      Chief complaint:   Chief Complaint   Patient presents with    Cough     Mucus   X 4 days              History ofPresent Illness   The patient is a 88 y.o. male  presented to the clinic with complaints as above.    Cough  -new issue  -started Wednesday  -cough is productive  -denies any fever, chills, or SOB    Past Medical History:      Diagnosis Date    A-fib ([facility])     follows with Dr Oli Jorgensen 9/28/22    AAA (abdominal aortic aneurysm) ([facility])     infrarenal 3.7cm 6/27/22    Acute congestive heart failure ([facility]) 03/01/2022    Acute on chronic combined systolic and diastolic CHF (congestive heart failure) ([facility]) 03/01/2022    Acute on chronic diastolic congestive heart failure ([facility]) 02/06/2023    Acute on chronic heart failure with preserved ejection fraction (HFpEF) ([facility])     Arrhythmia     Arthritis     Carotid artery stenosis     CHF (congestive heart failure) ([facility])     Chronic atrial fibrillation     CKD (chronic kidney disease), stage III ([facility])     Heart valve problem     Hyperlipidemia     Hypertension     ICD (implantable cardiac defibrillator) in place 2007    Medtronic Evera XT DR SR#[id number] generator changed 7/28/16  Dr Mercado    MI (mitral incompetence) 2003    MI (myocardial infarction) ([facility]) 2003    PAF (paroxysmal atrial fibrillation) ([facility]) 02/06/2023    Paroxysmal atrial fibrillation ([facility]) 03/13/2018    Ventricular tachycardia ([facility])        PastSurgical History:        Procedure Laterality Date    CARDIAC CATHETERIZATION Right 03/03/2017    Dr. Cooper- Rt Groin    CARDIAC DEFIBRILLATOR PLACEMENT  2007. 2008 2007 78 shocks replaced 2008 Medtronic     CARDIAC DEFIBRILLATOR PLACEMENT  07/28/2016    Medtronic Dual ICD gen change    CARDIAC DEFIBRILLATOR PLACEMENT Left 04/13/2023    Medtronic ICD-GR  Dr. Mercaod    CARDIAC SURGERY N/A 09/04/2021    [place] [facility] [place]

## 2024-04-09 ENCOUNTER — TELEPHONE (OUTPATIENT)
Dept: PRIMARY CARE CLINIC | Age: 89
End: 2024-04-09

## 2024-04-09 NOTE — TELEPHONE ENCOUNTER
Patient seen through express care on Saturday for cough, congestion. Started on Augmentin.      1. Community acquired pneumonia, unspecified laterality  New issue  -Given symptoms and PE findings will cover for pneumonia given age and co morbidities also concerns for him worsening, will treat with abx as below, call pcp if no improvement or worsening, POCT testing negative     Patient said he is not any better, still coughing up mucous and having some shortness of breath. Says he was told to call and follow up with PCP if medication was not helping. Nothing available on your schedule for a while, but patient is wanting to see you, does not want to see express again. Can you advise where to schedule him at?

## 2024-04-10 ENCOUNTER — OFFICE VISIT (OUTPATIENT)
Dept: PRIMARY CARE CLINIC | Age: 89
End: 2024-04-10

## 2024-04-10 VITALS
HEART RATE: 86 BPM | SYSTOLIC BLOOD PRESSURE: 128 MMHG | OXYGEN SATURATION: 94 % | HEIGHT: 66 IN | DIASTOLIC BLOOD PRESSURE: 50 MMHG | WEIGHT: 167 LBS | TEMPERATURE: 97.5 F | BODY MASS INDEX: 26.84 KG/M2

## 2024-04-10 DIAGNOSIS — R05.1 ACUTE COUGH: Primary | ICD-10-CM

## 2024-04-10 RX ORDER — METHYLPREDNISOLONE 4 MG/1
TABLET ORAL
Qty: 1 KIT | Refills: 0 | Status: SHIPPED | OUTPATIENT
Start: 2024-04-10

## 2024-04-10 RX ORDER — ALBUTEROL SULFATE 90 UG/1
2 AEROSOL, METERED RESPIRATORY (INHALATION) 4 TIMES DAILY PRN
Qty: 54 G | Refills: 1 | Status: SHIPPED | OUTPATIENT
Start: 2024-04-10

## 2024-04-10 RX ORDER — CODEINE PHOSPHATE/GUAIFENESIN 10-100MG/5
5 LIQUID (ML) ORAL 4 TIMES DAILY PRN
Qty: 140 ML | Refills: 0 | Status: SHIPPED | OUTPATIENT
Start: 2024-04-10 | End: 2024-04-17

## 2024-04-10 ASSESSMENT — ENCOUNTER SYMPTOMS
SORE THROAT: 0
GASTROINTESTINAL NEGATIVE: 1
COUGH: 1
EYES NEGATIVE: 1
TROUBLE SWALLOWING: 0
WHEEZING: 1

## 2024-04-10 NOTE — PROGRESS NOTES
Niles Field (:  1935) is a 88 y.o. male,Established patient, here for evaluation of the following chief complaint(s):  Cough, urgent care (2024 [facility] acquired pneumonia.//Patient reports some improvement of cough.  //), and Neck Pain (Patient reports neck,bilateral shoulder and head pain that started yesterday.  Has not taken any pain reliever.  Pain is described as sharp and aching in nature and is constant.  Patient states he is unable to move head side to side. )         ASSESSMENT/PLAN:  1. Acute cough  -     XR CHEST (2 VW); Future  At this time we will add albuterol, Medrol, and Cheratussin to his current regimen.  Continue with azithromycin/Augmentin until finished.  Therapeutic modification will be based on chest x-ray results.    No follow-ups on file.         Subjective   SUBJECTIVE/OBJECTIVE:  Cough  Associated symptoms include postnasal drip and wheezing. Pertinent negatives include no fever, headaches, rash or sore throat.   Neck Pain   Pertinent negatives include no fever, headaches or trouble swallowing.     Patient presents today for evaluation of continued and worsening cough with concern for pneumonia.  Patient presented to urgent care on 2024.  At time he was given Augmentin/azithromycin.  Has been having continued cough which is worse at night since that time.  No fever or chills but has had worsening neck stiffness and pain secondary to the cough.  No recent chest pain.  No known sick contact or travel prior to symptoms beginning.    Review of Systems   Constitutional:  Negative for fever.   HENT:  Positive for congestion and postnasal drip. Negative for sore throat and trouble swallowing.    Eyes: Negative.    Respiratory:  Positive for cough and wheezing.    Cardiovascular: Negative.    Gastrointestinal: Negative.    Musculoskeletal:  Positive for neck pain and neck stiffness.   Skin:  Negative for rash.   Neurological:  Negative for

## 2024-04-16 ENCOUNTER — OFFICE VISIT (OUTPATIENT)
Dept: CARDIOLOGY CLINIC | Age: 89
End: 2024-04-16
Payer: MEDICARE

## 2024-04-16 VITALS
WEIGHT: 170 LBS | DIASTOLIC BLOOD PRESSURE: 48 MMHG | SYSTOLIC BLOOD PRESSURE: 114 MMHG | HEIGHT: 66 IN | HEART RATE: 64 BPM | BODY MASS INDEX: 27.32 KG/M2 | RESPIRATION RATE: 20 BRPM

## 2024-04-16 DIAGNOSIS — I50.42 CHRONIC COMBINED SYSTOLIC AND DIASTOLIC CHF (CONGESTIVE HEART FAILURE) (HCC): Primary | ICD-10-CM

## 2024-04-16 PROCEDURE — G8417 CALC BMI ABV UP PARAM F/U: HCPCS | Performed by: INTERNAL MEDICINE

## 2024-04-16 PROCEDURE — 1123F ACP DISCUSS/DSCN MKR DOCD: CPT | Performed by: INTERNAL MEDICINE

## 2024-04-16 PROCEDURE — G8427 DOCREV CUR MEDS BY ELIG CLIN: HCPCS | Performed by: INTERNAL MEDICINE

## 2024-04-16 PROCEDURE — 1036F TOBACCO NON-USER: CPT | Performed by: INTERNAL MEDICINE

## 2024-04-16 PROCEDURE — 99214 OFFICE O/P EST MOD 30 MIN: CPT | Performed by: INTERNAL MEDICINE

## 2024-04-16 PROCEDURE — 93000 ELECTROCARDIOGRAM COMPLETE: CPT | Performed by: INTERNAL MEDICINE

## 2024-04-16 NOTE — PROGRESS NOTES
CHIEF COMPLAINT: TIA/CAD-CABG/PAF/ICD/GIB/Anemia    HISTORY OF PRESENT ILLNESS: Patient is a 88 y.o. male seen at the request of Geremias Koehler DO.      Baseline TORRES. No CP.     In sinus.    PMH:   MI, 2003. Cardiac catheterization: 85% ostial, proximal and mid LAD narrowings. LMC 70% stenosis. D1 occluded. D2 50% stenosis. OM1 80% ostial stenosis. Mid CX occluded. Dominant RCA severe disease. LVEF 40-45%.   [place] [facility] [place] LIMA-LAD, SVG-RI, SVG-OM.   Hyperlipidemia.   Mild carotid plaque on US, 2003.  Echo, 07/2006. Mild LVE, normal EF, Stage II diastolic dysfunction, moderate AS, mild MR, mild TR.  Right leg vein stripping, 1970's.  No history diabetes mellitus, stroke or COPD.  Nonsmoker for many years.  VT causing cardiac arrest after stress test, 03/15/2007. He was successfully cardioverted.   Cardiac catheterization, 03/16/2007 with normal LVEF, severe native three vessel coronary disease. SVG-RI, SVG-OM both occluded. LIMA-LAD patent.   Re-do CABG, [facility], 03/2007 with radial artery graft to D2 and OM2.   Elective PCI with CONNOR native OM2 and native LAD, 03/2007 following CABG. This was done because of inadequate conduits.   ICD placement, [facility], 03/2007.  ICD lead recall, early 2008, but he was followed electively because his device was functioning normally.  Presentation [facility], 03/29/2008 with multiple ICD inappropriate shocks. Transfer [facility] where ICD and lead were replaced. He reports cardiac catheterization that revealed patent LIMA-LAD and patent stents in native coronary arteries.   Atypical chest pain and anxiety with admission [facility], 05/2008. Troponin minimally elevated. Beta blocker dose increased.   [facility] admission, 06/13/2009 with lightheadedness, orthostatic hypotension, drop in hemoglobin to 10.5 from 14.9 over two months with an increase in BUN from 16 to 68 over the same time. Dark heme positive stools noted. EKG NSR, incomplete RBBB.   Echo,

## 2024-04-22 ENCOUNTER — OFFICE VISIT (OUTPATIENT)
Dept: PRIMARY CARE CLINIC | Age: 89
End: 2024-04-22
Payer: MEDICARE

## 2024-04-22 VITALS
TEMPERATURE: 97.1 F | SYSTOLIC BLOOD PRESSURE: 136 MMHG | OXYGEN SATURATION: 95 % | DIASTOLIC BLOOD PRESSURE: 50 MMHG | WEIGHT: 170 LBS | HEIGHT: 66 IN | HEART RATE: 63 BPM | BODY MASS INDEX: 27.32 KG/M2

## 2024-04-22 DIAGNOSIS — M54.12 CERVICAL RADICULITIS: Primary | ICD-10-CM

## 2024-04-22 DIAGNOSIS — M54.16 LUMBAR RADICULOPATHY: ICD-10-CM

## 2024-04-22 PROCEDURE — 1036F TOBACCO NON-USER: CPT | Performed by: FAMILY MEDICINE

## 2024-04-22 PROCEDURE — G8427 DOCREV CUR MEDS BY ELIG CLIN: HCPCS | Performed by: FAMILY MEDICINE

## 2024-04-22 PROCEDURE — 99213 OFFICE O/P EST LOW 20 MIN: CPT | Performed by: FAMILY MEDICINE

## 2024-04-22 PROCEDURE — G8417 CALC BMI ABV UP PARAM F/U: HCPCS | Performed by: FAMILY MEDICINE

## 2024-04-22 PROCEDURE — 1123F ACP DISCUSS/DSCN MKR DOCD: CPT | Performed by: FAMILY MEDICINE

## 2024-04-22 RX ORDER — MOMETASONE FUROATE 1 MG/G
OINTMENT TOPICAL
Qty: 15 G | Refills: 1 | Status: SHIPPED | OUTPATIENT
Start: 2024-04-22

## 2024-04-22 SDOH — ECONOMIC STABILITY: FOOD INSECURITY: WITHIN THE PAST 12 MONTHS, THE FOOD YOU BOUGHT JUST DIDN'T LAST AND YOU DIDN'T HAVE MONEY TO GET MORE.: NEVER TRUE

## 2024-04-22 SDOH — ECONOMIC STABILITY: HOUSING INSECURITY
IN THE LAST 12 MONTHS, WAS THERE A TIME WHEN YOU DID NOT HAVE A STEADY PLACE TO SLEEP OR SLEPT IN A SHELTER (INCLUDING NOW)?: NO

## 2024-04-22 SDOH — ECONOMIC STABILITY: FOOD INSECURITY: WITHIN THE PAST 12 MONTHS, YOU WORRIED THAT YOUR FOOD WOULD RUN OUT BEFORE YOU GOT MONEY TO BUY MORE.: NEVER TRUE

## 2024-04-22 SDOH — ECONOMIC STABILITY: INCOME INSECURITY: HOW HARD IS IT FOR YOU TO PAY FOR THE VERY BASICS LIKE FOOD, HOUSING, MEDICAL CARE, AND HEATING?: NOT HARD AT ALL

## 2024-04-22 ASSESSMENT — ENCOUNTER SYMPTOMS
WHEEZING: 0
TROUBLE SWALLOWING: 0
SORE THROAT: 0
GASTROINTESTINAL NEGATIVE: 1
BACK PAIN: 1
COUGH: 0
EYES NEGATIVE: 1

## 2024-04-22 NOTE — PROGRESS NOTES
Niles Field (:  1935) is a 88 y.o. male,Established patient, here for evaluation of the following chief complaint(s):  Neck Pain (Back of neck pain ongoing, discuss medication), Leg Pain (Bilateral leg pain started in low back radiates into bilateral feet and toes.  Wears compression stockings), and Skin Problem (Red area on right cheek noticed by Dr. Jorgensen last week, patient states no change noticed by family.)      Assessment & Plan   1. Cervical radiculitis  -     XR CERVICAL SPINE (2-3 VIEWS); Future  -     XR LUMBAR SPINE (2-3 VIEWS); Future  -     External Referral To Physical Therapy  2. Lumbar radiculopathy  -     XR CERVICAL SPINE (2-3 VIEWS); Future  -     XR LUMBAR SPINE (2-3 VIEWS); Future  -     External Referral To Physical Therapy  At this time we will continue with the Tylenol/ibuprofen as needed.  He has been only using it sparingly.  If needed we we will add pain medications to the patient's regimen.  X-rays ordered today and referral to physical therapy for further treatment of the patient's symptoms.      Initial review of research significant arthritis in the lumbar and cervical spine region.  Final read per radiologist.  Will refer to physical therapy.  See him back as scheduled or sooner as needed for pain control.      No follow-ups on file.       Subjective   HPI  Patient presents today for continued issues with posterior neck pain and lumbar pain going down to the bilateral lower extremities.  No bowel or bladder incontinence.  No saddle anesthesia.  As noted patient continues to use ibuprofen/acetaminophen sparingly.  No signs of GI bleed at this time.  No other issues.  No recent falls.      Review of Systems   Constitutional:  Negative for fever.   HENT:  Negative for congestion, postnasal drip, sore throat and trouble swallowing.    Eyes: Negative.    Respiratory:  Negative for cough and wheezing.    Cardiovascular: Negative.    Gastrointestinal: Negative.

## 2024-05-09 ENCOUNTER — APPOINTMENT (OUTPATIENT)
Dept: GENERAL RADIOLOGY | Age: 89
DRG: 812 | End: 2024-05-09
Payer: MEDICARE

## 2024-05-09 ENCOUNTER — TELEPHONE (OUTPATIENT)
Dept: NON INVASIVE DIAGNOSTICS | Age: 89
End: 2024-05-09

## 2024-05-09 ENCOUNTER — HOSPITAL ENCOUNTER (INPATIENT)
Age: 89
LOS: 5 days | Discharge: HOME | DRG: 812 | End: 2024-05-14
Attending: STUDENT IN AN ORGANIZED HEALTH CARE EDUCATION/TRAINING PROGRAM | Admitting: INTERNAL MEDICINE
Payer: MEDICARE

## 2024-05-09 DIAGNOSIS — Z79.01 CHRONIC ANTICOAGULATION: ICD-10-CM

## 2024-05-09 DIAGNOSIS — D64.9 ANEMIA, UNSPECIFIED TYPE: Primary | ICD-10-CM

## 2024-05-09 DIAGNOSIS — D64.9 ANEMIA REQUIRING TRANSFUSIONS: ICD-10-CM

## 2024-05-09 DIAGNOSIS — D50.0 IRON DEFICIENCY ANEMIA DUE TO CHRONIC BLOOD LOSS: ICD-10-CM

## 2024-05-09 DIAGNOSIS — N17.9 AKI (ACUTE KIDNEY INJURY) (HCC): ICD-10-CM

## 2024-05-09 LAB
ALBUMIN SERPL-MCNC: 3.3 G/DL (ref 3.5–5.2)
ALP SERPL-CCNC: 168 U/L (ref 40–129)
ALT SERPL-CCNC: 94 U/L (ref 0–40)
ANION GAP SERPL CALCULATED.3IONS-SCNC: 12 MMOL/L (ref 7–16)
AST SERPL-CCNC: 112 U/L (ref 0–39)
ATYPICAL LYMPHOCYTE ABSOLUTE COUNT: 0.07 K/UL (ref 0–0.46)
ATYPICAL LYMPHOCYTES: 1 % (ref 0–4)
BASOPHILS # BLD: 0 K/UL (ref 0–0.2)
BASOPHILS NFR BLD: 0 % (ref 0–2)
BILIRUB SERPL-MCNC: 0.3 MG/DL (ref 0–1.2)
BILIRUB UR QL STRIP: NEGATIVE
BNP SERPL-MCNC: 1792 PG/ML (ref 0–450)
BUN SERPL-MCNC: 51 MG/DL (ref 6–23)
CALCIUM SERPL-MCNC: 8 MG/DL (ref 8.6–10.2)
CASTS #/AREA URNS LPF: ABNORMAL /LPF
CHLORIDE SERPL-SCNC: 93 MMOL/L (ref 98–107)
CLARITY UR: CLEAR
CO2 SERPL-SCNC: 29 MMOL/L (ref 22–29)
COLOR UR: YELLOW
CREAT SERPL-MCNC: 1.9 MG/DL (ref 0.7–1.2)
EOSINOPHIL # BLD: 0 K/UL (ref 0.05–0.5)
EOSINOPHILS RELATIVE PERCENT: 0 % (ref 0–6)
ERYTHROCYTE [DISTWIDTH] IN BLOOD BY AUTOMATED COUNT: 16.7 % (ref 11.5–15)
FERRITIN SERPL-MCNC: 25 NG/ML
GFR, ESTIMATED: 33 ML/MIN/1.73M2
GLUCOSE SERPL-MCNC: 201 MG/DL (ref 74–99)
GLUCOSE UR STRIP-MCNC: NEGATIVE MG/DL
HCT VFR BLD AUTO: 17.6 % (ref 37–54)
HGB BLD-MCNC: 4.8 G/DL (ref 12.5–16.5)
HGB UR QL STRIP.AUTO: NEGATIVE
IRON SATN MFR SERPL: 4 % (ref 20–55)
IRON SERPL-MCNC: 16 UG/DL (ref 59–158)
KETONES UR STRIP-MCNC: NEGATIVE MG/DL
LEUKOCYTE ESTERASE UR QL STRIP: NEGATIVE
LYMPHOCYTES NFR BLD: 0.39 K/UL (ref 1.5–4)
LYMPHOCYTES RELATIVE PERCENT: 5 % (ref 20–42)
MAGNESIUM SERPL-MCNC: 2.8 MG/DL (ref 1.6–2.6)
MCH RBC QN AUTO: 25 PG (ref 26–35)
MCHC RBC AUTO-ENTMCNC: 27.3 G/DL (ref 32–34.5)
MCV RBC AUTO: 91.7 FL (ref 80–99.9)
MONOCYTES NFR BLD: 0.59 K/UL (ref 0.1–0.95)
MONOCYTES NFR BLD: 8 % (ref 2–12)
NEUTROPHILS NFR BLD: 86 % (ref 43–80)
NEUTS SEG NFR BLD: 6.45 K/UL (ref 1.8–7.3)
NITRITE UR QL STRIP: NEGATIVE
NUCLEATED RED BLOOD CELLS: 4 PER 100 WBC
PH UR STRIP: 6 [PH] (ref 5–9)
PLATELET # BLD AUTO: 363 K/UL (ref 130–450)
PMV BLD AUTO: 10.8 FL (ref 7–12)
POTASSIUM SERPL-SCNC: 4.6 MMOL/L (ref 3.5–5)
PROT SERPL-MCNC: 5.5 G/DL (ref 6.4–8.3)
PROT UR STRIP-MCNC: NEGATIVE MG/DL
RBC # BLD AUTO: 1.92 M/UL (ref 3.8–5.8)
RBC # BLD: ABNORMAL 10*6/UL
RBC #/AREA URNS HPF: ABNORMAL /HPF
SODIUM SERPL-SCNC: 134 MMOL/L (ref 132–146)
SP GR UR STRIP: 1.01 (ref 1–1.03)
TIBC SERPL-MCNC: 395 UG/DL (ref 250–450)
TROPONIN I SERPL HS-MCNC: 42 NG/L (ref 0–11)
TROPONIN I SERPL HS-MCNC: 45 NG/L (ref 0–11)
UROBILINOGEN UR STRIP-ACNC: 0.2 EU/DL (ref 0–1)
WBC #/AREA URNS HPF: ABNORMAL /HPF
WBC OTHER # BLD: 7.5 K/UL (ref 4.5–11.5)

## 2024-05-09 PROCEDURE — 84484 ASSAY OF TROPONIN QUANT: CPT

## 2024-05-09 PROCEDURE — 96374 THER/PROPH/DIAG INJ IV PUSH: CPT

## 2024-05-09 PROCEDURE — 2140000000 HC CCU INTERMEDIATE R&B

## 2024-05-09 PROCEDURE — C9113 INJ PANTOPRAZOLE SODIUM, VIA: HCPCS | Performed by: STUDENT IN AN ORGANIZED HEALTH CARE EDUCATION/TRAINING PROGRAM

## 2024-05-09 PROCEDURE — 86850 RBC ANTIBODY SCREEN: CPT

## 2024-05-09 PROCEDURE — 2580000003 HC RX 258: Performed by: STUDENT IN AN ORGANIZED HEALTH CARE EDUCATION/TRAINING PROGRAM

## 2024-05-09 PROCEDURE — A4216 STERILE WATER/SALINE, 10 ML: HCPCS | Performed by: STUDENT IN AN ORGANIZED HEALTH CARE EDUCATION/TRAINING PROGRAM

## 2024-05-09 PROCEDURE — 85025 COMPLETE CBC W/AUTO DIFF WBC: CPT

## 2024-05-09 PROCEDURE — 30233N1 TRANSFUSION OF NONAUTOLOGOUS RED BLOOD CELLS INTO PERIPHERAL VEIN, PERCUTANEOUS APPROACH: ICD-10-PCS | Performed by: STUDENT IN AN ORGANIZED HEALTH CARE EDUCATION/TRAINING PROGRAM

## 2024-05-09 PROCEDURE — 80053 COMPREHEN METABOLIC PANEL: CPT

## 2024-05-09 PROCEDURE — 6360000002 HC RX W HCPCS: Performed by: INTERNAL MEDICINE

## 2024-05-09 PROCEDURE — 83880 ASSAY OF NATRIURETIC PEPTIDE: CPT

## 2024-05-09 PROCEDURE — 81001 URINALYSIS AUTO W/SCOPE: CPT

## 2024-05-09 PROCEDURE — 83540 ASSAY OF IRON: CPT

## 2024-05-09 PROCEDURE — 71045 X-RAY EXAM CHEST 1 VIEW: CPT

## 2024-05-09 PROCEDURE — 86923 COMPATIBILITY TEST ELECTRIC: CPT

## 2024-05-09 PROCEDURE — 36430 TRANSFUSION BLD/BLD COMPNT: CPT

## 2024-05-09 PROCEDURE — 30233N1 TRANSFUSION OF NONAUTOLOGOUS RED BLOOD CELLS INTO PERIPHERAL VEIN, PERCUTANEOUS APPROACH: ICD-10-PCS

## 2024-05-09 PROCEDURE — 86900 BLOOD TYPING SEROLOGIC ABO: CPT

## 2024-05-09 PROCEDURE — 6370000000 HC RX 637 (ALT 250 FOR IP): Performed by: INTERNAL MEDICINE

## 2024-05-09 PROCEDURE — 93005 ELECTROCARDIOGRAM TRACING: CPT | Performed by: STUDENT IN AN ORGANIZED HEALTH CARE EDUCATION/TRAINING PROGRAM

## 2024-05-09 PROCEDURE — 83735 ASSAY OF MAGNESIUM: CPT

## 2024-05-09 PROCEDURE — 99285 EMERGENCY DEPT VISIT HI MDM: CPT

## 2024-05-09 PROCEDURE — 86901 BLOOD TYPING SEROLOGIC RH(D): CPT

## 2024-05-09 PROCEDURE — 6360000002 HC RX W HCPCS: Performed by: STUDENT IN AN ORGANIZED HEALTH CARE EDUCATION/TRAINING PROGRAM

## 2024-05-09 PROCEDURE — 82728 ASSAY OF FERRITIN: CPT

## 2024-05-09 PROCEDURE — P9016 RBC LEUKOCYTES REDUCED: HCPCS

## 2024-05-09 PROCEDURE — 99222 1ST HOSP IP/OBS MODERATE 55: CPT | Performed by: INTERNAL MEDICINE

## 2024-05-09 PROCEDURE — 83550 IRON BINDING TEST: CPT

## 2024-05-09 RX ORDER — SODIUM CHLORIDE 9 MG/ML
INJECTION, SOLUTION INTRAVENOUS PRN
Status: DISCONTINUED | OUTPATIENT
Start: 2024-05-09 | End: 2024-05-14 | Stop reason: HOSPADM

## 2024-05-09 RX ORDER — ONDANSETRON 2 MG/ML
4 INJECTION INTRAMUSCULAR; INTRAVENOUS EVERY 6 HOURS PRN
Status: DISCONTINUED | OUTPATIENT
Start: 2024-05-09 | End: 2024-05-14 | Stop reason: HOSPADM

## 2024-05-09 RX ORDER — AMIODARONE HYDROCHLORIDE 200 MG/1
200 TABLET ORAL DAILY
Status: DISCONTINUED | OUTPATIENT
Start: 2024-05-09 | End: 2024-05-14 | Stop reason: HOSPADM

## 2024-05-09 RX ORDER — FERROUS SULFATE 325(65) MG
325 TABLET ORAL
Status: DISCONTINUED | OUTPATIENT
Start: 2024-05-10 | End: 2024-05-14 | Stop reason: HOSPADM

## 2024-05-09 RX ORDER — SODIUM CHLORIDE 0.9 % (FLUSH) 0.9 %
5-40 SYRINGE (ML) INJECTION EVERY 12 HOURS SCHEDULED
Status: DISCONTINUED | OUTPATIENT
Start: 2024-05-09 | End: 2024-05-14 | Stop reason: HOSPADM

## 2024-05-09 RX ORDER — FUROSEMIDE 10 MG/ML
40 INJECTION INTRAMUSCULAR; INTRAVENOUS DAILY
Status: DISCONTINUED | OUTPATIENT
Start: 2024-05-09 | End: 2024-05-10

## 2024-05-09 RX ORDER — POLYETHYLENE GLYCOL 3350 17 G/17G
17 POWDER, FOR SOLUTION ORAL DAILY PRN
Status: DISCONTINUED | OUTPATIENT
Start: 2024-05-09 | End: 2024-05-10

## 2024-05-09 RX ORDER — ALBUTEROL SULFATE 2.5 MG/3ML
2.5 SOLUTION RESPIRATORY (INHALATION) EVERY 6 HOURS PRN
Status: DISCONTINUED | OUTPATIENT
Start: 2024-05-09 | End: 2024-05-14 | Stop reason: HOSPADM

## 2024-05-09 RX ORDER — PRAVASTATIN SODIUM 20 MG
40 TABLET ORAL NIGHTLY
Status: DISCONTINUED | OUTPATIENT
Start: 2024-05-09 | End: 2024-05-14 | Stop reason: HOSPADM

## 2024-05-09 RX ORDER — BUMETANIDE 1 MG/1
2 TABLET ORAL DAILY
Status: DISCONTINUED | OUTPATIENT
Start: 2024-05-10 | End: 2024-05-11

## 2024-05-09 RX ORDER — SODIUM CHLORIDE 9 MG/ML
INJECTION, SOLUTION INTRAVENOUS PRN
Status: COMPLETED | OUTPATIENT
Start: 2024-05-09 | End: 2024-05-14

## 2024-05-09 RX ORDER — ACETAMINOPHEN 650 MG/1
650 SUPPOSITORY RECTAL EVERY 6 HOURS PRN
Status: DISCONTINUED | OUTPATIENT
Start: 2024-05-09 | End: 2024-05-14 | Stop reason: HOSPADM

## 2024-05-09 RX ORDER — ALBUTEROL SULFATE 90 UG/1
2 AEROSOL, METERED RESPIRATORY (INHALATION) 4 TIMES DAILY PRN
Status: DISCONTINUED | OUTPATIENT
Start: 2024-05-09 | End: 2024-05-09 | Stop reason: CLARIF

## 2024-05-09 RX ORDER — ACETAMINOPHEN 325 MG/1
650 TABLET ORAL EVERY 6 HOURS PRN
Status: DISCONTINUED | OUTPATIENT
Start: 2024-05-09 | End: 2024-05-14 | Stop reason: HOSPADM

## 2024-05-09 RX ORDER — ENOXAPARIN SODIUM 100 MG/ML
30 INJECTION SUBCUTANEOUS DAILY
Status: DISCONTINUED | OUTPATIENT
Start: 2024-05-09 | End: 2024-05-09

## 2024-05-09 RX ORDER — AMLODIPINE BESYLATE 5 MG/1
5 TABLET ORAL DAILY
Status: DISCONTINUED | OUTPATIENT
Start: 2024-05-10 | End: 2024-05-14 | Stop reason: HOSPADM

## 2024-05-09 RX ORDER — ONDANSETRON 4 MG/1
4 TABLET, ORALLY DISINTEGRATING ORAL EVERY 8 HOURS PRN
Status: DISCONTINUED | OUTPATIENT
Start: 2024-05-09 | End: 2024-05-14 | Stop reason: HOSPADM

## 2024-05-09 RX ORDER — SODIUM CHLORIDE 0.9 % (FLUSH) 0.9 %
5-40 SYRINGE (ML) INJECTION PRN
Status: DISCONTINUED | OUTPATIENT
Start: 2024-05-09 | End: 2024-05-14 | Stop reason: HOSPADM

## 2024-05-09 RX ADMIN — FUROSEMIDE 40 MG: 10 INJECTION, SOLUTION INTRAMUSCULAR; INTRAVENOUS at 20:52

## 2024-05-09 RX ADMIN — PRAVASTATIN SODIUM 40 MG: 20 TABLET ORAL at 20:51

## 2024-05-09 RX ADMIN — METOPROLOL TARTRATE 25 MG: 25 TABLET, FILM COATED ORAL at 20:51

## 2024-05-09 RX ADMIN — SODIUM CHLORIDE 80 MG: 9 INJECTION INTRAMUSCULAR; INTRAVENOUS; SUBCUTANEOUS at 16:15

## 2024-05-09 ASSESSMENT — PAIN - FUNCTIONAL ASSESSMENT
PAIN_FUNCTIONAL_ASSESSMENT: NONE - DENIES PAIN
PAIN_FUNCTIONAL_ASSESSMENT: NONE - DENIES PAIN

## 2024-05-09 NOTE — ED PROVIDER NOTES
[facility]  EMERGENCY DEPARTMENT ENCOUNTER        Pt Name: Niles Field  MRN: [id number]  Birthdate 1935  Date of evaluation: 5/9/2024  Provider: Florecita Berg MD  PCP: Geremias Koehler DO  Note Started: 2:10 PM EDT 5/9/24    HPI  88 y.o. male with history of atrial fibrillation on Xarelto and ICD in place presenting for fatigue.  Son at bedside helping provide history.  He states over the past 2 days, patient has been having fatigue.  He was unable to walk today.  He has been having shortness of breath with exertion for the past few weeks.  He also had leg swelling.  He denies any fevers, chest pain, abdominal pain, nausea, emesis, or diarrhea.    --------------------------------------------- PAST HISTORY ---------------------------------------------  Past Medical History:  has a past medical history of A-fib ([facility]), AAA (abdominal aortic aneurysm) ([facility]), Acute congestive heart failure ([facility]), Acute on chronic combined systolic and diastolic CHF (congestive heart failure) ([facility]), Acute on chronic diastolic congestive heart failure ([facility]), Acute on chronic heart failure with preserved ejection fraction (HFpEF) ([facility]), Arrhythmia, Arthritis, Carotid artery stenosis, CHF (congestive heart failure) ([facility]), Chronic atrial fibrillation, CKD (chronic kidney disease), stage III ([facility]), Heart valve problem, Hyperlipidemia, Hypertension, ICD (implantable cardiac defibrillator) in place, MI (mitral incompetence), MI (myocardial infarction) ([facility]), PAF (paroxysmal atrial fibrillation) ([facility]), Paroxysmal atrial fibrillation ([facility]), and Ventricular tachycardia ([facility]).    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 2008); Cardiac defibrillator placement  Normal breath sounds.      Comments: Faint crackles left base  Abdominal:      General: There is no distension.      Palpations: Abdomen is soft.      Tenderness: There is no abdominal tenderness.   Genitourinary:     Comments: Nurse chaperone present, no masses, lesions, fissures, normal rectal tone, dark brown stool from rectal vault, Hemoccult positive  Musculoskeletal:         General: No swelling or deformity.      Comments: 3+ pitting edema bilateral lower extremity   Skin:     General: Skin is warm and dry.   Neurological:      General: No focal deficit present.      Mental Status: He is alert.   Psychiatric:         Mood and Affect: Mood normal.          Procedures     -------------------------------------------------- RESULTS -------------------------------------------------    LABS:  Results for orders placed or performed during the hospital encounter of 05/09/24   CBC with Auto Differential   Result Value Ref Range    WBC 7.5 4.5 - 11.5 k/uL    RBC 1.92 (L) 3.80 - 5.80 m/uL    Hemoglobin 4.8 (LL) 12.5 - 16.5 g/dL    Hematocrit 17.6 (L) 37.0 - 54.0 %    MCV 91.7 80.0 - 99.9 fL    MCH 25.0 (L) 26.0 - 35.0 pg    MCHC 27.3 (L) 32.0 - 34.5 g/dL    RDW 16.7 (H) 11.5 - 15.0 %    Platelets 363 130 - 450 k/uL    MPV 10.8 7.0 - 12.0 fL    Neutrophils % 86 (H) 43.0 - 80.0 %    Lymphocytes % 5 (L) 20.0 - 42.0 %    Monocytes % 8 2.0 - 12.0 %    Eosinophils % 0 0 - 6 %    Basophils % 0 0.0 - 2.0 %    Atypical Lymphocytes 1 0 - 4 %    nRBC 4 per 100 WBC    Neutrophils Absolute 6.45 1.80 - 7.30 k/uL    Lymphocytes Absolute 0.39 (L) 1.50 - 4.00 k/uL    Monocytes Absolute 0.59 0.10 - 0.95 k/uL    Eosinophils Absolute 0.00 (L) 0.05 - 0.50 k/uL    Basophils Absolute 0.00 0.00 - 0.20 k/uL    Atypical Lymphocytes Absolute 0.07 0.00 - 0.46 k/uL    RBC Morphology 2+ ANISOCYTOSIS     RBC Morphology 2+ HYPOCHROMIA     RBC Morphology 1+ OVALOCYTES     RBC Morphology 1+ POIKILOCYTOSIS     RBC Morphology 2+ POLYCHROMASIA     RBC

## 2024-05-09 NOTE — H&P
[facility] Hospitalist Group History and Physical      CHIEF COMPLAINT: Fatigue and weakness    History of Present Illness: This is an 88-year-old male with past medical history of atrial fibrillation with AICD, congestive heart failure, chronic kidney disease,hyperlipidemia and hypertension here due to feeling of fatigue for last 1 week, but it is worsening for last 3 days.  History taken from the patient at the bedside with his son was present.  He does not require oxygen but feeling shortness of breath if he walks a little bit.  He does not have any chest pain, cough, fever or any abdominal pain.  In ER workup shows hemoglobin 4.8 and 3 unit PRBC transfusion ordered, his OBT positive, his creatinine increased baseline at 1.3-1 0.4-1.9 now.  His magnesium increased.  Liver function test ordered alk phosphatase 168, ALT 94, .  Iron profile shows serum iron low less than 16.    Informant(s) for H&P: Patient and his son    REVIEW OF SYSTEMS:  A comprehensive review of systems was negative except for: what is in the HPI      PMH:  Past Medical History:   Diagnosis Date    A-fib ([facility])     follows with Dr Oli Jorgensen 9/28/22    AAA (abdominal aortic aneurysm) ([facility])     infrarenal 3.7cm 6/27/22    Acute congestive heart failure ([facility]) 03/01/2022    Acute on chronic combined systolic and diastolic CHF (congestive heart failure) ([facility]) 03/01/2022    Acute on chronic diastolic congestive heart failure ([facility]) 02/06/2023    Acute on chronic heart failure with preserved ejection fraction (HFpEF) ([facility])     Arrhythmia     Arthritis     Carotid artery stenosis     CHF (congestive heart failure) ([facility])     Chronic atrial fibrillation     CKD (chronic kidney disease), stage III ([facility])     Heart valve problem     Hyperlipidemia     Hypertension     ICD (implantable cardiac defibrillator) in place 2007    Medtronic Evera XT  SR#[id number] generator changed 7/28/16  Dr Rogelio MEZA (mitral incompetence) 2003    MI  ESOPHAGOGASTRODUODENOSCOPY DILATATION performed by Caitlyn Mcguire MD at [facility] ENDOSCOPY    VARICOSE VEIN SURGERY  1970's       Medications Prior to Admission:    Prior to Admission medications    Medication Sig Start Date End Date Taking? Authorizing Provider   mometasone (ELOCON) 0.1 % ointment Apply topically daily. 4/22/24   Geremias Koehler DO   albuterol sulfate HFA (VENTOLIN HFA) 108 (90 Base) MCG/ACT inhaler Inhale 2 puffs into the lungs 4 times daily as needed for Wheezing  Patient not taking: Reported on 4/22/2024 4/10/24   Geremias Koehler DO   rivaroxaban (XARELTO) 15 MG TABS tablet Take 1 tablet by mouth Daily with supper 3/22/24   Oli Jorgensen,    meclizine (ANTIVERT) 25 MG tablet TAKE 1 TABLET BY MOUTH DAILY AS  NEEDED FOR DIZZINESS 1/17/24   Geremias Koehler DO   bumetanide (BUMEX) 2 MG tablet Take 1 tablet by mouth daily Takes second dose prn per DR GUS Jorgensen 12/15/23   Geremias Koehler DO   potassium chloride (KLOR-CON M) 20 MEQ extended release tablet Take 1 tablet by mouth daily 12/15/23   Geremias Koehler DO   potassium chloride (KLOR-CON) 20 MEQ packet Take 20 mEq by mouth daily 8/24/23   Geremias Koehler DO   amLODIPine (NORVASC) 5 MG tablet Take 1 tablet by mouth daily 8/24/23   Geremias Koehler DO   metoprolol tartrate (LOPRESSOR) 25 MG tablet Take 1 tablet by mouth 2 times daily 8/24/23   Geremias Koehler DO   amiodarone (CORDARONE) 200 MG tablet TAKE 1 TABLET BY MOUTH DAILY 8/4/23   Mohini Mercado MD   pantoprazole (PROTONIX) 40 MG tablet TAKE 1 TABLET BY MOUTH DAILY 6/17/23   Geremias Koehler DO   ferrous sulfate (IRON 325) 325 (65 Fe) MG tablet Take 1 tablet by mouth daily (with breakfast)    Xiomara Moreno MD   pravastatin (PRAVACHOL) 40 MG tablet TAKE 1 TABLET BY MOUTH  DAILY 3/20/23   Jae Fowler MD   OXYGEN Inhale into the lungs 2L at nite    Xiomara Moreno MD   MAGNESIUM-OXIDE 400 (240 Mg) MG tablet Take 1 tablet by mouth Daily with supper 3/18/20   Provider,

## 2024-05-09 NOTE — TELEPHONE ENCOUNTER
Patient's wife called to report that they sent a remote over 5/9/2024. States that they are worried he is in Afib.

## 2024-05-09 NOTE — PROGRESS NOTES
4 Eyes Skin Assessment     NAME:  Niles Field  YOB: 1935  MEDICAL RECORD NUMBER:  [id number]    The patient is being assessed for  Admission    I agree that at least one RN has performed a thorough Head to Toe Skin Assessment on the patient. ALL assessment sites listed below have been assessed.      Areas assessed by both nurses:    Head, Face, Ears, Shoulders, Back, Chest, Arms, Elbows, Hands, Sacrum. Buttock, Coccyx, Ischium, Legs. Feet and Heels, and Under Medical Devices         Does the Patient have a Wound? No noted wound(s)       Michael Prevention initiated by RN: No  Wound Care Orders initiated by RN: No    Pressure Injury (Stage 3,4, Unstageable, DTI, NWPT, and Complex wounds) if present, place Wound referral order by RN under : No    New Ostomies, if present place, Ostomy referral order under : No     Nurse 1 eSignature: Electronically signed by Mignon Humphrey RN on 5/9/24 at 6:55 PM EDT    **SHARE this note so that the co-signing nurse can place an eSignature**    Nurse 2 eSignature: Electronically signed by Shania Cooper RN on 5/9/24 at 6:58 PM EDT

## 2024-05-10 ENCOUNTER — APPOINTMENT (OUTPATIENT)
Dept: ULTRASOUND IMAGING | Age: 89
DRG: 812 | End: 2024-05-10
Payer: MEDICARE

## 2024-05-10 PROBLEM — I48.19 PERSISTENT ATRIAL FIBRILLATION (HCC): Status: ACTIVE | Noted: 2024-05-10

## 2024-05-10 PROBLEM — N17.9 AKI (ACUTE KIDNEY INJURY) (HCC): Status: ACTIVE | Noted: 2024-05-10

## 2024-05-10 PROBLEM — Z79.01 CHRONIC ANTICOAGULATION: Status: ACTIVE | Noted: 2024-05-10

## 2024-05-10 LAB
ANION GAP SERPL CALCULATED.3IONS-SCNC: 12 MMOL/L (ref 7–16)
ANION GAP SERPL CALCULATED.3IONS-SCNC: 8 MMOL/L (ref 7–16)
BASOPHILS # BLD: 0.01 K/UL (ref 0–0.2)
BASOPHILS NFR BLD: 0 % (ref 0–2)
BUN SERPL-MCNC: 45 MG/DL (ref 6–23)
BUN SERPL-MCNC: 45 MG/DL (ref 6–23)
CALCIUM SERPL-MCNC: 7.9 MG/DL (ref 8.6–10.2)
CALCIUM SERPL-MCNC: 8 MG/DL (ref 8.6–10.2)
CHLORIDE SERPL-SCNC: 97 MMOL/L (ref 98–107)
CHLORIDE SERPL-SCNC: 98 MMOL/L (ref 98–107)
CO2 SERPL-SCNC: 28 MMOL/L (ref 22–29)
CO2 SERPL-SCNC: 32 MMOL/L (ref 22–29)
CREAT SERPL-MCNC: 1.8 MG/DL (ref 0.7–1.2)
CREAT SERPL-MCNC: 2 MG/DL (ref 0.7–1.2)
CREAT UR-MCNC: 100.6 MG/DL (ref 40–278)
EKG ATRIAL RATE: 16 BPM
EKG Q-T INTERVAL: 520 MS
EKG QRS DURATION: 180 MS
EKG QTC CALCULATION (BAZETT): 514 MS
EKG R AXIS: -70 DEGREES
EKG T AXIS: -25 DEGREES
EKG VENTRICULAR RATE: 59 BPM
EOSINOPHIL # BLD: 0.11 K/UL (ref 0.05–0.5)
EOSINOPHILS RELATIVE PERCENT: 1 % (ref 0–6)
ERYTHROCYTE [DISTWIDTH] IN BLOOD BY AUTOMATED COUNT: 18.6 % (ref 11.5–15)
GFR, ESTIMATED: 33 ML/MIN/1.73M2
GFR, ESTIMATED: 37 ML/MIN/1.73M2
GLUCOSE SERPL-MCNC: 102 MG/DL (ref 74–99)
GLUCOSE SERPL-MCNC: 109 MG/DL (ref 74–99)
HCT VFR BLD AUTO: 24.3 % (ref 37–54)
HCT VFR BLD AUTO: 25.3 % (ref 37–54)
HCT VFR BLD AUTO: 26.4 % (ref 37–54)
HGB BLD-MCNC: 7.3 G/DL (ref 12.5–16.5)
HGB BLD-MCNC: 7.5 G/DL (ref 12.5–16.5)
HGB BLD-MCNC: 7.7 G/DL (ref 12.5–16.5)
IMM GRANULOCYTES # BLD AUTO: 0.04 K/UL (ref 0–0.58)
IMM GRANULOCYTES NFR BLD: 1 % (ref 0–5)
LYMPHOCYTES NFR BLD: 0.9 K/UL (ref 1.5–4)
LYMPHOCYTES RELATIVE PERCENT: 11 % (ref 20–42)
MCH RBC QN AUTO: 26.1 PG (ref 26–35)
MCHC RBC AUTO-ENTMCNC: 30 G/DL (ref 32–34.5)
MCV RBC AUTO: 86.8 FL (ref 80–99.9)
MONOCYTES NFR BLD: 0.8 K/UL (ref 0.1–0.95)
MONOCYTES NFR BLD: 10 % (ref 2–12)
NEUTROPHILS NFR BLD: 78 % (ref 43–80)
NEUTS SEG NFR BLD: 6.43 K/UL (ref 1.8–7.3)
PLATELET # BLD AUTO: 287 K/UL (ref 130–450)
PMV BLD AUTO: 10.8 FL (ref 7–12)
POTASSIUM SERPL-SCNC: 3.8 MMOL/L (ref 3.5–5)
POTASSIUM SERPL-SCNC: 4 MMOL/L (ref 3.5–5)
RBC # BLD AUTO: 2.8 M/UL (ref 3.8–5.8)
SODIUM SERPL-SCNC: 137 MMOL/L (ref 132–146)
SODIUM SERPL-SCNC: 138 MMOL/L (ref 132–146)
TOTAL PROTEIN, URINE: 13 MG/DL (ref 0–12)
URINE TOTAL PROTEIN CREATININE RATIO: 0.13 (ref 0–0.2)
WBC OTHER # BLD: 8.3 K/UL (ref 4.5–11.5)

## 2024-05-10 PROCEDURE — 97165 OT EVAL LOW COMPLEX 30 MIN: CPT

## 2024-05-10 PROCEDURE — 2140000000 HC CCU INTERMEDIATE R&B

## 2024-05-10 PROCEDURE — 6360000002 HC RX W HCPCS: Performed by: STUDENT IN AN ORGANIZED HEALTH CARE EDUCATION/TRAINING PROGRAM

## 2024-05-10 PROCEDURE — 99222 1ST HOSP IP/OBS MODERATE 55: CPT | Performed by: NURSE PRACTITIONER

## 2024-05-10 PROCEDURE — 85018 HEMOGLOBIN: CPT

## 2024-05-10 PROCEDURE — 82043 UR ALBUMIN QUANTITATIVE: CPT

## 2024-05-10 PROCEDURE — 84300 ASSAY OF URINE SODIUM: CPT

## 2024-05-10 PROCEDURE — 2580000003 HC RX 258: Performed by: STUDENT IN AN ORGANIZED HEALTH CARE EDUCATION/TRAINING PROGRAM

## 2024-05-10 PROCEDURE — 93283 PRGRMG EVAL IMPLANTABLE DFB: CPT | Performed by: INTERNAL MEDICINE

## 2024-05-10 PROCEDURE — 99223 1ST HOSP IP/OBS HIGH 75: CPT | Performed by: INTERNAL MEDICINE

## 2024-05-10 PROCEDURE — 85025 COMPLETE CBC W/AUTO DIFF WBC: CPT

## 2024-05-10 PROCEDURE — A4216 STERILE WATER/SALINE, 10 ML: HCPCS | Performed by: STUDENT IN AN ORGANIZED HEALTH CARE EDUCATION/TRAINING PROGRAM

## 2024-05-10 PROCEDURE — 84156 ASSAY OF PROTEIN URINE: CPT

## 2024-05-10 PROCEDURE — P9016 RBC LEUKOCYTES REDUCED: HCPCS

## 2024-05-10 PROCEDURE — 2580000003 HC RX 258: Performed by: INTERNAL MEDICINE

## 2024-05-10 PROCEDURE — 76770 US EXAM ABDO BACK WALL COMP: CPT

## 2024-05-10 PROCEDURE — 99233 SBSQ HOSP IP/OBS HIGH 50: CPT | Performed by: STUDENT IN AN ORGANIZED HEALTH CARE EDUCATION/TRAINING PROGRAM

## 2024-05-10 PROCEDURE — C9113 INJ PANTOPRAZOLE SODIUM, VIA: HCPCS | Performed by: STUDENT IN AN ORGANIZED HEALTH CARE EDUCATION/TRAINING PROGRAM

## 2024-05-10 PROCEDURE — 97161 PT EVAL LOW COMPLEX 20 MIN: CPT

## 2024-05-10 PROCEDURE — 6370000000 HC RX 637 (ALT 250 FOR IP): Performed by: STUDENT IN AN ORGANIZED HEALTH CARE EDUCATION/TRAINING PROGRAM

## 2024-05-10 PROCEDURE — 82570 ASSAY OF URINE CREATININE: CPT

## 2024-05-10 PROCEDURE — 80048 BASIC METABOLIC PNL TOTAL CA: CPT

## 2024-05-10 PROCEDURE — 97530 THERAPEUTIC ACTIVITIES: CPT

## 2024-05-10 PROCEDURE — 85014 HEMATOCRIT: CPT

## 2024-05-10 PROCEDURE — 36415 COLL VENOUS BLD VENIPUNCTURE: CPT

## 2024-05-10 PROCEDURE — 93010 ELECTROCARDIOGRAM REPORT: CPT | Performed by: INTERNAL MEDICINE

## 2024-05-10 PROCEDURE — 36430 TRANSFUSION BLD/BLD COMPNT: CPT

## 2024-05-10 PROCEDURE — 97535 SELF CARE MNGMENT TRAINING: CPT

## 2024-05-10 PROCEDURE — 6370000000 HC RX 637 (ALT 250 FOR IP): Performed by: INTERNAL MEDICINE

## 2024-05-10 RX ORDER — POLYETHYLENE GLYCOL 3350 17 G/17G
17 POWDER, FOR SOLUTION ORAL DAILY
Status: DISCONTINUED | OUTPATIENT
Start: 2024-05-10 | End: 2024-05-14 | Stop reason: HOSPADM

## 2024-05-10 RX ORDER — SENNOSIDES A AND B 8.6 MG/1
1 TABLET, FILM COATED ORAL NIGHTLY
Status: DISCONTINUED | OUTPATIENT
Start: 2024-05-10 | End: 2024-05-11

## 2024-05-10 RX ORDER — DOCUSATE SODIUM 100 MG/1
100 CAPSULE, LIQUID FILLED ORAL 2 TIMES DAILY
Status: DISCONTINUED | OUTPATIENT
Start: 2024-05-10 | End: 2024-05-14 | Stop reason: HOSPADM

## 2024-05-10 RX ORDER — SUCRALFATE 1 G/1
1 TABLET ORAL EVERY 8 HOURS SCHEDULED
Status: DISCONTINUED | OUTPATIENT
Start: 2024-05-10 | End: 2024-05-14 | Stop reason: HOSPADM

## 2024-05-10 RX ADMIN — SUCRALFATE 1 G: 1 TABLET ORAL at 22:35

## 2024-05-10 RX ADMIN — PANTOPRAZOLE SODIUM 40 MG: 40 INJECTION, POWDER, FOR SOLUTION INTRAVENOUS at 22:35

## 2024-05-10 RX ADMIN — SODIUM CHLORIDE 125 MG: 9 INJECTION, SOLUTION INTRAVENOUS at 12:51

## 2024-05-10 RX ADMIN — SODIUM CHLORIDE, PRESERVATIVE FREE 10 ML: 5 INJECTION INTRAVENOUS at 14:05

## 2024-05-10 RX ADMIN — PRAVASTATIN SODIUM 40 MG: 20 TABLET ORAL at 22:35

## 2024-05-10 RX ADMIN — SODIUM CHLORIDE, PRESERVATIVE FREE 10 ML: 5 INJECTION INTRAVENOUS at 22:36

## 2024-05-10 RX ADMIN — POLYETHYLENE GLYCOL 3350 17 G: 17 POWDER, FOR SOLUTION ORAL at 12:48

## 2024-05-10 RX ADMIN — DOCUSATE SODIUM 100 MG: 100 CAPSULE, LIQUID FILLED ORAL at 22:36

## 2024-05-10 RX ADMIN — SENNOSIDES 8.6 MG: 8.6 TABLET, FILM COATED ORAL at 22:36

## 2024-05-10 RX ADMIN — SODIUM CHLORIDE, PRESERVATIVE FREE 10 ML: 5 INJECTION INTRAVENOUS at 09:48

## 2024-05-10 RX ADMIN — DOCUSATE SODIUM 100 MG: 100 CAPSULE, LIQUID FILLED ORAL at 12:48

## 2024-05-10 RX ADMIN — AMIODARONE HYDROCHLORIDE 200 MG: 200 TABLET ORAL at 09:47

## 2024-05-10 RX ADMIN — METOPROLOL TARTRATE 25 MG: 25 TABLET, FILM COATED ORAL at 22:35

## 2024-05-10 RX ADMIN — SUCRALFATE 1 G: 1 TABLET ORAL at 14:05

## 2024-05-10 RX ADMIN — METOPROLOL TARTRATE 25 MG: 25 TABLET, FILM COATED ORAL at 09:47

## 2024-05-10 RX ADMIN — PANTOPRAZOLE SODIUM 40 MG: 40 INJECTION, POWDER, FOR SOLUTION INTRAVENOUS at 09:48

## 2024-05-10 ASSESSMENT — PAIN SCALES - GENERAL
PAINLEVEL_OUTOF10: 0

## 2024-05-10 NOTE — PROGRESS NOTES
Physical Therapy  Physical Therapy Initial Assessment       Name: Niles Field  : 1935  MRN: [id number]      Date of Service: 5/10/2024    Evaluating PT:  Nany Nix PT, DPT [id number]    Room #:  6503/6503-A  Diagnosis:  Anemia [D64.9]  ELISSA (acute kidney injury) ([facility]) [N17.9]  Anemia, unspecified type [D64.9]  PMHx/PSHx:   has a past medical history of A-fib ([facility]), AAA (abdominal aortic aneurysm) ([facility]), Acute congestive heart failure ([facility]), Acute on chronic combined systolic and diastolic CHF (congestive heart failure) ([facility]), Acute on chronic diastolic congestive heart failure ([facility]), Acute on chronic heart failure with preserved ejection fraction (HFpEF) ([facility]), Arrhythmia, Arthritis, Carotid artery stenosis, CHF (congestive heart failure) ([facility]), Chronic atrial fibrillation, CKD (chronic kidney disease), stage III ([facility]), Heart valve problem, Hyperlipidemia, Hypertension, ICD (implantable cardiac defibrillator) in place, MI (mitral incompetence), MI (myocardial infarction) ([facility]), PAF (paroxysmal atrial fibrillation) ([facility]), Paroxysmal atrial fibrillation ([facility]), and Ventricular tachycardia ([facility]).    Procedure/Surgery:  none this admission   Precautions: Falls,  macular degeneration     SUBJECTIVE:    Pt lives with wife in a 1 story home with 2 stairs to enter and 1 rail(s).  Bed is on 1st floor and bath is on 1st floor.  Pt ambulated with WW PTA.    Equipment Owned: ANNMARIE LAU  Equipment Needs:  none    OBJECTIVE:   Initial Evaluation  Date: 5/10/24 Treatment  Date:  Short Term/ Long Term   Goals   AM-PAC 6 Clicks      Was pt agreeable to Eval/treatment? Yes      Does pt have pain? Denies pain      Bed Mobility  Rolling: SBA  Supine to sit: SBA  Sit to supine: SBA  Scooting: SBA  Rolling: Independent  Supine to sit: Independent  Sit to supine: Independent  Scooting: Independent   Transfers Sit to stand: SBA  Stand to sit: SBA  Stand pivot: SBA WW  Sit to stand: Independent  Stand to sit: Independent  Stand  practiced and was instructed in the following treatment:    Bed Mobility: Verbal cues for proper positioning and sequencing to perform bed mobility to facilitate maximum independence.   sat EOB > 5 minutes to promote upright tolerance and B LE ROM.  Transfers: Verbal cues for proper positioning and sequencing to perform transfers safely with maximum independence.   STS x2 reps from various surface heights.   Gait Training: Verbal cues for proper positioning and sequencing with WW to maximize functional mobility independence.   Pt education for safety with all mobility and WW management.   Vitals and symptoms monitored during session.     Pt's/ family goals   1. Get better    Prognosis is good for reaching above PT goals.    Patient and or family understand(s) diagnosis, prognosis, and plan of care.  Yes     PHYSICAL THERAPY PLAN OF CARE:    PT POC is established based on physician order and patient diagnosis     Referring provider/PT Order:    05/10/24 1045  PT eval and treat  Start:  05/10/24 1045,   End:  05/10/24 1045,   ONE TIME,   Standing Count:  1 Occurrences,   R         Doris Willett MD     Diagnosis:  Anemia [D64.9]  ELISSA (acute kidney injury) ([facility]) [N17.9]  Anemia, unspecified type [D64.9]  Specific instructions for next treatment:  progress functional mobility     Current Treatment Recommendations:     [x] Strengthening to improve independence with functional mobility   [] ROM to improve independence with functional mobility   [x] Balance Training to improve static/dynamic balance and to reduce fall risk  [x] Endurance Training to improve activity tolerance during functional mobility   [x] Transfer Training to improve safety and independence with all functional transfers   [x] Gait Training to improve gait mechanics, endurance and assess need for appropriate assistive device  [x] Stair Training in preparation for safe discharge home and/or into the community   [] Positioning to prevent skin breakdown and

## 2024-05-10 NOTE — PROGRESS NOTES
Hospitalist Progress Note      Chief Complaint: fatigue    SYNOPSIS: Patient admitted on 2024   This is an 88-year-old male with past medical history of atrial fibrillation on Xarelto congestive heart failure, ICD chronic kidney disease,hyperlipidemia and hypertension here due to feeling of fatigue for last 1 week.  He stated he has been suffering from macular degeneration, has problems with vision, he did not see whether he had black/dark stools or bright red blood in stools, complains of constipation.  In ER workup shows hemoglobin 4.8 and 3 unit PRBC transfusion ordered, his OBT positive, iron panel shows iron deficiency IV iron ordered.  Protonix 40 Mg IV twice daily, Xarelto on hold, will continue full liquid diet bland diet, monitor H&H every 8 hourly.  GI, general surgery consulted for scope  Nephrology following for ELISSA on CKD  EP consulted for recommendations regarding anticoagulation     SUBJECTIVE:    Patient seen and examined at bedside, not in acute distress  Records reviewed.   Stable overnight. No overnight issues reported     Temp (24hrs), Av °F (36.7 °C), Min:97.2 °F (36.2 °C), Max:99.2 °F (37.3 °C)    DIET: ADULT DIET; Full Liquid; GI [place] (GERD/Peptic Ulcer)  CODE: Full Code    Intake/Output Summary (Last 24 hours) at 5/10/2024 1425  Last data filed at 5/10/2024 1402  Gross per 24 hour   Intake 1259.33 ml   Output 1500 ml   Net -240.67 ml       OBJECTIVE:    BP (!) 114/50   Pulse 62   Temp 97.2 °F (36.2 °C) (Oral)   Resp 16   Ht 1.676 m (5' 6\")   Wt 77.1 kg (170 lb)   SpO2 95%   BMI 27.44 kg/m²     General appearance: No apparent distress, appears stated age and cooperative.   HEENT:  Normocephalic. Conjunctivae/corneas clear. Moist mucosa   Neck: Supple. No jugular venous distention. Thyroid not visible, non tender   Respiratory: Normal respiratory effort. Clear to auscultation bilaterally. No stridor/wheezing/rhonchi/crackles   Cardiovascular: Regular heart beats, normal  [Held by provider] bumetanide  2 mg Oral Daily    [Held by provider] ferrous sulfate  325 mg Oral Daily with breakfast    metoprolol tartrate  25 mg Oral BID    pravastatin  40 mg Oral Nightly    sodium chloride flush  5-40 mL IntraVENous 2 times per day     PRN Meds: sodium chloride, sodium chloride flush, sodium chloride, ondansetron **OR** ondansetron, acetaminophen **OR** acetaminophen, albuterol    Labs:     Recent Labs     05/09/24  1415 05/10/24  0329 05/10/24  1356   WBC 7.5 8.3  --    HGB 4.8* 7.3* 7.5*   HCT 17.6* 24.3* 25.3*    287  --        Recent Labs     05/09/24  1415 05/10/24  0329 05/10/24  0823    137 138   K 4.6 4.0 3.8   CL 93* 97* 98   CO2 29 32* 28   BUN 51* 45* 45*   CREATININE 1.9* 2.0* 1.8*   CALCIUM 8.0* 7.9* 8.0*       Recent Labs     05/09/24  1415   ALKPHOS 168*   ALT 94*   *   BILITOT 0.3       No results for input(s): \"INR\" in the last 72 hours.    No results for input(s): \"CKTOTAL\", \"TROPONINI\" in the last 72 hours.    Chronic labs:    Lab Results   Component Value Date    CHOL 142 06/28/2022    TRIG 191 (H) 06/28/2022    HDL 39 06/28/2022    TSH 2.52 09/12/2023    INR 1.5 06/15/2022    LABA1C 5.5 06/28/2022       Radiology: REVIEWED DAILY    +++++++++++++++++++++++++++++++++++++++++++++++++  Doris Willett MD  [facility]   [facility], [place]  +++++++++++++++++++++++++++++++++++++++++++++++++  NOTE: This report was transcribed using voice recognition software. Every effort was made to ensure accuracy; however, inadvertent computerized transcription errors may be present.

## 2024-05-10 NOTE — CONSULTS
GENERAL SURGERY  CONSULT NOTE    Patient's Name/Date of Birth: Niles Field / 1935    Date: May 10, 2024     PCP: Geremias Koehler DO     Chief Complaint:   Chief Complaint   Patient presents with    Fatigue     Patient states he has worsening generalized weakness making it difficult to ambulate. Hx neuropathy. Denies CP. States he gets SOB upon exertion        Physician Consulted: Dr. Thao   Reason for Consult: Anemia, GIB  Referring Physician: Dr. Tamie MAHAJAN  Niles Field is a 88 y.o. male PMHx of atrial fibrillation on Xarelto with AICD, congestive heart failure, chronic kidney disease, who presents for evaluation of fatigue.  Patient has been feeling fatigued for about a week but has become increasingly worse over the last 3 days.  In the ED his initial hemoglobin signed be 4.3 and received 3 units PRBC, in the ED he was found to be FOBT positive and gastroenterology was consulted.  His hemoglobin responded following the transfusions to 7.3. Patient underwent a EGD/colonoscopy '22 with Dr. Mcguire which revealed a tubular adenoma which was removed.  General surgery was consulted for anemia with concern for a GI bleed.         Past Medical History:   Diagnosis Date    A-fib ([facility])     follows with Dr Oli Jorgensen 9/28/22    AAA (abdominal aortic aneurysm) ([facility]ly [facility])     infrarenal 3.7cm 6/27/22    Acute congestive heart failure ([facility]) 03/01/2022    Acute on chronic combined systolic and diastolic CHF (congestive heart failure) ([facility]) 03/01/2022    Acute on chronic diastolic congestive heart failure ([facility]) 02/06/2023    Acute on chronic heart failure with preserved ejection fraction (HFpEF) ([facility])     Arrhythmia     Arthritis     Carotid artery stenosis     CHF (congestive heart failure) ([facility])     Chronic atrial fibrillation     CKD (chronic kidney disease), stage III ([facility])     Heart valve problem     Hyperlipidemia     Hypertension     ICD (implantable cardiac defibrillator) in place 2007    [employer] Everlamin  [facility] ENDOSCOPY    UPPER GASTROINTESTINAL ENDOSCOPY N/A 12/12/2022    EGD ESOPHAGOGASTRODUODENOSCOPY DILATATION performed by Caitlyn Mcguire MD at [facility] ENDOSCOPY    VARICOSE VEIN SURGERY  1970's       Medications Prior to Admission:    Prior to Admission medications    Medication Sig Start Date End Date Taking? Authorizing Provider   mometasone (ELOCON) 0.1 % ointment Apply topically daily. 4/22/24   Geremias Koehler DO   albuterol sulfate HFA (VENTOLIN HFA) 108 (90 Base) MCG/ACT inhaler Inhale 2 puffs into the lungs 4 times daily as needed for Wheezing  Patient not taking: Reported on 4/22/2024 4/10/24   Geremias Koehler,    rivaroxaban (XARELTO) 15 MG TABS tablet Take 1 tablet by mouth Daily with supper 3/22/24   Oli Jorgensen DO   meclizine (ANTIVERT) 25 MG tablet TAKE 1 TABLET BY MOUTH DAILY AS  NEEDED FOR DIZZINESS 1/17/24   Geremias Koehler DO   bumetanide (BUMEX) 2 MG tablet Take 1 tablet by mouth daily Takes second dose prn per DR GUS Jorgensen 12/15/23   Geremias Koehler DO   potassium chloride (KLOR-CON M) 20 MEQ extended release tablet Take 1 tablet by mouth daily 12/15/23   Geremias Koehler DO   potassium chloride (KLOR-CON) 20 MEQ packet Take 20 mEq by mouth daily 8/24/23   Geremias Koehler DO   amLODIPine (NORVASC) 5 MG tablet Take 1 tablet by mouth daily 8/24/23   Geremias Koehler DO   metoprolol tartrate (LOPRESSOR) 25 MG tablet Take 1 tablet by mouth 2 times daily 8/24/23   Geremias Koehler DO   amiodarone (CORDARONE) 200 MG tablet TAKE 1 TABLET BY MOUTH DAILY 8/4/23   Mohini Mercado MD   pantoprazole (PROTONIX) 40 MG tablet TAKE 1 TABLET BY MOUTH DAILY 6/17/23   Geremias Koehler DO   ferrous sulfate (IRON 325) 325 (65 Fe) MG tablet Take 1 tablet by mouth daily (with breakfast)    ProviderXiomara MD   pravastatin (PRAVACHOL) 40 MG tablet TAKE 1 TABLET BY MOUTH  DAILY 3/20/23   Jae Fowler MD   OXYGEN Inhale into the lungs 2L at nite    Provider, MD Xiomara   MAGNESIUM-OXIDE 400 (240 Mg)

## 2024-05-10 NOTE — PLAN OF CARE
Patient states feels ok after transfusion, patient told not to get up without help. Skin warm and dry ,pale

## 2024-05-10 NOTE — CONSULTS
[facility]   Gastroenterology, Hepatology, &  Advanced Endoscopy    Consult     Reason for consult: blood loss anemia  ASSESSMENT AND PLAN:  - Hgb 4.8 on presentation to the ED  - Ferrlecit IV  - Protonix 40 mg IV BID  - close monitoring of CBC  -Transfuse to keep Hgb >7  - Hgb 7.3 post transfusion  - Carafate 1g q 8 hour  - currently on a Full liquid diet  - Monitor for signs of gross bleeding or drop in Hgb over the weekend.  -Consideration for a EGD next week.    Pt previously had a gastric bleeding ulcer approx. 10 years ago while on Pradaxa.   - He has been on long term PPI management since.  - Patient underwent a EGD/colonoscopy '22 with Dr. Mcguire which revealed a tubular adenoma which was removed.   - Denies any gross bleeding though admits it's difficult with macular degeneration.    HISTORY OF PRESENT ILLNESS:      88 y.o. male with history of HFpEF (EF> 50%) ,atrial fibrillation on Xarelto and ICD in place presenting for fatigue.  Son at bedside helping provide history.  He states over the past 2 days, patient has been having fatigue.  He was unable to walk yesterday..  He has been having shortness of breath with exertion for the past few weeks.  He also had leg swelling.  Follows with the CHF clinic,  Dr. Oli Jorgensen, and Dr. Koehler   Patient underwent a EGD/colonoscopy '22 with Dr. Mcguire which revealed a tubular adenoma which was removed.     Past Medical History:        Diagnosis Date    A-fib (HCC)     follows with Dr Oli Jorgensen 9/28/22    AAA (abdominal aortic aneurysm) ([facility])     infrarenal 3.7cm 6/27/22    Acute congestive heart failure (HCC) 03/01/2022    Acute on chronic combined systolic and diastolic CHF (congestive heart failure) (HCC) 03/01/2022    Acute on chronic diastolic congestive heart failure (HCC) 02/06/2023    Acute on chronic heart failure with preserved ejection fraction (HFpEF) (HCC)     Arrhythmia     Arthritis     Carotid artery stenosis     CHF (congestive heart failure)  than or equal to 60 minutes was spent providing face-to-face patient care, including:  and coordinating care, reviewing the chart, labs, and diagnostics, as well as medical decision making. Greater than 50% of this time was spent instructing and counseling the patient face to face regarding findings and recommendations.    Thank you for including us in the care of this patient. Please do not hesitate to contact us with any additional questions or concerns.      Discussed with Dr. Miguel.  Electronically signed by GINETTE Cornejo CNP on 5/10/2024 at 9:29 AM

## 2024-05-10 NOTE — PLAN OF CARE
Problem: Chronic Conditions and Co-morbidities  Goal: Patient's chronic conditions and co-morbidity symptoms are monitored and maintained or improved  Outcome: Progressing     Problem: Discharge Planning  Goal: Discharge to home or other facility with appropriate resources  Outcome: Progressing     Problem: Skin/Tissue Integrity  Goal: Absence of new skin breakdown  Description: 1.  Monitor for areas of redness and/or skin breakdown  2.  Assess vascular access sites hourly  3.  Every 4-6 hours minimum:  Change oxygen saturation probe site  4.  Every 4-6 hours:  If on nasal continuous positive airway pressure, respiratory therapy assess nares and determine need for appliance change or resting period.  Outcome: Progressing     Problem: ABCDS Injury Assessment  Goal: Absence of physical injury  Outcome: Progressing     Problem: Safety - Adult  Goal: Free from fall injury  Outcome: Progressing

## 2024-05-10 NOTE — PROGRESS NOTES
Chart reviewed, full consultation to follow:    Briefly Mr. Field is a 88-year-old man with history of HTN, CAD status post CABG in 2003 with redo CABG in 2007, HFpEF 55-60%, status post ICD placement, status post TAVR, PAF status post DCCV, hyperlipidemia, status post esophageal dilatation, GI bleeding 2/2 large pyloric ulcer, macular degeneration, vertigo, TIA, who was admitted on May 9, 2024 after he presented to the ER because of weakness and fatigue, after evaluation in the ER he was found to have a hemoglobin level of 4.8 g/dL for which he received PRBC's transfusions x 3.  He has well was found to have an elevated creatinine of 1.9 mg/dL, reason for this consultation.  His baseline creatinine is between 1.2-1.4 mg/dL.  Patient reported that for the last 2 weeks he has been taking Motrin for neck pain.  His other medications prior to admission included rivaroxaban, Bumex 2 mg daily.    ELISSA stage I on CKD, probably hemodynamically mediated due to intravascular volume depletion-severe anemia, probably GI bleeding.    CKD stage IIIa, unknown protein excretion, probably due to nephrosclerosis versus cardiorenal syndrome.  Baseline creatinine 1.2-1.4 mg/dL    HFpEF 55-60%, proBNP 1792, he has large edema in the lower extremities but I will hold diuresis for now due to severe anemia    History of HTN, to hold BP medication for now  Normocytic anemia, with iron deficiency, ferritin level 25, iron saturation 4%, rule out GI bleeding.  On IV iron, Ferrlecit.      CAD status post CABG 2003, status post redo CABG 2007  Status post ICD placement  PAF status post DCCV, on amiodarone, metoprolol, rivaroxaban at home, on hold  Hyperlipidemia, on pravastatin      Plan:    Hold amlodipine  Hold diuretics for now  Monitor renal function  Obtain urine indices  Obtain urine albumin/creatinine and protein/creatinine ratio  Obtain bladder scan  Obtain kidney ultrasound      Charisma Castellon MD

## 2024-05-10 NOTE — CARE COORDINATION
5/10/24  Spoke with patient and his granddaughter regarding transition of care.  Introduced self and reviewed role of SW/CM.  Patient admitted for anemia and ELISSA.  Patient received  3 units of PRBC for Hemoglobin of 4.8.  Patient is pending a consult with GI as well as nephrology. Patient states he lives at home with his wife in a 1 story home.  Patient states he owns a cane, walker and wheelchair.  Patient has home 02 at 2 liters supplied by [employer] but has not needed it.  Patient is independent with ADL's and was going to start out patient therapy at [facility] prior to admit.  Patient would like to continue with his out patient therapy pending course of treatment and level of support.  PT/OT evals requested to assess function level.  PCP is Dr. CHELE Koehler and pharmacy preference is [facility] in [place].  Discharge goal is to return home with wife when medically stable.  No discharge needs noted at this time with therapy evals pending. SW/CM to follow.    Electronically signed by HUSAM Wasserman on 5/10/2024 at 10:52 AM     Case Management Assessment  Initial Evaluation    Date/Time of Evaluation: 5/10/2024 10:52 AM  Assessment Completed by: HUSAM Wasserman    If patient is discharged prior to next notation, then this note serves as note for discharge by case management.    Patient Name: Niles Field                   YOB: 1935  Diagnosis: Anemia [D64.9]  ELISSA (acute kidney injury) (HCC) [N17.9]  Anemia, unspecified type [D64.9]                   Date / Time: 5/9/2024  1:22 PM    Patient Admission Status: Inpatient   Readmission Risk (Low < 19, Mod (19-27), High > 27): Readmission Risk Score: 20.4    Current PCP: Geremias Koehler, DO  PCP verified by CM? Yes    Chart Reviewed: Yes      History Provided by: Patient  Patient Orientation: Alert and Oriented, Person, Place, Situation    Patient Cognition: Alert    Hospitalization in the last 30 days (Readmission):  No    If yes, Readmission  [facility] - P [phone] - F [phone]  [address] [facility]  [address]  [facility] [address]  Phone: [phone] Fax: [phone]    [facility] - [place], [place] - [address] - P [phone] - F [phone]  [address]  [address]  [facility] [address]  Phone: [phone] Fax: [phone]      Notes:    Factors facilitating achievement of predicted outcomes: Family support, Motivated, Cooperative, Pleasant, and Has needed Durable Medical Equipment at home    Barriers to discharge: none noted at this time    Additional Case Management Notes: See above    The Plan for Transition of Care is related to the following treatment goals of Anemia [D64.9]  ELISSA (acute kidney injury) (HCC) [N17.9]  Anemia, unspecified type [D64.9]    IF APPLICABLE: The Patient and/or patient representative Niles and his family were provided with a choice of provider and agrees with the discharge plan. Freedom of choice list with basic dialogue that supports the patient's individualized plan of care/goals and shares the quality data associated with the providers was provided to:     Patient Representative Name:       The Patient and/or Patient Representative Agree with the Discharge Plan? yes     HUSMA Wasserman  Case Management Department  Ph: [phone] Fax:

## 2024-05-10 NOTE — CONSULTS
287  --      Recent Labs     05/09/24  1415 05/10/24  0329 05/10/24  0823    137 138   K 4.6 4.0 3.8   CL 93* 97* 98   CO2 29 32* 28   BUN 51* 45* 45*   CREATININE 1.9* 2.0* 1.8*   CALCIUM 8.0* 7.9* 8.0*      Lab Results   Component Value Date/Time    MG 2.8 05/09/2024 02:15 PM     No results for input(s): \"TSH\" in the last 72 hours.  No results for input(s): \"INR\" in the last 72 hours.    CXR 5/9/24:   FINDINGS:  The lungs are without acute focal process.  There is no effusion or  pneumothorax.  Cardiomegaly.  The osseous structures are without acute  process.  Elevation right hemidiaphragm.     IMPRESSION:  No acute process.     Stable appearance of the chest compared to 04/10/2024.    Telemetry 5/10/24: NSR, no PAF or VT.    EKG: SB 59 bpm, first degree AV block, RBBB, Qtc 514 ms.  Please see scan in Cardiology.    Limited echocardiogram 2/6/23:    Findings      Left Ventricle   Normal left ventricular size and systolic function.   Ejection fraction is visually estimated at 55-60%.   Indeterminate diastolic function.   Basal inferior wall hypokinetic.   Mild left ventricular concentric hypertrophy noted.   Abnormal LV septal motion consistent with paced rhythm.      Right Ventricle   Device lead visualized in right ventricle.   Right ventricle global systolic function is mildly reduced.   TAPSE 1.6 cm.      Left Atrium   The left atrium is dilated.      Mitral Valve   Mild mitral annular calcification.   Mild to moderate mitral regurgitation is present.      Tricuspid Valve   Mild tricuspid regurgitation.   RVSP is 42 mmHg.      Aortic Valve   26 mm ZANE S3 TAVR valve implanted 2017.   Gradients not assessed.   Mild-to-moderate paravalvular leak.      Miscellaneous   Dilated Inferior Vena Cava, normal respiratory variation, suggesting   mildly elevated RA pressure, approximately 8 mmHg.      Conclusions      Summary   Limited echo ordered.      Normal left ventricular size and systolic  showed mild esophagitis and gastritis. His Xarelto has been put on hold.  - Presents to the hospital on 5/9/24 complaining of dizziness and fatigue for 1 week. He    was found to be anemic with Hb 4.8   - Given 3 units of PRBC and found to have positive FOBT.   - Plan for EGD next week per GI. Xarelto is currently in hold.    6. Aortic stenosis   - S/p TAVR 3/29/17    7. Hypertension    8. Hyperlipidemia    9. ELISSA on CKD  Estimated Creatinine Clearance: 29 mL/min (A) (based on SCr of 1.8 mg/dL (H)).     Recommendations:  Hold Xarelto.  Continue Amiodarone 200 mg daily.  Continue Lopressor 25 mg BID.  Resume OAC when clinically is able and is cleared by GI perspectives. Consider switch Xarelto to Eliquis 2.5 mg BID.  If he is deemed not a good candidate for long term OAC, would consider outpatient referral for Watchman INOCENTE occlusion procedure.  EP will sign off. Please call for any questions or concerns.  Follow up with primary EP in 1 month.    I have spent a total of 75 minutes with the patient and the family reviewing the above stated recommendations.  And a total of >50% of that time involved face-to-face time providing counseling and or coordination of care with the other providers, reviewing records/tests, counseling/education of the patient, ordering medications/tests/procedures, coordinating care, and documenting clinical information in the EHR.     Thank you for allowing me to participate in your patient's care.  Please call me if there are any questions or concerns.      Tere Boone MD  Cardiac Electrophysiology  [facility]  [facility] [place]  3:13 PM  5/10/2024

## 2024-05-10 NOTE — PATIENT CHOICE
P Quality Flow/Interdisciplinary Rounds Progress Note        Quality Flow Rounds held on May 10, 2024    Disciplines Attending:  Bedside Nurse, , , and Nursing Unit Leadership    Niles Field was admitted on 5/9/2024  1:22 PM    Anticipated Discharge Date:       Disposition:    Michael Score:  Michael Scale Score: 18    Readmission Risk              Risk of Unplanned Readmission:  17           Discussed patient goal for the day, patient clinical progression, and barriers to discharge.  The following Goal(s) of the Day/Commitment(s) have been identified:  report labs/diagnostics       Anna Garrido RN  May 10, 2024

## 2024-05-10 NOTE — ACP (ADVANCE CARE PLANNING)
Advance Care Planning   Healthcare Decision Maker:    Primary Decision Maker: Annie Field KARINA - Spouse - [phone]    Secondary Decision Maker: TimDenise - Child - [phone]    Supplemental (Other) Decision Maker: Niles Field - Child - [phone]    Supplemental (Other) Decision Maker: Edd Fielda - Other - [phone]    Click here to complete Healthcare Decision Makers including selection of the Healthcare Decision Maker Relationship (ie \"Primary\").

## 2024-05-10 NOTE — CONSULTS
place 2007    Medtronic Evera XT DR SR#[id number] generator changed 7/28/16  Dr Mercado    MI (mitral incompetence) 2003    MI (myocardial infarction) ([facility]) 2003    PAF (paroxysmal atrial fibrillation) (HCC) 02/06/2023    Paroxysmal atrial fibrillation (HCC) 03/13/2018    Ventricular tachycardia (HCC)      Past Surgical History:        Procedure Laterality Date    CARDIAC CATHETERIZATION Right 03/03/2017    Dr. Cooper- Rt Groin    CARDIAC DEFIBRILLATOR PLACEMENT  2007. 2008 2007 78 shocks replaced 2008 [employer]tronic     CARDIAC DEFIBRILLATOR PLACEMENT  07/28/2016    Med[employer]onic Dual ICD gen change    CARDIAC DEFIBRILLATOR PLACEMENT Left 04/13/2023    Med[employer]onic ICD-GR  Dr. Mercado    CARDIAC SURGERY N/A 09/04/2021    cardioversion performed by Mohini Mercado MD at [facility] OR    CARDIOVERSION  10/29/2020    Successful    (Dr. Mercado)    CARDIOVERSION  05/12/2022    Successful   Dr Mercado    CARDIOVERSION  09/12/2023    Successful  Dr. Mercado    COLONOSCOPY N/A 02/24/2020    COLONOSCOPY DIAGNOSTIC performed by Chris Sky MD at [facility] ENDOSCOPY    COLONOSCOPY N/A 05/04/2022    COLONOSCOPY WITH BIOPSY performed by Caitlyn Mcguire MD at [facility] ENDOSCOPY    CORONARY ARTERY BYPASS GRAFT  05/23/2003 03/16/2007    DIAGNOSTIC CARDIAC CATH LAB PROCEDURE  2007    DIAGNOSTIC CARDIAC CATH LAB PROCEDURE  03/29/2008    stent    JOINT REPLACEMENT  2011    r hip surgery Dr Vasquez    OTHER SURGICAL HISTORY  03/29/2017    Dr. Saab and Dr. Narayanan- TAVR Josie 26mm valve    UPPER GASTROINTESTINAL ENDOSCOPY N/A 09/03/2019    EGD ESOPHAGOGASTRODUODENOSCOPY performed by Nigel CHILDERS MD at [facility] ENDOSCOPY    UPPER GASTROINTESTINAL ENDOSCOPY N/A 02/18/2020    EGD ESOPHAGOGASTRODUODENOSCOPY performed by Chris Sky MD at [facility] ENDOSCOPY    UPPER GASTROINTESTINAL ENDOSCOPY N/A 03/14/2022    EGD CONTROL HEMORRHAGE performed by Caitlyn Mcguire MD at [facility] ENDOSCOPY    UPPER GASTROINTESTINAL ENDOSCOPY N/A 03/16/2022    EGD ESOPHAGOGASTRODUODENOSCOPY  smoking use included cigarettes. He started smoking about 54 years ago. He has a 1.5 pack-year smoking history. He has never used smokeless tobacco.  ETOH:   reports no history of alcohol use.    Family History:   No family history on file.  REVIEW OF SYSTEMS:    CONSTITUTIONAL:  positive for  fatigue  negative for  fevers and chills  EYES:  positive for  blurred vision  HEENT:  negative  RESPIRATORY:  negative  CARDIOVASCULAR:  negative  GASTROINTESTINAL:  negative  GENITOURINARY:  positive for nocturia  INTEGUMENT/BREAST:  negative  HEMATOLOGIC/LYMPHATIC:  negative  ALLERGIC/IMMUNOLOGIC:  negative  ENDOCRINE:  negative  MUSCULOSKELETAL:  negative  NEUROLOGICAL:  negative  PHYSICAL EXAM:      Vitals:    VITALS:  BP (!) 114/50   Pulse 62   Temp 97.2 °F (36.2 °C) (Oral)   Resp 16   Ht 1.676 m (5' 6\")   Wt 77.1 kg (170 lb)   SpO2 95%   BMI 27.44 kg/m²   24HR INTAKE/OUTPUT:    Intake/Output Summary (Last 24 hours) at 5/10/2024 1456  Last data filed at 5/10/2024 1402  Gross per 24 hour   Intake 1259.33 ml   Output 1500 ml   Net -240.67 ml       Constitutional: Patient is awake, alert, no distress  HEENT: Pupils are equal reactive  Respiratory: Decreased breath sounds at the bases  Cardiovascular/Edema: Heart sounds are regular rate  Gastrointestinal: Abdomen soft  Neurologic: Nonfocal  Skin: No lesion  Other:++ Edema    DATA:    CBC:   Lab Results   Component Value Date/Time    WBC 8.3 05/10/2024 03:29 AM    RBC 2.80 05/10/2024 03:29 AM    HGB 7.5 05/10/2024 01:56 PM    HCT 25.3 05/10/2024 01:56 PM    MCV 86.8 05/10/2024 03:29 AM    MCH 26.1 05/10/2024 03:29 AM    MCHC 30.0 05/10/2024 03:29 AM    RDW 18.6 05/10/2024 03:29 AM     05/10/2024 03:29 AM    MPV 10.8 05/10/2024 03:29 AM     CMP:    Lab Results   Component Value Date/Time     05/10/2024 08:23 AM    K 3.8 05/10/2024 08:23 AM    K 3.8 08/23/2022 08:56 AM    CL 98 05/10/2024 08:23 AM    CO2 28 05/10/2024 08:23 AM    BUN 45 05/10/2024 08:23 AM

## 2024-05-10 NOTE — PROGRESS NOTES
Occupational Therapy  OCCUPATIONAL THERAPY INITIAL EVALUATION    [facility]  [address], [place]      Date:5/10/2024                                                Patient Name: Niles Field  MRN: [id number]  : 1935  Room: [address] [place], [place] [address]    Evaluating OT: Raffy Brar OTR/L #[id number]     Referring Provider: Doris Willett MD   Specific Provider Orders/Date: OT eval and treat 5/10/24    Diagnosis: Anemia [D64.9]  ELISSA (acute kidney injury) ([facility]) [N17.9]  Anemia, unspecified type [D64.9]   Pt admitted to hospital with anemia      Pertinent Medical History:  has a past medical history of A-fib ([facility]), AAA (abdominal aortic aneurysm) ([facility]), Acute congestive heart failure ([facility]), Acute on chronic combined systolic and diastolic CHF (congestive heart failure) ([facility]), Acute on chronic diastolic congestive heart failure ([facility]), Acute on chronic heart failure with preserved ejection fraction (HFpEF) ([facility]), Arrhythmia, Arthritis, Carotid artery stenosis, CHF (congestive heart failure) ([facility]), Chronic atrial fibrillation, CKD (chronic kidney disease), stage III ([facility]), Heart valve problem, Hyperlipidemia, Hypertension, ICD (implantable cardiac defibrillator) in place, MI (mitral incompetence), MI (myocardial infarction) ([facility]), PAF (paroxysmal atrial fibrillation) ([facility]), Paroxysmal atrial fibrillation ([facility]), and Ventricular tachycardia ([facility]).       Precautions:  Fall Risk, macular degeneration     Assessment of current deficits    [x] Functional mobility  [x]ADLs  [x] Strength               []Cognition    [x] Functional transfers   [x] IADLs         [x] Safety Awareness   [x]Endurance    [] Fine Coordination              [x] Balance      [] Vision/perception   []Sensation     []Gross Motor Coordination  [] ROM  [] Delirium                   [] Motor Control     OT PLAN OF CARE   OT POC based on physician orders, patient diagnosis and results of clinical  fair     Functional Assessment:  AM-PAC Daily Activity Raw Score: 19/24   Initial Eval Status  Date: 5/10/24 Treatment Status  Date: STGs = LTGs  Time frame: 10-14 days   Feeding Independent      Grooming Stand by Assist   Modified [place]    UB Dressing Stand by Assist   Modified [place]    LB Dressing Stand by Assist   Modified [place]    Bathing Stand by Assist  Modified [place]    Toileting Stand by Assist   Modified [place]    Bed Mobility  Supine to sit: Stand by Assist    Sit to supine: NT  Supine to sit: Modified [place]   Sit to supine: Modified [place]    Functional Transfers Stand by Assist   Modified [place]    Functional Mobility Stand by Assist     Ambulated in room with w/w including to/from bathroom   Modified [place]    Balance Sitting:     Static:  sup    Dynamic:SBA  Standing: SBA at w/w     Activity Tolerance F  F+   Visual/  Perceptual Macular degeneration    Grossly wfl                   Hand Dominance right   Strength ROM Additional Info:    RUE   4-/5 wfl good  and wfl FMC/dexterity noted during ADL tasks     LUE 4-/5 wfl good  and wfl FMC/dexterity noted during ADL tasks     Hearing: wfl  Sensation: B LE neuropathy  Tone: wfl  Edema:none noted     Comments: Upon arrival patient supine in bed and agreeable to OT Session.  Therapist educated pt on role of OT. At end of session, patient seated in chair - nursing aware;  with call light and phone within reach, all lines and tubes intact.  Overall patient demonstrated decreased independence and safety during completion of ADL/functional transfer/mobility tasks.  Pt would benefit from continued skilled OT to increase safety and independence with completion of ADL/IADL tasks for functional independence and quality of life.    Treatment: OT treatment provided this date includes: Facilitation of bed mobility, unsupported sitting balance (impacting ADLs; addressing posture, weight shifting, dynamic

## 2024-05-11 ENCOUNTER — APPOINTMENT (OUTPATIENT)
Dept: GENERAL RADIOLOGY | Age: 89
DRG: 812 | End: 2024-05-11
Payer: MEDICARE

## 2024-05-11 LAB
ABO/RH: NORMAL
ALBUMIN SERPL-MCNC: 3.1 G/DL (ref 3.5–5.2)
ALP SERPL-CCNC: 171 U/L (ref 40–129)
ALT SERPL-CCNC: 81 U/L (ref 0–40)
ANION GAP SERPL CALCULATED.3IONS-SCNC: 11 MMOL/L (ref 7–16)
ANTIBODY SCREEN: NEGATIVE
ARM BAND NUMBER: NORMAL
AST SERPL-CCNC: 62 U/L (ref 0–39)
BASOPHILS # BLD: 0 K/UL (ref 0–0.2)
BASOPHILS NFR BLD: 0 % (ref 0–2)
BILIRUB SERPL-MCNC: 0.5 MG/DL (ref 0–1.2)
BLOOD BANK BLOOD PRODUCT EXPIRATION DATE: NORMAL
BLOOD BANK DISPENSE STATUS: NORMAL
BLOOD BANK ISBT PRODUCT BLOOD TYPE: 5100
BLOOD BANK PRODUCT CODE: NORMAL
BLOOD BANK SAMPLE EXPIRATION: NORMAL
BLOOD BANK UNIT TYPE AND RH: NORMAL
BPU ID: NORMAL
BUN SERPL-MCNC: 30 MG/DL (ref 6–23)
CALCIUM SERPL-MCNC: 7.8 MG/DL (ref 8.6–10.2)
CHLORIDE SERPL-SCNC: 101 MMOL/L (ref 98–107)
CO2 SERPL-SCNC: 28 MMOL/L (ref 22–29)
COMPONENT: NORMAL
CREAT SERPL-MCNC: 1.4 MG/DL (ref 0.7–1.2)
CREAT UR-MCNC: 99.2 MG/DL (ref 40–278)
CROSSMATCH RESULT: NORMAL
EOSINOPHIL # BLD: 0.06 K/UL (ref 0.05–0.5)
EOSINOPHILS RELATIVE PERCENT: 1 % (ref 0–6)
ERYTHROCYTE [DISTWIDTH] IN BLOOD BY AUTOMATED COUNT: 18.5 % (ref 11.5–15)
GFR, ESTIMATED: 51 ML/MIN/1.73M2
GLUCOSE SERPL-MCNC: 89 MG/DL (ref 74–99)
HCT VFR BLD AUTO: 25 % (ref 37–54)
HCT VFR BLD AUTO: 26.5 % (ref 37–54)
HCT VFR BLD AUTO: 27.2 % (ref 37–54)
HGB BLD-MCNC: 7.2 G/DL (ref 12.5–16.5)
HGB BLD-MCNC: 7.8 G/DL (ref 12.5–16.5)
HGB BLD-MCNC: 7.8 G/DL (ref 12.5–16.5)
LYMPHOCYTES NFR BLD: 1.06 K/UL (ref 1.5–4)
LYMPHOCYTES RELATIVE PERCENT: 15 % (ref 20–42)
MCH RBC QN AUTO: 25.4 PG (ref 26–35)
MCHC RBC AUTO-ENTMCNC: 28.8 G/DL (ref 32–34.5)
MCV RBC AUTO: 88.3 FL (ref 80–99.9)
METAMYELOCYTES ABSOLUTE COUNT: 0.06 K/UL (ref 0–0.12)
METAMYELOCYTES: 1 % (ref 0–1)
MICROALBUMIN UR-MCNC: 34 MG/L (ref 0–19)
MICROALBUMIN/CREAT UR-RTO: 34 MCG/MG CREAT (ref 0–30)
MONOCYTES NFR BLD: 0.38 K/UL (ref 0.1–0.95)
MONOCYTES NFR BLD: 5 % (ref 2–12)
MYELOCYTES ABSOLUTE COUNT: 0.06 K/UL
MYELOCYTES: 1 %
NEUTROPHILS NFR BLD: 77 % (ref 43–80)
NEUTS SEG NFR BLD: 5.57 K/UL (ref 1.8–7.3)
PLATELET # BLD AUTO: 289 K/UL (ref 130–450)
PMV BLD AUTO: 10.6 FL (ref 7–12)
POTASSIUM SERPL-SCNC: 4.3 MMOL/L (ref 3.5–5)
PROT SERPL-MCNC: 4.9 G/DL (ref 6.4–8.3)
RBC # BLD AUTO: 2.83 M/UL (ref 3.8–5.8)
RBC # BLD: ABNORMAL 10*6/UL
SODIUM SERPL-SCNC: 140 MMOL/L (ref 132–146)
SODIUM UR-SCNC: <20 MMOL/L
TRANSFUSION STATUS: NORMAL
UNIT DIVISION: 0
UNIT ISSUE DATE/TIME: NORMAL
WBC OTHER # BLD: 7.2 K/UL (ref 4.5–11.5)

## 2024-05-11 PROCEDURE — C9113 INJ PANTOPRAZOLE SODIUM, VIA: HCPCS | Performed by: STUDENT IN AN ORGANIZED HEALTH CARE EDUCATION/TRAINING PROGRAM

## 2024-05-11 PROCEDURE — 85025 COMPLETE CBC W/AUTO DIFF WBC: CPT

## 2024-05-11 PROCEDURE — 80053 COMPREHEN METABOLIC PANEL: CPT

## 2024-05-11 PROCEDURE — 2700000000 HC OXYGEN THERAPY PER DAY

## 2024-05-11 PROCEDURE — 2580000003 HC RX 258: Performed by: STUDENT IN AN ORGANIZED HEALTH CARE EDUCATION/TRAINING PROGRAM

## 2024-05-11 PROCEDURE — 71045 X-RAY EXAM CHEST 1 VIEW: CPT

## 2024-05-11 PROCEDURE — 6360000002 HC RX W HCPCS: Performed by: STUDENT IN AN ORGANIZED HEALTH CARE EDUCATION/TRAINING PROGRAM

## 2024-05-11 PROCEDURE — 85018 HEMOGLOBIN: CPT

## 2024-05-11 PROCEDURE — 2580000003 HC RX 258: Performed by: INTERNAL MEDICINE

## 2024-05-11 PROCEDURE — 36415 COLL VENOUS BLD VENIPUNCTURE: CPT

## 2024-05-11 PROCEDURE — 85014 HEMATOCRIT: CPT

## 2024-05-11 PROCEDURE — 2140000000 HC CCU INTERMEDIATE R&B

## 2024-05-11 PROCEDURE — A4216 STERILE WATER/SALINE, 10 ML: HCPCS | Performed by: STUDENT IN AN ORGANIZED HEALTH CARE EDUCATION/TRAINING PROGRAM

## 2024-05-11 PROCEDURE — 6370000000 HC RX 637 (ALT 250 FOR IP)

## 2024-05-11 PROCEDURE — 6370000000 HC RX 637 (ALT 250 FOR IP): Performed by: INTERNAL MEDICINE

## 2024-05-11 PROCEDURE — 99232 SBSQ HOSP IP/OBS MODERATE 35: CPT | Performed by: STUDENT IN AN ORGANIZED HEALTH CARE EDUCATION/TRAINING PROGRAM

## 2024-05-11 PROCEDURE — 6370000000 HC RX 637 (ALT 250 FOR IP): Performed by: STUDENT IN AN ORGANIZED HEALTH CARE EDUCATION/TRAINING PROGRAM

## 2024-05-11 RX ORDER — BUMETANIDE 1 MG/1
1 TABLET ORAL DAILY
Status: DISCONTINUED | OUTPATIENT
Start: 2024-05-11 | End: 2024-05-13

## 2024-05-11 RX ORDER — SENNOSIDES A AND B 8.6 MG/1
1 TABLET, FILM COATED ORAL 2 TIMES DAILY
Status: DISCONTINUED | OUTPATIENT
Start: 2024-05-11 | End: 2024-05-14 | Stop reason: HOSPADM

## 2024-05-11 RX ADMIN — METOPROLOL TARTRATE 25 MG: 25 TABLET, FILM COATED ORAL at 09:21

## 2024-05-11 RX ADMIN — METOPROLOL TARTRATE 25 MG: 25 TABLET, FILM COATED ORAL at 21:40

## 2024-05-11 RX ADMIN — SUCRALFATE 1 G: 1 TABLET ORAL at 21:40

## 2024-05-11 RX ADMIN — DOCUSATE SODIUM 100 MG: 100 CAPSULE, LIQUID FILLED ORAL at 09:21

## 2024-05-11 RX ADMIN — SODIUM CHLORIDE, PRESERVATIVE FREE 10 ML: 5 INJECTION INTRAVENOUS at 09:21

## 2024-05-11 RX ADMIN — SENNOSIDES 8.6 MG: 8.6 TABLET, FILM COATED ORAL at 21:40

## 2024-05-11 RX ADMIN — BUMETANIDE 1 MG: 1 TABLET ORAL at 12:46

## 2024-05-11 RX ADMIN — SODIUM CHLORIDE 125 MG: 9 INJECTION, SOLUTION INTRAVENOUS at 09:38

## 2024-05-11 RX ADMIN — PANTOPRAZOLE SODIUM 40 MG: 40 INJECTION, POWDER, FOR SOLUTION INTRAVENOUS at 21:40

## 2024-05-11 RX ADMIN — SUCRALFATE 1 G: 1 TABLET ORAL at 06:10

## 2024-05-11 RX ADMIN — PANTOPRAZOLE SODIUM 40 MG: 40 INJECTION, POWDER, FOR SOLUTION INTRAVENOUS at 09:22

## 2024-05-11 RX ADMIN — PRAVASTATIN SODIUM 40 MG: 20 TABLET ORAL at 21:39

## 2024-05-11 RX ADMIN — POLYETHYLENE GLYCOL 3350 17 G: 17 POWDER, FOR SOLUTION ORAL at 09:21

## 2024-05-11 RX ADMIN — SODIUM CHLORIDE, PRESERVATIVE FREE 10 ML: 5 INJECTION INTRAVENOUS at 22:14

## 2024-05-11 RX ADMIN — SUCRALFATE 1 G: 1 TABLET ORAL at 12:46

## 2024-05-11 RX ADMIN — DOCUSATE SODIUM 100 MG: 100 CAPSULE, LIQUID FILLED ORAL at 21:40

## 2024-05-11 RX ADMIN — AMIODARONE HYDROCHLORIDE 200 MG: 200 TABLET ORAL at 09:21

## 2024-05-11 ASSESSMENT — PAIN SCALES - GENERAL: PAINLEVEL_OUTOF10: 0

## 2024-05-11 NOTE — PLAN OF CARE

## 2024-05-11 NOTE — PROGRESS NOTES
Hospitalist Progress Note      Chief Complaint: fatigue    SYNOPSIS: Patient admitted on 2024   This is an 88-year-old male with past medical history of atrial fibrillation on Xarelto congestive heart failure, ICD chronic kidney disease,hyperlipidemia and hypertension here due to feeling of fatigue for last 1 week.  He stated he has been suffering from macular degeneration, has problems with vision, he did not see whether he had black/dark stools or bright red blood in stools, complains of constipation.  In ER workup shows hemoglobin 4.8 and 3 unit PRBC transfusion ordered, his OBT positive, iron panel shows iron deficiency IV iron ordered.  Protonix 40 Mg IV twice daily, Xarelto on hold, will continue full liquid diet bland diet, monitor H&H every 8 hourly.  GI, general surgery consulted for scope  Nephrology following for ELISSA on CKD  5/10: EP consulted for recommendations regarding anticoagulation, recommended starting anticoagulation when okay with GI, and switch from Xarelto to Eliquis 2.5 Mg twice daily.  :Aggressive bowel regimen for constipation, hb 7.2, Cr: 1.4     SUBJECTIVE:    Patient seen and examined at bedside, not in acute distress, complains of constipation, continue MiraLAX, Colace, senokot, monitor for BM  Records reviewed.   Stable overnight. No overnight issues reported     Temp (24hrs), Av.5 °F (36.4 °C), Min:97.2 °F (36.2 °C), Max:98.2 °F (36.8 °C)    DIET: ADULT DIET; Full Liquid; [facility] [place] (GERD/Peptic Ulcer)  CODE: Full Code    Intake/Output Summary (Last 24 hours) at 2024 1115  Last data filed at 2024 1058  Gross per 24 hour   Intake 1154.33 ml   Output 1150 ml   Net 4.33 ml       OBJECTIVE:    BP (!) 119/54   Pulse 69   Temp 97.5 °F (36.4 °C) (Temporal)   Resp 18   Ht 1.676 m (5' 6\")   Wt 77.1 kg (170 lb)   SpO2 99%   BMI 27.44 kg/m²     General appearance: No apparent distress, appears stated age and cooperative.   HEENT:  Normocephalic.

## 2024-05-11 NOTE — PROGRESS NOTES
Department of Internal Medicine  Nephrology Progress Note  Events reviewed.     SUBJECTIVE: We are following Mr. Field for ELISSA on CKD. He reports no new complaints today.     PHYSICAL EXAM:      Vitals:    VITALS:  BP (!) 116/58   Pulse 56   Temp 97.2 °F (36.2 °C) (Temporal)   Resp 18   Ht 1.676 m (5' 6\")   Wt 77.1 kg (170 lb)   SpO2 99%   BMI 27.44 kg/m²   24HR INTAKE/OUTPUT:    Intake/Output Summary (Last 24 hours) at 5/11/2024 1153  Last data filed at 5/11/2024 1058  Gross per 24 hour   Intake 1154.33 ml   Output 1150 ml   Net 4.33 ml       Constitutional: Patient is awake, alert, no distress  HEENT: Pupils are equal reactive  Respiratory: Decreased breath sounds at the bases  Cardiovascular/Edema: Heart sounds are regular rate  Gastrointestinal: Abdomen soft  Neurologic: Nonfocal  Skin: No lesion  Other:++ Edema    Scheduled Meds:   senna  1 tablet Oral BID    bumetanide  1 mg Oral Daily    pantoprazole (PROTONIX) 40 mg in sodium chloride (PF) 0.9 % 10 mL injection  40 mg IntraVENous Q12H    ferric gluconate (FERRLECIT) 125 mg in sodium chloride 0.9 % 100 mL IVPB  125 mg IntraVENous Q24H    sucralfate  1 g Oral 3 times per day    polyethylene glycol  17 g Oral Daily    docusate sodium  100 mg Oral BID    amiodarone  200 mg Oral Daily    [Held by provider] amLODIPine  5 mg Oral Daily    [Held by provider] ferrous sulfate  325 mg Oral Daily with breakfast    metoprolol tartrate  25 mg Oral BID    pravastatin  40 mg Oral Nightly    sodium chloride flush  5-40 mL IntraVENous 2 times per day     Continuous Infusions:   sodium chloride      sodium chloride       PRN Meds:.sodium chloride, sodium chloride flush, sodium chloride, ondansetron **OR** ondansetron, acetaminophen **OR** acetaminophen, albuterol      DATA:    CBC:   Lab Results   Component Value Date/Time    WBC 7.2 05/11/2024 05:21 AM    RBC 2.83 05/11/2024 05:21 AM    HGB 7.2 05/11/2024 05:21 AM    HCT 25.0 05/11/2024 05:21 AM    MCV 88.3 05/11/2024

## 2024-05-11 NOTE — PLAN OF CARE
Problem: Chronic Conditions and Co-morbidities  Goal: Patient's chronic conditions and co-morbidity symptoms are monitored and maintained or improved  5/11/2024 1114 by Tanesha Skinner RN  Outcome: Progressing  5/11/2024 0037 by Anny Betancourt RN  Outcome: Progressing     Problem: Discharge Planning  Goal: Discharge to home or other facility with appropriate resources  5/11/2024 1114 by Tanesha Skinner RN  Outcome: Progressing  5/11/2024 0037 by Anny Betancourt RN  Outcome: Progressing     Problem: Skin/Tissue Integrity  Goal: Absence of new skin breakdown  Description: 1.  Monitor for areas of redness and/or skin breakdown  2.  Assess vascular access sites hourly  3.  Every 4-6 hours minimum:  Change oxygen saturation probe site  4.  Every 4-6 hours:  If on nasal continuous positive airway pressure, respiratory therapy assess nares and determine need for appliance change or resting period.  5/11/2024 1114 by Tanesha Skinner RN  Outcome: Progressing  5/11/2024 0037 by Anny Betancourt RN  Outcome: Progressing     Problem: ABCDS Injury Assessment  Goal: Absence of physical injury  5/11/2024 1114 by Tanesha Skinner RN  Outcome: Progressing  Flowsheets (Taken 5/11/2024 1113)  Absence of Physical Injury: Implement safety measures based on patient assessment  5/11/2024 0037 by Anny Betancourt RN  Outcome: Progressing     Problem: Safety - Adult  Goal: Free from fall injury  5/11/2024 1114 by Tanesha Skinner RN  Outcome: Progressing  Flowsheets (Taken 5/11/2024 1113)  Free From Fall Injury: Instruct family/caregiver on patient safety  5/11/2024 0037 by Anny Betancourt RN  Outcome: Progressing     Problem: Pain  Goal: Verbalizes/displays adequate comfort level or baseline comfort level  5/11/2024 1114 by Tanesha Skinner RN  Outcome: Progressing  5/11/2024 0037 by Anny Betancourt RN  Outcome: Progressing

## 2024-05-12 LAB
ANION GAP SERPL CALCULATED.3IONS-SCNC: 12 MMOL/L (ref 7–16)
BASOPHILS # BLD: 0.03 K/UL (ref 0–0.2)
BASOPHILS NFR BLD: 0 % (ref 0–2)
BUN SERPL-MCNC: 19 MG/DL (ref 6–23)
CALCIUM SERPL-MCNC: 7.9 MG/DL (ref 8.6–10.2)
CHLORIDE SERPL-SCNC: 101 MMOL/L (ref 98–107)
CO2 SERPL-SCNC: 28 MMOL/L (ref 22–29)
CREAT SERPL-MCNC: 1.2 MG/DL (ref 0.7–1.2)
EOSINOPHIL # BLD: 0.15 K/UL (ref 0.05–0.5)
EOSINOPHILS RELATIVE PERCENT: 2 % (ref 0–6)
ERYTHROCYTE [DISTWIDTH] IN BLOOD BY AUTOMATED COUNT: 18.2 % (ref 11.5–15)
GFR, ESTIMATED: 57 ML/MIN/1.73M2
GLUCOSE SERPL-MCNC: 93 MG/DL (ref 74–99)
HCT VFR BLD AUTO: 26.8 % (ref 37–54)
HCT VFR BLD AUTO: 27.1 % (ref 37–54)
HCT VFR BLD AUTO: 27.9 % (ref 37–54)
HGB BLD-MCNC: 7.7 G/DL (ref 12.5–16.5)
HGB BLD-MCNC: 7.8 G/DL (ref 12.5–16.5)
HGB BLD-MCNC: 8 G/DL (ref 12.5–16.5)
IMM GRANULOCYTES # BLD AUTO: <0.03 K/UL (ref 0–0.58)
IMM GRANULOCYTES NFR BLD: 0 % (ref 0–5)
LYMPHOCYTES NFR BLD: 0.63 K/UL (ref 1.5–4)
LYMPHOCYTES RELATIVE PERCENT: 8 % (ref 20–42)
MCH RBC QN AUTO: 25.8 PG (ref 26–35)
MCHC RBC AUTO-ENTMCNC: 28.7 G/DL (ref 32–34.5)
MCV RBC AUTO: 89.9 FL (ref 80–99.9)
MONOCYTES NFR BLD: 0.72 K/UL (ref 0.1–0.95)
MONOCYTES NFR BLD: 9 % (ref 2–12)
NEUTROPHILS NFR BLD: 81 % (ref 43–80)
NEUTS SEG NFR BLD: 6.54 K/UL (ref 1.8–7.3)
PLATELET # BLD AUTO: 290 K/UL (ref 130–450)
PMV BLD AUTO: 10.4 FL (ref 7–12)
POTASSIUM SERPL-SCNC: 3.6 MMOL/L (ref 3.5–5)
RBC # BLD AUTO: 2.98 M/UL (ref 3.8–5.8)
RBC # BLD: ABNORMAL 10*6/UL
SODIUM SERPL-SCNC: 141 MMOL/L (ref 132–146)
WBC OTHER # BLD: 8.1 K/UL (ref 4.5–11.5)

## 2024-05-12 PROCEDURE — 6370000000 HC RX 637 (ALT 250 FOR IP)

## 2024-05-12 PROCEDURE — 80048 BASIC METABOLIC PNL TOTAL CA: CPT

## 2024-05-12 PROCEDURE — 2580000003 HC RX 258: Performed by: INTERNAL MEDICINE

## 2024-05-12 PROCEDURE — 2580000003 HC RX 258: Performed by: STUDENT IN AN ORGANIZED HEALTH CARE EDUCATION/TRAINING PROGRAM

## 2024-05-12 PROCEDURE — 85018 HEMOGLOBIN: CPT

## 2024-05-12 PROCEDURE — 36415 COLL VENOUS BLD VENIPUNCTURE: CPT

## 2024-05-12 PROCEDURE — 85014 HEMATOCRIT: CPT

## 2024-05-12 PROCEDURE — 2700000000 HC OXYGEN THERAPY PER DAY

## 2024-05-12 PROCEDURE — 2140000000 HC CCU INTERMEDIATE R&B

## 2024-05-12 PROCEDURE — A4216 STERILE WATER/SALINE, 10 ML: HCPCS | Performed by: STUDENT IN AN ORGANIZED HEALTH CARE EDUCATION/TRAINING PROGRAM

## 2024-05-12 PROCEDURE — 99232 SBSQ HOSP IP/OBS MODERATE 35: CPT | Performed by: STUDENT IN AN ORGANIZED HEALTH CARE EDUCATION/TRAINING PROGRAM

## 2024-05-12 PROCEDURE — 6360000002 HC RX W HCPCS: Performed by: STUDENT IN AN ORGANIZED HEALTH CARE EDUCATION/TRAINING PROGRAM

## 2024-05-12 PROCEDURE — C9113 INJ PANTOPRAZOLE SODIUM, VIA: HCPCS | Performed by: STUDENT IN AN ORGANIZED HEALTH CARE EDUCATION/TRAINING PROGRAM

## 2024-05-12 PROCEDURE — 6370000000 HC RX 637 (ALT 250 FOR IP): Performed by: STUDENT IN AN ORGANIZED HEALTH CARE EDUCATION/TRAINING PROGRAM

## 2024-05-12 PROCEDURE — 6370000000 HC RX 637 (ALT 250 FOR IP): Performed by: INTERNAL MEDICINE

## 2024-05-12 PROCEDURE — 85025 COMPLETE CBC W/AUTO DIFF WBC: CPT

## 2024-05-12 RX ADMIN — DOCUSATE SODIUM 100 MG: 100 CAPSULE, LIQUID FILLED ORAL at 08:56

## 2024-05-12 RX ADMIN — SUCRALFATE 1 G: 1 TABLET ORAL at 06:02

## 2024-05-12 RX ADMIN — SENNOSIDES 8.6 MG: 8.6 TABLET, FILM COATED ORAL at 08:56

## 2024-05-12 RX ADMIN — AMIODARONE HYDROCHLORIDE 200 MG: 200 TABLET ORAL at 08:56

## 2024-05-12 RX ADMIN — SODIUM CHLORIDE 125 MG: 9 INJECTION, SOLUTION INTRAVENOUS at 08:57

## 2024-05-12 RX ADMIN — METOPROLOL TARTRATE 25 MG: 25 TABLET, FILM COATED ORAL at 08:56

## 2024-05-12 RX ADMIN — METOPROLOL TARTRATE 25 MG: 25 TABLET, FILM COATED ORAL at 20:13

## 2024-05-12 RX ADMIN — SUCRALFATE 1 G: 1 TABLET ORAL at 20:13

## 2024-05-12 RX ADMIN — SUCRALFATE 1 G: 1 TABLET ORAL at 13:53

## 2024-05-12 RX ADMIN — SENNOSIDES 8.6 MG: 8.6 TABLET, FILM COATED ORAL at 20:13

## 2024-05-12 RX ADMIN — BUMETANIDE 1 MG: 1 TABLET ORAL at 08:56

## 2024-05-12 RX ADMIN — DOCUSATE SODIUM 100 MG: 100 CAPSULE, LIQUID FILLED ORAL at 20:13

## 2024-05-12 RX ADMIN — POLYETHYLENE GLYCOL 3350 17 G: 17 POWDER, FOR SOLUTION ORAL at 08:56

## 2024-05-12 RX ADMIN — SODIUM CHLORIDE, PRESERVATIVE FREE 10 ML: 5 INJECTION INTRAVENOUS at 20:14

## 2024-05-12 RX ADMIN — PANTOPRAZOLE SODIUM 40 MG: 40 INJECTION, POWDER, FOR SOLUTION INTRAVENOUS at 20:14

## 2024-05-12 RX ADMIN — PANTOPRAZOLE SODIUM 40 MG: 40 INJECTION, POWDER, FOR SOLUTION INTRAVENOUS at 08:56

## 2024-05-12 RX ADMIN — PRAVASTATIN SODIUM 40 MG: 20 TABLET ORAL at 20:13

## 2024-05-12 NOTE — PLAN OF CARE
Problem: Chronic Conditions and Co-morbidities  Goal: Patient's chronic conditions and co-morbidity symptoms are monitored and maintained or improved  5/12/2024 0750 by Shania Cooper RN  Outcome: Progressing     Problem: Discharge Planning  Goal: Discharge to home or other facility with appropriate resources  5/12/2024 0750 by Shania Cooper, RN  Outcome: Progressing     Problem: Skin/Tissue Integrity  Goal: Absence of new skin breakdown  Description: 1.  Monitor for areas of redness and/or skin breakdown  2.  Assess vascular access sites hourly  3.  Every 4-6 hours minimum:  Change oxygen saturation probe site  4.  Every 4-6 hours:  If on nasal continuous positive airway pressure, respiratory therapy assess nares and determine need for appliance change or resting period.  5/12/2024 0750 by Shania Cooper RN  Outcome: Progressing     Problem: ABCDS Injury Assessment  Goal: Absence of physical injury  5/12/2024 0750 by Shania Cooper RN  Outcome: Progressing     Problem: Safety - Adult  Goal: Free from fall injury  5/12/2024 0750 by Shania Cooper, RN  Outcome: Progressing     Problem: Pain  Goal: Verbalizes/displays adequate comfort level or baseline comfort level  5/12/2024 0750 by Shania Cooper RN  Outcome: Progressing

## 2024-05-12 NOTE — PROGRESS NOTES
Hospitalist Progress Note      SYNOPSIS: Patient admitted on 2024 for Anemia  This is an 88-year-old male with past medical history of atrial fibrillation on Xarelto congestive heart failure, ICD chronic kidney disease,hyperlipidemia and hypertension here due to feeling of fatigue for last 1 week.  He stated he has been suffering from macular degeneration, has problems with vision, he did not see whether he had black/dark stools or bright red blood in stools, complains of constipation.  In ER workup shows hemoglobin 4.8 and 3 unit PRBC transfusion ordered, his OBT positive, iron panel shows iron deficiency IV iron ordered.  Protonix 40 Mg IV twice daily, Xarelto on hold, will continue full liquid diet bland diet, monitor H&H every 8 hourly.  GI, general surgery consulted for scope  Nephrology following for ELISSA on CKD  5/10: EP consulted for recommendations regarding anticoagulation, recommended starting anticoagulation when okay with GI, and switch from Xarelto to Eliquis 2.5 Mg twice daily.  :Aggressive bowel regimen for constipation, hb 7.2, Cr: 1.4    hemoglobin 7.8 patient did have bowel movement    SUBJECTIVE:  Stable overnight. No other overnight issues reported.   Patient seen and examined at bedside today a.m. patient had bowel movement today   Records reviewed.         Temp (24hrs), Av.3 °F (36.3 °C), Min:96.8 °F (36 °C), Max:98.2 °F (36.8 °C)    DIET: ADULT DIET; Full Liquid; [facility] [place] (GERD/Peptic Ulcer)  CODE: Full Code    Intake/Output Summary (Last 24 hours) at 2024 0957  Last data filed at 2024 2144  Gross per 24 hour   Intake 340 ml   Output 1100 ml   Net -760 ml       Review of Systems  All bolded are positive; please see HPI  General:  Fever, chills, diaphoresis, fatigue, malaise, night sweats, weight loss  Psychological:  Anxiety, disorientation, hallucinations.  ENT:  Epistaxis, headaches, vertigo, visual changes.  Cardiovascular:  Chest pain, irregular heartbeats,  palpitations, paroxysmal nocturnal dyspnea.  Respiratory:  Shortness of breath, coughing, sputum production, hemoptysis, wheezing, orthopnea.  Gastrointestinal:  Nausea, vomiting, diarrhea, heartburn, constipation, abdominal pain, hematemesis, hematochezia, melena, acholic stools  Genito-Urinary:  Dysuria, urgency, frequency, hematuria  Musculoskeletal:  Joint pain, joint stiffness, joint swelling, muscle pain  Neurology:  Headache, focal neurological deficits, weakness, numbness, paresthesia  Derm:  Rashes, ulcers, excoriations, bruising  Extremities:  Decreased ROM, peripheral edema, mottling      OBJECTIVE:    /64   Pulse 62   Temp 96.8 °F (36 °C) (Temporal)   Resp 18   Ht 1.676 m (5' 6\")   Wt 77.1 kg (170 lb)   SpO2 96%   BMI 27.44 kg/m²     General appearance:  awake, alert, and oriented to person, place, time, and purpose; appears stated age and cooperative; no apparent distress no labored breathing  HEENT:  Conjunctivae/corneas clear.   Neck: Supple. No jugular venous distention.   Respiratory: symmetrical; clear to auscultation bilaterally; no wheezes; no rhonchi; no rales  Cardiovascular: rhythm regular; rate controlled; no murmurs  Abdomen: Soft, nontender, nondistended  Extremities:  peripheral pulses present; no peripheral edema; no ulcers  Musculoskeletal: No clubbing, cyanosis, no bilateral lower extremity edema. Brisk capillary refill.   Skin:  No rashes  on visible skin  Neurologic: awake, alert and following commands     ASSESSMENT and PLAN:  Acute anemia likely secondary to GI bleeding: FOBT positive, Protonix 40 Mg twice daily started, full liquid diet/bland diet advance diet as tolerated, Carafate 1 g every 8 hourly,  Monitor H&H every 8 hourly, transfuse if hemoglobin less than 7, received 3 units PRBC, holding oral anticoagulation, GI on board, awaiting EGD likely tomorrow a.m., keeping patient n.p.o. after midnight     Iron deficiency anemia: Iron panel suggestive of low iron

## 2024-05-12 NOTE — PROGRESS NOTES
Department of Internal Medicine  Nephrology Progress Note  Events reviewed.     SUBJECTIVE: We are following Mr. Field for ELISSA on CKD. He reports no new complaints today.     PHYSICAL EXAM:      Vitals:    VITALS:  /64   Pulse 62   Temp 96.8 °F (36 °C) (Temporal)   Resp 18   Ht 1.676 m (5' 6\")   Wt 77.1 kg (170 lb)   SpO2 96%   BMI 27.44 kg/m²   24HR INTAKE/OUTPUT:    Intake/Output Summary (Last 24 hours) at 5/12/2024 1054  Last data filed at 5/11/2024 2144  Gross per 24 hour   Intake 340 ml   Output 1100 ml   Net -760 ml         Constitutional: Patient is awake, alert, no distress  HEENT: Pupils are equal reactive  Respiratory: Decreased breath sounds at the bases  Cardiovascular/Edema: Heart sounds are regular rate  Gastrointestinal: Abdomen soft  Neurologic: Nonfocal  Skin: No lesion  Other:+ Edema    Scheduled Meds:   senna  1 tablet Oral BID    bumetanide  1 mg Oral Daily    pantoprazole (PROTONIX) 40 mg in sodium chloride (PF) 0.9 % 10 mL injection  40 mg IntraVENous Q12H    sucralfate  1 g Oral 3 times per day    polyethylene glycol  17 g Oral Daily    docusate sodium  100 mg Oral BID    amiodarone  200 mg Oral Daily    [Held by provider] amLODIPine  5 mg Oral Daily    [Held by provider] ferrous sulfate  325 mg Oral Daily with breakfast    metoprolol tartrate  25 mg Oral BID    pravastatin  40 mg Oral Nightly    sodium chloride flush  5-40 mL IntraVENous 2 times per day     Continuous Infusions:   sodium chloride      sodium chloride       PRN Meds:.sodium chloride, sodium chloride flush, sodium chloride, ondansetron **OR** ondansetron, acetaminophen **OR** acetaminophen, albuterol      DATA:    CBC:   Lab Results   Component Value Date/Time    WBC 8.1 05/12/2024 05:10 AM    RBC 2.98 05/12/2024 05:10 AM    HGB 7.8 05/12/2024 07:40 AM    HCT 27.1 05/12/2024 07:40 AM    MCV 89.9 05/12/2024 05:10 AM    MCH 25.8 05/12/2024 05:10 AM    MCHC 28.7 05/12/2024 05:10 AM    RDW 18.2 05/12/2024 05:10 AM      05/12/2024 05:10 AM    MPV 10.4 05/12/2024 05:10 AM     CMP:    Lab Results   Component Value Date/Time     05/12/2024 05:10 AM    K 3.6 05/12/2024 05:10 AM    K 3.8 08/23/2022 08:56 AM     05/12/2024 05:10 AM    CO2 28 05/12/2024 05:10 AM    BUN 19 05/12/2024 05:10 AM    CREATININE 1.2 05/12/2024 05:10 AM    GFRAA >60 10/13/2022 11:57 AM    LABGLOM 57 05/12/2024 05:10 AM    LABGLOM 51 02/12/2024 10:18 AM    GLUCOSE 93 05/12/2024 05:10 AM    CALCIUM 7.9 05/12/2024 05:10 AM    BILITOT 0.5 05/11/2024 05:21 AM    ALKPHOS 171 05/11/2024 05:21 AM    AST 62 05/11/2024 05:21 AM    ALT 81 05/11/2024 05:21 AM     Magnesium:    Lab Results   Component Value Date/Time    MG 2.8 05/09/2024 02:15 PM     Phosphorus:    Lab Results   Component Value Date/Time    PHOS 3.7 06/28/2022 06:29 AM     Radiology Review:      XR CHEST PORTABLE 5/9/24    IMPRESSION:  No acute process.    IMPRESSION/RECOMMENDATIONS:      Briefly Mr. Field is a 88-year-old man with history of HTN, CAD status post CABG in 2003 with redo CABG in 2007, HFpEF 55-60%, status post ICD placement, status post TAVR, PAF status post DCCV, hyperlipidemia, status post esophageal dilatation, GI bleeding 2/2 large pyloric ulcer, macular degeneration, vertigo, TIA, who was admitted on May 9, 2024 after he presented to the ER because of weakness and fatigue, after evaluation in the ER he was found to have a hemoglobin level of 4.8 g/dL for which he received PRBC's transfusions x 3.  He has well was found to have an elevated creatinine of 1.9 mg/dL, reason for this consultation.  His baseline creatinine is between 1.2-1.4 mg/dL.  Patient reported that for the last 2 weeks he has been taking Motrin for neck pain.  He reports some nocturia 1-3 times a night.  His other medications prior to admission included rivaroxaban, Bumex 2 mg daily.     ELISSA stage I on CKD, probably hemodynamically mediated due to intravascular volume depletion-severe anemia, probably

## 2024-05-12 NOTE — PLAN OF CARE
Problem: Chronic Conditions and Co-morbidities  Goal: Patient's chronic conditions and co-morbidity symptoms are monitored and maintained or improved  5/11/2024 2238 by Anny Betancourt RN  Outcome: Progressing  5/11/2024 1114 by Tanesha Skinner RN  Outcome: Progressing     Problem: Discharge Planning  Goal: Discharge to home or other facility with appropriate resources  5/11/2024 2238 by Anny Betancourt RN  Outcome: Progressing  5/11/2024 1114 by Tanesha Skinner RN  Outcome: Progressing     Problem: Skin/Tissue Integrity  Goal: Absence of new skin breakdown  Description: 1.  Monitor for areas of redness and/or skin breakdown  2.  Assess vascular access sites hourly  3.  Every 4-6 hours minimum:  Change oxygen saturation probe site  4.  Every 4-6 hours:  If on nasal continuous positive airway pressure, respiratory therapy assess nares and determine need for appliance change or resting period.  5/11/2024 2238 by Anny Betancourt RN  Outcome: Progressing  5/11/2024 1114 by Tanesha Skinner RN  Outcome: Progressing     Problem: ABCDS Injury Assessment  Goal: Absence of physical injury  5/11/2024 2238 by Anny Betancourt RN  Outcome: Progressing  5/11/2024 1114 by Tanesha Skinner RN  Outcome: Progressing  Flowsheets (Taken 5/11/2024 1113)  Absence of Physical Injury: Implement safety measures based on patient assessment     Problem: Safety - Adult  Goal: Free from fall injury  5/11/2024 2238 by Anny Betancourt RN  Outcome: Progressing  5/11/2024 1114 by Tanesha Skinner RN  Outcome: Progressing  Flowsheets (Taken 5/11/2024 1113)  Free From Fall Injury: Instruct family/caregiver on patient safety     Problem: Pain  Goal: Verbalizes/displays adequate comfort level or baseline comfort level  5/11/2024 2238 by Anny Betancourt RN  Outcome: Progressing  5/11/2024 1114 by Tanesha Skinner RN  Outcome: Progressing

## 2024-05-13 PROBLEM — D64.9 ANEMIA REQUIRING TRANSFUSIONS: Status: ACTIVE | Noted: 2024-05-09

## 2024-05-13 LAB
ANION GAP SERPL CALCULATED.3IONS-SCNC: 13 MMOL/L (ref 7–16)
BASOPHILS # BLD: 0 K/UL (ref 0–0.2)
BASOPHILS NFR BLD: 0 % (ref 0–2)
BUN SERPL-MCNC: 16 MG/DL (ref 6–23)
CALCIUM SERPL-MCNC: 8.2 MG/DL (ref 8.6–10.2)
CHLORIDE SERPL-SCNC: 102 MMOL/L (ref 98–107)
CO2 SERPL-SCNC: 27 MMOL/L (ref 22–29)
CREAT SERPL-MCNC: 1.2 MG/DL (ref 0.7–1.2)
EOSINOPHIL # BLD: 0 K/UL (ref 0.05–0.5)
EOSINOPHILS RELATIVE PERCENT: 0 % (ref 0–6)
ERYTHROCYTE [DISTWIDTH] IN BLOOD BY AUTOMATED COUNT: 18.9 % (ref 11.5–15)
GFR, ESTIMATED: 61 ML/MIN/1.73M2
GLUCOSE SERPL-MCNC: 90 MG/DL (ref 74–99)
HCT VFR BLD AUTO: 27.3 % (ref 37–54)
HCT VFR BLD AUTO: 27.5 % (ref 37–54)
HCT VFR BLD AUTO: 31.8 % (ref 37–54)
HGB BLD-MCNC: 7.8 G/DL (ref 12.5–16.5)
HGB BLD-MCNC: 7.8 G/DL (ref 12.5–16.5)
HGB BLD-MCNC: 8.9 G/DL (ref 12.5–16.5)
LYMPHOCYTES NFR BLD: 0.2 K/UL (ref 1.5–4)
LYMPHOCYTES RELATIVE PERCENT: 3 % (ref 20–42)
MCH RBC QN AUTO: 26.1 PG (ref 26–35)
MCHC RBC AUTO-ENTMCNC: 28.6 G/DL (ref 32–34.5)
MCV RBC AUTO: 91.3 FL (ref 80–99.9)
MONOCYTES NFR BLD: 0.75 K/UL (ref 0.1–0.95)
MONOCYTES NFR BLD: 10 % (ref 2–12)
NEUTROPHILS NFR BLD: 88 % (ref 43–80)
NEUTS SEG NFR BLD: 6.85 K/UL (ref 1.8–7.3)
PLATELET # BLD AUTO: 288 K/UL (ref 130–450)
PMV BLD AUTO: 10.5 FL (ref 7–12)
POTASSIUM SERPL-SCNC: 3.4 MMOL/L (ref 3.5–5)
RBC # BLD AUTO: 2.99 M/UL (ref 3.8–5.8)
RBC # BLD: ABNORMAL 10*6/UL
SODIUM SERPL-SCNC: 142 MMOL/L (ref 132–146)
WBC OTHER # BLD: 7.8 K/UL (ref 4.5–11.5)

## 2024-05-13 PROCEDURE — 99232 SBSQ HOSP IP/OBS MODERATE 35: CPT | Performed by: STUDENT IN AN ORGANIZED HEALTH CARE EDUCATION/TRAINING PROGRAM

## 2024-05-13 PROCEDURE — 2140000000 HC CCU INTERMEDIATE R&B

## 2024-05-13 PROCEDURE — 80048 BASIC METABOLIC PNL TOTAL CA: CPT

## 2024-05-13 PROCEDURE — A4216 STERILE WATER/SALINE, 10 ML: HCPCS | Performed by: STUDENT IN AN ORGANIZED HEALTH CARE EDUCATION/TRAINING PROGRAM

## 2024-05-13 PROCEDURE — 85018 HEMOGLOBIN: CPT

## 2024-05-13 PROCEDURE — 6370000000 HC RX 637 (ALT 250 FOR IP)

## 2024-05-13 PROCEDURE — 2580000003 HC RX 258: Performed by: INTERNAL MEDICINE

## 2024-05-13 PROCEDURE — 85025 COMPLETE CBC W/AUTO DIFF WBC: CPT

## 2024-05-13 PROCEDURE — 2700000000 HC OXYGEN THERAPY PER DAY

## 2024-05-13 PROCEDURE — 6370000000 HC RX 637 (ALT 250 FOR IP): Performed by: INTERNAL MEDICINE

## 2024-05-13 PROCEDURE — C9113 INJ PANTOPRAZOLE SODIUM, VIA: HCPCS | Performed by: STUDENT IN AN ORGANIZED HEALTH CARE EDUCATION/TRAINING PROGRAM

## 2024-05-13 PROCEDURE — 36415 COLL VENOUS BLD VENIPUNCTURE: CPT

## 2024-05-13 PROCEDURE — 6360000002 HC RX W HCPCS: Performed by: STUDENT IN AN ORGANIZED HEALTH CARE EDUCATION/TRAINING PROGRAM

## 2024-05-13 PROCEDURE — 2580000003 HC RX 258: Performed by: STUDENT IN AN ORGANIZED HEALTH CARE EDUCATION/TRAINING PROGRAM

## 2024-05-13 PROCEDURE — 6370000000 HC RX 637 (ALT 250 FOR IP): Performed by: STUDENT IN AN ORGANIZED HEALTH CARE EDUCATION/TRAINING PROGRAM

## 2024-05-13 PROCEDURE — 85014 HEMATOCRIT: CPT

## 2024-05-13 RX ORDER — BUMETANIDE 1 MG/1
2 TABLET ORAL DAILY
Status: DISCONTINUED | OUTPATIENT
Start: 2024-05-14 | End: 2024-05-14 | Stop reason: HOSPADM

## 2024-05-13 RX ORDER — POTASSIUM CHLORIDE 20 MEQ/1
40 TABLET, EXTENDED RELEASE ORAL ONCE
Status: COMPLETED | OUTPATIENT
Start: 2024-05-13 | End: 2024-05-13

## 2024-05-13 RX ADMIN — PANTOPRAZOLE SODIUM 40 MG: 40 INJECTION, POWDER, FOR SOLUTION INTRAVENOUS at 20:58

## 2024-05-13 RX ADMIN — BUMETANIDE 1 MG: 1 TABLET ORAL at 10:02

## 2024-05-13 RX ADMIN — SODIUM CHLORIDE, PRESERVATIVE FREE 10 ML: 5 INJECTION INTRAVENOUS at 10:02

## 2024-05-13 RX ADMIN — DOCUSATE SODIUM 100 MG: 100 CAPSULE, LIQUID FILLED ORAL at 20:59

## 2024-05-13 RX ADMIN — SENNOSIDES 8.6 MG: 8.6 TABLET, FILM COATED ORAL at 20:59

## 2024-05-13 RX ADMIN — POTASSIUM CHLORIDE 40 MEQ: 1500 TABLET, EXTENDED RELEASE ORAL at 10:08

## 2024-05-13 RX ADMIN — METOPROLOL TARTRATE 25 MG: 25 TABLET, FILM COATED ORAL at 21:06

## 2024-05-13 RX ADMIN — PRAVASTATIN SODIUM 40 MG: 20 TABLET ORAL at 20:59

## 2024-05-13 RX ADMIN — SODIUM CHLORIDE, PRESERVATIVE FREE 10 ML: 5 INJECTION INTRAVENOUS at 20:59

## 2024-05-13 RX ADMIN — METOPROLOL TARTRATE 25 MG: 25 TABLET, FILM COATED ORAL at 10:01

## 2024-05-13 RX ADMIN — DOCUSATE SODIUM 100 MG: 100 CAPSULE, LIQUID FILLED ORAL at 10:02

## 2024-05-13 RX ADMIN — SUCRALFATE 1 G: 1 TABLET ORAL at 16:44

## 2024-05-13 RX ADMIN — SUCRALFATE 1 G: 1 TABLET ORAL at 20:59

## 2024-05-13 RX ADMIN — SENNOSIDES 8.6 MG: 8.6 TABLET, FILM COATED ORAL at 10:02

## 2024-05-13 RX ADMIN — POLYETHYLENE GLYCOL 3350 17 G: 17 POWDER, FOR SOLUTION ORAL at 10:01

## 2024-05-13 RX ADMIN — AMIODARONE HYDROCHLORIDE 200 MG: 200 TABLET ORAL at 10:02

## 2024-05-13 RX ADMIN — PANTOPRAZOLE SODIUM 40 MG: 40 INJECTION, POWDER, FOR SOLUTION INTRAVENOUS at 10:01

## 2024-05-13 NOTE — PROGRESS NOTES
Hospitalist Progress Note      Chief Complaint: fatigue    SYNOPSIS: Patient admitted on 2024   This is an 88-year-old male with past medical history of atrial fibrillation on Xarelto congestive heart failure, ICD chronic kidney disease,hyperlipidemia and hypertension here due to feeling of fatigue for last 1 week.  He stated he has been suffering from macular degeneration, has problems with vision, he did not see whether he had black/dark stools or bright red blood in stools, complains of constipation.  In ER workup shows hemoglobin 4.8 and 3 unit PRBC transfusion ordered, his OBT positive, iron panel shows iron deficiency IV iron ordered.  Protonix 40 Mg IV twice daily, Xarelto on hold, will continue full liquid diet bland diet, monitor H&H every 8 hourly.  GI, general surgery consulted for scope  Nephrology following for ELISSA on CKD  5/10: EP consulted for recommendations regarding anticoagulation, recommended starting anticoagulation when okay with GI, and switch from Xarelto to Eliquis 2.5 Mg twice daily.  :Aggressive bowel regimen for constipation, hb 7.2, Cr: 1.4   : Had BM, Creatinine improved  : Awaiting GI plan for scope    SUBJECTIVE:    Patient seen and examined at bedside, not in acute distress, Constipation resolved, Records reviewed.   Stable overnight. No overnight issues reported     Temp (24hrs), Av °F (36.7 °C), Min:97.4 °F (36.3 °C), Max:98.4 °F (36.9 °C)    DIET: ADULT DIET; Clear Liquid  CODE: Full Code    Intake/Output Summary (Last 24 hours) at 2024 1333  Last data filed at 2024 1331  Gross per 24 hour   Intake 0 ml   Output 1550 ml   Net -1550 ml       OBJECTIVE:    BP (!) 154/71   Pulse 66   Temp 98.4 °F (36.9 °C) (Temporal)   Resp 16   Ht 1.676 m (5' 6\")   Wt 77.1 kg (170 lb)   SpO2 96%   BMI 27.44 kg/m²     General appearance: No apparent distress, appears stated age and cooperative.   HEENT:  Normocephalic. Conjunctivae/corneas clear. Moist  care plan.    DISPOSITION: Discharge plan is home when medically stable    Medications:  REVIEWED DAILY    Infusion Medications    sodium chloride      sodium chloride       Scheduled Medications    [START ON 5/14/2024] bumetanide  2 mg Oral Daily    senna  1 tablet Oral BID    pantoprazole (PROTONIX) 40 mg in sodium chloride (PF) 0.9 % 10 mL injection  40 mg IntraVENous Q12H    sucralfate  1 g Oral 3 times per day    polyethylene glycol  17 g Oral Daily    docusate sodium  100 mg Oral BID    amiodarone  200 mg Oral Daily    amLODIPine  5 mg Oral Daily    [Held by provider] ferrous sulfate  325 mg Oral Daily with breakfast    metoprolol tartrate  25 mg Oral BID    pravastatin  40 mg Oral Nightly    sodium chloride flush  5-40 mL IntraVENous 2 times per day     PRN Meds: sodium chloride, sodium chloride flush, sodium chloride, ondansetron **OR** ondansetron, acetaminophen **OR** acetaminophen, albuterol    Labs:     Recent Labs     05/11/24  0521 05/11/24  1520 05/12/24  0510 05/12/24  0740 05/12/24  1530 05/12/24  2353 05/13/24  0546   WBC 7.2  --  8.1  --   --   --  7.8   HGB 7.2*   < > 7.7*   < > 8.0* 7.8* 7.8*   HCT 25.0*   < > 26.8*   < > 27.9* 27.5* 27.3*     --  290  --   --   --  288    < > = values in this interval not displayed.       Recent Labs     05/11/24  0521 05/12/24  0510 05/13/24  0546    141 142   K 4.3 3.6 3.4*    101 102   CO2 28 28 27   BUN 30* 19 16   CREATININE 1.4* 1.2 1.2   CALCIUM 7.8* 7.9* 8.2*       Recent Labs     05/11/24  0521   ALKPHOS 171*   ALT 81*   AST 62*   BILITOT 0.5       No results for input(s): \"INR\" in the last 72 hours.    No results for input(s): \"CKTOTAL\", \"TROPONINI\" in the last 72 hours.    Chronic labs:    Lab Results   Component Value Date    CHOL 142 06/28/2022    TRIG 191 (H) 06/28/2022    HDL 39 06/28/2022    TSH 2.52 09/12/2023    INR 1.5 06/15/2022    LABA1C 5.5 06/28/2022       Radiology: REVIEWED

## 2024-05-13 NOTE — PROGRESS NOTES
Message Dr. Miguel regarding if pt is getting an EGD today.     Per Dr. Miguel, pt is not EGD today, ok for clear liquid diet    Kayla Ortiz RN

## 2024-05-13 NOTE — PROGRESS NOTES
Department of Internal Medicine  Nephrology Progress Note  Events reviewed.     SUBJECTIVE: We are following Mr. Field for ELISSA on CKD. He reports no new complaints today.     PHYSICAL EXAM:      Vitals:    VITALS:  BP (!) 128/59   Pulse 68   Temp 97.4 °F (36.3 °C) (Temporal)   Resp 20   Ht 1.676 m (5' 6\")   Wt 77.1 kg (170 lb)   SpO2 95%   BMI 27.44 kg/m²   24HR INTAKE/OUTPUT:    Intake/Output Summary (Last 24 hours) at 5/13/2024 0850  Last data filed at 5/13/2024 0255  Gross per 24 hour   Intake 0 ml   Output 700 ml   Net -700 ml         Constitutional: Patient is awake, alert, no distress  HEENT: Pupils are equal reactive  Respiratory: Decreased breath sounds at the bases  Cardiovascular/Edema: Heart sounds are regular rate  Gastrointestinal: Abdomen soft  Neurologic: Nonfocal  Skin: No lesion  Other:+ Edema    Scheduled Meds:   senna  1 tablet Oral BID    bumetanide  1 mg Oral Daily    pantoprazole (PROTONIX) 40 mg in sodium chloride (PF) 0.9 % 10 mL injection  40 mg IntraVENous Q12H    sucralfate  1 g Oral 3 times per day    polyethylene glycol  17 g Oral Daily    docusate sodium  100 mg Oral BID    amiodarone  200 mg Oral Daily    [Held by provider] amLODIPine  5 mg Oral Daily    [Held by provider] ferrous sulfate  325 mg Oral Daily with breakfast    metoprolol tartrate  25 mg Oral BID    pravastatin  40 mg Oral Nightly    sodium chloride flush  5-40 mL IntraVENous 2 times per day     Continuous Infusions:   sodium chloride      sodium chloride       PRN Meds:.sodium chloride, sodium chloride flush, sodium chloride, ondansetron **OR** ondansetron, acetaminophen **OR** acetaminophen, albuterol      DATA:    CBC:   Lab Results   Component Value Date/Time    WBC 7.8 05/13/2024 05:46 AM    RBC 2.99 05/13/2024 05:46 AM    HGB 7.8 05/13/2024 05:46 AM    HCT 27.3 05/13/2024 05:46 AM    MCV 91.3 05/13/2024 05:46 AM    MCH 26.1 05/13/2024 05:46 AM    MCHC 28.6 05/13/2024 05:46 AM    RDW 18.9 05/13/2024 05:46 AM

## 2024-05-13 NOTE — CARE COORDINATION
5/13/24  Transition of Care Update.  Patient admitted for anemia and ELISSA. Hemoglobin is 7.8 today.  ELISSA has resolved.  Patient on PO Bumex.  GI is following with plan for EGD and colonoscopy tomorrow.  PT /OT evaluations reveal AM-PAC of 19/24 for OT and 16/24 for PT.  Discharge goal is home.  Patient would like to continue with his plan for out patient therapy at [facility].  Discharge goal is to return home with wife when medically stable. No discharge needs noted at this time with therapy evals pending. SW/CM to follow.     Electronically signed by HUSAM Wasserman on 5/13/2024 at 11:36 AM

## 2024-05-13 NOTE — PLAN OF CARE
Problem: Chronic Conditions and Co-morbidities  Goal: Patient's chronic conditions and co-morbidity symptoms are monitored and maintained or improved  5/13/2024 1930 by Yane Manriquez RN  Outcome: Progressing  5/13/2024 1412 by Tanesha Skinner RN  Outcome: Progressing     Problem: Discharge Planning  Goal: Discharge to home or other facility with appropriate resources  5/13/2024 1930 by Yane Manriquez RN  Outcome: Progressing  5/13/2024 1412 by Tanesha Skinner RN  Outcome: Progressing     Problem: Skin/Tissue Integrity  Goal: Absence of new skin breakdown  Description: 1.  Monitor for areas of redness and/or skin breakdown  2.  Assess vascular access sites hourly  3.  Every 4-6 hours minimum:  Change oxygen saturation probe site  4.  Every 4-6 hours:  If on nasal continuous positive airway pressure, respiratory therapy assess nares and determine need for appliance change or resting period.  5/13/2024 1412 by Tanesha Skinner RN  Outcome: Progressing     Problem: ABCDS Injury Assessment  Goal: Absence of physical injury  5/13/2024 1412 by Tanesha Skinner RN  Outcome: Progressing  Flowsheets (Taken 5/13/2024 1411)  Absence of Physical Injury: Implement safety measures based on patient assessment     Problem: Safety - Adult  Goal: Free from fall injury  5/13/2024 1412 by Tanesha Skinner RN  Outcome: Progressing  Flowsheets (Taken 5/13/2024 1411)  Free From Fall Injury: Instruct family/caregiver on patient safety     Problem: Pain  Goal: Verbalizes/displays adequate comfort level or baseline comfort level  5/13/2024 1412 by Tanesha Skinner RN  Outcome: Progressing

## 2024-05-13 NOTE — PLAN OF CARE
Problem: Chronic Conditions and Co-morbidities  Goal: Patient's chronic conditions and co-morbidity symptoms are monitored and maintained or improved  5/12/2024 2021 by Yane Manriquez RN  Outcome: Progressing  5/12/2024 0750 by Shania Cooper RN  Outcome: Progressing     Problem: Discharge Planning  Goal: Discharge to home or other facility with appropriate resources  5/12/2024 2021 by Yane Manriquez RN  Outcome: Progressing  5/12/2024 0750 by Shania Cooper RN  Outcome: Progressing     Problem: Skin/Tissue Integrity  Goal: Absence of new skin breakdown  Description: 1.  Monitor for areas of redness and/or skin breakdown  2.  Assess vascular access sites hourly  3.  Every 4-6 hours minimum:  Change oxygen saturation probe site  4.  Every 4-6 hours:  If on nasal continuous positive airway pressure, respiratory therapy assess nares and determine need for appliance change or resting period.  5/12/2024 0750 by Shania Cooper RN  Outcome: Progressing     Problem: ABCDS Injury Assessment  Goal: Absence of physical injury  5/12/2024 0750 by Shania Cooper RN  Outcome: Progressing     Problem: Safety - Adult  Goal: Free from fall injury  5/12/2024 0750 by Shania Cooper RN  Outcome: Progressing     Problem: Pain  Goal: Verbalizes/displays adequate comfort level or baseline comfort level  5/12/2024 0750 by Shania Cooper RN  Outcome: Progressing

## 2024-05-13 NOTE — PROGRESS NOTES
P Quality Flow/Interdisciplinary Rounds Progress Note        Quality Flow Rounds held on May 13, 2024    Disciplines Attending:  Bedside Nurse, , , and Nursing Unit Leadership    Niles Field was admitted on 5/9/2024  1:22 PM    Anticipated Discharge Date:       Disposition:    Michael Score:  Michael Scale Score: 19    Readmission Risk              Risk of Unplanned Readmission:  15           Discussed patient goal for the day, patient clinical progression, and barriers to discharge.  The following Goal(s) of the Day/Commitment(s) have been identified:  Diagnostics - Report Results and Labs - Report Results      Kayla Ortiz RN  May 13, 2024

## 2024-05-13 NOTE — PLAN OF CARE
Problem: Chronic Conditions and Co-morbidities  Goal: Patient's chronic conditions and co-morbidity symptoms are monitored and maintained or improved  Outcome: Progressing     Problem: Discharge Planning  Goal: Discharge to home or other facility with appropriate resources  Outcome: Progressing     Problem: Skin/Tissue Integrity  Goal: Absence of new skin breakdown  Description: 1.  Monitor for areas of redness and/or skin breakdown  2.  Assess vascular access sites hourly  3.  Every 4-6 hours minimum:  Change oxygen saturation probe site  4.  Every 4-6 hours:  If on nasal continuous positive airway pressure, respiratory therapy assess nares and determine need for appliance change or resting period.  Outcome: Progressing     Problem: ABCDS Injury Assessment  Goal: Absence of physical injury  Outcome: Progressing  Flowsheets (Taken 5/13/2024 1411)  Absence of Physical Injury: Implement safety measures based on patient assessment     Problem: Safety - Adult  Goal: Free from fall injury  Outcome: Progressing  Flowsheets (Taken 5/13/2024 1411)  Free From Fall Injury: Instruct family/caregiver on patient safety     Problem: Pain  Goal: Verbalizes/displays adequate comfort level or baseline comfort level  Outcome: Progressing

## 2024-05-14 ENCOUNTER — ANESTHESIA EVENT (OUTPATIENT)
Dept: ENDOSCOPY | Age: 89
DRG: 812 | End: 2024-05-14
Payer: MEDICARE

## 2024-05-14 ENCOUNTER — ANESTHESIA (OUTPATIENT)
Dept: ENDOSCOPY | Age: 89
DRG: 812 | End: 2024-05-14
Payer: MEDICARE

## 2024-05-14 VITALS
TEMPERATURE: 98.2 F | DIASTOLIC BLOOD PRESSURE: 56 MMHG | SYSTOLIC BLOOD PRESSURE: 144 MMHG | BODY MASS INDEX: 27.32 KG/M2 | OXYGEN SATURATION: 98 % | RESPIRATION RATE: 19 BRPM | HEART RATE: 68 BPM | WEIGHT: 170 LBS | HEIGHT: 66 IN

## 2024-05-14 LAB
ANION GAP SERPL CALCULATED.3IONS-SCNC: 10 MMOL/L (ref 7–16)
BASOPHILS # BLD: 0.12 K/UL (ref 0–0.2)
BASOPHILS NFR BLD: 2 % (ref 0–2)
BUN SERPL-MCNC: 11 MG/DL (ref 6–23)
CALCIUM SERPL-MCNC: 8.5 MG/DL (ref 8.6–10.2)
CHLORIDE SERPL-SCNC: 100 MMOL/L (ref 98–107)
CO2 SERPL-SCNC: 31 MMOL/L (ref 22–29)
CREAT SERPL-MCNC: 1.1 MG/DL (ref 0.7–1.2)
EOSINOPHIL # BLD: 0.18 K/UL (ref 0.05–0.5)
EOSINOPHILS RELATIVE PERCENT: 3 % (ref 0–6)
ERYTHROCYTE [DISTWIDTH] IN BLOOD BY AUTOMATED COUNT: 18.6 % (ref 11.5–15)
GFR, ESTIMATED: 68 ML/MIN/1.73M2
GLUCOSE SERPL-MCNC: 88 MG/DL (ref 74–99)
HCT VFR BLD AUTO: 28.6 % (ref 37–54)
HGB BLD-MCNC: 8 G/DL (ref 12.5–16.5)
LYMPHOCYTES NFR BLD: 0.71 K/UL (ref 1.5–4)
LYMPHOCYTES RELATIVE PERCENT: 11 % (ref 20–42)
MCH RBC QN AUTO: 25.6 PG (ref 26–35)
MCHC RBC AUTO-ENTMCNC: 28 G/DL (ref 32–34.5)
MCV RBC AUTO: 91.7 FL (ref 80–99.9)
MONOCYTES NFR BLD: 0.29 K/UL (ref 0.1–0.95)
MONOCYTES NFR BLD: 4 % (ref 2–12)
NEUTROPHILS NFR BLD: 81 % (ref 43–80)
NEUTS SEG NFR BLD: 5.41 K/UL (ref 1.8–7.3)
PLATELET # BLD AUTO: 297 K/UL (ref 130–450)
PMV BLD AUTO: 10.4 FL (ref 7–12)
POTASSIUM SERPL-SCNC: 4.1 MMOL/L (ref 3.5–5)
RBC # BLD AUTO: 3.12 M/UL (ref 3.8–5.8)
RBC # BLD: ABNORMAL 10*6/UL
SODIUM SERPL-SCNC: 141 MMOL/L (ref 132–146)
WBC OTHER # BLD: 6.7 K/UL (ref 4.5–11.5)

## 2024-05-14 PROCEDURE — 3700000000 HC ANESTHESIA ATTENDED CARE: Performed by: STUDENT IN AN ORGANIZED HEALTH CARE EDUCATION/TRAINING PROGRAM

## 2024-05-14 PROCEDURE — 6360000002 HC RX W HCPCS: Performed by: STUDENT IN AN ORGANIZED HEALTH CARE EDUCATION/TRAINING PROGRAM

## 2024-05-14 PROCEDURE — 97530 THERAPEUTIC ACTIVITIES: CPT

## 2024-05-14 PROCEDURE — 2709999900 HC NON-CHARGEABLE SUPPLY: Performed by: STUDENT IN AN ORGANIZED HEALTH CARE EDUCATION/TRAINING PROGRAM

## 2024-05-14 PROCEDURE — 6360000002 HC RX W HCPCS

## 2024-05-14 PROCEDURE — 80048 BASIC METABOLIC PNL TOTAL CA: CPT

## 2024-05-14 PROCEDURE — 0DB78ZX EXCISION OF STOMACH, PYLORUS, VIA NATURAL OR ARTIFICIAL OPENING ENDOSCOPIC, DIAGNOSTIC: ICD-10-PCS | Performed by: STUDENT IN AN ORGANIZED HEALTH CARE EDUCATION/TRAINING PROGRAM

## 2024-05-14 PROCEDURE — 3609012400 HC EGD TRANSORAL BIOPSY SINGLE/MULTIPLE: Performed by: STUDENT IN AN ORGANIZED HEALTH CARE EDUCATION/TRAINING PROGRAM

## 2024-05-14 PROCEDURE — 99239 HOSP IP/OBS DSCHRG MGMT >30: CPT | Performed by: STUDENT IN AN ORGANIZED HEALTH CARE EDUCATION/TRAINING PROGRAM

## 2024-05-14 PROCEDURE — C9113 INJ PANTOPRAZOLE SODIUM, VIA: HCPCS | Performed by: STUDENT IN AN ORGANIZED HEALTH CARE EDUCATION/TRAINING PROGRAM

## 2024-05-14 PROCEDURE — 7100000001 HC PACU RECOVERY - ADDTL 15 MIN: Performed by: STUDENT IN AN ORGANIZED HEALTH CARE EDUCATION/TRAINING PROGRAM

## 2024-05-14 PROCEDURE — 36415 COLL VENOUS BLD VENIPUNCTURE: CPT

## 2024-05-14 PROCEDURE — 2580000003 HC RX 258: Performed by: INTERNAL MEDICINE

## 2024-05-14 PROCEDURE — A4216 STERILE WATER/SALINE, 10 ML: HCPCS | Performed by: STUDENT IN AN ORGANIZED HEALTH CARE EDUCATION/TRAINING PROGRAM

## 2024-05-14 PROCEDURE — 2580000003 HC RX 258: Performed by: STUDENT IN AN ORGANIZED HEALTH CARE EDUCATION/TRAINING PROGRAM

## 2024-05-14 PROCEDURE — 7100000000 HC PACU RECOVERY - FIRST 15 MIN: Performed by: STUDENT IN AN ORGANIZED HEALTH CARE EDUCATION/TRAINING PROGRAM

## 2024-05-14 PROCEDURE — 97535 SELF CARE MNGMENT TRAINING: CPT

## 2024-05-14 PROCEDURE — 3700000001 HC ADD 15 MINUTES (ANESTHESIA): Performed by: STUDENT IN AN ORGANIZED HEALTH CARE EDUCATION/TRAINING PROGRAM

## 2024-05-14 PROCEDURE — 6370000000 HC RX 637 (ALT 250 FOR IP): Performed by: INTERNAL MEDICINE

## 2024-05-14 PROCEDURE — 85025 COMPLETE CBC W/AUTO DIFF WBC: CPT

## 2024-05-14 PROCEDURE — 2700000000 HC OXYGEN THERAPY PER DAY

## 2024-05-14 PROCEDURE — 88305 TISSUE EXAM BY PATHOLOGIST: CPT

## 2024-05-14 PROCEDURE — 6370000000 HC RX 637 (ALT 250 FOR IP)

## 2024-05-14 RX ORDER — MIDAZOLAM HYDROCHLORIDE 2 MG/2ML
2 INJECTION, SOLUTION INTRAMUSCULAR; INTRAVENOUS
Status: CANCELLED | OUTPATIENT
Start: 2024-05-14 | End: 2024-05-15

## 2024-05-14 RX ORDER — SODIUM CHLORIDE 9 MG/ML
INJECTION, SOLUTION INTRAVENOUS PRN
Status: CANCELLED | OUTPATIENT
Start: 2024-05-14

## 2024-05-14 RX ORDER — SODIUM CHLORIDE 0.9 % (FLUSH) 0.9 %
5-40 SYRINGE (ML) INJECTION PRN
Status: CANCELLED | OUTPATIENT
Start: 2024-05-14

## 2024-05-14 RX ORDER — LABETALOL HYDROCHLORIDE 5 MG/ML
10 INJECTION, SOLUTION INTRAVENOUS
Status: CANCELLED | OUTPATIENT
Start: 2024-05-14

## 2024-05-14 RX ORDER — SUCRALFATE 1 G/1
1 TABLET ORAL EVERY 8 HOURS SCHEDULED
Qty: 120 TABLET | Refills: 0 | Status: SHIPPED | OUTPATIENT
Start: 2024-05-14

## 2024-05-14 RX ORDER — HYDRALAZINE HYDROCHLORIDE 20 MG/ML
10 INJECTION INTRAMUSCULAR; INTRAVENOUS
Status: CANCELLED | OUTPATIENT
Start: 2024-05-14

## 2024-05-14 RX ORDER — PROPOFOL 10 MG/ML
INJECTION, EMULSION INTRAVENOUS PRN
Status: DISCONTINUED | OUTPATIENT
Start: 2024-05-14 | End: 2024-05-14 | Stop reason: SDUPTHER

## 2024-05-14 RX ORDER — LIDOCAINE HYDROCHLORIDE 20 MG/ML
INJECTION, SOLUTION INTRAVENOUS PRN
Status: DISCONTINUED | OUTPATIENT
Start: 2024-05-14 | End: 2024-05-14 | Stop reason: SDUPTHER

## 2024-05-14 RX ORDER — HYDROMORPHONE HYDROCHLORIDE 1 MG/ML
0.25 INJECTION, SOLUTION INTRAMUSCULAR; INTRAVENOUS; SUBCUTANEOUS EVERY 5 MIN PRN
Status: CANCELLED | OUTPATIENT
Start: 2024-05-14

## 2024-05-14 RX ORDER — POLYETHYLENE GLYCOL 3350 17 G/17G
17 POWDER, FOR SOLUTION ORAL DAILY
Qty: 30 PACKET | Refills: 0 | Status: SHIPPED | OUTPATIENT
Start: 2024-05-15 | End: 2024-06-14

## 2024-05-14 RX ORDER — NALOXONE HYDROCHLORIDE 0.4 MG/ML
INJECTION, SOLUTION INTRAMUSCULAR; INTRAVENOUS; SUBCUTANEOUS PRN
Status: CANCELLED | OUTPATIENT
Start: 2024-05-14

## 2024-05-14 RX ORDER — ONDANSETRON 2 MG/ML
4 INJECTION INTRAMUSCULAR; INTRAVENOUS
Status: CANCELLED | OUTPATIENT
Start: 2024-05-14 | End: 2024-05-15

## 2024-05-14 RX ORDER — GLYCOPYRROLATE 1 MG/5 ML
SYRINGE (ML) INTRAVENOUS PRN
Status: DISCONTINUED | OUTPATIENT
Start: 2024-05-14 | End: 2024-05-14 | Stop reason: SDUPTHER

## 2024-05-14 RX ORDER — SODIUM CHLORIDE 0.9 % (FLUSH) 0.9 %
5-40 SYRINGE (ML) INJECTION EVERY 12 HOURS SCHEDULED
Status: CANCELLED | OUTPATIENT
Start: 2024-05-14

## 2024-05-14 RX ORDER — HYDROMORPHONE HYDROCHLORIDE 1 MG/ML
0.5 INJECTION, SOLUTION INTRAMUSCULAR; INTRAVENOUS; SUBCUTANEOUS EVERY 5 MIN PRN
Status: CANCELLED | OUTPATIENT
Start: 2024-05-14

## 2024-05-14 RX ORDER — FERROUS SULFATE 325(65) MG
325 TABLET ORAL EVERY OTHER DAY
Qty: 30 TABLET | Refills: 1 | Status: SHIPPED | OUTPATIENT
Start: 2024-05-14

## 2024-05-14 RX ORDER — SENNOSIDES A AND B 8.6 MG/1
1 TABLET, FILM COATED ORAL 2 TIMES DAILY
Qty: 60 TABLET | Refills: 0 | Status: SHIPPED | OUTPATIENT
Start: 2024-05-14 | End: 2024-06-13

## 2024-05-14 RX ORDER — IPRATROPIUM BROMIDE AND ALBUTEROL SULFATE 2.5; .5 MG/3ML; MG/3ML
1 SOLUTION RESPIRATORY (INHALATION)
Status: CANCELLED | OUTPATIENT
Start: 2024-05-14 | End: 2024-05-15

## 2024-05-14 RX ADMIN — SODIUM CHLORIDE: 9 INJECTION, SOLUTION INTRAVENOUS at 13:23

## 2024-05-14 RX ADMIN — AMLODIPINE BESYLATE 5 MG: 5 TABLET ORAL at 07:57

## 2024-05-14 RX ADMIN — PROPOFOL 40 MG: 10 INJECTION, EMULSION INTRAVENOUS at 13:26

## 2024-05-14 RX ADMIN — SODIUM CHLORIDE, PRESERVATIVE FREE 10 ML: 5 INJECTION INTRAVENOUS at 07:59

## 2024-05-14 RX ADMIN — LIDOCAINE HYDROCHLORIDE 60 MG: 20 INJECTION, SOLUTION INTRAVENOUS at 13:26

## 2024-05-14 RX ADMIN — AMIODARONE HYDROCHLORIDE 200 MG: 200 TABLET ORAL at 07:57

## 2024-05-14 RX ADMIN — PROPOFOL 150 MCG/KG/MIN: 10 INJECTION, EMULSION INTRAVENOUS at 13:27

## 2024-05-14 RX ADMIN — Medication 0.1 MG: at 13:26

## 2024-05-14 RX ADMIN — PANTOPRAZOLE SODIUM 40 MG: 40 INJECTION, POWDER, FOR SOLUTION INTRAVENOUS at 07:56

## 2024-05-14 RX ADMIN — BUMETANIDE 2 MG: 1 TABLET ORAL at 07:57

## 2024-05-14 RX ADMIN — METOPROLOL TARTRATE 25 MG: 25 TABLET, FILM COATED ORAL at 07:57

## 2024-05-14 ASSESSMENT — PAIN SCALES - GENERAL
PAINLEVEL_OUTOF10: 0

## 2024-05-14 ASSESSMENT — LIFESTYLE VARIABLES: SMOKING_STATUS: 1

## 2024-05-14 NOTE — ANESTHESIA PRE PROCEDURE
ICD (implantable cardioverter-defibrillator) in place Z95.810   • Lumbar degenerative disc disease M51.36   • Bilateral carotid artery stenosis I65.23   • Dizziness R42   • Hearing loss of left ear due to cerumen impaction H61.22   • Anemia D64.9   • ELISSA (acute kidney injury) ([facility]) N17.9   • Chronic anticoagulation Z79.01   • Persistent atrial fibrillation ([facility]) I48.19   • Anemia requiring transfusions D64.9       Past Medical History:        Diagnosis Date   • A-fib ([facility])     follows with Dr Oli Jorgensen 9/28/22   • AAA (abdominal aortic aneurysm) ([facility])     infrarenal 3.7cm 6/27/22   • Acute congestive heart failure ([facility]) 03/01/2022   • Acute on chronic combined systolic and diastolic CHF (congestive heart failure) ([facility]) 03/01/2022   • Acute on chronic diastolic congestive heart failure ([facility]) 02/06/2023   • Acute on chronic heart failure with preserved ejection fraction (HFpEF) ([facility])    • Arrhythmia    • Arthritis    • Carotid artery stenosis    • CHF (congestive heart failure) ([facility])    • Chronic atrial fibrillation    • CKD (chronic kidney disease), stage III ([facility])    • Heart valve problem    • Hyperlipidemia    • Hypertension    • ICD (implantable cardiac defibrillator) in place 2007    Medtronic Evera XT  SR#[id number] generator changed 7/28/16  Dr Mercado   • MI (mitral incompetence) 2003   • MI (myocardial infarction) ([facility]) 2003   • PAF (paroxysmal atrial fibrillation) ([facility]) 02/06/2023   • Paroxysmal atrial fibrillation ([facility]) 03/13/2018   • Ventricular tachycardia ([facility])        Past Surgical History:        Procedure Laterality Date   • CARDIAC CATHETERIZATION Right 03/03/2017    Dr. Cooper- [facility]   • CARDIAC DEFIBRILLATOR PLACEMENT  2007. 2008 2007 78 shocks replaced 2008 Medtronic    • CARDIAC DEFIBRILLATOR PLACEMENT  07/28/2016    Medtronic Dual ICD gen change   • CARDIAC DEFIBRILLATOR PLACEMENT Left 04/13/2023    Medtronic ICD-GR  Dr. Mercado   • CARDIAC SURGERY N/A 09/04/2021    cardioversion performed

## 2024-05-14 NOTE — DISCHARGE SUMMARY
Hospitalist Discharge Summary    Patient ID: Niles Field   Patient : [id number]  Patient's PCP: Geremias Koehler DO    Admit Date: 2024   Admitting Physician: Bakari Ziegler MD    Discharge Date:  2024   Discharge Physician: Doris Willett MD   Discharge Condition: Stable  Discharge Disposition: Home      Hospital course in brief:  (Please refer to daily progress notes for a comprehensive review of the hospitalization by requesting medical records)    This is an 88-year-old male with past medical history of atrial fibrillation on Xarelto congestive heart failure, ICD chronic kidney disease,hyperlipidemia and hypertension here due to feeling of fatigue for last 1 week.  He stated he has been suffering from macular degeneration, has problems with vision, he did not see whether he had black/dark stools or bright red blood in stools, complains of constipation.  In ER workup shows hemoglobin 4.8 and 3 unit PRBC transfusion ordered, his OBT positive, iron panel shows iron deficiency IV iron ordered.  Protonix 40 Mg IV twice daily, Xarelto on hold, will continue full liquid diet bland diet, monitor H&H every 8 hourly.  GI, general surgery consulted for scope  Nephrology following for ELISSA on CKD  5/10: EP consulted for recommendations regarding anticoagulation, recommended starting anticoagulation when okay with GI, and switch from Xarelto to Eliquis 2.5 Mg twice daily.  :Aggressive bowel regimen for constipation, hb 7.2, Cr: 1.4   : constipation improved and creatinine improved.   : Hb remained stable, Underwent EGD perGI   Patient was Ok to be started on oral anticoagulation and get monthly CBC per GI.    Upon discharge will stop the xarelto and start on Eliquis 2.5mg PO BID as recommended by the EP.  Due to constipation will send patient with Scripts for laxatives, will change the Iron supplement from daily to every other day.     He was stable from medical and GI standpoint to be    bumetanide 2 MG tablet  Commonly known as: BUMEX  Take 1 tablet by mouth daily Takes second dose prn per DR GUS Jorgensen     MAGnesium-Oxide 400 (240 Mg) MG tablet  Generic drug: magnesium oxide     meclizine 25 MG tablet  Commonly known as: ANTIVERT  TAKE 1 TABLET BY MOUTH DAILY AS  NEEDED FOR DIZZINESS     metoprolol tartrate 25 MG tablet  Commonly known as: LOPRESSOR  Take 1 tablet by mouth 2 times daily     mometasone 0.1 % ointment  Commonly known as: ELOCON  Apply topically daily.     OXYGEN     pantoprazole 40 MG tablet  Commonly known as: PROTONIX  TAKE 1 TABLET BY MOUTH DAILY     potassium chloride 20 MEQ packet  Commonly known as: KLOR-CON  Take 20 mEq by mouth daily     pravastatin 40 MG tablet  Commonly known as: PRAVACHOL  TAKE 1 TABLET BY MOUTH  DAILY     vitamin D 50 MCG (2000 UT) Caps capsule            STOP taking these medications      albuterol sulfate  (90 Base) MCG/ACT inhaler  Commonly known as: Ventolin HFA     potassium chloride 20 MEQ extended release tablet  Commonly known as: KLOR-CON M     rivaroxaban 15 MG Tabs tablet  Commonly known as: XARELTO               Where to Get Your Medications        These medications were sent to [facility] [place] [address] [address] - P [phone] - F [phone]  [facility] [address]      Phone: [phone]   apixaban 2.5 MG Tabs tablet  ferrous sulfate 325 (65 Fe) MG tablet  polyethylene glycol 17 g packet  senna 8.6 MG tablet  sucralfate 1 GM tablet         Time Spent on discharge is 45 minutes in the examination, evaluation, counseling and review of medications and discharge plan.    +++++++++++++++++++++++++++++++++++++++++++++++++  Doris Willett MD  [facility] Hospitalist  [place], [place]  +++++++++++++++++++++++++++++++++++++++++++++++++  NOTE: This report was transcribed using voice recognition software. Every effort was made to ensure accuracy; however,

## 2024-05-14 NOTE — PROGRESS NOTES
Department of Internal Medicine  Nephrology Progress Note  Events reviewed.     SUBJECTIVE: We are following Mr. Field for ELISSA on CKD. He reports no new complaints today.     PHYSICAL EXAM:      Vitals:    VITALS:  BP (!) 130/57   Pulse 69   Temp 96.9 °F (36.1 °C) (Temporal)   Resp 18   Ht 1.676 m (5' 6\")   Wt 77.1 kg (170 lb)   SpO2 96%   BMI 27.44 kg/m²   24HR INTAKE/OUTPUT:    Intake/Output Summary (Last 24 hours) at 5/14/2024 1305  Last data filed at 5/14/2024 1140  Gross per 24 hour   Intake 1040 ml   Output 2950 ml   Net -1910 ml         Constitutional: Patient is awake, alert, no distress  HEENT: Pupils are equal reactive  Respiratory: Decreased breath sounds at the bases  Cardiovascular/Edema: Heart sounds are regular rate  Gastrointestinal: Abdomen soft  Neurologic: Nonfocal  Skin: No lesion  Other:+ Edema    Scheduled Meds:   bumetanide  2 mg Oral Daily    senna  1 tablet Oral BID    pantoprazole (PROTONIX) 40 mg in sodium chloride (PF) 0.9 % 10 mL injection  40 mg IntraVENous Q12H    sucralfate  1 g Oral 3 times per day    polyethylene glycol  17 g Oral Daily    docusate sodium  100 mg Oral BID    amiodarone  200 mg Oral Daily    amLODIPine  5 mg Oral Daily    [Held by provider] ferrous sulfate  325 mg Oral Daily with breakfast    metoprolol tartrate  25 mg Oral BID    pravastatin  40 mg Oral Nightly    sodium chloride flush  5-40 mL IntraVENous 2 times per day     Continuous Infusions:   sodium chloride      sodium chloride       PRN Meds:.sodium chloride, sodium chloride flush, sodium chloride, ondansetron **OR** ondansetron, acetaminophen **OR** acetaminophen, albuterol      DATA:    CBC:   Lab Results   Component Value Date/Time    WBC 6.7 05/14/2024 05:51 AM    RBC 3.12 05/14/2024 05:51 AM    HGB 8.0 05/14/2024 05:51 AM    HCT 28.6 05/14/2024 05:51 AM    MCV 91.7 05/14/2024 05:51 AM    MCH 25.6 05/14/2024 05:51 AM    MCHC 28.0 05/14/2024 05:51 AM    RDW 18.6 05/14/2024 05:51 AM

## 2024-05-14 NOTE — PROGRESS NOTES
Comprehensive Nutrition Assessment    Type and Reason for Visit:  Initial (LOS)    Nutrition Recommendations/Plan:   Advance diet when medically appropriate.    When diet advances, will recommend and start appropriate nutritional supplementation to help meet nutritional needs.           Malnutrition Assessment:  Malnutrition Status:  At risk for malnutrition (Comment) (05/14/24 1245)    Context:  Acute Illness     Findings of the 6 clinical characteristics of malnutrition:  Energy Intake:  Mild decrease in energy intake (Comment) (since admission)  Weight Loss:  Unable to assess (d/t no actual weights available since admission)     Body Fat Loss:  Unable to assess     Muscle Mass Loss:  Unable to assess    Fluid Accumulation:  No significant fluid accumulation     Strength:  Not Performed    Nutrition Assessment:    Patient is currently NPO for planned EGD and colonoscopy ; possible GI bleed ; adm w/ fatigue/weakness/SOB ; also adm w/ ELISSA and anemia ; ELISSA on CKD ; hx of CAD/CHF/CKD/AAA ; 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    Current BMI (kg/m2): 27.5  Usual Body Weight:  (UTO ; EMR shows past weights of 166# actual on 2/12/24 and 169# actual on 8/9/23)                       BMI Categories: Overweight (BMI 25.0-29.9)    Estimated Daily Nutrient Needs:  Energy Requirements Based On: Formula  Weight Used for Energy Requirements: Current  Energy (kcal/day): 6212-7778 (REE 1395 x 1.1

## 2024-05-14 NOTE — DISCHARGE INSTRUCTIONS
Your Oral anticoagulation will be switched from Xarelto to Eliquis 2.5 mg PO BID as recommended by EP.  You will need monthly lab monitoring your CBC.   Please follow up with your PCP and get Monthly CBC checked.   If there is continuous drop in hemoglobin and GI bleeding please talk to your EP regarding Watchman INOCENTE occlusion procedure in future.

## 2024-05-14 NOTE — PLAN OF CARE
Problem: Chronic Conditions and Co-morbidities  Goal: Patient's chronic conditions and co-morbidity symptoms are monitored and maintained or improved  5/14/2024 0930 by Tanesha Skinner RN  Outcome: Progressing  5/13/2024 1930 by Yane Manriquez RN  Outcome: Progressing     Problem: Discharge Planning  Goal: Discharge to home or other facility with appropriate resources  5/14/2024 0930 by Tanesha Skinner RN  Outcome: Progressing  5/13/2024 1930 by Yane Manriquez RN  Outcome: Progressing     Problem: Skin/Tissue Integrity  Goal: Absence of new skin breakdown  Description: 1.  Monitor for areas of redness and/or skin breakdown  2.  Assess vascular access sites hourly  3.  Every 4-6 hours minimum:  Change oxygen saturation probe site  4.  Every 4-6 hours:  If on nasal continuous positive airway pressure, respiratory therapy assess nares and determine need for appliance change or resting period.  Outcome: Progressing     Problem: ABCDS Injury Assessment  Goal: Absence of physical injury  Outcome: Progressing  Flowsheets (Taken 5/14/2024 0929)  Absence of Physical Injury: Implement safety measures based on patient assessment     Problem: Safety - Adult  Goal: Free from fall injury  Outcome: Progressing  Flowsheets (Taken 5/14/2024 0929)  Free From Fall Injury: Instruct family/caregiver on patient safety     Problem: Pain  Goal: Verbalizes/displays adequate comfort level or baseline comfort level  Outcome: Progressing

## 2024-05-14 NOTE — CARE COORDINATION
5/14/24  Transition of Care Update.  Patient planned for a scope and colonoscopy today.  PT/OT updates reveal AM-PAC of 18/24 for PT and 19/24 for OT.  Discharge goal is home. Patient would like to continue with his plan for [facility]. No discharge needs noted.  Discharge anticipated pending results of scope and colonoscopy. MERCEDES/RITIKA to follow.     Electronically signed by HUSAM Wasserman on 5/14/2024 at 12:13 PM

## 2024-05-14 NOTE — PATIENT CARE CONFERENCE
P Quality Flow/Interdisciplinary Rounds Progress Note        Quality Flow Rounds held on May 14, 2024    Disciplines Attending:  Bedside Nurse, , , and Nursing Unit Leadership    Niles Field was admitted on 5/9/2024  1:22 PM    Anticipated Discharge Date:       Disposition:    Michael Score:  Michael Scale Score: 19    Readmission Risk              Risk of Unplanned Readmission:  15           Discussed patient goal for the day, patient clinical progression, and barriers to discharge.  The following Goal(s) of the Day/Commitment(s) have been identified:  downgrade/discharge planning       Anna Garrido RN  May 14, 2024

## 2024-05-14 NOTE — PROGRESS NOTES
CLINICAL PHARMACY NOTE: MEDS TO BEDS    Total # of Prescriptions Filled: 5   The following medications were delivered to the patient:  Senna 8.6 mg  Sucralfate 1gm  Polyethylene glycol  Eliquis 2.5 mg  Ferosul 325    Additional Documentation:   Son picked up in the pharmacy

## 2024-05-14 NOTE — PROGRESS NOTES
Occupational Therapy  OT BEDSIDE TREATMENT NOTE   [facility]  [address], [place]      Date:2024  Patient Name: Niles Field  MRN: [id number]  : 1935  Room: [address] [place], [place] [address]     Evaluating OT: Raffy Brar OTR/L #[id number]      Referring Provider: Doris Willett MD   Specific Provider Orders/Date: OT eval and treat 5/10/24     Diagnosis: Anemia [D64.9]  ELISSA (acute kidney injury) ([facility]) [N17.9]  Anemia, unspecified type [D64.9]   Pt admitted to hospital with anemia       Pertinent Medical History:  has a past medical history of A-fib ([facility]), AAA (abdominal aortic aneurysm) ([facility]), Acute congestive heart failure ([facility]), Acute on chronic combined systolic and diastolic CHF (congestive heart failure) ([facility]), Acute on chronic diastolic congestive heart failure ([facility]), Acute on chronic heart failure with preserved ejection fraction (HFpEF) ([facility]), Arrhythmia, Arthritis, Carotid artery stenosis, CHF (congestive heart failure) ([facility]), Chronic atrial fibrillation, CKD (chronic kidney disease), stage III ([facility]), Heart valve problem, Hyperlipidemia, Hypertension, ICD (implantable cardiac defibrillator) in place, MI (mitral incompetence), MI (myocardial infarction) ([facility]), PAF (paroxysmal atrial fibrillation) ([facility]), Paroxysmal atrial fibrillation ([facility]), and Ventricular tachycardia ([facility]).         Precautions:  Fall Risk, macular degeneration      Assessment of current deficits    [x] Functional mobility          [x]ADLs           [x] Strength                  []Cognition    [x] Functional transfers        [x] IADLs         [x] Safety Awareness   [x]Endurance    [] Fine Coordination                        [x] Balance      [] Vision/perception   []Sensation      []Gross Motor Coordination            [] ROM           [] Delirium                   [] Motor Control      OT PLAN OF CARE   OT POC based on physician orders, patient diagnosis and results of clinical  assessment     Frequency/Duration 1-3 days/wk for 2 weeks PRN   Specific OT Treatment Interventions to include:   * Instruction/training on adapted ADL techniques and AE recommendations to increase functional independence within precautions       * Training on energy conservation strategies, correct breathing pattern and techniques to improve independence/tolerance for self-care routine  * Functional transfer/mobility training/DME recommendations for increased independence, safety, and fall prevention  * Patient/Family education to increase follow through with safety techniques and functional independence  * Recommendation of environmental modifications for increased safety with functional transfers/mobility and ADLs  * Cognitive retraining/development of therapeutic activities to improve problem solving, judgement, memory, and attention for increased safety/participation in ADL/IADL tasks  * Therapeutic exercise to improve motor endurance, ROM, and functional strength for ADLs/functional transfers  * Therapeutic activities to facilitate/challenge dynamic balance, stand tolerance for increased safety and independence with ADLs  * Therapeutic activities to facilitate gross/fine motor skills for increased independence with ADLs  * Neuro-muscular re-education: facilitation of righting/equilibrium reactions, midline orientation, scapular stability/mobility, normalization of muscle tone, and facilitation of volitional active controled movement        Recommended Adaptive Equipment:  TBD      Home Living: Pt lives with wife in a 1 story home with 2 AMADO and no hand rails    Bathroom setup: tub/shower combo    Equipment owned: w/w, cane     Prior Level of Function: mod I with ADLs , assist prn with IADLs; ambulated with w/w   Driving: no   Occupation: na     Pain Level: Pt denies pain this session     Cognition: A&O: 4/4; Follows 2 step directions             Memory:  good             Sequencing:  good             Problem

## 2024-05-14 NOTE — PROGRESS NOTES
Physical Therapy  Physical Therapy treatment note       Name: Niles Field  : 1935  MRN: [id number]      Date of Service: 2024    Evaluating PT:  Nany Nix PT, DPT [id number]    Room #:  6503/6503-A  Diagnosis:  Anemia [D64.9]  ELISSA (acute kidney injury) ([facility]) [N17.9]  Anemia, unspecified type [D64.9]  PMHx/PSHx:   has a past medical history of A-fib ([facility]), AAA (abdominal aortic aneurysm) ([facility]), Acute congestive heart failure ([facility]), Acute on chronic combined systolic and diastolic CHF (congestive heart failure) ([facility]), Acute on chronic diastolic congestive heart failure ([facility]), Acute on chronic heart failure with preserved ejection fraction (HFpEF) ([facility]), Arrhythmia, Arthritis, Carotid artery stenosis, CHF (congestive heart failure) ([facility]), Chronic atrial fibrillation, CKD (chronic kidney disease), stage III ([facility]), Heart valve problem, Hyperlipidemia, Hypertension, ICD (implantable cardiac defibrillator) in place, MI (mitral incompetence), MI (myocardial infarction) ([facility]), PAF (paroxysmal atrial fibrillation) ([facility]), Paroxysmal atrial fibrillation ([facility]), and Ventricular tachycardia ([facility]).    Procedure/Surgery:  none this admission   Precautions: Falls,  macular degeneration, O2     SUBJECTIVE:    Pt lives with wife in a 1 story home with 2 stairs to enter and 1 rail(s).  Bed is on 1st floor and bath is on 1st floor.  Pt ambulated with WW PTA.    Equipment Owned: SID, ANNMARIE  Equipment Needs:  none    OBJECTIVE:   Initial Evaluation  Date: 5/10/24 Treatment  Date: 24 Short Term/ Long Term   Goals   AM-PAC 6 Clicks     Was pt agreeable to Eval/treatment? Yes  Yes     Does pt have pain? Denies pain  Denies pain     Bed Mobility  Rolling: SBA  Supine to sit: SBA  Sit to supine: SBA  Scooting: SBA NT Rolling: Independent  Supine to sit: Independent  Sit to supine: Independent  Scooting: Independent   Transfers Sit to stand: SBA  Stand to sit: SBA  Stand pivot: SBA WW Sit to stand: SBA  Stand to sit:

## 2024-05-14 NOTE — ANESTHESIA POSTPROCEDURE EVALUATION
Department of Anesthesiology  Postprocedure Note    Patient: Niles Field  MRN: [id number]  YOB: 1935  Date of evaluation: 5/14/2024    Procedure Summary       Date: 05/14/24 Room / Location: [place] [address] / [facility]    Anesthesia Start: 1323 Anesthesia Stop: 1341    Procedure: ESOPHAGOGASTRODUODENOSCOPY BIOPSY Diagnosis:       Anemia requiring transfusions      (Anemia requiring transfusions [D64.9])    Surgeons: Kishore Miguel MD Responsible Provider: Victoria Santiago DO    Anesthesia Type: MAC ASA Status: 4            Anesthesia Type: MAC    Alfred Phase I: Alfred Score: 10    Alfred Phase II:      Anesthesia Post Evaluation    Patient location during evaluation: PACU  Patient participation: complete - patient participated  Level of consciousness: awake  Airway patency: patent  Nausea & Vomiting: no nausea and no vomiting  Cardiovascular status: hemodynamically stable  Respiratory status: acceptable  Hydration status: stable  Pain management: adequate    No notable events documented.

## 2024-05-15 ENCOUNTER — TELEPHONE (OUTPATIENT)
Dept: PRIMARY CARE CLINIC | Age: 89
End: 2024-05-15

## 2024-05-15 ENCOUNTER — COMMUNITY CARE MANAGEMENT (OUTPATIENT)
Facility: CLINIC | Age: 89
End: 2024-05-15

## 2024-05-15 NOTE — TELEPHONE ENCOUNTER
Pt needs a hospital follow up appt., he was discharged yesterday. Where would you like me to put him?

## 2024-05-16 ENCOUNTER — OFFICE VISIT (OUTPATIENT)
Dept: PRIMARY CARE CLINIC | Age: 89
End: 2024-05-16

## 2024-05-16 VITALS
SYSTOLIC BLOOD PRESSURE: 122 MMHG | HEART RATE: 80 BPM | OXYGEN SATURATION: 96 % | TEMPERATURE: 97.7 F | BODY MASS INDEX: 26.84 KG/M2 | DIASTOLIC BLOOD PRESSURE: 52 MMHG | HEIGHT: 66 IN | WEIGHT: 167 LBS

## 2024-05-16 DIAGNOSIS — D50.0 BLOOD LOSS ANEMIA: Primary | ICD-10-CM

## 2024-05-16 DIAGNOSIS — N18.32 STAGE 3B CHRONIC KIDNEY DISEASE (HCC): ICD-10-CM

## 2024-05-16 DIAGNOSIS — I48.19 PERSISTENT ATRIAL FIBRILLATION (HCC): ICD-10-CM

## 2024-05-16 DIAGNOSIS — I50.42 CHRONIC COMBINED SYSTOLIC AND DIASTOLIC CHF (CONGESTIVE HEART FAILURE) (HCC): ICD-10-CM

## 2024-05-16 DIAGNOSIS — Z09 HOSPITAL DISCHARGE FOLLOW-UP: ICD-10-CM

## 2024-05-16 PROBLEM — N17.9 AKI (ACUTE KIDNEY INJURY) (HCC): Status: RESOLVED | Noted: 2024-05-10 | Resolved: 2024-05-16

## 2024-05-16 NOTE — PROGRESS NOTES
1 tablet by mouth 2 times daily     bumetanide 2 MG tablet  Commonly known as: BUMEX  Take 1 tablet by mouth daily Takes second dose prn per DR GUS Jorgensen     ferrous sulfate 325 (65 Fe) MG tablet  Commonly known as: IRON 325  Take 1 tablet by mouth every other day     MAGnesium-Oxide 400 (240 Mg) MG tablet  Generic drug: magnesium oxide     meclizine 25 MG tablet  Commonly known as: ANTIVERT  TAKE 1 TABLET BY MOUTH DAILY AS  NEEDED FOR DIZZINESS     metoprolol tartrate 25 MG tablet  Commonly known as: LOPRESSOR  Take 1 tablet by mouth 2 times daily     mometasone 0.1 % ointment  Commonly known as: ELOCON  Apply topically daily.     OXYGEN     pantoprazole 40 MG tablet  Commonly known as: PROTONIX  TAKE 1 TABLET BY MOUTH DAILY     polyethylene glycol 17 g packet  Commonly known as: GLYCOLAX  Take 1 packet by mouth daily     potassium chloride 20 MEQ packet  Commonly known as: KLOR-CON  Take 20 mEq by mouth daily     pravastatin 40 MG tablet  Commonly known as: PRAVACHOL  TAKE 1 TABLET BY MOUTH  DAILY     senna 8.6 MG tablet  Commonly known as: SENOKOT  Take 1 tablet by mouth 2 times daily     sucralfate 1 GM tablet  Commonly known as: CARAFATE  Take 1 tablet by mouth every 8 hours     vitamin D 50 MCG (2000 UT) Caps capsule                Medications marked \"taking\" at this time  Outpatient Medications Marked as Taking for the 5/16/24 encounter (Office Visit) with Geremias Koehler, DO   Medication Sig Dispense Refill    ferrous sulfate (IRON 325) 325 (65 Fe) MG tablet Take 1 tablet by mouth every other day 30 tablet 1    apixaban (ELIQUIS) 2.5 MG TABS tablet Take 1 tablet by mouth 2 times daily 60 tablet 3    polyethylene glycol (GLYCOLAX) 17 g packet Take 1 packet by mouth daily 30 packet 0    senna (SENOKOT) 8.6 MG tablet Take 1 tablet by mouth 2 times daily 60 tablet 0    sucralfate (CARAFATE) 1 GM tablet Take 1 tablet by mouth every 8 hours 120 tablet 0    mometasone (ELOCON) 0.1 % ointment Apply topically daily.

## 2024-05-17 ENCOUNTER — TELEPHONE (OUTPATIENT)
Dept: PRIMARY CARE CLINIC | Age: 89
End: 2024-05-17

## 2024-05-17 DIAGNOSIS — M54.16 LUMBAR RADICULOPATHY: ICD-10-CM

## 2024-05-17 DIAGNOSIS — M54.12 CERVICAL RADICULITIS: Primary | ICD-10-CM

## 2024-05-17 LAB — SURGICAL PATHOLOGY REPORT: NORMAL

## 2024-05-17 NOTE — TELEPHONE ENCOUNTER
The pt's wife is calling because the pt was admitted into the hospital since his last PT order so he will need a new one, she said the PT was for his neck stiffness and the neuropathy in his feet, she is asking if it can be sent over to [facility] again

## 2024-05-20 ENCOUNTER — TELEPHONE (OUTPATIENT)
Dept: PRIMARY CARE CLINIC | Age: 89
End: 2024-05-20

## 2024-05-20 DIAGNOSIS — D50.0 BLOOD LOSS ANEMIA: ICD-10-CM

## 2024-05-20 NOTE — TELEPHONE ENCOUNTER
[facility] calling for a release stating that patient can resume physical therapy due to patient recently being hospitalized 5/9-5/14    [facility]   Fax. [phone]

## 2024-05-21 LAB
BASOPHILS ABSOLUTE: 0 K/UL (ref 0–0.2)
BASOPHILS RELATIVE PERCENT: 0 % (ref 0–2)
EOSINOPHILS ABSOLUTE: 0.11 K/UL (ref 0.05–0.5)
EOSINOPHILS RELATIVE PERCENT: 2 % (ref 0–6)
HCT VFR BLD CALC: 35.8 % (ref 37–54)
HEMOGLOBIN: 9.7 G/DL (ref 12.5–16.5)
LYMPHOCYTES ABSOLUTE: 0.42 K/UL (ref 1.5–4)
LYMPHOCYTES RELATIVE PERCENT: 7 % (ref 20–42)
MCH RBC QN AUTO: 25.2 PG (ref 26–35)
MCHC RBC AUTO-ENTMCNC: 27.1 G/DL (ref 32–34.5)
MCV RBC AUTO: 93 FL (ref 80–99.9)
MONOCYTES ABSOLUTE: 0.11 K/UL (ref 0.1–0.95)
MONOCYTES RELATIVE PERCENT: 2 % (ref 2–12)
NEUTROPHILS ABSOLUTE: 5.17 K/UL (ref 1.8–7.3)
NEUTROPHILS RELATIVE PERCENT: 89 % (ref 43–80)
PDW BLD-RTO: 18 % (ref 11.5–15)
PLATELET # BLD: 327 K/UL (ref 130–450)
PMV BLD AUTO: 10.4 FL (ref 7–12)
RBC # BLD: 3.85 M/UL (ref 3.8–5.8)
RBC # BLD: ABNORMAL 10*6/UL
WBC # BLD: 5.8 K/UL (ref 4.5–11.5)

## 2024-05-22 ENCOUNTER — CARE COORDINATION (OUTPATIENT)
Dept: CARE COORDINATION | Age: 89
End: 2024-05-22

## 2024-05-22 NOTE — CARE COORDINATION
-ACM spoke with patient to offer enrollment into Care Coordination program & RPM services.  -Introduced self, reason for call, and explanation of program.   -Patient interested but unable to enroll today.  Pt scheduled for enrollment next week.   -Plan: ACM will outreach patient at an agreed upon date and time for Care Coordination enrollment.

## 2024-05-28 ENCOUNTER — CARE COORDINATION (OUTPATIENT)
Dept: PRIMARY CARE CLINIC | Age: 89
End: 2024-05-28

## 2024-05-28 ENCOUNTER — CARE COORDINATION (OUTPATIENT)
Dept: CARE COORDINATION | Age: 89
End: 2024-05-28

## 2024-05-28 DIAGNOSIS — I50.42 CHRONIC COMBINED SYSTOLIC AND DIASTOLIC CHF (CONGESTIVE HEART FAILURE) (HCC): ICD-10-CM

## 2024-05-28 DIAGNOSIS — I10 ESSENTIAL HYPERTENSION: Primary | ICD-10-CM

## 2024-05-28 DIAGNOSIS — N18.32 STAGE 3B CHRONIC KIDNEY DISEASE (HCC): ICD-10-CM

## 2024-05-28 SDOH — ECONOMIC STABILITY: FOOD INSECURITY: WITHIN THE PAST 12 MONTHS, YOU WORRIED THAT YOUR FOOD WOULD RUN OUT BEFORE YOU GOT MONEY TO BUY MORE.: NEVER TRUE

## 2024-05-28 SDOH — ECONOMIC STABILITY: FOOD INSECURITY: WITHIN THE PAST 12 MONTHS, THE FOOD YOU BOUGHT JUST DIDN'T LAST AND YOU DIDN'T HAVE MONEY TO GET MORE.: NEVER TRUE

## 2024-05-28 SDOH — HEALTH STABILITY: PHYSICAL HEALTH: ON AVERAGE, HOW MANY DAYS PER WEEK DO YOU ENGAGE IN MODERATE TO STRENUOUS EXERCISE (LIKE A BRISK WALK)?: 2 DAYS

## 2024-05-28 SDOH — HEALTH STABILITY: PHYSICAL HEALTH: ON AVERAGE, HOW MANY MINUTES DO YOU ENGAGE IN EXERCISE AT THIS LEVEL?: 40 MIN

## 2024-05-28 ASSESSMENT — SOCIAL DETERMINANTS OF HEALTH (SDOH)
HOW OFTEN DO YOU ATTEND CHURCH OR RELIGIOUS SERVICES?: NEVER
HOW OFTEN DO YOU ATTENT MEETINGS OF THE CLUB OR ORGANIZATION YOU BELONG TO?: NEVER
HOW OFTEN DO YOU GET TOGETHER WITH FRIENDS OR RELATIVES?: TWICE A WEEK
HOW HARD IS IT FOR YOU TO PAY FOR THE VERY BASICS LIKE FOOD, HOUSING, MEDICAL CARE, AND HEATING?: NOT HARD AT ALL
IN A TYPICAL WEEK, HOW MANY TIMES DO YOU TALK ON THE PHONE WITH FAMILY, FRIENDS, OR NEIGHBORS?: MORE THAN THREE TIMES A WEEK
DO YOU BELONG TO ANY CLUBS OR ORGANIZATIONS SUCH AS CHURCH GROUPS UNIONS, FRATERNAL OR ATHLETIC GROUPS, OR SCHOOL GROUPS?: YES

## 2024-05-28 ASSESSMENT — LIFESTYLE VARIABLES: HOW OFTEN DO YOU HAVE A DRINK CONTAINING ALCOHOL: NEVER

## 2024-05-28 NOTE — PROGRESS NOTES
Remote Patient Monitoring Treatment Plan    Received request from [facility]/CTBritt Venegas RN   to order remote patient monitoring for in home monitoring of Kidney Disease; Condition managed by PCP.  CHF; Condition managed by PCP.  HTN; Condition managed by PCP.  and order completed.     Patient will be monitoring blood pressure   pulse ox   weight.      Patient will engage in Remote Patient Monitoring each day to develop the skills necessary for self management.       RPM Care Team Responsibilities:   Alerts will be reviewed daily and addressed within 2-4 hours during operational hours (Monday -Friday 9 am-4 pm)  Alert response and intervention documented in patient medical record  Alert response escalated to PCP per protocol and documented in patient medical record  Patient monitored over approximately  days  Discharge from program based on self-management readiness    See care coordination encounters for additional details.

## 2024-05-28 NOTE — CARE COORDINATION
Remote Patient Kit Ordering Note      Date/Time:  5/28/2024 3:49 PM      CCSS placed phone call to patient/family today to notify of RPM kit order; patient/family was available; discussed the following topics below and all questions answered.    [x] CCSS confirmed patient shipping address  [x] Patient will receive package over the next 1-3 business days. Someone 21 years or older must be present to sign for UPS delivery.  [x] HRS will contact patient within 24 hours, an HRS  will call the patient directly: If the patient does not answer, HRS will follow up with the clinical team notifying them about the unsuccessful attempt to contact the patient. HRS will make three call attempts to the patient.Provide patient with [facility] Virtual install number is: [phone].  [x] ACM will contact patient once equipment is active to welcome them to the program.                                                         [x] Hours of RPM monitoring - Monday-Friday 5577-3724; encourage patient to get vitals entered by Noon each day to have the alert addressed same day.  [[facility].                      ACM made aware the RPM kit has been ordered.

## 2024-05-29 RX ORDER — AMIODARONE HYDROCHLORIDE 200 MG/1
TABLET ORAL
Refills: 0 | OUTPATIENT
Start: 2024-05-29

## 2024-05-29 RX ORDER — MECLIZINE HYDROCHLORIDE 25 MG/1
TABLET ORAL
Refills: 0 | OUTPATIENT
Start: 2024-05-29

## 2024-05-29 RX ORDER — PRAVASTATIN SODIUM 40 MG
TABLET ORAL
Refills: 0 | OUTPATIENT
Start: 2024-05-29

## 2024-05-29 RX ORDER — POTASSIUM CHLORIDE 1.5 G/1.58G
20 POWDER, FOR SOLUTION ORAL DAILY
Qty: 90 PACKET | Refills: 3 | Status: SHIPPED | OUTPATIENT
Start: 2024-05-29 | End: 2025-05-29

## 2024-05-29 RX ORDER — AMIODARONE HYDROCHLORIDE 200 MG/1
200 TABLET ORAL DAILY
Qty: 90 TABLET | Refills: 1 | Status: SHIPPED | OUTPATIENT
Start: 2024-05-29

## 2024-05-29 NOTE — CARE COORDINATION
-ACM spoke with patient to offer enrollment into Care Coordination program & RPM services.  -Introduced self, reason for call, and explanation of program.   -Patient accepted.    -Plan: ACM will outreach patient at an agreed upon date and time for Care Coordination enrollment.     
PCP aware patient enrolled in Care Coordination.   
discussions? (Barriers include language, deafness, aphasia, alcohol or drug problems, learning difficulties, concentration): Clear and open communication, no identified barriers   Do other services need to be involved to help this patient?: Other care/services not in place and required   Are current services involved with this patient well-coordinated? (Include coordination with other services you are now recommendation): All required care/services in place and well-coordinated   Suggested Interventions and Community Resources  Fall Risk Prevention: Completed (Comment: 5-28-24 Will mail Fall precautions.) Disease Specific Clinic: Completed (Comment: 5-28-24 Pt is active with the CHF Clinic.)   Meals on Wheels: Completed (Comment: 5-28-24 Pt is active with Mom's meals.)   Other Services or Interventions: Will mail education for HTN, monitoring sodium, reading food labels and monitoring logs for BP, pulse ox and weight.   Medication Assistance Program: Completed (Comment: 5-28-24 Referral for Eliquis.)   Pharmacist: Declined   Physical Therapy: Completed (Comment: 5-28-24 Pt is active with [facility] outpatient.)   Registered Dietician: Declined   Social Work: Declined   Other Services: Completed (Comment: 5-28-24 Initiate RPM service.)   Zone Management Tools: Completed (Comment: 5-28-24 Will mail [facility] zone tool.)         Other Interventions: Set up/Review an Education Plan, Set up/Review Goals              Future Appointments   Date Time Provider Department Center   8/14/2024 10:15 AM [facility] ROOM 3 [facility]   8/27/2024 10:00 AM Geremias Koehler DO N [place] [facility]   8/29/2024  1:30 PM SCHEDULE, MHYX YTOWN DEVICE YTOWN [facility]     ,   Congestive Heart Failure Assessment    Are you currently restricting fluids?: No Restriction  Do you understand a low sodium diet?: Yes  Do you understand how to read food labels?: Yes  How many restaurant meals do you eat per week?: 1-2  Do you salt your food before

## 2024-06-04 ENCOUNTER — CARE COORDINATION (OUTPATIENT)
Dept: CARE COORDINATION | Age: 89
End: 2024-06-04

## 2024-06-04 NOTE — CARE COORDINATION
Ambulatory Care Coordination Note  2024    Patient [place] [facility] [facility] [place] [facility] [address]     ACM contacted the patient by telephone. Verified name and  with patient as identifiers. Provided introduction to self, and explanation of the ACM role.     Challenges to be reviewed by the provider   Additional needs identified to be addressed with provider: No  none               Method of communication with provider: none.    ACM: Britt Calzada RN    Offered patient enrollment in the Remote Patient Monitoring (RPM) program for in-home monitoring: Yes, patient enrolled; current status is activated and monitoring.    Remote Patient Monitoring Welcome Note  Date/Time:  2024 3:59 PM  Patient Current Location: Home: [address]  [place] [facility] [address]  Verified patients name and  as identifiers.  Completed and confirmed the following:   Emergency Contact: Annie Field, wife, P # [phone].  [x] Patient received all RPM equipment (tablet, scale, blood pressure device and cuff, and pulse oximeter)  Cuff Size: regular (9.05\"-15.74\")    Weight Scale:  regular (<330lbs)                    [x] Instructed patient keep box for use when returning equipment                                                          [x] Reviewed Patient Welcome Letter with patient    [x]  Reviewed Consent Form  Copy of consent form in chart.                 [x] Reviewed expectations for patient and care team  Monitoring hours M-F 9-4pm  It is important to take your vitals every day, even on the weekends,to keep your care team aware of how you are doing every day of the week.  Completing monitoring by 12pm on  so that alerts can be responded to in the same day  Patient weighs self at same time every day (or after urinating and waking up)  Take blood pressure 1-2 hrs after medications   RPM team may have different phone area code (including [place], [place], [place] or [place])                              [x] Instructed

## 2024-06-06 ENCOUNTER — CARE COORDINATION (OUTPATIENT)
Dept: CASE MANAGEMENT | Age: 89
End: 2024-06-06

## 2024-06-06 DIAGNOSIS — I48.19 PERSISTENT ATRIAL FIBRILLATION (HCC): ICD-10-CM

## 2024-06-06 RX ORDER — AMLODIPINE BESYLATE 5 MG/1
5 TABLET ORAL DAILY
Qty: 90 TABLET | Refills: 3 | Status: SHIPPED | OUTPATIENT
Start: 2024-06-06

## 2024-06-06 RX ORDER — POTASSIUM CHLORIDE 1.5 G/1.58G
POWDER, FOR SOLUTION ORAL
Refills: 0 | OUTPATIENT
Start: 2024-06-06

## 2024-06-06 NOTE — TELEPHONE ENCOUNTER
Patient is requesting the Potassium pill instead of the packet. Already received the packets from [facility], but patient said he spoke with [facility] and if we send over medication is pill form, he can send the packets back.

## 2024-06-06 NOTE — TELEPHONE ENCOUNTER
Patients last appointment 5/16/2024.  Patients next scheduled appointment   Future Appointments   Date Time Provider Department Center   8/14/2024 10:15 AM [facility] ROOM 3 [facility]   8/27/2024 10:00 AM Geremias Koehler DO N [place] [facility]   8/29/2024  1:30 PM SCHEDULE, MHYX YTOWN DEVICE YTOWN ELECTR [facility]

## 2024-06-06 NOTE — CARE COORDINATION
intervention in an effort to avoid hospitalization.  Report any worsening symptoms to PCP and/or Call 911 and/or GO TO  EMERGENCY ROOM if symptoms are severe or worsening.   Expresses understanding.     Plan/Follow Up: Will continue to review, monitor and address alerts with follow up based on severity of symptoms and risk factors.  **For any new or worsening symptoms, please contact your Provider or report to the nearest Emergency Room.**

## 2024-06-14 VITALS
OXYGEN SATURATION: 92 % | DIASTOLIC BLOOD PRESSURE: 54 MMHG | BODY MASS INDEX: 26.37 KG/M2 | SYSTOLIC BLOOD PRESSURE: 125 MMHG | HEART RATE: 56 BPM | WEIGHT: 163.4 LBS

## 2024-06-17 DIAGNOSIS — D50.0 BLOOD LOSS ANEMIA: ICD-10-CM

## 2024-06-17 LAB
BASOPHILS ABSOLUTE: 0.05 K/UL (ref 0–0.2)
BASOPHILS RELATIVE PERCENT: 1 % (ref 0–2)
EOSINOPHILS ABSOLUTE: 0.31 K/UL (ref 0.05–0.5)
EOSINOPHILS RELATIVE PERCENT: 4 % (ref 0–6)
HCT VFR BLD CALC: 33.7 % (ref 37–54)
HEMOGLOBIN: 10.3 G/DL (ref 12.5–16.5)
IMMATURE GRANULOCYTES %: 0 % (ref 0–5)
IMMATURE GRANULOCYTES ABSOLUTE: 0.03 K/UL (ref 0–0.58)
LYMPHOCYTES ABSOLUTE: 1.12 K/UL (ref 1.5–4)
LYMPHOCYTES RELATIVE PERCENT: 16 % (ref 20–42)
MCH RBC QN AUTO: 27.4 PG (ref 26–35)
MCHC RBC AUTO-ENTMCNC: 30.6 G/DL (ref 32–34.5)
MCV RBC AUTO: 89.6 FL (ref 80–99.9)
MONOCYTES ABSOLUTE: 0.69 K/UL (ref 0.1–0.95)
MONOCYTES RELATIVE PERCENT: 10 % (ref 2–12)
NEUTROPHILS ABSOLUTE: 4.93 K/UL (ref 1.8–7.3)
NEUTROPHILS RELATIVE PERCENT: 69 % (ref 43–80)
PDW BLD-RTO: 17.5 % (ref 11.5–15)
PLATELET # BLD: 269 K/UL (ref 130–450)
PMV BLD AUTO: 10.7 FL (ref 7–12)
RBC # BLD: 3.76 M/UL (ref 3.8–5.8)
WBC # BLD: 7.1 K/UL (ref 4.5–11.5)

## 2024-06-18 ENCOUNTER — CARE COORDINATION (OUTPATIENT)
Dept: CASE MANAGEMENT | Age: 89
End: 2024-06-18

## 2024-06-18 NOTE — CARE COORDINATION
-Remote Alert Monitoring Note  Date/Time:  2024 10:00 AM  Patient Current Location: [place]  Verified patients name and  as identifiers.    Rpm alert to be reviewed by the provider   red alert  weight (Gained 3# over night)  Vitals Recheck weight ( )  Additional needs to be addressed by provider: No         LPN contacted patient by telephone regarding red alert received   Background: CHF, HTN, KD  Refer to 911 immediately if:  Patient unresponsive or unable to provide history  Change in cognition or sudden confusion  Patient unable to respond in complete sentences  Intense chest pain/tightness  Any concern for any clinical emergency  Red Alert: Provider response time of 1 hr required for any red alert requiring intervention  Yellow Alert: Provider response time of 3hr required for any escalated yellow alert  Patient Chief Complaint:  Weight Scale Triage  Was your weight obtained upon rising/waking today? Yes   Was your weight obtained after voiding and/or use of the bathroom today? yes   Did you weigh yourself in the same amount of clothing today, compared to how you typically do? yes   Was the scale bumped or moved prior to today's weight? no   Is your scale on a flat/hard surface? yes   Did you obtain your weight with shoes on? no   If yes, is this something you normally do during your daily weights? no   Were you standing up straight on the scale today? yes   Were you leaning on anything while obtaining your weight today? no     Clinical Interventions: Reviewed and followed up on alerts and treatments-Patient has 3# weight gain over night.  Patient denies CP, SOB, Uses oxygen 2/L at night,No swelling, No scale issues or increased NA intake.  Patient says, \"I feel pretty good\".  Patient has not taken Bumex 2 mg daily yet today. He will take it after he returns from PT.  Will F/U tomorrow.        Advised Patient to contact PCP  regarding CP, SOB and any health concerns for early outpatient intervention in

## 2024-06-19 RX ORDER — POTASSIUM CHLORIDE 20 MEQ/1
20 TABLET, EXTENDED RELEASE ORAL DAILY
Qty: 30 TABLET | Refills: 5 | Status: SHIPPED
Start: 2024-06-19 | End: 2024-06-19 | Stop reason: SDUPTHER

## 2024-06-19 RX ORDER — POTASSIUM CHLORIDE 20 MEQ/1
20 TABLET, EXTENDED RELEASE ORAL DAILY
Qty: 30 TABLET | Refills: 5 | Status: SHIPPED
Start: 2024-06-19 | End: 2024-06-25

## 2024-06-21 ENCOUNTER — CARE COORDINATION (OUTPATIENT)
Dept: CASE MANAGEMENT | Age: 89
End: 2024-06-21

## 2024-06-25 RX ORDER — POTASSIUM CHLORIDE 20 MEQ/1
20 TABLET, EXTENDED RELEASE ORAL DAILY
Qty: 90 TABLET | Refills: 3 | Status: SHIPPED | OUTPATIENT
Start: 2024-06-25

## 2024-07-02 DIAGNOSIS — F41.9 ANXIETY: Primary | ICD-10-CM

## 2024-07-02 RX ORDER — ALPRAZOLAM 0.25 MG/1
TABLET ORAL
Qty: 135 TABLET | Refills: 5 | Status: SHIPPED | OUTPATIENT
Start: 2024-07-02 | End: 2024-12-29

## 2024-07-02 RX ORDER — ALPRAZOLAM 0.25 MG/1
0.25 TABLET ORAL NIGHTLY PRN
COMMUNITY
Start: 2024-05-26 | End: 2024-07-02 | Stop reason: SDUPTHER

## 2024-07-08 ENCOUNTER — CARE COORDINATION (OUTPATIENT)
Dept: CARE COORDINATION | Age: 89
End: 2024-07-08

## 2024-07-08 NOTE — CARE COORDINATION
Ambulatory Care Coordination Note  2024    Patient Current Location:  Home: [place] [address]     ACM contacted the patient by telephone. Verified name and  with patient as identifiers. Provided introduction to self, and explanation of the ACM role.     Challenges to be reviewed by the provider   Additional needs identified to be addressed with provider: No  none               Method of communication with provider: none.    ACM: Britt Calzada RN    Offered patient enrollment in the Remote Patient Monitoring (RPM) program for in-home monitoring: Yes, patient enrolled; current status is activated and monitoring.    ACM contacted patient to follow up on /his status regarding RPM, CHF, HTN, PAP (Eliquis) for Care Coordination.  -Pt has macular degeneration and his vision is limited. Pt said he has difficulty reading and writing even with his prescription eyeglasses and a magnifying glass.   -Pt has uses oxygen 1L/nc only at bedtime. Pt's oxygen company is [employer] in [place].   -Pt has a life alert button that his son pays for.   -Pt is active with [employer]. Pt receives a 10 day supply of low sodium meals every 2 weeks.    -Pt reports neuropathy in his feet is unchanged.   -Pt reports [facility] 2 x/week continues. 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 Pt said his neck is slowly feeling better. Pt said he does exercise PT exercises on off PT days intermittently.     -Pt reports he has a pedal machine that he pedals while sitting daily.     CHF/HTN:  -Pt has a BP monitor, scale, pulse ox.  Today's RPM vital signs: /50, HR 56-57, pulse ox 95%, room air, weight 166 lbs. Pt said his weight fluctuates between 163-166 lbs.   --16-24 PCP office visit: /52, HR 80. Pulse ox 96%, weight 167 lbs.   -Pt reports he keeps hydrated drinking water daily.     -Pt reports low sodium diet continues.   -Pt reports SOB with exertion if he walks too

## 2024-07-15 RX ORDER — PRAVASTATIN SODIUM 40 MG
40 TABLET ORAL DAILY
Qty: 30 TABLET | Refills: 5 | Status: SHIPPED | OUTPATIENT
Start: 2024-07-15

## 2024-07-17 DIAGNOSIS — D50.0 BLOOD LOSS ANEMIA: ICD-10-CM

## 2024-07-17 LAB
BASOPHILS ABSOLUTE: 0.04 K/UL (ref 0–0.2)
BASOPHILS RELATIVE PERCENT: 1 % (ref 0–2)
EOSINOPHILS ABSOLUTE: 0.21 K/UL (ref 0.05–0.5)
EOSINOPHILS RELATIVE PERCENT: 3 % (ref 0–6)
HCT VFR BLD CALC: 33 % (ref 37–54)
HEMOGLOBIN: 10.1 G/DL (ref 12.5–16.5)
IMMATURE GRANULOCYTES %: 0 % (ref 0–5)
IMMATURE GRANULOCYTES ABSOLUTE: <0.03 K/UL (ref 0–0.58)
LYMPHOCYTES ABSOLUTE: 1.11 K/UL (ref 1.5–4)
LYMPHOCYTES RELATIVE PERCENT: 16 % (ref 20–42)
MCH RBC QN AUTO: 27.5 PG (ref 26–35)
MCHC RBC AUTO-ENTMCNC: 30.6 G/DL (ref 32–34.5)
MCV RBC AUTO: 89.9 FL (ref 80–99.9)
MONOCYTES ABSOLUTE: 0.78 K/UL (ref 0.1–0.95)
MONOCYTES RELATIVE PERCENT: 11 % (ref 2–12)
NEUTROPHILS ABSOLUTE: 4.93 K/UL (ref 1.8–7.3)
NEUTROPHILS RELATIVE PERCENT: 69 % (ref 43–80)
PDW BLD-RTO: 17.2 % (ref 11.5–15)
PLATELET # BLD: 264 K/UL (ref 130–450)
PMV BLD AUTO: 11.1 FL (ref 7–12)
RBC # BLD: 3.67 M/UL (ref 3.8–5.8)
WBC # BLD: 7.1 K/UL (ref 4.5–11.5)

## 2024-07-27 DIAGNOSIS — I48.19 PERSISTENT ATRIAL FIBRILLATION (HCC): ICD-10-CM

## 2024-07-27 DIAGNOSIS — K92.1 GASTROINTESTINAL HEMORRHAGE WITH MELENA: ICD-10-CM

## 2024-07-29 RX ORDER — PANTOPRAZOLE SODIUM 40 MG/1
40 TABLET, DELAYED RELEASE ORAL DAILY
Qty: 90 TABLET | Refills: 3 | Status: SHIPPED | OUTPATIENT
Start: 2024-07-29

## 2024-08-08 ENCOUNTER — TELEPHONE (OUTPATIENT)
Dept: CARDIOLOGY CLINIC | Age: 89
End: 2024-08-08

## 2024-08-08 NOTE — TELEPHONE ENCOUNTER
I have been trying to reach the patient for a week to inform him that he needs to spend 367.89$ out of pocket before he qualifies for patient assistance. I did provide the patient with a 3 week supply on 8/5.

## 2024-08-12 ENCOUNTER — TELEPHONE (OUTPATIENT)
Dept: PRIMARY CARE CLINIC | Age: 89
End: 2024-08-12

## 2024-08-12 DIAGNOSIS — M54.12 CERVICAL RADICULITIS: ICD-10-CM

## 2024-08-12 DIAGNOSIS — M54.16 LUMBAR RADICULOPATHY: Primary | ICD-10-CM

## 2024-08-12 NOTE — TELEPHONE ENCOUNTER
[facility] needing an updated for physical therapy for cervical radiculitis and lumbar radiculopathy.     Fax. [phone]

## 2024-08-14 ENCOUNTER — HOSPITAL ENCOUNTER (OUTPATIENT)
Age: 89
Discharge: HOME | End: 2024-08-14

## 2024-08-14 ENCOUNTER — HOSPITAL ENCOUNTER (OUTPATIENT)
Dept: OTHER | Age: 89
Setting detail: THERAPIES SERIES
Discharge: HOME | End: 2024-08-14
Payer: MEDICARE

## 2024-08-14 VITALS
BODY MASS INDEX: 27.12 KG/M2 | RESPIRATION RATE: 16 BRPM | DIASTOLIC BLOOD PRESSURE: 48 MMHG | HEART RATE: 55 BPM | OXYGEN SATURATION: 97 % | SYSTOLIC BLOOD PRESSURE: 132 MMHG | WEIGHT: 168 LBS

## 2024-08-14 DIAGNOSIS — Z00.00 MEDICARE ANNUAL WELLNESS VISIT, SUBSEQUENT: ICD-10-CM

## 2024-08-14 DIAGNOSIS — I50.42 CHRONIC COMBINED SYSTOLIC AND DIASTOLIC CHF (CONGESTIVE HEART FAILURE) (HCC): ICD-10-CM

## 2024-08-14 DIAGNOSIS — D50.0 IRON DEFICIENCY ANEMIA DUE TO CHRONIC BLOOD LOSS: ICD-10-CM

## 2024-08-14 DIAGNOSIS — I48.20 CHRONIC ATRIAL FIBRILLATION (HCC): ICD-10-CM

## 2024-08-14 DIAGNOSIS — N18.32 STAGE 3B CHRONIC KIDNEY DISEASE (HCC): ICD-10-CM

## 2024-08-14 DIAGNOSIS — R79.89 OTHER SPECIFIED ABNORMAL FINDINGS OF BLOOD CHEMISTRY: ICD-10-CM

## 2024-08-14 DIAGNOSIS — I71.40 ABDOMINAL AORTIC ANEURYSM (AAA) 3.0 CM TO 5.5 CM IN DIAMETER IN MALE (HCC): ICD-10-CM

## 2024-08-14 LAB
ALBUMIN SERPL-MCNC: 4 G/DL (ref 3.5–5.2)
ALP SERPL-CCNC: 189 U/L (ref 40–129)
ALT SERPL-CCNC: 13 U/L (ref 0–40)
ANION GAP SERPL CALCULATED.3IONS-SCNC: 11 MMOL/L (ref 7–16)
AST SERPL-CCNC: 25 U/L (ref 0–39)
BASOPHILS # BLD: 0.05 K/UL (ref 0–0.2)
BASOPHILS NFR BLD: 1 % (ref 0–2)
BILIRUB DIRECT SERPL-MCNC: <0.2 MG/DL (ref 0–0.3)
BILIRUB INDIRECT SERPL-MCNC: ABNORMAL MG/DL (ref 0–1)
BILIRUB SERPL-MCNC: 0.4 MG/DL (ref 0–1.2)
BILIRUB UR QL STRIP: NEGATIVE
BNP SERPL-MCNC: 1201 PG/ML (ref 0–450)
BUN SERPL-MCNC: 22 MG/DL (ref 6–23)
CALCIUM SERPL-MCNC: 9.1 MG/DL (ref 8.6–10.2)
CHLORIDE SERPL-SCNC: 94 MMOL/L (ref 98–107)
CHOLEST SERPL-MCNC: 144 MG/DL
CLARITY UR: CLEAR
CO2 SERPL-SCNC: 30 MMOL/L (ref 22–29)
COLOR UR: YELLOW
CREAT SERPL-MCNC: 1.4 MG/DL (ref 0.7–1.2)
CREAT UR-MCNC: 105.5 MG/DL (ref 40–278)
EOSINOPHIL # BLD: 0.1 K/UL (ref 0.05–0.5)
EOSINOPHILS RELATIVE PERCENT: 1 % (ref 0–6)
ERYTHROCYTE [DISTWIDTH] IN BLOOD BY AUTOMATED COUNT: 15.2 % (ref 11.5–15)
GFR, ESTIMATED: 50 ML/MIN/1.73M2
GLUCOSE SERPL-MCNC: 96 MG/DL (ref 74–99)
GLUCOSE UR STRIP-MCNC: NEGATIVE MG/DL
HBA1C MFR BLD: 5.5 % (ref 4–5.6)
HCT VFR BLD AUTO: 30.5 % (ref 37–54)
HDLC SERPL-MCNC: 38 MG/DL
HGB BLD-MCNC: 9.4 G/DL (ref 12.5–16.5)
HGB UR QL STRIP.AUTO: NEGATIVE
IMM GRANULOCYTES # BLD AUTO: <0.03 K/UL (ref 0–0.58)
IMM GRANULOCYTES NFR BLD: 0 % (ref 0–5)
KETONES UR STRIP-MCNC: NEGATIVE MG/DL
LDLC SERPL CALC-MCNC: 86 MG/DL
LEUKOCYTE ESTERASE UR QL STRIP: NEGATIVE
LYMPHOCYTES NFR BLD: 0.97 K/UL (ref 1.5–4)
LYMPHOCYTES RELATIVE PERCENT: 14 % (ref 20–42)
MCH RBC QN AUTO: 26.5 PG (ref 26–35)
MCHC RBC AUTO-ENTMCNC: 30.8 G/DL (ref 32–34.5)
MCV RBC AUTO: 85.9 FL (ref 80–99.9)
MICROALBUMIN UR-MCNC: 19 MG/L (ref 0–19)
MICROALBUMIN/CREAT UR-RTO: 18 MCG/MG CREAT (ref 0–30)
MONOCYTES NFR BLD: 0.86 K/UL (ref 0.1–0.95)
MONOCYTES NFR BLD: 12 % (ref 2–12)
NEUTROPHILS NFR BLD: 71 % (ref 43–80)
NEUTS SEG NFR BLD: 4.95 K/UL (ref 1.8–7.3)
NITRITE UR QL STRIP: NEGATIVE
PH UR STRIP: 7 [PH] (ref 5–9)
PLATELET # BLD AUTO: 232 K/UL (ref 130–450)
PMV BLD AUTO: 10.7 FL (ref 7–12)
POTASSIUM SERPL-SCNC: 4.3 MMOL/L (ref 3.5–5)
PROT SERPL-MCNC: 6.7 G/DL (ref 6.4–8.3)
PROT UR STRIP-MCNC: NEGATIVE MG/DL
RBC # BLD AUTO: 3.55 M/UL (ref 3.8–5.8)
RBC #/AREA URNS HPF: NORMAL /HPF
SODIUM SERPL-SCNC: 135 MMOL/L (ref 132–146)
SP GR UR STRIP: 1.01 (ref 1–1.03)
T4 FREE SERPL-MCNC: 1.7 NG/DL (ref 0.9–1.7)
TRIGL SERPL-MCNC: 102 MG/DL
TSH SERPL DL<=0.05 MIU/L-ACNC: 2.6 UIU/ML (ref 0.27–4.2)
URATE SERPL-MCNC: 6.8 MG/DL (ref 3.4–7)
UROBILINOGEN UR STRIP-ACNC: 0.2 EU/DL (ref 0–1)
VLDLC SERPL CALC-MCNC: 20 MG/DL
WBC #/AREA URNS HPF: NORMAL /HPF
WBC OTHER # BLD: 7 K/UL (ref 4.5–11.5)

## 2024-08-14 PROCEDURE — 83880 ASSAY OF NATRIURETIC PEPTIDE: CPT

## 2024-08-14 PROCEDURE — 84550 ASSAY OF BLOOD/URIC ACID: CPT

## 2024-08-14 PROCEDURE — 80061 LIPID PANEL: CPT

## 2024-08-14 PROCEDURE — 85025 COMPLETE CBC W/AUTO DIFF WBC: CPT

## 2024-08-14 PROCEDURE — 81001 URINALYSIS AUTO W/SCOPE: CPT

## 2024-08-14 PROCEDURE — 99214 OFFICE O/P EST MOD 30 MIN: CPT

## 2024-08-14 PROCEDURE — 84439 ASSAY OF FREE THYROXINE: CPT

## 2024-08-14 PROCEDURE — 84443 ASSAY THYROID STIM HORMONE: CPT

## 2024-08-14 PROCEDURE — 82570 ASSAY OF URINE CREATININE: CPT

## 2024-08-14 PROCEDURE — 83036 HEMOGLOBIN GLYCOSYLATED A1C: CPT

## 2024-08-14 PROCEDURE — 82248 BILIRUBIN DIRECT: CPT

## 2024-08-14 PROCEDURE — 36415 COLL VENOUS BLD VENIPUNCTURE: CPT

## 2024-08-14 PROCEDURE — 80053 COMPREHEN METABOLIC PANEL: CPT

## 2024-08-14 PROCEDURE — 82043 UR ALBUMIN QUANTITATIVE: CPT

## 2024-08-14 ASSESSMENT — PATIENT HEALTH QUESTIONNAIRE - PHQ9
2. FEELING DOWN, DEPRESSED OR HOPELESS: NOT AT ALL
SUM OF ALL RESPONSES TO PHQ QUESTIONS 1-9: 0
SUM OF ALL RESPONSES TO PHQ9 QUESTIONS 1 & 2: 0
SUM OF ALL RESPONSES TO PHQ QUESTIONS 1-9: 0
SUM OF ALL RESPONSES TO PHQ QUESTIONS 1-9: 0
1. LITTLE INTEREST OR PLEASURE IN DOING THINGS: NOT AT ALL
SUM OF ALL RESPONSES TO PHQ QUESTIONS 1-9: 0

## 2024-08-14 NOTE — DISCHARGE INSTRUCTIONS
options.     Tell your doctor ALL the medicines, vitamins, supplements, and herbal remedies you take. Some may increase the risk of problems during your procedure. Your doctor will tell you if you should stop taking any of them before the procedure and how soon to do it.     If you take a medicine that prevents blood clots, your doctor may tell you to stop taking it before your procedure. Or your doctor may tell you to keep taking it. (These medicines include aspirin and other blood thinners.) Make sure that you understand exactly what your doctor wants you to do.     You may have a test before the procedure. This may be a CT scan.     Make sure your doctor and the hospital have a copy of your advance directive. If you don't have one, you may want to prepare one. It lets others know your health care wishes. It's a good thing to have before any type of surgery or procedure.   What happens on the day of the procedure?    Follow the instructions exactly about when to stop eating and drinking. If you don't, your procedure may be canceled. If your doctor told you to take your medicines on the day of the procedure, take them with only a sip of water.     Follow your doctor's instructions about when to bathe or shower before your procedure. Do not apply lotions, perfumes, deodorants, or nail polish.     Do not shave the surgical site yourself.     Take off all jewelry and piercings. And take out contact lenses, if you wear them.   At the hospital or surgery center    Bring a picture ID.     You will be kept comfortable and safe by your anesthesia provider. The anesthesia may make you sleep. Or it may just numb the area being worked on.     The procedure will take about 1 hour.   After the procedure    Pressure may be applied to the area where the catheter was put in your blood vessel. This will help prevent bleeding. A small device may also be used to close the blood vessel. You may have a bandage or a compression device

## 2024-08-14 NOTE — PROGRESS NOTES
Congestive Heart Failure Clinic   [facility]       Referring Provider: GINETTE Ward-CNP  Primary Care Physician: Geremias Koehler DO   Cardiologist: Dr Jorgensen  Nephrologist:       HISTORY OF PRESENT ILLNESS:     Niles Field is a 88 y.o. (1935) male with a history of HFpEF (EF> 50%), most recent EF:  Lab Results   Component Value Date    LVEF 58 02/06/2023    LVEFMODE Echo 04/19/2018         He presents to the CHF clinic for ongoing evaluation and monitoring of heart failure.    In the CHF clinic today he denies any adverse symptoms except:  [] Dizziness or lightheadedness   [] Syncope or near syncope  [] Recent Fall  [] Shortness of breath at rest   [] Dyspnea with exertion  [] Decline in functional capacity (unable to perform activities they had previously been able to do)  [] Fatigue   [] Orthopnea  [] PND  [] Nocturnal cough  [] Hemoptysis  [] Chest pain, pressure, or discomfort  [] Palpitations  [] Abdominal distention  [] Abdominal bloating  [] Early satiety  [] Blood in stool   [] Diarrhea  [] Constipation  [] Nausea/Vomiting  [] Decreased urinary response to oral diuretic   [] Scrotal swelling   [x] Lower extremity edema  [] Used PRN doses of oral diuretic   [] Weight gain    Wt Readings from Last 3 Encounters:   08/14/24 76.2 kg (168 lb)   08/14/24 75 kg (165 lb 6.4 oz)   05/16/24 75.8 kg (167 lb)       SOCIAL HISTORY:  [x] Denies tobacco, alcohol or illicit drug abuse  [] Tobacco use:  [] ETOH use:  [] Illicit drug use:        MEDICATIONS:    No Known Allergies  Prior to Visit Medications    Medication Sig Taking? Authorizing Provider   apixaban (ELIQUIS) 2.5 MG TABS tablet Take 1 tablet by mouth 2 times daily  Oli Jorgensen,    pantoprazole (PROTONIX) 40 MG tablet TAKE 1 TABLET BY MOUTH DAILY  Geremias Koehler,    metoprolol tartrate (LOPRESSOR) 25 MG tablet TAKE 1 TABLET BY MOUTH TWICE  DAILY  Geremias Koehler,    pravastatin (PRAVACHOL) 40 MG

## 2024-08-15 ENCOUNTER — CARE COORDINATION (OUTPATIENT)
Dept: CARE COORDINATION | Age: 89
End: 2024-08-15

## 2024-08-15 NOTE — CARE COORDINATION
Ambulatory Care Coordination Note  8/15/2024    Patient Current Location:  Home: [place] [address]     ACM contacted the patient by telephone. Verified name and  with patient as identifiers. Provided introduction to self, and explanation of the ACM role.     Challenges to be reviewed by the provider   Additional needs identified to be addressed with provider: No  none               Method of communication with provider: none.    ACM: Britt Calzada RN    Offered patient enrollment in the Remote Patient Monitoring (RPM) program for in-home monitoring: Yes, patient enrolled; current status is activated and monitoring.    ACM contacted patient to follow up on his status regarding RPM, CHF, HTN, PAP (Eliquis) for Care Coordination.  -Pt has macular degeneration with limited vision. Pt has difficulty reading and writing even with his prescription eyeglasses and a magnifying glass.   -Pt uses oxygen 1L/nc only at bedtime. Pt's oxygen company is [employer] in [place].   -Pt has a life alert button that his son pays for.   -Pt is active with [employer]. Pt receives a 10 day supply of low sodium meals every 2 weeks.    -Pt reports [facility] 2 x/week was renewed and he will be restarting Thursday, 24.  Pt said the PT is cervical and strengthening his legs.  -Pt reports he has a pedal machine that he pedals while sitting daily.      CHF/HTN:  -Pt has a BP monitor, scale, pulse ox.  Yesterday's RPM vital signs: /64, HR 58-64, pulse ox 95%, room air, weight 165 lbs.  -24 PCP office visit: /52, HR 80. Pulse ox 96%, weight 167 lbs.   -Pt reports he keeps hydrated drinking water daily.     -Pt reports low sodium diet continues.   -Pt reports SOB with exertion if he walks too far is baseline. Occasional productive cough with clear beige mucous. No wheeze or ankle/feet edema.  Pt said knee high compression socks are on.   -CHF Clinic appt 25.    Resources  -Pt reports PAP

## 2024-08-16 ENCOUNTER — CARE COORDINATION (OUTPATIENT)
Dept: CASE MANAGEMENT | Age: 89
End: 2024-08-16

## 2024-08-16 NOTE — CARE COORDINATION
Remote Patient Monitoring Note      Date/Time:  2024 2:50 PM  Patient Current Location: [place]  LPN contacted patient by telephone regarding red alert received for No BP for 2 days. Verified patients name and  as identifiers.  Background:   Clinical Interventions: Reviewed and followed up on alerts and treatments-       Called and spoke with patient. He says the BP cuff won't work.  E-mailed [facility] to have them call the patient and trouble shoot the problem.    Lesley Patrick LPN    [phone]  [facility] Coordinator / RPM    Plan/Follow Up: Will continue to review, monitor and address alerts with follow up based on severity of symptoms and risk factors.

## 2024-08-27 ENCOUNTER — OFFICE VISIT (OUTPATIENT)
Dept: PRIMARY CARE CLINIC | Age: 89
End: 2024-08-27
Payer: MEDICARE

## 2024-08-27 VITALS
TEMPERATURE: 97.4 F | WEIGHT: 173 LBS | OXYGEN SATURATION: 93 % | BODY MASS INDEX: 27.8 KG/M2 | HEART RATE: 59 BPM | SYSTOLIC BLOOD PRESSURE: 130 MMHG | DIASTOLIC BLOOD PRESSURE: 54 MMHG | HEIGHT: 66 IN

## 2024-08-27 DIAGNOSIS — N18.32 STAGE 3B CHRONIC KIDNEY DISEASE (HCC): Primary | ICD-10-CM

## 2024-08-27 DIAGNOSIS — I50.42 CHRONIC COMBINED SYSTOLIC AND DIASTOLIC CHF (CONGESTIVE HEART FAILURE) (HCC): ICD-10-CM

## 2024-08-27 DIAGNOSIS — H61.23 CERUMEN DEBRIS ON TYMPANIC MEMBRANE OF BOTH EARS: ICD-10-CM

## 2024-08-27 DIAGNOSIS — I10 ESSENTIAL HYPERTENSION: ICD-10-CM

## 2024-08-27 DIAGNOSIS — D50.9 IRON DEFICIENCY ANEMIA, UNSPECIFIED IRON DEFICIENCY ANEMIA TYPE: ICD-10-CM

## 2024-08-27 DIAGNOSIS — I71.40 ABDOMINAL AORTIC ANEURYSM (AAA) 3.0 CM TO 5.5 CM IN DIAMETER IN MALE (HCC): ICD-10-CM

## 2024-08-27 DIAGNOSIS — H93.13 TINNITUS OF BOTH EARS: ICD-10-CM

## 2024-08-27 DIAGNOSIS — I48.19 PERSISTENT ATRIAL FIBRILLATION (HCC): ICD-10-CM

## 2024-08-27 PROCEDURE — 1036F TOBACCO NON-USER: CPT | Performed by: FAMILY MEDICINE

## 2024-08-27 PROCEDURE — 1123F ACP DISCUSS/DSCN MKR DOCD: CPT | Performed by: FAMILY MEDICINE

## 2024-08-27 PROCEDURE — 99214 OFFICE O/P EST MOD 30 MIN: CPT | Performed by: FAMILY MEDICINE

## 2024-08-27 PROCEDURE — G8417 CALC BMI ABV UP PARAM F/U: HCPCS | Performed by: FAMILY MEDICINE

## 2024-08-27 PROCEDURE — G8427 DOCREV CUR MEDS BY ELIG CLIN: HCPCS | Performed by: FAMILY MEDICINE

## 2024-08-27 RX ORDER — FERROUS SULFATE 325(65) MG
325 TABLET ORAL EVERY OTHER DAY
Qty: 30 TABLET | Refills: 1 | Status: SHIPPED | OUTPATIENT
Start: 2024-08-27

## 2024-08-27 ASSESSMENT — ENCOUNTER SYMPTOMS
COUGH: 0
EYES NEGATIVE: 1
GASTROINTESTINAL NEGATIVE: 1
TROUBLE SWALLOWING: 0
BACK PAIN: 1
SORE THROAT: 0
WHEEZING: 0

## 2024-08-27 NOTE — PROGRESS NOTES
Niles Field (:  1935) is a 88 y.o. male,Established patient, here for evaluation of the following chief complaint(s):  6 Month Follow-Up         Assessment & Plan  Stage 3b chronic kidney disease (HCC)       Orders:    CBC with Auto Differential; Standing    Basic Metabolic Panel; Standing    Magnesium; Standing    Iron deficiency anemia, unspecified iron deficiency anemia type       Orders:    CBC with Auto Differential; Standing    Basic Metabolic Panel; Standing    Magnesium; Standing    ferrous sulfate (IRON 325) 325 (65 Fe) MG tablet; Take 1 tablet by mouth every other day    Cerumen debris on tympanic membrane of both ears       Orders:    carbamide peroxide (DEBROX) 6.5 % otic solution; Place 5 drops in ear(s) 2 times daily Leave for 10 minutes and flush with warm water    Tinnitus of both ears       Orders:    carbamide peroxide (DEBROX) 6.5 % otic solution; Place 5 drops in ear(s) 2 times daily Leave for 10 minutes and flush with warm water    At this time we will continue to monitor his blood work every 4 weeks.  Will have him start taking iron  because his hemoglobin did drop slightly.  Will send over Debrox to help with the cerumen issue.  Tinnitus may be secondary to age-related hearing loss versus ototoxicity from his Bumex.  See him back in 6 months or sooner based on lab results and patient condition.  Continue to follow with specialist as indicated.  Chronic combined systolic and diastolic CHF (congestive heart failure) (HCC)            Persistent atrial fibrillation (HCC)            Abdominal aortic aneurysm (AAA) 3.0 cm to 5.5 cm in diameter in male (HCC)  Will need to discuss further imaging at repeat visit.         Essential hypertension              Return in about 6 months (around 2025).       Subjective   HPI  Patient presents today for follow-up on chronic issues and medication refills.  Recent labs showed slight decrease in his hemoglobin level.   (PRAVACHOL) 40 MG tablet, Take 1 tablet by mouth daily, Disp: 30 tablet, Rfl: 5    ALPRAZolam (XANAX) 0.25 MG tablet, 1 and 1/2 tablets daily (Patient taking differently: Take 2 tablets by mouth nightly as needed for Sleep.), Disp: 135 tablet, Rfl: 5    amLODIPine (NORVASC) 5 MG tablet, TAKE 1 TABLET BY MOUTH DAILY, Disp: 90 tablet, Rfl: 3    amiodarone (CORDARONE) 200 MG tablet, Take 1 tablet by mouth daily, Disp: 90 tablet, Rfl: 1    meclizine (ANTIVERT) 25 MG tablet, TAKE 1 TABLET BY MOUTH DAILY AS  NEEDED FOR DIZZINESS, Disp: 90 tablet, Rfl: 0    bumetanide (BUMEX) 2 MG tablet, Take 1 tablet by mouth daily Takes second dose prn per DR GUS Jorgensen, Disp: 180 tablet, Rfl: 3    OXYGEN, Inhale into the lungs 2L at nite, Disp: , Rfl:     MAGNESIUM-OXIDE 400 (240 Mg) MG tablet, Take 1 tablet by mouth Daily with supper, Disp: , Rfl:     Cholecalciferol (VITAMIN D) 2000 UNITS CAPS capsule, Take 2,000 Units by mouth Daily with lunch, Disp: , Rfl:    Patient Active Problem List   Diagnosis    Chronic combined systolic and diastolic CHF (congestive heart failure) ([facility])    S/P TAVR (transcatheter aortic valve replacement)    Coronary artery disease involving native coronary artery of native heart without angina pectoris    Essential hypertension    High risk medications (not anticoagulants) long-term use    Hyperlipidemia LDL goal <100    GIB (gastrointestinal bleeding)    ICD (implantable cardioverter-defibrillator), dual, in situ    Ischemic heart disease    Vitamin D deficiency    Other insomnia    SOB (shortness of breath)    Lung nodule    Pure hypercholesterolemia    Stage 3b chronic kidney disease ([facility])    Transient cerebral ischemia    Aortic valve disease    Near syncope    Gastroesophageal reflux disease without esophagitis    Coronary artery disease involving coronary bypass graft    Symptomatic anemia    GI bleed    Abdominal aortic aneurysm (AAA) 3.0 cm to 5.5 cm in diameter in male ([facility])    ICD (implantable

## 2024-08-27 NOTE — ASSESSMENT & PLAN NOTE
Orders:    CBC with Auto Differential; Standing    Basic Metabolic Panel; Standing    Magnesium; Standing

## 2024-08-27 NOTE — ASSESSMENT & PLAN NOTE
Orders:    CBC with Auto Differential; Standing    Basic Metabolic Panel; Standing    Magnesium; Standing    ferrous sulfate (IRON 325) 325 (65 Fe) MG tablet; Take 1 tablet by mouth every other day

## 2024-08-30 ENCOUNTER — CARE COORDINATION (OUTPATIENT)
Dept: CARE COORDINATION | Age: 89
End: 2024-08-30

## 2024-08-30 NOTE — CARE COORDINATION
Remote Alert Monitoring Note  Date/Time:  2024 8:35 AM  Patient Current Location: [place]  Verified patients name and  as identifiers.  Rpm alert to be reviewed by the provider   red alert  pulse ox reading (91%)  Vitals Recheck pulse ox reading (93%)  Additional needs to be addressed by provider: No     LPN contacted patient by telephone regarding red alert received   Background: Pt enrolled in RPM r/t HTN, CHF, Kidney Disease.  Refer to 911 immediately if:  Patient unresponsive or unable to provide history  Change in cognition or sudden confusion  Patient unable to respond in complete sentences  Intense chest pain/tightness  Any concern for any clinical emergency  Red Alert: Provider response time of 1 hr required for any red alert requiring intervention  Yellow Alert: Provider response time of 3hr required for any escalated yellow alert  Patient Chief Complaint:  O2 Triage  Are you having any Chest Pain? no   Are you having any Shortness of Breath? no   Swelling in your hands or feet? no   Are you having any other health concerns or issues? no   Clinical Interventions: Reviewed and followed up on alerts and treatments-Pt speaking in full sentences, no apparent distress.  Pt denies any SOB/dyspnea and verbalizes compliance with O2 at 2 L at HS.  States monitor records to fast when he puts it on.  He is agreeable to recheck oxygen level at this time with updated reading of 93%.  Oxygen level now within RPM parameters.  Pt education provided that if oxygen level below 92% and pt in no distress, he should reposition oximeter and recheck metric.  Pt verbalizes understanding.     Plan/Follow Up: Will continue to review, monitor and address alerts with follow up based on severity of symptoms and risk factors.  **For any new or worsening symptoms or you are concerned in anyway, please contact your Provider or report to the nearest Emergency Room.**

## 2024-09-03 DIAGNOSIS — F41.9 ANXIETY: ICD-10-CM

## 2024-09-03 RX ORDER — PRAVASTATIN SODIUM 40 MG
40 TABLET ORAL DAILY
Qty: 90 TABLET | Refills: 3 | Status: SHIPPED | OUTPATIENT
Start: 2024-09-03

## 2024-09-03 RX ORDER — MECLIZINE HYDROCHLORIDE 25 MG/1
25 TABLET ORAL DAILY PRN
Qty: 90 TABLET | Refills: 0 | Status: SHIPPED | OUTPATIENT
Start: 2024-09-03

## 2024-09-03 RX ORDER — ALPRAZOLAM 0.25 MG/1
TABLET ORAL
Qty: 135 TABLET | Refills: 2 | Status: SHIPPED | OUTPATIENT
Start: 2024-09-03 | End: 2025-03-02

## 2024-09-04 ENCOUNTER — HOSPITAL ENCOUNTER (OUTPATIENT)
Dept: OTHER | Age: 89
Setting detail: THERAPIES SERIES
Discharge: HOME | End: 2024-09-04
Payer: MEDICARE

## 2024-09-04 ENCOUNTER — TELEPHONE (OUTPATIENT)
Dept: OTHER | Age: 89
End: 2024-09-04

## 2024-09-04 VITALS
RESPIRATION RATE: 18 BRPM | HEART RATE: 65 BPM | OXYGEN SATURATION: 97 % | SYSTOLIC BLOOD PRESSURE: 117 MMHG | BODY MASS INDEX: 27.76 KG/M2 | WEIGHT: 172 LBS | DIASTOLIC BLOOD PRESSURE: 58 MMHG

## 2024-09-04 LAB
ANION GAP SERPL CALCULATED.3IONS-SCNC: 11 MMOL/L (ref 7–16)
BNP SERPL-MCNC: 4813 PG/ML (ref 0–450)
BUN SERPL-MCNC: 20 MG/DL (ref 6–23)
CALCIUM SERPL-MCNC: 8.7 MG/DL (ref 8.6–10.2)
CHLORIDE SERPL-SCNC: 94 MMOL/L (ref 98–107)
CO2 SERPL-SCNC: 30 MMOL/L (ref 22–29)
CREAT SERPL-MCNC: 1.4 MG/DL (ref 0.7–1.2)
GFR, ESTIMATED: 49 ML/MIN/1.73M2
GLUCOSE SERPL-MCNC: 107 MG/DL (ref 74–99)
POTASSIUM SERPL-SCNC: 4 MMOL/L (ref 3.5–5)
SODIUM SERPL-SCNC: 135 MMOL/L (ref 132–146)

## 2024-09-04 PROCEDURE — 36415 COLL VENOUS BLD VENIPUNCTURE: CPT

## 2024-09-04 PROCEDURE — 6360000002 HC RX W HCPCS

## 2024-09-04 PROCEDURE — 83880 ASSAY OF NATRIURETIC PEPTIDE: CPT

## 2024-09-04 PROCEDURE — 96374 THER/PROPH/DIAG INJ IV PUSH: CPT

## 2024-09-04 PROCEDURE — 2580000003 HC RX 258: Performed by: INTERNAL MEDICINE

## 2024-09-04 PROCEDURE — 80048 BASIC METABOLIC PNL TOTAL CA: CPT

## 2024-09-04 PROCEDURE — 99215 OFFICE O/P EST HI 40 MIN: CPT

## 2024-09-04 RX ORDER — BUMETANIDE 0.25 MG/ML
2 INJECTION INTRAMUSCULAR; INTRAVENOUS ONCE
Status: COMPLETED | OUTPATIENT
Start: 2024-09-04 | End: 2024-09-04

## 2024-09-04 RX ORDER — BUMETANIDE 0.25 MG/ML
INJECTION INTRAMUSCULAR; INTRAVENOUS
Status: COMPLETED
Start: 2024-09-04 | End: 2024-09-04

## 2024-09-04 RX ORDER — POTASSIUM CHLORIDE 750 MG/1
10 TABLET, EXTENDED RELEASE ORAL DAILY PRN
Qty: 90 TABLET | Refills: 1 | Status: SHIPPED | OUTPATIENT
Start: 2024-09-04

## 2024-09-04 RX ORDER — SODIUM CHLORIDE 0.9 % (FLUSH) 0.9 %
10 SYRINGE (ML) INJECTION ONCE
Status: COMPLETED | OUTPATIENT
Start: 2024-09-04 | End: 2024-09-04

## 2024-09-04 RX ADMIN — SODIUM CHLORIDE, PRESERVATIVE FREE 10 ML: 5 INJECTION INTRAVENOUS at 10:47

## 2024-09-04 RX ADMIN — BUMETANIDE 2 MG: 0.25 INJECTION INTRAMUSCULAR; INTRAVENOUS at 10:45

## 2024-09-04 NOTE — PROGRESS NOTES
provided   [x] Low sodium diet (2000 mg)  Barriers to compliance  [] Refuses to monitor diet  [] Socioeconomic difficulties  [] Unable to cook for self (use of frozen meals, can goods, etc)  [] CHF CHW consulted  [] Low sodium meal delivery options given to patient  [] Dietician consulted   [] Low sodium recipes provided  [] Sodium free seasoning provided   [x] Fluid intake 6-8 cups (around 64 oz)  [x] Reviewed currently prescribed medications with patient, educated on importance of compliance and answered any questions regarding their medication  [] Pill box provided to patient  [] Patient using pill packing pharmacy   [] CPAP/BiPAP use  [] Low impact exercise / cardiac rehab   [] LifeVest use  [x] Patient aware of signs and symptoms of worsening HF, CHF clinic phone number provided and made aware to call clinic for sooner if evaluation if needed     [] Difficulty affording medications  [] CHF CHW consulted  [] Prescription assistance information given   [] [facility] medication assistance program information given   [] Sample medications provided to patient to help bridge gap until affordability N/A          Scheduled to follow up in CHF clinic on:   Future Appointments   Date Time Provider Department Center   9/11/2024  7:30 AM [facility] ROOM 2 [facility]   9/18/2024 11:00 AM SCHEDULE, MHYX YTOWN DEVICE YTOWN ELECTR [facility]   2/12/2025 10:15 AM [facility] ROOM 1 [facility]   2/25/2025 10:30 AM Geremias Koehler DO N [facility]A [facility] DEP

## 2024-09-04 NOTE — RESULT ENCOUNTER NOTE
Labs and CHF clinic note reviewed  Patient came in for STAT appointment  Received IV diuretics in clinic today    Please have him increase Bumex 2 mg twice daily for the next three days  Take 10 meq potassium x 3 days  Return to CHF Clinic in 1 week - sooner if needed    Thank you

## 2024-09-04 NOTE — TELEPHONE ENCOUNTER
----- Message from GINETTE Ward CNP sent at 9/4/2024 12:04 PM EDT -----  Labs and CHF clinic note reviewed  Patient came in for STAT appointment  Received IV diuretics in clinic today    Please have him increase Bumex 2 mg twice daily for the next three days  Take 10 meq potassium x 3 days  Return to CHF Clinic in 1 week - sooner if needed    Thank you

## 2024-09-04 NOTE — TELEPHONE ENCOUNTER
Spoke to wife vinny regarding medication adjustments per Per Lashonda López, APRN-CNP.     Please have him increase Bumex 2 mg twice daily for the next three days  Take 10 meq potassium x 3 days  Return to CHF Clinic in 1 week - sooner if needed    Per wife she understands medications instructions.

## 2024-09-06 ENCOUNTER — TELEPHONE (OUTPATIENT)
Dept: OTHER | Age: 89
End: 2024-09-06

## 2024-09-06 NOTE — TELEPHONE ENCOUNTER
Patient called in saying his legs went down from Bumex  and his ox is low (88 up to 92) he wanted to talk with Nurse Yuli Carson-Spoke with patient regarding symptoms-    Still having shortness of breath though has improved from Wednesday. Leg swelling decreased slightly and decrease in weight 3 pounds after receiving diuretic and increasing PO bumex but patient nervous about weekend.     Per Vanna Matthews, APRN-CNS:  Have patient continue Bumex 2 mg BID throughout weekend and 10 mEq potassium and come for follow up Monday 9/9/24.  Call placed to patient and provider instructions given to patient    Patient verbalized understanding and all questions answered at this time.  Electronically signed by Tarsha Michael RN on 9/6/2024 at 10:28 AM      Future Appointments   Date Time Provider Department Center   9/9/2024  9:30 AM [facility] ROOM 1 [facility]   9/18/2024 11:00 AM SCHEDULE, MHYX YTOWN DEVICE YTOWN ELECTR [facility]   2/12/2025 10:15 AM [facility] ROOM 1 [facility]   2/25/2025 10:30 AM Geremias Koehler DO N [facility] DEP

## 2024-09-09 ENCOUNTER — HOSPITAL ENCOUNTER (OUTPATIENT)
Dept: OTHER | Age: 89
Setting detail: THERAPIES SERIES
Discharge: HOME | End: 2024-09-09
Payer: MEDICARE

## 2024-09-09 VITALS
RESPIRATION RATE: 18 BRPM | DIASTOLIC BLOOD PRESSURE: 68 MMHG | WEIGHT: 171 LBS | BODY MASS INDEX: 27.6 KG/M2 | OXYGEN SATURATION: 94 % | SYSTOLIC BLOOD PRESSURE: 118 MMHG | HEART RATE: 68 BPM

## 2024-09-09 DIAGNOSIS — E87.1 HYPONATREMIA: Primary | ICD-10-CM

## 2024-09-09 DIAGNOSIS — I50.32 CHRONIC HEART FAILURE WITH PRESERVED EJECTION FRACTION (HFPEF) (HCC): ICD-10-CM

## 2024-09-09 LAB
ANION GAP SERPL CALCULATED.3IONS-SCNC: 12 MMOL/L (ref 7–16)
BNP SERPL-MCNC: 2655 PG/ML (ref 0–450)
BUN SERPL-MCNC: 22 MG/DL (ref 6–23)
CALCIUM SERPL-MCNC: 8.5 MG/DL (ref 8.6–10.2)
CHLORIDE SERPL-SCNC: 89 MMOL/L (ref 98–107)
CO2 SERPL-SCNC: 30 MMOL/L (ref 22–29)
CREAT SERPL-MCNC: 1.3 MG/DL (ref 0.7–1.2)
GFR, ESTIMATED: 53 ML/MIN/1.73M2
GLUCOSE SERPL-MCNC: 108 MG/DL (ref 74–99)
POTASSIUM SERPL-SCNC: 4.1 MMOL/L (ref 3.5–5)
SODIUM SERPL-SCNC: 131 MMOL/L (ref 132–146)

## 2024-09-09 PROCEDURE — 83880 ASSAY OF NATRIURETIC PEPTIDE: CPT

## 2024-09-09 PROCEDURE — 96374 THER/PROPH/DIAG INJ IV PUSH: CPT

## 2024-09-09 PROCEDURE — 2580000003 HC RX 258: Performed by: CLINICAL NURSE SPECIALIST

## 2024-09-09 PROCEDURE — 36415 COLL VENOUS BLD VENIPUNCTURE: CPT

## 2024-09-09 PROCEDURE — 99214 OFFICE O/P EST MOD 30 MIN: CPT

## 2024-09-09 PROCEDURE — 6360000002 HC RX W HCPCS

## 2024-09-09 PROCEDURE — 80048 BASIC METABOLIC PNL TOTAL CA: CPT

## 2024-09-09 RX ORDER — BUMETANIDE 0.25 MG/ML
2 INJECTION INTRAMUSCULAR; INTRAVENOUS ONCE
Status: COMPLETED | OUTPATIENT
Start: 2024-09-09 | End: 2024-09-09

## 2024-09-09 RX ORDER — BUMETANIDE 0.25 MG/ML
INJECTION INTRAMUSCULAR; INTRAVENOUS
Status: COMPLETED
Start: 2024-09-09 | End: 2024-09-09

## 2024-09-09 RX ORDER — SODIUM CHLORIDE 0.9 % (FLUSH) 0.9 %
10 SYRINGE (ML) INJECTION ONCE
Status: COMPLETED | OUTPATIENT
Start: 2024-09-09 | End: 2024-09-09

## 2024-09-09 RX ADMIN — BUMETANIDE 2 MG: 0.25 INJECTION INTRAMUSCULAR; INTRAVENOUS at 10:21

## 2024-09-09 RX ADMIN — SODIUM CHLORIDE, PRESERVATIVE FREE 10 ML: 5 INJECTION INTRAVENOUS at 10:21

## 2024-09-12 DIAGNOSIS — D50.9 IRON DEFICIENCY ANEMIA, UNSPECIFIED IRON DEFICIENCY ANEMIA TYPE: ICD-10-CM

## 2024-09-12 DIAGNOSIS — E87.1 HYPONATREMIA: ICD-10-CM

## 2024-09-12 DIAGNOSIS — N18.32 STAGE 3B CHRONIC KIDNEY DISEASE (HCC): ICD-10-CM

## 2024-09-12 DIAGNOSIS — I50.32 CHRONIC HEART FAILURE WITH PRESERVED EJECTION FRACTION (HFPEF) (HCC): ICD-10-CM

## 2024-09-12 LAB
ANION GAP SERPL CALCULATED.3IONS-SCNC: 13 MMOL/L (ref 7–16)
BASOPHILS ABSOLUTE: 0.05 K/UL (ref 0–0.2)
BASOPHILS RELATIVE PERCENT: 1 % (ref 0–2)
BUN BLDV-MCNC: 18 MG/DL (ref 6–23)
CALCIUM SERPL-MCNC: 9.2 MG/DL (ref 8.6–10.2)
CHLORIDE BLD-SCNC: 91 MMOL/L (ref 98–107)
CO2: 32 MMOL/L (ref 22–29)
CREAT SERPL-MCNC: 1.4 MG/DL (ref 0.7–1.2)
EOSINOPHILS ABSOLUTE: 0.15 K/UL (ref 0.05–0.5)
EOSINOPHILS RELATIVE PERCENT: 2 % (ref 0–6)
GFR, ESTIMATED: 49 ML/MIN/1.73M2
GLUCOSE BLD-MCNC: 99 MG/DL (ref 74–99)
HCT VFR BLD CALC: 32.7 % (ref 37–54)
HEMOGLOBIN: 9.5 G/DL (ref 12.5–16.5)
IMMATURE GRANULOCYTES %: 1 % (ref 0–5)
IMMATURE GRANULOCYTES ABSOLUTE: 0.04 K/UL (ref 0–0.58)
LYMPHOCYTES ABSOLUTE: 0.9 K/UL (ref 1.5–4)
LYMPHOCYTES RELATIVE PERCENT: 12 % (ref 20–42)
MAGNESIUM: 2.5 MG/DL (ref 1.6–2.6)
MCH RBC QN AUTO: 25.7 PG (ref 26–35)
MCHC RBC AUTO-ENTMCNC: 29.1 G/DL (ref 32–34.5)
MCV RBC AUTO: 88.6 FL (ref 80–99.9)
MONOCYTES ABSOLUTE: 0.77 K/UL (ref 0.1–0.95)
MONOCYTES RELATIVE PERCENT: 10 % (ref 2–12)
NEUTROPHILS ABSOLUTE: 5.66 K/UL (ref 1.8–7.3)
NEUTROPHILS RELATIVE PERCENT: 75 % (ref 43–80)
PDW BLD-RTO: 15.7 % (ref 11.5–15)
PLATELET # BLD: 327 K/UL (ref 130–450)
PMV BLD AUTO: 10.8 FL (ref 7–12)
POTASSIUM SERPL-SCNC: 4.1 MMOL/L (ref 3.5–5)
RBC # BLD: 3.69 M/UL (ref 3.8–5.8)
SODIUM BLD-SCNC: 136 MMOL/L (ref 132–146)
WBC # BLD: 7.6 K/UL (ref 4.5–11.5)

## 2024-09-18 ENCOUNTER — HOSPITAL ENCOUNTER (OUTPATIENT)
Dept: OTHER | Age: 89
Setting detail: THERAPIES SERIES
Discharge: HOME | End: 2024-09-18
Payer: MEDICARE

## 2024-09-18 ENCOUNTER — NURSE ONLY (OUTPATIENT)
Dept: NON INVASIVE DIAGNOSTICS | Age: 89
End: 2024-09-18

## 2024-09-18 VITALS
SYSTOLIC BLOOD PRESSURE: 119 MMHG | WEIGHT: 169.2 LBS | BODY MASS INDEX: 27.31 KG/M2 | DIASTOLIC BLOOD PRESSURE: 50 MMHG | HEART RATE: 94 BPM | OXYGEN SATURATION: 94 % | RESPIRATION RATE: 18 BRPM

## 2024-09-18 DIAGNOSIS — I47.20 VENTRICULAR TACHYARRHYTHMIA (HCC): ICD-10-CM

## 2024-09-18 DIAGNOSIS — Z95.810 PRESENCE OF AUTOMATIC CARDIOVERTER/DEFIBRILLATOR (AICD): ICD-10-CM

## 2024-09-18 DIAGNOSIS — I48.20 CHRONIC ATRIAL FIBRILLATION (HCC): ICD-10-CM

## 2024-09-18 DIAGNOSIS — Z45.02 ICD (IMPLANTABLE CARDIOVERTER-DEFIBRILLATOR) DISCHARGE: Primary | ICD-10-CM

## 2024-09-18 LAB
ANION GAP SERPL CALCULATED.3IONS-SCNC: 12 MMOL/L (ref 7–16)
BNP SERPL-MCNC: 2329 PG/ML (ref 0–450)
BUN SERPL-MCNC: 22 MG/DL (ref 6–23)
CALCIUM SERPL-MCNC: 8.4 MG/DL (ref 8.6–10.2)
CHLORIDE SERPL-SCNC: 93 MMOL/L (ref 98–107)
CO2 SERPL-SCNC: 31 MMOL/L (ref 22–29)
CREAT SERPL-MCNC: 1.4 MG/DL (ref 0.7–1.2)
GFR, ESTIMATED: 51 ML/MIN/1.73M2
GLUCOSE SERPL-MCNC: 109 MG/DL (ref 74–99)
POTASSIUM SERPL-SCNC: 3.5 MMOL/L (ref 3.5–5)
SODIUM SERPL-SCNC: 136 MMOL/L (ref 132–146)

## 2024-09-18 PROCEDURE — 99214 OFFICE O/P EST MOD 30 MIN: CPT

## 2024-09-18 PROCEDURE — 83880 ASSAY OF NATRIURETIC PEPTIDE: CPT

## 2024-09-18 PROCEDURE — 36415 COLL VENOUS BLD VENIPUNCTURE: CPT

## 2024-09-18 PROCEDURE — 80048 BASIC METABOLIC PNL TOTAL CA: CPT

## 2024-09-19 ENCOUNTER — TELEPHONE (OUTPATIENT)
Dept: OTHER | Age: 89
End: 2024-09-19

## 2024-09-19 ENCOUNTER — APPOINTMENT (OUTPATIENT)
Dept: GENERAL RADIOLOGY | Age: 89
DRG: 308 | End: 2024-09-19
Payer: MEDICARE

## 2024-09-19 ENCOUNTER — HOSPITAL ENCOUNTER (INPATIENT)
Age: 89
LOS: 6 days | Discharge: HOME HEALTH | DRG: 308 | End: 2024-09-26
Attending: EMERGENCY MEDICINE | Admitting: STUDENT IN AN ORGANIZED HEALTH CARE EDUCATION/TRAINING PROGRAM
Payer: MEDICARE

## 2024-09-19 DIAGNOSIS — I48.91 AF (ATRIAL FIBRILLATION) (HCC): ICD-10-CM

## 2024-09-19 DIAGNOSIS — F41.9 ANXIETY: ICD-10-CM

## 2024-09-19 DIAGNOSIS — I48.91 AFIB (HCC): ICD-10-CM

## 2024-09-19 DIAGNOSIS — J18.9 PNEUMONIA OF RIGHT MIDDLE LOBE DUE TO INFECTIOUS ORGANISM: Primary | ICD-10-CM

## 2024-09-19 LAB
ANION GAP SERPL CALCULATED.3IONS-SCNC: 14 MMOL/L (ref 7–16)
BASOPHILS # BLD: 0.06 K/UL (ref 0–0.2)
BASOPHILS NFR BLD: 1 % (ref 0–2)
BUN SERPL-MCNC: 25 MG/DL (ref 6–23)
CALCIUM SERPL-MCNC: 9.1 MG/DL (ref 8.6–10.2)
CHLORIDE SERPL-SCNC: 92 MMOL/L (ref 98–107)
CO2 SERPL-SCNC: 31 MMOL/L (ref 22–29)
CREAT SERPL-MCNC: 1.5 MG/DL (ref 0.7–1.2)
EOSINOPHIL # BLD: 0.11 K/UL (ref 0.05–0.5)
EOSINOPHILS RELATIVE PERCENT: 1 % (ref 0–6)
ERYTHROCYTE [DISTWIDTH] IN BLOOD BY AUTOMATED COUNT: 15.9 % (ref 11.5–15)
GFR, ESTIMATED: 45 ML/MIN/1.73M2
GLUCOSE SERPL-MCNC: 155 MG/DL (ref 74–99)
HCT VFR BLD AUTO: 32.3 % (ref 37–54)
HGB BLD-MCNC: 9.7 G/DL (ref 12.5–16.5)
IMM GRANULOCYTES # BLD AUTO: 0.05 K/UL (ref 0–0.58)
IMM GRANULOCYTES NFR BLD: 1 % (ref 0–5)
LYMPHOCYTES NFR BLD: 0.93 K/UL (ref 1.5–4)
LYMPHOCYTES RELATIVE PERCENT: 10 % (ref 20–42)
MAGNESIUM SERPL-MCNC: 2.4 MG/DL (ref 1.6–2.6)
MCH RBC QN AUTO: 25.8 PG (ref 26–35)
MCHC RBC AUTO-ENTMCNC: 30 G/DL (ref 32–34.5)
MCV RBC AUTO: 85.9 FL (ref 80–99.9)
MONOCYTES NFR BLD: 0.87 K/UL (ref 0.1–0.95)
MONOCYTES NFR BLD: 10 % (ref 2–12)
NEUTROPHILS NFR BLD: 78 % (ref 43–80)
NEUTS SEG NFR BLD: 6.97 K/UL (ref 1.8–7.3)
PLATELET # BLD AUTO: 395 K/UL (ref 130–450)
PMV BLD AUTO: 10.5 FL (ref 7–12)
POTASSIUM SERPL-SCNC: 4.2 MMOL/L (ref 3.5–5)
RBC # BLD AUTO: 3.76 M/UL (ref 3.8–5.8)
SODIUM SERPL-SCNC: 137 MMOL/L (ref 132–146)
TROPONIN I SERPL HS-MCNC: 41 NG/L (ref 0–11)
TROPONIN I SERPL HS-MCNC: 44 NG/L (ref 0–11)
WBC OTHER # BLD: 9 K/UL (ref 4.5–11.5)

## 2024-09-19 PROCEDURE — 99285 EMERGENCY DEPT VISIT HI MDM: CPT

## 2024-09-19 PROCEDURE — 84484 ASSAY OF TROPONIN QUANT: CPT

## 2024-09-19 PROCEDURE — 96365 THER/PROPH/DIAG IV INF INIT: CPT

## 2024-09-19 PROCEDURE — 96375 TX/PRO/DX INJ NEW DRUG ADDON: CPT

## 2024-09-19 PROCEDURE — 6360000002 HC RX W HCPCS

## 2024-09-19 PROCEDURE — 71045 X-RAY EXAM CHEST 1 VIEW: CPT

## 2024-09-19 PROCEDURE — 2580000003 HC RX 258

## 2024-09-19 PROCEDURE — 93005 ELECTROCARDIOGRAM TRACING: CPT

## 2024-09-19 PROCEDURE — 2500000003 HC RX 250 WO HCPCS

## 2024-09-19 PROCEDURE — 87040 BLOOD CULTURE FOR BACTERIA: CPT

## 2024-09-19 PROCEDURE — 80048 BASIC METABOLIC PNL TOTAL CA: CPT

## 2024-09-19 PROCEDURE — 85025 COMPLETE CBC W/AUTO DIFF WBC: CPT

## 2024-09-19 PROCEDURE — 83735 ASSAY OF MAGNESIUM: CPT

## 2024-09-19 RX ADMIN — DOXYCYCLINE 100 MG: 100 INJECTION, POWDER, LYOPHILIZED, FOR SOLUTION INTRAVENOUS at 23:21

## 2024-09-19 RX ADMIN — WATER 2000 MG: 1 INJECTION INTRAMUSCULAR; INTRAVENOUS; SUBCUTANEOUS at 23:19

## 2024-09-19 ASSESSMENT — PAIN - FUNCTIONAL ASSESSMENT: PAIN_FUNCTIONAL_ASSESSMENT: NONE - DENIES PAIN

## 2024-09-20 PROBLEM — J18.9 COMMUNITY ACQUIRED PNEUMONIA OF RIGHT MIDDLE LOBE OF LUNG: Status: ACTIVE | Noted: 2024-09-20

## 2024-09-20 PROBLEM — J18.9 PNEUMONIA INVOLVING RIGHT LUNG: Status: ACTIVE | Noted: 2024-09-20

## 2024-09-20 PROBLEM — J18.9 PNEUMONIA DUE TO ORGANISM: Status: ACTIVE | Noted: 2024-09-20

## 2024-09-20 LAB
ANION GAP SERPL CALCULATED.3IONS-SCNC: 18 MMOL/L (ref 7–16)
BASOPHILS # BLD: 0.04 K/UL (ref 0–0.2)
BASOPHILS NFR BLD: 1 % (ref 0–2)
BUN SERPL-MCNC: 23 MG/DL (ref 6–23)
CALCIUM SERPL-MCNC: 8.5 MG/DL (ref 8.6–10.2)
CHLORIDE SERPL-SCNC: 94 MMOL/L (ref 98–107)
CO2 SERPL-SCNC: 24 MMOL/L (ref 22–29)
CREAT SERPL-MCNC: 1.3 MG/DL (ref 0.7–1.2)
EKG ATRIAL RATE: 72 BPM
EKG Q-T INTERVAL: 504 MS
EKG QRS DURATION: 160 MS
EKG QTC CALCULATION (BAZETT): 570 MS
EKG R AXIS: -112 DEGREES
EKG T AXIS: -34 DEGREES
EKG VENTRICULAR RATE: 77 BPM
EOSINOPHIL # BLD: 0.08 K/UL (ref 0.05–0.5)
EOSINOPHILS RELATIVE PERCENT: 1 % (ref 0–6)
ERYTHROCYTE [DISTWIDTH] IN BLOOD BY AUTOMATED COUNT: 16.2 % (ref 11.5–15)
GFR, ESTIMATED: 53 ML/MIN/1.73M2
GLUCOSE SERPL-MCNC: 91 MG/DL (ref 74–99)
HCT VFR BLD AUTO: 31.6 % (ref 37–54)
HGB BLD-MCNC: 9 G/DL (ref 12.5–16.5)
IMM GRANULOCYTES # BLD AUTO: 0.03 K/UL (ref 0–0.58)
IMM GRANULOCYTES NFR BLD: 0 % (ref 0–5)
LYMPHOCYTES NFR BLD: 0.76 K/UL (ref 1.5–4)
LYMPHOCYTES RELATIVE PERCENT: 10 % (ref 20–42)
MCH RBC QN AUTO: 25.6 PG (ref 26–35)
MCHC RBC AUTO-ENTMCNC: 28.5 G/DL (ref 32–34.5)
MCV RBC AUTO: 90 FL (ref 80–99.9)
MONOCYTES NFR BLD: 0.7 K/UL (ref 0.1–0.95)
MONOCYTES NFR BLD: 9 % (ref 2–12)
NEUTROPHILS NFR BLD: 78 % (ref 43–80)
NEUTS SEG NFR BLD: 5.8 K/UL (ref 1.8–7.3)
PLATELET # BLD AUTO: 273 K/UL (ref 130–450)
PMV BLD AUTO: 10.8 FL (ref 7–12)
POTASSIUM SERPL-SCNC: 4 MMOL/L (ref 3.5–5)
PROCALCITONIN SERPL-MCNC: 0.04 NG/ML (ref 0–0.08)
RBC # BLD AUTO: 3.51 M/UL (ref 3.8–5.8)
RBC # BLD: ABNORMAL 10*6/UL
SODIUM SERPL-SCNC: 136 MMOL/L (ref 132–146)
WBC OTHER # BLD: 7.4 K/UL (ref 4.5–11.5)

## 2024-09-20 PROCEDURE — 6370000000 HC RX 637 (ALT 250 FOR IP): Performed by: HOSPITALIST

## 2024-09-20 PROCEDURE — 2580000003 HC RX 258: Performed by: STUDENT IN AN ORGANIZED HEALTH CARE EDUCATION/TRAINING PROGRAM

## 2024-09-20 PROCEDURE — 36415 COLL VENOUS BLD VENIPUNCTURE: CPT

## 2024-09-20 PROCEDURE — 96366 THER/PROPH/DIAG IV INF ADDON: CPT

## 2024-09-20 PROCEDURE — 6360000002 HC RX W HCPCS: Performed by: HOSPITALIST

## 2024-09-20 PROCEDURE — 6360000002 HC RX W HCPCS: Performed by: STUDENT IN AN ORGANIZED HEALTH CARE EDUCATION/TRAINING PROGRAM

## 2024-09-20 PROCEDURE — 84145 PROCALCITONIN (PCT): CPT

## 2024-09-20 PROCEDURE — G0378 HOSPITAL OBSERVATION PER HR: HCPCS

## 2024-09-20 PROCEDURE — 96367 TX/PROPH/DG ADDL SEQ IV INF: CPT

## 2024-09-20 PROCEDURE — 80048 BASIC METABOLIC PNL TOTAL CA: CPT

## 2024-09-20 PROCEDURE — 2580000003 HC RX 258: Performed by: HOSPITALIST

## 2024-09-20 PROCEDURE — 96376 TX/PRO/DX INJ SAME DRUG ADON: CPT

## 2024-09-20 PROCEDURE — 85025 COMPLETE CBC W/AUTO DIFF WBC: CPT

## 2024-09-20 PROCEDURE — 99223 1ST HOSP IP/OBS HIGH 75: CPT | Performed by: STUDENT IN AN ORGANIZED HEALTH CARE EDUCATION/TRAINING PROGRAM

## 2024-09-20 PROCEDURE — 93010 ELECTROCARDIOGRAM REPORT: CPT | Performed by: INTERNAL MEDICINE

## 2024-09-20 PROCEDURE — 99223 1ST HOSP IP/OBS HIGH 75: CPT | Performed by: HOSPITALIST

## 2024-09-20 RX ORDER — SODIUM CHLORIDE 0.9 % (FLUSH) 0.9 %
5-40 SYRINGE (ML) INJECTION PRN
Status: DISCONTINUED | OUTPATIENT
Start: 2024-09-20 | End: 2024-09-26 | Stop reason: HOSPADM

## 2024-09-20 RX ORDER — PRAVASTATIN SODIUM 20 MG
40 TABLET ORAL NIGHTLY
Status: DISCONTINUED | OUTPATIENT
Start: 2024-09-20 | End: 2024-09-26 | Stop reason: HOSPADM

## 2024-09-20 RX ORDER — ACETAMINOPHEN 650 MG/1
650 SUPPOSITORY RECTAL EVERY 6 HOURS PRN
Status: DISCONTINUED | OUTPATIENT
Start: 2024-09-20 | End: 2024-09-26 | Stop reason: HOSPADM

## 2024-09-20 RX ORDER — ACETAMINOPHEN 325 MG/1
650 TABLET ORAL EVERY 6 HOURS PRN
Status: DISCONTINUED | OUTPATIENT
Start: 2024-09-20 | End: 2024-09-26 | Stop reason: HOSPADM

## 2024-09-20 RX ORDER — SODIUM CHLORIDE 0.9 % (FLUSH) 0.9 %
5-40 SYRINGE (ML) INJECTION EVERY 12 HOURS SCHEDULED
Status: DISCONTINUED | OUTPATIENT
Start: 2024-09-20 | End: 2024-09-26 | Stop reason: HOSPADM

## 2024-09-20 RX ORDER — MECLIZINE HCL 12.5 MG 12.5 MG/1
25 TABLET ORAL DAILY PRN
Status: DISCONTINUED | OUTPATIENT
Start: 2024-09-20 | End: 2024-09-26 | Stop reason: HOSPADM

## 2024-09-20 RX ORDER — DOXYCYCLINE 100 MG/1
100 CAPSULE ORAL EVERY 12 HOURS SCHEDULED
Status: COMPLETED | OUTPATIENT
Start: 2024-09-20 | End: 2024-09-24

## 2024-09-20 RX ORDER — GUAIFENESIN/DEXTROMETHORPHAN 100-10MG/5
5 SYRUP ORAL EVERY 4 HOURS PRN
Status: DISCONTINUED | OUTPATIENT
Start: 2024-09-20 | End: 2024-09-26 | Stop reason: HOSPADM

## 2024-09-20 RX ORDER — METOPROLOL TARTRATE 25 MG/1
25 TABLET, FILM COATED ORAL 2 TIMES DAILY
Status: DISCONTINUED | OUTPATIENT
Start: 2024-09-20 | End: 2024-09-26 | Stop reason: HOSPADM

## 2024-09-20 RX ORDER — PROCHLORPERAZINE EDISYLATE 5 MG/ML
10 INJECTION INTRAMUSCULAR; INTRAVENOUS EVERY 6 HOURS PRN
Status: DISCONTINUED | OUTPATIENT
Start: 2024-09-20 | End: 2024-09-26 | Stop reason: HOSPADM

## 2024-09-20 RX ORDER — AMLODIPINE BESYLATE 5 MG/1
5 TABLET ORAL DAILY
Status: DISCONTINUED | OUTPATIENT
Start: 2024-09-20 | End: 2024-09-26 | Stop reason: HOSPADM

## 2024-09-20 RX ORDER — SODIUM CHLORIDE 9 MG/ML
INJECTION, SOLUTION INTRAVENOUS PRN
Status: DISCONTINUED | OUTPATIENT
Start: 2024-09-20 | End: 2024-09-26 | Stop reason: HOSPADM

## 2024-09-20 RX ORDER — ALPRAZOLAM 0.25 MG
0.5 TABLET ORAL NIGHTLY PRN
Status: DISCONTINUED | OUTPATIENT
Start: 2024-09-20 | End: 2024-09-26 | Stop reason: HOSPADM

## 2024-09-20 RX ORDER — ONDANSETRON 2 MG/ML
4 INJECTION INTRAMUSCULAR; INTRAVENOUS EVERY 6 HOURS PRN
Status: DISCONTINUED | OUTPATIENT
Start: 2024-09-20 | End: 2024-09-20 | Stop reason: CLARIF

## 2024-09-20 RX ORDER — FERROUS SULFATE 325(65) MG
325 TABLET ORAL EVERY OTHER DAY
Status: DISCONTINUED | OUTPATIENT
Start: 2024-09-20 | End: 2024-09-26 | Stop reason: HOSPADM

## 2024-09-20 RX ORDER — POTASSIUM CHLORIDE 750 MG/1
10 TABLET, EXTENDED RELEASE ORAL DAILY PRN
Status: DISCONTINUED | OUTPATIENT
Start: 2024-09-20 | End: 2024-09-20

## 2024-09-20 RX ORDER — POLYETHYLENE GLYCOL 3350 17 G/17G
17 POWDER, FOR SOLUTION ORAL DAILY PRN
Status: DISCONTINUED | OUTPATIENT
Start: 2024-09-20 | End: 2024-09-26 | Stop reason: HOSPADM

## 2024-09-20 RX ORDER — PROCHLORPERAZINE MALEATE 10 MG
10 TABLET ORAL EVERY 8 HOURS PRN
Status: DISCONTINUED | OUTPATIENT
Start: 2024-09-20 | End: 2024-09-26 | Stop reason: HOSPADM

## 2024-09-20 RX ORDER — ONDANSETRON 4 MG/1
4 TABLET, ORALLY DISINTEGRATING ORAL EVERY 8 HOURS PRN
Status: DISCONTINUED | OUTPATIENT
Start: 2024-09-20 | End: 2024-09-20 | Stop reason: CLARIF

## 2024-09-20 RX ORDER — PANTOPRAZOLE SODIUM 40 MG/1
40 TABLET, DELAYED RELEASE ORAL DAILY
Status: DISCONTINUED | OUTPATIENT
Start: 2024-09-20 | End: 2024-09-26 | Stop reason: HOSPADM

## 2024-09-20 RX ORDER — AMIODARONE HYDROCHLORIDE 200 MG/1
200 TABLET ORAL DAILY
Status: DISCONTINUED | OUTPATIENT
Start: 2024-09-20 | End: 2024-09-25

## 2024-09-20 RX ORDER — IPRATROPIUM BROMIDE AND ALBUTEROL SULFATE 2.5; .5 MG/3ML; MG/3ML
1 SOLUTION RESPIRATORY (INHALATION) EVERY 4 HOURS PRN
Status: DISCONTINUED | OUTPATIENT
Start: 2024-09-20 | End: 2024-09-23

## 2024-09-20 RX ADMIN — PRAVASTATIN SODIUM 40 MG: 20 TABLET ORAL at 21:25

## 2024-09-20 RX ADMIN — APIXABAN 2.5 MG: 5 TABLET, FILM COATED ORAL at 21:25

## 2024-09-20 RX ADMIN — DOXYCYCLINE HYCLATE 100 MG: 100 CAPSULE ORAL at 21:26

## 2024-09-20 RX ADMIN — WATER 1000 MG: 1 INJECTION INTRAMUSCULAR; INTRAVENOUS; SUBCUTANEOUS at 10:44

## 2024-09-20 RX ADMIN — METOPROLOL TARTRATE 25 MG: 25 TABLET, FILM COATED ORAL at 10:01

## 2024-09-20 RX ADMIN — AMIODARONE HYDROCHLORIDE 200 MG: 200 TABLET ORAL at 10:00

## 2024-09-20 RX ADMIN — AMLODIPINE BESYLATE 5 MG: 5 TABLET ORAL at 10:01

## 2024-09-20 RX ADMIN — AMIODARONE HYDROCHLORIDE 1 MG/MIN: 50 INJECTION, SOLUTION INTRAVENOUS at 21:27

## 2024-09-20 RX ADMIN — DOXYCYCLINE HYCLATE 100 MG: 100 CAPSULE ORAL at 09:59

## 2024-09-20 RX ADMIN — PANTOPRAZOLE SODIUM 40 MG: 40 TABLET, DELAYED RELEASE ORAL at 10:01

## 2024-09-20 RX ADMIN — FERROUS SULFATE TAB 325 MG (65 MG ELEMENTAL FE) 325 MG: 325 (65 FE) TAB at 10:00

## 2024-09-20 RX ADMIN — APIXABAN 2.5 MG: 5 TABLET, FILM COATED ORAL at 10:00

## 2024-09-20 RX ADMIN — SODIUM CHLORIDE, PRESERVATIVE FREE 10 ML: 5 INJECTION INTRAVENOUS at 10:50

## 2024-09-20 RX ADMIN — METOPROLOL TARTRATE 25 MG: 25 TABLET, FILM COATED ORAL at 21:25

## 2024-09-20 NOTE — CONSULTS
2.66 cm2 with a maximum gradient of 6 mmHg and a mean gradient of 3 mmHg. Moderate TR  LHC 3/3/17: % stenosis, Circumflex 100% stenosis, RCA 75% stenosis, Medical management  TTE 2/1/17:Severe aortic stenosis is present.The aortic valve area is 0.8 cm2 with a maximum gradient of 47 mmHg and a mean gradient of 27 mmHg. Moderate TR,   TTE 10/13/15: Severe aortic stenosis, peak/mean gradient 48mmhg      Patient Active Problem List    Diagnosis Date Noted    Chronic combined systolic and diastolic CHF (congestive heart failure) ([facility]) 10/08/2015     Priority: High    ICD (implantable cardioverter-defibrillator) in place 02/06/2023     Priority: Medium    ICD (implantable cardioverter-defibrillator) discharge      Priority: Medium    Abdominal aortic aneurysm (AAA) 3.0 cm to 5.5 cm in diameter in male ([facility]) 04/27/2022     Priority: Medium    Pneumonia involving right lung 09/20/2024    Pneumonia due to organism 09/20/2024    Community acquired pneumonia of right middle lobe of lung 09/20/2024    Chronic anticoagulation 05/10/2024    Persistent atrial fibrillation (HCC) 05/10/2024    Anemia 05/09/2024    Anemia requiring transfusions 05/09/2024    Bilateral carotid artery stenosis 01/24/2024    Dizziness 01/24/2024    Hearing loss of left ear due to cerumen impaction 01/24/2024    Lumbar degenerative disc disease 08/25/2023    Symptomatic anemia 03/12/2022    GI bleed 03/12/2022    Gastroesophageal reflux disease without esophagitis 03/01/2022    Coronary artery disease involving coronary bypass graft 03/01/2022    Near syncope 09/02/2021    SOB (shortness of breath) 10/29/2020    Lung nodule     Pure hypercholesterolemia     Stage 3b chronic kidney disease (HCC)     Transient cerebral ischemia     Aortic valve disease     Other insomnia 05/07/2020    Ischemic heart disease     ICD (implantable cardioverter-defibrillator), dual, in situ     GIB (gastrointestinal bleeding) 02/17/2020    Hyperlipidemia LDL goal <100  epistaxis.   Eyes: Negative for diploplia, blurred vision.   Respiratory: Negative for cough, chest tightness, shortness of breath and wheezing.   Cardiovascular: pertinent positives in HPI  Gastrointestinal: Negative for abdominal pain and blood in stool.   All other review of systems are negative     PHYSICAL EXAM:   Vitals:    09/19/24 2215 09/20/24 0130 09/20/24 0600 09/20/24 0953   BP: (!) 108/53 119/61 (!) 136/57 133/72   Pulse: 65 59 58 88   Resp: 22 22 19    Temp:  98 °F (36.7 °C) 98.1 °F (36.7 °C)    TempSrc:   Oral    SpO2: 94% 94% 96% 95%   Weight:       Height:          Constitutional: Well-developed, no acute distress  Eyes: conjunctivae normal, no xanthelasma   Ears, Nose, Throat: oral mucosa moist, no cyanosis   CV: no JVD. Regular rate and rhythm. Normal S1S2 and no S3. No murmurs, rubs, or gallops. PMI is nondisplaced  Lungs: clear to auscultation bilaterally, normal respiratory effort without used of accessory muscles  Abdomen: soft, non-tender, bowel sounds present, no masses or hepatomegaly   Musculoskeletal: no digital clubbing, no edema   Skin: warm, no rashes     I have personally reviewed the laboratory, cardiac diagnostic and radiographic testing as outlined below:    Data:    Recent Labs     09/19/24 2051 09/20/24  0710   WBC 9.0 7.4   HGB 9.7* 9.0*   HCT 32.3* 31.6*    273     Recent Labs     09/18/24  0832 09/19/24 2051 09/20/24  0710    137 136   K 3.5 4.2 4.0   CL 93* 92* 94*   CO2 31* 31* 24   BUN 22 25* 23   CREATININE 1.4* 1.5* 1.3*   CALCIUM 8.4* 9.1 8.5*      Lab Results   Component Value Date/Time    MG 2.4 09/19/2024 08:51 PM     No results for input(s): \"TSH\" in the last 72 hours.  No results for input(s): \"INR\" in the last 72 hours.  Device Interrogation ( 9/19/24 )  Make/Model MDT Cobalt   Mode AAAIR===DDDR 55/120  P wave: 0.6 mV  Impedance: 323 ohms   Threshold: 0.875 V @ 0.40 ms  RV R wave: 3.1 mV  Impedance: 285 ohms   Threshold: .875 V @ 0.40 ms  Pacing: A:

## 2024-09-20 NOTE — ED PROVIDER NOTES
[facility] EMERGENCY DEPARTMENT  EMERGENCY DEPARTMENT ENCOUNTER        Pt Name: Niles Field  MRN: [id number]  Birthdate 1935  Date of evaluation: 9/19/2024  Provider: Natalie Aparicio MD  PCP: Geremias Koehler DO  Note Started: 8:51 PM EDT 9/19/24    CHIEF COMPLAINT       Chief Complaint   Patient presents with    AICD Problem     Pt defibrillator shocked him around 1900 tonight. Pt states it has happened before and that he was supposed to be shocked into a normal rhythm by cardiology        HISTORY OF PRESENT ILLNESS: 1 or more Elements   History From: patient    Limitations to history : None    Niles Field is a 88 y.o. male who presents for defibrillator fire around 7 PM tonight.  Patient states he was sitting watching TV and he felt his left arm move backwards which happens when he gets shocked.  He did not make it sound.  He is not complaining of any chest pain.  He does endorse some shortness of breath with walking from room to room however he is not short of breath at rest.  Denies any dizziness today.  Denies any new or worsening weakness of extremities.  The patient states he had a device check yesterday in office however this happened after the visit.  Patient does have a history of A-fib.  He is followed by Dr. Mercado    Nursing Notes were all reviewed and agreed with or any disagreements were addressed in the HPI.        REVIEW OF EXTERNAL NOTE :       Patient was last seen for device check yesterday due to ICD discharge, chronic atrial fibrillation, V. tach, presence of AICD    REVIEW OF SYSTEMS :           Positives and Pertinent negatives as per HPI.     SURGICAL HISTORY     Past Surgical History:   Procedure Laterality Date    CARDIAC CATHETERIZATION Right 03/03/2017    Dr. Cooper- Rt Groin    CARDIAC DEFIBRILLATOR PLACEMENT  2007. 2008 2007 78 shocks replaced 2008 Medtronic     CARDIAC DEFIBRILLATOR PLACEMENT  07/28/2016    Medtronic Dual ICD

## 2024-09-20 NOTE — H&P
Hospital Medicine History & Physical      PCP: Geremias Koehler DO    Date of Admission: 9/19/2024    Date of Service: Pt seen/examined on 9/19/2024 and is Placed in Observation.    Chief Complaint:  had concerns including AICD Problem (Pt defibrillator shocked him around 1900 tonight. Pt states it has happened before and that he was supposed to be shocked into a normal rhythm by cardiology ).    History Of Present Illness:    Mr. Niles Field, a 88 y.o. year old male  with PMH of TAVR, HFpEF, HTN, HLD, a fib, ICD in place,     Pt presented to ED for evaluation of concerning that his defibrillator fired. Pt was resting at home , and watching TV, just noticed his left arm twisted backwards suddenly, pt did not feel any symptoms, no dizziness/headache, SOB, chest pain/pressure, N/V/abd pain.   Pt does report has been coughing recently, but not severe enough to catch his daily attention.   CXR in ED concerns for right mid log opacity.       I have personally reviewed and interpreted the Initial workups as summarized below:     WBC normal, H/H stable, platelet normal  Renal function stable CKD3a  Hepatic function   stable.     CXR  reviewed  IMPRESSION:  Stable appearance of left-sided transvenous pacer device.  Peripheral right mid lung opacity not present on prior study.    Troponin 44-41  proBNP  2329   EKG reviewed SR, 1s Avb with no acute ST/T wave ischemic change.  Last ECHO 2/6/2023 with LVEF 55-60%  Summary   Limited echo ordered.      Normal left ventricular size and systolic function.   Ejection fraction is visually estimated at 55-60%.   Indeterminate diastolic function.   Basal inferior wall hypokinetic.   Mild left ventricular concentric hypertrophy noted.   Abnormal LV septal motion consistent with paced rhythm.   Right ventricle global systolic function is mildly reduced.   Mild to moderate mitral regurgitation is present.   26 mm ZANE S3 TAVR valve implanted 2017.   Mild-to-moderate paravalvular  ceftriaxone  - pt needs ambulatory oxygen test prior to discharge    HFpEF  Hx of AS s/p TAVR in 2017, CAD s/p CABG 2003,    Hx of VT, A fib  - troponin 44--41, EKG paced, no ST change.   - BNP above 2000,   - resume amiodarone 200mg daily, lopressor 25mg bid, pravastatin 40mg qHS, eliquis 2.5mg bid, amlodipine 5mg daily,     CKD3  - renal overall stable CKD3.        DVT Prophylaxis: []Lovenox []Heparin []PCD [] Warfarin/NOAC []Encouraged ambulation    Diet: ADULT DIET; Regular  Code Status: Full Code  Surrogate decision maker confirmed with patient:   Extended Emergency Contact Information  Primary Emergency Contact: Annie Field  Address: [address] [place] [address] [place] [address] [facility]  Home Phone: [phone]  Relation: Spouse  Secondary Emergency Contact: Maritza Dumont  Address: [facility]  Home Phone: [phone]  Mobile Phone: [phone]  Relation: Child    Admit status: [] Observation [x] Inpatient   Disposition: []Med/Surg [x] Intermediate [] ICU/CCU    +++++++++++++++++++++++++++++++++++++++++++++++++  Edgar Jurado MD PhD  [facility]  +++++++++++++++++++++++++++++++++++++++++++++++++  NOTE: Report could be transcribed using voice recognition software. Every effort was made to ensure accuracy; however, inadvertent computerized transcription errors may be present.

## 2024-09-21 PROBLEM — I48.91 AF (ATRIAL FIBRILLATION) (HCC): Status: ACTIVE | Noted: 2024-09-21

## 2024-09-21 LAB
ANION GAP SERPL CALCULATED.3IONS-SCNC: 11 MMOL/L (ref 7–16)
BASOPHILS # BLD: 0.05 K/UL (ref 0–0.2)
BASOPHILS NFR BLD: 1 % (ref 0–2)
BUN SERPL-MCNC: 21 MG/DL (ref 6–23)
CALCIUM SERPL-MCNC: 8.5 MG/DL (ref 8.6–10.2)
CHLORIDE SERPL-SCNC: 96 MMOL/L (ref 98–107)
CO2 SERPL-SCNC: 29 MMOL/L (ref 22–29)
CREAT SERPL-MCNC: 1.3 MG/DL (ref 0.7–1.2)
EOSINOPHIL # BLD: 0.1 K/UL (ref 0.05–0.5)
EOSINOPHILS RELATIVE PERCENT: 1 % (ref 0–6)
ERYTHROCYTE [DISTWIDTH] IN BLOOD BY AUTOMATED COUNT: 16 % (ref 11.5–15)
GFR, ESTIMATED: 55 ML/MIN/1.73M2
GLUCOSE SERPL-MCNC: 108 MG/DL (ref 74–99)
HCT VFR BLD AUTO: 30.2 % (ref 37–54)
HGB BLD-MCNC: 9.1 G/DL (ref 12.5–16.5)
IMM GRANULOCYTES # BLD AUTO: 0.04 K/UL (ref 0–0.58)
IMM GRANULOCYTES NFR BLD: 1 % (ref 0–5)
LYMPHOCYTES NFR BLD: 0.88 K/UL (ref 1.5–4)
LYMPHOCYTES RELATIVE PERCENT: 12 % (ref 20–42)
MCH RBC QN AUTO: 26.1 PG (ref 26–35)
MCHC RBC AUTO-ENTMCNC: 30.1 G/DL (ref 32–34.5)
MCV RBC AUTO: 86.5 FL (ref 80–99.9)
MONOCYTES NFR BLD: 0.8 K/UL (ref 0.1–0.95)
MONOCYTES NFR BLD: 11 % (ref 2–12)
NEUTROPHILS NFR BLD: 75 % (ref 43–80)
NEUTS SEG NFR BLD: 5.71 K/UL (ref 1.8–7.3)
PLATELET # BLD AUTO: 334 K/UL (ref 130–450)
PMV BLD AUTO: 10.4 FL (ref 7–12)
POTASSIUM SERPL-SCNC: 4.2 MMOL/L (ref 3.5–5)
RBC # BLD AUTO: 3.49 M/UL (ref 3.8–5.8)
SODIUM SERPL-SCNC: 136 MMOL/L (ref 132–146)
WBC OTHER # BLD: 7.6 K/UL (ref 4.5–11.5)

## 2024-09-21 PROCEDURE — 2060000000 HC ICU INTERMEDIATE R&B

## 2024-09-21 PROCEDURE — 85025 COMPLETE CBC W/AUTO DIFF WBC: CPT

## 2024-09-21 PROCEDURE — 99232 SBSQ HOSP IP/OBS MODERATE 35: CPT | Performed by: STUDENT IN AN ORGANIZED HEALTH CARE EDUCATION/TRAINING PROGRAM

## 2024-09-21 PROCEDURE — 6360000002 HC RX W HCPCS: Performed by: STUDENT IN AN ORGANIZED HEALTH CARE EDUCATION/TRAINING PROGRAM

## 2024-09-21 PROCEDURE — 6370000000 HC RX 637 (ALT 250 FOR IP): Performed by: HOSPITALIST

## 2024-09-21 PROCEDURE — 2580000003 HC RX 258: Performed by: HOSPITALIST

## 2024-09-21 PROCEDURE — 2580000003 HC RX 258: Performed by: STUDENT IN AN ORGANIZED HEALTH CARE EDUCATION/TRAINING PROGRAM

## 2024-09-21 PROCEDURE — 96366 THER/PROPH/DIAG IV INF ADDON: CPT

## 2024-09-21 PROCEDURE — 6360000002 HC RX W HCPCS: Performed by: HOSPITALIST

## 2024-09-21 PROCEDURE — 80048 BASIC METABOLIC PNL TOTAL CA: CPT

## 2024-09-21 PROCEDURE — 36415 COLL VENOUS BLD VENIPUNCTURE: CPT

## 2024-09-21 PROCEDURE — G0378 HOSPITAL OBSERVATION PER HR: HCPCS

## 2024-09-21 RX ADMIN — SODIUM CHLORIDE, PRESERVATIVE FREE 10 ML: 5 INJECTION INTRAVENOUS at 08:57

## 2024-09-21 RX ADMIN — AMIODARONE HYDROCHLORIDE 200 MG: 200 TABLET ORAL at 08:56

## 2024-09-21 RX ADMIN — AMLODIPINE BESYLATE 5 MG: 5 TABLET ORAL at 08:56

## 2024-09-21 RX ADMIN — APIXABAN 2.5 MG: 5 TABLET, FILM COATED ORAL at 08:56

## 2024-09-21 RX ADMIN — DOXYCYCLINE HYCLATE 100 MG: 100 CAPSULE ORAL at 08:57

## 2024-09-21 RX ADMIN — METOPROLOL TARTRATE 25 MG: 25 TABLET, FILM COATED ORAL at 20:25

## 2024-09-21 RX ADMIN — WATER 1000 MG: 1 INJECTION INTRAMUSCULAR; INTRAVENOUS; SUBCUTANEOUS at 10:03

## 2024-09-21 RX ADMIN — AMIODARONE HYDROCHLORIDE 1 MG/MIN: 50 INJECTION, SOLUTION INTRAVENOUS at 04:46

## 2024-09-21 RX ADMIN — GUAIFENESIN SYRUP AND DEXTROMETHORPHAN 5 ML: 100; 10 SYRUP ORAL at 12:16

## 2024-09-21 RX ADMIN — METOPROLOL TARTRATE 25 MG: 25 TABLET, FILM COATED ORAL at 08:57

## 2024-09-21 RX ADMIN — GUAIFENESIN SYRUP AND DEXTROMETHORPHAN 5 ML: 100; 10 SYRUP ORAL at 16:47

## 2024-09-21 RX ADMIN — APIXABAN 2.5 MG: 5 TABLET, FILM COATED ORAL at 20:25

## 2024-09-21 RX ADMIN — DOXYCYCLINE HYCLATE 100 MG: 100 CAPSULE ORAL at 20:25

## 2024-09-21 RX ADMIN — PRAVASTATIN SODIUM 40 MG: 20 TABLET ORAL at 20:25

## 2024-09-21 RX ADMIN — SODIUM CHLORIDE, PRESERVATIVE FREE 10 ML: 5 INJECTION INTRAVENOUS at 20:28

## 2024-09-21 RX ADMIN — PANTOPRAZOLE SODIUM 40 MG: 40 TABLET, DELAYED RELEASE ORAL at 08:57

## 2024-09-21 RX ADMIN — GUAIFENESIN SYRUP AND DEXTROMETHORPHAN 5 ML: 100; 10 SYRUP ORAL at 07:50

## 2024-09-21 NOTE — PROGRESS NOTES
[facility] PHYSICIANS- [facility]  Inpatient progress Report  PATIENT: Niles Field  MEDICAL RECORD NUMBER: [id number]  DATE OF SERVICE:  9/21/2024  ATTENDING ELECTROPHYSIOLOGIST: Benoit Byers   PRIMARY ELECTROPHYSIOLOGIST: Mohini Mercado MD  REFERRING PHYSICIAN: No ref. provider found and Geremias Koehler DO  CHIEF COMPLAINT: Defibrillator went off    HPI: 88-year-old male with history of nonvalvular persistent AF sp DCCV x 7 (12/2010, 5/30/2017, 10/29/2020, 9/4/2021, 5/13/2022, 2/9/2023, 9/12/2023), VT sp Medtronic dual-chamber ICD (DOI: 4/24/2007-passive fixation RA lead; RV ICD lead revision with capping/abandoning old lead 2/2 fracture: 4/7/2008; GEN change: 7/28/2016 and 4/13/2023), RBBB, CAD sp CABG (5/23/2003) and redo CABG (3/2007), AS sp TAVR (3/29/2017), carotid stenosis, AAA (2022: 5.5 cm), TIA, HTN, recurrent GI bleed (8/2013: PUD on Pradaxa; 3/2022: 3 AVMs in fundus of stomach treated with APC/clips on Xarelto and PPI; 5/2024: GI bleed of uncertain etiology on Xarelto), and CKD-3.  He is managed by Kia Jorgensen and Rogelio with amiodarone 200 mg daily, amlodipine 5 mg daily, apixaban 2.5 mg twice daily, Bumex 2 mg daily as needed, metoprolol 25 mg twice daily, pravastatin 40 mg daily, and PPI. In 5/2003, patient diagnosed with multivessel CAD, which was treated with CABG.  Details not available.  In 3/2007, during stress test, patient had cardiac arrest/VT, which was treated with external defibrillation.  Shortly after, he underwent redo CABG and implant of dual-chamber ICD with passive fixation RA lead.  In 4/2008, patient had inappropriate shocks due to RV lead fracture, which was managed with capping/abandoning old lead and implanting new RV ICD lead.  In 2010, he was diagnosed with AF, which was treated with Pradaxa and DCCV.  In 8/2013, he had GI bleed due to pyloric ulcer.  Pradaxa was changed to Xarelto.  In 3/2017, he was diagnosed with severe AS,      09/19/24 2051 09/20/24  0710 09/21/24  0627   WBC 9.0 7.4 7.6   HGB 9.7* 9.0* 9.1*   HCT 32.3* 31.6* 30.2*    273 334     Recent Labs     09/19/24 2051 09/20/24  0710 09/21/24  0627    136 136   K 4.2 4.0 4.2   CL 92* 94* 96*   CO2 31* 24 29   BUN 25* 23 21   CREATININE 1.5* 1.3* 1.3*   CALCIUM 9.1 8.5* 8.5*      Lab Results   Component Value Date/Time    MG 2.4 09/19/2024 08:51 PM     No results for input(s): \"TSH\" in the last 72 hours.  No results for input(s): \"INR\" in the last 72 hours.  Device Interrogation ( 9/19/24 )  Make/Model MDT Cobalt   Mode AAAIR===DDDR 55/120  P wave: 0.6 mV  Impedance: 323 ohms   Threshold: 0.875 V @ 0.40 ms  RV R wave: 3.1 mV  Impedance: 285 ohms   Threshold: .875 V @ 0.40 ms  Pacing: A: 2%  RV: 40%    Battery Voltage/Longevity:  9.7 years    Arrhythmias: Afib with burden of 11.5%  Reprogramming included none  Overall device function is normal  All device programmable settings were evaluated per above and in the scanned document, along with iterative adjustments (capture thresholds) to assess and select the most appropriate final programming to provide for consistent delivery of the appropriate therapy and to verify function of the device.     Telemetry: AF with ventricular rates of 55-75 bpm    Assessment/plan:  nonvalvular persistent AF sp DCCV x 7 (12/2010, 5/30/2017, 10/29/2020, 9/4/2021, 5/13/2022, 2/9/2023, 9/12/2023)  - Initially diagnosed in 2010.  - QAZ8RT4-TMSc score = 6 (age, CAD, HTN, TIA).  Recommend OAC in males with score of 1 or more.  DOAC preferred.  - Continue apixaban 2.5 mg twice daily.  While on DOAC, recommend annual monitoring of CBC and CMP.  Given patient's history of GI bleed/PUD, recommend PPI.  Continue PPI.  As patient has had multiple GI bleeds, consider referral for Watchman implant.  I will defer to established EP.  - Rhythm control desired due to symptoms despite rate control.  Continue amiodarone 200 mg daily.  While on

## 2024-09-21 NOTE — PROGRESS NOTES
packet 17 g  17 g Oral Daily PRN Edgar Jurado MD        acetaminophen (TYLENOL) tablet 650 mg  650 mg Oral Q6H PRN Edagr Jurado MD        Or    acetaminophen (TYLENOL) suppository 650 mg  650 mg Rectal Q6H PRN Edgar Jurado MD        ipratropium 0.5 mg-albuterol 2.5 mg (DUONEB) nebulizer solution 1 Dose  1 Dose Inhalation Q4H PRN Edgar Jurado MD        guaiFENesin-dextromethorphan (ROBITUSSIN DM) 100-10 MG/5ML syrup 5 mL  5 mL Oral Q4H PRN Edgar Jurado MD   5 mL at 09/21/24 0750    cefTRIAXone (ROCEPHIN) 1,000 mg in sterile water 10 mL IV syringe  1,000 mg IntraVENous Q24H Edgar Jurado MD   1,000 mg at 09/20/24 1044    And    doxycycline hyclate (VIBRAMYCIN) capsule 100 mg  100 mg Oral 2 times per day Edgar Jurado MD   100 mg at 09/21/24 0857    prochlorperazine (COMPAZINE) injection 10 mg  10 mg IntraVENous Q6H PRN Edgar Jurado MD        Or    prochlorperazine (COMPAZINE) tablet 10 mg  10 mg Oral Q8H PRN Edgar Jurado MD           PRN Medications  ALPRAZolam, meclizine, sodium chloride flush, sodium chloride, polyethylene glycol, acetaminophen **OR** acetaminophen, ipratropium 0.5 mg-albuterol 2.5 mg, guaiFENesin-dextromethorphan, prochlorperazine **OR** prochlorperazine    Most Recent Labs  Lab Results   Component Value Date    WBC 7.6 09/21/2024    HGB 9.1 (L) 09/21/2024    HCT 30.2 (L) 09/21/2024     09/21/2024     09/21/2024    K 4.2 09/21/2024    CL 96 (L) 09/21/2024    CREATININE 1.3 (H) 09/21/2024    BUN 21 09/21/2024    CO2 29 09/21/2024    GLUCOSE 108 (H) 09/21/2024    ALT 13 08/14/2024    AST 25 08/14/2024    INR 1.5 06/15/2022    APTT 38.4 (H) 06/15/2022    TSH 2.60 08/14/2024    LABA1C 5.5 08/14/2024       XR CHEST PORTABLE   Final Result   Stable appearance of left-sided transvenous pacer device.      Peripheral right mid lung opacity not present on prior study.             Hospital Medications  Current Facility-Administered Medications   Medication Dose Route Frequency Provider Last Rate Last Admin  (COMPAZINE) injection 10 mg  10 mg IntraVENous Q6H PRN Edgar Jurado MD        Or    prochlorperazine (COMPAZINE) tablet 10 mg  10 mg Oral Q8H PRN Edgar Jurado MD           PRN Medications  ALPRAZolam, meclizine, sodium chloride flush, sodium chloride, polyethylene glycol, acetaminophen **OR** acetaminophen, ipratropium 0.5 mg-albuterol 2.5 mg, guaiFENesin-dextromethorphan, prochlorperazine **OR** prochlorperazine    +++++++++++++++++++++++++++++++++++++++++++++++++  Ric Fernandez MD    [place], [place]  +++++++++++++++++++++++++++++++++++++++++++++++++  NOTE: This report was transcribed using voice recognition software. Every effort was made to ensure accuracy; however, inadvertent computerized transcription errors may be present.    I can be reached through [employer].

## 2024-09-21 NOTE — PROGRESS NOTES
4 Eyes Skin Assessment     NAME:  Niles Field  YOB: 1935  MEDICAL RECORD NUMBER:  [id number]    The patient is being assessed for  Admission    I agree that at least one RN has performed a thorough Head to Toe Skin Assessment on the patient. ALL assessment sites listed below have been assessed.      Areas assessed by both nurses:    Head, Face, Ears, Shoulders, Back, Chest, Arms, Elbows, Hands, Sacrum. Buttock, Coccyx, Ischium, Legs. Feet and Heels, and Under Medical Devices         Does the Patient have a Wound? No noted wound(s)       Michael Prevention initiated by RN: No  Wound Care Orders initiated by RN: No    Pressure Injury (Stage 3,4, Unstageable, DTI, NWPT, and Complex wounds) if present, place Wound referral order by RN under : No    New Ostomies, if present place, Ostomy referral order under : No     Nurse 1 eSignature: Electronically signed by Omega Hughes RN on 9/21/24 at 2:04 AM EDT    **SHARE this note so that the co-signing nurse can place an eSignature**    Nurse 2 eSignature: Electronically signed by Gregoria VIGIL RN on 9/21/24 at 2:18 AM EDT

## 2024-09-22 LAB
ANION GAP SERPL CALCULATED.3IONS-SCNC: 13 MMOL/L (ref 7–16)
BASOPHILS # BLD: 0.04 K/UL (ref 0–0.2)
BASOPHILS NFR BLD: 0 % (ref 0–2)
BUN SERPL-MCNC: 28 MG/DL (ref 6–23)
CALCIUM SERPL-MCNC: 8.8 MG/DL (ref 8.6–10.2)
CHLORIDE SERPL-SCNC: 94 MMOL/L (ref 98–107)
CO2 SERPL-SCNC: 29 MMOL/L (ref 22–29)
CREAT SERPL-MCNC: 1.4 MG/DL (ref 0.7–1.2)
EOSINOPHIL # BLD: 0.03 K/UL (ref 0.05–0.5)
EOSINOPHILS RELATIVE PERCENT: 0 % (ref 0–6)
ERYTHROCYTE [DISTWIDTH] IN BLOOD BY AUTOMATED COUNT: 16.1 % (ref 11.5–15)
GFR, ESTIMATED: 51 ML/MIN/1.73M2
GLUCOSE SERPL-MCNC: 131 MG/DL (ref 74–99)
HCT VFR BLD AUTO: 31.8 % (ref 37–54)
HGB BLD-MCNC: 9.5 G/DL (ref 12.5–16.5)
IMM GRANULOCYTES # BLD AUTO: 0.04 K/UL (ref 0–0.58)
IMM GRANULOCYTES NFR BLD: 0 % (ref 0–5)
LYMPHOCYTES NFR BLD: 0.68 K/UL (ref 1.5–4)
LYMPHOCYTES RELATIVE PERCENT: 8 % (ref 20–42)
MCH RBC QN AUTO: 25.6 PG (ref 26–35)
MCHC RBC AUTO-ENTMCNC: 29.9 G/DL (ref 32–34.5)
MCV RBC AUTO: 85.7 FL (ref 80–99.9)
MONOCYTES NFR BLD: 0.69 K/UL (ref 0.1–0.95)
MONOCYTES NFR BLD: 8 % (ref 2–12)
NEUTROPHILS NFR BLD: 84 % (ref 43–80)
NEUTS SEG NFR BLD: 7.64 K/UL (ref 1.8–7.3)
PLATELET # BLD AUTO: 364 K/UL (ref 130–450)
PMV BLD AUTO: 10.3 FL (ref 7–12)
POTASSIUM SERPL-SCNC: 4.2 MMOL/L (ref 3.5–5)
RBC # BLD AUTO: 3.71 M/UL (ref 3.8–5.8)
SODIUM SERPL-SCNC: 136 MMOL/L (ref 132–146)
WBC OTHER # BLD: 9.1 K/UL (ref 4.5–11.5)

## 2024-09-22 PROCEDURE — 99233 SBSQ HOSP IP/OBS HIGH 50: CPT | Performed by: STUDENT IN AN ORGANIZED HEALTH CARE EDUCATION/TRAINING PROGRAM

## 2024-09-22 PROCEDURE — 2580000003 HC RX 258: Performed by: HOSPITALIST

## 2024-09-22 PROCEDURE — 99232 SBSQ HOSP IP/OBS MODERATE 35: CPT | Performed by: STUDENT IN AN ORGANIZED HEALTH CARE EDUCATION/TRAINING PROGRAM

## 2024-09-22 PROCEDURE — 80048 BASIC METABOLIC PNL TOTAL CA: CPT

## 2024-09-22 PROCEDURE — 85025 COMPLETE CBC W/AUTO DIFF WBC: CPT

## 2024-09-22 PROCEDURE — 6370000000 HC RX 637 (ALT 250 FOR IP): Performed by: HOSPITALIST

## 2024-09-22 PROCEDURE — 36415 COLL VENOUS BLD VENIPUNCTURE: CPT

## 2024-09-22 PROCEDURE — 2060000000 HC ICU INTERMEDIATE R&B

## 2024-09-22 PROCEDURE — 6360000002 HC RX W HCPCS: Performed by: HOSPITALIST

## 2024-09-22 RX ADMIN — GUAIFENESIN SYRUP AND DEXTROMETHORPHAN 5 ML: 100; 10 SYRUP ORAL at 13:35

## 2024-09-22 RX ADMIN — FERROUS SULFATE TAB 325 MG (65 MG ELEMENTAL FE) 325 MG: 325 (65 FE) TAB at 08:34

## 2024-09-22 RX ADMIN — METOPROLOL TARTRATE 25 MG: 25 TABLET, FILM COATED ORAL at 20:22

## 2024-09-22 RX ADMIN — SODIUM CHLORIDE, PRESERVATIVE FREE 10 ML: 5 INJECTION INTRAVENOUS at 20:56

## 2024-09-22 RX ADMIN — SODIUM CHLORIDE, PRESERVATIVE FREE 10 ML: 5 INJECTION INTRAVENOUS at 08:34

## 2024-09-22 RX ADMIN — APIXABAN 2.5 MG: 5 TABLET, FILM COATED ORAL at 20:21

## 2024-09-22 RX ADMIN — WATER 1000 MG: 1 INJECTION INTRAMUSCULAR; INTRAVENOUS; SUBCUTANEOUS at 08:34

## 2024-09-22 RX ADMIN — METOPROLOL TARTRATE 25 MG: 25 TABLET, FILM COATED ORAL at 08:34

## 2024-09-22 RX ADMIN — APIXABAN 2.5 MG: 5 TABLET, FILM COATED ORAL at 08:34

## 2024-09-22 RX ADMIN — PRAVASTATIN SODIUM 40 MG: 20 TABLET ORAL at 20:21

## 2024-09-22 RX ADMIN — GUAIFENESIN SYRUP AND DEXTROMETHORPHAN 5 ML: 100; 10 SYRUP ORAL at 20:25

## 2024-09-22 RX ADMIN — AMLODIPINE BESYLATE 5 MG: 5 TABLET ORAL at 08:34

## 2024-09-22 RX ADMIN — DOXYCYCLINE HYCLATE 100 MG: 100 CAPSULE ORAL at 08:34

## 2024-09-22 RX ADMIN — AMIODARONE HYDROCHLORIDE 200 MG: 200 TABLET ORAL at 08:34

## 2024-09-22 RX ADMIN — DOXYCYCLINE HYCLATE 100 MG: 100 CAPSULE ORAL at 20:22

## 2024-09-22 RX ADMIN — PANTOPRAZOLE SODIUM 40 MG: 40 TABLET, DELAYED RELEASE ORAL at 08:34

## 2024-09-22 NOTE — PROGRESS NOTES
HOSPITALIST PROGRESS NOTE    Patient's name: Niles Field  : 1935  Admission date: 2024  Date of service: 2024   Primary care physician: Geremias Koehler DO    Assessment   Patient admitted on 2024     Niles Field is a 88 y.o. male patient of Germeias Koehler DO with history of HFpEF, atrial fibrillation, hypertension, TAVR, ICD in place presented to [facility] on 2024 with concern of his defibrillator firing.  While in the ER ICD was interrogated, no episodes of VT/V-fib but patient was in A-fib for more than 24 hours.  Patient chest x-ray also concerning for possible pneumonia.  Patient is admitted for pneumonia and A-fib.    Atrial fibrillation s/p DCCV x 7  Continue Eliquis, amiodarone, metoprolol  Per EP planning on cardioversion on Monday.    Community-acquired pneumonia  Continue ceftriaxone and doxycycline    HFpEF  History of artery stenosis s/p TAVR in   CAD s/p CABG in   History of VT s/p ICD  Continue home amiodarone, Lopressor, statin, Eliquis, amlodipine          Follow labs   DVT prophylaxis Eliquis  Please see orders for further management and care.  Discharge plan: Pending cardioversion by EP    Subjective  Niles was seen and examined at bedside.  Patient is alert and oriented, no acute overnight events.  Wean off oxygen before discharge  Patient to go for cardioversion on .        Review of Systems  All systems reviewed and negative except mentioned above.       Objective  Most Recent Recorded Vitals  /67   Pulse 72   Temp 98.2 °F (36.8 °C) (Temporal)   Resp 18   Ht 1.727 m (5' 8\")   Wt 75.3 kg (166 lb)   SpO2 95%   BMI 25.24 kg/m²     General appearance: awake, alert No apparent distress, appears stated age and cooperative.  HEENT: Normal cephalic, atraumatic without obvious deformity. Pupils equal, round, and reactive to light.  Extra ocular muscles intact. Conjunctivae/corneas clear.  Neck: Supple, with full range

## 2024-09-22 NOTE — PROGRESS NOTES
[facility] PHYSICIANS- [facility]  Inpatient progress Report  PATIENT: Niles Field  MEDICAL RECORD NUMBER: [id number]  DATE OF SERVICE:  9/22/2024  ATTENDING ELECTROPHYSIOLOGIST: Benoit Byers   PRIMARY ELECTROPHYSIOLOGIST: Mohini Mercado MD  REFERRING PHYSICIAN: No ref. provider found and Geremias Koehler DO  CHIEF COMPLAINT: Defibrillator went off    HPI: 88-year-old male with history of nonvalvular persistent AF sp DCCV x 7 (12/2010, 5/30/2017, 10/29/2020, 9/4/2021, 5/13/2022, 2/9/2023, 9/12/2023), VT sp Medtronic dual-chamber ICD (DOI: 4/24/2007-passive fixation RA lead; RV ICD lead revision with capping/abandoning old lead 2/2 fracture: 4/7/2008; GEN change: 7/28/2016 and 4/13/2023), RBBB, CAD sp CABG (5/23/2003) and redo CABG (3/2007), AS sp TAVR (3/29/2017), carotid stenosis, AAA (2022: 5.5 cm), TIA, HTN, recurrent GI bleed (8/2013: PUD on Pradaxa; 3/2022: 3 AVMs in fundus of stomach treated with APC/clips on Xarelto and PPI; 5/2024: GI bleed of uncertain etiology on Xarelto), and CKD-3.  He is managed by Kia Jorgensen and Rogelio with amiodarone 200 mg daily, amlodipine 5 mg daily, apixaban 2.5 mg twice daily, Bumex 2 mg daily as needed, metoprolol 25 mg twice daily, pravastatin 40 mg daily, and PPI. In 5/2003, patient diagnosed with multivessel CAD, which was treated with CABG.  Details not available.  In 3/2007, during stress test, patient had cardiac arrest/VT, which was treated with external defibrillation.  Shortly after, he underwent redo CABG and implant of dual-chamber ICD with passive fixation RA lead.  In 4/2008, patient had inappropriate shocks due to RV lead fracture, which was managed with capping/abandoning old lead and implanting new RV ICD lead.  In 2010, he was diagnosed with AF, which was treated with Pradaxa and DCCV.  In 8/2013, he had GI bleed due to pyloric ulcer.  Pradaxa was changed to Xarelto.  In 3/2017, he was diagnosed with severe AS,

## 2024-09-23 ENCOUNTER — APPOINTMENT (OUTPATIENT)
Age: 89
DRG: 308 | End: 2024-09-23
Attending: INTERNAL MEDICINE
Payer: MEDICARE

## 2024-09-23 ENCOUNTER — ANESTHESIA EVENT (OUTPATIENT)
Age: 89
End: 2024-09-23
Payer: MEDICARE

## 2024-09-23 ENCOUNTER — CARE COORDINATION (OUTPATIENT)
Dept: CASE MANAGEMENT | Age: 89
End: 2024-09-23

## 2024-09-23 ENCOUNTER — ANESTHESIA (OUTPATIENT)
Age: 89
End: 2024-09-23
Payer: MEDICARE

## 2024-09-23 ENCOUNTER — APPOINTMENT (OUTPATIENT)
Dept: GENERAL RADIOLOGY | Age: 89
DRG: 308 | End: 2024-09-23
Payer: MEDICARE

## 2024-09-23 LAB — ECHO BSA: 1.9 M2

## 2024-09-23 PROCEDURE — 71045 X-RAY EXAM CHEST 1 VIEW: CPT

## 2024-09-23 PROCEDURE — 99232 SBSQ HOSP IP/OBS MODERATE 35: CPT | Performed by: INTERNAL MEDICINE

## 2024-09-23 PROCEDURE — 7100000010 HC PHASE II RECOVERY - FIRST 15 MIN: Performed by: INTERNAL MEDICINE

## 2024-09-23 PROCEDURE — 5A2204Z RESTORATION OF CARDIAC RHYTHM, SINGLE: ICD-10-PCS | Performed by: INTERNAL MEDICINE

## 2024-09-23 PROCEDURE — 6370000000 HC RX 637 (ALT 250 FOR IP): Performed by: HOSPITALIST

## 2024-09-23 PROCEDURE — 6360000002 HC RX W HCPCS: Performed by: STUDENT IN AN ORGANIZED HEALTH CARE EDUCATION/TRAINING PROGRAM

## 2024-09-23 PROCEDURE — 2060000000 HC ICU INTERMEDIATE R&B

## 2024-09-23 PROCEDURE — 6370000000 HC RX 637 (ALT 250 FOR IP): Performed by: STUDENT IN AN ORGANIZED HEALTH CARE EDUCATION/TRAINING PROGRAM

## 2024-09-23 PROCEDURE — 6370000000 HC RX 637 (ALT 250 FOR IP): Performed by: INTERNAL MEDICINE

## 2024-09-23 PROCEDURE — 97535 SELF CARE MNGMENT TRAINING: CPT

## 2024-09-23 PROCEDURE — 6360000002 HC RX W HCPCS: Performed by: HOSPITALIST

## 2024-09-23 PROCEDURE — 97165 OT EVAL LOW COMPLEX 30 MIN: CPT

## 2024-09-23 PROCEDURE — 94640 AIRWAY INHALATION TREATMENT: CPT

## 2024-09-23 PROCEDURE — 97161 PT EVAL LOW COMPLEX 20 MIN: CPT

## 2024-09-23 PROCEDURE — 2580000003 HC RX 258: Performed by: HOSPITALIST

## 2024-09-23 PROCEDURE — 92960 CARDIOVERSION ELECTRIC EXT: CPT | Performed by: INTERNAL MEDICINE

## 2024-09-23 PROCEDURE — 97530 THERAPEUTIC ACTIVITIES: CPT

## 2024-09-23 PROCEDURE — 6360000002 HC RX W HCPCS: Performed by: INTERNAL MEDICINE

## 2024-09-23 PROCEDURE — 3700000000 HC ANESTHESIA ATTENDED CARE: Performed by: INTERNAL MEDICINE

## 2024-09-23 PROCEDURE — 2580000003 HC RX 258

## 2024-09-23 PROCEDURE — 6360000002 HC RX W HCPCS

## 2024-09-23 PROCEDURE — 2580000003 HC RX 258: Performed by: INTERNAL MEDICINE

## 2024-09-23 PROCEDURE — 92960 CARDIOVERSION ELECTRIC EXT: CPT

## 2024-09-23 PROCEDURE — 2700000000 HC OXYGEN THERAPY PER DAY

## 2024-09-23 RX ORDER — SODIUM CHLORIDE 9 MG/ML
INJECTION, SOLUTION INTRAVENOUS
Status: DISCONTINUED | OUTPATIENT
Start: 2024-09-23 | End: 2024-09-23 | Stop reason: SDUPTHER

## 2024-09-23 RX ORDER — POLYETHYLENE GLYCOL 3350 17 G/17G
17 POWDER, FOR SOLUTION ORAL 2 TIMES DAILY
Status: DISCONTINUED | OUTPATIENT
Start: 2024-09-23 | End: 2024-09-26 | Stop reason: HOSPADM

## 2024-09-23 RX ORDER — PROPOFOL 10 MG/ML
INJECTION, EMULSION INTRAVENOUS
Status: DISCONTINUED | OUTPATIENT
Start: 2024-09-23 | End: 2024-09-23 | Stop reason: SDUPTHER

## 2024-09-23 RX ORDER — FUROSEMIDE 10 MG/ML
40 INJECTION INTRAMUSCULAR; INTRAVENOUS ONCE
Status: COMPLETED | OUTPATIENT
Start: 2024-09-24 | End: 2024-09-24

## 2024-09-23 RX ORDER — GUAIFENESIN/DEXTROMETHORPHAN 100-10MG/5
5 SYRUP ORAL 2 TIMES DAILY
Qty: 120 ML | Refills: 0 | Status: CANCELLED | OUTPATIENT
Start: 2024-09-23 | End: 2024-10-05

## 2024-09-23 RX ORDER — SENNA AND DOCUSATE SODIUM 50; 8.6 MG/1; MG/1
2 TABLET, FILM COATED ORAL 2 TIMES DAILY
Status: DISCONTINUED | OUTPATIENT
Start: 2024-09-23 | End: 2024-09-26 | Stop reason: HOSPADM

## 2024-09-23 RX ORDER — ALBUTEROL SULFATE 0.83 MG/ML
2.5 SOLUTION RESPIRATORY (INHALATION) 3 TIMES DAILY
Status: DISCONTINUED | OUTPATIENT
Start: 2024-09-23 | End: 2024-09-26 | Stop reason: HOSPADM

## 2024-09-23 RX ORDER — SENNA AND DOCUSATE SODIUM 50; 8.6 MG/1; MG/1
2 TABLET, FILM COATED ORAL 2 TIMES DAILY PRN
Qty: 30 TABLET | Refills: 0 | Status: CANCELLED | OUTPATIENT
Start: 2024-09-23

## 2024-09-23 RX ORDER — FUROSEMIDE 10 MG/ML
40 INJECTION INTRAMUSCULAR; INTRAVENOUS ONCE
Status: COMPLETED | OUTPATIENT
Start: 2024-09-23 | End: 2024-09-23

## 2024-09-23 RX ADMIN — DOXYCYCLINE HYCLATE 100 MG: 100 CAPSULE ORAL at 20:22

## 2024-09-23 RX ADMIN — METOPROLOL TARTRATE 25 MG: 25 TABLET, FILM COATED ORAL at 10:19

## 2024-09-23 RX ADMIN — METOPROLOL TARTRATE 25 MG: 25 TABLET, FILM COATED ORAL at 20:22

## 2024-09-23 RX ADMIN — APIXABAN 2.5 MG: 5 TABLET, FILM COATED ORAL at 10:19

## 2024-09-23 RX ADMIN — ALBUTEROL SULFATE 2.5 MG: 2.5 SOLUTION RESPIRATORY (INHALATION) at 21:20

## 2024-09-23 RX ADMIN — APIXABAN 5 MG: 5 TABLET, FILM COATED ORAL at 20:22

## 2024-09-23 RX ADMIN — DOXYCYCLINE HYCLATE 100 MG: 100 CAPSULE ORAL at 10:19

## 2024-09-23 RX ADMIN — ALBUTEROL SULFATE 2.5 MG: 2.5 SOLUTION RESPIRATORY (INHALATION) at 10:35

## 2024-09-23 RX ADMIN — GUAIFENESIN SYRUP AND DEXTROMETHORPHAN 5 ML: 100; 10 SYRUP ORAL at 20:22

## 2024-09-23 RX ADMIN — FUROSEMIDE 40 MG: 10 INJECTION, SOLUTION INTRAMUSCULAR; INTRAVENOUS at 16:09

## 2024-09-23 RX ADMIN — AMLODIPINE BESYLATE 5 MG: 5 TABLET ORAL at 10:19

## 2024-09-23 RX ADMIN — POLYETHYLENE GLYCOL 3350 17 G: 17 POWDER, FOR SOLUTION ORAL at 20:22

## 2024-09-23 RX ADMIN — SENNOSIDES AND DOCUSATE SODIUM 2 TABLET: 50; 8.6 TABLET ORAL at 10:18

## 2024-09-23 RX ADMIN — SODIUM CHLORIDE, PRESERVATIVE FREE 10 ML: 5 INJECTION INTRAVENOUS at 10:19

## 2024-09-23 RX ADMIN — WATER 1000 MG: 1 INJECTION INTRAMUSCULAR; INTRAVENOUS; SUBCUTANEOUS at 10:19

## 2024-09-23 RX ADMIN — AMIODARONE HYDROCHLORIDE 200 MG: 200 TABLET ORAL at 10:19

## 2024-09-23 RX ADMIN — PRAVASTATIN SODIUM 40 MG: 20 TABLET ORAL at 20:21

## 2024-09-23 RX ADMIN — SENNOSIDES AND DOCUSATE SODIUM 2 TABLET: 50; 8.6 TABLET ORAL at 20:21

## 2024-09-23 RX ADMIN — ALBUTEROL SULFATE 2.5 MG: 2.5 SOLUTION RESPIRATORY (INHALATION) at 14:52

## 2024-09-23 RX ADMIN — PROPOFOL 50 MG: 10 INJECTION, EMULSION INTRAVENOUS at 12:49

## 2024-09-23 RX ADMIN — SODIUM CHLORIDE, PRESERVATIVE FREE 10 ML: 5 INJECTION INTRAVENOUS at 20:22

## 2024-09-23 RX ADMIN — SODIUM CHLORIDE: 9 INJECTION, SOLUTION INTRAVENOUS at 12:43

## 2024-09-23 ASSESSMENT — ENCOUNTER SYMPTOMS: SHORTNESS OF BREATH: 1

## 2024-09-23 NOTE — PROGRESS NOTES
oral mucosa moist, no cyanosis   CV: no JVD or leg edema, laterally displaced PMI. Regular rate and rhythm. Normal S1S2 and no S3.  Grade II-III/VI systolic ejection murmur at the aortic area radiating to the left sternal border  Lungs: clear to auscultation bilaterally, normal respiratory effort without used of accessory muscles  Abdomen: soft, non-tender, nondistended  Musculoskeletal: no digital clubbing, no edema   Skin: warm, no rashes     I have personally reviewed the laboratory, cardiac diagnostic and radiographic testing as outlined below:    Data:    Recent Labs     09/21/24 0627 09/22/24 0937   WBC 7.6 9.1   HGB 9.1* 9.5*   HCT 30.2* 31.8*    364     Recent Labs     09/21/24 0627 09/22/24 0937    136   K 4.2 4.2   CL 96* 94*   CO2 29 29   BUN 21 28*   CREATININE 1.3* 1.4*   CALCIUM 8.5* 8.8      Lab Results   Component Value Date/Time    MG 2.4 09/19/2024 08:51 PM     No results for input(s): \"TSH\" in the last 72 hours.  No results for input(s): \"INR\" in the last 72 hours.  Device Interrogation ( 9/19/24 )  Make/Model MDT Cobalt   Mode AAAIR===DDDR 55/120  P wave: 0.6 mV  Impedance: 323 ohms   Threshold: 0.875 V @ 0.40 ms  RV R wave: 3.1 mV  Impedance: 285 ohms   Threshold: .875 V @ 0.40 ms  Pacing: A: 2%  RV: 40%    Battery Voltage/Longevity:  9.7 years    Arrhythmias: Afib with burden of 11.5%--atrial fibrillation has been persistent for the past 2 to 3 weeks  Reprogramming included none  Overall device function is normal  All device programmable settings were evaluated per above and in the scanned document, along with iterative adjustments (capture thresholds) to assess and select the most appropriate final programming to provide for consistent delivery of the appropriate therapy and to verify function of the device.     Telemetry: AF with ventricular rates of 60-90 bpm, occasional ventricular pacing    Assessment/plan:  nonvalvular persistent AF sp DCCV x 7 (12/2010, 5/30/2017,  10/29/2020, 9/4/2021, 5/13/2022, 2/9/2023, 9/12/2023)  - Initially diagnosed in 2010.  - [id number] score = 6 (age, CAD, HTN, TIA).  Recommend OAC in males with score of 1 or more.  DOAC preferred.  - Continue apixaban 2.5 mg twice daily.  While on DOAC, recommend annual monitoring of CBC and CMP.  Given patient's history of GI bleed/PUD, recommend PPI.  Continue PPI.   Rhythm control desired due to symptoms despite rate control.  Continue amiodarone 200 mg daily.  While on amiodarone, recommend monitoring of TFTs and LFTs every 6 months, as well as annual chest x-ray and eye exam.  He has remained in AF/AFL since earlier this month (approximately 2 weeks).    DC cardioversion completed today    VT sp Medtronic dual-chamber ICD (DOI: 4/24/2007-passive fixation RA lead; RV ICD lead revision with capping/abandoning old lead 2/2 fracture: 4/7/2008; GEN change: 7/28/2016 and 4/13/2023)  - Stable device function.  - Continue remote monitoring via established EP office.  - Device interrogation at time of admission without evidence of ICD shock.    CAD sp CABG (5/23/2003) and redo CABG (3/2007)  - Established with Dr. Jorgensen ([facility] cardiology).    4.  Aortic stenosis-status post TAVR    Recommendation    Cardioversion completed today without complications however subsequent chest x-ray shows pulmonary vascular congestion  IV diuresis initiated  Will continue IV diuresis for 1 dose in the a.m.  Resume Bumex thereafter  Consider discharge in the next 24 to 48 hours    All of the above was discussed with the patient and her family>50% of the time involved face-to-face time providing counseling and or coordination of care with the other providers,  reviewing records/tests, counseling/education of the patient, ordering medications/tests/procedures, coordinating care, and documenting clinical information in the EHR.   I personally and independently saw and examined patient and reviewed all done pertinent laboratory data,

## 2024-09-23 NOTE — PROGRESS NOTES
Physical Therapy  Initial Assessment       Name: Niles Field  : 1935  MRN: [id number]      Date of Service: 2024    Evaluating PT:  Rom Smith PT, DPT  [id number]    Room #:  8516/8516-A  Diagnosis:  Pneumonia due to organism [J18.9]  Pneumonia of right middle lobe due to infectious organism [J18.9]  Community acquired pneumonia of right middle lobe of lung [J18.9]  AF (atrial fibrillation) ([facility]) [I48.91]  PMHx/PSHx:   has a past medical history of A-fib ([facility]), AAA (abdominal aortic aneurysm) ([facility]), Acute congestive heart failure ([facility]), Acute on chronic combined systolic and diastolic CHF (congestive heart failure) ([facility]), Acute on chronic diastolic congestive heart failure ([facility]), Acute on chronic heart failure with preserved ejection fraction (HFpEF) ([facility]), ELISSA (acute kidney injury) ([facility]), Arrhythmia, Arthritis, Carotid artery stenosis, CHF (congestive heart failure) ([facility]), Chronic atrial fibrillation, CKD (chronic kidney disease), stage III ([facility]), Heart valve problem, Hyperlipidemia, Hypertension, ICD (implantable cardiac defibrillator) in place, MI (mitral incompetence), MI (myocardial infarction) ([facility]), Other persistent atrial fibrillation ([facility]), PAF (paroxysmal atrial fibrillation) ([facility]), Paroxysmal atrial fibrillation ([facility]), Ventricular tachycardia ([facility]), and VT (ventricular tachycardia) ([facility]).  Procedure/Surgery:  DCCV   Precautions:  Falls, O2  Equipment Needs:  Owns Rollator    SUBJECTIVE:    Pt lives with spouse in a 1 story home with 2 stairs to enter and single rail.  Bed is on first floor and bath is on first floor.  Pt ambulated with SPC independently PTA.  Equipment Owned:   Rollator  WW  SPC    OBJECTIVE:   Initial Evaluation  Date: 24 Treatment Short Term/ Long Term   Goals   AM-PAC 6 Clicks      Was pt agreeable to Eval/treatment? yes     Does pt have pain? No c/o pain     Bed Mobility  Rolling: SBA  Supine to sit: SBA  Sit to supine: SBA  Scooting: SBA  Rolling:

## 2024-09-23 NOTE — PROGRESS NOTES
TRANSFER - OUT REPORT:    Verbal report given to Anita RN(name) on Edaraceli Field being transferred to [facility](unit) for routine progression of patient care       Report consisted of patient's Situation, Background, Assessment and   Recommendations(SBAR).     Information from the following report(s) Nurse Handoff Report was reviewed with the receiving nurse.    Opportunity for questions and clarification was provided.      Patient transported with:   Monitor

## 2024-09-23 NOTE — CARE COORDINATION
Chart reviewed and case reviewed in IDR.  Patient admitted after he felt his ICD fire at home.  Per EP note: Device interrogation at time of admission without evidence of ICD shock.  Patient to have cardioversion today with EP.  CXR showing pneumonia, patient on PO vibramycin and IV Rocephin.  Will be changed to PO antibiotics for discharge per Dr Finnegan.  Met with the patient at the bedside to discuss transition of care planning.  Patient lives with his wife Annie in a once story home with 2 step entry with railing.  Patient has a walker, cane, WC and shower bench that he doesn't use.  Patient also has 2L O2 per NC through [employer] at home for HS use.  Patient is active with [facility] and has a remote history of HHC.  Patient's PCP is Dr Koehler and he uses [facility] or [facility] in [place] for his medications.  Patient stated his daughter Maritza Maciel will be up for his cardioversion and either she, or his son Niles will provide transportation home for him when he is medically stable to discharge.  Transition of care plan is to return home with outpatient therapy.  Will continue to follow for further transition of care planning needs.       Shania Higginbotham RN.  P:  [phone]        Case Management Assessment  Initial Evaluation    Date/Time of Evaluation: 9/23/2024 10:51 AM  Assessment Completed by: Shania Higginbotham RN    If patient is discharged prior to next notation, then this note serves as note for discharge by case management.    Patient Name: Niles Field                   YOB: 1935  Diagnosis: Pneumonia due to organism [J18.9]  Pneumonia of right middle lobe due to infectious organism [J18.9]  Community acquired pneumonia of right middle lobe of lung [J18.9]  AF (atrial fibrillation) ([facility]) [I48.91]                   Date / Time: 9/19/2024  8:15 PM    Patient Admission Status: Inpatient   Readmission Risk (Low < 19, Mod (19-27), High > 27): Readmission Risk Score:  16.3    Current PCP: Geremias Koehler, DO  PCP verified by CM? Yes    Chart Reviewed: Yes      History Provided by: Patient  Patient Orientation: Alert and Oriented    Patient Cognition: Alert    Hospitalization in the last 30 days (Readmission):  No    If yes, Readmission Assessment in CM Navigator will be completed.    Advance Directives:      Code Status: Full Code   Patient's Primary Decision Maker is: Legal Next of Kin    Primary Decision Maker: Annie Field - Spouse - [phone]    Secondary Decision Maker: Maritza Dumont - Child - [phone]    Supplemental (Other) Decision Maker: Niles Field - Child - [phone]    Supplemental (Other) Decision Maker: MianmeRee burns - Other - [phone]    Discharge Planning:    Patient lives with: Spouse/Significant Other Type of Home: House  Primary Care Giver: Self  Patient Support Systems include: Spouse/Significant Other, Children, Family Members   Current Financial resources:    Current community resources:    Current services prior to admission: None            Current DME:              Type of Home Care services:  None    ADLS  Prior functional level: Independent in ADLs/IADLs  Current functional level: Assistance with the following:, Mobility, Shopping    PT AM-PAC:   /24  OT AM-PAC:   /24    Family can provide assistance at DC: Yes  Would you like Case Management to discuss the discharge plan with any other family members/significant others, and if so, who? No  Plans to Return to Present Housing: Yes  Other Identified Issues/Barriers to RETURNING to current housing: n/a   Potential Assistance needed at discharge: N/A            Potential DME:    Patient expects to discharge to: House  Plan for transportation at discharge:      Financial    Payor: MEDICARE / Plan: MEDICARE PART A AND B / Product Type: *No Product type* /     Does insurance require precert for SNF: No    Potential assistance Purchasing Medications:    Meds-to-Beds request:

## 2024-09-23 NOTE — PROGRESS NOTES
[facility] Hospitalist Progress Note    SYNOPSIS: Patient admitted on 2024 for Pneumonia involving right lung   88 y.o. male patient of Geremias Koehler DO with history of HFpEF, atrial fibrillation, hypertension, TAVR, ICD in place presented to [facility] on 2024 with concern of his defibrillator firing.  While in the ER ICD was interrogated, no episodes of VT/V-fib but patient was in A-fib for more than 24 hours.  Patient chest x-ray also concerning for possible pneumonia.  Patient is admitted for pneumonia and A-fib.   s/p DCCV today-ep is ok with dc in 24 hours  On iv diuresis                SUBJECTIVE:  Stable overnight. No other overnight issues reported.   Patient seen and examined  Records reviewed.           Temp (24hrs), Av.8 °F (36.6 °C), Min:97.5 °F (36.4 °C), Max:98.1 °F (36.7 °C)    DIET: ADULT DIET; Regular; Low Sodium (2 gm)  CODE: Full Code    Intake/Output Summary (Last 24 hours) at 2024 1821  Last data filed at 2024 1255  Gross per 24 hour   Intake 200 ml   Output 700 ml   Net -500 ml       Review of Systems  All bolded are positive; please see HPI  General:  Fever, chills, diaphoresis, fatigue, malaise, night sweats, weight loss  Psychological:  Anxiety, disorientation, hallucinations.  ENT:  Epistaxis, headaches, vertigo, visual changes.  Cardiovascular:  Chest pain, irregular heartbeats, palpitations, paroxysmal nocturnal dyspnea.  Respiratory:  Shortness of breath, coughing, sputum production, hemoptysis, wheezing, orthopnea.  Gastrointestinal:  Nausea, vomiting, diarrhea, heartburn, constipation, abdominal pain, hematemesis, hematochezia, melena, acholic stools  Genito-Urinary:  Dysuria, urgency, frequency, hematuria  Musculoskeletal:  Joint pain, joint stiffness, joint swelling, muscle pain  Neurology:  Headache, focal neurological deficits, weakness, numbness, paresthesia  Derm:  Rashes, ulcers, excoriations, bruising  Extremities:

## 2024-09-23 NOTE — PROGRESS NOTES
OCCUPATIONAL THERAPY INITIAL EVALUATION    [facility] [address] [place], [place]       Date:2024                                                  Patient Name: Niles Field  MRN: [id number]  : 1935  Room: [address] [place] [address]    Evaluating OT: Dion Omalley OTR/L [id number]    Referring Provider: Mohini Mercado MD      Specific Provider Orders/Date: OT evaluation and treatment 24 0345    Diagnosis:  Pneumonia due to organism [J18.9]  Pneumonia of right middle lobe due to infectious organism [J18.9]  Community acquired pneumonia of right middle lobe of lung [J18.9]  AF (atrial fibrillation) ([facility]) [I48.91]      Pertinent Medical History:  has a past medical history of A-fib ([facility]), AAA (abdominal aortic aneurysm) ([facility]), Acute congestive heart failure ([facility]), Acute on chronic combined systolic and diastolic CHF (congestive heart failure) ([facility]), Acute on chronic diastolic congestive heart failure ([facility]), Acute on chronic heart failure with preserved ejection fraction (HFpEF) ([facility]), ELISSA (acute kidney injury) ([facility]), Arrhythmia, Arthritis, Carotid artery stenosis, CHF (congestive heart failure) ([facility]), Chronic atrial fibrillation, CKD (chronic kidney disease), stage III ([facility]), Heart valve problem, Hyperlipidemia, Hypertension, ICD (implantable cardiac defibrillator) in place, MI (mitral incompetence), MI (myocardial infarction) ([facility]), Other persistent atrial fibrillation ([facility]), PAF (paroxysmal atrial fibrillation) ([facility]), Paroxysmal atrial fibrillation ([facility]), Ventricular tachycardia ([facility]), and VT (ventricular tachycardia) ([facility]).   Past Surgical History:   Procedure Laterality Date    CARDIAC CATHETERIZATION Right 2017    Dr. Cooper- [place] [place]    CARDIAC DEFIBRILLATOR PLACEMENT  . 2008 78 shocks replaced  [employer]     CARDIAC DEFIBRILLATOR PLACEMENT  2016    [employer] Dual ICD gen change    CARDIAC DEFIBRILLATOR PLACEMENT  factors, co-morbidities, assistance required, and other factors as noted in the clinical evaluation and functional testing.     Time In: 1335  Time Out: 1358  Total Treatment Time: 8 minutes    Min Units   OT Eval Low 97165  x  1   OT Eval Moderate 08948      OT Eval High 50458       OT Re-Eval 24783       Therapeutic Ex 36923       Therapeutic Activities 33676      ADL/Self Care 48586  8 1   Orthotic Management 69037       Neuro Re-Ed 19412       Non-Billable Time          Evaluation Time includes thorough review of current medical information, gathering information on past medical history/social history and prior level of function, completion of standardized testing/informal observation of tasks, assessment of data and education on plan of care and goals.          Dion Omalley OTR/L [id number]

## 2024-09-23 NOTE — ACP (ADVANCE CARE PLANNING)
Advance Care Planning   Healthcare Decision Maker:    Primary Decision Maker: EmirAnnie COLLINS - Spouse - [phone]    Secondary Decision Maker: TimMaritza Maciel - Child - [phone]    Supplemental (Other) Decision Maker: EmirGuevaraaraceli - Child - [phone]    Supplemental (Other) Decision Maker: MianyocastaRee - Other - [phone]    Click here to complete Healthcare Decision Makers including selection of the Healthcare Decision Maker Relationship (ie \"Primary\").               Shania Higginbotham, RN

## 2024-09-24 LAB
ANION GAP SERPL CALCULATED.3IONS-SCNC: 14 MMOL/L (ref 7–16)
BUN SERPL-MCNC: 32 MG/DL (ref 6–23)
CALCIUM SERPL-MCNC: 9 MG/DL (ref 8.6–10.2)
CHLORIDE SERPL-SCNC: 97 MMOL/L (ref 98–107)
CO2 SERPL-SCNC: 30 MMOL/L (ref 22–29)
CREAT SERPL-MCNC: 1.4 MG/DL (ref 0.7–1.2)
GFR, ESTIMATED: 51 ML/MIN/1.73M2
GLUCOSE SERPL-MCNC: 99 MG/DL (ref 74–99)
MICROORGANISM SPEC CULT: NORMAL
MICROORGANISM SPEC CULT: NORMAL
POTASSIUM SERPL-SCNC: 3.9 MMOL/L (ref 3.5–5)
SERVICE CMNT-IMP: NORMAL
SERVICE CMNT-IMP: NORMAL
SODIUM SERPL-SCNC: 141 MMOL/L (ref 132–146)
SPECIMEN DESCRIPTION: NORMAL
SPECIMEN DESCRIPTION: NORMAL

## 2024-09-24 PROCEDURE — 2060000000 HC ICU INTERMEDIATE R&B

## 2024-09-24 PROCEDURE — 6370000000 HC RX 637 (ALT 250 FOR IP): Performed by: INTERNAL MEDICINE

## 2024-09-24 PROCEDURE — 99232 SBSQ HOSP IP/OBS MODERATE 35: CPT | Performed by: STUDENT IN AN ORGANIZED HEALTH CARE EDUCATION/TRAINING PROGRAM

## 2024-09-24 PROCEDURE — 6370000000 HC RX 637 (ALT 250 FOR IP): Performed by: STUDENT IN AN ORGANIZED HEALTH CARE EDUCATION/TRAINING PROGRAM

## 2024-09-24 PROCEDURE — 2700000000 HC OXYGEN THERAPY PER DAY

## 2024-09-24 PROCEDURE — 94640 AIRWAY INHALATION TREATMENT: CPT

## 2024-09-24 PROCEDURE — 80048 BASIC METABOLIC PNL TOTAL CA: CPT

## 2024-09-24 PROCEDURE — 6360000002 HC RX W HCPCS: Performed by: INTERNAL MEDICINE

## 2024-09-24 PROCEDURE — 2580000003 HC RX 258: Performed by: INTERNAL MEDICINE

## 2024-09-24 PROCEDURE — 36415 COLL VENOUS BLD VENIPUNCTURE: CPT

## 2024-09-24 PROCEDURE — 99233 SBSQ HOSP IP/OBS HIGH 50: CPT | Performed by: INTERNAL MEDICINE

## 2024-09-24 RX ORDER — ALBUTEROL SULFATE 90 UG/1
2 INHALANT RESPIRATORY (INHALATION) 4 TIMES DAILY PRN
Qty: 18 G | Refills: 0 | Status: SHIPPED | OUTPATIENT
Start: 2024-09-24

## 2024-09-24 RX ORDER — BUMETANIDE 1 MG/1
2 TABLET ORAL DAILY
Status: DISCONTINUED | OUTPATIENT
Start: 2024-09-24 | End: 2024-09-24

## 2024-09-24 RX ORDER — BUMETANIDE 1 MG/1
2 TABLET ORAL DAILY
Status: DISCONTINUED | OUTPATIENT
Start: 2024-09-26 | End: 2024-09-25

## 2024-09-24 RX ORDER — BUMETANIDE 1 MG/1
2 TABLET ORAL DAILY
Status: DISCONTINUED | OUTPATIENT
Start: 2024-09-25 | End: 2024-09-24

## 2024-09-24 RX ORDER — FUROSEMIDE 10 MG/ML
40 INJECTION INTRAMUSCULAR; INTRAVENOUS ONCE
Status: COMPLETED | OUTPATIENT
Start: 2024-09-25 | End: 2024-09-25

## 2024-09-24 RX ORDER — GUAIFENESIN/DEXTROMETHORPHAN 100-10MG/5
5 SYRUP ORAL EVERY 4 HOURS PRN
Qty: 120 ML | Refills: 0 | Status: SHIPPED | OUTPATIENT
Start: 2024-09-24 | End: 2024-10-04

## 2024-09-24 RX ORDER — SENNA AND DOCUSATE SODIUM 50; 8.6 MG/1; MG/1
2 TABLET, FILM COATED ORAL 2 TIMES DAILY PRN
Qty: 60 TABLET | Refills: 0 | Status: SHIPPED | OUTPATIENT
Start: 2024-09-24

## 2024-09-24 RX ADMIN — ALBUTEROL SULFATE 2.5 MG: 2.5 SOLUTION RESPIRATORY (INHALATION) at 16:12

## 2024-09-24 RX ADMIN — APIXABAN 5 MG: 5 TABLET, FILM COATED ORAL at 09:01

## 2024-09-24 RX ADMIN — ALBUTEROL SULFATE 2.5 MG: 2.5 SOLUTION RESPIRATORY (INHALATION) at 09:25

## 2024-09-24 RX ADMIN — AMOXICILLIN AND CLAVULANATE POTASSIUM 1 TABLET: 875; 125 TABLET, FILM COATED ORAL at 22:16

## 2024-09-24 RX ADMIN — APIXABAN 5 MG: 5 TABLET, FILM COATED ORAL at 22:14

## 2024-09-24 RX ADMIN — PANTOPRAZOLE SODIUM 40 MG: 40 TABLET, DELAYED RELEASE ORAL at 09:02

## 2024-09-24 RX ADMIN — POLYETHYLENE GLYCOL 3350 17 G: 17 POWDER, FOR SOLUTION ORAL at 09:02

## 2024-09-24 RX ADMIN — AMLODIPINE BESYLATE 5 MG: 5 TABLET ORAL at 09:04

## 2024-09-24 RX ADMIN — PRAVASTATIN SODIUM 40 MG: 20 TABLET ORAL at 22:13

## 2024-09-24 RX ADMIN — SENNOSIDES AND DOCUSATE SODIUM 2 TABLET: 50; 8.6 TABLET ORAL at 09:00

## 2024-09-24 RX ADMIN — FUROSEMIDE 40 MG: 10 INJECTION, SOLUTION INTRAMUSCULAR; INTRAVENOUS at 09:03

## 2024-09-24 RX ADMIN — SODIUM CHLORIDE, PRESERVATIVE FREE 10 ML: 5 INJECTION INTRAVENOUS at 22:13

## 2024-09-24 RX ADMIN — METOPROLOL TARTRATE 25 MG: 25 TABLET, FILM COATED ORAL at 22:13

## 2024-09-24 RX ADMIN — DOXYCYCLINE HYCLATE 100 MG: 100 CAPSULE ORAL at 22:13

## 2024-09-24 RX ADMIN — WATER 1000 MG: 1 INJECTION INTRAMUSCULAR; INTRAVENOUS; SUBCUTANEOUS at 09:03

## 2024-09-24 RX ADMIN — SODIUM CHLORIDE, PRESERVATIVE FREE 10 ML: 5 INJECTION INTRAVENOUS at 09:03

## 2024-09-24 RX ADMIN — METOPROLOL TARTRATE 25 MG: 25 TABLET, FILM COATED ORAL at 09:01

## 2024-09-24 RX ADMIN — DOXYCYCLINE HYCLATE 100 MG: 100 CAPSULE ORAL at 09:02

## 2024-09-24 RX ADMIN — FERROUS SULFATE TAB 325 MG (65 MG ELEMENTAL FE) 325 MG: 325 (65 FE) TAB at 09:01

## 2024-09-24 RX ADMIN — AMIODARONE HYDROCHLORIDE 200 MG: 200 TABLET ORAL at 09:01

## 2024-09-24 RX ADMIN — ALBUTEROL SULFATE 2.5 MG: 2.5 SOLUTION RESPIRATORY (INHALATION) at 19:44

## 2024-09-24 NOTE — PLAN OF CARE
Problem: Safety - Adult  Goal: Free from fall injury  9/24/2024 0920 by Fanny Lutz RN  Outcome: Progressing     Problem: Chronic Conditions and Co-morbidities  Goal: Patient's chronic conditions and co-morbidity symptoms are monitored and maintained or improved  9/24/2024 0920 by Fanny Lutz RN  Outcome: Progressing     Problem: Discharge Planning  Goal: Discharge to home or other facility with appropriate resources  9/24/2024 0920 by Fanny Lutz RN  Outcome: Progressing     Problem: ABCDS Injury Assessment  Goal: Absence of physical injury  Outcome: Progressing

## 2024-09-24 NOTE — PROGRESS NOTES
[facility] Hospitalist Progress Note    SYNOPSIS: Patient admitted on 2024 for Pneumonia involving right lung   88 y.o. male patient of Geremias Koehler DO with history of HFpEF, atrial fibrillation, hypertension, TAVR, ICD in place presented to [facility] on 2024 with concern of his defibrillator firing.  While in the ER ICD was interrogated, no episodes of VT/V-fib but patient was in A-fib for more than 24 hours.  Patient chest x-ray also concerning for possible pneumonia; s/p 5 days course of rocephin and doxycycline.  Patient  is managed for  pneumonia and A-fib.   s/p DCCV  on -patient converted back to a fib -planning for repeat cardioversion tomorrow; s/p  iv diuresis today; resuming bumex po from tomorrow                SUBJECTIVE:  Stable overnight. No other overnight issues reported.   Patient seen and examined  Records reviewed.           Temp (24hrs), Av.9 °F (36.6 °C), Min:97.8 °F (36.6 °C), Max:97.9 °F (36.6 °C)    DIET: ADULT DIET; Regular; Low Sodium (2 gm)  CODE: Full Code    Intake/Output Summary (Last 24 hours) at 2024 1200  Last data filed at 2024 0310  Gross per 24 hour   Intake 200 ml   Output 820 ml   Net -620 ml       Review of Systems  All bolded are positive; please see HPI  General:  Fever, chills, diaphoresis, fatigue, malaise, night sweats, weight loss  Psychological:  Anxiety, disorientation, hallucinations.  ENT:  Epistaxis, headaches, vertigo, visual changes.  Cardiovascular:  Chest pain, irregular heartbeats, palpitations, paroxysmal nocturnal dyspnea.  Respiratory:  Shortness of breath, coughing, sputum production, hemoptysis, wheezing, orthopnea.  Gastrointestinal:  Nausea, vomiting, diarrhea, heartburn, constipation, abdominal pain, hematemesis, hematochezia, melena, acholic stools  Genito-Urinary:  Dysuria, urgency, frequency, hematuria  Musculoskeletal:  Joint pain, joint stiffness, joint swelling, muscle pain  Neurology:   sennosides-docusate sodium  2 tablet Oral BID    polyethylene glycol  17 g Oral BID    albuterol  2.5 mg Nebulization TID    apixaban  5 mg Oral BID    amiodarone  200 mg Oral Daily    amLODIPine  5 mg Oral Daily    ferrous sulfate  325 mg Oral Every Other Day    metoprolol tartrate  25 mg Oral BID    pantoprazole  40 mg Oral Daily    pravastatin  40 mg Oral Nightly    sodium chloride flush  5-40 mL IntraVENous 2 times per day    doxycycline  100 mg Oral 2 times per day     PRN Meds: ALPRAZolam, meclizine, sodium chloride flush, sodium chloride, polyethylene glycol, acetaminophen **OR** acetaminophen, guaiFENesin-dextromethorphan, prochlorperazine **OR** prochlorperazine    Labs:     Recent Labs     09/22/24  0937   WBC 9.1   HGB 9.5*   HCT 31.8*          Recent Labs     09/22/24  0937 09/24/24  0459    141   K 4.2 3.9   CL 94* 97*   CO2 29 30*   BUN 28* 32*   CREATININE 1.4* 1.4*   CALCIUM 8.8 9.0       No results for input(s): \"ALKPHOS\", \"ALT\", \"AST\", \"BILITOT\", \"AMYLASE\", \"LIPASE\" in the last 72 hours.    Invalid input(s): \"PROT\", \"ALB\"    No results for input(s): \"INR\" in the last 72 hours.    No results for input(s): \"CKTOTAL\", \"TROPONINI\" in the last 72 hours.    Chronic labs:    Lab Results   Component Value Date    CHOL 144 08/14/2024    TRIG 102 08/14/2024    HDL 38 (L) 08/14/2024    TSH 2.60 08/14/2024    INR 1.5 06/15/2022    LABA1C 5.5 08/14/2024       Radiology: REVIEWED DAILY    +++++++++++++++++++++++++++++++++++++++++++++++++  Gwendolyn Finnegan MD  [facility] Hospitalist  [place]  +++++++++++++++++++++++++++++++++++++++++++++++++  NOTE: This report was transcribed using voice recognition software. Every effort was made to ensure accuracy; however, inadvertent computerized transcription errors may be present.

## 2024-09-24 NOTE — CARE COORDINATION
Chart reviewed and case reviewed in IDR.  Patient with cardioversion yesterday, conversion back to afib on 9/24.  Plan for cardioversion again on 9/25.  IV Lasix given yesterday and today per EP for volume overload.  Met with the patient at the bedside to discuss transition of care planning.  Patient remains on 2L O2 per NC.  Patient stated he was on continuous oxygen previously at home and has tanks as well as his concentrator for discharge if he still needs oxygen at discharge.  Nursing will continue to wean oxygen.  Transition of care plan at this time is to home with [facility].  Will continue to follow for further transition of care planning needs.         Shania Higginbotham RN.  P:  [phone]

## 2024-09-24 NOTE — PROGRESS NOTES
Notified Dr Sivan Mercado does not want pt to dc at this time   Okay to remove DC order per Dr Finnegan

## 2024-09-24 NOTE — PROGRESS NOTES
Took pt off of oxygen and his pulse ox dropped to 80%. Placed back on 2 liters and got the longer tubing so that pt can walk in the room pt tolerated well.

## 2024-09-25 ENCOUNTER — ANESTHESIA (OUTPATIENT)
Age: 89
End: 2024-09-25
Payer: MEDICARE

## 2024-09-25 ENCOUNTER — ANESTHESIA EVENT (OUTPATIENT)
Age: 89
End: 2024-09-25
Payer: MEDICARE

## 2024-09-25 ENCOUNTER — HOSPITAL ENCOUNTER (INPATIENT)
Age: 89
Discharge: HOME | DRG: 308 | End: 2024-09-27
Attending: INTERNAL MEDICINE
Payer: MEDICARE

## 2024-09-25 ENCOUNTER — APPOINTMENT (OUTPATIENT)
Dept: GENERAL RADIOLOGY | Age: 89
DRG: 308 | End: 2024-09-25
Payer: MEDICARE

## 2024-09-25 VITALS
SYSTOLIC BLOOD PRESSURE: 128 MMHG | TEMPERATURE: 98.1 F | DIASTOLIC BLOOD PRESSURE: 67 MMHG | HEART RATE: 64 BPM | OXYGEN SATURATION: 99 % | RESPIRATION RATE: 20 BRPM

## 2024-09-25 LAB
ANION GAP SERPL CALCULATED.3IONS-SCNC: 12 MMOL/L (ref 7–16)
BNP SERPL-MCNC: 2022 PG/ML (ref 0–450)
BUN SERPL-MCNC: 30 MG/DL (ref 6–23)
CALCIUM SERPL-MCNC: 8.6 MG/DL (ref 8.6–10.2)
CHLORIDE SERPL-SCNC: 100 MMOL/L (ref 98–107)
CO2 SERPL-SCNC: 29 MMOL/L (ref 22–29)
CREAT SERPL-MCNC: 1.3 MG/DL (ref 0.7–1.2)
GFR, ESTIMATED: 56 ML/MIN/1.73M2
GLUCOSE SERPL-MCNC: 103 MG/DL (ref 74–99)
MAGNESIUM SERPL-MCNC: 2 MG/DL (ref 1.6–2.6)
POTASSIUM SERPL-SCNC: 4 MMOL/L (ref 3.5–5)
SODIUM SERPL-SCNC: 141 MMOL/L (ref 132–146)

## 2024-09-25 PROCEDURE — 6360000002 HC RX W HCPCS: Performed by: NURSE ANESTHETIST, CERTIFIED REGISTERED

## 2024-09-25 PROCEDURE — 94640 AIRWAY INHALATION TREATMENT: CPT

## 2024-09-25 PROCEDURE — 99232 SBSQ HOSP IP/OBS MODERATE 35: CPT | Performed by: INTERNAL MEDICINE

## 2024-09-25 PROCEDURE — 71045 X-RAY EXAM CHEST 1 VIEW: CPT

## 2024-09-25 PROCEDURE — 92960 CARDIOVERSION ELECTRIC EXT: CPT | Performed by: INTERNAL MEDICINE

## 2024-09-25 PROCEDURE — 6360000002 HC RX W HCPCS: Performed by: INTERNAL MEDICINE

## 2024-09-25 PROCEDURE — 6370000000 HC RX 637 (ALT 250 FOR IP): Performed by: INTERNAL MEDICINE

## 2024-09-25 PROCEDURE — 36415 COLL VENOUS BLD VENIPUNCTURE: CPT

## 2024-09-25 PROCEDURE — 92960 CARDIOVERSION ELECTRIC EXT: CPT

## 2024-09-25 PROCEDURE — 2700000000 HC OXYGEN THERAPY PER DAY

## 2024-09-25 PROCEDURE — 80048 BASIC METABOLIC PNL TOTAL CA: CPT

## 2024-09-25 PROCEDURE — 2580000003 HC RX 258: Performed by: NURSE ANESTHETIST, CERTIFIED REGISTERED

## 2024-09-25 PROCEDURE — 7100000010 HC PHASE II RECOVERY - FIRST 15 MIN

## 2024-09-25 PROCEDURE — 2060000000 HC ICU INTERMEDIATE R&B

## 2024-09-25 PROCEDURE — 6370000000 HC RX 637 (ALT 250 FOR IP): Performed by: STUDENT IN AN ORGANIZED HEALTH CARE EDUCATION/TRAINING PROGRAM

## 2024-09-25 PROCEDURE — 2580000003 HC RX 258: Performed by: INTERNAL MEDICINE

## 2024-09-25 PROCEDURE — 3700000000 HC ANESTHESIA ATTENDED CARE

## 2024-09-25 PROCEDURE — 5A2204Z RESTORATION OF CARDIAC RHYTHM, SINGLE: ICD-10-PCS | Performed by: INTERNAL MEDICINE

## 2024-09-25 PROCEDURE — 83735 ASSAY OF MAGNESIUM: CPT

## 2024-09-25 PROCEDURE — 7100000011 HC PHASE II RECOVERY - ADDTL 15 MIN

## 2024-09-25 PROCEDURE — 83880 ASSAY OF NATRIURETIC PEPTIDE: CPT

## 2024-09-25 RX ORDER — AMIODARONE HYDROCHLORIDE 200 MG/1
200 TABLET ORAL 2 TIMES DAILY
Status: DISCONTINUED | OUTPATIENT
Start: 2024-09-25 | End: 2024-09-26 | Stop reason: HOSPADM

## 2024-09-25 RX ORDER — SODIUM CHLORIDE 9 MG/ML
INJECTION, SOLUTION INTRAVENOUS
Status: DISCONTINUED | OUTPATIENT
Start: 2024-09-25 | End: 2024-09-25 | Stop reason: SDUPTHER

## 2024-09-25 RX ORDER — BUMETANIDE 1 MG/1
2 TABLET ORAL DAILY
Status: DISCONTINUED | OUTPATIENT
Start: 2024-09-27 | End: 2024-09-26 | Stop reason: HOSPADM

## 2024-09-25 RX ORDER — PROPOFOL 10 MG/ML
INJECTION, EMULSION INTRAVENOUS
Status: DISCONTINUED | OUTPATIENT
Start: 2024-09-25 | End: 2024-09-25 | Stop reason: SDUPTHER

## 2024-09-25 RX ORDER — FUROSEMIDE 10 MG/ML
40 INJECTION INTRAMUSCULAR; INTRAVENOUS ONCE
Status: COMPLETED | OUTPATIENT
Start: 2024-09-26 | End: 2024-09-26

## 2024-09-25 RX ADMIN — PRAVASTATIN SODIUM 40 MG: 20 TABLET ORAL at 20:21

## 2024-09-25 RX ADMIN — SENNOSIDES AND DOCUSATE SODIUM 2 TABLET: 50; 8.6 TABLET ORAL at 20:20

## 2024-09-25 RX ADMIN — SODIUM CHLORIDE, PRESERVATIVE FREE 10 ML: 5 INJECTION INTRAVENOUS at 20:21

## 2024-09-25 RX ADMIN — ALBUTEROL SULFATE 2.5 MG: 2.5 SOLUTION RESPIRATORY (INHALATION) at 12:32

## 2024-09-25 RX ADMIN — SODIUM CHLORIDE, PRESERVATIVE FREE 10 ML: 5 INJECTION INTRAVENOUS at 07:31

## 2024-09-25 RX ADMIN — AMLODIPINE BESYLATE 5 MG: 5 TABLET ORAL at 07:31

## 2024-09-25 RX ADMIN — AMIODARONE HYDROCHLORIDE 200 MG: 200 TABLET ORAL at 20:19

## 2024-09-25 RX ADMIN — AMIODARONE HYDROCHLORIDE 200 MG: 200 TABLET ORAL at 07:31

## 2024-09-25 RX ADMIN — FUROSEMIDE 40 MG: 10 INJECTION, SOLUTION INTRAMUSCULAR; INTRAVENOUS at 10:18

## 2024-09-25 RX ADMIN — PROPOFOL 50 MG: 10 INJECTION, EMULSION INTRAVENOUS at 10:29

## 2024-09-25 RX ADMIN — METOPROLOL TARTRATE 25 MG: 25 TABLET, FILM COATED ORAL at 07:31

## 2024-09-25 RX ADMIN — APIXABAN 5 MG: 5 TABLET, FILM COATED ORAL at 07:31

## 2024-09-25 RX ADMIN — APIXABAN 5 MG: 5 TABLET, FILM COATED ORAL at 20:20

## 2024-09-25 RX ADMIN — AMOXICILLIN AND CLAVULANATE POTASSIUM 1 TABLET: 875; 125 TABLET, FILM COATED ORAL at 07:31

## 2024-09-25 RX ADMIN — SODIUM CHLORIDE: 9 INJECTION, SOLUTION INTRAVENOUS at 08:50

## 2024-09-25 RX ADMIN — ALBUTEROL SULFATE 2.5 MG: 2.5 SOLUTION RESPIRATORY (INHALATION) at 19:21

## 2024-09-25 RX ADMIN — METOPROLOL TARTRATE 25 MG: 25 TABLET, FILM COATED ORAL at 20:19

## 2024-09-25 RX ADMIN — AMOXICILLIN AND CLAVULANATE POTASSIUM 1 TABLET: 875; 125 TABLET, FILM COATED ORAL at 20:22

## 2024-09-25 RX ADMIN — PANTOPRAZOLE SODIUM 40 MG: 40 TABLET, DELAYED RELEASE ORAL at 07:31

## 2024-09-25 ASSESSMENT — ENCOUNTER SYMPTOMS: SHORTNESS OF BREATH: 1

## 2024-09-25 NOTE — CARE COORDINATION
Chart reviewed and case reviewed in IDR.  Patient went for second cardioversion today that as successful.  IV Diuresis completed.  Patient on 4L O2 per NC, 2L O2 per NC at home through [facility].  Patient stated he has tanks at home.  CXR today showing: Mild increase in right middle lung atelectasis/pneumonia; Stable Cardiomegaly;  Small left effusion.  Transition of care is to home with HHC at discharge when medically to discharge.  Referral called to Kacie, liaison with [facility].  She will review and let this CM know if they can accept.  Home care orders received.  AVS updated.  Will continue to follow for further transition of care planning needs.       Shania Higginbotham RN.  P:  [phone]

## 2024-09-25 NOTE — DISCHARGE INSTRUCTIONS
Electrical Cardioversion: What to Expect at Home  Your recovery     Electrical cardioversion is a treatment for an abnormal heartbeat, such as atrial fibrillation, supraventricular tachycardia, or ventricular tachycardia (VT). Your doctor used a brief electrical shock to reset your heart's rhythm.  After the procedure, you may have redness, like a sunburn, where the patches were. The medicines you got to make you sleepy may make you feel drowsy for the rest of the day. You may feel soreness or discomfort in your chest wall for a few days.  Your doctor may have you take medicines to help the heart beat normally and to prevent blood clots.  This care sheet gives you a general idea about how long it will take for you to recover. But each person recovers at a different pace. Follow the steps below to feel better as quickly as possible.  How can you care for yourself at home?  Medicines    If the doctor gave you a sedative:  For 24 hours, don't do anything that requires attention to detail. It takes time for the medicine's effects to completely wear off.  For your safety, do not drive or operate any machinery that could be dangerous. Wait until the medicine wears off and you can think clearly and react easily.     Be safe with medicines. Take your medicines exactly as prescribed. Call your doctor if you think you are having a problem with your medicine. You may take one or more of the following medicines:  Rate-control medicines to slow the heart rate.  Rhythm-control medicines that help the heart keep a normal rhythm.  Blood thinners, also called anticoagulants, which help prevent blood clots.  You will get more details on the specific medicines your doctor prescribes. Be sure you know how to take your medicines safely.     Do not take any vitamins, over-the-counter medicines, or herbal products without talking to your doctor first.   Exercise    Talk to your doctor about what type and level of exercise are safe  for you.     When you exercise, watch for signs that your heart is working too hard. You are pushing too hard if you cannot talk while you are exercising. If you become short of breath or dizzy or have chest pain, sit down and rest right away.     Check your pulse regularly. Place two fingers on the artery at the palm side of your wrist in line with your thumb. If your heartbeat seems uneven or fast, talk to your doctor.   Heart-healthy lifestyle    Do not smoke. If you need help quitting, talk to your doctor about stop-smoking programs and medicines. These can increase your chances of quitting for good.     Eat heart-healthy foods. Limit sodium, alcohol, and sugar.     Stay at a healthy weight. Lose weight if you need to.     Manage other health problems. If you think you may have a problem with alcohol or drug use, talk to your doctor.   Follow-up care is a key part of your treatment and safety. Be sure to make and go to all appointments, and call your doctor if you are having problems. It's also a good idea to know your test results and keep a list of the medicines you take.  When should you call for help?   Call 911 anytime you think you may need emergency care. For example, call if:    You passed out (lost consciousness).     You have symptoms of a heart attack. These may include:  Chest pain or pressure, or a strange feeling in the chest.  Sweating.  Shortness of breath.  Nausea or vomiting.  Pain, pressure, or a strange feeling in the back, neck, jaw, or upper belly or in one or both shoulders or arms.  Lightheadedness or sudden weakness.  A fast or irregular heartbeat.     After calling 911, the  may tell you to chew 1 adult-strength or 2 to 4 low-dose aspirin. Wait for an ambulance. Do not try to drive yourself.     You have symptoms of a stroke. These may include:  Sudden numbness, tingling, weakness, or loss of movement in your face, arm, or leg, especially on only one side of your body.  Sudden

## 2024-09-25 NOTE — PLAN OF CARE
Problem: Safety - Adult  Goal: Free from fall injury  9/25/2024 0740 by Darnell Bethea RN  Outcome: Progressing  9/25/2024 0143 by Omega Hughes RN  Outcome: Progressing     Problem: Chronic Conditions and Co-morbidities  Goal: Patient's chronic conditions and co-morbidity symptoms are monitored and maintained or improved  9/25/2024 0740 by Darnell Bethea RN  Outcome: Progressing  9/25/2024 0143 by Omega Hughes RN  Outcome: Progressing     Problem: Discharge Planning  Goal: Discharge to home or other facility with appropriate resources  9/25/2024 0740 by Darnell Bethea RN  Outcome: Progressing  9/25/2024 0143 by Omega Hughes RN  Outcome: Progressing     Problem: ABCDS Injury Assessment  Goal: Absence of physical injury  9/25/2024 0740 by Darnell Bethea RN  Outcome: Progressing  9/25/2024 0143 by Omega Hughes RN  Outcome: Progressing

## 2024-09-25 NOTE — PROGRESS NOTES
anticoagulation with Eliquis    All of the above was discussed with the patient >50% of the time involved face-to-face time providing counseling and or coordination of care with the other providers,  reviewing records/tests, counseling/education of the patient, ordering medications/tests/procedures, coordinating care, and documenting clinical information in the EHR.   I personally and independently saw and examined patient and reviewed all done pertinent laboratory data, imaging studies, ECGs and rhythm strips.     Thank you for allowing me to participate in your patient's care.  Please call me if there are any questions or concerns.      Mohini Mercado MD  Cardiac Electrophysiology  [facility]  [facility] [place]: Electrophysiology  9/24/2024

## 2024-09-25 NOTE — PROGRESS NOTES
MD Mohini        amiodarone (CORDARONE) tablet 200 mg  200 mg Oral Daily Mohini Mercado MD   200 mg at 09/25/24 0731    amLODIPine (NORVASC) tablet 5 mg  5 mg Oral Daily Mohini Mercado MD   5 mg at 09/25/24 0731    ferrous sulfate (IRON 325) tablet 325 mg  325 mg Oral Every Other Day Mohini Mercado MD   325 mg at 09/24/24 0901    meclizine (ANTIVERT) tablet 25 mg  25 mg Oral Daily PRN Mohini Mercado MD        metoprolol tartrate (LOPRESSOR) tablet 25 mg  25 mg Oral BID Mohini Mercado MD   25 mg at 09/25/24 0731    pantoprazole (PROTONIX) tablet 40 mg  40 mg Oral Daily Mohini Mercado MD   40 mg at 09/25/24 0731    pravastatin (PRAVACHOL) tablet 40 mg  40 mg Oral Nightly Mohini Mercado MD   40 mg at 09/24/24 2213    sodium chloride flush 0.9 % injection 5-40 mL  5-40 mL IntraVENous 2 times per day Mohini Mercado MD   10 mL at 09/25/24 0731    sodium chloride flush 0.9 % injection 5-40 mL  5-40 mL IntraVENous PRN Mohini Mercado MD        0.9 % sodium chloride infusion   IntraVENous PRN Mohini Mercado MD        polyethylene glycol (GLYCOLAX) packet 17 g  17 g Oral Daily PRN Mohini Mercado MD        acetaminophen (TYLENOL) tablet 650 mg  650 mg Oral Q6H PRN Mohini Mercado MD        Or    acetaminophen (TYLENOL) suppository 650 mg  650 mg Rectal Q6H PRN Mohini Mercado MD        guaiFENesin-dextromethorphan (ROBITUSSIN DM) 100-10 MG/5ML syrup 5 mL  5 mL Oral Q4H PRN Mohini Mercado MD   5 mL at 09/23/24 2022    prochlorperazine (COMPAZINE) injection 10 mg  10 mg IntraVENous Q6H PRN Mohini Mercado MD        Or    prochlorperazine (COMPAZINE) tablet 10 mg  10 mg Oral Q8H PRN Mohini Mercado MD            No Known Allergies    ROS:   Constitutional: Negative for fever, activity change and appetite change.   HENT: Negative for epistaxis.   Eyes: Negative for diploplia, blurred vision.   Respiratory: Negative for cough, chest tightness, shortness of breath and wheezing.   Cardiovascular: pertinent positives in HPI  Gastrointestinal: Negative for  post cardioversion  Amiodarone  Continue other medications including systemic anticoagulation with Eliquis  Discharge home tomorrow    All of the above was discussed with the patient and his family,50% of the time involved face-to-face time providing counseling and or coordination of care with the other providers,  reviewing records/tests, counseling/education of the patient, ordering medications/tests/procedures, coordinating care, and documenting clinical information in the EHR.   I personally and independently saw and examined patient and reviewed all done pertinent laboratory data, imaging studies, ECGs and rhythm strips.     Thank you for allowing me to participate in your patient's care.  Please call me if there are any questions or concerns.      Mohini Mercado MD  Cardiac Electrophysiology  [facility]  [facility] [place]: Electrophysiology  9/25/2024

## 2024-09-25 NOTE — PROGRESS NOTES
[facility] Hospitalist Progress Note    SYNOPSIS: Patient admitted on 2024 for Pneumonia involving right lung   88 y.o. male patient of Geremias Koehler DO with history of HFpEF, atrial fibrillation, hypertension, TAVR, ICD in place presented to [facility] on 2024 with concern of his defibrillator firing.  While in the ER ICD was interrogated, no episodes of VT/V-fib but patient was in A-fib for more than 24 hours.  Patient chest x-ray also concerning for possible pneumonia; s/p 5 days course of rocephin and doxycycline.  Patient  is managed for  pneumonia and A-fib.   s/p DCCV  on -patient converted back to a fib   S/p DCCV on   Possible dc tomorrow after overnight telemetric monitoring  EP increased amiodarone frequency to 200 mg bid                SUBJECTIVE:  Stable overnight. No other overnight issues reported.   Patient seen and examined  Records reviewed.           Temp (24hrs), Av.7 °F (36.5 °C), Min:96.4 °F (35.8 °C), Max:98.1 °F (36.7 °C)    DIET: ADULT DIET; Regular; Low Sodium (2 gm)  CODE: Full Code    Intake/Output Summary (Last 24 hours) at 2024 1225  Last data filed at 2024 1223  Gross per 24 hour   Intake 290 ml   Output 1600 ml   Net -1310 ml       Review of Systems  All bolded are positive; please see HPI  General:  Fever, chills, diaphoresis, fatigue, malaise, night sweats, weight loss  Psychological:  Anxiety, disorientation, hallucinations.  ENT:  Epistaxis, headaches, vertigo, visual changes.  Cardiovascular:  Chest pain, irregular heartbeats, palpitations, paroxysmal nocturnal dyspnea.  Respiratory:  Shortness of breath, coughing, sputum production, hemoptysis, wheezing, orthopnea.  Gastrointestinal:  Nausea, vomiting, diarrhea, heartburn, constipation, abdominal pain, hematemesis, hematochezia, melena, acholic stools  Genito-Urinary:  Dysuria, urgency, frequency, hematuria  Musculoskeletal:  Joint pain, joint stiffness, joint

## 2024-09-26 VITALS
RESPIRATION RATE: 18 BRPM | BODY MASS INDEX: 25.16 KG/M2 | WEIGHT: 166 LBS | DIASTOLIC BLOOD PRESSURE: 66 MMHG | HEIGHT: 68 IN | SYSTOLIC BLOOD PRESSURE: 136 MMHG | HEART RATE: 76 BPM | TEMPERATURE: 98.6 F | OXYGEN SATURATION: 97 %

## 2024-09-26 LAB
ANION GAP SERPL CALCULATED.3IONS-SCNC: 13 MMOL/L (ref 7–16)
BUN SERPL-MCNC: 30 MG/DL (ref 6–23)
CALCIUM SERPL-MCNC: 8.9 MG/DL (ref 8.6–10.2)
CHLORIDE SERPL-SCNC: 97 MMOL/L (ref 98–107)
CO2 SERPL-SCNC: 32 MMOL/L (ref 22–29)
CREAT SERPL-MCNC: 1.2 MG/DL (ref 0.7–1.2)
ECHO BSA: 1.9 M2
GFR, ESTIMATED: 61 ML/MIN/1.73M2
GLUCOSE SERPL-MCNC: 116 MG/DL (ref 74–99)
POTASSIUM SERPL-SCNC: 4.1 MMOL/L (ref 3.5–5)
SODIUM SERPL-SCNC: 142 MMOL/L (ref 132–146)

## 2024-09-26 PROCEDURE — 6370000000 HC RX 637 (ALT 250 FOR IP): Performed by: INTERNAL MEDICINE

## 2024-09-26 PROCEDURE — 99232 SBSQ HOSP IP/OBS MODERATE 35: CPT | Performed by: INTERNAL MEDICINE

## 2024-09-26 PROCEDURE — 94640 AIRWAY INHALATION TREATMENT: CPT

## 2024-09-26 PROCEDURE — 6360000002 HC RX W HCPCS: Performed by: INTERNAL MEDICINE

## 2024-09-26 PROCEDURE — 80048 BASIC METABOLIC PNL TOTAL CA: CPT

## 2024-09-26 PROCEDURE — 2700000000 HC OXYGEN THERAPY PER DAY

## 2024-09-26 PROCEDURE — 6370000000 HC RX 637 (ALT 250 FOR IP): Performed by: STUDENT IN AN ORGANIZED HEALTH CARE EDUCATION/TRAINING PROGRAM

## 2024-09-26 PROCEDURE — 2580000003 HC RX 258: Performed by: INTERNAL MEDICINE

## 2024-09-26 PROCEDURE — 36415 COLL VENOUS BLD VENIPUNCTURE: CPT

## 2024-09-26 RX ORDER — AMIODARONE HYDROCHLORIDE 200 MG/1
200 TABLET ORAL 2 TIMES DAILY
Qty: 60 TABLET | Refills: 0 | Status: SHIPPED | OUTPATIENT
Start: 2024-09-26

## 2024-09-26 RX ADMIN — AMIODARONE HYDROCHLORIDE 200 MG: 200 TABLET ORAL at 08:54

## 2024-09-26 RX ADMIN — SODIUM CHLORIDE, PRESERVATIVE FREE 10 ML: 5 INJECTION INTRAVENOUS at 08:56

## 2024-09-26 RX ADMIN — FUROSEMIDE 40 MG: 10 INJECTION, SOLUTION INTRAMUSCULAR; INTRAVENOUS at 06:00

## 2024-09-26 RX ADMIN — METOPROLOL TARTRATE 25 MG: 25 TABLET, FILM COATED ORAL at 08:54

## 2024-09-26 RX ADMIN — POLYETHYLENE GLYCOL 3350 17 G: 17 POWDER, FOR SOLUTION ORAL at 08:55

## 2024-09-26 RX ADMIN — AMOXICILLIN AND CLAVULANATE POTASSIUM 1 TABLET: 875; 125 TABLET, FILM COATED ORAL at 08:55

## 2024-09-26 RX ADMIN — FERROUS SULFATE TAB 325 MG (65 MG ELEMENTAL FE) 325 MG: 325 (65 FE) TAB at 08:53

## 2024-09-26 RX ADMIN — ALBUTEROL SULFATE 2.5 MG: 2.5 SOLUTION RESPIRATORY (INHALATION) at 10:02

## 2024-09-26 RX ADMIN — SENNOSIDES AND DOCUSATE SODIUM 2 TABLET: 50; 8.6 TABLET ORAL at 08:54

## 2024-09-26 RX ADMIN — PANTOPRAZOLE SODIUM 40 MG: 40 TABLET, DELAYED RELEASE ORAL at 08:54

## 2024-09-26 RX ADMIN — APIXABAN 5 MG: 5 TABLET, FILM COATED ORAL at 08:54

## 2024-09-26 RX ADMIN — AMLODIPINE BESYLATE 5 MG: 5 TABLET ORAL at 08:53

## 2024-09-26 NOTE — PROGRESS NOTES
In preparation to discharge, patient stated that he has oxygen tanks at home but he has not used them for an extended period of time/not needing supplemental oxygen. RN trialed patient off oxygen-patient, sitting on side of bed, was 88% on room air-if patient was conversing with RN, SaO2 dropped to 86%. RN requested that patient have his oxygen brought up to be utilized during transport home-Home oxygen will then be utilized on arrival home.

## 2024-09-26 NOTE — PROGRESS NOTES
[facility]- [facility] [place]-  Electrophysiology  Inpatient progress Report  PATIENT: Niles Field  MEDICAL RECORD NUMBER: [id number]  DATE OF SERVICE:  9/26/2024  ATTENDING ELECTROPHYSIOLOGIST: Benoit Byers   PRIMARY ELECTROPHYSIOLOGIST: Mohini Mercado MD  REFERRING PHYSICIAN: No ref. provider found and Geremias Koehler DO  CHIEF COMPLAINT: Defibrillator went off    HPI: 88-year-old male with history of nonvalvular persistent AF sp DCCV x 7 (12/2010, 5/30/2017, 10/29/2020, 9/4/2021, 5/13/2022, 2/9/2023, 9/12/2023), VT sp [employer] dual-chamber ICD (DOI: 4/24/2007-passive fixation RA lead; RV ICD lead revision with capping/abandoning old lead 2/2 fracture: 4/7/2008; GEN change: 7/28/2016 and 4/13/2023), RBBB, CAD sp CABG (5/23/2003) and redo CABG (3/2007), AS sp TAVR (3/29/2017), carotid stenosis, AAA (2022: 5.5 cm), TIA, HTN, recurrent GI bleed (8/2013: PUD on Pradaxa; 3/2022: 3 AVMs in fundus of stomach treated with APC/clips on Xarelto and PPI; 5/2024: GI bleed of uncertain etiology on Xarelto), and CKD-3.  He is managed by Kia Jorgensen and Rogelio with amiodarone 200 mg daily, amlodipine 5 mg daily, apixaban 2.5 mg twice daily, Bumex 2 mg daily as needed, metoprolol 25 mg twice daily, pravastatin 40 mg daily, and PPI.   In 5/2003, patient diagnosed with multivessel CAD, which was treated with CABG.  Details not available.  In 3/2007, during stress test, patient had cardiac arrest/VT, which was treated with external defibrillation.  Shortly after, he underwent redo CABG and implant of dual-chamber ICD with passive fixation RA lead.  In 4/2008, patient had inappropriate shocks due to RV lead fracture, which was managed with capping/abandoning old lead and implanting new RV ICD lead.  In 2010, he was diagnosed with AF, which was treated with Pradaxa and DCCV.  In 8/2013, he had GI bleed due to pyloric ulcer.  Pradaxa was changed to Xarelto.  In 3/2017, he was diagnosed with severe AS,  stenosis-status post TAVR    Recommendation    Okay for discharge from EP standpoint    All of the above was discussed with the patient and his family,50% of the time involved face-to-face time providing counseling and or coordination of care with the other providers,  reviewing records/tests, counseling/education of the patient, ordering medications/tests/procedures, coordinating care, and documenting clinical information in the EHR.   I personally and independently saw and examined patient and reviewed all done pertinent laboratory data, imaging studies, ECGs and rhythm strips.     Thank you for allowing me to participate in your patient's care.  Please call me if there are any questions or concerns.      Mohini Mercado MD  Cardiac Electrophysiology  [facility]  [facility] [place]: Electrophysiology  9/26/2024

## 2024-09-26 NOTE — PLAN OF CARE
Problem: Safety - Adult  Goal: Free from fall injury  Outcome: Progressing     Problem: Chronic Conditions and Co-morbidities  Goal: Patient's chronic conditions and co-morbidity symptoms are monitored and maintained or improved  Outcome: Progressing     Problem: Discharge Planning  Goal: Discharge to home or other facility with appropriate resources  Outcome: Progressing     Problem: ABCDS Injury Assessment  Goal: Absence of physical injury  Outcome: Progressing     Problem: Respiratory - Adult  Goal: Achieves optimal ventilation and oxygenation  Outcome: Progressing

## 2024-09-26 NOTE — DISCHARGE SUMMARY
Hospitalist Discharge Summary    Patient ID: Niles Field   Patient : 1935  Patient's PCP: Geremias Koehler DO    Admit Date: 2024   Admitting Physician: Ric Fernandez MD    Discharge Date:  2024   Discharge Physician: Gwendolyn Finnegan MD   Discharge Condition: Stable  Discharge Disposition: Home      Hospital course in brief:  (Please refer to daily progress notes for a comprehensive review of the hospitalization by requesting medical records)      88 y.o. male patient of Geremias Koehler DO with history of HFpEF, atrial fibrillation, hypertension, TAVR, ICD in place presented to [facility] on 2024 with concern of his defibrillator firing.  While in the ER ICD was interrogated, no episodes of VT/V-fib but patient was in A-fib for more than 24 hours.  Patient chest x-ray also concerning for possible pneumonia; s/p 5 days course of rocephin and doxycycline.  Patient  is managed for  pneumonia and A-fib.   s/p DCCV  on -patient converted back to a fib   S/p DCCV on ; patient is normal sinus rhythm post cardioversion  EP increased amiodarone frequency to 200 mg bid   ok to discharge per EP   Recommended to follow up with pcp and ep in a week  Continue eliquis metoprolol and amiodarone 200 mg bid  Has LE swelling; Encouraged extremity elevation; compression stockings; continue taking bumex at home    Consults:   IP CONSULT TO INTERNAL MEDICINE  IP CONSULT TO ELECTROPHYSIOLOGY  IP CONSULT TO VASCULAR ACCESS TEAM  IP CONSULT TO HOME CARE NEEDS    Discharge Diagnoses:    Atrial fibrillation s/p DCCV  on    With conversion back to a fib on   S/p DCCV on   CAP      Discharge Instructions / Follow up:  Follow-up with PCP within 1 week of discharge.  Follow-up with consultants as indicated by them.  Compliance with medications as prescribed on discharge.    Future Appointments   Date Time Provider Department Center   10/9/2024  9:30 AM [facility] CHF ROOM 3 [facility]  restore sinus rhythm the patient was then allowed to wake in the usual fashion. Comment: The patient was therapeutically anticoagulated for a minimum of 3 consecutive weeks prior to cardioversion.   SUMMARY: 1. Successful cardioversion of persistent atrial fibrillation to sinus rhythm. RECOMMENDATIONS: 1.  Chest x-ray as planned 2.  IV diuresis if needed 3.  Continued inpatient monitoring Mohini Mercado MD Cardiac Electrophysiology [facility] [place]: [place] [facility] 5:38 PM 9/23/2024     XR CHEST PORTABLE    Result Date: 9/23/2024  EXAMINATION: ONE XRAY VIEW OF THE CHEST 9/23/2024 2:17 pm COMPARISON: 09/19/2024 HISTORY: ORDERING SYSTEM PROVIDED HISTORY: eval and compare TECHNOLOGIST PROVIDED HISTORY: Reason for exam:->eval and compare FINDINGS: There is unchanged cardiomegaly, evidence of a prior median sternotomy and multi lead left ICD.  No evidence of pneumothorax.  There appears to be fluid in the major fissure on the right with a small right pleural effusion and elevation of the right hemidiaphragm.  Unchanged blunting of the left costophrenic angle.  Some calcification along the left hemidiaphragm.  There are findings of a prior TAVR.  Slight pulmonary venous hypertension.     1.  Minimal increase in fluid in the major fissure on the right.  Mild pulmonary venous hypertension.  Consider slight volume overload. 2.  Cardiomegaly and post therapeutic changes as described.     XR CHEST PORTABLE    Result Date: 9/19/2024  EXAMINATION: ONE XRAY VIEW OF THE CHEST 9/19/2024 9:33 pm COMPARISON: None. HISTORY: ORDERING SYSTEM PROVIDED HISTORY: AICD fire TECHNOLOGIST PROVIDED HISTORY: Reason for exam:->AICD fire FINDINGS: Peripheral right mid lung opacity stable appearance of left-sided transvenous pacer device.  There is no effusion or pneumothorax.  Cardiomegaly.  The osseous structures are without acute process.     Stable appearance of left-sided transvenous pacer device.

## 2024-09-26 NOTE — CARE COORDINATION
SOCIAL WORK/CASEMANAGEMENT TRANSITION OF CARE PLANNING( NATALIE LAWTON, [phone]): I met with pt in the room this a.m. he was sitting in a chair with his o2 at 2l nc which is his baseline. Plan is home with wife and [facility], orders are in epic. Pcp said pt can go later today if ok with cardio..Natalie Lawton, HUSAM  9/26/2024

## 2024-09-26 NOTE — PROGRESS NOTES
CLINICAL PHARMACY NOTE: MEDS TO BEDS    Total # of Prescriptions Filled: 2   The following medications were delivered to the patient:  Ventolin hfa  Amiodarone 200 mg    Additional Documentation:     Delivered the above meds to patient at bedside. Patient declined the Eliquis 5mg, he said he just received eliquis 2.5mg (90day) that he would double up on the dose. Patient also declined the noni tussin and the senexon 8.6-50 mg tablets.

## 2024-09-27 ENCOUNTER — COMMUNITY CARE MANAGEMENT (OUTPATIENT)
Facility: CLINIC | Age: 89
End: 2024-09-27

## 2024-09-27 NOTE — PROGRESS NOTES
Not TCM eligible. Spoke with Rosanna BRUNO, Steph COLLINS, and Chelly BRUNO and did not outreach pt due to previous declination.      Ivania Lynne TCM RN, MSN  [phone]

## 2024-09-30 DIAGNOSIS — R05.9 COUGH, UNSPECIFIED TYPE: ICD-10-CM

## 2024-10-01 RX ORDER — BUMETANIDE 2 MG/1
TABLET ORAL
Qty: 180 TABLET | Refills: 3 | Status: SHIPPED | OUTPATIENT
Start: 2024-10-01

## 2024-10-02 ENCOUNTER — OFFICE VISIT (OUTPATIENT)
Dept: PRIMARY CARE CLINIC | Age: 89
End: 2024-10-02

## 2024-10-02 VITALS
BODY MASS INDEX: 25.28 KG/M2 | DIASTOLIC BLOOD PRESSURE: 52 MMHG | SYSTOLIC BLOOD PRESSURE: 120 MMHG | HEIGHT: 68 IN | OXYGEN SATURATION: 90 % | TEMPERATURE: 97.8 F | WEIGHT: 166.8 LBS | HEART RATE: 60 BPM

## 2024-10-02 DIAGNOSIS — I48.19 PERSISTENT ATRIAL FIBRILLATION (HCC): ICD-10-CM

## 2024-10-02 DIAGNOSIS — Z09 HOSPITAL DISCHARGE FOLLOW-UP: ICD-10-CM

## 2024-10-02 DIAGNOSIS — N18.32 STAGE 3B CHRONIC KIDNEY DISEASE (HCC): ICD-10-CM

## 2024-10-02 DIAGNOSIS — E03.9 HYPOTHYROIDISM, UNSPECIFIED TYPE: Primary | ICD-10-CM

## 2024-10-02 DIAGNOSIS — I50.42 CHRONIC COMBINED SYSTOLIC AND DIASTOLIC CHF (CONGESTIVE HEART FAILURE) (HCC): ICD-10-CM

## 2024-10-02 PROBLEM — I71.40 ABDOMINAL AORTIC ANEURYSM (AAA) 3.0 CM TO 5.5 CM IN DIAMETER IN MALE (HCC): Status: RESOLVED | Noted: 2022-04-27 | Resolved: 2024-10-02

## 2024-10-02 PROBLEM — I48.91 AF (ATRIAL FIBRILLATION) (HCC): Status: RESOLVED | Noted: 2024-09-21 | Resolved: 2024-10-02

## 2024-10-02 NOTE — PROGRESS NOTES
Post-Discharge Transitional Care  Follow Up      Niles Field   YOB: 1935    Date of Office Visit:  10/2/2024  Date of Hospital Admission: 9/19/24  Date of Hospital Discharge: 9/26/24  Risk of hospital readmission (high >=14%. Medium >=10%) :Readmission Risk Score: 13.7      Care management risk score Rising risk (score 2-5) and Complex Care (Scores >=6): No Risk Score On File     Non face to face  following discharge, date last encounter closed (first attempt may have been earlier): 09/27/2024    Call initiated 2 business days of discharge: *No response recorded in the last 14 days    ASSESSMENT/PLAN:   Hypothyroidism, unspecified type  -     TSH; Future  -     T4, Free; Future  Hospital discharge follow-up  -     [place] DISCHARGE MEDS RECONCILED W/ CURRENT OUTPATIENT MED LIST  Persistent atrial fibrillation (HCC)  Chronic combined systolic and diastolic CHF (congestive heart failure) (HCC)  Stage 3b chronic kidney disease (HCC)      Medical Decision Making: moderate complexity  No follow-ups on file.    On this date 10/2/2024 I have spent 20 minutes reviewing previous notes, test results and face to face with the patient discussing the diagnosis and importance of compliance with the treatment plan as well as documenting on the day of the visit.       Subjective:   HPI:  Follow up of Hospital problems/diagnosis(es): Pneumonia, hypervolemia atrial fibrillation    Inpatient course: Discharge summary reviewed- see chart.    Interval history/Current status: Patient is doing well overall.  States that the cough is slowly resolving.  No side effects from the increased amiodarone.  Does have follow-up with CHF clinic next week.  Still taking Bumex once daily and states he is feeling much better.  No other issues at this time.    Patient Active Problem List   Diagnosis    Chronic combined systolic and diastolic CHF (congestive heart failure) (HCC)    S/P TAVR (transcatheter aortic valve replacement)

## 2024-10-09 ENCOUNTER — CARE COORDINATION (OUTPATIENT)
Dept: CARE COORDINATION | Age: 89
End: 2024-10-09

## 2024-10-09 ENCOUNTER — HOSPITAL ENCOUNTER (OUTPATIENT)
Dept: OTHER | Age: 89
Setting detail: THERAPIES SERIES
Discharge: HOME | End: 2024-10-09
Payer: MEDICARE

## 2024-10-09 VITALS
BODY MASS INDEX: 25.24 KG/M2 | OXYGEN SATURATION: 95 % | WEIGHT: 166 LBS | RESPIRATION RATE: 18 BRPM | DIASTOLIC BLOOD PRESSURE: 70 MMHG | SYSTOLIC BLOOD PRESSURE: 130 MMHG | HEART RATE: 55 BPM

## 2024-10-09 LAB
ANION GAP SERPL CALCULATED.3IONS-SCNC: 8 MMOL/L (ref 7–16)
BNP SERPL-MCNC: 1126 PG/ML (ref 0–450)
BUN SERPL-MCNC: 20 MG/DL (ref 6–23)
CALCIUM SERPL-MCNC: 8.5 MG/DL (ref 8.6–10.2)
CHLORIDE SERPL-SCNC: 96 MMOL/L (ref 98–107)
CO2 SERPL-SCNC: 31 MMOL/L (ref 22–29)
CREAT SERPL-MCNC: 1.4 MG/DL (ref 0.7–1.2)
GFR, ESTIMATED: 51 ML/MIN/1.73M2
GLUCOSE SERPL-MCNC: 125 MG/DL (ref 74–99)
POTASSIUM SERPL-SCNC: 4.1 MMOL/L (ref 3.5–5)
SODIUM SERPL-SCNC: 135 MMOL/L (ref 132–146)

## 2024-10-09 PROCEDURE — 80048 BASIC METABOLIC PNL TOTAL CA: CPT

## 2024-10-09 PROCEDURE — 83880 ASSAY OF NATRIURETIC PEPTIDE: CPT

## 2024-10-09 PROCEDURE — 36415 COLL VENOUS BLD VENIPUNCTURE: CPT

## 2024-10-09 PROCEDURE — 99214 OFFICE O/P EST MOD 30 MIN: CPT

## 2024-10-09 NOTE — CARE COORDINATION
Ambulatory Care Coordination Note     10/9/2024 4:04 PM     Patient Current Location:  Home: [address]  [place] [id number]     ACM contacted the patient by telephone. Verified name and  with patient as identifiers.         ACM: Britt Calzada RN     Challenges to be reviewed by the provider   Additional needs identified to be addressed with provider No  none               Method of communication with provider: none.    Has the patient been seen in the ED since your last call? Yes,   Discharge Date: 24   Discharge Facility: [facility]  Reason for ED Visit: ICD firing  Visit Diagnosis: Pneumonia, A Fib    Number of ED visits in the last 6 months: 2      Do you have any ongoing symptoms? No  Did you call your PCP prior to going to the ED? No, did not call the PCP office.     Review of Discharge Instructions:   [x] AVS discharge instructions  [x] Right Care, Right Place, Right Time document  [x] Medication changes  [x] Follow up appointments  [x] Referral follow up   []     Remote Patient Monitoring Graduation    Date/Time:  10/9/2024 4:19 PM  Patient Current Location: Home: [address]  [place] [address]  Patient has graduated from the Remote Patient Monitoring program on 10/9/2024.   RPM goals have been met at this time.      Patient has been provided instruction on process to return RPM equipment and RPM has been deactivated.     Patient has ACM's contact information for any further questions, concerns, or needs.  HRS time stamp: Current Patient Metrics ---- Blood Pressure: 124/62, 55bpm Pulseox: 95%, 55bpm Weight: 163.6lbs Note Created at: 10/09/2024 04:23 PM ET ---- Time-Spent: 5 minutes 0 seconds        Care Summary Note:   ACM contacted patient to follow up on his status regarding RPM, CHF, HTN, ICD, A Fib for Care Coordination.  -Pt has macular degeneration with limited vision. Pt has difficulty reading and writing even with his prescription eyeglasses and a magnifying

## 2024-10-09 NOTE — PROGRESS NOTES
CHF clinic.  Patient to follow up in 1 month.         [x]Lab work obtained    [x] Patient/Family Educated On:  [] HF zones (Green, Yellow, Red) and aware of when to take action   [x] Daily weights  [] Scale provided   [x] Low sodium diet (2000 mg)  Barriers to compliance  [] Refuses to monitor diet  [] Socioeconomic difficulties  [] Unable to cook for self (use of frozen meals, can goods, etc)  [] CHF CHW consulted  [] Low sodium meal delivery options given to patient  [] Dietician consulted   [] Low sodium recipes provided  [] Sodium free seasoning provided   [x] Fluid intake 6-8 cups (around 64 oz)  [x] Reviewed currently prescribed medications with patient, educated on importance of compliance and answered any questions regarding their medication  [] Pill box provided to patient  [] Patient using pill packing pharmacy   [] CPAP/BiPAP use  [] Low impact exercise / cardiac rehab   [] LifeVest use  [x] Patient aware of signs and symptoms of worsening HF, CHF clinic phone number provided and made aware to call clinic for sooner if evaluation if needed     [] Difficulty affording medications  [] CHF CHW consulted  [] Prescription assistance information given   [] [facility] medication assistance program information given   [] Sample medications provided to patient to help bridge gap until affordability N/A          Scheduled to follow up in CHF clinic on:   Future Appointments   Date Time Provider Department Center   11/6/2024 10:15 AM [facility] ROOM 2 [facility]   2/12/2025 10:15 AM [facility] ROOM 1 [facility]   2/25/2025 10:30 AM Geremias Koehler DO [facility] E[facility] DEP

## 2024-10-10 ENCOUNTER — CARE COORDINATION (OUTPATIENT)
Dept: PRIMARY CARE CLINIC | Age: 89
End: 2024-10-10

## 2024-10-10 DIAGNOSIS — I10 ESSENTIAL HYPERTENSION: Primary | ICD-10-CM

## 2024-10-10 DIAGNOSIS — I50.42 CHRONIC COMBINED SYSTOLIC AND DIASTOLIC CHF (CONGESTIVE HEART FAILURE) (HCC): ICD-10-CM

## 2024-10-10 DIAGNOSIS — N18.32 STAGE 3B CHRONIC KIDNEY DISEASE (HCC): ICD-10-CM

## 2024-10-10 NOTE — PROGRESS NOTES
OFFICE VISIT      Patient: Claudia Jenkins Date of Service: 10/19/2023   : 2005 MRN: [id number]     CHIEF COMPLAINT:  Claudia Jenkins is a 18 year old female who presents today for   Chief Complaint   Patient presents with   • Office Visit   • Cough     x1week   • Knee Pain     L knee     • Knee Injury       HPI:  Congested cough - 1 week   No ST, no fever, no chills  Sputum- yellowish -light green     L knee pain - off and on   She is a volley ball player  No specific injuries  Just felt swollen last week - she used lidocine patch - she feels better  No falls, no balance issues        PAST MEDICAL HISTORY:  History reviewed. No pertinent past medical history.    MEDICATIONS:  Current Outpatient Medications   Medication Sig   • benzonatate (TESSALON PERLES) 100 MG capsule Take 1 capsule by mouth 3 times daily as needed for Cough.   • azithromycin (ZITHROMAX) 250 MG tablet Take 2 tablets on day 1 and then 1 tablet every day for next 4 days     No current facility-administered medications for this visit.       ALLERGIES:  ALLERGIES:  No Known Allergies    PAST SURGICAL HISTORY:  History reviewed. No pertinent surgical history.    FAMILY HISTORY:  Family History   Problem Relation Age of Onset   • Diabetes Maternal Grandmother    • Diabetes Maternal Grandfather    • Diabetes Paternal Grandmother    • Diabetes Paternal Grandfather    • Leukemia Nephew        SOCIAL HISTORY:  Social History     Tobacco Use   • Smoking status: Never   • Smokeless tobacco: Never       OBGYN:  LMP:  10//6/23  regular cycles.  Menses is approximately 5 days.    Recent PHQ 2/9 Score    PHQ 2:  PHQ 2 Score Peds PHQ 2 Score Peds PHQ 2 Interpretation   10/19/2023   2:48 PM 0 No further screening needed             Review of Systems   Constitutional: Negative.    Respiratory: Negative.    Cardiovascular: Negative.    Gastrointestinal: Negative.    Neurological: Negative.    Psychiatric/Behavioral: Negative.    placido in HS  Taking college  Remote Patient Order Discontinued    Received request from Britt Calzada RN   to discontinue order for remote patient monitoring of Kidney Disease , CHF, and HTN and order completed.      entrance exams      OBJECTIVE:     Visit Vitals  /66 (BP Location: LUE - Left upper extremity, Patient Position: Sitting, Cuff Size: Regular)   Pulse (!) 59   Temp 97.8 °F (36.6 °C) (Oral)   Ht 5' 11\" (1.803 m)   Wt 65.9 kg (145 lb 6.3 oz)   LMP 10/06/2023 (Exact Date)   SpO2 99%   BMI 20.28 kg/m²       Physical Exam  Vitals and nursing note reviewed.   Constitutional:       Appearance: She is well-developed.   HENT:      Head: Normocephalic and atraumatic.   Eyes:      Pupils: Pupils are equal, round, and reactive to light.   Cardiovascular:      Rate and Rhythm: Normal rate and regular rhythm.      Heart sounds: Normal heart sounds.   Pulmonary:      Effort: Pulmonary effort is normal. No respiratory distress.      Breath sounds: Normal breath sounds.   Musculoskeletal:         General: Normal range of motion.      Cervical back: Normal range of motion.   Skin:     General: Skin is warm and dry.   Neurological:      Mental Status: She is alert and oriented to person, place, and time.   Psychiatric:         Behavior: Behavior normal.             ASSESSMENT AND PLAN:     Acute upper respiratory infection, unspecified  - COVID/Flu/RSV panel; Future  - COVID/Flu/RSV panel  - benzonatate (TESSALON PERLES) 100 MG capsule; Take 1 capsule by mouth 3 times daily as needed for Cough.  Dispense: 15 capsule; Refill: 0  - azithromycin (ZITHROMAX) 250 MG tablet; Take 2 tablets on day 1 and then 1 tablet every day for next 4 days  Dispense: 6 tablet; Refill: 0    Hip asymmetry  - SERVICE TO ORTHOPEDICS        Instructions provided as documented in the AVS.      The patient indicated understanding of the diagnosis and agreed with the plan of care.      Shelli Cox MD

## 2024-10-10 NOTE — CARE COORDINATION
Niles Field  10/10/24    Care Coordination  placed call to Patient  to arrange RPM kit  through [employer].     CCSS spoke to Patient reviewed with Patient how to pack equipment in original packing. Patient aware [employer] will  equipment in 2-4 days.   All questions and concerns answered.

## 2024-10-15 RX ORDER — AMIODARONE HYDROCHLORIDE 200 MG/1
200 TABLET ORAL DAILY
Qty: 90 TABLET | Refills: 1 | Status: SHIPPED | OUTPATIENT
Start: 2024-10-15

## 2024-11-06 ENCOUNTER — HOSPITAL ENCOUNTER (OUTPATIENT)
Dept: OTHER | Age: 89
Setting detail: THERAPIES SERIES
Discharge: HOME | End: 2024-11-06
Payer: MEDICARE

## 2024-11-06 VITALS
HEART RATE: 55 BPM | RESPIRATION RATE: 18 BRPM | OXYGEN SATURATION: 94 % | WEIGHT: 164 LBS | SYSTOLIC BLOOD PRESSURE: 131 MMHG | DIASTOLIC BLOOD PRESSURE: 51 MMHG | BODY MASS INDEX: 24.94 KG/M2

## 2024-11-06 LAB
ANION GAP SERPL CALCULATED.3IONS-SCNC: 9 MMOL/L (ref 7–16)
BNP SERPL-MCNC: 897 PG/ML (ref 0–450)
BUN SERPL-MCNC: 25 MG/DL (ref 6–23)
CALCIUM SERPL-MCNC: 8.7 MG/DL (ref 8.6–10.2)
CHLORIDE SERPL-SCNC: 98 MMOL/L (ref 98–107)
CO2 SERPL-SCNC: 30 MMOL/L (ref 22–29)
CREAT SERPL-MCNC: 1.2 MG/DL (ref 0.7–1.2)
GFR, ESTIMATED: 58 ML/MIN/1.73M2
GLUCOSE SERPL-MCNC: 109 MG/DL (ref 74–99)
POTASSIUM SERPL-SCNC: 4.3 MMOL/L (ref 3.5–5)
SODIUM SERPL-SCNC: 137 MMOL/L (ref 132–146)

## 2024-11-06 PROCEDURE — 80048 BASIC METABOLIC PNL TOTAL CA: CPT

## 2024-11-06 PROCEDURE — 83880 ASSAY OF NATRIURETIC PEPTIDE: CPT

## 2024-11-06 PROCEDURE — 99214 OFFICE O/P EST MOD 30 MIN: CPT

## 2024-11-06 PROCEDURE — 36415 COLL VENOUS BLD VENIPUNCTURE: CPT

## 2024-11-06 ASSESSMENT — PATIENT HEALTH QUESTIONNAIRE - PHQ9
SUM OF ALL RESPONSES TO PHQ9 QUESTIONS 1 & 2: 0
SUM OF ALL RESPONSES TO PHQ QUESTIONS 1-9: 0
1. LITTLE INTEREST OR PLEASURE IN DOING THINGS: NOT AT ALL
SUM OF ALL RESPONSES TO PHQ QUESTIONS 1-9: 0
2. FEELING DOWN, DEPRESSED OR HOPELESS: NOT AT ALL
SUM OF ALL RESPONSES TO PHQ QUESTIONS 1-9: 0
SUM OF ALL RESPONSES TO PHQ QUESTIONS 1-9: 0

## 2024-11-06 NOTE — PROGRESS NOTES
Date Value   10/09/2024 31 (H)   09/26/2024 32 (H)   09/25/2024 29     BUN (mg/dL)   Date Value   10/09/2024 20   09/26/2024 30 (H)   09/25/2024 30 (H)     Creatinine (mg/dL)   Date Value   10/09/2024 1.4 (H)   09/26/2024 1.2   09/25/2024 1.3 (H)     Glucose (mg/dL)   Date Value   10/09/2024 125 (H)   09/26/2024 116 (H)   09/25/2024 103 (H)     Calcium (mg/dL)   Date Value   10/09/2024 8.5 (L)   09/26/2024 8.9   09/25/2024 8.6     BNP:  NT Pro-BNP (pg/mL)   Date Value   10/09/2024 1,126 (H)   09/25/2024 2,022 (H)   09/18/2024 2,329 (H)      CBC:  WBC (k/uL)   Date Value   09/22/2024 9.1     Hemoglobin (g/dL)   Date Value   09/22/2024 9.5 (L)     Hematocrit (%)   Date Value   09/22/2024 31.8 (L)     Platelets (k/uL)   Date Value   09/22/2024 364     Iron Studies:  Ferritin (ng/mL)   Date Value   05/09/2024 25     Iron (ug/dL)   Date Value   05/09/2024 16 (L)     TIBC (ug/dL)   Date Value   05/09/2024 395     Hepatic:  AST (U/L)   Date Value   08/14/2024 25     ALT (U/L)   Date Value   08/14/2024 13     Total Bilirubin (mg/dL)   Date Value   08/14/2024 0.4     Alkaline Phosphatase (U/L)   Date Value   08/14/2024 189 (H)     INR:  INR (no units)   Date Value   06/15/2022 1.5     Wt Readings from Last 3 Encounters:   11/06/24 74.4 kg (164 lb)   10/09/24 75.3 kg (166 lb)   10/02/24 75.7 kg (166 lb 12.8 oz)       ASSESSMENT/PLAN:    [x] Euvolemic          [] Hypervolemic, with increase from baseline:  [] Shortness of breath/TORRES  [] JVD  [] HJR  [] Abnormal lung assessment:   [] Orthopnea  [] PND  [] Decreased urinary response to oral diuretic   [] Scrotal swelling   [] Lower extremity edema- i  [] Compression stockings provided  [] Decline in functional capacity (unable to perform activities they had previously been able to do)  [] Weight gain     [] IV diuretics given NO  [] Provider notified of recurrent IV diuretic use    Additional Notes:   Weight down 2 lbs since last visit. patient feeling well and has  fine

## 2024-11-27 ENCOUNTER — TELEPHONE (OUTPATIENT)
Dept: NON INVASIVE DIAGNOSTICS | Age: 89
End: 2024-11-27

## 2024-11-27 DIAGNOSIS — N18.32 STAGE 3B CHRONIC KIDNEY DISEASE (HCC): ICD-10-CM

## 2024-11-27 DIAGNOSIS — D50.9 IRON DEFICIENCY ANEMIA, UNSPECIFIED IRON DEFICIENCY ANEMIA TYPE: ICD-10-CM

## 2024-11-27 DIAGNOSIS — E03.9 HYPOTHYROIDISM, UNSPECIFIED TYPE: ICD-10-CM

## 2024-11-27 LAB
ANION GAP SERPL CALCULATED.3IONS-SCNC: 15 MMOL/L (ref 7–16)
BASOPHILS ABSOLUTE: 0.03 K/UL (ref 0–0.2)
BASOPHILS RELATIVE PERCENT: 0 % (ref 0–2)
BUN BLDV-MCNC: 25 MG/DL (ref 6–23)
CALCIUM SERPL-MCNC: 8.9 MG/DL (ref 8.6–10.2)
CHLORIDE BLD-SCNC: 96 MMOL/L (ref 98–107)
CO2: 28 MMOL/L (ref 22–29)
CREAT SERPL-MCNC: 1.4 MG/DL (ref 0.7–1.2)
EOSINOPHILS ABSOLUTE: 0.13 K/UL (ref 0.05–0.5)
EOSINOPHILS RELATIVE PERCENT: 2 % (ref 0–6)
GFR, ESTIMATED: 50 ML/MIN/1.73M2
GLUCOSE BLD-MCNC: 137 MG/DL (ref 74–99)
HCT VFR BLD CALC: 33 % (ref 37–54)
HEMOGLOBIN: 9.7 G/DL (ref 12.5–16.5)
IMMATURE GRANULOCYTES %: 1 % (ref 0–5)
IMMATURE GRANULOCYTES ABSOLUTE: 0.04 K/UL (ref 0–0.58)
LYMPHOCYTES ABSOLUTE: 0.93 K/UL (ref 1.5–4)
LYMPHOCYTES RELATIVE PERCENT: 12 % (ref 20–42)
MAGNESIUM: 2.3 MG/DL (ref 1.6–2.6)
MCH RBC QN AUTO: 26.9 PG (ref 26–35)
MCHC RBC AUTO-ENTMCNC: 29.4 G/DL (ref 32–34.5)
MCV RBC AUTO: 91.4 FL (ref 80–99.9)
MONOCYTES ABSOLUTE: 0.77 K/UL (ref 0.1–0.95)
MONOCYTES RELATIVE PERCENT: 10 % (ref 2–12)
NEUTROPHILS ABSOLUTE: 5.87 K/UL (ref 1.8–7.3)
NEUTROPHILS RELATIVE PERCENT: 76 % (ref 43–80)
PDW BLD-RTO: 19.8 % (ref 11.5–15)
PLATELET # BLD: 230 K/UL (ref 130–450)
PMV BLD AUTO: 10.9 FL (ref 7–12)
POTASSIUM SERPL-SCNC: 4.3 MMOL/L (ref 3.5–5)
RBC # BLD: 3.61 M/UL (ref 3.8–5.8)
SODIUM BLD-SCNC: 139 MMOL/L (ref 132–146)
T4 FREE: 1.7 NG/DL (ref 0.9–1.7)
TSH SERPL DL<=0.05 MIU/L-ACNC: 3.22 UIU/ML (ref 0.27–4.2)
WBC # BLD: 7.8 K/UL (ref 4.5–11.5)

## 2024-11-27 NOTE — TELEPHONE ENCOUNTER
Patient was discharged from the hospital on 9/26/24 and is just calling in to see when he needs seen. Please advise

## 2024-12-09 ENCOUNTER — HOSPITAL ENCOUNTER (OUTPATIENT)
Dept: HOSPITAL 83 - RESCLI | Age: 89
Discharge: HOME | End: 2024-12-09
Attending: STUDENT IN AN ORGANIZED HEALTH CARE EDUCATION/TRAINING PROGRAM
Payer: MEDICARE

## 2024-12-09 DIAGNOSIS — I11.0: ICD-10-CM

## 2024-12-09 DIAGNOSIS — I25.10: Primary | ICD-10-CM

## 2024-12-09 DIAGNOSIS — R06.02: ICD-10-CM

## 2024-12-09 DIAGNOSIS — I50.9: ICD-10-CM

## 2024-12-09 DIAGNOSIS — R42: ICD-10-CM

## 2024-12-09 DIAGNOSIS — D64.9: ICD-10-CM

## 2024-12-09 DIAGNOSIS — I48.0: ICD-10-CM

## 2024-12-09 DIAGNOSIS — Z98.890: ICD-10-CM

## 2024-12-09 DIAGNOSIS — Z79.899: ICD-10-CM

## 2024-12-09 DIAGNOSIS — K21.9: ICD-10-CM

## 2024-12-10 ENCOUNTER — HOSPITAL ENCOUNTER (OUTPATIENT)
Dept: OTHER | Age: 89
Setting detail: THERAPIES SERIES
Discharge: HOME | End: 2024-12-10
Payer: MEDICARE

## 2024-12-10 VITALS
OXYGEN SATURATION: 96 % | WEIGHT: 160 LBS | SYSTOLIC BLOOD PRESSURE: 142 MMHG | DIASTOLIC BLOOD PRESSURE: 65 MMHG | RESPIRATION RATE: 18 BRPM | BODY MASS INDEX: 24.33 KG/M2 | HEART RATE: 56 BPM

## 2024-12-10 LAB
ANION GAP SERPL CALCULATED.3IONS-SCNC: 11 MMOL/L (ref 7–16)
BNP SERPL-MCNC: 948 PG/ML (ref 0–450)
BUN SERPL-MCNC: 27 MG/DL (ref 6–23)
CALCIUM SERPL-MCNC: 8.6 MG/DL (ref 8.6–10.2)
CHLORIDE SERPL-SCNC: 97 MMOL/L (ref 98–107)
CO2 SERPL-SCNC: 31 MMOL/L (ref 22–29)
CREAT SERPL-MCNC: 1.5 MG/DL (ref 0.7–1.2)
GFR, ESTIMATED: 46 ML/MIN/1.73M2
GLUCOSE SERPL-MCNC: 123 MG/DL (ref 74–99)
POTASSIUM SERPL-SCNC: 3.6 MMOL/L (ref 3.5–5)
SODIUM SERPL-SCNC: 139 MMOL/L (ref 132–146)

## 2024-12-10 PROCEDURE — 80048 BASIC METABOLIC PNL TOTAL CA: CPT

## 2024-12-10 PROCEDURE — 99214 OFFICE O/P EST MOD 30 MIN: CPT

## 2024-12-10 PROCEDURE — 36415 COLL VENOUS BLD VENIPUNCTURE: CPT

## 2024-12-10 PROCEDURE — 83880 ASSAY OF NATRIURETIC PEPTIDE: CPT

## 2024-12-10 NOTE — PROGRESS NOTES
Value   12/10/2024 31 (H)   11/27/2024 28   11/06/2024 30 (H)     BUN (mg/dL)   Date Value   12/10/2024 27 (H)   11/27/2024 25 (H)   11/06/2024 25 (H)     Creatinine (mg/dL)   Date Value   12/10/2024 1.5 (H)   11/27/2024 1.4 (H)   11/06/2024 1.2     Glucose (mg/dL)   Date Value   12/10/2024 123 (H)   11/27/2024 137 (H)   11/06/2024 109 (H)     Calcium (mg/dL)   Date Value   12/10/2024 8.6   11/27/2024 8.9   11/06/2024 8.7     BNP:  NT Pro-BNP (pg/mL)   Date Value   12/10/2024 948 (H)   11/06/2024 897 (H)   10/09/2024 1,126 (H)      CBC:  WBC (k/uL)   Date Value   11/27/2024 7.8     Hemoglobin (g/dL)   Date Value   11/27/2024 9.7 (L)     Hematocrit (%)   Date Value   11/27/2024 33.0 (L)     Platelets (k/uL)   Date Value   11/27/2024 230     Iron Studies:  Ferritin (ng/mL)   Date Value   05/09/2024 25     Iron (ug/dL)   Date Value   05/09/2024 16 (L)     TIBC (ug/dL)   Date Value   05/09/2024 395     Hepatic:  AST (U/L)   Date Value   08/14/2024 25     ALT (U/L)   Date Value   08/14/2024 13     Total Bilirubin (mg/dL)   Date Value   08/14/2024 0.4     Alkaline Phosphatase (U/L)   Date Value   08/14/2024 189 (H)     INR:  INR (no units)   Date Value   06/15/2022 1.5     Wt Readings from Last 3 Encounters:   12/10/24 72.6 kg (160 lb)   11/06/24 74.4 kg (164 lb)   10/09/24 75.3 kg (166 lb)       ASSESSMENT/PLAN:    [x] Euvolemic          [] Hypervolemic, with increase from baseline:  [] Shortness of breath/TORRES  [] JVD  [] HJR  [] Abnormal lung assessment:   [] Orthopnea  [] PND  [] Decreased urinary response to oral diuretic   [] Scrotal swelling   [] Lower extremity edema- i  [] Compression stockings provided  [] Decline in functional capacity (unable to perform activities they had previously been able to do)  [] Weight gain     [] IV diuretics given NO  [] Provider notified of recurrent IV diuretic use    Additional Notes:    Euvolemic on exam.  Patient down 4 lbs since last CHF visit. Per patient his diuretic is

## 2024-12-12 ENCOUNTER — CARE COORDINATION (OUTPATIENT)
Dept: CARE COORDINATION | Age: 89
End: 2024-12-12

## 2024-12-12 NOTE — CARE COORDINATION
[facility] PT.)  Registered Dietician: Declined  Social Work: Declined  Other Services: Completed (Comment: 10-9-24 Pt graduates from [facility] today.)  Zone Management Tools: Completed (Comment: 7-8-24 Pt received mailed CHF zone tool.)  Other Services or Interventions: Pt received mailed education for HTN, monitoring sodium, reading food labels and monitoring logs for BP, pulse ox and weight.      ,   Congestive Heart Failure Assessment    Are you currently restricting fluids?: No Restriction  Do you understand a low sodium diet?: Yes  Do you understand how to read food labels?: Yes  How many restaurant meals do you eat per week?: 1-2  Do you salt your food before tasting it?: No     No patient-reported symptoms      Symptoms:  CHF associated shortness of breath: Pos (Comment: 12-12-24 SOB with exertion if he walks to fast is baseline.)      Symptom course: improving  Patient-reported weight (lb): 160  Weight trend: decreasing steadily  Salt intake watch compared to last visit: improved      ,   Hypertension - Encounter Level    Symptoms: hypertension associated shortness of breath: Pos (Comment: 12-12-24 SOB with exertion if he walks too fast is baseline.)  Symptom course: improving          Medications Reviewed:   Patient denies any changes with medications and reports taking all medications as prescribed.    Advance Care Planning:   Not reviewed during this call     Care Planning:   Education Documentation  No documentation found.  Education Comments  No comments found.     ,    Goals Addressed                   This Visit's Progress     Medication Management        I will talk with PAP regarding the cost of Eliquis    Barriers: financial  Plan for overcoming my barriers: Care Coordination, PAP.   Confidence: 6/10  Anticipated Goal Completion Date: 9-28-24 5-28-24 Referral initiated.   6-4-24 Pt spoke with PAP. Pt received the PAP forms and is attempting to fill them out.   7-8-24 Pt reports he completed the PAP forms

## 2025-01-07 NOTE — PROGRESS NOTES
[facility] PHYSICIANS- The Heart [facility] [place]-  Electrophysiology  Outpatient progress Report  PATIENT: Niles Field  MEDICAL RECORD NUMBER: [id number]  DATE OF SERVICE:  1/8/2025  PRIMARY ELECTROPHYSIOLOGIST: Mohini Mercado MD  REFERRING PHYSICIAN: No ref. provider found and Geremias Koehler DO    Chief Complaint   Patient presents with    Atrial Fibrillation     Hospital follow up- patient has complaints sob.         HPI: 88-year-old male with history of nonvalvular persistent AF sp DCCV x 7 (12/2010, 5/30/2017, 10/29/2020, 9/4/2021, 5/13/2022, 2/9/2023, 9/12/2023), VT sp Medtronic dual-chamber ICD (DOI: 4/24/2007-passive fixation RA lead; RV ICD lead revision with capping/abandoning old lead 2/2 fracture: 4/7/2008; GEN change: 7/28/2016 and 4/13/2023), RBBB, CAD sp CABG (5/23/2003) and redo CABG (3/2007), AS sp TAVR (3/29/2017), carotid stenosis, AAA (2022: 5.5 cm), TIA, HTN, recurrent GI bleed (8/2013: PUD on Pradaxa; 3/2022: 3 AVMs in fundus of stomach treated with APC/clips on Xarelto and PPI; 5/2024: GI bleed of uncertain etiology on Xarelto), and CKD-3.  He is managed by Kia Jorgensen and Rogelio with amiodarone 200 mg daily, amlodipine 5 mg daily, apixaban 2.5 mg twice daily, Bumex 2 mg daily as needed, metoprolol 25 mg twice daily, pravastatin 40 mg daily, and PPI.   In 5/2003, patient diagnosed with multivessel CAD, which was treated with CABG.  Details not available.  In 3/2007, during stress test, patient had cardiac arrest/VT, which was treated with external defibrillation.  Shortly after, he underwent redo CABG and implant of dual-chamber ICD with passive fixation RA lead.  In 4/2008, patient had inappropriate shocks due to RV lead fracture, which was managed with capping/abandoning old lead and implanting new RV ICD lead.  In 2010, he was diagnosed with AF, which was treated with Pradaxa and DCCV.  In 8/2013, he had GI bleed due to pyloric ulcer.  Pradaxa was changed to Xarelto.  In 3/2017, he

## 2025-01-08 ENCOUNTER — OFFICE VISIT (OUTPATIENT)
Dept: NON INVASIVE DIAGNOSTICS | Age: 89
End: 2025-01-08
Payer: MEDICARE

## 2025-01-08 ENCOUNTER — NURSE ONLY (OUTPATIENT)
Dept: NON INVASIVE DIAGNOSTICS | Age: 89
End: 2025-01-08

## 2025-01-08 VITALS
RESPIRATION RATE: 18 BRPM | HEART RATE: 56 BPM | HEIGHT: 68 IN | SYSTOLIC BLOOD PRESSURE: 130 MMHG | WEIGHT: 164 LBS | BODY MASS INDEX: 24.86 KG/M2 | DIASTOLIC BLOOD PRESSURE: 68 MMHG

## 2025-01-08 DIAGNOSIS — I48.20 CHRONIC ATRIAL FIBRILLATION (HCC): ICD-10-CM

## 2025-01-08 DIAGNOSIS — I47.20 VENTRICULAR TACHYARRHYTHMIA (HCC): ICD-10-CM

## 2025-01-08 DIAGNOSIS — Z45.02 ICD (IMPLANTABLE CARDIOVERTER-DEFIBRILLATOR) DISCHARGE: Primary | ICD-10-CM

## 2025-01-08 DIAGNOSIS — I48.20 CHRONIC ATRIAL FIBRILLATION (HCC): Primary | ICD-10-CM

## 2025-01-08 PROCEDURE — 1160F RVW MEDS BY RX/DR IN RCRD: CPT | Performed by: INTERNAL MEDICINE

## 2025-01-08 PROCEDURE — G8427 DOCREV CUR MEDS BY ELIG CLIN: HCPCS | Performed by: INTERNAL MEDICINE

## 2025-01-08 PROCEDURE — 99214 OFFICE O/P EST MOD 30 MIN: CPT | Performed by: INTERNAL MEDICINE

## 2025-01-08 PROCEDURE — G8420 CALC BMI NORM PARAMETERS: HCPCS | Performed by: INTERNAL MEDICINE

## 2025-01-08 PROCEDURE — 1123F ACP DISCUSS/DSCN MKR DOCD: CPT | Performed by: INTERNAL MEDICINE

## 2025-01-08 PROCEDURE — 1036F TOBACCO NON-USER: CPT | Performed by: INTERNAL MEDICINE

## 2025-01-08 PROCEDURE — M1308 PR FLU IMMUNIZE NO ADMIN: HCPCS | Performed by: INTERNAL MEDICINE

## 2025-01-08 PROCEDURE — 93000 ELECTROCARDIOGRAM COMPLETE: CPT | Performed by: INTERNAL MEDICINE

## 2025-01-08 PROCEDURE — 1159F MED LIST DOCD IN RCRD: CPT | Performed by: INTERNAL MEDICINE

## 2025-01-22 ENCOUNTER — OFFICE VISIT (OUTPATIENT)
Dept: CARDIOLOGY CLINIC | Age: 89
End: 2025-01-22
Payer: MEDICARE

## 2025-01-22 VITALS
DIASTOLIC BLOOD PRESSURE: 60 MMHG | RESPIRATION RATE: 18 BRPM | TEMPERATURE: 96.9 F | SYSTOLIC BLOOD PRESSURE: 132 MMHG | HEIGHT: 68 IN | HEART RATE: 60 BPM | BODY MASS INDEX: 24.8 KG/M2 | OXYGEN SATURATION: 95 % | WEIGHT: 163.6 LBS

## 2025-01-22 DIAGNOSIS — I25.10 CORONARY ARTERY DISEASE INVOLVING NATIVE CORONARY ARTERY OF NATIVE HEART WITHOUT ANGINA PECTORIS: Primary | ICD-10-CM

## 2025-01-22 PROCEDURE — 93000 ELECTROCARDIOGRAM COMPLETE: CPT | Performed by: INTERNAL MEDICINE

## 2025-01-22 PROCEDURE — 1123F ACP DISCUSS/DSCN MKR DOCD: CPT | Performed by: INTERNAL MEDICINE

## 2025-01-22 PROCEDURE — 99214 OFFICE O/P EST MOD 30 MIN: CPT | Performed by: INTERNAL MEDICINE

## 2025-01-22 PROCEDURE — G8420 CALC BMI NORM PARAMETERS: HCPCS | Performed by: INTERNAL MEDICINE

## 2025-01-22 PROCEDURE — 1036F TOBACCO NON-USER: CPT | Performed by: INTERNAL MEDICINE

## 2025-01-22 PROCEDURE — G8427 DOCREV CUR MEDS BY ELIG CLIN: HCPCS | Performed by: INTERNAL MEDICINE

## 2025-01-22 PROCEDURE — 1159F MED LIST DOCD IN RCRD: CPT | Performed by: INTERNAL MEDICINE

## 2025-01-22 NOTE — PROGRESS NOTES
CHIEF COMPLAINT: TIA/CAD-CABG/PAF/ICD/GIB/Anemia    HISTORY OF PRESENT ILLNESS: Patient is a 89 y.o. male seen at the request of Geremias Koehler DO.      Baseline TORRES. No CP.     In sinus.    PMH:   MI, 2003. Cardiac catheterization: 85% ostial, proximal and mid LAD narrowings. LMC 70% stenosis. D1 occluded. D2 50% stenosis. OM1 80% ostial stenosis. Mid CX occluded. Dominant RCA severe disease. LVEF 40-45%.   [facility]-LAD, SVG-RI, SVG-OM.   Hyperlipidemia.   Mild carotid plaque on US, 2003.  Echo, 07/2006. Mild LVE, normal EF, Stage II diastolic dysfunction, moderate AS, mild MR, mild TR.  Right leg vein stripping, 1970's.  No history diabetes mellitus, stroke or COPD.  Nonsmoker for many years.  VT causing cardiac arrest after stress test, 03/15/2007. He was successfully cardioverted.   Cardiac catheterization, 03/16/2007 with normal LVEF, severe native three vessel coronary disease. SVG-RI, SVG-OM both occluded. LIMA-LAD patent.   Re-do CABG, [facility], 03/2007 with radial artery graft to D2 and OM2.   Elective PCI with CONNOR native OM2 and native LAD, 03/2007 following CABG. This was done because of inadequate conduits.   ICD placement, [facility], 03/2007.  ICD lead recall, early 2008, but he was followed electively because his device was functioning normally.  Presentation [facility], 03/29/2008 with multiple ICD inappropriate shocks. Transfer [facility] where ICD and lead were replaced. He reports cardiac catheterization that revealed patent LIMA-LAD and patent stents in native coronary arteries.   Atypical chest pain and anxiety with admission [facility], 05/2008. Troponin minimally elevated. Beta blocker dose increased.   [facility] admission, 06/13/2009 with lightheadedness, orthostatic hypotension, drop in hemoglobin to 10.5 from 14.9 over two months with an increase in BUN from 16 to 68 over the same time. Dark heme positive stools noted. EKG NSR, incomplete RBBB.   Echo,

## 2025-02-12 ENCOUNTER — HOSPITAL ENCOUNTER (OUTPATIENT)
Dept: OTHER | Age: 89
Setting detail: THERAPIES SERIES
Discharge: HOME | End: 2025-02-12
Payer: MEDICARE

## 2025-02-12 VITALS
BODY MASS INDEX: 24.63 KG/M2 | OXYGEN SATURATION: 96 % | HEART RATE: 60 BPM | SYSTOLIC BLOOD PRESSURE: 122 MMHG | WEIGHT: 162 LBS | DIASTOLIC BLOOD PRESSURE: 50 MMHG | RESPIRATION RATE: 16 BRPM

## 2025-02-12 LAB
ANION GAP SERPL CALCULATED.3IONS-SCNC: 9 MMOL/L (ref 7–16)
BNP SERPL-MCNC: 1673 PG/ML (ref 0–450)
BUN SERPL-MCNC: 26 MG/DL (ref 6–23)
CALCIUM SERPL-MCNC: 8.6 MG/DL (ref 8.6–10.2)
CHLORIDE SERPL-SCNC: 95 MMOL/L (ref 98–107)
CO2 SERPL-SCNC: 31 MMOL/L (ref 22–29)
CREAT SERPL-MCNC: 1.3 MG/DL (ref 0.7–1.2)
GFR, ESTIMATED: 54 ML/MIN/1.73M2
GLUCOSE SERPL-MCNC: 92 MG/DL (ref 74–99)
POTASSIUM SERPL-SCNC: 4.4 MMOL/L (ref 3.5–5)
SODIUM SERPL-SCNC: 135 MMOL/L (ref 132–146)

## 2025-02-12 PROCEDURE — 80048 BASIC METABOLIC PNL TOTAL CA: CPT

## 2025-02-12 PROCEDURE — 99214 OFFICE O/P EST MOD 30 MIN: CPT

## 2025-02-12 PROCEDURE — 83880 ASSAY OF NATRIURETIC PEPTIDE: CPT

## 2025-02-12 PROCEDURE — 36415 COLL VENOUS BLD VENIPUNCTURE: CPT

## 2025-02-12 ASSESSMENT — PATIENT HEALTH QUESTIONNAIRE - PHQ9
SUM OF ALL RESPONSES TO PHQ QUESTIONS 1-9: 0
SUM OF ALL RESPONSES TO PHQ9 QUESTIONS 1 & 2: 0
SUM OF ALL RESPONSES TO PHQ QUESTIONS 1-9: 0
1. LITTLE INTEREST OR PLEASURE IN DOING THINGS: NOT AT ALL
2. FEELING DOWN, DEPRESSED OR HOPELESS: NOT AT ALL

## 2025-02-12 NOTE — PROGRESS NOTES
regarding their medication  [] Pill box provided to patient  [] Patient using pill packing pharmacy   [] CPAP/BiPAP use  [] Low impact exercise / cardiac rehab   [] LifeVest use  [x] Patient aware of signs and symptoms of worsening HF, CHF clinic phone number provided and made aware to call clinic for sooner if evaluation if needed     [] Difficulty affording medications  [] CHF CHW consulted  [] Prescription assistance information given   [] [facility] medication assistance program information given   [] Sample medications provided to patient to help bridge gap until affordability N/A          Scheduled to follow up in CHF clinic on:   Future Appointments   Date Time Provider Department Center   2/25/2025 10:30 AM Geremias Koehler DO [place] [facility] DEP   5/13/2025 10:15 AM YAJAIRA CHF ROOM 3 [facility]   10/28/2025 10:15 AM Oli Jorgensen DO [place] [facility]

## 2025-02-25 ENCOUNTER — OFFICE VISIT (OUTPATIENT)
Dept: PRIMARY CARE CLINIC | Age: 89
End: 2025-02-25

## 2025-02-25 VITALS
SYSTOLIC BLOOD PRESSURE: 120 MMHG | OXYGEN SATURATION: 94 % | HEIGHT: 68 IN | WEIGHT: 163 LBS | BODY MASS INDEX: 24.71 KG/M2 | HEART RATE: 67 BPM | DIASTOLIC BLOOD PRESSURE: 62 MMHG | TEMPERATURE: 97 F

## 2025-02-25 DIAGNOSIS — N18.32 STAGE 3B CHRONIC KIDNEY DISEASE (HCC): Primary | ICD-10-CM

## 2025-02-25 DIAGNOSIS — R79.89 OTHER SPECIFIED ABNORMAL FINDINGS OF BLOOD CHEMISTRY: ICD-10-CM

## 2025-02-25 DIAGNOSIS — I50.42 CHRONIC COMBINED SYSTOLIC AND DIASTOLIC CHF (CONGESTIVE HEART FAILURE) (HCC): ICD-10-CM

## 2025-02-25 DIAGNOSIS — I10 ESSENTIAL HYPERTENSION: ICD-10-CM

## 2025-02-25 DIAGNOSIS — H61.23 BILATERAL IMPACTED CERUMEN: ICD-10-CM

## 2025-02-25 PROBLEM — E83.42 HYPOMAGNESEMIA: Status: ACTIVE | Noted: 2025-02-25

## 2025-02-25 PROBLEM — E87.6 HYPOKALEMIA: Status: ACTIVE | Noted: 2025-02-25

## 2025-02-25 PROBLEM — K59.00 CONSTIPATION: Status: ACTIVE | Noted: 2025-02-25

## 2025-02-25 ASSESSMENT — ENCOUNTER SYMPTOMS
GASTROINTESTINAL NEGATIVE: 1
SORE THROAT: 0
TROUBLE SWALLOWING: 0
EYES NEGATIVE: 1
COUGH: 0
WHEEZING: 0
BACK PAIN: 1

## 2025-02-25 NOTE — ASSESSMENT & PLAN NOTE
Currently stable and continues to follow with CHF clinic and cardiology.    Orders:    CBC with Auto Differential; Future    T4, Free; Future    Uric Acid; Future    TSH; Future    Hepatic Function Panel; Future    Basic Metabolic Panel; Future    Lipid Panel; Future    Hemoglobin A1C; Future    Urinalysis with Microscopic; Future    Albumin/Creatinine Ratio, Urine; Future

## 2025-02-25 NOTE — ASSESSMENT & PLAN NOTE
Currently under JNC 8 guidelines and asymptomatic.  Will continue with current medication regimen.    Orders:    CBC with Auto Differential; Future    T4, Free; Future    Uric Acid; Future    TSH; Future    Hepatic Function Panel; Future    Basic Metabolic Panel; Future    Lipid Panel; Future    Hemoglobin A1C; Future    Urinalysis with Microscopic; Future    Albumin/Creatinine Ratio, Urine; Future

## 2025-02-25 NOTE — ASSESSMENT & PLAN NOTE
Currently stable.  Labs before next visit.    Orders:    CBC with Auto Differential; Future    T4, Free; Future    Uric Acid; Future    TSH; Future    Hepatic Function Panel; Future    Basic Metabolic Panel; Future    Lipid Panel; Future    Hemoglobin A1C; Future    Urinalysis with Microscopic; Future    Albumin/Creatinine Ratio, Urine; Future

## 2025-02-25 NOTE — PROGRESS NOTES
Niles Field (:  1935) is a 89 y.o. male,Established patient, here for evaluation of the following chief complaint(s):  Annual Exam and Other (Pt wants ears flushed )         Assessment & Plan  Stage 3b chronic kidney disease (HCC)  Currently stable.  Labs before next visit.    Orders:    CBC with Auto Differential; Future    T4, Free; Future    Uric Acid; Future    TSH; Future    Hepatic Function Panel; Future    Basic Metabolic Panel; Future    Lipid Panel; Future    Hemoglobin A1C; Future    Urinalysis with Microscopic; Future    Albumin/Creatinine Ratio, Urine; Future    Bilateral impacted cerumen  Unsuccessful removal in office.  Will refer to otolaryngology for further evaluation and treatment.    Orders:    REMOVAL IMPACTED CERUMEN IRRIGATION/LVG Yovanny Singh DO, Otolaryngology, [place]    Chronic combined systolic and diastolic CHF (congestive heart failure) (HCC)  Currently stable and continues to follow with CHF clinic and cardiology.    Orders:    CBC with Auto Differential; Future    T4, Free; Future    Uric Acid; Future    TSH; Future    Hepatic Function Panel; Future    Basic Metabolic Panel; Future    Lipid Panel; Future    Hemoglobin A1C; Future    Urinalysis with Microscopic; Future    Albumin/Creatinine Ratio, Urine; Future    Essential hypertension  Currently under JNC 8 guidelines and asymptomatic.  Will continue with current medication regimen.    Orders:    CBC with Auto Differential; Future    T4, Free; Future    Uric Acid; Future    TSH; Future    Hepatic Function Panel; Future    Basic Metabolic Panel; Future    Lipid Panel; Future    Hemoglobin A1C; Future    Urinalysis with Microscopic; Future    Albumin/Creatinine Ratio, Urine; Future      Return in about 5 months (around 2025).       Subjective   HPI  Patient presents today for follow-up on chronic issues.  Doing well overall for the most part in regards to his chronic issues.  No recent

## 2025-03-07 NOTE — ANESTHESIA POSTPROCEDURE EVALUATION
Department of Anesthesiology  Postprocedure Note    Patient: Sylvia Duke  MRN: [id number]  YOB: 1935  Date of evaluation: 10/30/2020  Time:  6:03 AM     Procedure Summary     Date:  10/29/20 Room / Location:  [facility]    Anesthesia Start:  0012 Anesthesia Stop:  0987    Procedure:  CARDIOVERSION WITH ANESTHESIA Diagnosis:      Scheduled Providers:   Responsible Provider:  Adria Spatz, DO    Anesthesia Type:  MAC ASA Status:  4          Anesthesia Type: MAC    Alfred Phase I:      Alfred Phase II:      Last vitals: Reviewed and per EMR flowsheets.        Anesthesia Post Evaluation    Patient location during evaluation: PACU  Patient participation: complete - patient participated  Level of consciousness: awake and alert  Pain score: 1  Airway patency: patent  Nausea & Vomiting: no nausea and no vomiting  Complications: no  Cardiovascular status: hemodynamically stable  Respiratory status: acceptable  Hydration status: euvolemic
[FreeTextEntry1] : Radiographs 3 views of the right elbow reviewed showing consolidation of the olecranon fracture with hardware in place

## 2025-03-10 DIAGNOSIS — F41.9 ANXIETY: Primary | ICD-10-CM

## 2025-03-10 RX ORDER — ALPRAZOLAM 0.25 MG
0.38 TABLET ORAL NIGHTLY PRN
Qty: 135 TABLET | Refills: 0 | Status: SHIPPED | OUTPATIENT
Start: 2025-03-10 | End: 2025-06-08

## 2025-03-10 RX ORDER — ALPRAZOLAM 0.25 MG
0.38 TABLET ORAL NIGHTLY PRN
COMMUNITY
End: 2025-03-10 | Stop reason: SDUPTHER

## 2025-04-17 ENCOUNTER — OFFICE VISIT (OUTPATIENT)
Dept: ENT CLINIC | Age: 89
End: 2025-04-17
Payer: MEDICARE

## 2025-04-17 VITALS
HEIGHT: 68 IN | WEIGHT: 161.3 LBS | HEART RATE: 79 BPM | TEMPERATURE: 97.5 F | DIASTOLIC BLOOD PRESSURE: 70 MMHG | SYSTOLIC BLOOD PRESSURE: 153 MMHG | BODY MASS INDEX: 24.44 KG/M2 | OXYGEN SATURATION: 90 %

## 2025-04-17 DIAGNOSIS — H61.21 HEARING LOSS OF RIGHT EAR DUE TO CERUMEN IMPACTION: ICD-10-CM

## 2025-04-17 DIAGNOSIS — H61.22 HEARING LOSS OF LEFT EAR DUE TO CERUMEN IMPACTION: ICD-10-CM

## 2025-04-17 DIAGNOSIS — H61.23 BILATERAL IMPACTED CERUMEN: Primary | ICD-10-CM

## 2025-04-17 PROCEDURE — 69210 REMOVE IMPACTED EAR WAX UNI: CPT | Performed by: OTOLARYNGOLOGY

## 2025-04-17 PROCEDURE — 1036F TOBACCO NON-USER: CPT | Performed by: OTOLARYNGOLOGY

## 2025-04-17 PROCEDURE — 1159F MED LIST DOCD IN RCRD: CPT | Performed by: OTOLARYNGOLOGY

## 2025-04-17 PROCEDURE — G8420 CALC BMI NORM PARAMETERS: HCPCS | Performed by: OTOLARYNGOLOGY

## 2025-04-17 PROCEDURE — 99202 OFFICE O/P NEW SF 15 MIN: CPT | Performed by: OTOLARYNGOLOGY

## 2025-04-17 PROCEDURE — 1123F ACP DISCUSS/DSCN MKR DOCD: CPT | Performed by: OTOLARYNGOLOGY

## 2025-04-17 PROCEDURE — G8427 DOCREV CUR MEDS BY ELIG CLIN: HCPCS | Performed by: OTOLARYNGOLOGY

## 2025-04-17 NOTE — PROGRESS NOTES
Department of Otolaryngology  Office Consult Note  4/17/25          Subjective:        Chief Complaint:  had concerns including New Patient (NEW PATIENT - NP-cerumen; referred by Dr. Koehler/).     Patient ID: Niles Field is a 89 y.o. male.    HPI: Patient presents as  new patient for cerumen impaction.  Patient was seen by PCP about 1 month ago and noted to have significant cerumen impaction, irrigation was attempted in the office with persistent cerumen.  He does feel fullness and muffled hearing.  He has not had his hearing tested before.  He however feels like he does have some hearing loss progressive over the past several years.  Denies surgery to the ear.  Denies otorrhea, otalgia.    Review of Systems   Constitutional: Negative.    HENT:  Positive for hearing loss. Negative for congestion, ear discharge, ear pain, rhinorrhea, sinus pressure, sinus pain, sore throat and tinnitus.    Respiratory:  Negative for cough, choking, shortness of breath, wheezing and stridor.    Cardiovascular:  Negative for chest pain.   Skin:  Negative for rash.   Allergic/Immunologic: Negative for environmental allergies and immunocompromised state.   Neurological:  Negative for dizziness, weakness, light-headedness and headaches.   Psychiatric/Behavioral:  Negative for confusion.    All other systems reviewed and are negative.        Past Medical History:   Diagnosis Date    A-fib ([facility])     follows with Dr Oli Jorgensen 9/28/22    AAA (abdominal aortic aneurysm)     infrarenal 3.7cm 6/27/22    Abdominal aortic aneurysm (AAA) 3.0 cm to 5.5 cm in diameter in male 04/27/2022    Acute congestive heart failure (HCC) 03/01/2022    Acute on chronic combined systolic and diastolic CHF (congestive heart failure) ([facility]) 03/01/2022    Acute on chronic diastolic congestive heart failure ([facility]) 02/06/2023    Acute on chronic heart failure with preserved ejection fraction (HFpEF) ([facility])     ELISSA (acute kidney injury) 05/10/2024    Arrhythmia      Pt verbally states they understood all oral and written discharge instructions.  Pt had steady gait upon leaving ER area.

## 2025-04-21 ASSESSMENT — ENCOUNTER SYMPTOMS
WHEEZING: 0
RHINORRHEA: 0
SORE THROAT: 0
SINUS PRESSURE: 0
SINUS PAIN: 0
CHOKING: 0
SHORTNESS OF BREATH: 0
COUGH: 0
STRIDOR: 0

## 2025-04-22 DIAGNOSIS — I48.19 PERSISTENT ATRIAL FIBRILLATION (HCC): ICD-10-CM

## 2025-04-22 RX ORDER — AMIODARONE HYDROCHLORIDE 200 MG/1
200 TABLET ORAL DAILY
Qty: 90 TABLET | Refills: 1 | Status: SHIPPED | OUTPATIENT
Start: 2025-04-22

## 2025-04-22 RX ORDER — METOPROLOL TARTRATE 25 MG/1
25 TABLET, FILM COATED ORAL 2 TIMES DAILY
Qty: 180 TABLET | Refills: 3 | Status: SHIPPED | OUTPATIENT
Start: 2025-04-22

## 2025-04-22 NOTE — TELEPHONE ENCOUNTER
Name of Medication(s) Requested:  Requested Prescriptions      No prescriptions requested or ordered in this encounter       Medication is on current medication list Yes    Dosage and directions were verified? Yes    Quantity verified: 90 day supply     Pharmacy Verified?  Yes    Last Appointment:  2/25/2025    Future appts:  Future Appointments   Date Time Provider Department Center   5/13/2025 10:15 AM [facility] ROOM [facility]   7/29/2025 11:15 AM Geremias Koehler, DO [place] [facility]   8/28/2025 11:00 AM Yovanny Patel DO [place] [facility]""   10/28/2025 10:15 AM Oli Jorgensen, DO [facility]        (If no appt send self scheduling link. .REFILLAPPT)  Scheduling request sent?     [] Yes  [] No    Does patient need updated?  [] Yes  [] No

## 2025-04-23 ENCOUNTER — TELEPHONE (OUTPATIENT)
Dept: NON INVASIVE DIAGNOSTICS | Age: 89
End: 2025-04-23

## 2025-04-23 ENCOUNTER — PREP FOR PROCEDURE (OUTPATIENT)
Dept: NON INVASIVE DIAGNOSTICS | Age: 89
End: 2025-04-23

## 2025-04-23 RX ORDER — SODIUM CHLORIDE 0.9 % (FLUSH) 0.9 %
5-40 SYRINGE (ML) INJECTION EVERY 12 HOURS SCHEDULED
Status: CANCELLED | OUTPATIENT
Start: 2025-04-25

## 2025-04-23 RX ORDER — SODIUM CHLORIDE 9 MG/ML
INJECTION, SOLUTION INTRAVENOUS PRN
Status: CANCELLED | OUTPATIENT
Start: 2025-04-25

## 2025-04-23 RX ORDER — SODIUM CHLORIDE 0.9 % (FLUSH) 0.9 %
5-40 SYRINGE (ML) INJECTION PRN
Status: CANCELLED | OUTPATIENT
Start: 2025-04-25

## 2025-04-23 NOTE — TELEPHONE ENCOUNTER
This nurse gave instructions to patient 4/23/25 at 1230    You are scheduled at: [facility] [address] [place] [address]    Procedure: Cardioversion      *Park in the parking garage(with the bridge).  Enter through the main entrance, go to the right  Check-in with admitting on 1st floor , they will direct you to the 3rd floor cath lab.    You should arrive at the hospital on 4/25 at 1230 for your procedure at 1330.    Instructions:    Nothing to eat or drink after midnight the night before. ( THURSDAY INTO MONDAY )    2.  You can take your regular medications morning of with just enough water to get them down.      3.  Please hold following medications:       YOU MAY HOLD BUMEX IF YOU WISH FOR COMFORT , BUT IT IS NOT REQUIRED FOR PROCEDURE.       DO NOT MISS ANY DOSES OF ELIQUIS.     4. You will need someone to drive you.      If you have any further questions, please call the office at [phone] ask for Kayla.

## 2025-04-25 ENCOUNTER — HOSPITAL ENCOUNTER (OUTPATIENT)
Age: 89
Discharge: HOME | End: 2025-04-27
Attending: INTERNAL MEDICINE
Payer: MEDICARE

## 2025-04-25 ENCOUNTER — ANESTHESIA (OUTPATIENT)
Age: 89
End: 2025-04-25
Payer: MEDICARE

## 2025-04-25 ENCOUNTER — ANESTHESIA EVENT (OUTPATIENT)
Age: 89
End: 2025-04-25
Payer: MEDICARE

## 2025-04-25 VITALS
TEMPERATURE: 97 F | SYSTOLIC BLOOD PRESSURE: 151 MMHG | DIASTOLIC BLOOD PRESSURE: 61 MMHG | OXYGEN SATURATION: 92 % | RESPIRATION RATE: 20 BRPM | HEART RATE: 60 BPM

## 2025-04-25 DIAGNOSIS — I48.0 PAROXYSMAL ATRIAL FIBRILLATION (HCC): ICD-10-CM

## 2025-04-25 DIAGNOSIS — I48.19 PERSISTENT ATRIAL FIBRILLATION (HCC): Primary | ICD-10-CM

## 2025-04-25 LAB
ALBUMIN SERPL-MCNC: 4.1 G/DL (ref 3.5–5.2)
ALP SERPL-CCNC: 205 U/L (ref 40–129)
ALT SERPL-CCNC: 26 U/L (ref 0–50)
ANION GAP SERPL CALCULATED.3IONS-SCNC: 11 MMOL/L (ref 7–16)
AST SERPL-CCNC: 45 U/L (ref 0–50)
BASOPHILS # BLD: 0.04 K/UL (ref 0–0.2)
BASOPHILS NFR BLD: 1 % (ref 0–2)
BILIRUB SERPL-MCNC: 0.5 MG/DL (ref 0–1.2)
BUN SERPL-MCNC: 23 MG/DL (ref 8–23)
CALCIUM SERPL-MCNC: 9.2 MG/DL (ref 8.8–10.2)
CHLORIDE SERPL-SCNC: 100 MMOL/L (ref 98–107)
CO2 SERPL-SCNC: 33 MMOL/L (ref 22–29)
CREAT SERPL-MCNC: 1.2 MG/DL (ref 0.7–1.2)
EOSINOPHIL # BLD: 0.19 K/UL (ref 0.05–0.5)
EOSINOPHILS RELATIVE PERCENT: 2 % (ref 0–6)
ERYTHROCYTE [DISTWIDTH] IN BLOOD BY AUTOMATED COUNT: 15.4 % (ref 11.5–15)
GFR, ESTIMATED: 59 ML/MIN/1.73M2
GLUCOSE SERPL-MCNC: 103 MG/DL (ref 74–99)
HCT VFR BLD AUTO: 38.3 % (ref 37–54)
HGB BLD-MCNC: 11.9 G/DL (ref 12.5–16.5)
IMM GRANULOCYTES # BLD AUTO: <0.03 K/UL (ref 0–0.58)
IMM GRANULOCYTES NFR BLD: 0 % (ref 0–5)
LYMPHOCYTES NFR BLD: 1.16 K/UL (ref 1.5–4)
LYMPHOCYTES RELATIVE PERCENT: 15 % (ref 20–42)
MAGNESIUM SERPL-MCNC: 2.6 MG/DL (ref 1.6–2.4)
MCH RBC QN AUTO: 28.3 PG (ref 26–35)
MCHC RBC AUTO-ENTMCNC: 31.1 G/DL (ref 32–34.5)
MCV RBC AUTO: 91.2 FL (ref 80–99.9)
MONOCYTES NFR BLD: 0.73 K/UL (ref 0.1–0.95)
MONOCYTES NFR BLD: 9 % (ref 2–12)
NEUTROPHILS NFR BLD: 73 % (ref 43–80)
NEUTS SEG NFR BLD: 5.63 K/UL (ref 1.8–7.3)
PLATELET # BLD AUTO: 220 K/UL (ref 130–450)
PMV BLD AUTO: 11.1 FL (ref 7–12)
POTASSIUM SERPL-SCNC: 4.1 MMOL/L (ref 3.5–5.1)
PROT SERPL-MCNC: 7 G/DL (ref 6.4–8.3)
RBC # BLD AUTO: 4.2 M/UL (ref 3.8–5.8)
SODIUM SERPL-SCNC: 144 MMOL/L (ref 136–145)
TSH SERPL DL<=0.05 MIU/L-ACNC: 4.33 UIU/ML (ref 0.27–4.2)
WBC OTHER # BLD: 7.8 K/UL (ref 4.5–11.5)

## 2025-04-25 PROCEDURE — 84443 ASSAY THYROID STIM HORMONE: CPT

## 2025-04-25 PROCEDURE — 7100000011 HC PHASE II RECOVERY - ADDTL 15 MIN: Performed by: INTERNAL MEDICINE

## 2025-04-25 PROCEDURE — 6360000002 HC RX W HCPCS: Performed by: NURSE ANESTHETIST, CERTIFIED REGISTERED

## 2025-04-25 PROCEDURE — 3700000000 HC ANESTHESIA ATTENDED CARE: Performed by: INTERNAL MEDICINE

## 2025-04-25 PROCEDURE — 83735 ASSAY OF MAGNESIUM: CPT

## 2025-04-25 PROCEDURE — 6370000000 HC RX 637 (ALT 250 FOR IP): Performed by: INTERNAL MEDICINE

## 2025-04-25 PROCEDURE — 2580000003 HC RX 258: Performed by: NURSE ANESTHETIST, CERTIFIED REGISTERED

## 2025-04-25 PROCEDURE — 92960 CARDIOVERSION ELECTRIC EXT: CPT

## 2025-04-25 PROCEDURE — 92960 CARDIOVERSION ELECTRIC EXT: CPT | Performed by: INTERNAL MEDICINE

## 2025-04-25 PROCEDURE — 7100000010 HC PHASE II RECOVERY - FIRST 15 MIN: Performed by: INTERNAL MEDICINE

## 2025-04-25 PROCEDURE — 80053 COMPREHEN METABOLIC PANEL: CPT

## 2025-04-25 PROCEDURE — 85025 COMPLETE CBC W/AUTO DIFF WBC: CPT

## 2025-04-25 PROCEDURE — 3700000001 HC ADD 15 MINUTES (ANESTHESIA): Performed by: INTERNAL MEDICINE

## 2025-04-25 RX ORDER — SODIUM CHLORIDE 9 MG/ML
INJECTION, SOLUTION INTRAVENOUS PRN
Status: DISCONTINUED | OUTPATIENT
Start: 2025-04-25 | End: 2025-04-28 | Stop reason: HOSPADM

## 2025-04-25 RX ORDER — SODIUM CHLORIDE 9 MG/ML
INJECTION, SOLUTION INTRAVENOUS
Status: DISCONTINUED | OUTPATIENT
Start: 2025-04-25 | End: 2025-04-25 | Stop reason: SDUPTHER

## 2025-04-25 RX ORDER — SODIUM CHLORIDE 0.9 % (FLUSH) 0.9 %
5-40 SYRINGE (ML) INJECTION PRN
Status: DISCONTINUED | OUTPATIENT
Start: 2025-04-25 | End: 2025-04-28 | Stop reason: HOSPADM

## 2025-04-25 RX ORDER — PROPOFOL 10 MG/ML
INJECTION, EMULSION INTRAVENOUS
Status: DISCONTINUED | OUTPATIENT
Start: 2025-04-25 | End: 2025-04-25 | Stop reason: SDUPTHER

## 2025-04-25 RX ORDER — SODIUM CHLORIDE 0.9 % (FLUSH) 0.9 %
5-40 SYRINGE (ML) INJECTION EVERY 12 HOURS SCHEDULED
Status: DISCONTINUED | OUTPATIENT
Start: 2025-04-25 | End: 2025-04-28 | Stop reason: HOSPADM

## 2025-04-25 RX ADMIN — APIXABAN 2.5 MG: 5 TABLET, FILM COATED ORAL at 14:30

## 2025-04-25 RX ADMIN — PROPOFOL 50 MG: 10 INJECTION, EMULSION INTRAVENOUS at 14:05

## 2025-04-25 RX ADMIN — SODIUM CHLORIDE: 9 INJECTION, SOLUTION INTRAVENOUS at 13:45

## 2025-04-25 ASSESSMENT — ENCOUNTER SYMPTOMS: SHORTNESS OF BREATH: 1

## 2025-04-25 NOTE — DISCHARGE INSTRUCTIONS
Discharge Date:  4/25/2025   Discharged To: home with support    Resume Activity:  Bathing:   Tub Yes   Shower Yes  Walking:Yes  Stairs: Yes  Driving: Yes,tomorrow  Work: Yes  School: NA  Sexual Activity: Yes  Lifting: No    Special Instructions: Take 5 mg of Eliquis twice a day from tonight    Diet: No added salt.    Tobacco Cessation Counseling: Done.    Office Follow Up: Call for appointment as scheduled  Office Number [phone]

## 2025-04-25 NOTE — H&P
[facility] PHYSICIANS- The Heart and Vascular [place]-  Electrophysiology  History and physical  PATIENT: Niles Field  MEDICAL RECORD NUMBER: [id number]  DATE OF SERVICE:  4/25/2025  PRIMARY ELECTROPHYSIOLOGIST: Mohini Mercado MD  REFERRING PHYSICIAN: Mohini Mercado MD and Geremias Koehler DO    Patient here for cardioversion        HPI: 88-year-old male with history of nonvalvular persistent AF sp DCCV x 7 (12/2010, 5/30/2017, 10/29/2020, 9/4/2021, 5/13/2022, 2/9/2023, 9/12/2023), VT sp Medtronic dual-chamber ICD (DOI: 4/24/2007-passive fixation RA lead; RV ICD lead revision with capping/abandoning old lead 2/2 fracture: 4/7/2008; GEN change: 7/28/2016 and 4/13/2023), RBBB, CAD sp CABG (5/23/2003) and redo CABG (3/2007), AS sp TAVR (3/29/2017), carotid stenosis, AAA (2022: 5.5 cm), TIA, HTN, recurrent GI bleed (8/2013: PUD on Pradaxa; 3/2022: 3 AVMs in fundus of stomach treated with APC/clips on Xarelto and PPI; 5/2024: GI bleed of uncertain etiology on Xarelto), and CKD-3.  He is managed by Kia Jorgensen and Rogelio with amiodarone 200 mg daily, amlodipine 5 mg daily, apixaban 2.5 mg twice daily, Bumex 2 mg daily as needed, metoprolol 25 mg twice daily, pravastatin 40 mg daily, and PPI.   In 5/2003, patient diagnosed with multivessel CAD, which was treated with CABG.  Details not available.  In 3/2007, during stress test, patient had cardiac arrest/VT, which was treated with external defibrillation.  Shortly after, he underwent redo CABG and implant of dual-chamber ICD with passive fixation RA lead.  In 4/2008, patient had inappropriate shocks due to RV lead fracture, which was managed with capping/abandoning old lead and implanting new RV ICD lead.  In 2010, he was diagnosed with AF, which was treated with Pradaxa and DCCV.  In 8/2013, he had GI bleed due to pyloric ulcer.  Pradaxa was changed to Xarelto.  In 3/2017, he was diagnosed with severe AS, which was treated with TAVR.  In 5/2017, he had recurrence of AF,

## 2025-04-25 NOTE — ANESTHESIA PRE PROCEDURE
performed by Caitlyn Mcguire MD at [facility] ENDOSCOPY    CORONARY ARTERY BYPASS GRAFT  2003    DIAGNOSTIC CARDIAC CATH LAB PROCEDURE      DIAGNOSTIC CARDIAC CATH LAB PROCEDURE  2008    stent    JOINT REPLACEMENT  2011    r hip surgery Dr Vasquez    OTHER SURGICAL HISTORY  2017    Dr. Saab and Dr. Narayanan- TAVR Josie 26mm valve    UPPER GASTROINTESTINAL ENDOSCOPY N/A 2019    EGD ESOPHAGOGASTRODUODENOSCOPY performed by Nigel CHILDERS MD at [facility] ENDOSCOPY    UPPER GASTROINTESTINAL ENDOSCOPY N/A 2020    EGD ESOPHAGOGASTRODUODENOSCOPY performed by Chris Sky MD at [facility] ENDOSCOPY    UPPER GASTROINTESTINAL ENDOSCOPY N/A 2022    EGD CONTROL HEMORRHAGE performed by Caitlyn Mcguire MD at [facility] ENDOSCOPY    UPPER GASTROINTESTINAL ENDOSCOPY N/A 2022    EGD ESOPHAGOGASTRODUODENOSCOPY performed by Caitlyn Mcguire MD at [facility] ENDOSCOPY    UPPER GASTROINTESTINAL ENDOSCOPY N/A 2022    EGD ESOPHAGOGASTRODUODENOSCOPY DILATATION performed by Caitlyn Mcguire MD at [facility] ENDOSCOPY    UPPER GASTROINTESTINAL ENDOSCOPY N/A 2024    ESOPHAGOGASTRODUODENOSCOPY BIOPSY performed by Kishore Miguel MD at [facility] ENDOSCOPY    VARICOSE VEIN SURGERY         Social History:    Social History     Tobacco Use    Smoking status: Former     Current packs/day: 0.00     Average packs/day: 0.5 packs/day for 3.0 years (1.5 ttl pk-yrs)     Types: Cigarettes     Start date: 1970     Quit date: 1973     Years since quittin.2    Smokeless tobacco: Never    Tobacco comments:     Quit smoking in    Substance Use Topics    Alcohol use: No                                Counseling given: Not Answered  Tobacco comments: Quit smoking in       Vital Signs (Current):   Vitals:    25 1233   BP: (!) 157/70   Pulse: 69   Resp: 18   Temp: 36.1 °C (97 °F)   SpO2: 93%                                              BP Readings from Last 3 Encounters:   25 (!) 157/70

## 2025-04-25 NOTE — PLAN OF CARE
Problem: Chronic Conditions and Co-morbidities  Goal: Patient's chronic conditions and co-morbidity symptoms are monitored and maintained or improved  Outcome: Progressing      25-Apr-2025 20:08

## 2025-04-25 NOTE — ANESTHESIA POSTPROCEDURE EVALUATION
Department of Anesthesiology  Postprocedure Note    Patient: Niles Field  MRN: [id number]  YOB: 1935  Date of evaluation: 4/25/2025    Procedure Summary       Date: 04/25/25 Room / Location: [facility] Cardiac Cath Lab    Anesthesia Start: 1345 Anesthesia Stop: 1415    Procedure: CARDIOVERSION EXTERNAL Diagnosis: Paroxysmal atrial fibrillation (HCC)    Scheduled Providers: Shruthi Ye MD Responsible Provider: Shruthi Ye MD    Anesthesia Type: MAC ASA Status: 4            Anesthesia Type: MAC    Alfred Phase I: Alfred Score: 10    Alfred Phase II:      Anesthesia Post Evaluation    Patient location during evaluation: PACU  Patient participation: complete - patient participated  Level of consciousness: awake  Pain score: 0  Airway patency: patent  Nausea & Vomiting: no nausea  Cardiovascular status: hemodynamically stable  Respiratory status: acceptable  Hydration status: stable    No notable events documented.

## 2025-04-29 ENCOUNTER — PREP FOR PROCEDURE (OUTPATIENT)
Dept: NON INVASIVE DIAGNOSTICS | Age: 89
End: 2025-04-29

## 2025-04-29 ENCOUNTER — TELEPHONE (OUTPATIENT)
Dept: NON INVASIVE DIAGNOSTICS | Age: 89
End: 2025-04-29

## 2025-04-29 RX ORDER — SODIUM CHLORIDE 9 MG/ML
INJECTION, SOLUTION INTRAVENOUS PRN
Status: CANCELLED | OUTPATIENT
Start: 2025-04-30

## 2025-04-29 RX ORDER — SODIUM CHLORIDE 0.9 % (FLUSH) 0.9 %
5-40 SYRINGE (ML) INJECTION PRN
Status: CANCELLED | OUTPATIENT
Start: 2025-04-30

## 2025-04-29 RX ORDER — SODIUM CHLORIDE 0.9 % (FLUSH) 0.9 %
5-40 SYRINGE (ML) INJECTION EVERY 12 HOURS SCHEDULED
Status: CANCELLED | OUTPATIENT
Start: 2025-04-30

## 2025-04-29 NOTE — TELEPHONE ENCOUNTER
This nurse gave instructions to patient 4/29/25 at 0915 over the phone    You are scheduled at: [facility] [address] [place] [address]    Procedure: Cardioversion      *Park in the parking garage(with the bridge).  Enter through the main entrance, go to the right  Check-in with admitting on 1st floor , they will direct you to the 3rd floor cath lab.    You should arrive at the hospital on 4/30 at 0700 for your procedure at 0800.    Instructions:    Nothing to eat or drink after midnight the night before.    2.  You can take your regular medications morning of with just enough water to get them down.      3.  DO NOT MISS ANY DOSES OF YOUR ELIQUIS.    4. You will need someone to drive you.      If you have any further questions, please call the office at [phone] ask for Kayla.

## 2025-04-30 ENCOUNTER — ANESTHESIA (OUTPATIENT)
Age: 89
End: 2025-04-30
Payer: MEDICARE

## 2025-04-30 ENCOUNTER — ANESTHESIA EVENT (OUTPATIENT)
Age: 89
End: 2025-04-30
Payer: MEDICARE

## 2025-04-30 ENCOUNTER — HOSPITAL ENCOUNTER (OUTPATIENT)
Age: 89
Discharge: HOME | End: 2025-05-02
Attending: INTERNAL MEDICINE
Payer: MEDICARE

## 2025-04-30 VITALS
BODY MASS INDEX: 25.07 KG/M2 | SYSTOLIC BLOOD PRESSURE: 137 MMHG | TEMPERATURE: 96.5 F | HEIGHT: 66 IN | OXYGEN SATURATION: 97 % | DIASTOLIC BLOOD PRESSURE: 55 MMHG | RESPIRATION RATE: 21 BRPM | HEART RATE: 52 BPM | WEIGHT: 156 LBS

## 2025-04-30 DIAGNOSIS — I48.0 PAROXYSMAL ATRIAL FIBRILLATION (HCC): ICD-10-CM

## 2025-04-30 LAB
ALBUMIN SERPL-MCNC: 3.9 G/DL (ref 3.5–5.2)
ALP SERPL-CCNC: 200 U/L (ref 40–129)
ALT SERPL-CCNC: 21 U/L (ref 0–50)
ANION GAP SERPL CALCULATED.3IONS-SCNC: 10 MMOL/L (ref 7–16)
AST SERPL-CCNC: 42 U/L (ref 0–50)
BASOPHILS # BLD: 0.03 K/UL (ref 0–0.2)
BASOPHILS NFR BLD: 0 % (ref 0–2)
BILIRUB SERPL-MCNC: 0.5 MG/DL (ref 0–1.2)
BUN SERPL-MCNC: 20 MG/DL (ref 8–23)
CALCIUM SERPL-MCNC: 9.2 MG/DL (ref 8.8–10.2)
CHLORIDE SERPL-SCNC: 100 MMOL/L (ref 98–107)
CO2 SERPL-SCNC: 34 MMOL/L (ref 22–29)
CREAT SERPL-MCNC: 1.1 MG/DL (ref 0.7–1.2)
ECHO BSA: 1.82 M2
EOSINOPHIL # BLD: 0.33 K/UL (ref 0.05–0.5)
EOSINOPHILS RELATIVE PERCENT: 5 % (ref 0–6)
ERYTHROCYTE [DISTWIDTH] IN BLOOD BY AUTOMATED COUNT: 15.3 % (ref 11.5–15)
GFR, ESTIMATED: 64 ML/MIN/1.73M2
GLUCOSE SERPL-MCNC: 102 MG/DL (ref 74–99)
HCT VFR BLD AUTO: 36.6 % (ref 37–54)
HGB BLD-MCNC: 11.5 G/DL (ref 12.5–16.5)
IMM GRANULOCYTES # BLD AUTO: <0.03 K/UL (ref 0–0.58)
IMM GRANULOCYTES NFR BLD: 0 % (ref 0–5)
LYMPHOCYTES NFR BLD: 1.03 K/UL (ref 1.5–4)
LYMPHOCYTES RELATIVE PERCENT: 15 % (ref 20–42)
MAGNESIUM SERPL-MCNC: 2.3 MG/DL (ref 1.6–2.4)
MCH RBC QN AUTO: 28.7 PG (ref 26–35)
MCHC RBC AUTO-ENTMCNC: 31.4 G/DL (ref 32–34.5)
MCV RBC AUTO: 91.3 FL (ref 80–99.9)
MONOCYTES NFR BLD: 0.62 K/UL (ref 0.1–0.95)
MONOCYTES NFR BLD: 9 % (ref 2–12)
NEUTROPHILS NFR BLD: 70 % (ref 43–80)
NEUTS SEG NFR BLD: 4.64 K/UL (ref 1.8–7.3)
PLATELET # BLD AUTO: 220 K/UL (ref 130–450)
PMV BLD AUTO: 11.3 FL (ref 7–12)
POTASSIUM SERPL-SCNC: 4.3 MMOL/L (ref 3.5–5.1)
PROT SERPL-MCNC: 6.5 G/DL (ref 6.4–8.3)
RBC # BLD AUTO: 4.01 M/UL (ref 3.8–5.8)
SODIUM SERPL-SCNC: 143 MMOL/L (ref 136–145)
TSH SERPL DL<=0.05 MIU/L-ACNC: 5.83 UIU/ML (ref 0.27–4.2)
WBC OTHER # BLD: 6.7 K/UL (ref 4.5–11.5)

## 2025-04-30 PROCEDURE — 84443 ASSAY THYROID STIM HORMONE: CPT

## 2025-04-30 PROCEDURE — 2580000003 HC RX 258

## 2025-04-30 PROCEDURE — 7100000011 HC PHASE II RECOVERY - ADDTL 15 MIN: Performed by: INTERNAL MEDICINE

## 2025-04-30 PROCEDURE — 6360000002 HC RX W HCPCS

## 2025-04-30 PROCEDURE — 92960 CARDIOVERSION ELECTRIC EXT: CPT | Performed by: INTERNAL MEDICINE

## 2025-04-30 PROCEDURE — 2709999900 HC NON-CHARGEABLE SUPPLY: Performed by: INTERNAL MEDICINE

## 2025-04-30 PROCEDURE — 80053 COMPREHEN METABOLIC PANEL: CPT

## 2025-04-30 PROCEDURE — 92960 CARDIOVERSION ELECTRIC EXT: CPT

## 2025-04-30 PROCEDURE — 85025 COMPLETE CBC W/AUTO DIFF WBC: CPT

## 2025-04-30 PROCEDURE — 83735 ASSAY OF MAGNESIUM: CPT

## 2025-04-30 PROCEDURE — 7100000010 HC PHASE II RECOVERY - FIRST 15 MIN: Performed by: INTERNAL MEDICINE

## 2025-04-30 PROCEDURE — 3700000000 HC ANESTHESIA ATTENDED CARE: Performed by: INTERNAL MEDICINE

## 2025-04-30 PROCEDURE — 3700000001 HC ADD 15 MINUTES (ANESTHESIA): Performed by: INTERNAL MEDICINE

## 2025-04-30 RX ORDER — SODIUM CHLORIDE 9 MG/ML
INJECTION, SOLUTION INTRAVENOUS
Status: DISCONTINUED | OUTPATIENT
Start: 2025-04-30 | End: 2025-04-30 | Stop reason: SDUPTHER

## 2025-04-30 RX ORDER — SODIUM CHLORIDE 0.9 % (FLUSH) 0.9 %
5-40 SYRINGE (ML) INJECTION PRN
Status: DISCONTINUED | OUTPATIENT
Start: 2025-04-30 | End: 2025-04-30 | Stop reason: ALTCHOICE

## 2025-04-30 RX ORDER — SODIUM CHLORIDE 9 MG/ML
INJECTION, SOLUTION INTRAVENOUS PRN
Status: DISCONTINUED | OUTPATIENT
Start: 2025-04-30 | End: 2025-05-03 | Stop reason: HOSPADM

## 2025-04-30 RX ORDER — PROPOFOL 10 MG/ML
INJECTION, EMULSION INTRAVENOUS
Status: DISCONTINUED | OUTPATIENT
Start: 2025-04-30 | End: 2025-04-30 | Stop reason: SDUPTHER

## 2025-04-30 RX ORDER — SODIUM CHLORIDE 0.9 % (FLUSH) 0.9 %
5-40 SYRINGE (ML) INJECTION EVERY 12 HOURS SCHEDULED
Status: DISCONTINUED | OUTPATIENT
Start: 2025-04-30 | End: 2025-04-30 | Stop reason: ALTCHOICE

## 2025-04-30 RX ADMIN — PROPOFOL 50 MG: 10 INJECTION, EMULSION INTRAVENOUS at 08:13

## 2025-04-30 RX ADMIN — SODIUM CHLORIDE: 9 INJECTION, SOLUTION INTRAVENOUS at 08:00

## 2025-04-30 ASSESSMENT — ENCOUNTER SYMPTOMS: SHORTNESS OF BREATH: 1

## 2025-04-30 NOTE — PROGRESS NOTES
Patient discharged with documented belongings. IV site removed, catheter intact. Discharge instructions reviewed with patient and family.

## 2025-04-30 NOTE — H&P
[facility]- [facility] [place]-  Electrophysiology  History and physical  PATIENT: Niles Field  MEDICAL RECORD NUMBER: [id number]  DATE OF SERVICE:  4/30/2025  PRIMARY ELECTROPHYSIOLOGIST: Mohini Mercado MD  REFERRING PHYSICIAN: Mohini Mercado MD and Geremias Koehler DO    Patient here for cardioversion        HPI: 88-year-old male with history of nonvalvular persistent AF sp DCCV x 7 (12/2010, 5/30/2017, 10/29/2020, 9/4/2021, 5/13/2022, 2/9/2023, 9/12/2023), VT sp Medtronic dual-chamber ICD (DOI: 4/24/2007-passive fixation RA lead; RV ICD lead revision with capping/abandoning old lead 2/2 fracture: 4/7/2008; GEN change: 7/28/2016 and 4/13/2023), RBBB, CAD sp CABG (5/23/2003) and redo CABG (3/2007), AS sp TAVR (3/29/2017), carotid stenosis, AAA (2022: 5.5 cm), TIA, HTN, recurrent GI bleed (8/2013: PUD on Pradaxa; 3/2022: 3 AVMs in fundus of stomach treated with APC/clips on Xarelto and PPI; 5/2024: GI bleed of uncertain etiology on Xarelto), and CKD-3.  He is managed by Kia Jorgensen and Rogelio with amiodarone 200 mg daily, amlodipine 5 mg daily, apixaban 2.5 mg twice daily, Bumex 2 mg daily as needed, metoprolol 25 mg twice daily, pravastatin 40 mg daily, and PPI.   In 5/2003, patient diagnosed with multivessel CAD, which was treated with CABG.  Details not available.  In 3/2007, during stress test, patient had cardiac arrest/VT, which was treated with external defibrillation.  Shortly after, he underwent redo CABG and implant of dual-chamber ICD with passive fixation RA lead.  In 4/2008, patient had inappropriate shocks due to RV lead fracture, which was managed with capping/abandoning old lead and implanting new RV ICD lead.  In 2010, he was diagnosed with AF, which was treated with Pradaxa and DCCV.  In 8/2013, he had GI bleed due to pyloric ulcer.  Pradaxa was changed to Xarelto.  In 3/2017, he was diagnosed with severe AS, which was treated with TAVR.  In 5/2017, he had recurrence of AF,

## 2025-04-30 NOTE — DISCHARGE INSTRUCTIONS
Discharge Date:  4/30/2025   Discharged To: home with support    Resume Activity:  Bathing:   Tub Yes   Shower Yes  Walking:Yes  Stairs: Yes  Driving: Yes,tomorrow  Work: Yes  School: NA      Special Instructions: Take Amiodarone 1 1/2  tabs for one month   Then reduce to one tab daily    Diet: No added salt.    Tobacco Cessation Counseling: Done.    Office Follow Up: Call for appointment  as scheduled  Office Number [phone]

## 2025-04-30 NOTE — ANESTHESIA PRE PROCEDURE
Department of Anesthesiology  Preprocedure Note       Name:  Niles Field   Age:  89 y.o.  :  1935                                          MRN:  [id number]         Date:  2025      Surgeon:     Procedure: DCCV    Medications prior to admission:   Prior to Admission medications    Medication Sig Start Date End Date Taking? Authorizing Provider   apixaban (ELIQUIS) 5 MG TABS tablet Take 1 tablet by mouth 2 times daily 25  Yes Omega Katz, APRN - CNP   metoprolol tartrate (LOPRESSOR) 25 MG tablet Take 1 tablet by mouth 2 times daily 25  Yes Geremias Koehler DO   amiodarone (CORDARONE) 200 MG tablet Take 1 tablet by mouth daily 25  Yes Geremias Koehler DO   ALPRAZolam (XANAX) 0.25 MG tablet Take 1.5 tablets by mouth nightly as needed for Sleep for up to 90 days. Max Daily Amount: 0.375 mg 3/10/25 6/8/25 Yes Geremias Koehler DO   bumetanide (BUMEX) 2 MG tablet TAKE 1 TABLET BY MOUTH DAILY ;  MAY TAKE A SECOND DOSE AS NEEDED AS DIRECTED 10/1/24  Yes Geremias Koehler DO   pravastatin (PRAVACHOL) 40 MG tablet Take 1 tablet by mouth daily  Patient taking differently: Take 1 tablet by mouth at bedtime 9/3/24  Yes Geremias Koehler DO   Multiple Vitamins-Minerals (PRESERVISION AREDS PO) Take by mouth 2 times daily   Yes Xiomara Moreno MD   pantoprazole (PROTONIX) 40 MG tablet TAKE 1 TABLET BY MOUTH DAILY 24  Yes Geremias Koehler DO   MAGNESIUM-OXIDE 400 (240 Mg) MG tablet Take 1 tablet by mouth Daily with supper 3/18/20  Yes ProviderXiomara MD   Cholecalciferol (VITAMIN D) 2000 UNITS CAPS capsule Take 1 capsule by mouth Daily with lunch   Yes Xiomara Moreno MD   OXYGEN Inhale 2 L into the lungs nightly 2L at nite 24   Gwendolyn Finnegan MD   meclizine (ANTIVERT) 25 MG tablet Take 1 tablet by mouth daily as needed for Dizziness 9/3/24   Geremias Koehler DO   ferrous sulfate (IRON 325) 325 (65 Fe) MG tablet Take 1 tablet by mouth every other day  Patient taking

## 2025-04-30 NOTE — ANESTHESIA POSTPROCEDURE EVALUATION
Department of Anesthesiology  Postprocedure Note    Patient: Niles Field  MRN: [id number]  YOB: 1935  Date of evaluation: 4/30/2025    Procedure Summary       Date: 04/30/25 Room / Location: [facility] Cardiac Cath Lab    Anesthesia Start: 0800 Anesthesia Stop: 0822    Procedure: CARDIOVERSION EXTERNAL Diagnosis: Paroxysmal atrial fibrillation (HCC)    Scheduled Providers: Mabel Minaya MD Responsible Provider: Mabel Minaya MD    Anesthesia Type: MAC ASA Status: 3            Anesthesia Type: No value filed.    Alfred Phase I:      Alfred Phase II:      Anesthesia Post Evaluation    Patient location during evaluation: PACU  Patient participation: complete - patient participated  Level of consciousness: awake and alert  Airway patency: patent  Nausea & Vomiting: no nausea and no vomiting  Cardiovascular status: blood pressure returned to baseline and hemodynamically stable  Respiratory status: acceptable and spontaneous ventilation  Hydration status: euvolemic  Multimodal analgesia pain management approach  Pain management: adequate    No notable events documented.

## 2025-05-13 ENCOUNTER — HOSPITAL ENCOUNTER (OUTPATIENT)
Dept: OTHER | Age: 89
Setting detail: THERAPIES SERIES
Discharge: HOME | End: 2025-05-13
Payer: MEDICARE

## 2025-05-13 VITALS
WEIGHT: 158 LBS | OXYGEN SATURATION: 92 % | DIASTOLIC BLOOD PRESSURE: 59 MMHG | SYSTOLIC BLOOD PRESSURE: 120 MMHG | BODY MASS INDEX: 25.5 KG/M2 | HEART RATE: 58 BPM | RESPIRATION RATE: 18 BRPM

## 2025-05-13 LAB
ANION GAP SERPL CALCULATED.3IONS-SCNC: 12 MMOL/L (ref 7–16)
BNP SERPL-MCNC: 1206 PG/ML (ref 0–450)
BUN SERPL-MCNC: 26 MG/DL (ref 6–23)
CALCIUM SERPL-MCNC: 8.8 MG/DL (ref 8.6–10.2)
CHLORIDE SERPL-SCNC: 97 MMOL/L (ref 98–107)
CO2 SERPL-SCNC: 28 MMOL/L (ref 22–29)
CREAT SERPL-MCNC: 1.2 MG/DL (ref 0.7–1.2)
GFR, ESTIMATED: 62 ML/MIN/1.73M2
GLUCOSE SERPL-MCNC: 94 MG/DL (ref 74–99)
POTASSIUM SERPL-SCNC: 4.3 MMOL/L (ref 3.5–5)
SODIUM SERPL-SCNC: 137 MMOL/L (ref 132–146)

## 2025-05-13 PROCEDURE — 36415 COLL VENOUS BLD VENIPUNCTURE: CPT

## 2025-05-13 PROCEDURE — 99214 OFFICE O/P EST MOD 30 MIN: CPT

## 2025-05-13 PROCEDURE — 83880 ASSAY OF NATRIURETIC PEPTIDE: CPT

## 2025-05-13 PROCEDURE — 80048 BASIC METABOLIC PNL TOTAL CA: CPT

## 2025-05-13 ASSESSMENT — PATIENT HEALTH QUESTIONNAIRE - PHQ9
1. LITTLE INTEREST OR PLEASURE IN DOING THINGS: NOT AT ALL
SUM OF ALL RESPONSES TO PHQ QUESTIONS 1-9: 0
SUM OF ALL RESPONSES TO PHQ QUESTIONS 1-9: 0
2. FEELING DOWN, DEPRESSED OR HOPELESS: NOT AT ALL
SUM OF ALL RESPONSES TO PHQ QUESTIONS 1-9: 0
SUM OF ALL RESPONSES TO PHQ QUESTIONS 1-9: 0

## 2025-05-13 NOTE — PROGRESS NOTES
Congestive Heart Failure Clinic   [facility]      Referring Provider: LAWRENCE Ward  Primary Care Physician: Geremias Koehler DO   Cardiologist: Dr Jorgensen  Nephrologist: N/A       HISTORY OF PRESENT ILLNESS:     Niles Field is a 89 y.o. (1935) male with a history of HFpEF (EF> 50%), most recent EF:  Lab Results   Component Value Date    LVEF 58 02/06/2023    LVEFMODE Echo 04/19/2018         He presents to the CHF clinic for ongoing evaluation and monitoring of heart failure.    In the CHF clinic today he denies any adverse symptoms except:  [] Dizziness or lightheadedness   [] Syncope or near syncope  [] Recent Fall  [] Shortness of breath at rest   [x] Dyspnea with exertion  [] Decline in functional capacity (unable to perform activities they had previously been able to do)  [] Fatigue   [] Orthopnea  [] PND  [] Nocturnal cough  [] Hemoptysis  [] Chest pain, pressure, or discomfort  [] Palpitations  [] Abdominal distention  [] Abdominal bloating  [] Early satiety  [] Blood in stool   [] Diarrhea  [] Constipation  [] Nausea/Vomiting  [] Decreased urinary response to oral diuretic   [] Scrotal swelling   [] Lower extremity edema-   [] Used PRN doses of oral diuretic   [] Weight gain     Wt Readings from Last 3 Encounters:   05/13/25 71.7 kg (158 lb)   04/30/25 70.8 kg (156 lb)   04/17/25 73.2 kg (161 lb 4.8 oz)       SOCIAL HISTORY:  [x] Denies tobacco, alcohol or illicit drug abuse  [] Tobacco use:  [] ETOH use:  [] Illicit drug use:        MEDICATIONS:    Allergies   Allergen Reactions    Lipitor [Atorvastatin]      Prior to Visit Medications    Medication Sig Taking? Authorizing Provider   apixaban (ELIQUIS) 5 MG TABS tablet Take 1 tablet by mouth 2 times daily Yes Omega Katz APRN - CNP   metoprolol tartrate (LOPRESSOR) 25 MG tablet Take 1 tablet by mouth 2 times daily Yes Geremias Koehler DO   amiodarone (CORDARONE) 200 MG tablet Take 1

## 2025-05-15 ENCOUNTER — TELEPHONE (OUTPATIENT)
Dept: NON INVASIVE DIAGNOSTICS | Age: 89
End: 2025-05-15

## 2025-05-15 NOTE — TELEPHONE ENCOUNTER
Carelink alert  Medtronic Cobalt XT  MVP   AP: 5.6%  : 26.4  AF in progress since 05/04/2025 was cardioverted on 04/25/2025 & 04/30/2025  Currently on Amiodarone 200mg daily & Eliquis            Will review with Dr Mercado

## 2025-05-27 ENCOUNTER — TELEPHONE (OUTPATIENT)
Dept: NON INVASIVE DIAGNOSTICS | Age: 89
End: 2025-05-27

## 2025-05-27 ENCOUNTER — PREP FOR PROCEDURE (OUTPATIENT)
Dept: NON INVASIVE DIAGNOSTICS | Age: 89
End: 2025-05-27

## 2025-05-27 RX ORDER — SODIUM CHLORIDE 9 MG/ML
INJECTION, SOLUTION INTRAVENOUS PRN
Status: CANCELLED | OUTPATIENT
Start: 2025-06-20

## 2025-05-27 RX ORDER — SODIUM CHLORIDE 0.9 % (FLUSH) 0.9 %
5-40 SYRINGE (ML) INJECTION PRN
Status: CANCELLED | OUTPATIENT
Start: 2025-06-20

## 2025-05-27 RX ORDER — SODIUM CHLORIDE 0.9 % (FLUSH) 0.9 %
5-40 SYRINGE (ML) INJECTION EVERY 12 HOURS SCHEDULED
Status: CANCELLED | OUTPATIENT
Start: 2025-06-20

## 2025-05-27 NOTE — TELEPHONE ENCOUNTER
This nurse gave instructions to patient 5/27 over the phone and mailed copy to patients home.    You are scheduled at: [facility] [address] [place] [address]    Procedure: Cardioversion      *Park in the parking garage(with the bridge).  Enter through the main entrance, go to the right  Check-in with admitting on 1st floor , they will direct you to the 3rd floor cath lab.    You should arrive at the hospital on 6/20 at 0830 for your procedure at 0930.    Instructions:    Nothing to eat or drink after midnight the night before. ( Thursday )    2.  You can take your regular medications morning of with just enough water to get them down.      3.  DON'T MISS ANY DOSES OF ELIQUIS    4. You will need someone to drive you.      If you have any further questions, please call the office at [phone] ask for Kayla.

## 2025-05-27 NOTE — TELEPHONE ENCOUNTER
----- Message from CHADD TIRADO RN sent at 5/27/2025  9:25 AM EDT -----  Regarding: cardioversion    Please schedule cardioversion per Dr Mercado.   Patient will call you to schedule, has to check with his family on some dates.   Thanks

## 2025-06-11 DIAGNOSIS — F41.9 ANXIETY: Primary | ICD-10-CM

## 2025-06-11 RX ORDER — ALPRAZOLAM 0.25 MG
0.25 TABLET ORAL NIGHTLY PRN
COMMUNITY
End: 2025-06-11 | Stop reason: SDUPTHER

## 2025-06-11 RX ORDER — ALPRAZOLAM 0.25 MG
0.38 TABLET ORAL NIGHTLY PRN
Qty: 135 TABLET | Refills: 0 | Status: SHIPPED | OUTPATIENT
Start: 2025-06-11 | End: 2025-09-09

## 2025-06-11 NOTE — TELEPHONE ENCOUNTER
Name of Medication(s) Requested:  Requested Prescriptions      No prescriptions requested or ordered in this encounter       Medication is on current medication list Yes    Dosage and directions were verified? Yes    Quantity verified: 90 day supply     Pharmacy Verified?  Yes    Last Appointment:  2/25/2025    Future appts:  Future Appointments   Date Time Provider Department Center   6/20/2025  9:30 AM [id number] Rad/Car   7/29/2025 11:15 AM Geremias Koehler DO [place] [facility] DEP   8/13/2025 10:15 AM [facility] ROOM 2 [facility] [place] HOD   8/28/2025 11:00 AM Yovanny Patel, DO [place] [facility]   10/28/2025 10:15 AM Oli Jorgensen DO [place] [facility]        (If no appt send self scheduling link. .REFILLAPPT)  Scheduling request sent?     [] Yes  [] No    Does patient need updated?  [] Yes  [] No

## 2025-06-20 ENCOUNTER — ANESTHESIA EVENT (OUTPATIENT)
Age: 89
End: 2025-06-20
Payer: MEDICARE

## 2025-06-20 ENCOUNTER — HOSPITAL ENCOUNTER (OUTPATIENT)
Age: 89
Discharge: HOME | End: 2025-06-20
Attending: INTERNAL MEDICINE | Admitting: INTERNAL MEDICINE
Payer: MEDICARE

## 2025-06-20 ENCOUNTER — ANESTHESIA (OUTPATIENT)
Age: 89
End: 2025-06-20
Payer: MEDICARE

## 2025-06-20 VITALS
RESPIRATION RATE: 18 BRPM | SYSTOLIC BLOOD PRESSURE: 159 MMHG | TEMPERATURE: 97.1 F | BODY MASS INDEX: 25.07 KG/M2 | DIASTOLIC BLOOD PRESSURE: 64 MMHG | HEIGHT: 66 IN | WEIGHT: 156 LBS | OXYGEN SATURATION: 91 % | HEART RATE: 50 BPM

## 2025-06-20 DIAGNOSIS — I48.19 PERSISTENT ATRIAL FIBRILLATION (HCC): Primary | ICD-10-CM

## 2025-06-20 DIAGNOSIS — I48.0 PAROXYSMAL ATRIAL FIBRILLATION (HCC): ICD-10-CM

## 2025-06-20 LAB
ALBUMIN SERPL-MCNC: 4.2 G/DL (ref 3.5–5.2)
ALP SERPL-CCNC: 188 U/L (ref 40–129)
ALT SERPL-CCNC: 28 U/L (ref 0–50)
ANION GAP SERPL CALCULATED.3IONS-SCNC: 12 MMOL/L (ref 7–16)
AST SERPL-CCNC: 49 U/L (ref 0–50)
BASOPHILS # BLD: 0.05 K/UL (ref 0–0.2)
BASOPHILS NFR BLD: 1 % (ref 0–2)
BILIRUB SERPL-MCNC: 0.5 MG/DL (ref 0–1.2)
BUN SERPL-MCNC: 22 MG/DL (ref 8–23)
CALCIUM SERPL-MCNC: 9.2 MG/DL (ref 8.8–10.2)
CHLORIDE SERPL-SCNC: 95 MMOL/L (ref 98–107)
CO2 SERPL-SCNC: 31 MMOL/L (ref 22–29)
CREAT SERPL-MCNC: 1.2 MG/DL (ref 0.7–1.2)
ECHO BSA: 1.82 M2
EOSINOPHIL # BLD: 0.25 K/UL (ref 0.05–0.5)
EOSINOPHILS RELATIVE PERCENT: 3 % (ref 0–6)
ERYTHROCYTE [DISTWIDTH] IN BLOOD BY AUTOMATED COUNT: 16 % (ref 11.5–15)
GFR, ESTIMATED: 58 ML/MIN/1.73M2
GLUCOSE SERPL-MCNC: 101 MG/DL (ref 74–99)
HCT VFR BLD AUTO: 36.1 % (ref 37–54)
HGB BLD-MCNC: 11.9 G/DL (ref 12.5–16.5)
IMM GRANULOCYTES # BLD AUTO: <0.03 K/UL (ref 0–0.58)
IMM GRANULOCYTES NFR BLD: 0 % (ref 0–5)
LYMPHOCYTES NFR BLD: 1.21 K/UL (ref 1.5–4)
LYMPHOCYTES RELATIVE PERCENT: 16 % (ref 20–42)
MAGNESIUM SERPL-MCNC: 2.5 MG/DL (ref 1.6–2.4)
MCH RBC QN AUTO: 30 PG (ref 26–35)
MCHC RBC AUTO-ENTMCNC: 33 G/DL (ref 32–34.5)
MCV RBC AUTO: 90.9 FL (ref 80–99.9)
MONOCYTES NFR BLD: 0.73 K/UL (ref 0.1–0.95)
MONOCYTES NFR BLD: 10 % (ref 2–12)
NEUTROPHILS NFR BLD: 69 % (ref 43–80)
NEUTS SEG NFR BLD: 5.14 K/UL (ref 1.8–7.3)
PLATELET # BLD AUTO: 234 K/UL (ref 130–450)
PMV BLD AUTO: 10.7 FL (ref 7–12)
POTASSIUM SERPL-SCNC: 4.7 MMOL/L (ref 3.5–5.1)
PROT SERPL-MCNC: 7 G/DL (ref 6.4–8.3)
RBC # BLD AUTO: 3.97 M/UL (ref 3.8–5.8)
SODIUM SERPL-SCNC: 139 MMOL/L (ref 136–145)
TSH SERPL DL<=0.05 MIU/L-ACNC: 9.53 UIU/ML (ref 0.27–4.2)
WBC OTHER # BLD: 7.4 K/UL (ref 4.5–11.5)

## 2025-06-20 PROCEDURE — 84443 ASSAY THYROID STIM HORMONE: CPT

## 2025-06-20 PROCEDURE — 92960 CARDIOVERSION ELECTRIC EXT: CPT

## 2025-06-20 PROCEDURE — 83735 ASSAY OF MAGNESIUM: CPT

## 2025-06-20 PROCEDURE — 7100000011 HC PHASE II RECOVERY - ADDTL 15 MIN: Performed by: INTERNAL MEDICINE

## 2025-06-20 PROCEDURE — 2580000003 HC RX 258

## 2025-06-20 PROCEDURE — 3700000000 HC ANESTHESIA ATTENDED CARE: Performed by: INTERNAL MEDICINE

## 2025-06-20 PROCEDURE — 80053 COMPREHEN METABOLIC PANEL: CPT

## 2025-06-20 PROCEDURE — 85025 COMPLETE CBC W/AUTO DIFF WBC: CPT

## 2025-06-20 PROCEDURE — 92960 CARDIOVERSION ELECTRIC EXT: CPT | Performed by: INTERNAL MEDICINE

## 2025-06-20 PROCEDURE — 7100000010 HC PHASE II RECOVERY - FIRST 15 MIN: Performed by: INTERNAL MEDICINE

## 2025-06-20 PROCEDURE — 6360000002 HC RX W HCPCS: Performed by: NURSE ANESTHETIST, CERTIFIED REGISTERED

## 2025-06-20 RX ORDER — LEVOTHYROXINE SODIUM 50 UG/1
50 TABLET ORAL DAILY
Qty: 90 TABLET | Refills: 3 | Status: SHIPPED | OUTPATIENT
Start: 2025-06-20

## 2025-06-20 RX ORDER — SODIUM CHLORIDE 0.9 % (FLUSH) 0.9 %
5-40 SYRINGE (ML) INJECTION PRN
Status: DISCONTINUED | OUTPATIENT
Start: 2025-06-20 | End: 2025-06-20 | Stop reason: HOSPADM

## 2025-06-20 RX ORDER — SODIUM CHLORIDE 9 MG/ML
INJECTION, SOLUTION INTRAVENOUS PRN
Status: DISCONTINUED | OUTPATIENT
Start: 2025-06-20 | End: 2025-06-20 | Stop reason: HOSPADM

## 2025-06-20 RX ORDER — SODIUM CHLORIDE 0.9 % (FLUSH) 0.9 %
5-40 SYRINGE (ML) INJECTION EVERY 12 HOURS SCHEDULED
Status: DISCONTINUED | OUTPATIENT
Start: 2025-06-20 | End: 2025-06-20 | Stop reason: HOSPADM

## 2025-06-20 RX ORDER — LIDOCAINE HYDROCHLORIDE 10 MG/ML
INJECTION, SOLUTION EPIDURAL; INFILTRATION; INTRACAUDAL; PERINEURAL
Status: DISCONTINUED | OUTPATIENT
Start: 2025-06-20 | End: 2025-06-20 | Stop reason: SDUPTHER

## 2025-06-20 RX ORDER — PROPOFOL 10 MG/ML
INJECTION, EMULSION INTRAVENOUS
Status: DISCONTINUED | OUTPATIENT
Start: 2025-06-20 | End: 2025-06-20 | Stop reason: SDUPTHER

## 2025-06-20 RX ORDER — SODIUM CHLORIDE 9 MG/ML
INJECTION, SOLUTION INTRAVENOUS
Status: DISCONTINUED | OUTPATIENT
Start: 2025-06-20 | End: 2025-06-20 | Stop reason: SDUPTHER

## 2025-06-20 RX ADMIN — PROPOFOL 10 MG: 10 INJECTION, EMULSION INTRAVENOUS at 10:16

## 2025-06-20 RX ADMIN — SODIUM CHLORIDE: 9 INJECTION, SOLUTION INTRAVENOUS at 09:36

## 2025-06-20 RX ADMIN — LIDOCAINE HYDROCHLORIDE 20 MG: 10 INJECTION, SOLUTION EPIDURAL; INFILTRATION; INTRACAUDAL; PERINEURAL at 10:11

## 2025-06-20 RX ADMIN — PROPOFOL 30 MG: 10 INJECTION, EMULSION INTRAVENOUS at 10:11

## 2025-06-20 ASSESSMENT — ENCOUNTER SYMPTOMS: SHORTNESS OF BREATH: 1

## 2025-06-20 NOTE — H&P
[facility] PH[facility]- The Heart and [facility] [place]-  Electrophysiology  History and physical  PATIENT: Niles Field  MEDICAL RECORD NUMBER: [id number]  DATE OF SERVICE:  6/20/2025  PRIMARY ELECTROPHYSIOLOGIST: Mohini Mercado MD  REFERRING PHYSICIAN: Mohini Mercado MD and Geremias Koehler DO    Patient here for cardioversion        HPI: 88-year-old male with history of nonvalvular persistent AF sp DCCV x 7 (12/2010, 5/30/2017, 10/29/2020, 9/4/2021, 5/13/2022, 2/9/2023, 9/12/2023), VT sp Medtronic dual-chamber ICD (DOI: 4/24/2007-passive fixation RA lead; RV ICD lead revision with capping/abandoning old lead 2/2 fracture: 4/7/2008; GEN change: 7/28/2016 and 4/13/2023), RBBB, CAD sp CABG (5/23/2003) and redo CABG (3/2007), AS sp TAVR (3/29/2017), carotid stenosis, AAA (2022: 5.5 cm), TIA, HTN, recurrent GI bleed (8/2013: PUD on Pradaxa; 3/2022: 3 AVMs in fundus of stomach treated with APC/clips on Xarelto and PPI; 5/2024: GI bleed of uncertain etiology on Xarelto), and CKD-3.  He is managed by Kia Jorgensen and Rogelio with amiodarone 200 mg daily, amlodipine 5 mg daily, apixaban 2.5 mg twice daily, Bumex 2 mg daily as needed, metoprolol 25 mg twice daily, pravastatin 40 mg daily, and PPI.   In 5/2003, patient diagnosed with multivessel CAD, which was treated with CABG.  Details not available.  In 3/2007, during stress test, patient had cardiac arrest/VT, which was treated with external defibrillation.  Shortly after, he underwent redo CABG and implant of dual-chamber ICD with passive fixation RA lead.  In 4/2008, patient had inappropriate shocks due to RV lead fracture, which was managed with capping/abandoning old lead and implanting new RV ICD lead.  In 2010, he was diagnosed with AF, which was treated with Pradaxa and DCCV.  In 8/2013, he had GI bleed due to pyloric ulcer.  Pradaxa was changed to Xarelto.  In 3/2017, he was diagnosed with severe AS, which was treated with TAVR.  In 5/2017, he had recurrence of AF,  ----------------------------------------------------------------   Electronically signed by Chin Henry MD(Interpreting   physician) on 03/02/2022 12:04 PM          Assessment/plan:    1.  VT sp Medtronic dual-chamber ICD (DOI: 4/24/2007-passive fixation RA lead; RV ICD lead revision with capping/abandoning old lead 2/2 fracture: 4/7/2008; GEN change: 7/28/2016 and 4/13/2023)  - Stable device function as on interrogation today    2.  Paroxysmal atrial fibrillation   Persistent since January 2025  - Initially diagnosed in 2010.  - [id number]-XRHh score = 6 (age, CAD, HTN, TIA).  Recommend OAC in males with score of 1 or more.  DOAC preferred.  - Patient has been cardioverted twice already.  Amiodarone increased to 300 mg daily    3.  HFpEF--currently functional class II NYHA  On GDMT as noted in med list      4.  CAD sp CABG (5/23/2003) and redo CABG (3/2007)  - Established with Dr. Jorgensen ([facility] cardiology).    5.  Aortic stenosis-status post TAVR    Recommendation    Repeat DC cardioversion today  Continue amiodarone at 300 mg daily    I discussed with them the risks & benefits of a cardioversion including but not limited to sedation, skin burns, and malignant dysrhythmias. Patient states that he understands and agrees to proceed.    All of the above was discussed with the patient and his family,50% of the time involved face-to-face time providing counseling and or coordination of care with the other providers,  reviewing records/tests, counseling/education of the patient, ordering medications/tests/procedures, coordinating care, and documenting clinical information in the EHR.   I personally and independently saw and examined patient and reviewed all done pertinent laboratory data, imaging studies, ECGs and rhythm strips.     Thank you for allowing me to participate in your patient's care.  Please call me if there are any questions or concerns.      Mohini Mercado MD  Cardiac Electrophysiology  [facility]

## 2025-06-20 NOTE — ANESTHESIA PRE PROCEDURE
Department of Anesthesiology  Preprocedure Note       Name:  Niles Field   Age:  89 y.o.  :  1935                                          MRN:  [id number]         Date:  2025      Surgeon:     Procedure: CARDIOVERSION EXTERNAL     Medications prior to admission:   Prior to Admission medications    Medication Sig Start Date End Date Taking? Authorizing Provider   apixaban (ELIQUIS) 5 MG TABS tablet Take 1 tablet by mouth 2 times daily 25  Yes Omega Katz, APRN - CNP   metoprolol tartrate (LOPRESSOR) 25 MG tablet Take 1 tablet by mouth 2 times daily 25  Yes Geremias Koehler DO   amiodarone (CORDARONE) 200 MG tablet Take 1 tablet by mouth daily 25  Yes Geremias Koehler,    bumetanide (BUMEX) 2 MG tablet TAKE 1 TABLET BY MOUTH DAILY ;  MAY TAKE A SECOND DOSE AS NEEDED AS DIRECTED 10/1/24  Yes Geremias Koehler DO   pravastatin (PRAVACHOL) 40 MG tablet Take 1 tablet by mouth daily  Patient taking differently: Take 1 tablet by mouth at bedtime 9/3/24  Yes Geremias Koehler, DO   Multiple Vitamins-Minerals (PRESERVISION AREDS PO) Take by mouth 2 times daily   Yes ProviderXiomara MD   ferrous sulfate (IRON 325) 325 (65 Fe) MG tablet Take 1 tablet by mouth every other day  Patient taking differently: Take 1 tablet by mouth every other day M-W-F 24  Yes Geremias Koehler DO   pantoprazole (PROTONIX) 40 MG tablet TAKE 1 TABLET BY MOUTH DAILY 24  Yes Geremias Koehler,    MAGNESIUM-OXIDE 400 (240 Mg) MG tablet Take 1 tablet by mouth Daily with supper 3/18/20  Yes ProviderXiomara MD   Cholecalciferol (VITAMIN D) 2000 UNITS CAPS capsule Take 1 capsule by mouth Daily with lunch   Yes ProviderXiomara MD   ALPRAZolam (XANAX) 0.25 MG tablet Take 1.5 tablets by mouth nightly as needed for Sleep for up to 90 days. Max Daily Amount: 0.375 mg 25  Geremias Koehler DO   OXYGEN Inhale 2 L into the lungs nightly 2L at nite 24   Gwendolyn Finnegan MD

## 2025-06-20 NOTE — PROGRESS NOTES
Dr. Mercado at bedside. OK for discharge. IV removed. Discharge instructions given, patient and family verbalizes understanding. Patient discharged to home.

## 2025-06-20 NOTE — ANESTHESIA POSTPROCEDURE EVALUATION
Department of Anesthesiology  Postprocedure Note    Patient: Niles Field  MRN: [id number]  YOB: 1935  Date of evaluation: 6/20/2025    Procedure Summary       Date: 06/20/25 Room / Location: [facility] Cardiac Cath Lab    Anesthesia Start: 1007 Anesthesia Stop: 1021    Procedure: CARDIOVERSION EXTERNAL Diagnosis:       Persistent atrial fibrillation (HCC)      Paroxysmal atrial fibrillation (HCC)    Scheduled Providers: Mabel Minaya MD Responsible Provider: Mabel Minaya MD    Anesthesia Type: MAC ASA Status: 3            Anesthesia Type: No value filed.    Alfred Phase I:      Alfred Phase II:      Anesthesia Post Evaluation    Patient location during evaluation: PACU  Patient participation: complete - patient participated  Level of consciousness: awake and alert  Airway patency: patent  Nausea & Vomiting: no nausea and no vomiting  Cardiovascular status: blood pressure returned to baseline and hemodynamically stable  Respiratory status: acceptable and spontaneous ventilation  Hydration status: euvolemic  Multimodal analgesia pain management approach  Pain management: adequate    No notable events documented.

## 2025-06-29 PROCEDURE — 93296 REM INTERROG EVL PM/IDS: CPT | Performed by: INTERNAL MEDICINE

## 2025-06-29 PROCEDURE — 93295 DEV INTERROG REMOTE 1/2/MLT: CPT | Performed by: INTERNAL MEDICINE

## 2025-07-01 ENCOUNTER — TELEPHONE (OUTPATIENT)
Dept: ADMINISTRATIVE | Age: 89
End: 2025-07-01

## 2025-07-01 ENCOUNTER — TELEPHONE (OUTPATIENT)
Dept: PRIMARY CARE CLINIC | Age: 89
End: 2025-07-01

## 2025-07-01 NOTE — TELEPHONE ENCOUNTER
Patient having an ongoing cough. Coughing up mucous, but now the mucous seems to be coming from his chest. Does not have transportation. Asking if you can send something in for him.     Yana Child

## 2025-07-01 NOTE — TELEPHONE ENCOUNTER
Ree requested that 8/28 appt with Dr. Patel be cancelled.  Pt does not want appt and does not want any further treatment.

## 2025-07-02 RX ORDER — BENZONATATE 100 MG/1
100 CAPSULE ORAL 3 TIMES DAILY PRN
Qty: 30 CAPSULE | Refills: 0 | Status: SHIPPED | OUTPATIENT
Start: 2025-07-02 | End: 2025-07-12

## 2025-07-02 NOTE — TELEPHONE ENCOUNTER
Patient called again to see if you could call something in for his cough.  He really isn't able to come in.  Please call patient and let him know.

## 2025-07-07 ENCOUNTER — OFFICE VISIT (OUTPATIENT)
Dept: PRIMARY CARE CLINIC | Age: 89
End: 2025-07-07

## 2025-07-07 VITALS
BODY MASS INDEX: 24.91 KG/M2 | SYSTOLIC BLOOD PRESSURE: 126 MMHG | WEIGHT: 155 LBS | RESPIRATION RATE: 18 BRPM | DIASTOLIC BLOOD PRESSURE: 78 MMHG | OXYGEN SATURATION: 95 % | HEART RATE: 66 BPM | HEIGHT: 66 IN | TEMPERATURE: 97.2 F

## 2025-07-07 DIAGNOSIS — R05.1 ACUTE COUGH: Primary | ICD-10-CM

## 2025-07-07 RX ORDER — CODEINE PHOSPHATE AND GUAIFENESIN 10; 100 MG/5ML; MG/5ML
5 SOLUTION ORAL 4 TIMES DAILY PRN
Qty: 140 ML | Refills: 0 | Status: SHIPPED | OUTPATIENT
Start: 2025-07-07 | End: 2025-07-14

## 2025-07-07 RX ORDER — AZITHROMYCIN 250 MG/1
TABLET, FILM COATED ORAL
Qty: 6 TABLET | Refills: 0 | Status: SHIPPED | OUTPATIENT
Start: 2025-07-07 | End: 2025-07-17

## 2025-07-07 RX ORDER — METHYLPREDNISOLONE 4 MG/1
TABLET ORAL
Qty: 1 KIT | Refills: 0 | Status: SHIPPED | OUTPATIENT
Start: 2025-07-07

## 2025-07-07 RX ORDER — CEFDINIR 300 MG/1
300 CAPSULE ORAL 2 TIMES DAILY
Qty: 14 CAPSULE | Refills: 0 | Status: SHIPPED | OUTPATIENT
Start: 2025-07-07 | End: 2025-07-14

## 2025-07-07 ASSESSMENT — ENCOUNTER SYMPTOMS
TROUBLE SWALLOWING: 0
COUGH: 1
SORE THROAT: 0
GASTROINTESTINAL NEGATIVE: 1
EYES NEGATIVE: 1

## 2025-07-07 NOTE — PROGRESS NOTES
Alcohol use: No    Drug use: No    Sexual activity: Not on file   Other Topics Concern    Not on file   Social History Narrative    1 cup coffee daily; occ pop     Social Drivers of Health     Financial Resource Strain: Low Risk  (5/28/2024)    Overall Financial Resource Strain (CARDIA)     Difficulty of Paying Living Expenses: Not hard at all   Food Insecurity: No Food Insecurity (9/21/2024)    Hunger Vital Sign     Worried About Running Out of Food in the Last Year: Never true     Ran Out of Food in the Last Year: Never true   Transportation Needs: No Transportation Needs (9/21/2024)    PRAPARE - Transportation     Lack of Transportation (Medical): No     Lack of Transportation (Non-Medical): No   Physical Activity: Insufficiently Active (5/28/2024)    Exercise Vital Sign     Days of Exercise per Week: 2 days     Minutes of Exercise per Session: 40 min   Stress: No Stress Concern Present (5/28/2024)    [employer] - Occupational Stress Questionnaire     Feeling of Stress : Only a little   Social Connections: Moderately Integrated (5/28/2024)    Social Connection and Isolation Panel [NHANES]     Frequency of Communication with Friends and Family: More than three times a week     Frequency of Social Gatherings with Friends and Family: Twice a week     Attends L[employer] day Services: Never     Active Member of Clubs or Organizations: Yes     Attends Club or Organization Meetings: Never     Marital Status:    Intimate Partner Violence: Not on file   Housing Stability: Low Risk  (9/21/2024)    Housing Stability Vital Sign     Unable to Pay for Housing in the Last Year: No     Number of Times Moved in the Last Year: 1     Homeless in the Last Year: No     No family history on file.   There are no preventive care reminders to display for this patient.  There are no preventive care reminders to display for this patient.   There are no preventive care reminders to display for this patient.

## 2025-07-08 ENCOUNTER — RESULTS FOLLOW-UP (OUTPATIENT)
Dept: FAMILY MEDICINE CLINIC | Age: 89
End: 2025-07-08

## 2025-07-08 DIAGNOSIS — R91.8 MASS OF MIDDLE LOBE OF RIGHT LUNG: Primary | ICD-10-CM

## 2025-07-27 DIAGNOSIS — K92.1 GASTROINTESTINAL HEMORRHAGE WITH MELENA: ICD-10-CM

## 2025-07-28 RX ORDER — PRAVASTATIN SODIUM 40 MG
40 TABLET ORAL NIGHTLY
Qty: 90 TABLET | Refills: 3 | Status: SHIPPED | OUTPATIENT
Start: 2025-07-28

## 2025-07-28 RX ORDER — PANTOPRAZOLE SODIUM 40 MG/1
40 TABLET, DELAYED RELEASE ORAL DAILY
Qty: 90 TABLET | Refills: 3 | Status: SHIPPED | OUTPATIENT
Start: 2025-07-28

## 2025-07-29 ENCOUNTER — OFFICE VISIT (OUTPATIENT)
Dept: PRIMARY CARE CLINIC | Age: 89
End: 2025-07-29

## 2025-07-29 VITALS
HEIGHT: 65 IN | BODY MASS INDEX: 25.99 KG/M2 | WEIGHT: 156 LBS | TEMPERATURE: 97 F | SYSTOLIC BLOOD PRESSURE: 128 MMHG | HEART RATE: 60 BPM | OXYGEN SATURATION: 94 % | DIASTOLIC BLOOD PRESSURE: 60 MMHG

## 2025-07-29 DIAGNOSIS — I25.10 CORONARY ARTERY DISEASE INVOLVING NATIVE CORONARY ARTERY OF NATIVE HEART WITHOUT ANGINA PECTORIS: ICD-10-CM

## 2025-07-29 DIAGNOSIS — Z00.00 MEDICARE ANNUAL WELLNESS VISIT, SUBSEQUENT: Primary | ICD-10-CM

## 2025-07-29 DIAGNOSIS — E03.2 DRUG-INDUCED HYPOTHYROIDISM: ICD-10-CM

## 2025-07-29 DIAGNOSIS — N18.32 STAGE 3B CHRONIC KIDNEY DISEASE (HCC): ICD-10-CM

## 2025-07-29 DIAGNOSIS — I48.19 OTHER PERSISTENT ATRIAL FIBRILLATION (HCC): ICD-10-CM

## 2025-07-29 DIAGNOSIS — Z95.2 S/P TAVR (TRANSCATHETER AORTIC VALVE REPLACEMENT): ICD-10-CM

## 2025-07-29 DIAGNOSIS — I50.42 CHRONIC COMBINED SYSTOLIC AND DIASTOLIC CHF (CONGESTIVE HEART FAILURE) (HCC): ICD-10-CM

## 2025-07-29 RX ORDER — IPRATROPIUM BROMIDE 42 UG/1
2 SPRAY, METERED NASAL 4 TIMES DAILY PRN
Qty: 30 ML | Refills: 11 | Status: SHIPPED | OUTPATIENT
Start: 2025-07-29

## 2025-07-29 RX ORDER — LEVOTHYROXINE SODIUM 50 UG/1
50 TABLET ORAL DAILY
Qty: 90 TABLET | Refills: 1 | Status: SHIPPED | OUTPATIENT
Start: 2025-07-29

## 2025-07-29 RX ORDER — AMIODARONE HYDROCHLORIDE 100 MG/1
300 TABLET ORAL DAILY
Qty: 270 TABLET | Refills: 3 | Status: SHIPPED | OUTPATIENT
Start: 2025-07-29

## 2025-07-29 ASSESSMENT — PATIENT HEALTH QUESTIONNAIRE - PHQ9
1. LITTLE INTEREST OR PLEASURE IN DOING THINGS: NOT AT ALL
2. FEELING DOWN, DEPRESSED OR HOPELESS: SEVERAL DAYS
SUM OF ALL RESPONSES TO PHQ QUESTIONS 1-9: 1

## 2025-07-29 NOTE — PROGRESS NOTES
Medicare Annual Wellness Visit    [place] is here for Medicare AWV    Assessment & Plan   Medicare annual wellness visit, subsequent  Chronic combined systolic and diastolic CHF (congestive heart failure) (HCC)  Other persistent atrial fibrillation (HCC)  -     amiodarone (PACERONE) 100 MG tablet; Take 3 tablets by mouth daily, Disp-270 tablet, R-3Normal  Stage 3b chronic kidney disease (HCC)  Coronary artery disease involving native coronary artery of native heart without angina pectoris  S/P TAVR (transcatheter aortic valve replacement)  Drug-induced hypothyroidism  -     levothyroxine (SYNTHROID) 50 MCG tablet; Take 1 tablet by mouth daily, Disp-90 tablet, R-1Normal   Medication refilled.  Sent over medication to help with his rhinitis.  See him back as scheduled.  Baseline labs today.  See him back in 4 months.        Return in about 4 months (around 11/29/2025).     Subjective   Patient presents today for annual wellness visit.  Cough continues.  Has followed with electrophysiology.  Had his medications adjusted.  Amiodarone increased to 300 mg with Synthroid based on the amiodarone use.  No recurrence or decompensation of A-fib.  Has been having more issue with congestion at night and particularly after eating.  No fever or chills.  No chest pain or shortness of breath.  No other changes to medications.    Patient's complete Health Risk Assessment and screening values have been reviewed and are found in Flowsheets. The following problems were reviewed today and where indicated follow up appointments were made and/or referrals ordered.    Positive Risk Factor Screenings with Interventions:                 Dentist Screen:  Have you seen the dentist within the past year?: (!) No    Intervention:  Advised to schedule with their dentist    Hearing Screen:  Do you or your family notice any trouble with your hearing that hasn't been managed with hearing aids?: (!) Yes    Interventions:  Patient declines any

## 2025-08-07 ENCOUNTER — TELEPHONE (OUTPATIENT)
Dept: PRIMARY CARE CLINIC | Age: 89
End: 2025-08-07

## 2025-08-07 DIAGNOSIS — K92.1 GASTROINTESTINAL HEMORRHAGE WITH MELENA: ICD-10-CM

## 2025-08-07 DIAGNOSIS — M54.16 LUMBAR RADICULOPATHY: Primary | ICD-10-CM

## 2025-08-07 RX ORDER — PANTOPRAZOLE SODIUM 40 MG/1
40 TABLET, DELAYED RELEASE ORAL DAILY
Qty: 90 TABLET | Refills: 3 | Status: SHIPPED | OUTPATIENT
Start: 2025-08-07

## 2025-08-13 ENCOUNTER — HOSPITAL ENCOUNTER (OUTPATIENT)
Dept: OTHER | Age: 89
Setting detail: THERAPIES SERIES
Discharge: HOME | End: 2025-08-13
Payer: MEDICARE

## 2025-08-13 VITALS
SYSTOLIC BLOOD PRESSURE: 143 MMHG | WEIGHT: 156 LBS | DIASTOLIC BLOOD PRESSURE: 62 MMHG | HEART RATE: 55 BPM | BODY MASS INDEX: 25.96 KG/M2 | OXYGEN SATURATION: 96 % | RESPIRATION RATE: 18 BRPM

## 2025-08-13 LAB
ANION GAP SERPL CALCULATED.3IONS-SCNC: 14 MMOL/L (ref 7–16)
BNP SERPL-MCNC: 1133 PG/ML (ref 0–450)
BUN SERPL-MCNC: 22 MG/DL (ref 8–23)
CALCIUM SERPL-MCNC: 8.8 MG/DL (ref 8.8–10.2)
CHLORIDE SERPL-SCNC: 91 MMOL/L (ref 98–107)
CO2 SERPL-SCNC: 29 MMOL/L (ref 22–29)
CREAT SERPL-MCNC: 1.2 MG/DL (ref 0.7–1.2)
GFR, ESTIMATED: 58 ML/MIN/1.73M2
GLUCOSE SERPL-MCNC: 101 MG/DL (ref 74–99)
POTASSIUM SERPL-SCNC: 4.3 MMOL/L (ref 3.5–5.1)
SODIUM SERPL-SCNC: 133 MMOL/L (ref 136–145)

## 2025-08-13 PROCEDURE — 83880 ASSAY OF NATRIURETIC PEPTIDE: CPT

## 2025-08-13 PROCEDURE — 80048 BASIC METABOLIC PNL TOTAL CA: CPT

## 2025-08-13 PROCEDURE — 99214 OFFICE O/P EST MOD 30 MIN: CPT

## 2025-08-13 PROCEDURE — 36415 COLL VENOUS BLD VENIPUNCTURE: CPT

## 2025-08-22 DIAGNOSIS — E03.2 DRUG-INDUCED HYPOTHYROIDISM: ICD-10-CM

## 2025-08-22 DIAGNOSIS — K92.1 GASTROINTESTINAL HEMORRHAGE WITH MELENA: ICD-10-CM

## 2025-08-22 RX ORDER — PANTOPRAZOLE SODIUM 40 MG/1
40 TABLET, DELAYED RELEASE ORAL DAILY
Qty: 90 TABLET | Refills: 3 | Status: SHIPPED | OUTPATIENT
Start: 2025-08-22

## 2025-08-22 RX ORDER — LEVOTHYROXINE SODIUM 50 UG/1
50 TABLET ORAL DAILY
Qty: 90 TABLET | Refills: 1 | Status: SHIPPED | OUTPATIENT
Start: 2025-08-22

## (undated) DEVICE — GLOVE SURG SZ 7 L12IN FNGR THK79MIL GRN LTX FREE

## (undated) DEVICE — SURGICAL PROCEDURE PACK BASIC

## (undated) DEVICE — BLOCK BITE 60FR RUBBER ADLT DENTAL

## (undated) DEVICE — DRAPE EQUIP CAM UNIV 96X7 IN LASER BX INSRT BLU ELASTIC FEN

## (undated) DEVICE — SPONGE GZ W4XL4IN RAYON POLY FILL CVR W/ NONWOVEN FAB

## (undated) DEVICE — 1.5L THIN WALL CAN: Brand: CRD

## (undated) DEVICE — MEDI-TRACE CADENCE ADULT, PRE-CONNECT  DEFIBRILLATION ELECTRODE (10 PR/PK) - PHYSIO-CONTROL: Brand: MEDI-TRACE CADENCE

## (undated) DEVICE — ELECTRODE PT RET AD L9FT HI MOIST COND ADH HYDRGEL CORDED

## (undated) DEVICE — CONNECTOR TBNG AUX H2O JET DISP FOR OLY 160/180 SER

## (undated) DEVICE — REAGENT TEST UREASE RAPD CLOTEST F/

## (undated) DEVICE — SET SURG INSTR MINI VASC

## (undated) DEVICE — TOWEL,OR,DSP,ST,BLUE,DLX,10/PK,8PK/CS: Brand: MEDLINE

## (undated) DEVICE — GLOVE SURG SZ 7 L12IN FNGR THK94MIL TRNSLUC YEL LTX HYDRGEL

## (undated) DEVICE — DEFENDO AIR WATER SUCTION AND BIOPSY VALVE KIT FOR  OLYMPUS: Brand: DEFENDO AIR/WATER/SUCTION AND BIOPSY VALVE

## (undated) DEVICE — FORCEPS BX L160CM JAW DIA2.4MM YEL L CAP W/ NDL DISP RAD

## (undated) DEVICE — NEEDLE SYR 18GA L1.5IN RED PLAS HUB S STL BLNT FILL W/O

## (undated) DEVICE — NEEDLE HYPO 21GA L1.5IN GRN POLYPR HUB S STL REG BVL STR

## (undated) DEVICE — FIAPC® PROBE W/ FILTER 2200 A OD 2.3MM/6.9FR; L 2.2M/7.2FT: Brand: ERBE

## (undated) DEVICE — GRADUATE TRIANG MEASURE 1000ML BLK PRNT

## (undated) DEVICE — SNARE ENDOSCP L240CM LOOP W13MM SHTH DIA2.4MM SM OVL FLX

## (undated) DEVICE — SNARE POLYP M W27MMXL240CM OVL STIFF DISP CAPTIVATOR

## (undated) DEVICE — FORCEPS BX OVL CUP FEN DISPOSABLE CAP L 160CM RAD JAW 4

## (undated) DEVICE — TOWEL,OR,DSP,ST,BLUE,DLX,4/PK,20PK/CS: Brand: MEDLINE

## (undated) DEVICE — CANNULA NSL ORAL AD FOR CAPNOFLEX CO2 O2 AIRLFE

## (undated) DEVICE — GOWN,SIRUS,FABRNF,XL,20/CS: Brand: MEDLINE

## (undated) DEVICE — PACK,UNIVERSAL,NO GOWNS: Brand: MEDLINE

## (undated) DEVICE — SHEET,DRAPE,53X77,STERILE: Brand: MEDLINE

## (undated) DEVICE — GAUZE,SPONGE,POST-OP,4X3,STRL,LF: Brand: MEDLINE

## (undated) DEVICE — Device

## (undated) DEVICE — GAUZE,SPONGE,4"X4",8PLY,STRL,LF,10/TRAY: Brand: MEDLINE

## (undated) DEVICE — FORCEPS BX L240CM JAW DIA2.4MM ORNG L CAP W/ NDL DISP RAD

## (undated) DEVICE — CLIPPING DEVICE: Brand: RESOLUTION CLIP

## (undated) DEVICE — CONTAINER SPEC 60ML PH 7NEUTRAL BUFF FRMLN RDY TO USE

## (undated) DEVICE — SYRINGE MED 20ML STD CLR PLAS LUERSLIP TIP N CTRL DISP

## (undated) DEVICE — Device: Brand: QUICK-CROSS EXTREME SUPPORT CATHETER

## (undated) DEVICE — BITEBLOCK 54FR W/ DENT RIM BLOX

## (undated) DEVICE — FIAPC® PROBE W/ FILTER 2200 SC OD 2.3MM/6.9FR; L 2.2M/7.2FT: Brand: ERBE